# Patient Record
Sex: FEMALE | Race: WHITE | NOT HISPANIC OR LATINO | Employment: OTHER | URBAN - METROPOLITAN AREA
[De-identification: names, ages, dates, MRNs, and addresses within clinical notes are randomized per-mention and may not be internally consistent; named-entity substitution may affect disease eponyms.]

---

## 2017-01-10 ENCOUNTER — COMMUNICATION - HEALTHEAST (OUTPATIENT)
Dept: FAMILY MEDICINE | Facility: CLINIC | Age: 67
End: 2017-01-10

## 2017-01-10 DIAGNOSIS — F41.1 GENERALIZED ANXIETY DISORDER: ICD-10-CM

## 2017-02-28 ENCOUNTER — COMMUNICATION - HEALTHEAST (OUTPATIENT)
Dept: FAMILY MEDICINE | Facility: CLINIC | Age: 67
End: 2017-02-28

## 2017-02-28 DIAGNOSIS — R60.0 BILATERAL EDEMA OF LOWER EXTREMITY: ICD-10-CM

## 2017-02-28 DIAGNOSIS — I10 ESSENTIAL HYPERTENSION: ICD-10-CM

## 2017-03-13 ENCOUNTER — OFFICE VISIT - HEALTHEAST (OUTPATIENT)
Dept: FAMILY MEDICINE | Facility: CLINIC | Age: 67
End: 2017-03-13

## 2017-03-13 DIAGNOSIS — G44.009 CLUSTER HEADACHE: ICD-10-CM

## 2017-03-24 ENCOUNTER — COMMUNICATION - HEALTHEAST (OUTPATIENT)
Dept: FAMILY MEDICINE | Facility: CLINIC | Age: 67
End: 2017-03-24

## 2017-03-29 ENCOUNTER — OFFICE VISIT - HEALTHEAST (OUTPATIENT)
Dept: FAMILY MEDICINE | Facility: CLINIC | Age: 67
End: 2017-03-29

## 2017-03-29 DIAGNOSIS — G43.909 MIGRAINE: ICD-10-CM

## 2017-03-29 ASSESSMENT — MIFFLIN-ST. JEOR: SCORE: 1226.42

## 2017-04-01 ENCOUNTER — COMMUNICATION - HEALTHEAST (OUTPATIENT)
Dept: FAMILY MEDICINE | Facility: CLINIC | Age: 67
End: 2017-04-01

## 2017-04-06 ENCOUNTER — RECORDS - HEALTHEAST (OUTPATIENT)
Dept: ADMINISTRATIVE | Facility: OTHER | Age: 67
End: 2017-04-06

## 2017-04-20 ENCOUNTER — COMMUNICATION - HEALTHEAST (OUTPATIENT)
Dept: FAMILY MEDICINE | Facility: CLINIC | Age: 67
End: 2017-04-20

## 2017-04-20 DIAGNOSIS — F41.1 GENERALIZED ANXIETY DISORDER: ICD-10-CM

## 2017-04-27 ENCOUNTER — OFFICE VISIT - HEALTHEAST (OUTPATIENT)
Dept: FAMILY MEDICINE | Facility: CLINIC | Age: 67
End: 2017-04-27

## 2017-04-27 ENCOUNTER — COMMUNICATION - HEALTHEAST (OUTPATIENT)
Dept: FAMILY MEDICINE | Facility: CLINIC | Age: 67
End: 2017-04-27

## 2017-04-27 DIAGNOSIS — I10 HYPERTENSION: ICD-10-CM

## 2017-04-27 DIAGNOSIS — R60.0 PEDAL EDEMA: ICD-10-CM

## 2017-04-27 DIAGNOSIS — E55.9 VITAMIN D DEFICIENCY: ICD-10-CM

## 2017-04-27 DIAGNOSIS — G43.909 MIGRAINE: ICD-10-CM

## 2017-04-27 DIAGNOSIS — K43.9 HERNIA, VENTRAL: ICD-10-CM

## 2017-04-27 DIAGNOSIS — M54.9 BACK PAIN: ICD-10-CM

## 2017-04-27 ASSESSMENT — MIFFLIN-ST. JEOR: SCORE: 1230.96

## 2017-04-28 ENCOUNTER — OFFICE VISIT - HEALTHEAST (OUTPATIENT)
Dept: SURGERY | Facility: CLINIC | Age: 67
End: 2017-04-28

## 2017-04-28 ENCOUNTER — COMMUNICATION - HEALTHEAST (OUTPATIENT)
Dept: FAMILY MEDICINE | Facility: CLINIC | Age: 67
End: 2017-04-28

## 2017-04-28 DIAGNOSIS — G43.909 MIGRAINE: ICD-10-CM

## 2017-04-28 DIAGNOSIS — K43.2 INCISIONAL HERNIA, WITHOUT OBSTRUCTION OR GANGRENE: ICD-10-CM

## 2017-04-28 ASSESSMENT — MIFFLIN-ST. JEOR: SCORE: 1235.5

## 2017-05-01 ENCOUNTER — RECORDS - HEALTHEAST (OUTPATIENT)
Dept: ADMINISTRATIVE | Facility: OTHER | Age: 67
End: 2017-05-01

## 2017-05-11 ENCOUNTER — COMMUNICATION - HEALTHEAST (OUTPATIENT)
Dept: FAMILY MEDICINE | Facility: CLINIC | Age: 67
End: 2017-05-11

## 2017-05-11 ENCOUNTER — AMBULATORY - HEALTHEAST (OUTPATIENT)
Dept: FAMILY MEDICINE | Facility: CLINIC | Age: 67
End: 2017-05-11

## 2017-05-11 DIAGNOSIS — M25.569 KNEE PAIN: ICD-10-CM

## 2017-05-18 ENCOUNTER — OFFICE VISIT - HEALTHEAST (OUTPATIENT)
Dept: FAMILY MEDICINE | Facility: CLINIC | Age: 67
End: 2017-05-18

## 2017-05-18 DIAGNOSIS — G43.909 MIGRAINE: ICD-10-CM

## 2017-05-18 DIAGNOSIS — Z02.9 ADMINISTRATIVE ENCOUNTER: ICD-10-CM

## 2017-05-18 DIAGNOSIS — I10 HYPERTENSION: ICD-10-CM

## 2017-05-18 ASSESSMENT — MIFFLIN-ST. JEOR: SCORE: 1221.89

## 2017-05-20 ENCOUNTER — COMMUNICATION - HEALTHEAST (OUTPATIENT)
Dept: FAMILY MEDICINE | Facility: CLINIC | Age: 67
End: 2017-05-20

## 2017-05-22 ENCOUNTER — COMMUNICATION - HEALTHEAST (OUTPATIENT)
Dept: FAMILY MEDICINE | Facility: CLINIC | Age: 67
End: 2017-05-22

## 2017-05-22 DIAGNOSIS — G43.909 MIGRAINE: ICD-10-CM

## 2017-05-24 ENCOUNTER — COMMUNICATION - HEALTHEAST (OUTPATIENT)
Dept: FAMILY MEDICINE | Facility: CLINIC | Age: 67
End: 2017-05-24

## 2017-05-24 DIAGNOSIS — G43.909 MIGRAINE: ICD-10-CM

## 2017-05-26 ENCOUNTER — COMMUNICATION - HEALTHEAST (OUTPATIENT)
Dept: FAMILY MEDICINE | Facility: CLINIC | Age: 67
End: 2017-05-26

## 2017-05-29 ENCOUNTER — COMMUNICATION - HEALTHEAST (OUTPATIENT)
Dept: FAMILY MEDICINE | Facility: CLINIC | Age: 67
End: 2017-05-29

## 2017-05-29 DIAGNOSIS — G43.909 MIGRAINE: ICD-10-CM

## 2017-05-31 ENCOUNTER — COMMUNICATION - HEALTHEAST (OUTPATIENT)
Dept: FAMILY MEDICINE | Facility: CLINIC | Age: 67
End: 2017-05-31

## 2017-05-31 ENCOUNTER — OFFICE VISIT - HEALTHEAST (OUTPATIENT)
Dept: FAMILY MEDICINE | Facility: CLINIC | Age: 67
End: 2017-05-31

## 2017-05-31 DIAGNOSIS — K43.9 HERNIA, VENTRAL: ICD-10-CM

## 2017-05-31 DIAGNOSIS — Z01.818 PREOP EXAMINATION: ICD-10-CM

## 2017-05-31 DIAGNOSIS — F41.1 GENERALIZED ANXIETY DISORDER: ICD-10-CM

## 2017-05-31 DIAGNOSIS — E03.9 HYPOTHYROIDISM: ICD-10-CM

## 2017-05-31 ASSESSMENT — MIFFLIN-ST. JEOR: SCORE: 1204.89

## 2017-06-02 ENCOUNTER — SURGERY - HEALTHEAST (OUTPATIENT)
Dept: SURGERY | Facility: CLINIC | Age: 67
End: 2017-06-02

## 2017-06-02 ENCOUNTER — ANESTHESIA - HEALTHEAST (OUTPATIENT)
Dept: SURGERY | Facility: CLINIC | Age: 67
End: 2017-06-02

## 2017-06-02 ASSESSMENT — MIFFLIN-ST. JEOR: SCORE: 1196.95

## 2017-06-04 ASSESSMENT — MIFFLIN-ST. JEOR
SCORE: 1258.41
SCORE: 1244.8

## 2017-06-07 ENCOUNTER — COMMUNICATION - HEALTHEAST (OUTPATIENT)
Dept: FAMILY MEDICINE | Facility: CLINIC | Age: 67
End: 2017-06-07

## 2017-06-07 DIAGNOSIS — F41.1 GENERALIZED ANXIETY DISORDER: ICD-10-CM

## 2017-06-08 ENCOUNTER — COMMUNICATION - HEALTHEAST (OUTPATIENT)
Dept: FAMILY MEDICINE | Facility: CLINIC | Age: 67
End: 2017-06-08

## 2017-06-15 ENCOUNTER — COMMUNICATION - HEALTHEAST (OUTPATIENT)
Dept: FAMILY MEDICINE | Facility: CLINIC | Age: 67
End: 2017-06-15

## 2017-06-15 ENCOUNTER — OFFICE VISIT - HEALTHEAST (OUTPATIENT)
Dept: FAMILY MEDICINE | Facility: CLINIC | Age: 67
End: 2017-06-15

## 2017-06-15 DIAGNOSIS — M54.9 BACK PAIN: ICD-10-CM

## 2017-06-15 DIAGNOSIS — G43.909 MIGRAINE: ICD-10-CM

## 2017-06-15 DIAGNOSIS — Z13.9 SCREENING: ICD-10-CM

## 2017-06-15 DIAGNOSIS — F41.1 GENERALIZED ANXIETY DISORDER: ICD-10-CM

## 2017-06-15 DIAGNOSIS — K43.9 HERNIA, VENTRAL: ICD-10-CM

## 2017-06-15 ASSESSMENT — MIFFLIN-ST. JEOR: SCORE: 1182.21

## 2017-06-16 ENCOUNTER — OFFICE VISIT - HEALTHEAST (OUTPATIENT)
Dept: SURGERY | Facility: CLINIC | Age: 67
End: 2017-06-16

## 2017-06-16 DIAGNOSIS — K43.2 INCISIONAL HERNIA, WITHOUT OBSTRUCTION OR GANGRENE: ICD-10-CM

## 2017-06-16 ASSESSMENT — MIFFLIN-ST. JEOR: SCORE: 1175.4

## 2017-07-05 ENCOUNTER — COMMUNICATION - HEALTHEAST (OUTPATIENT)
Dept: SURGERY | Facility: CLINIC | Age: 67
End: 2017-07-05

## 2017-07-06 ENCOUNTER — OFFICE VISIT - HEALTHEAST (OUTPATIENT)
Dept: SURGERY | Facility: CLINIC | Age: 67
End: 2017-07-06

## 2017-07-06 DIAGNOSIS — R11.2 NON-INTRACTABLE VOMITING WITH NAUSEA, UNSPECIFIED VOMITING TYPE: ICD-10-CM

## 2017-07-06 DIAGNOSIS — K43.2 INCISIONAL HERNIA, WITHOUT OBSTRUCTION OR GANGRENE: ICD-10-CM

## 2017-07-11 ENCOUNTER — HOSPITAL ENCOUNTER (OUTPATIENT)
Dept: MAMMOGRAPHY | Facility: CLINIC | Age: 67
Discharge: HOME | End: 2017-07-11
Attending: FAMILY MEDICINE

## 2017-07-11 DIAGNOSIS — Z13.9 SCREENING: ICD-10-CM

## 2017-07-12 ENCOUNTER — HOSPITAL ENCOUNTER (OUTPATIENT)
Dept: CT IMAGING | Facility: CLINIC | Age: 67
Discharge: HOME | End: 2017-07-12
Attending: INTERNAL MEDICINE

## 2017-07-12 ENCOUNTER — RECORDS - HEALTHEAST (OUTPATIENT)
Dept: ADMINISTRATIVE | Facility: OTHER | Age: 67
End: 2017-07-12

## 2017-07-12 DIAGNOSIS — R10.30 LOWER ABDOMINAL PAIN: ICD-10-CM

## 2017-07-13 ENCOUNTER — RECORDS - HEALTHEAST (OUTPATIENT)
Dept: ADMINISTRATIVE | Facility: OTHER | Age: 67
End: 2017-07-13

## 2017-07-24 ENCOUNTER — RECORDS - HEALTHEAST (OUTPATIENT)
Dept: ADMINISTRATIVE | Facility: OTHER | Age: 67
End: 2017-07-24

## 2017-08-01 ENCOUNTER — AMBULATORY - HEALTHEAST (OUTPATIENT)
Dept: FAMILY MEDICINE | Facility: CLINIC | Age: 67
End: 2017-08-01

## 2017-08-01 DIAGNOSIS — K59.00 CONSTIPATION: ICD-10-CM

## 2017-08-15 ENCOUNTER — RECORDS - HEALTHEAST (OUTPATIENT)
Dept: ADMINISTRATIVE | Facility: OTHER | Age: 67
End: 2017-08-15

## 2017-08-22 ENCOUNTER — COMMUNICATION - HEALTHEAST (OUTPATIENT)
Dept: FAMILY MEDICINE | Facility: CLINIC | Age: 67
End: 2017-08-22

## 2017-08-22 DIAGNOSIS — G43.909 MIGRAINE: ICD-10-CM

## 2017-08-23 ENCOUNTER — COMMUNICATION - HEALTHEAST (OUTPATIENT)
Dept: FAMILY MEDICINE | Facility: CLINIC | Age: 67
End: 2017-08-23

## 2017-08-23 DIAGNOSIS — G43.909 MIGRAINES: ICD-10-CM

## 2017-08-23 DIAGNOSIS — R60.0 BILATERAL EDEMA OF LOWER EXTREMITY: ICD-10-CM

## 2017-08-23 DIAGNOSIS — I10 ESSENTIAL HYPERTENSION: ICD-10-CM

## 2017-08-24 ENCOUNTER — OFFICE VISIT - HEALTHEAST (OUTPATIENT)
Dept: NURSING | Facility: CLINIC | Age: 67
End: 2017-08-24

## 2017-08-24 DIAGNOSIS — Z78.0 MENOPAUSE: ICD-10-CM

## 2017-08-24 DIAGNOSIS — F41.1 GENERALIZED ANXIETY DISORDER: ICD-10-CM

## 2017-08-24 DIAGNOSIS — G25.81 RESTLESS LEG SYNDROME: ICD-10-CM

## 2017-08-24 DIAGNOSIS — G43.801 OTHER FORMS OF MIGRAINE WITH STATUS MIGRAINOSUS: ICD-10-CM

## 2017-08-24 DIAGNOSIS — E55.9 VITAMIN D DEFICIENCY: ICD-10-CM

## 2017-08-24 DIAGNOSIS — E03.9 HYPOTHYROIDISM, UNSPECIFIED TYPE: ICD-10-CM

## 2017-08-24 DIAGNOSIS — I10 ESSENTIAL HYPERTENSION WITH GOAL BLOOD PRESSURE LESS THAN 140/90: ICD-10-CM

## 2017-08-24 DIAGNOSIS — R25.2 MUSCLE CRAMPS: ICD-10-CM

## 2017-08-30 ENCOUNTER — COMMUNICATION - HEALTHEAST (OUTPATIENT)
Dept: FAMILY MEDICINE | Facility: CLINIC | Age: 67
End: 2017-08-30

## 2017-09-05 ENCOUNTER — RECORDS - HEALTHEAST (OUTPATIENT)
Dept: ADMINISTRATIVE | Facility: OTHER | Age: 67
End: 2017-09-05

## 2017-09-10 ENCOUNTER — OFFICE VISIT - HEALTHEAST (OUTPATIENT)
Dept: TELEHEALTH | Facility: CLINIC | Age: 67
End: 2017-09-10

## 2017-09-10 DIAGNOSIS — N39.0 ACUTE UTI (URINARY TRACT INFECTION): ICD-10-CM

## 2017-09-11 ENCOUNTER — RECORDS - HEALTHEAST (OUTPATIENT)
Dept: ADMINISTRATIVE | Facility: OTHER | Age: 67
End: 2017-09-11

## 2017-09-12 ENCOUNTER — RECORDS - HEALTHEAST (OUTPATIENT)
Dept: ADMINISTRATIVE | Facility: OTHER | Age: 67
End: 2017-09-12

## 2017-09-12 ENCOUNTER — RECORDS - HEALTHEAST (OUTPATIENT)
Dept: BONE DENSITY | Facility: CLINIC | Age: 67
End: 2017-09-12

## 2017-09-12 DIAGNOSIS — Z78.0 ASYMPTOMATIC MENOPAUSAL STATE: ICD-10-CM

## 2017-09-14 ENCOUNTER — AMBULATORY - HEALTHEAST (OUTPATIENT)
Dept: FAMILY MEDICINE | Facility: CLINIC | Age: 67
End: 2017-09-14

## 2017-09-14 DIAGNOSIS — M81.0 OSTEOPOROSIS: ICD-10-CM

## 2017-09-15 ENCOUNTER — COMMUNICATION - HEALTHEAST (OUTPATIENT)
Dept: ENDOCRINOLOGY | Facility: CLINIC | Age: 67
End: 2017-09-15

## 2017-09-15 ENCOUNTER — COMMUNICATION - HEALTHEAST (OUTPATIENT)
Dept: FAMILY MEDICINE | Facility: CLINIC | Age: 67
End: 2017-09-15

## 2017-09-18 ENCOUNTER — COMMUNICATION - HEALTHEAST (OUTPATIENT)
Dept: FAMILY MEDICINE | Facility: CLINIC | Age: 67
End: 2017-09-18

## 2017-09-18 ENCOUNTER — OFFICE VISIT - HEALTHEAST (OUTPATIENT)
Dept: FAMILY MEDICINE | Facility: CLINIC | Age: 67
End: 2017-09-18

## 2017-09-18 DIAGNOSIS — M54.50 CHRONIC LOW BACK PAIN: ICD-10-CM

## 2017-09-18 DIAGNOSIS — F41.1 GENERALIZED ANXIETY DISORDER: ICD-10-CM

## 2017-09-18 DIAGNOSIS — G89.29 CHRONIC LOW BACK PAIN: ICD-10-CM

## 2017-09-25 ENCOUNTER — RECORDS - HEALTHEAST (OUTPATIENT)
Dept: ADMINISTRATIVE | Facility: OTHER | Age: 67
End: 2017-09-25

## 2017-09-27 ENCOUNTER — RECORDS - HEALTHEAST (OUTPATIENT)
Dept: ADMINISTRATIVE | Facility: OTHER | Age: 67
End: 2017-09-27

## 2017-09-28 ENCOUNTER — OFFICE VISIT - HEALTHEAST (OUTPATIENT)
Dept: NURSING | Facility: CLINIC | Age: 67
End: 2017-09-28

## 2017-09-28 DIAGNOSIS — M81.0 OSTEOPOROSIS, UNSPECIFIED OSTEOPOROSIS TYPE, UNSPECIFIED PATHOLOGICAL FRACTURE PRESENCE: ICD-10-CM

## 2017-09-28 DIAGNOSIS — F41.1 GENERALIZED ANXIETY DISORDER: ICD-10-CM

## 2017-09-28 DIAGNOSIS — I10 ESSENTIAL HYPERTENSION WITH GOAL BLOOD PRESSURE LESS THAN 140/90: ICD-10-CM

## 2017-10-03 ENCOUNTER — RECORDS - HEALTHEAST (OUTPATIENT)
Dept: ADMINISTRATIVE | Facility: OTHER | Age: 67
End: 2017-10-03

## 2017-10-04 ENCOUNTER — COMMUNICATION - HEALTHEAST (OUTPATIENT)
Dept: NURSING | Facility: CLINIC | Age: 67
End: 2017-10-04

## 2017-10-04 ENCOUNTER — AMBULATORY - HEALTHEAST (OUTPATIENT)
Dept: FAMILY MEDICINE | Facility: CLINIC | Age: 67
End: 2017-10-04

## 2017-10-04 DIAGNOSIS — K57.90 DIVERTICULOSIS: ICD-10-CM

## 2017-10-05 ENCOUNTER — COMMUNICATION - HEALTHEAST (OUTPATIENT)
Dept: FAMILY MEDICINE | Facility: CLINIC | Age: 67
End: 2017-10-05

## 2017-10-05 DIAGNOSIS — I10 ESSENTIAL HYPERTENSION: ICD-10-CM

## 2017-10-05 DIAGNOSIS — F41.1 GENERALIZED ANXIETY DISORDER: ICD-10-CM

## 2017-10-05 DIAGNOSIS — E03.9 HYPOTHYROID: ICD-10-CM

## 2017-10-05 DIAGNOSIS — I10 ESSENTIAL HYPERTENSION WITH GOAL BLOOD PRESSURE LESS THAN 140/90: ICD-10-CM

## 2017-10-06 ENCOUNTER — COMMUNICATION - HEALTHEAST (OUTPATIENT)
Dept: FAMILY MEDICINE | Facility: CLINIC | Age: 67
End: 2017-10-06

## 2017-10-07 ENCOUNTER — COMMUNICATION - HEALTHEAST (OUTPATIENT)
Dept: FAMILY MEDICINE | Facility: CLINIC | Age: 67
End: 2017-10-07

## 2017-10-07 DIAGNOSIS — G25.81 RLS (RESTLESS LEGS SYNDROME): ICD-10-CM

## 2017-10-10 ENCOUNTER — COMMUNICATION - HEALTHEAST (OUTPATIENT)
Dept: NURSING | Facility: CLINIC | Age: 67
End: 2017-10-10

## 2017-10-12 ENCOUNTER — COMMUNICATION - HEALTHEAST (OUTPATIENT)
Dept: FAMILY MEDICINE | Facility: CLINIC | Age: 67
End: 2017-10-12

## 2017-10-12 ENCOUNTER — AMBULATORY - HEALTHEAST (OUTPATIENT)
Dept: FAMILY MEDICINE | Facility: CLINIC | Age: 67
End: 2017-10-12

## 2017-10-16 ENCOUNTER — HOSPITAL ENCOUNTER (OUTPATIENT)
Dept: MRI IMAGING | Facility: CLINIC | Age: 67
Discharge: HOME | End: 2017-10-16
Attending: INTERNAL MEDICINE

## 2017-10-16 DIAGNOSIS — R10.32 LEFT LOWER QUADRANT PAIN: ICD-10-CM

## 2017-10-16 ASSESSMENT — MIFFLIN-ST. JEOR: SCORE: 1177.67

## 2017-10-26 ENCOUNTER — COMMUNICATION - HEALTHEAST (OUTPATIENT)
Dept: FAMILY MEDICINE | Facility: CLINIC | Age: 67
End: 2017-10-26

## 2017-10-26 ENCOUNTER — COMMUNICATION - HEALTHEAST (OUTPATIENT)
Dept: NURSING | Facility: CLINIC | Age: 67
End: 2017-10-26

## 2017-10-26 ENCOUNTER — OFFICE VISIT - HEALTHEAST (OUTPATIENT)
Dept: FAMILY MEDICINE | Facility: CLINIC | Age: 67
End: 2017-10-26

## 2017-10-26 ENCOUNTER — RECORDS - HEALTHEAST (OUTPATIENT)
Dept: ADMINISTRATIVE | Facility: OTHER | Age: 67
End: 2017-10-26

## 2017-10-26 DIAGNOSIS — E55.9 VITAMIN D DEFICIENCY: ICD-10-CM

## 2017-10-26 DIAGNOSIS — M81.0 OSTEOPOROSIS WITHOUT CURRENT PATHOLOGICAL FRACTURE, UNSPECIFIED OSTEOPOROSIS TYPE: ICD-10-CM

## 2017-10-26 DIAGNOSIS — I10 ESSENTIAL HYPERTENSION: ICD-10-CM

## 2017-10-26 DIAGNOSIS — F41.1 GENERALIZED ANXIETY DISORDER: ICD-10-CM

## 2017-10-26 DIAGNOSIS — E03.9 HYPOTHYROIDISM, UNSPECIFIED TYPE: ICD-10-CM

## 2017-10-26 DIAGNOSIS — M54.9 BACK PAIN: ICD-10-CM

## 2017-10-30 ENCOUNTER — RECORDS - HEALTHEAST (OUTPATIENT)
Dept: ADMINISTRATIVE | Facility: OTHER | Age: 67
End: 2017-10-30

## 2017-11-07 ENCOUNTER — OFFICE VISIT - HEALTHEAST (OUTPATIENT)
Dept: FAMILY MEDICINE | Facility: CLINIC | Age: 67
End: 2017-11-07

## 2017-11-07 DIAGNOSIS — L20.82 FLEXURAL ECZEMA: ICD-10-CM

## 2017-11-07 DIAGNOSIS — E03.9 HYPOTHYROID: ICD-10-CM

## 2017-11-07 DIAGNOSIS — E03.9 HYPOTHYROIDISM, UNSPECIFIED TYPE: ICD-10-CM

## 2017-11-10 ENCOUNTER — COMMUNICATION - HEALTHEAST (OUTPATIENT)
Dept: FAMILY MEDICINE | Facility: CLINIC | Age: 67
End: 2017-11-10

## 2017-11-14 ENCOUNTER — OFFICE VISIT - HEALTHEAST (OUTPATIENT)
Dept: FAMILY MEDICINE | Facility: CLINIC | Age: 67
End: 2017-11-14

## 2017-11-14 ENCOUNTER — HOSPITAL ENCOUNTER (OUTPATIENT)
Dept: ULTRASOUND IMAGING | Facility: CLINIC | Age: 67
Discharge: HOME | End: 2017-11-14
Attending: INTERNAL MEDICINE

## 2017-11-14 DIAGNOSIS — R74.8 ELEVATED ALKALINE PHOSPHATASE LEVEL: ICD-10-CM

## 2017-11-14 DIAGNOSIS — R10.32 LLQ PAIN: ICD-10-CM

## 2017-11-14 DIAGNOSIS — E87.8 HYPOCHLOREMIA: ICD-10-CM

## 2017-11-14 DIAGNOSIS — E03.9 HYPOTHYROIDISM, UNSPECIFIED TYPE: ICD-10-CM

## 2017-11-14 DIAGNOSIS — G89.29 CHRONIC PAIN OF RIGHT KNEE: ICD-10-CM

## 2017-11-14 DIAGNOSIS — E87.1 HYPONATREMIA: ICD-10-CM

## 2017-11-14 DIAGNOSIS — M25.561 CHRONIC PAIN OF RIGHT KNEE: ICD-10-CM

## 2017-11-14 DIAGNOSIS — Z01.818 PREOP EXAMINATION: ICD-10-CM

## 2017-11-14 LAB
ATRIAL RATE - MUSE: 66 BPM
DIASTOLIC BLOOD PRESSURE - MUSE: NORMAL MMHG
INTERPRETATION ECG - MUSE: NORMAL
P AXIS - MUSE: 10 DEGREES
PR INTERVAL - MUSE: 128 MS
QRS DURATION - MUSE: 90 MS
QT - MUSE: 378 MS
QTC - MUSE: 396 MS
R AXIS - MUSE: 10 DEGREES
SYSTOLIC BLOOD PRESSURE - MUSE: NORMAL MMHG
T AXIS - MUSE: 19 DEGREES
VENTRICULAR RATE- MUSE: 66 BPM

## 2017-11-14 ASSESSMENT — MIFFLIN-ST. JEOR: SCORE: 1185.6

## 2017-11-16 ENCOUNTER — RECORDS - HEALTHEAST (OUTPATIENT)
Dept: ADMINISTRATIVE | Facility: OTHER | Age: 67
End: 2017-11-16

## 2017-11-20 ASSESSMENT — MIFFLIN-ST. JEOR: SCORE: 1185.6

## 2017-11-27 ENCOUNTER — RECORDS - HEALTHEAST (OUTPATIENT)
Dept: ADMINISTRATIVE | Facility: OTHER | Age: 67
End: 2017-11-27

## 2017-11-27 ENCOUNTER — ANESTHESIA - HEALTHEAST (OUTPATIENT)
Dept: SURGERY | Facility: CLINIC | Age: 67
End: 2017-11-27

## 2017-11-27 ENCOUNTER — SURGERY - HEALTHEAST (OUTPATIENT)
Dept: SURGERY | Facility: CLINIC | Age: 67
End: 2017-11-27

## 2017-12-07 ENCOUNTER — OFFICE VISIT - HEALTHEAST (OUTPATIENT)
Dept: NURSING | Facility: CLINIC | Age: 67
End: 2017-12-07

## 2017-12-07 ENCOUNTER — RECORDS - HEALTHEAST (OUTPATIENT)
Dept: ADMINISTRATIVE | Facility: OTHER | Age: 67
End: 2017-12-07

## 2017-12-07 DIAGNOSIS — G47.00 INSOMNIA, UNSPECIFIED TYPE: ICD-10-CM

## 2017-12-07 DIAGNOSIS — F32.A DEPRESSION, UNSPECIFIED DEPRESSION TYPE: ICD-10-CM

## 2017-12-07 DIAGNOSIS — F41.9 ANXIETY: ICD-10-CM

## 2017-12-07 DIAGNOSIS — Z96.651 STATUS POST TOTAL RIGHT KNEE REPLACEMENT: ICD-10-CM

## 2017-12-07 DIAGNOSIS — M81.0 OSTEOPOROSIS WITHOUT CURRENT PATHOLOGICAL FRACTURE, UNSPECIFIED OSTEOPOROSIS TYPE: ICD-10-CM

## 2017-12-13 ENCOUNTER — AMBULATORY - HEALTHEAST (OUTPATIENT)
Dept: FAMILY MEDICINE | Facility: CLINIC | Age: 67
End: 2017-12-13

## 2017-12-13 DIAGNOSIS — M81.0 OSTEOPOROSIS WITHOUT CURRENT PATHOLOGICAL FRACTURE, UNSPECIFIED OSTEOPOROSIS TYPE: ICD-10-CM

## 2017-12-14 ENCOUNTER — RECORDS - HEALTHEAST (OUTPATIENT)
Dept: ADMINISTRATIVE | Facility: OTHER | Age: 67
End: 2017-12-14

## 2018-01-05 ENCOUNTER — COMMUNICATION - HEALTHEAST (OUTPATIENT)
Dept: FAMILY MEDICINE | Facility: CLINIC | Age: 68
End: 2018-01-05

## 2018-01-05 DIAGNOSIS — F41.1 GENERALIZED ANXIETY DISORDER: ICD-10-CM

## 2018-01-05 DIAGNOSIS — I10 HTN (HYPERTENSION): ICD-10-CM

## 2018-01-15 ENCOUNTER — AMBULATORY - HEALTHEAST (OUTPATIENT)
Dept: LAB | Facility: CLINIC | Age: 68
End: 2018-01-15

## 2018-01-15 DIAGNOSIS — R74.8 ELEVATED ALKALINE PHOSPHATASE LEVEL: ICD-10-CM

## 2018-01-15 DIAGNOSIS — E87.1 HYPONATREMIA: ICD-10-CM

## 2018-01-15 DIAGNOSIS — E87.8 HYPOCHLOREMIA: ICD-10-CM

## 2018-01-15 LAB
ALBUMIN SERPL-MCNC: 3.4 G/DL (ref 3.5–5)
ALP SERPL-CCNC: 122 U/L (ref 45–120)
ALT SERPL W P-5'-P-CCNC: 22 U/L (ref 0–45)
ANION GAP SERPL CALCULATED.3IONS-SCNC: 11 MMOL/L (ref 5–18)
AST SERPL W P-5'-P-CCNC: 19 U/L (ref 0–40)
BILIRUB SERPL-MCNC: 0.4 MG/DL (ref 0–1)
BUN SERPL-MCNC: 15 MG/DL (ref 8–22)
CALCIUM SERPL-MCNC: 9 MG/DL (ref 8.5–10.5)
CHLORIDE BLD-SCNC: 100 MMOL/L (ref 98–107)
CO2 SERPL-SCNC: 24 MMOL/L (ref 22–31)
CREAT SERPL-MCNC: 0.69 MG/DL (ref 0.6–1.1)
GFR SERPL CREATININE-BSD FRML MDRD: >60 ML/MIN/1.73M2
GLUCOSE BLD-MCNC: 96 MG/DL (ref 70–125)
POTASSIUM BLD-SCNC: 4 MMOL/L (ref 3.5–5)
PROT SERPL-MCNC: 6.6 G/DL (ref 6–8)
SODIUM SERPL-SCNC: 135 MMOL/L (ref 136–145)

## 2018-01-24 ENCOUNTER — OFFICE VISIT - HEALTHEAST (OUTPATIENT)
Dept: FAMILY MEDICINE | Facility: CLINIC | Age: 68
End: 2018-01-24

## 2018-01-24 DIAGNOSIS — G47.00 INSOMNIA, UNSPECIFIED TYPE: ICD-10-CM

## 2018-01-24 DIAGNOSIS — S03.00XA DISLOCATION OF TEMPOROMANDIBULAR JOINT, INITIAL ENCOUNTER: ICD-10-CM

## 2018-01-24 ASSESSMENT — MIFFLIN-ST. JEOR: SCORE: 1190.14

## 2018-03-09 ENCOUNTER — COMMUNICATION - HEALTHEAST (OUTPATIENT)
Dept: FAMILY MEDICINE | Facility: CLINIC | Age: 68
End: 2018-03-09

## 2018-03-09 DIAGNOSIS — R60.0 BILATERAL EDEMA OF LOWER EXTREMITY: ICD-10-CM

## 2018-03-09 DIAGNOSIS — I10 ESSENTIAL HYPERTENSION: ICD-10-CM

## 2018-03-14 ENCOUNTER — RECORDS - HEALTHEAST (OUTPATIENT)
Dept: ADMINISTRATIVE | Facility: OTHER | Age: 68
End: 2018-03-14

## 2018-03-15 ENCOUNTER — AMBULATORY - HEALTHEAST (OUTPATIENT)
Dept: LAB | Facility: CLINIC | Age: 68
End: 2018-03-15

## 2018-03-15 DIAGNOSIS — E03.9 HYPOTHYROIDISM, UNSPECIFIED TYPE: ICD-10-CM

## 2018-03-15 LAB — TSH SERPL DL<=0.005 MIU/L-ACNC: 1.5 UIU/ML (ref 0.3–5)

## 2018-04-09 ENCOUNTER — RECORDS - HEALTHEAST (OUTPATIENT)
Dept: ADMINISTRATIVE | Facility: OTHER | Age: 68
End: 2018-04-09

## 2018-04-12 ENCOUNTER — AMBULATORY - HEALTHEAST (OUTPATIENT)
Dept: LAB | Facility: CLINIC | Age: 68
End: 2018-04-12

## 2018-04-12 ENCOUNTER — RECORDS - HEALTHEAST (OUTPATIENT)
Dept: ADMINISTRATIVE | Facility: OTHER | Age: 68
End: 2018-04-12

## 2018-04-12 DIAGNOSIS — M25.569 KNEE PAIN: ICD-10-CM

## 2018-04-12 LAB
C REACTIVE PROTEIN LHE: 0.4 MG/DL (ref 0–0.8)
ERYTHROCYTE [SEDIMENTATION RATE] IN BLOOD BY WESTERGREN METHOD: 16 MM/HR (ref 0–20)

## 2018-04-18 ENCOUNTER — HOSPITAL ENCOUNTER (OUTPATIENT)
Dept: ULTRASOUND IMAGING | Facility: CLINIC | Age: 68
Discharge: HOME | End: 2018-04-18
Attending: ORTHOPAEDIC SURGERY

## 2018-04-18 DIAGNOSIS — R52 PAIN: ICD-10-CM

## 2018-04-18 DIAGNOSIS — R60.9 SWELLING: ICD-10-CM

## 2018-04-19 ENCOUNTER — COMMUNICATION - HEALTHEAST (OUTPATIENT)
Dept: FAMILY MEDICINE | Facility: CLINIC | Age: 68
End: 2018-04-19

## 2018-04-24 ENCOUNTER — OFFICE VISIT - HEALTHEAST (OUTPATIENT)
Dept: FAMILY MEDICINE | Facility: CLINIC | Age: 68
End: 2018-04-24

## 2018-04-24 DIAGNOSIS — K57.90 DIVERTICULOSIS: ICD-10-CM

## 2018-04-24 DIAGNOSIS — E03.9 ACQUIRED HYPOTHYROIDISM: ICD-10-CM

## 2018-04-24 DIAGNOSIS — Z98.890 POSTOPERATIVE NAUSEA AND VOMITING: ICD-10-CM

## 2018-04-24 DIAGNOSIS — E87.1 HYPONATREMIA: ICD-10-CM

## 2018-04-24 DIAGNOSIS — R74.8 ELEVATED ALKALINE PHOSPHATASE LEVEL: ICD-10-CM

## 2018-04-24 DIAGNOSIS — M79.7 FIBROMYALGIA: ICD-10-CM

## 2018-04-24 DIAGNOSIS — R11.2 POSTOPERATIVE NAUSEA AND VOMITING: ICD-10-CM

## 2018-04-24 DIAGNOSIS — E87.8 HYPOCHLOREMIA: ICD-10-CM

## 2018-04-24 DIAGNOSIS — D62 ACUTE BLOOD LOSS AS CAUSE OF POSTOPERATIVE ANEMIA: ICD-10-CM

## 2018-04-24 LAB
ALBUMIN SERPL-MCNC: 3.6 G/DL (ref 3.5–5)
ALP SERPL-CCNC: 137 U/L (ref 45–120)
ALT SERPL W P-5'-P-CCNC: 22 U/L (ref 0–45)
ANION GAP SERPL CALCULATED.3IONS-SCNC: 13 MMOL/L (ref 5–18)
AST SERPL W P-5'-P-CCNC: 20 U/L (ref 0–40)
BASOPHILS # BLD AUTO: 0 THOU/UL (ref 0–0.2)
BASOPHILS NFR BLD AUTO: 1 % (ref 0–2)
BILIRUB SERPL-MCNC: 0.4 MG/DL (ref 0–1)
BUN SERPL-MCNC: 13 MG/DL (ref 8–22)
CALCIUM SERPL-MCNC: 9.5 MG/DL (ref 8.5–10.5)
CHLORIDE BLD-SCNC: 104 MMOL/L (ref 98–107)
CO2 SERPL-SCNC: 21 MMOL/L (ref 22–31)
CREAT SERPL-MCNC: 0.69 MG/DL (ref 0.6–1.1)
EOSINOPHIL # BLD AUTO: 0.2 THOU/UL (ref 0–0.4)
EOSINOPHIL NFR BLD AUTO: 3 % (ref 0–6)
ERYTHROCYTE [DISTWIDTH] IN BLOOD BY AUTOMATED COUNT: 11.8 % (ref 11–14.5)
GFR SERPL CREATININE-BSD FRML MDRD: >60 ML/MIN/1.73M2
GLUCOSE BLD-MCNC: 93 MG/DL (ref 70–125)
HCT VFR BLD AUTO: 37.2 % (ref 35–47)
HGB BLD-MCNC: 12.4 G/DL (ref 12–16)
LYMPHOCYTES # BLD AUTO: 1.4 THOU/UL (ref 0.8–4.4)
LYMPHOCYTES NFR BLD AUTO: 25 % (ref 20–40)
MCH RBC QN AUTO: 29.1 PG (ref 27–34)
MCHC RBC AUTO-ENTMCNC: 33.4 G/DL (ref 32–36)
MCV RBC AUTO: 87 FL (ref 80–100)
MONOCYTES # BLD AUTO: 0.6 THOU/UL (ref 0–0.9)
MONOCYTES NFR BLD AUTO: 11 % (ref 2–10)
NEUTROPHILS # BLD AUTO: 3.4 THOU/UL (ref 2–7.7)
NEUTROPHILS NFR BLD AUTO: 61 % (ref 50–70)
PLATELET # BLD AUTO: 326 THOU/UL (ref 140–440)
PMV BLD AUTO: 6.4 FL (ref 7–10)
POTASSIUM BLD-SCNC: 4.1 MMOL/L (ref 3.5–5)
PROT SERPL-MCNC: 7.1 G/DL (ref 6–8)
RBC # BLD AUTO: 4.27 MILL/UL (ref 3.8–5.4)
SODIUM SERPL-SCNC: 138 MMOL/L (ref 136–145)
WBC: 5.5 THOU/UL (ref 4–11)

## 2018-04-25 ENCOUNTER — RECORDS - HEALTHEAST (OUTPATIENT)
Dept: ADMINISTRATIVE | Facility: OTHER | Age: 68
End: 2018-04-25

## 2018-05-06 ENCOUNTER — COMMUNICATION - HEALTHEAST (OUTPATIENT)
Dept: FAMILY MEDICINE | Facility: CLINIC | Age: 68
End: 2018-05-06

## 2018-05-06 DIAGNOSIS — F41.1 GENERALIZED ANXIETY DISORDER: ICD-10-CM

## 2018-05-11 ENCOUNTER — TRANSFERRED RECORDS (OUTPATIENT)
Dept: HEALTH INFORMATION MANAGEMENT | Facility: CLINIC | Age: 68
End: 2018-05-11

## 2018-05-11 ENCOUNTER — RECORDS - HEALTHEAST (OUTPATIENT)
Dept: ADMINISTRATIVE | Facility: OTHER | Age: 68
End: 2018-05-11

## 2018-06-11 ENCOUNTER — COMMUNICATION - HEALTHEAST (OUTPATIENT)
Dept: FAMILY MEDICINE | Facility: CLINIC | Age: 68
End: 2018-06-11

## 2018-06-11 DIAGNOSIS — I10 ESSENTIAL HYPERTENSION: ICD-10-CM

## 2018-06-11 DIAGNOSIS — R60.0 BILATERAL EDEMA OF LOWER EXTREMITY: ICD-10-CM

## 2018-06-12 ENCOUNTER — HOSPITAL ENCOUNTER (OUTPATIENT)
Dept: PALLIATIVE MEDICINE | Facility: OTHER | Age: 68
Discharge: HOME | End: 2018-06-12
Attending: FAMILY MEDICINE

## 2018-06-12 DIAGNOSIS — G89.4 CHRONIC PAIN SYNDROME: ICD-10-CM

## 2018-06-12 DIAGNOSIS — M79.7 FIBROMYALGIA: ICD-10-CM

## 2018-06-12 ASSESSMENT — MIFFLIN-ST. JEOR: SCORE: 1217.35

## 2018-06-30 ENCOUNTER — COMMUNICATION - HEALTHEAST (OUTPATIENT)
Dept: FAMILY MEDICINE | Facility: CLINIC | Age: 68
End: 2018-06-30

## 2018-06-30 DIAGNOSIS — G25.81 RLS (RESTLESS LEGS SYNDROME): ICD-10-CM

## 2018-06-30 DIAGNOSIS — M26.609 TEMPOROMANDIBULAR JOINT SYNDROME: ICD-10-CM

## 2018-07-04 ENCOUNTER — COMMUNICATION - HEALTHEAST (OUTPATIENT)
Dept: FAMILY MEDICINE | Facility: CLINIC | Age: 68
End: 2018-07-04

## 2018-07-04 DIAGNOSIS — G25.81 RLS (RESTLESS LEGS SYNDROME): ICD-10-CM

## 2018-07-12 ENCOUNTER — RECORDS - HEALTHEAST (OUTPATIENT)
Dept: ADMINISTRATIVE | Facility: OTHER | Age: 68
End: 2018-07-12

## 2018-07-13 ENCOUNTER — COMMUNICATION - HEALTHEAST (OUTPATIENT)
Dept: INTERNAL MEDICINE | Facility: CLINIC | Age: 68
End: 2018-07-13

## 2018-07-18 ENCOUNTER — COMMUNICATION - HEALTHEAST (OUTPATIENT)
Dept: PALLIATIVE MEDICINE | Facility: OTHER | Age: 68
End: 2018-07-18

## 2018-07-19 ENCOUNTER — HOSPITAL ENCOUNTER (OUTPATIENT)
Dept: PALLIATIVE MEDICINE | Facility: OTHER | Age: 68
Discharge: HOME | End: 2018-07-19
Attending: PAIN MEDICINE | Admitting: PAIN MEDICINE

## 2018-07-19 DIAGNOSIS — M79.18 MYOFASCIAL PAIN: ICD-10-CM

## 2018-07-19 ASSESSMENT — MIFFLIN-ST. JEOR: SCORE: 1217.35

## 2018-07-20 ENCOUNTER — COMMUNICATION - HEALTHEAST (OUTPATIENT)
Dept: PALLIATIVE MEDICINE | Facility: OTHER | Age: 68
End: 2018-07-20

## 2018-07-20 ENCOUNTER — RECORDS - HEALTHEAST (OUTPATIENT)
Dept: ADMINISTRATIVE | Facility: OTHER | Age: 68
End: 2018-07-20

## 2018-07-25 ENCOUNTER — RECORDS - HEALTHEAST (OUTPATIENT)
Dept: ADMINISTRATIVE | Facility: OTHER | Age: 68
End: 2018-07-25

## 2018-07-31 ENCOUNTER — COMMUNICATION - HEALTHEAST (OUTPATIENT)
Dept: FAMILY MEDICINE | Facility: CLINIC | Age: 68
End: 2018-07-31

## 2018-07-31 DIAGNOSIS — I10 ESSENTIAL HYPERTENSION: ICD-10-CM

## 2018-08-02 ENCOUNTER — COMMUNICATION - HEALTHEAST (OUTPATIENT)
Dept: FAMILY MEDICINE | Facility: CLINIC | Age: 68
End: 2018-08-02

## 2018-08-02 DIAGNOSIS — I10 HTN (HYPERTENSION): ICD-10-CM

## 2018-08-03 ENCOUNTER — OFFICE VISIT - HEALTHEAST (OUTPATIENT)
Dept: FAMILY MEDICINE | Facility: CLINIC | Age: 68
End: 2018-08-03

## 2018-08-03 DIAGNOSIS — S81.812D LACERATION OF LEFT LOWER EXTREMITY, SUBSEQUENT ENCOUNTER: ICD-10-CM

## 2018-08-03 DIAGNOSIS — L03.116 CELLULITIS OF LEFT LOWER EXTREMITY: ICD-10-CM

## 2018-08-03 ASSESSMENT — MIFFLIN-ST. JEOR: SCORE: 1235.5

## 2018-08-05 LAB — BACTERIA SPEC CULT: NO GROWTH

## 2018-08-07 ENCOUNTER — RECORDS - HEALTHEAST (OUTPATIENT)
Dept: ADMINISTRATIVE | Facility: OTHER | Age: 68
End: 2018-08-07

## 2018-08-16 ENCOUNTER — OFFICE VISIT - HEALTHEAST (OUTPATIENT)
Dept: INTERNAL MEDICINE | Facility: CLINIC | Age: 68
End: 2018-08-16

## 2018-08-16 DIAGNOSIS — M79.7 FIBROMYALGIA: ICD-10-CM

## 2018-08-16 DIAGNOSIS — G43.909 MIGRAINE: ICD-10-CM

## 2018-08-16 DIAGNOSIS — E03.9 ACQUIRED HYPOTHYROIDISM: ICD-10-CM

## 2018-08-16 DIAGNOSIS — M81.0 OSTEOPOROSIS WITHOUT CURRENT PATHOLOGICAL FRACTURE, UNSPECIFIED OSTEOPOROSIS TYPE: ICD-10-CM

## 2018-08-16 DIAGNOSIS — F32.A DEPRESSION, UNSPECIFIED DEPRESSION TYPE: ICD-10-CM

## 2018-08-16 DIAGNOSIS — K21.9 GASTROESOPHAGEAL REFLUX DISEASE WITHOUT ESOPHAGITIS: ICD-10-CM

## 2018-08-16 DIAGNOSIS — I10 ESSENTIAL HYPERTENSION: ICD-10-CM

## 2018-08-16 ASSESSMENT — MIFFLIN-ST. JEOR: SCORE: 1242.08

## 2018-08-22 ENCOUNTER — COMMUNICATION - HEALTHEAST (OUTPATIENT)
Dept: INTERNAL MEDICINE | Facility: CLINIC | Age: 68
End: 2018-08-22

## 2018-08-22 ENCOUNTER — COMMUNICATION - HEALTHEAST (OUTPATIENT)
Dept: FAMILY MEDICINE | Facility: CLINIC | Age: 68
End: 2018-08-22

## 2018-08-22 DIAGNOSIS — G25.81 RLS (RESTLESS LEGS SYNDROME): ICD-10-CM

## 2018-08-23 ENCOUNTER — HOSPITAL ENCOUNTER (OUTPATIENT)
Dept: MAMMOGRAPHY | Facility: CLINIC | Age: 68
Discharge: HOME | End: 2018-08-23
Attending: INTERNAL MEDICINE

## 2018-08-23 DIAGNOSIS — Z12.31 VISIT FOR SCREENING MAMMOGRAM: ICD-10-CM

## 2018-08-24 ENCOUNTER — AMBULATORY - HEALTHEAST (OUTPATIENT)
Dept: INTERNAL MEDICINE | Facility: CLINIC | Age: 68
End: 2018-08-24

## 2018-08-24 ENCOUNTER — AMBULATORY - HEALTHEAST (OUTPATIENT)
Dept: NURSING | Facility: CLINIC | Age: 68
End: 2018-08-24

## 2018-08-24 DIAGNOSIS — M81.0 OSTEOPOROSIS WITHOUT CURRENT PATHOLOGICAL FRACTURE, UNSPECIFIED OSTEOPOROSIS TYPE: ICD-10-CM

## 2018-09-20 ENCOUNTER — COMMUNICATION - HEALTHEAST (OUTPATIENT)
Dept: FAMILY MEDICINE | Facility: CLINIC | Age: 68
End: 2018-09-20

## 2018-09-20 ENCOUNTER — COMMUNICATION - HEALTHEAST (OUTPATIENT)
Dept: INTERNAL MEDICINE | Facility: CLINIC | Age: 68
End: 2018-09-20

## 2018-09-20 ENCOUNTER — OFFICE VISIT - HEALTHEAST (OUTPATIENT)
Dept: INTERNAL MEDICINE | Facility: CLINIC | Age: 68
End: 2018-09-20

## 2018-09-20 DIAGNOSIS — R60.0 PERIPHERAL EDEMA: ICD-10-CM

## 2018-09-20 DIAGNOSIS — F41.1 GENERALIZED ANXIETY DISORDER: ICD-10-CM

## 2018-09-20 DIAGNOSIS — R60.0 BILATERAL EDEMA OF LOWER EXTREMITY: ICD-10-CM

## 2018-09-20 DIAGNOSIS — I10 ESSENTIAL HYPERTENSION: ICD-10-CM

## 2018-10-01 ENCOUNTER — COMMUNICATION - HEALTHEAST (OUTPATIENT)
Dept: FAMILY MEDICINE | Facility: CLINIC | Age: 68
End: 2018-10-01

## 2018-10-01 DIAGNOSIS — I10 ESSENTIAL HYPERTENSION WITH GOAL BLOOD PRESSURE LESS THAN 140/90: ICD-10-CM

## 2018-10-08 ENCOUNTER — COMMUNICATION - HEALTHEAST (OUTPATIENT)
Dept: FAMILY MEDICINE | Facility: CLINIC | Age: 68
End: 2018-10-08

## 2018-10-08 DIAGNOSIS — I10 ESSENTIAL HYPERTENSION WITH GOAL BLOOD PRESSURE LESS THAN 140/90: ICD-10-CM

## 2018-10-18 ENCOUNTER — OFFICE VISIT - HEALTHEAST (OUTPATIENT)
Dept: INTERNAL MEDICINE | Facility: CLINIC | Age: 68
End: 2018-10-18

## 2018-10-18 DIAGNOSIS — F32.A DEPRESSION, UNSPECIFIED DEPRESSION TYPE: ICD-10-CM

## 2018-10-18 DIAGNOSIS — M81.0 OSTEOPOROSIS: ICD-10-CM

## 2018-10-18 DIAGNOSIS — G25.81 RESTLESS LEG SYNDROME: ICD-10-CM

## 2018-10-18 DIAGNOSIS — Z00.00 ROUTINE GENERAL MEDICAL EXAMINATION AT A HEALTH CARE FACILITY: ICD-10-CM

## 2018-10-18 DIAGNOSIS — R60.0 BILATERAL LEG EDEMA: ICD-10-CM

## 2018-10-18 DIAGNOSIS — K21.9 GASTROESOPHAGEAL REFLUX DISEASE WITHOUT ESOPHAGITIS: ICD-10-CM

## 2018-10-18 DIAGNOSIS — I10 ESSENTIAL HYPERTENSION: ICD-10-CM

## 2018-10-18 DIAGNOSIS — G47.00 INSOMNIA, UNSPECIFIED TYPE: ICD-10-CM

## 2018-10-18 DIAGNOSIS — G25.81 RLS (RESTLESS LEGS SYNDROME): ICD-10-CM

## 2018-10-18 DIAGNOSIS — K57.90 DIVERTICULOSIS: ICD-10-CM

## 2018-10-18 DIAGNOSIS — E03.9 ACQUIRED HYPOTHYROIDISM: ICD-10-CM

## 2018-10-18 DIAGNOSIS — G43.909 MIGRAINE: ICD-10-CM

## 2018-10-18 DIAGNOSIS — M79.7 FIBROMYALGIA: ICD-10-CM

## 2018-10-18 DIAGNOSIS — R20.2 NUMBNESS AND TINGLING OF BOTH LEGS: ICD-10-CM

## 2018-10-18 DIAGNOSIS — R20.0 NUMBNESS AND TINGLING OF BOTH LEGS: ICD-10-CM

## 2018-10-18 DIAGNOSIS — Z00.00 ENCOUNTER FOR HEALTH MAINTENANCE EXAMINATION IN ADULT: ICD-10-CM

## 2018-10-18 ASSESSMENT — MIFFLIN-ST. JEOR: SCORE: 1256.59

## 2018-10-19 ENCOUNTER — COMMUNICATION - HEALTHEAST (OUTPATIENT)
Dept: INTERNAL MEDICINE | Facility: CLINIC | Age: 68
End: 2018-10-19

## 2018-10-19 ENCOUNTER — AMBULATORY - HEALTHEAST (OUTPATIENT)
Dept: LAB | Facility: CLINIC | Age: 68
End: 2018-10-19

## 2018-10-19 DIAGNOSIS — F32.A DEPRESSION, UNSPECIFIED DEPRESSION TYPE: ICD-10-CM

## 2018-10-19 DIAGNOSIS — K57.90 DIVERTICULOSIS: ICD-10-CM

## 2018-10-19 DIAGNOSIS — K21.9 GASTROESOPHAGEAL REFLUX DISEASE WITHOUT ESOPHAGITIS: ICD-10-CM

## 2018-10-19 DIAGNOSIS — M81.0 OSTEOPOROSIS: ICD-10-CM

## 2018-10-19 DIAGNOSIS — E03.9 ACQUIRED HYPOTHYROIDISM: ICD-10-CM

## 2018-10-19 DIAGNOSIS — R20.0 NUMBNESS AND TINGLING OF BOTH LEGS: ICD-10-CM

## 2018-10-19 DIAGNOSIS — G25.81 RESTLESS LEG SYNDROME: ICD-10-CM

## 2018-10-19 DIAGNOSIS — Z00.00 ENCOUNTER FOR HEALTH MAINTENANCE EXAMINATION IN ADULT: ICD-10-CM

## 2018-10-19 DIAGNOSIS — M79.7 FIBROMYALGIA: ICD-10-CM

## 2018-10-19 DIAGNOSIS — R20.2 NUMBNESS AND TINGLING OF BOTH LEGS: ICD-10-CM

## 2018-10-19 DIAGNOSIS — I10 ESSENTIAL HYPERTENSION: ICD-10-CM

## 2018-10-19 DIAGNOSIS — G43.909 MIGRAINE: ICD-10-CM

## 2018-10-19 DIAGNOSIS — G47.00 INSOMNIA, UNSPECIFIED TYPE: ICD-10-CM

## 2018-10-19 LAB
ALBUMIN SERPL-MCNC: 3.8 G/DL (ref 3.5–5)
ALBUMIN UR-MCNC: NEGATIVE MG/DL
ALP SERPL-CCNC: 99 U/L (ref 45–120)
ALT SERPL W P-5'-P-CCNC: 29 U/L (ref 0–45)
ANION GAP SERPL CALCULATED.3IONS-SCNC: 10 MMOL/L (ref 5–18)
APPEARANCE UR: CLEAR
AST SERPL W P-5'-P-CCNC: 27 U/L (ref 0–40)
BILIRUB SERPL-MCNC: 0.6 MG/DL (ref 0–1)
BILIRUB UR QL STRIP: NEGATIVE
BUN SERPL-MCNC: 17 MG/DL (ref 8–28)
CALCIUM SERPL-MCNC: 9.5 MG/DL (ref 8.5–10.5)
CALCIUM SERPL-MCNC: 9.5 MG/DL (ref 8.5–10.5)
CHLORIDE BLD-SCNC: 101 MMOL/L (ref 98–107)
CHOLEST SERPL-MCNC: 225 MG/DL
CO2 SERPL-SCNC: 26 MMOL/L (ref 22–31)
COLOR UR AUTO: YELLOW
CREAT SERPL-MCNC: 0.7 MG/DL (ref 0.6–1.1)
CREAT SERPL-MCNC: 0.72 MG/DL (ref 0.6–1.1)
ERYTHROCYTE [DISTWIDTH] IN BLOOD BY AUTOMATED COUNT: 12.8 % (ref 11–14.5)
FASTING STATUS PATIENT QL REPORTED: YES
FERRITIN SERPL-MCNC: 24 NG/ML (ref 10–130)
GFR SERPL CREATININE-BSD FRML MDRD: >60 ML/MIN/1.73M2
GFR SERPL CREATININE-BSD FRML MDRD: >60 ML/MIN/1.73M2
GLUCOSE BLD-MCNC: 87 MG/DL (ref 70–125)
GLUCOSE UR STRIP-MCNC: NEGATIVE MG/DL
HCT VFR BLD AUTO: 37.6 % (ref 35–47)
HDLC SERPL-MCNC: 54 MG/DL
HGB BLD-MCNC: 12.7 G/DL (ref 12–16)
HGB UR QL STRIP: NEGATIVE
IRON SATN MFR SERPL: 23 % (ref 20–50)
IRON SERPL-MCNC: 97 UG/DL (ref 42–175)
KETONES UR STRIP-MCNC: NEGATIVE MG/DL
LDLC SERPL CALC-MCNC: 143 MG/DL
LEUKOCYTE ESTERASE UR QL STRIP: ABNORMAL
MAGNESIUM SERPL-MCNC: 2.2 MG/DL (ref 1.8–2.6)
MCH RBC QN AUTO: 29.2 PG (ref 27–34)
MCHC RBC AUTO-ENTMCNC: 33.7 G/DL (ref 32–36)
MCV RBC AUTO: 87 FL (ref 80–100)
NITRATE UR QL: NEGATIVE
PH UR STRIP: 5.5 [PH] (ref 5–8)
PHOSPHATE SERPL-MCNC: 4 MG/DL (ref 2.5–4.5)
PLATELET # BLD AUTO: 318 THOU/UL (ref 140–440)
PMV BLD AUTO: 7.3 FL (ref 7–10)
POTASSIUM BLD-SCNC: 4.1 MMOL/L (ref 3.5–5)
PROT SERPL-MCNC: 6.4 G/DL (ref 6–8)
PTH-INTACT SERPL-MCNC: 82 PG/ML (ref 10–86)
RBC # BLD AUTO: 4.35 MILL/UL (ref 3.8–5.4)
SODIUM SERPL-SCNC: 137 MMOL/L (ref 136–145)
SP GR UR STRIP: 1.02 (ref 1–1.03)
TIBC SERPL-MCNC: 421 UG/DL (ref 313–563)
TRANSFERRIN SERPL-MCNC: 337 MG/DL (ref 212–360)
TRIGL SERPL-MCNC: 138 MG/DL
TSH SERPL DL<=0.005 MIU/L-ACNC: 2.15 UIU/ML (ref 0.3–5)
UROBILINOGEN UR STRIP-ACNC: ABNORMAL
VIT B12 SERPL-MCNC: 373 PG/ML (ref 213–816)
WBC: 5.9 THOU/UL (ref 4–11)

## 2018-10-20 LAB — BACTERIA SPEC CULT: NO GROWTH

## 2018-10-22 ENCOUNTER — AMBULATORY - HEALTHEAST (OUTPATIENT)
Dept: NURSING | Facility: CLINIC | Age: 68
End: 2018-10-22

## 2018-10-22 LAB
25(OH)D3 SERPL-MCNC: 25.3 NG/ML (ref 30–80)
25(OH)D3 SERPL-MCNC: 25.3 NG/ML (ref 30–80)

## 2018-10-23 LAB
ALBUMIN PERCENT: 60.3 % (ref 51–67)
ALBUMIN SERPL ELPH-MCNC: 4 G/DL (ref 3.2–4.7)
ALPHA 1 PERCENT: 2.8 % (ref 2–4)
ALPHA 2 PERCENT: 11.3 % (ref 5–13)
ALPHA1 GLOB SERPL ELPH-MCNC: 0.2 G/DL (ref 0.1–0.3)
ALPHA2 GLOB SERPL ELPH-MCNC: 0.8 G/DL (ref 0.4–0.9)
B-GLOBULIN SERPL ELPH-MCNC: 0.9 G/DL (ref 0.7–1.2)
BETA PERCENT: 14 % (ref 10–17)
GAMMA GLOB SERPL ELPH-MCNC: 0.8 G/DL (ref 0.6–1.4)
GAMMA GLOBULIN PERCENT: 11.6 % (ref 9–20)
PATH ICD:: NORMAL
PATH ICD:: NORMAL
PROT PATTERN SERPL ELPH-IMP: NORMAL
PROT PATTERN SERPL ELPH-IMP: NORMAL
PROT SERPL-MCNC: 6.7 G/DL (ref 6–8)
REVIEWING PATHOLOGIST: NORMAL
REVIEWING PATHOLOGIST: NORMAL
TOTAL PROTEIN RANDOM URINE MG/DL: 10 MG/DL

## 2018-11-02 ENCOUNTER — OFFICE VISIT - HEALTHEAST (OUTPATIENT)
Dept: VASCULAR SURGERY | Facility: CLINIC | Age: 68
End: 2018-11-02

## 2018-11-02 ENCOUNTER — COMMUNICATION - HEALTHEAST (OUTPATIENT)
Dept: VASCULAR SURGERY | Facility: CLINIC | Age: 68
End: 2018-11-02

## 2018-11-02 ENCOUNTER — RECORDS - HEALTHEAST (OUTPATIENT)
Dept: VASCULAR ULTRASOUND | Facility: CLINIC | Age: 68
End: 2018-11-02

## 2018-11-02 DIAGNOSIS — I87.2 VENOUS INSUFFICIENCY (CHRONIC) (PERIPHERAL): ICD-10-CM

## 2018-11-02 DIAGNOSIS — E66.9 OBESITY (BMI 30-39.9): ICD-10-CM

## 2018-11-02 DIAGNOSIS — R60.9 DEPENDENT EDEMA: ICD-10-CM

## 2018-11-02 DIAGNOSIS — Z98.890 H/O VEIN STRIPPING: ICD-10-CM

## 2018-11-02 DIAGNOSIS — R60.9 EDEMA, UNSPECIFIED: ICD-10-CM

## 2018-11-02 DIAGNOSIS — I87.2 VENOUS INSUFFICIENCY OF BOTH LOWER EXTREMITIES: ICD-10-CM

## 2018-11-02 DIAGNOSIS — Z96.653 PRESENCE OF ARTIFICIAL KNEE JOINT, BILATERAL: ICD-10-CM

## 2018-11-02 DIAGNOSIS — I87.303 VENOUS HYPERTENSION, CHRONIC, BILATERAL: ICD-10-CM

## 2018-11-02 DIAGNOSIS — I87.303 CHRONIC VENOUS HYPERTENSION (IDIOPATHIC) WITHOUT COMPLICATIONS OF BILATERAL LOWER EXTREMITY: ICD-10-CM

## 2018-11-02 DIAGNOSIS — E66.9 OBESITY, UNSPECIFIED: ICD-10-CM

## 2018-11-02 DIAGNOSIS — Z96.653 HISTORY OF BILATERAL KNEE REPLACEMENT: ICD-10-CM

## 2018-11-02 ASSESSMENT — MIFFLIN-ST. JEOR: SCORE: 1255.23

## 2018-11-08 ENCOUNTER — COMMUNICATION - HEALTHEAST (OUTPATIENT)
Dept: VASCULAR SURGERY | Facility: CLINIC | Age: 68
End: 2018-11-08

## 2018-11-09 ENCOUNTER — RECORDS - HEALTHEAST (OUTPATIENT)
Dept: ADMINISTRATIVE | Facility: OTHER | Age: 68
End: 2018-11-09

## 2018-11-14 ENCOUNTER — OFFICE VISIT - HEALTHEAST (OUTPATIENT)
Dept: VASCULAR SURGERY | Facility: CLINIC | Age: 68
End: 2018-11-14

## 2018-11-14 DIAGNOSIS — I83.891 SYMPTOMATIC VARICOSE VEINS OF RIGHT LOWER EXTREMITY: ICD-10-CM

## 2018-11-14 DIAGNOSIS — I87.2 VENOUS INSUFFICIENCY OF RIGHT LEG: ICD-10-CM

## 2018-11-16 ENCOUNTER — COMMUNICATION - HEALTHEAST (OUTPATIENT)
Dept: FAMILY MEDICINE | Facility: CLINIC | Age: 68
End: 2018-11-16

## 2018-11-16 DIAGNOSIS — I10 ESSENTIAL HYPERTENSION: ICD-10-CM

## 2018-11-16 DIAGNOSIS — E03.9 HYPOTHYROID: ICD-10-CM

## 2018-11-21 ENCOUNTER — COMMUNICATION - HEALTHEAST (OUTPATIENT)
Dept: VASCULAR SURGERY | Facility: CLINIC | Age: 68
End: 2018-11-21

## 2018-11-27 ENCOUNTER — COMMUNICATION - HEALTHEAST (OUTPATIENT)
Dept: INTERNAL MEDICINE | Facility: CLINIC | Age: 68
End: 2018-11-27

## 2018-11-28 ENCOUNTER — COMMUNICATION - HEALTHEAST (OUTPATIENT)
Dept: VASCULAR SURGERY | Facility: CLINIC | Age: 68
End: 2018-11-28

## 2018-12-04 ENCOUNTER — OFFICE VISIT - HEALTHEAST (OUTPATIENT)
Dept: INTERNAL MEDICINE | Facility: CLINIC | Age: 68
End: 2018-12-04

## 2018-12-04 ENCOUNTER — COMMUNICATION - HEALTHEAST (OUTPATIENT)
Dept: VASCULAR SURGERY | Facility: CLINIC | Age: 68
End: 2018-12-04

## 2018-12-04 DIAGNOSIS — I89.0 LYMPHEDEMA: ICD-10-CM

## 2018-12-04 DIAGNOSIS — R10.32 LEFT LOWER QUADRANT PAIN: ICD-10-CM

## 2018-12-07 ENCOUNTER — OFFICE VISIT - HEALTHEAST (OUTPATIENT)
Dept: PHYSICAL THERAPY | Facility: REHABILITATION | Age: 68
End: 2018-12-07

## 2018-12-07 DIAGNOSIS — I89.0 LYMPHEDEMA: ICD-10-CM

## 2018-12-07 DIAGNOSIS — M62.81 MUSCLE WEAKNESS (GENERALIZED): ICD-10-CM

## 2018-12-07 DIAGNOSIS — G25.81 RESTLESS LEG SYNDROME: ICD-10-CM

## 2018-12-07 DIAGNOSIS — M79.7 FIBROMYALGIA: ICD-10-CM

## 2018-12-07 DIAGNOSIS — R60.0 LOCALIZED EDEMA: ICD-10-CM

## 2018-12-07 DIAGNOSIS — F32.A DEPRESSION, UNSPECIFIED DEPRESSION TYPE: ICD-10-CM

## 2018-12-11 ENCOUNTER — RECORDS - HEALTHEAST (OUTPATIENT)
Dept: VASCULAR ULTRASOUND | Facility: CLINIC | Age: 68
End: 2018-12-11

## 2018-12-11 ENCOUNTER — HOSPITAL ENCOUNTER (OUTPATIENT)
Dept: CT IMAGING | Facility: CLINIC | Age: 68
Discharge: HOME | End: 2018-12-11
Attending: INTERNAL MEDICINE

## 2018-12-11 DIAGNOSIS — I89.0 LYMPHEDEMA: ICD-10-CM

## 2018-12-11 DIAGNOSIS — I83.891 VARICOSE VEINS OF RIGHT LOWER EXTREMITY WITH OTHER COMPLICATIONS: ICD-10-CM

## 2018-12-11 DIAGNOSIS — I87.2 VENOUS INSUFFICIENCY (CHRONIC) (PERIPHERAL): ICD-10-CM

## 2018-12-11 LAB
CREAT BLD-MCNC: 0.7 MG/DL
POC GFR AMER AF HE - HISTORICAL: >60 ML/MIN/1.73M2
POC GFR NON AMER AF HE - HISTORICAL: >60 ML/MIN/1.73M2

## 2018-12-13 ENCOUNTER — RECORDS - HEALTHEAST (OUTPATIENT)
Dept: VASCULAR ULTRASOUND | Facility: CLINIC | Age: 68
End: 2018-12-13

## 2018-12-13 DIAGNOSIS — I87.2 VENOUS INSUFFICIENCY (CHRONIC) (PERIPHERAL): ICD-10-CM

## 2018-12-13 DIAGNOSIS — I83.891 VARICOSE VEINS OF RIGHT LOWER EXTREMITY WITH OTHER COMPLICATIONS: ICD-10-CM

## 2018-12-17 ENCOUNTER — OFFICE VISIT - HEALTHEAST (OUTPATIENT)
Dept: SURGERY | Facility: CLINIC | Age: 68
End: 2018-12-17

## 2018-12-17 DIAGNOSIS — R10.32 ABDOMINAL PAIN, LEFT LOWER QUADRANT: ICD-10-CM

## 2018-12-17 ASSESSMENT — MIFFLIN-ST. JEOR: SCORE: 1246.61

## 2018-12-18 ENCOUNTER — OFFICE VISIT - HEALTHEAST (OUTPATIENT)
Dept: PHYSICAL THERAPY | Facility: REHABILITATION | Age: 68
End: 2018-12-18

## 2018-12-18 DIAGNOSIS — F32.A DEPRESSION, UNSPECIFIED DEPRESSION TYPE: ICD-10-CM

## 2018-12-18 DIAGNOSIS — I89.0 LYMPHEDEMA: ICD-10-CM

## 2018-12-18 DIAGNOSIS — M79.7 FIBROMYALGIA: ICD-10-CM

## 2018-12-18 DIAGNOSIS — G25.81 RESTLESS LEG SYNDROME: ICD-10-CM

## 2018-12-18 DIAGNOSIS — M62.81 MUSCLE WEAKNESS (GENERALIZED): ICD-10-CM

## 2018-12-18 DIAGNOSIS — R60.0 LOCALIZED EDEMA: ICD-10-CM

## 2018-12-21 ENCOUNTER — OFFICE VISIT - HEALTHEAST (OUTPATIENT)
Dept: PHYSICAL THERAPY | Facility: REHABILITATION | Age: 68
End: 2018-12-21

## 2018-12-21 DIAGNOSIS — M79.7 FIBROMYALGIA: ICD-10-CM

## 2018-12-21 DIAGNOSIS — M62.81 MUSCLE WEAKNESS (GENERALIZED): ICD-10-CM

## 2018-12-21 DIAGNOSIS — R60.0 LOCALIZED EDEMA: ICD-10-CM

## 2018-12-21 DIAGNOSIS — I89.0 LYMPHEDEMA: ICD-10-CM

## 2018-12-21 DIAGNOSIS — G25.81 RESTLESS LEG SYNDROME: ICD-10-CM

## 2018-12-21 DIAGNOSIS — F32.A DEPRESSION, UNSPECIFIED DEPRESSION TYPE: ICD-10-CM

## 2018-12-27 ENCOUNTER — OFFICE VISIT - HEALTHEAST (OUTPATIENT)
Dept: PHYSICAL THERAPY | Facility: REHABILITATION | Age: 68
End: 2018-12-27

## 2018-12-27 DIAGNOSIS — G25.81 RESTLESS LEG SYNDROME: ICD-10-CM

## 2018-12-27 DIAGNOSIS — R60.0 LOCALIZED EDEMA: ICD-10-CM

## 2018-12-27 DIAGNOSIS — I89.0 LYMPHEDEMA: ICD-10-CM

## 2018-12-27 DIAGNOSIS — M62.81 MUSCLE WEAKNESS (GENERALIZED): ICD-10-CM

## 2018-12-27 DIAGNOSIS — F32.A DEPRESSION, UNSPECIFIED DEPRESSION TYPE: ICD-10-CM

## 2018-12-27 DIAGNOSIS — M79.7 FIBROMYALGIA: ICD-10-CM

## 2018-12-31 ENCOUNTER — COMMUNICATION - HEALTHEAST (OUTPATIENT)
Dept: FAMILY MEDICINE | Facility: CLINIC | Age: 68
End: 2018-12-31

## 2018-12-31 ENCOUNTER — OFFICE VISIT - HEALTHEAST (OUTPATIENT)
Dept: PHYSICAL THERAPY | Facility: REHABILITATION | Age: 68
End: 2018-12-31

## 2018-12-31 ENCOUNTER — OFFICE VISIT - HEALTHEAST (OUTPATIENT)
Dept: SURGERY | Facility: CLINIC | Age: 68
End: 2018-12-31

## 2018-12-31 DIAGNOSIS — F41.1 GENERALIZED ANXIETY DISORDER: ICD-10-CM

## 2018-12-31 DIAGNOSIS — I89.0 LYMPHEDEMA: ICD-10-CM

## 2018-12-31 DIAGNOSIS — R10.32 LLQ ABDOMINAL PAIN: ICD-10-CM

## 2018-12-31 DIAGNOSIS — R10.32 ABDOMINAL PAIN, LEFT LOWER QUADRANT: ICD-10-CM

## 2018-12-31 ASSESSMENT — MIFFLIN-ST. JEOR: SCORE: 1243.44

## 2019-01-02 ENCOUNTER — OFFICE VISIT - HEALTHEAST (OUTPATIENT)
Dept: VASCULAR SURGERY | Facility: CLINIC | Age: 69
End: 2019-01-02

## 2019-01-02 DIAGNOSIS — I83.891 SYMPTOMATIC VARICOSE VEINS OF RIGHT LOWER EXTREMITY: ICD-10-CM

## 2019-01-02 DIAGNOSIS — I87.2 VENOUS INSUFFICIENCY OF RIGHT LEG: ICD-10-CM

## 2019-01-03 ENCOUNTER — OFFICE VISIT - HEALTHEAST (OUTPATIENT)
Dept: PHYSICAL THERAPY | Facility: REHABILITATION | Age: 69
End: 2019-01-03

## 2019-01-03 ENCOUNTER — COMMUNICATION - HEALTHEAST (OUTPATIENT)
Dept: INTERNAL MEDICINE | Facility: CLINIC | Age: 69
End: 2019-01-03

## 2019-01-03 DIAGNOSIS — I89.0 LYMPHEDEMA: ICD-10-CM

## 2019-01-03 DIAGNOSIS — G25.81 RESTLESS LEG SYNDROME: ICD-10-CM

## 2019-01-03 DIAGNOSIS — M79.7 FIBROMYALGIA: ICD-10-CM

## 2019-01-09 ENCOUNTER — AMBULATORY - HEALTHEAST (OUTPATIENT)
Dept: PALLIATIVE MEDICINE | Facility: OTHER | Age: 69
End: 2019-01-09

## 2019-01-15 ENCOUNTER — AMBULATORY - HEALTHEAST (OUTPATIENT)
Dept: SURGERY | Facility: CLINIC | Age: 69
End: 2019-01-15

## 2019-01-15 DIAGNOSIS — R10.32 ABDOMINAL PAIN, LEFT LOWER QUADRANT: ICD-10-CM

## 2019-01-25 ENCOUNTER — COMMUNICATION - HEALTHEAST (OUTPATIENT)
Dept: INTERNAL MEDICINE | Facility: CLINIC | Age: 69
End: 2019-01-25

## 2019-02-12 ENCOUNTER — COMMUNICATION - HEALTHEAST (OUTPATIENT)
Dept: INTERNAL MEDICINE | Facility: CLINIC | Age: 69
End: 2019-02-12

## 2019-02-12 DIAGNOSIS — R60.0 BILATERAL EDEMA OF LOWER EXTREMITY: ICD-10-CM

## 2019-02-12 DIAGNOSIS — E03.9 HYPOTHYROID: ICD-10-CM

## 2019-02-12 DIAGNOSIS — I10 ESSENTIAL HYPERTENSION: ICD-10-CM

## 2019-02-12 DIAGNOSIS — F41.1 GENERALIZED ANXIETY DISORDER: ICD-10-CM

## 2019-03-11 ENCOUNTER — COMMUNICATION - HEALTHEAST (OUTPATIENT)
Dept: INTERNAL MEDICINE | Facility: CLINIC | Age: 69
End: 2019-03-11

## 2019-03-15 ENCOUNTER — RECORDS - HEALTHEAST (OUTPATIENT)
Dept: ADMINISTRATIVE | Facility: OTHER | Age: 69
End: 2019-03-15

## 2019-03-16 ENCOUNTER — RECORDS - HEALTHEAST (OUTPATIENT)
Dept: ADMINISTRATIVE | Facility: OTHER | Age: 69
End: 2019-03-16

## 2019-03-17 ENCOUNTER — RECORDS - HEALTHEAST (OUTPATIENT)
Dept: ADMINISTRATIVE | Facility: OTHER | Age: 69
End: 2019-03-17

## 2019-03-18 ENCOUNTER — RECORDS - HEALTHEAST (OUTPATIENT)
Dept: ADMINISTRATIVE | Facility: OTHER | Age: 69
End: 2019-03-18

## 2019-03-19 ENCOUNTER — RECORDS - HEALTHEAST (OUTPATIENT)
Dept: ADMINISTRATIVE | Facility: OTHER | Age: 69
End: 2019-03-19

## 2019-03-22 ENCOUNTER — RECORDS - HEALTHEAST (OUTPATIENT)
Dept: ADMINISTRATIVE | Facility: OTHER | Age: 69
End: 2019-03-22

## 2019-03-22 ENCOUNTER — COMMUNICATION - HEALTHEAST (OUTPATIENT)
Dept: INTERNAL MEDICINE | Facility: CLINIC | Age: 69
End: 2019-03-22

## 2019-03-27 ENCOUNTER — RECORDS - HEALTHEAST (OUTPATIENT)
Dept: ADMINISTRATIVE | Facility: OTHER | Age: 69
End: 2019-03-27

## 2019-03-28 ENCOUNTER — COMMUNICATION - HEALTHEAST (OUTPATIENT)
Dept: INTERNAL MEDICINE | Facility: CLINIC | Age: 69
End: 2019-03-28

## 2019-03-28 DIAGNOSIS — G25.81 RLS (RESTLESS LEGS SYNDROME): ICD-10-CM

## 2019-04-07 ENCOUNTER — COMMUNICATION - HEALTHEAST (OUTPATIENT)
Dept: INTERNAL MEDICINE | Facility: CLINIC | Age: 69
End: 2019-04-07

## 2019-04-07 DIAGNOSIS — I10 ESSENTIAL HYPERTENSION: ICD-10-CM

## 2019-04-10 ENCOUNTER — COMMUNICATION - HEALTHEAST (OUTPATIENT)
Dept: PALLIATIVE MEDICINE | Facility: OTHER | Age: 69
End: 2019-04-10

## 2019-04-11 ENCOUNTER — OFFICE VISIT - HEALTHEAST (OUTPATIENT)
Dept: INTERNAL MEDICINE | Facility: CLINIC | Age: 69
End: 2019-04-11

## 2019-04-11 ENCOUNTER — HOSPITAL ENCOUNTER (OUTPATIENT)
Dept: PALLIATIVE MEDICINE | Facility: OTHER | Age: 69
Discharge: HOME | End: 2019-04-11
Attending: PAIN MEDICINE | Admitting: PAIN MEDICINE

## 2019-04-11 DIAGNOSIS — J18.9 PNEUMONIA DUE TO INFECTIOUS ORGANISM, UNSPECIFIED LATERALITY, UNSPECIFIED PART OF LUNG: ICD-10-CM

## 2019-04-11 DIAGNOSIS — E04.1 THYROID NODULE: ICD-10-CM

## 2019-04-11 DIAGNOSIS — Z09 HOSPITAL DISCHARGE FOLLOW-UP: ICD-10-CM

## 2019-04-11 DIAGNOSIS — M81.0 OSTEOPOROSIS, UNSPECIFIED OSTEOPOROSIS TYPE, UNSPECIFIED PATHOLOGICAL FRACTURE PRESENCE: ICD-10-CM

## 2019-04-11 DIAGNOSIS — I10 ESSENTIAL HYPERTENSION: ICD-10-CM

## 2019-04-11 DIAGNOSIS — I25.10 CORONARY ARTERY CALCIFICATION: ICD-10-CM

## 2019-04-11 DIAGNOSIS — G57.92 ILIOINGUINAL NEURALGIA, LEFT: ICD-10-CM

## 2019-04-11 LAB
ALBUMIN SERPL-MCNC: 4 G/DL (ref 3.5–5)
ALBUMIN UR-MCNC: NEGATIVE MG/DL
ALP SERPL-CCNC: 98 U/L (ref 45–120)
ALT SERPL W P-5'-P-CCNC: 31 U/L (ref 0–45)
ANION GAP SERPL CALCULATED.3IONS-SCNC: 14 MMOL/L (ref 5–18)
APPEARANCE UR: CLEAR
AST SERPL W P-5'-P-CCNC: 20 U/L (ref 0–40)
BACTERIA #/AREA URNS HPF: ABNORMAL HPF
BILIRUB SERPL-MCNC: 0.3 MG/DL (ref 0–1)
BILIRUB UR QL STRIP: NEGATIVE
BUN SERPL-MCNC: 21 MG/DL (ref 8–28)
CALCIUM SERPL-MCNC: 9.9 MG/DL (ref 8.5–10.5)
CHLORIDE BLD-SCNC: 101 MMOL/L (ref 98–107)
CO2 SERPL-SCNC: 24 MMOL/L (ref 22–31)
COLOR UR AUTO: YELLOW
CREAT SERPL-MCNC: 0.74 MG/DL (ref 0.6–1.1)
ERYTHROCYTE [DISTWIDTH] IN BLOOD BY AUTOMATED COUNT: 12.3 % (ref 11–14.5)
GFR SERPL CREATININE-BSD FRML MDRD: >60 ML/MIN/1.73M2
GLUCOSE BLD-MCNC: 99 MG/DL (ref 70–125)
GLUCOSE UR STRIP-MCNC: NEGATIVE MG/DL
HCT VFR BLD AUTO: 37.8 % (ref 35–47)
HGB BLD-MCNC: 12.8 G/DL (ref 12–16)
HGB UR QL STRIP: NEGATIVE
KETONES UR STRIP-MCNC: NEGATIVE MG/DL
LEUKOCYTE ESTERASE UR QL STRIP: ABNORMAL
MCH RBC QN AUTO: 30.7 PG (ref 27–34)
MCHC RBC AUTO-ENTMCNC: 34 G/DL (ref 32–36)
MCV RBC AUTO: 90 FL (ref 80–100)
MUCOUS THREADS #/AREA URNS LPF: ABNORMAL LPF
NITRATE UR QL: NEGATIVE
PH UR STRIP: 5.5 [PH] (ref 5–8)
PLATELET # BLD AUTO: 275 THOU/UL (ref 140–440)
PMV BLD AUTO: 6.3 FL (ref 7–10)
POTASSIUM BLD-SCNC: 3.8 MMOL/L (ref 3.5–5)
PROT SERPL-MCNC: 7.1 G/DL (ref 6–8)
RBC # BLD AUTO: 4.18 MILL/UL (ref 3.8–5.4)
RBC #/AREA URNS AUTO: ABNORMAL HPF
SODIUM SERPL-SCNC: 139 MMOL/L (ref 136–145)
SP GR UR STRIP: 1.01 (ref 1–1.03)
SQUAMOUS #/AREA URNS AUTO: ABNORMAL LPF
TRANS CELLS #/AREA URNS HPF: ABNORMAL LPF
TSH SERPL DL<=0.005 MIU/L-ACNC: 1.61 UIU/ML (ref 0.3–5)
UROBILINOGEN UR STRIP-ACNC: ABNORMAL
WBC #/AREA URNS AUTO: ABNORMAL HPF
WBC CLUMPS #/AREA URNS HPF: ABNORMAL /[HPF]
WBC: 8.5 THOU/UL (ref 4–11)

## 2019-04-13 LAB — BACTERIA SPEC CULT: NORMAL

## 2019-04-15 ENCOUNTER — OFFICE VISIT - HEALTHEAST (OUTPATIENT)
Dept: SURGERY | Facility: CLINIC | Age: 69
End: 2019-04-15

## 2019-04-15 DIAGNOSIS — M25.552 HIP PAIN, LEFT: ICD-10-CM

## 2019-04-16 ENCOUNTER — HOSPITAL ENCOUNTER (OUTPATIENT)
Dept: NUCLEAR MEDICINE | Facility: CLINIC | Age: 69
Discharge: HOME | End: 2019-04-16
Attending: INTERNAL MEDICINE

## 2019-04-16 ENCOUNTER — HOSPITAL ENCOUNTER (OUTPATIENT)
Dept: CARDIOLOGY | Facility: CLINIC | Age: 69
Discharge: HOME | End: 2019-04-16
Attending: INTERNAL MEDICINE

## 2019-04-16 DIAGNOSIS — I25.10 CORONARY ARTERY CALCIFICATION: ICD-10-CM

## 2019-04-16 LAB
CV STRESS CURRENT BP HE: NORMAL
CV STRESS CURRENT HR HE: 105
CV STRESS CURRENT HR HE: 106
CV STRESS CURRENT HR HE: 71
CV STRESS CURRENT HR HE: 72
CV STRESS CURRENT HR HE: 74
CV STRESS CURRENT HR HE: 77
CV STRESS CURRENT HR HE: 78
CV STRESS CURRENT HR HE: 80
CV STRESS CURRENT HR HE: 80
CV STRESS CURRENT HR HE: 87
CV STRESS CURRENT HR HE: 88
CV STRESS CURRENT HR HE: 89
CV STRESS CURRENT HR HE: 90
CV STRESS CURRENT HR HE: 94
CV STRESS CURRENT HR HE: 96
CV STRESS CURRENT HR HE: 99
CV STRESS CURRENT HR HE: 99
CV STRESS DEVIATION TIME HE: NORMAL
CV STRESS ECHO PERCENT HR HE: NORMAL
CV STRESS EXERCISE STAGE HE: NORMAL
CV STRESS FINAL RESTING BP HE: NORMAL
CV STRESS FINAL RESTING HR HE: 80
CV STRESS MAX HR HE: 109
CV STRESS MAX TREADMILL GRADE HE: 0
CV STRESS MAX TREADMILL SPEED HE: 0
CV STRESS PEAK DIA BP HE: NORMAL
CV STRESS PEAK SYS BP HE: NORMAL
CV STRESS PHASE HE: NORMAL
CV STRESS PROTOCOL HE: NORMAL
CV STRESS RESTING PT POSITION HE: NORMAL
CV STRESS ST DEVIATION AMOUNT HE: NORMAL
CV STRESS ST DEVIATION ELEVATION HE: NORMAL
CV STRESS ST EVELATION AMOUNT HE: NORMAL
CV STRESS TEST TYPE HE: NORMAL
CV STRESS TOTAL STAGE TIME MIN 1 HE: NORMAL
NUC STRESS EJECTION FRACTION: >75 %
STRESS ECHO BASELINE BP: NORMAL
STRESS ECHO BASELINE HR: 70
STRESS ECHO CALCULATED PERCENT HR: 73 %
STRESS ECHO LAST STRESS BP: NORMAL
STRESS ECHO LAST STRESS HR: 99

## 2019-04-19 ENCOUNTER — HOSPITAL ENCOUNTER (OUTPATIENT)
Dept: ULTRASOUND IMAGING | Facility: CLINIC | Age: 69
Discharge: HOME | End: 2019-04-19
Attending: INTERNAL MEDICINE | Admitting: RADIOLOGY

## 2019-04-19 DIAGNOSIS — E04.1 THYROID NODULE: ICD-10-CM

## 2019-04-26 ENCOUNTER — COMMUNICATION - HEALTHEAST (OUTPATIENT)
Dept: INTERNAL MEDICINE | Facility: CLINIC | Age: 69
End: 2019-04-26

## 2019-04-26 LAB
CAP COMMENT: NORMAL
LAB AP CHARGES (HE HISTORICAL CONVERSION): NORMAL
LAB AP INITIAL CYTO EVAL (HE HISTORICAL CONVERSION): NORMAL
LAB MED GENERAL PATH INTERP (HE HISTORICAL CONVERSION): NORMAL
PATH REPORT.COMMENTS IMP SPEC: NORMAL
PATH REPORT.COMMENTS IMP SPEC: NORMAL
PATH REPORT.FINAL DX SPEC: NORMAL
PATH REPORT.MICROSCOPIC SPEC OTHER STN: NORMAL
PATH REPORT.RELEVANT HX SPEC: NORMAL
RESULT FLAG (HE HISTORICAL CONVERSION): NORMAL
SPECIMEN DESCRIPTION: NORMAL

## 2019-05-01 ENCOUNTER — OFFICE VISIT - HEALTHEAST (OUTPATIENT)
Dept: VASCULAR SURGERY | Facility: CLINIC | Age: 69
End: 2019-05-01

## 2019-05-01 DIAGNOSIS — I87.2 VENOUS INSUFFICIENCY OF RIGHT LEG: ICD-10-CM

## 2019-05-01 DIAGNOSIS — I83.891 SYMPTOMATIC VARICOSE VEINS OF RIGHT LOWER EXTREMITY: ICD-10-CM

## 2019-05-02 ENCOUNTER — COMMUNICATION - HEALTHEAST (OUTPATIENT)
Dept: INTERNAL MEDICINE | Facility: CLINIC | Age: 69
End: 2019-05-02

## 2019-05-02 DIAGNOSIS — G25.81 RLS (RESTLESS LEGS SYNDROME): ICD-10-CM

## 2019-05-03 ENCOUNTER — RECORDS - HEALTHEAST (OUTPATIENT)
Dept: ADMINISTRATIVE | Facility: OTHER | Age: 69
End: 2019-05-03

## 2019-05-07 ENCOUNTER — AMBULATORY - HEALTHEAST (OUTPATIENT)
Dept: VASCULAR SURGERY | Facility: CLINIC | Age: 69
End: 2019-05-07

## 2019-05-08 ENCOUNTER — RECORDS - HEALTHEAST (OUTPATIENT)
Dept: BONE DENSITY | Facility: CLINIC | Age: 69
End: 2019-05-08

## 2019-05-08 ENCOUNTER — RECORDS - HEALTHEAST (OUTPATIENT)
Dept: ADMINISTRATIVE | Facility: OTHER | Age: 69
End: 2019-05-08

## 2019-05-08 DIAGNOSIS — M81.0 AGE-RELATED OSTEOPOROSIS WITHOUT CURRENT PATHOLOGICAL FRACTURE: ICD-10-CM

## 2019-05-15 ENCOUNTER — COMMUNICATION - HEALTHEAST (OUTPATIENT)
Dept: VASCULAR SURGERY | Facility: CLINIC | Age: 69
End: 2019-05-15

## 2019-05-16 ENCOUNTER — AMBULATORY - HEALTHEAST (OUTPATIENT)
Dept: VASCULAR SURGERY | Facility: CLINIC | Age: 69
End: 2019-05-16

## 2019-05-17 ENCOUNTER — COMMUNICATION - HEALTHEAST (OUTPATIENT)
Dept: INTERNAL MEDICINE | Facility: CLINIC | Age: 69
End: 2019-05-17

## 2019-05-21 ENCOUNTER — AMBULATORY - HEALTHEAST (OUTPATIENT)
Dept: SURGERY | Facility: CLINIC | Age: 69
End: 2019-05-21

## 2019-05-22 ENCOUNTER — COMMUNICATION - HEALTHEAST (OUTPATIENT)
Dept: VASCULAR SURGERY | Facility: CLINIC | Age: 69
End: 2019-05-22

## 2019-05-23 ENCOUNTER — AMBULATORY - HEALTHEAST (OUTPATIENT)
Dept: VASCULAR SURGERY | Facility: CLINIC | Age: 69
End: 2019-05-23

## 2019-07-08 ENCOUNTER — OFFICE VISIT - HEALTHEAST (OUTPATIENT)
Dept: INTERNAL MEDICINE | Facility: CLINIC | Age: 69
End: 2019-07-08

## 2019-07-08 DIAGNOSIS — T14.8XXA BRUISING: ICD-10-CM

## 2019-07-09 ENCOUNTER — COMMUNICATION - HEALTHEAST (OUTPATIENT)
Dept: VASCULAR SURGERY | Facility: CLINIC | Age: 69
End: 2019-07-09

## 2019-07-11 ENCOUNTER — COMMUNICATION - HEALTHEAST (OUTPATIENT)
Dept: INTERNAL MEDICINE | Facility: CLINIC | Age: 69
End: 2019-07-11

## 2019-07-11 DIAGNOSIS — F41.1 GENERALIZED ANXIETY DISORDER: ICD-10-CM

## 2019-07-29 ENCOUNTER — AMBULATORY - HEALTHEAST (OUTPATIENT)
Dept: LAB | Facility: CLINIC | Age: 69
End: 2019-07-29

## 2019-07-29 DIAGNOSIS — I10 ESSENTIAL HYPERTENSION: ICD-10-CM

## 2019-07-29 LAB
ANION GAP SERPL CALCULATED.3IONS-SCNC: 8 MMOL/L (ref 5–18)
BUN SERPL-MCNC: 13 MG/DL (ref 8–28)
CALCIUM SERPL-MCNC: 9.1 MG/DL (ref 8.5–10.5)
CHLORIDE BLD-SCNC: 106 MMOL/L (ref 98–107)
CO2 SERPL-SCNC: 25 MMOL/L (ref 22–31)
CREAT SERPL-MCNC: 0.71 MG/DL (ref 0.6–1.1)
GFR SERPL CREATININE-BSD FRML MDRD: >60 ML/MIN/1.73M2
GLUCOSE BLD-MCNC: 93 MG/DL (ref 70–125)
POTASSIUM BLD-SCNC: 4.2 MMOL/L (ref 3.5–5)
SODIUM SERPL-SCNC: 139 MMOL/L (ref 136–145)

## 2019-07-30 ENCOUNTER — OFFICE VISIT - HEALTHEAST (OUTPATIENT)
Dept: INTERNAL MEDICINE | Facility: CLINIC | Age: 69
End: 2019-07-30

## 2019-07-30 DIAGNOSIS — K66.0 ADHESION OF INTESTINE: ICD-10-CM

## 2019-07-30 DIAGNOSIS — R10.32 LLQ ABDOMINAL PAIN: ICD-10-CM

## 2019-07-30 DIAGNOSIS — Z01.818 PREOP GENERAL PHYSICAL EXAM: ICD-10-CM

## 2019-07-30 LAB
ERYTHROCYTE [DISTWIDTH] IN BLOOD BY AUTOMATED COUNT: 11.6 % (ref 11–14.5)
HCT VFR BLD AUTO: 36 % (ref 35–47)
HGB BLD-MCNC: 11.8 G/DL (ref 12–16)
MCH RBC QN AUTO: 29.2 PG (ref 27–34)
MCHC RBC AUTO-ENTMCNC: 32.9 G/DL (ref 32–36)
MCV RBC AUTO: 89 FL (ref 80–100)
PLATELET # BLD AUTO: 321 THOU/UL (ref 140–440)
PMV BLD AUTO: 6.9 FL (ref 7–10)
RBC # BLD AUTO: 4.05 MILL/UL (ref 3.8–5.4)
WBC: 6.4 THOU/UL (ref 4–11)

## 2019-07-30 ASSESSMENT — MIFFLIN-ST. JEOR: SCORE: 1258.41

## 2019-08-01 ENCOUNTER — COMMUNICATION - HEALTHEAST (OUTPATIENT)
Dept: INTERNAL MEDICINE | Facility: CLINIC | Age: 69
End: 2019-08-01

## 2019-08-01 ENCOUNTER — AMBULATORY - HEALTHEAST (OUTPATIENT)
Dept: VASCULAR SURGERY | Facility: CLINIC | Age: 69
End: 2019-08-01

## 2019-08-01 DIAGNOSIS — G47.00 INSOMNIA, UNSPECIFIED TYPE: ICD-10-CM

## 2019-08-14 ENCOUNTER — COMMUNICATION - HEALTHEAST (OUTPATIENT)
Dept: INTERNAL MEDICINE | Facility: CLINIC | Age: 69
End: 2019-08-14

## 2019-08-21 ASSESSMENT — MIFFLIN-ST. JEOR: SCORE: 1257.04

## 2019-08-22 ENCOUNTER — ANESTHESIA - HEALTHEAST (OUTPATIENT)
Dept: SURGERY | Facility: HOSPITAL | Age: 69
End: 2019-08-22

## 2019-08-22 ENCOUNTER — SURGERY - HEALTHEAST (OUTPATIENT)
Dept: SURGERY | Facility: HOSPITAL | Age: 69
End: 2019-08-22

## 2019-09-09 ENCOUNTER — OFFICE VISIT - HEALTHEAST (OUTPATIENT)
Dept: INTERNAL MEDICINE | Facility: CLINIC | Age: 69
End: 2019-09-09

## 2019-09-09 DIAGNOSIS — Z12.31 VISIT FOR SCREENING MAMMOGRAM: ICD-10-CM

## 2019-09-09 DIAGNOSIS — M54.50 LUMBAR BACK PAIN: ICD-10-CM

## 2019-09-09 DIAGNOSIS — R30.0 DYSURIA: ICD-10-CM

## 2019-09-09 DIAGNOSIS — I10 ESSENTIAL HYPERTENSION: ICD-10-CM

## 2019-09-09 DIAGNOSIS — Z01.818 PREOPERATIVE EXAMINATION: ICD-10-CM

## 2019-09-09 LAB
ALBUMIN UR-MCNC: NEGATIVE MG/DL
APPEARANCE UR: CLEAR
BACTERIA #/AREA URNS HPF: ABNORMAL HPF
BILIRUB UR QL STRIP: NEGATIVE
COLOR UR AUTO: YELLOW
GLUCOSE UR STRIP-MCNC: NEGATIVE MG/DL
HGB BLD-MCNC: 12.5 G/DL (ref 12–16)
HGB UR QL STRIP: NEGATIVE
KETONES UR STRIP-MCNC: NEGATIVE MG/DL
LEUKOCYTE ESTERASE UR QL STRIP: ABNORMAL
NITRATE UR QL: NEGATIVE
PH UR STRIP: 6 [PH] (ref 5–8)
POTASSIUM BLD-SCNC: 4 MMOL/L (ref 3.5–5)
RBC #/AREA URNS AUTO: ABNORMAL HPF
SP GR UR STRIP: 1.01 (ref 1–1.03)
SQUAMOUS #/AREA URNS AUTO: ABNORMAL LPF
UROBILINOGEN UR STRIP-ACNC: ABNORMAL
WBC #/AREA URNS AUTO: ABNORMAL HPF

## 2019-09-09 ASSESSMENT — MIFFLIN-ST. JEOR: SCORE: 1275.64

## 2019-09-10 LAB — BACTERIA SPEC CULT: NORMAL

## 2019-09-12 ENCOUNTER — RECORDS - HEALTHEAST (OUTPATIENT)
Dept: ADMINISTRATIVE | Facility: OTHER | Age: 69
End: 2019-09-12

## 2019-09-16 ENCOUNTER — AMBULATORY - HEALTHEAST (OUTPATIENT)
Dept: SURGERY | Facility: CLINIC | Age: 69
End: 2019-09-16

## 2019-09-16 DIAGNOSIS — R10.32 LLQ PAIN: ICD-10-CM

## 2019-09-17 ENCOUNTER — AMBULATORY - HEALTHEAST (OUTPATIENT)
Dept: SURGERY | Facility: CLINIC | Age: 69
End: 2019-09-17

## 2019-09-19 ENCOUNTER — COMMUNICATION - HEALTHEAST (OUTPATIENT)
Dept: FAMILY MEDICINE | Facility: CLINIC | Age: 69
End: 2019-09-19

## 2019-09-19 DIAGNOSIS — I10 HTN (HYPERTENSION): ICD-10-CM

## 2019-09-27 ENCOUNTER — OFFICE VISIT - HEALTHEAST (OUTPATIENT)
Dept: INTERNAL MEDICINE | Facility: CLINIC | Age: 69
End: 2019-09-27

## 2019-09-27 DIAGNOSIS — Z01.818 PREOPERATIVE EXAMINATION: ICD-10-CM

## 2019-09-27 DIAGNOSIS — H02.403 PTOSIS OF BOTH EYELIDS: ICD-10-CM

## 2019-09-27 DIAGNOSIS — E03.9 ACQUIRED HYPOTHYROIDISM: ICD-10-CM

## 2019-09-27 DIAGNOSIS — I10 ESSENTIAL HYPERTENSION: ICD-10-CM

## 2019-09-27 LAB
HGB BLD-MCNC: 11 G/DL (ref 12–16)
POTASSIUM BLD-SCNC: 4.1 MMOL/L (ref 3.5–5)

## 2019-10-03 ENCOUNTER — TRANSFERRED RECORDS (OUTPATIENT)
Dept: HEALTH INFORMATION MANAGEMENT | Facility: CLINIC | Age: 69
End: 2019-10-03

## 2019-10-06 ENCOUNTER — COMMUNICATION - HEALTHEAST (OUTPATIENT)
Dept: INTERNAL MEDICINE | Facility: CLINIC | Age: 69
End: 2019-10-06

## 2019-10-06 DIAGNOSIS — L29.9 ITCHING: ICD-10-CM

## 2019-10-07 ENCOUNTER — COMMUNICATION - HEALTHEAST (OUTPATIENT)
Dept: SCHEDULING | Facility: CLINIC | Age: 69
End: 2019-10-07

## 2019-10-07 ENCOUNTER — OFFICE VISIT - HEALTHEAST (OUTPATIENT)
Dept: FAMILY MEDICINE | Facility: CLINIC | Age: 69
End: 2019-10-07

## 2019-10-07 DIAGNOSIS — R21 RASH AND NONSPECIFIC SKIN ERUPTION: ICD-10-CM

## 2019-10-07 ASSESSMENT — PATIENT HEALTH QUESTIONNAIRE - PHQ9: SUM OF ALL RESPONSES TO PHQ QUESTIONS 1-9: 0

## 2019-10-12 ENCOUNTER — COMMUNICATION - HEALTHEAST (OUTPATIENT)
Dept: INTERNAL MEDICINE | Facility: CLINIC | Age: 69
End: 2019-10-12

## 2019-10-12 DIAGNOSIS — F41.1 GENERALIZED ANXIETY DISORDER: ICD-10-CM

## 2019-10-18 ENCOUNTER — OFFICE VISIT - HEALTHEAST (OUTPATIENT)
Dept: INTERNAL MEDICINE | Facility: CLINIC | Age: 69
End: 2019-10-18

## 2019-10-18 DIAGNOSIS — R53.83 FATIGUE, UNSPECIFIED TYPE: ICD-10-CM

## 2019-10-18 DIAGNOSIS — L29.9 ITCHING: ICD-10-CM

## 2019-10-18 DIAGNOSIS — L29.9 PRURITUS: ICD-10-CM

## 2019-10-18 DIAGNOSIS — R50.9 LOW GRADE FEVER: ICD-10-CM

## 2019-10-18 DIAGNOSIS — Z11.4 ENCOUNTER FOR SCREENING FOR HUMAN IMMUNODEFICIENCY VIRUS (HIV): ICD-10-CM

## 2019-10-18 LAB
ALBUMIN SERPL-MCNC: 3.6 G/DL (ref 3.5–5)
ALBUMIN UR-MCNC: NEGATIVE MG/DL
ALP SERPL-CCNC: 133 U/L (ref 45–120)
ALT SERPL W P-5'-P-CCNC: 65 U/L (ref 0–45)
AMYLASE SERPL-CCNC: 30 U/L (ref 5–120)
ANION GAP SERPL CALCULATED.3IONS-SCNC: 9 MMOL/L (ref 5–18)
APPEARANCE UR: CLEAR
AST SERPL W P-5'-P-CCNC: 48 U/L (ref 0–40)
BACTERIA #/AREA URNS HPF: ABNORMAL HPF
BILIRUB SERPL-MCNC: 0.4 MG/DL (ref 0–1)
BILIRUB UR QL STRIP: NEGATIVE
BUN SERPL-MCNC: 12 MG/DL (ref 8–28)
C REACTIVE PROTEIN LHE: 8.4 MG/DL (ref 0–0.8)
CALCIUM SERPL-MCNC: 9.6 MG/DL (ref 8.5–10.5)
CHLORIDE BLD-SCNC: 103 MMOL/L (ref 98–107)
CO2 SERPL-SCNC: 26 MMOL/L (ref 22–31)
COLOR UR AUTO: YELLOW
CREAT SERPL-MCNC: 0.72 MG/DL (ref 0.6–1.1)
ERYTHROCYTE [SEDIMENTATION RATE] IN BLOOD BY WESTERGREN METHOD: 53 MM/HR (ref 0–20)
GFR SERPL CREATININE-BSD FRML MDRD: >60 ML/MIN/1.73M2
GLUCOSE BLD-MCNC: 94 MG/DL (ref 70–125)
GLUCOSE UR STRIP-MCNC: NEGATIVE MG/DL
HGB UR QL STRIP: NEGATIVE
HIV 1+2 AB+HIV1 P24 AG SERPL QL IA: NEGATIVE
HYALINE CASTS #/AREA URNS LPF: ABNORMAL LPF
KETONES UR STRIP-MCNC: NEGATIVE MG/DL
LEUKOCYTE ESTERASE UR QL STRIP: ABNORMAL
LIPASE SERPL-CCNC: 19 U/L (ref 0–52)
NITRATE UR QL: NEGATIVE
PH UR STRIP: 6 [PH] (ref 5–8)
POTASSIUM BLD-SCNC: 4 MMOL/L (ref 3.5–5)
PROT SERPL-MCNC: 6.7 G/DL (ref 6–8)
RBC #/AREA URNS AUTO: ABNORMAL HPF
RHEUMATOID FACT SERPL-ACNC: <15 IU/ML (ref 0–30)
SODIUM SERPL-SCNC: 138 MMOL/L (ref 136–145)
SP GR UR STRIP: 1.01 (ref 1–1.03)
SQUAMOUS #/AREA URNS AUTO: ABNORMAL LPF
TSH SERPL DL<=0.005 MIU/L-ACNC: 1.94 UIU/ML (ref 0.3–5)
UROBILINOGEN UR STRIP-ACNC: ABNORMAL
WBC #/AREA URNS AUTO: ABNORMAL HPF
WBC CLUMPS #/AREA URNS HPF: ABNORMAL /[HPF]

## 2019-10-19 LAB
BACTERIA SPEC CULT: NO GROWTH
BASOPHILS # BLD AUTO: 0.1 THOU/UL (ref 0–0.2)
BASOPHILS NFR BLD AUTO: 1 % (ref 0–2)
EOSINOPHIL # BLD AUTO: 0.4 THOU/UL (ref 0–0.4)
EOSINOPHIL NFR BLD AUTO: 5 % (ref 0–6)
ERYTHROCYTE [DISTWIDTH] IN BLOOD BY AUTOMATED COUNT: 13.9 % (ref 11–14.5)
HCT VFR BLD AUTO: 33.6 % (ref 35–47)
HGB BLD-MCNC: 10.7 G/DL (ref 12–16)
LYMPHOCYTES # BLD AUTO: 1.5 THOU/UL (ref 0.8–4.4)
LYMPHOCYTES NFR BLD AUTO: 20 % (ref 20–40)
MCH RBC QN AUTO: 28.2 PG (ref 27–34)
MCHC RBC AUTO-ENTMCNC: 31.8 G/DL (ref 32–36)
MCV RBC AUTO: 88 FL (ref 80–100)
MONOCYTES # BLD AUTO: 0.7 THOU/UL (ref 0–0.9)
MONOCYTES NFR BLD AUTO: 10 % (ref 2–10)
NEUTROPHILS # BLD AUTO: 4.9 THOU/UL (ref 2–7.7)
NEUTROPHILS NFR BLD AUTO: 65 % (ref 50–70)
OVALOCYTES: ABNORMAL
PATH REPORT.MICROSCOPIC SPEC OTHER STN: ABNORMAL
PLAT MORPH BLD: NORMAL
PLATELET # BLD AUTO: 331 THOU/UL (ref 140–440)
PMV BLD AUTO: 9.7 FL (ref 8.5–12.5)
POLYCHROMASIA BLD QL SMEAR: ABNORMAL
RBC # BLD AUTO: 3.8 MILL/UL (ref 3.8–5.4)
REACTIVE LYMPHS: ABNORMAL
WBC: 7.6 THOU/UL (ref 4–11)

## 2019-10-20 LAB — MONOCYTES NFR BLD AUTO: POSITIVE %

## 2019-10-21 ENCOUNTER — COMMUNICATION - HEALTHEAST (OUTPATIENT)
Dept: INTERNAL MEDICINE | Facility: CLINIC | Age: 69
End: 2019-10-21

## 2019-10-21 ENCOUNTER — TRANSFERRED RECORDS (OUTPATIENT)
Dept: HEALTH INFORMATION MANAGEMENT | Facility: CLINIC | Age: 69
End: 2019-10-21

## 2019-10-21 LAB
ANA SER QL: 0.2 U
B BURGDOR IGG+IGM SER QL: 0.03 INDEX VALUE
HAV IGM SERPL QL IA: NEGATIVE
HBV CORE IGM SERPL QL IA: NEGATIVE
HBV SURFACE AG SERPL QL IA: NEGATIVE
HCV AB SERPL QL IA: NEGATIVE
LAB AP CHARGES (HE HISTORICAL CONVERSION): NORMAL
PATH REPORT.COMMENTS IMP SPEC: NORMAL
PATH REPORT.COMMENTS IMP SPEC: NORMAL
PATH REPORT.FINAL DX SPEC: NORMAL
PATH REPORT.MICROSCOPIC SPEC OTHER STN: NORMAL
PATH REPORT.RELEVANT HX SPEC: NORMAL

## 2019-10-22 ENCOUNTER — AMBULATORY - HEALTHEAST (OUTPATIENT)
Dept: VASCULAR SURGERY | Facility: CLINIC | Age: 69
End: 2019-10-22

## 2019-10-23 ENCOUNTER — OFFICE VISIT - HEALTHEAST (OUTPATIENT)
Dept: INTERNAL MEDICINE | Facility: CLINIC | Age: 69
End: 2019-10-23

## 2019-10-23 DIAGNOSIS — D64.9 LOW HEMOGLOBIN: ICD-10-CM

## 2019-10-23 DIAGNOSIS — B27.90 EBV INFECTION: ICD-10-CM

## 2019-10-23 DIAGNOSIS — L29.9 PRURITUS: ICD-10-CM

## 2019-10-23 LAB
ALBUMIN SERPL-MCNC: 3.7 G/DL (ref 3.5–5)
ALP SERPL-CCNC: 151 U/L (ref 45–120)
ALT SERPL W P-5'-P-CCNC: 44 U/L (ref 0–45)
ANION GAP SERPL CALCULATED.3IONS-SCNC: 12 MMOL/L (ref 5–18)
AST SERPL W P-5'-P-CCNC: 21 U/L (ref 0–40)
BILIRUB SERPL-MCNC: 0.2 MG/DL (ref 0–1)
BUN SERPL-MCNC: 11 MG/DL (ref 8–28)
CALCIUM SERPL-MCNC: 9.4 MG/DL (ref 8.5–10.5)
CHLORIDE BLD-SCNC: 99 MMOL/L (ref 98–107)
CO2 SERPL-SCNC: 26 MMOL/L (ref 22–31)
CREAT SERPL-MCNC: 0.69 MG/DL (ref 0.6–1.1)
ERYTHROCYTE [DISTWIDTH] IN BLOOD BY AUTOMATED COUNT: 12.5 % (ref 11–14.5)
FERRITIN SERPL-MCNC: 51 NG/ML (ref 10–130)
GFR SERPL CREATININE-BSD FRML MDRD: >60 ML/MIN/1.73M2
GLUCOSE BLD-MCNC: 93 MG/DL (ref 70–125)
HCT VFR BLD AUTO: 36.9 % (ref 35–47)
HGB BLD-MCNC: 12.2 G/DL (ref 12–16)
IRON SATN MFR SERPL: 7 % (ref 20–50)
IRON SERPL-MCNC: 25 UG/DL (ref 42–175)
MCH RBC QN AUTO: 28.9 PG (ref 27–34)
MCHC RBC AUTO-ENTMCNC: 33.2 G/DL (ref 32–36)
MCV RBC AUTO: 87 FL (ref 80–100)
PLATELET # BLD AUTO: 465 THOU/UL (ref 140–440)
PMV BLD AUTO: 6.7 FL (ref 7–10)
POTASSIUM BLD-SCNC: 3.9 MMOL/L (ref 3.5–5)
PROT SERPL-MCNC: 7.1 G/DL (ref 6–8)
RBC # BLD AUTO: 4.23 MILL/UL (ref 3.8–5.4)
SODIUM SERPL-SCNC: 137 MMOL/L (ref 136–145)
TIBC SERPL-MCNC: 356 UG/DL (ref 313–563)
TRANSFERRIN SERPL-MCNC: 285 MG/DL (ref 212–360)
VIT B12 SERPL-MCNC: 1428 PG/ML (ref 213–816)
WBC: 5.8 THOU/UL (ref 4–11)

## 2019-10-23 ASSESSMENT — PATIENT HEALTH QUESTIONNAIRE - PHQ9: SUM OF ALL RESPONSES TO PHQ QUESTIONS 1-9: 3

## 2019-10-26 ENCOUNTER — COMMUNICATION - HEALTHEAST (OUTPATIENT)
Dept: INTERNAL MEDICINE | Facility: CLINIC | Age: 69
End: 2019-10-26

## 2019-10-26 DIAGNOSIS — F41.1 GENERALIZED ANXIETY DISORDER: ICD-10-CM

## 2019-10-28 ENCOUNTER — RECORDS - HEALTHEAST (OUTPATIENT)
Dept: ADMINISTRATIVE | Facility: OTHER | Age: 69
End: 2019-10-28

## 2019-10-29 ENCOUNTER — COMMUNICATION - HEALTHEAST (OUTPATIENT)
Dept: INTERNAL MEDICINE | Facility: CLINIC | Age: 69
End: 2019-10-29

## 2019-10-31 ENCOUNTER — RECORDS - HEALTHEAST (OUTPATIENT)
Dept: ADMINISTRATIVE | Facility: OTHER | Age: 69
End: 2019-10-31

## 2019-11-02 ENCOUNTER — COMMUNICATION - HEALTHEAST (OUTPATIENT)
Dept: VASCULAR SURGERY | Facility: CLINIC | Age: 69
End: 2019-11-02

## 2019-11-03 ENCOUNTER — COMMUNICATION - HEALTHEAST (OUTPATIENT)
Dept: INTERNAL MEDICINE | Facility: CLINIC | Age: 69
End: 2019-11-03

## 2019-11-03 DIAGNOSIS — L29.9 ITCHING: ICD-10-CM

## 2019-11-04 ENCOUNTER — AMBULATORY - HEALTHEAST (OUTPATIENT)
Dept: VASCULAR SURGERY | Facility: CLINIC | Age: 69
End: 2019-11-04

## 2019-11-04 ENCOUNTER — SURGERY - HEALTHEAST (OUTPATIENT)
Dept: VASCULAR SURGERY | Facility: CLINIC | Age: 69
End: 2019-11-04

## 2019-11-04 DIAGNOSIS — I87.2 VENOUS INSUFFICIENCY OF BOTH LOWER EXTREMITIES: ICD-10-CM

## 2019-11-05 ENCOUNTER — HOSPITAL ENCOUNTER (OUTPATIENT)
Dept: MAMMOGRAPHY | Facility: CLINIC | Age: 69
Discharge: HOME | End: 2019-11-05

## 2019-11-05 DIAGNOSIS — Z12.31 VISIT FOR SCREENING MAMMOGRAM: ICD-10-CM

## 2019-11-06 ENCOUNTER — TRANSFERRED RECORDS (OUTPATIENT)
Dept: HEALTH INFORMATION MANAGEMENT | Facility: CLINIC | Age: 69
End: 2019-11-06

## 2019-11-07 ENCOUNTER — COMMUNICATION - HEALTHEAST (OUTPATIENT)
Dept: INTERNAL MEDICINE | Facility: CLINIC | Age: 69
End: 2019-11-07

## 2019-11-07 ENCOUNTER — AMBULATORY - HEALTHEAST (OUTPATIENT)
Dept: INTERNAL MEDICINE | Facility: CLINIC | Age: 69
End: 2019-11-07

## 2019-11-07 ENCOUNTER — OFFICE VISIT - HEALTHEAST (OUTPATIENT)
Dept: INTERNAL MEDICINE | Facility: CLINIC | Age: 69
End: 2019-11-07

## 2019-11-07 DIAGNOSIS — L29.9 ITCHING: ICD-10-CM

## 2019-11-07 DIAGNOSIS — M81.0 OSTEOPOROSIS WITHOUT CURRENT PATHOLOGICAL FRACTURE, UNSPECIFIED OSTEOPOROSIS TYPE: ICD-10-CM

## 2019-11-07 DIAGNOSIS — D64.9 LOW HEMOGLOBIN: ICD-10-CM

## 2019-11-07 DIAGNOSIS — Z92.29 PERSONAL HISTORY OF OTHER DRUG THERAPY: ICD-10-CM

## 2019-11-07 LAB
ALBUMIN SERPL-MCNC: 3.9 G/DL (ref 3.5–5)
ALP SERPL-CCNC: 116 U/L (ref 45–120)
ALT SERPL W P-5'-P-CCNC: 27 U/L (ref 0–45)
ANION GAP SERPL CALCULATED.3IONS-SCNC: 9 MMOL/L (ref 5–18)
AST SERPL W P-5'-P-CCNC: 22 U/L (ref 0–40)
BILIRUB SERPL-MCNC: 0.3 MG/DL (ref 0–1)
BUN SERPL-MCNC: 15 MG/DL (ref 8–28)
C REACTIVE PROTEIN LHE: 0.4 MG/DL (ref 0–0.8)
CALCIUM SERPL-MCNC: 9.6 MG/DL (ref 8.5–10.5)
CHLORIDE BLD-SCNC: 102 MMOL/L (ref 98–107)
CO2 SERPL-SCNC: 28 MMOL/L (ref 22–31)
CREAT SERPL-MCNC: 0.73 MG/DL (ref 0.6–1.1)
ERYTHROCYTE [DISTWIDTH] IN BLOOD BY AUTOMATED COUNT: 13.6 % (ref 11–14.5)
ERYTHROCYTE [SEDIMENTATION RATE] IN BLOOD BY WESTERGREN METHOD: 19 MM/HR (ref 0–20)
FERRITIN SERPL-MCNC: 31 NG/ML (ref 10–130)
GFR SERPL CREATININE-BSD FRML MDRD: >60 ML/MIN/1.73M2
GLUCOSE BLD-MCNC: 98 MG/DL (ref 70–125)
HCT VFR BLD AUTO: 37.6 % (ref 35–47)
HGB BLD-MCNC: 12.5 G/DL (ref 12–16)
IRON SATN MFR SERPL: 43 % (ref 20–50)
IRON SERPL-MCNC: 168 UG/DL (ref 42–175)
MCH RBC QN AUTO: 28.6 PG (ref 27–34)
MCHC RBC AUTO-ENTMCNC: 33.2 G/DL (ref 32–36)
MCV RBC AUTO: 86 FL (ref 80–100)
PLATELET # BLD AUTO: 324 THOU/UL (ref 140–440)
PMV BLD AUTO: 6.7 FL (ref 7–10)
POTASSIUM BLD-SCNC: 3.9 MMOL/L (ref 3.5–5)
PROT SERPL-MCNC: 7.1 G/DL (ref 6–8)
RBC # BLD AUTO: 4.36 MILL/UL (ref 3.8–5.4)
SODIUM SERPL-SCNC: 139 MMOL/L (ref 136–145)
TIBC SERPL-MCNC: 389 UG/DL (ref 313–563)
TRANSFERRIN SERPL-MCNC: 311 MG/DL (ref 212–360)
WBC: 5.8 THOU/UL (ref 4–11)

## 2019-11-08 ENCOUNTER — COMMUNICATION - HEALTHEAST (OUTPATIENT)
Dept: INTERNAL MEDICINE | Facility: CLINIC | Age: 69
End: 2019-11-08

## 2019-11-22 ENCOUNTER — COMMUNICATION - HEALTHEAST (OUTPATIENT)
Dept: INTERNAL MEDICINE | Facility: CLINIC | Age: 69
End: 2019-11-22

## 2019-12-02 ENCOUNTER — OFFICE VISIT - HEALTHEAST (OUTPATIENT)
Dept: INTERNAL MEDICINE | Facility: CLINIC | Age: 69
End: 2019-12-02

## 2019-12-02 DIAGNOSIS — I10 ESSENTIAL HYPERTENSION: ICD-10-CM

## 2019-12-02 DIAGNOSIS — Z01.818 PREOPERATIVE EXAMINATION: ICD-10-CM

## 2019-12-02 DIAGNOSIS — I83.893 SYMPTOMATIC VARICOSE VEINS OF BOTH LOWER EXTREMITIES: ICD-10-CM

## 2019-12-02 DIAGNOSIS — E03.9 ACQUIRED HYPOTHYROIDISM: ICD-10-CM

## 2019-12-02 ASSESSMENT — MIFFLIN-ST. JEOR: SCORE: 1267.48

## 2019-12-10 ENCOUNTER — OFFICE VISIT - HEALTHEAST (OUTPATIENT)
Dept: INTERNAL MEDICINE | Facility: CLINIC | Age: 69
End: 2019-12-10

## 2019-12-10 DIAGNOSIS — G89.29 CHRONIC LOW BACK PAIN, UNSPECIFIED BACK PAIN LATERALITY, UNSPECIFIED WHETHER SCIATICA PRESENT: ICD-10-CM

## 2019-12-10 DIAGNOSIS — Z01.818 PREOPERATIVE EXAMINATION: ICD-10-CM

## 2019-12-10 DIAGNOSIS — M54.50 CHRONIC LOW BACK PAIN, UNSPECIFIED BACK PAIN LATERALITY, UNSPECIFIED WHETHER SCIATICA PRESENT: ICD-10-CM

## 2019-12-10 DIAGNOSIS — E03.9 ACQUIRED HYPOTHYROIDISM: ICD-10-CM

## 2019-12-10 DIAGNOSIS — I10 ESSENTIAL HYPERTENSION: ICD-10-CM

## 2019-12-10 LAB
ANION GAP SERPL CALCULATED.3IONS-SCNC: 9 MMOL/L (ref 5–18)
BUN SERPL-MCNC: 15 MG/DL (ref 8–28)
CALCIUM SERPL-MCNC: 9 MG/DL (ref 8.5–10.5)
CHLORIDE BLD-SCNC: 102 MMOL/L (ref 98–107)
CO2 SERPL-SCNC: 26 MMOL/L (ref 22–31)
CREAT SERPL-MCNC: 0.75 MG/DL (ref 0.6–1.1)
GFR SERPL CREATININE-BSD FRML MDRD: >60 ML/MIN/1.73M2
GLUCOSE BLD-MCNC: 68 MG/DL (ref 70–125)
HGB BLD-MCNC: 12.3 G/DL (ref 12–16)
POTASSIUM BLD-SCNC: 4.1 MMOL/L (ref 3.5–5)
SODIUM SERPL-SCNC: 137 MMOL/L (ref 136–145)

## 2019-12-11 ENCOUNTER — COMMUNICATION - HEALTHEAST (OUTPATIENT)
Dept: FAMILY MEDICINE | Facility: CLINIC | Age: 69
End: 2019-12-11

## 2019-12-11 DIAGNOSIS — I10 HTN (HYPERTENSION): ICD-10-CM

## 2019-12-18 ASSESSMENT — MIFFLIN-ST. JEOR: SCORE: 1275.19

## 2019-12-19 ENCOUNTER — ANESTHESIA - HEALTHEAST (OUTPATIENT)
Dept: SURGERY | Facility: AMBULATORY SURGERY CENTER | Age: 69
End: 2019-12-19

## 2019-12-20 ENCOUNTER — SURGERY - HEALTHEAST (OUTPATIENT)
Dept: SURGERY | Facility: AMBULATORY SURGERY CENTER | Age: 69
End: 2019-12-20

## 2019-12-23 ENCOUNTER — COMMUNICATION - HEALTHEAST (OUTPATIENT)
Dept: FAMILY MEDICINE | Facility: CLINIC | Age: 69
End: 2019-12-23

## 2019-12-23 DIAGNOSIS — I10 ESSENTIAL HYPERTENSION WITH GOAL BLOOD PRESSURE LESS THAN 140/90: ICD-10-CM

## 2019-12-26 ENCOUNTER — COMMUNICATION - HEALTHEAST (OUTPATIENT)
Dept: INTERNAL MEDICINE | Facility: CLINIC | Age: 69
End: 2019-12-26

## 2019-12-26 DIAGNOSIS — L29.9 ITCHING: ICD-10-CM

## 2020-01-04 ENCOUNTER — COMMUNICATION - HEALTHEAST (OUTPATIENT)
Dept: INTERNAL MEDICINE | Facility: CLINIC | Age: 70
End: 2020-01-04

## 2020-01-04 DIAGNOSIS — G25.81 RLS (RESTLESS LEGS SYNDROME): ICD-10-CM

## 2020-01-07 ENCOUNTER — OFFICE VISIT - HEALTHEAST (OUTPATIENT)
Dept: VASCULAR SURGERY | Facility: CLINIC | Age: 70
End: 2020-01-07

## 2020-01-07 DIAGNOSIS — I87.2 VENOUS INSUFFICIENCY OF BOTH LOWER EXTREMITIES: ICD-10-CM

## 2020-01-07 ASSESSMENT — MIFFLIN-ST. JEOR: SCORE: 1275.19

## 2020-01-18 ENCOUNTER — COMMUNICATION - HEALTHEAST (OUTPATIENT)
Dept: INTERNAL MEDICINE | Facility: CLINIC | Age: 70
End: 2020-01-18

## 2020-01-18 DIAGNOSIS — I10 ESSENTIAL HYPERTENSION: ICD-10-CM

## 2020-01-18 DIAGNOSIS — R60.0 BILATERAL EDEMA OF LOWER EXTREMITY: ICD-10-CM

## 2020-01-18 DIAGNOSIS — E03.9 HYPOTHYROID: ICD-10-CM

## 2020-01-22 ENCOUNTER — COMMUNICATION - HEALTHEAST (OUTPATIENT)
Dept: INTERNAL MEDICINE | Facility: CLINIC | Age: 70
End: 2020-01-22

## 2020-01-22 DIAGNOSIS — L29.9 ITCHING: ICD-10-CM

## 2020-01-26 ENCOUNTER — COMMUNICATION - HEALTHEAST (OUTPATIENT)
Dept: INTERNAL MEDICINE | Facility: CLINIC | Age: 70
End: 2020-01-26

## 2020-01-26 DIAGNOSIS — F41.1 GENERALIZED ANXIETY DISORDER: ICD-10-CM

## 2020-01-28 ENCOUNTER — COMMUNICATION - HEALTHEAST (OUTPATIENT)
Dept: INTERNAL MEDICINE | Facility: CLINIC | Age: 70
End: 2020-01-28

## 2020-01-28 DIAGNOSIS — F41.1 GENERALIZED ANXIETY DISORDER: ICD-10-CM

## 2020-01-30 ENCOUNTER — COMMUNICATION - HEALTHEAST (OUTPATIENT)
Dept: INTERNAL MEDICINE | Facility: CLINIC | Age: 70
End: 2020-01-30

## 2020-01-30 DIAGNOSIS — L29.9 ITCHING: ICD-10-CM

## 2020-03-25 ENCOUNTER — COMMUNICATION - HEALTHEAST (OUTPATIENT)
Dept: INTERNAL MEDICINE | Facility: CLINIC | Age: 70
End: 2020-03-25

## 2020-03-25 DIAGNOSIS — I10 ESSENTIAL HYPERTENSION: ICD-10-CM

## 2020-04-17 ENCOUNTER — OFFICE VISIT - HEALTHEAST (OUTPATIENT)
Dept: INTERNAL MEDICINE | Facility: CLINIC | Age: 70
End: 2020-04-17

## 2020-04-17 ENCOUNTER — COMMUNICATION - HEALTHEAST (OUTPATIENT)
Dept: INTERNAL MEDICINE | Facility: CLINIC | Age: 70
End: 2020-04-17

## 2020-04-17 DIAGNOSIS — N30.01 ACUTE CYSTITIS WITH HEMATURIA: ICD-10-CM

## 2020-04-17 DIAGNOSIS — R30.0 DYSURIA: ICD-10-CM

## 2020-04-17 LAB
ALBUMIN UR-MCNC: NEGATIVE MG/DL
APPEARANCE UR: CLEAR
BACTERIA #/AREA URNS HPF: ABNORMAL HPF
BILIRUB UR QL STRIP: NEGATIVE
COLOR UR AUTO: YELLOW
GLUCOSE UR STRIP-MCNC: NEGATIVE MG/DL
HGB UR QL STRIP: ABNORMAL
KETONES UR STRIP-MCNC: NEGATIVE MG/DL
LEUKOCYTE ESTERASE UR QL STRIP: ABNORMAL
NITRATE UR QL: NEGATIVE
PH UR STRIP: 6 [PH] (ref 5–8)
RBC #/AREA URNS AUTO: ABNORMAL HPF
SP GR UR STRIP: 1.02 (ref 1–1.03)
SQUAMOUS #/AREA URNS AUTO: ABNORMAL LPF
UROBILINOGEN UR STRIP-ACNC: ABNORMAL
WBC #/AREA URNS AUTO: ABNORMAL HPF
WBC CLUMPS #/AREA URNS HPF: PRESENT /[HPF]

## 2020-04-17 ASSESSMENT — PATIENT HEALTH QUESTIONNAIRE - PHQ9: SUM OF ALL RESPONSES TO PHQ QUESTIONS 1-9: 2

## 2020-04-20 LAB — BACTERIA SPEC CULT: ABNORMAL

## 2020-04-23 ENCOUNTER — COMMUNICATION - HEALTHEAST (OUTPATIENT)
Dept: INTERNAL MEDICINE | Facility: CLINIC | Age: 70
End: 2020-04-23

## 2020-04-23 ENCOUNTER — AMBULATORY - HEALTHEAST (OUTPATIENT)
Dept: LAB | Facility: CLINIC | Age: 70
End: 2020-04-23

## 2020-04-23 ENCOUNTER — AMBULATORY - HEALTHEAST (OUTPATIENT)
Dept: INTERNAL MEDICINE | Facility: CLINIC | Age: 70
End: 2020-04-23

## 2020-04-23 DIAGNOSIS — R30.0 DYSURIA: ICD-10-CM

## 2020-04-23 LAB
ALBUMIN UR-MCNC: NEGATIVE MG/DL
APPEARANCE UR: ABNORMAL
BACTERIA #/AREA URNS HPF: ABNORMAL HPF
BILIRUB UR QL STRIP: NEGATIVE
COLOR UR AUTO: YELLOW
GLUCOSE UR STRIP-MCNC: NEGATIVE MG/DL
HGB UR QL STRIP: NEGATIVE
KETONES UR STRIP-MCNC: NEGATIVE MG/DL
LEUKOCYTE ESTERASE UR QL STRIP: NEGATIVE
NITRATE UR QL: NEGATIVE
PH UR STRIP: 6 [PH] (ref 5–8)
RBC #/AREA URNS AUTO: ABNORMAL HPF
SP GR UR STRIP: 1.02 (ref 1–1.03)
SQUAMOUS #/AREA URNS AUTO: ABNORMAL LPF
UROBILINOGEN UR STRIP-ACNC: ABNORMAL
WBC #/AREA URNS AUTO: ABNORMAL HPF

## 2020-05-21 ENCOUNTER — COMMUNICATION - HEALTHEAST (OUTPATIENT)
Dept: INTERNAL MEDICINE | Facility: CLINIC | Age: 70
End: 2020-05-21

## 2020-05-21 DIAGNOSIS — G47.00 INSOMNIA, UNSPECIFIED TYPE: ICD-10-CM

## 2020-06-11 ENCOUNTER — AMBULATORY - HEALTHEAST (OUTPATIENT)
Dept: INTERNAL MEDICINE | Facility: CLINIC | Age: 70
End: 2020-06-11

## 2020-06-11 DIAGNOSIS — Z92.29 PERSONAL HISTORY OF OTHER DRUG THERAPY: ICD-10-CM

## 2020-06-11 DIAGNOSIS — M81.0 OSTEOPOROSIS WITHOUT CURRENT PATHOLOGICAL FRACTURE, UNSPECIFIED OSTEOPOROSIS TYPE: ICD-10-CM

## 2020-06-16 ENCOUNTER — OFFICE VISIT - HEALTHEAST (OUTPATIENT)
Dept: INTERNAL MEDICINE | Facility: CLINIC | Age: 70
End: 2020-06-16

## 2020-06-16 DIAGNOSIS — I10 ESSENTIAL HYPERTENSION: ICD-10-CM

## 2020-06-16 DIAGNOSIS — G25.81 RESTLESS LEG SYNDROME: ICD-10-CM

## 2020-06-16 DIAGNOSIS — E03.9 ACQUIRED HYPOTHYROIDISM: ICD-10-CM

## 2020-06-16 DIAGNOSIS — M81.0 OSTEOPOROSIS WITHOUT CURRENT PATHOLOGICAL FRACTURE, UNSPECIFIED OSTEOPOROSIS TYPE: ICD-10-CM

## 2020-06-25 ENCOUNTER — COMMUNICATION - HEALTHEAST (OUTPATIENT)
Dept: INTERNAL MEDICINE | Facility: CLINIC | Age: 70
End: 2020-06-25

## 2020-06-25 DIAGNOSIS — G25.81 RLS (RESTLESS LEGS SYNDROME): ICD-10-CM

## 2020-07-01 ENCOUNTER — OFFICE VISIT - HEALTHEAST (OUTPATIENT)
Dept: FAMILY MEDICINE | Facility: CLINIC | Age: 70
End: 2020-07-01

## 2020-07-01 DIAGNOSIS — Z01.818 PRE-OP EXAM: ICD-10-CM

## 2020-07-01 DIAGNOSIS — I10 ESSENTIAL HYPERTENSION: ICD-10-CM

## 2020-07-01 DIAGNOSIS — M54.9 CHRONIC BACK PAIN, UNSPECIFIED BACK LOCATION, UNSPECIFIED BACK PAIN LATERALITY: ICD-10-CM

## 2020-07-01 DIAGNOSIS — R30.0 DYSURIA: ICD-10-CM

## 2020-07-01 DIAGNOSIS — G89.29 CHRONIC BACK PAIN, UNSPECIFIED BACK LOCATION, UNSPECIFIED BACK PAIN LATERALITY: ICD-10-CM

## 2020-07-01 LAB
ALBUMIN UR-MCNC: NEGATIVE MG/DL
APPEARANCE UR: CLEAR
BACTERIA #/AREA URNS HPF: ABNORMAL HPF
BILIRUB UR QL STRIP: NEGATIVE
COLOR UR AUTO: YELLOW
GLUCOSE UR STRIP-MCNC: NEGATIVE MG/DL
HGB BLD-MCNC: 12.6 G/DL (ref 12–16)
HGB UR QL STRIP: NEGATIVE
KETONES UR STRIP-MCNC: NEGATIVE MG/DL
LEUKOCYTE ESTERASE UR QL STRIP: ABNORMAL
NITRATE UR QL: NEGATIVE
PH UR STRIP: 5 [PH] (ref 5–8)
POTASSIUM BLD-SCNC: 4.7 MMOL/L (ref 3.5–5)
RBC #/AREA URNS AUTO: ABNORMAL HPF
SP GR UR STRIP: 1.01 (ref 1–1.03)
SQUAMOUS #/AREA URNS AUTO: ABNORMAL LPF
UROBILINOGEN UR STRIP-ACNC: ABNORMAL
WBC #/AREA URNS AUTO: ABNORMAL HPF

## 2020-07-02 LAB — BACTERIA SPEC CULT: NO GROWTH

## 2020-07-21 ENCOUNTER — TRANSFERRED RECORDS (OUTPATIENT)
Dept: HEALTH INFORMATION MANAGEMENT | Facility: CLINIC | Age: 70
End: 2020-07-21

## 2020-07-24 ENCOUNTER — RECORDS - HEALTHEAST (OUTPATIENT)
Dept: ADMINISTRATIVE | Facility: OTHER | Age: 70
End: 2020-07-24

## 2020-07-31 ENCOUNTER — AMBULATORY - HEALTHEAST (OUTPATIENT)
Dept: LAB | Facility: CLINIC | Age: 70
End: 2020-07-31

## 2020-07-31 DIAGNOSIS — I10 ESSENTIAL HYPERTENSION: ICD-10-CM

## 2020-07-31 DIAGNOSIS — G25.81 RESTLESS LEG SYNDROME: ICD-10-CM

## 2020-07-31 DIAGNOSIS — E03.9 ACQUIRED HYPOTHYROIDISM: ICD-10-CM

## 2020-07-31 DIAGNOSIS — M81.0 OSTEOPOROSIS WITHOUT CURRENT PATHOLOGICAL FRACTURE, UNSPECIFIED OSTEOPOROSIS TYPE: ICD-10-CM

## 2020-07-31 LAB
ALBUMIN SERPL-MCNC: 3.9 G/DL (ref 3.5–5)
ALBUMIN UR-MCNC: NEGATIVE MG/DL
ALP SERPL-CCNC: 89 U/L (ref 45–120)
ALT SERPL W P-5'-P-CCNC: 19 U/L (ref 0–45)
ANION GAP SERPL CALCULATED.3IONS-SCNC: 9 MMOL/L (ref 5–18)
APPEARANCE UR: CLEAR
AST SERPL W P-5'-P-CCNC: 17 U/L (ref 0–40)
BACTERIA #/AREA URNS HPF: ABNORMAL HPF
BILIRUB SERPL-MCNC: 0.3 MG/DL (ref 0–1)
BILIRUB UR QL STRIP: NEGATIVE
BUN SERPL-MCNC: 18 MG/DL (ref 8–28)
CALCIUM SERPL-MCNC: 9.3 MG/DL (ref 8.5–10.5)
CHLORIDE BLD-SCNC: 106 MMOL/L (ref 98–107)
CHOLEST SERPL-MCNC: 234 MG/DL
CO2 SERPL-SCNC: 22 MMOL/L (ref 22–31)
COLOR UR AUTO: YELLOW
CREAT SERPL-MCNC: 0.89 MG/DL (ref 0.6–1.1)
ERYTHROCYTE [DISTWIDTH] IN BLOOD BY AUTOMATED COUNT: 13.5 % (ref 11–14.5)
FASTING STATUS PATIENT QL REPORTED: YES
GFR SERPL CREATININE-BSD FRML MDRD: >60 ML/MIN/1.73M2
GLUCOSE BLD-MCNC: 96 MG/DL (ref 70–125)
GLUCOSE UR STRIP-MCNC: NEGATIVE MG/DL
HCT VFR BLD AUTO: 39 % (ref 35–47)
HDLC SERPL-MCNC: 53 MG/DL
HGB BLD-MCNC: 12.9 G/DL (ref 12–16)
HGB UR QL STRIP: NEGATIVE
KETONES UR STRIP-MCNC: NEGATIVE MG/DL
LDLC SERPL CALC-MCNC: 159 MG/DL
LEUKOCYTE ESTERASE UR QL STRIP: ABNORMAL
MAGNESIUM SERPL-MCNC: 2.2 MG/DL (ref 1.8–2.6)
MCH RBC QN AUTO: 28.6 PG (ref 27–34)
MCHC RBC AUTO-ENTMCNC: 33 G/DL (ref 32–36)
MCV RBC AUTO: 87 FL (ref 80–100)
MUCOUS THREADS #/AREA URNS LPF: ABNORMAL LPF
NITRATE UR QL: NEGATIVE
PH UR STRIP: 5 [PH] (ref 5–8)
PLATELET # BLD AUTO: 346 THOU/UL (ref 140–440)
PMV BLD AUTO: 7.4 FL (ref 7–10)
POTASSIUM BLD-SCNC: 4.4 MMOL/L (ref 3.5–5)
PROT SERPL-MCNC: 6.8 G/DL (ref 6–8)
RBC # BLD AUTO: 4.5 MILL/UL (ref 3.8–5.4)
RBC #/AREA URNS AUTO: ABNORMAL HPF
SODIUM SERPL-SCNC: 137 MMOL/L (ref 136–145)
SP GR UR STRIP: 1.02 (ref 1–1.03)
SQUAMOUS #/AREA URNS AUTO: ABNORMAL LPF
TRIGL SERPL-MCNC: 111 MG/DL
TSH SERPL DL<=0.005 MIU/L-ACNC: 3.38 UIU/ML (ref 0.3–5)
UROBILINOGEN UR STRIP-ACNC: ABNORMAL
WBC #/AREA URNS AUTO: ABNORMAL HPF
WBC: 5.5 THOU/UL (ref 4–11)

## 2020-08-01 LAB — BACTERIA SPEC CULT: NO GROWTH

## 2020-08-03 LAB — 25(OH)D3 SERPL-MCNC: 34.2 NG/ML (ref 30–80)

## 2020-08-04 ENCOUNTER — RECORDS - HEALTHEAST (OUTPATIENT)
Dept: ADMINISTRATIVE | Facility: OTHER | Age: 70
End: 2020-08-04

## 2020-08-05 ENCOUNTER — OFFICE VISIT - HEALTHEAST (OUTPATIENT)
Dept: INTERNAL MEDICINE | Facility: CLINIC | Age: 70
End: 2020-08-05

## 2020-08-05 ENCOUNTER — RECORDS - HEALTHEAST (OUTPATIENT)
Dept: ADMINISTRATIVE | Facility: OTHER | Age: 70
End: 2020-08-05

## 2020-08-05 DIAGNOSIS — E78.5 DYSLIPIDEMIA: ICD-10-CM

## 2020-08-05 DIAGNOSIS — I10 ESSENTIAL HYPERTENSION: ICD-10-CM

## 2020-08-05 DIAGNOSIS — E03.9 ACQUIRED HYPOTHYROIDISM: ICD-10-CM

## 2020-08-05 DIAGNOSIS — Z00.00 ROUTINE GENERAL MEDICAL EXAMINATION AT A HEALTH CARE FACILITY: ICD-10-CM

## 2020-08-05 DIAGNOSIS — G43.811 OTHER MIGRAINE WITH STATUS MIGRAINOSUS, INTRACTABLE: ICD-10-CM

## 2020-08-05 DIAGNOSIS — F32.A DEPRESSION, UNSPECIFIED DEPRESSION TYPE: ICD-10-CM

## 2020-08-05 DIAGNOSIS — Z01.818 PREOP GENERAL PHYSICAL EXAM: ICD-10-CM

## 2020-08-05 DIAGNOSIS — G25.81 RESTLESS LEG SYNDROME: ICD-10-CM

## 2020-08-05 DIAGNOSIS — H53.9 VISUAL DISTURBANCE: ICD-10-CM

## 2020-08-05 DIAGNOSIS — M79.7 FIBROMYALGIA: ICD-10-CM

## 2020-08-05 DIAGNOSIS — M81.0 OSTEOPOROSIS WITHOUT CURRENT PATHOLOGICAL FRACTURE, UNSPECIFIED OSTEOPOROSIS TYPE: ICD-10-CM

## 2020-08-05 ASSESSMENT — MIFFLIN-ST. JEOR: SCORE: 1241.51

## 2020-08-06 LAB
ATRIAL RATE - MUSE: 73 BPM
DIASTOLIC BLOOD PRESSURE - MUSE: NORMAL
INTERPRETATION ECG - MUSE: NORMAL
P AXIS - MUSE: 48 DEGREES
PR INTERVAL - MUSE: 126 MS
QRS DURATION - MUSE: 96 MS
QT - MUSE: 404 MS
QTC - MUSE: 445 MS
R AXIS - MUSE: 38 DEGREES
SYSTOLIC BLOOD PRESSURE - MUSE: NORMAL
T AXIS - MUSE: 47 DEGREES
VENTRICULAR RATE- MUSE: 73 BPM

## 2020-08-21 ENCOUNTER — AMBULATORY - HEALTHEAST (OUTPATIENT)
Dept: FAMILY MEDICINE | Facility: CLINIC | Age: 70
End: 2020-08-21

## 2020-08-21 ENCOUNTER — TRANSFERRED RECORDS (OUTPATIENT)
Dept: HEALTH INFORMATION MANAGEMENT | Facility: CLINIC | Age: 70
End: 2020-08-21

## 2020-08-21 ENCOUNTER — COMMUNICATION - HEALTHEAST (OUTPATIENT)
Dept: INTERNAL MEDICINE | Facility: CLINIC | Age: 70
End: 2020-08-21

## 2020-08-21 DIAGNOSIS — Z20.822 ENCOUNTER FOR LABORATORY TESTING FOR COVID-19 VIRUS: ICD-10-CM

## 2020-08-21 LAB — PHQ9 SCORE: 2

## 2020-08-22 ENCOUNTER — COMMUNICATION - HEALTHEAST (OUTPATIENT)
Dept: SCHEDULING | Facility: CLINIC | Age: 70
End: 2020-08-22

## 2020-08-27 ENCOUNTER — COMMUNICATION - HEALTHEAST (OUTPATIENT)
Dept: INTERNAL MEDICINE | Facility: CLINIC | Age: 70
End: 2020-08-27

## 2020-08-27 DIAGNOSIS — F41.1 GENERALIZED ANXIETY DISORDER: ICD-10-CM

## 2020-09-22 ENCOUNTER — COMMUNICATION - HEALTHEAST (OUTPATIENT)
Dept: INTERNAL MEDICINE | Facility: CLINIC | Age: 70
End: 2020-09-22

## 2020-09-22 DIAGNOSIS — F41.1 GENERALIZED ANXIETY DISORDER: ICD-10-CM

## 2020-11-11 ENCOUNTER — RECORDS - HEALTHEAST (OUTPATIENT)
Dept: ADMINISTRATIVE | Facility: OTHER | Age: 70
End: 2020-11-11

## 2020-11-12 ENCOUNTER — COMMUNICATION - HEALTHEAST (OUTPATIENT)
Dept: INTERNAL MEDICINE | Facility: CLINIC | Age: 70
End: 2020-11-12

## 2020-11-12 DIAGNOSIS — E03.9 HYPOTHYROID: ICD-10-CM

## 2020-11-23 ENCOUNTER — COMMUNICATION - HEALTHEAST (OUTPATIENT)
Dept: INTERNAL MEDICINE | Facility: CLINIC | Age: 70
End: 2020-11-23

## 2020-12-04 ENCOUNTER — RECORDS - HEALTHEAST (OUTPATIENT)
Dept: ADMINISTRATIVE | Facility: OTHER | Age: 70
End: 2020-12-04

## 2020-12-04 ENCOUNTER — OFFICE VISIT - HEALTHEAST (OUTPATIENT)
Dept: INTERNAL MEDICINE | Facility: CLINIC | Age: 70
End: 2020-12-04

## 2020-12-04 DIAGNOSIS — I10 ESSENTIAL HYPERTENSION: ICD-10-CM

## 2020-12-04 DIAGNOSIS — Z01.818 PREOP GENERAL PHYSICAL EXAM: ICD-10-CM

## 2020-12-04 DIAGNOSIS — H02.401 PTOSIS OF EYELID, RIGHT: ICD-10-CM

## 2020-12-04 DIAGNOSIS — G43.811 OTHER MIGRAINE WITH STATUS MIGRAINOSUS, INTRACTABLE: ICD-10-CM

## 2020-12-04 DIAGNOSIS — F32.A DEPRESSION, UNSPECIFIED DEPRESSION TYPE: ICD-10-CM

## 2020-12-04 DIAGNOSIS — Z20.822 ENCOUNTER FOR LABORATORY TESTING FOR COVID-19 VIRUS: ICD-10-CM

## 2020-12-04 LAB
ANION GAP SERPL CALCULATED.3IONS-SCNC: 12 MMOL/L (ref 5–18)
BUN SERPL-MCNC: 24 MG/DL (ref 8–28)
CALCIUM SERPL-MCNC: 9.4 MG/DL (ref 8.5–10.5)
CHLORIDE BLD-SCNC: 105 MMOL/L (ref 98–107)
CO2 SERPL-SCNC: 20 MMOL/L (ref 22–31)
CREAT SERPL-MCNC: 0.82 MG/DL (ref 0.6–1.1)
GFR SERPL CREATININE-BSD FRML MDRD: >60 ML/MIN/1.73M2
GLUCOSE BLD-MCNC: 89 MG/DL (ref 70–125)
POTASSIUM BLD-SCNC: 4.4 MMOL/L (ref 3.5–5)
SODIUM SERPL-SCNC: 137 MMOL/L (ref 136–145)

## 2020-12-06 ENCOUNTER — COMMUNICATION - HEALTHEAST (OUTPATIENT)
Dept: SCHEDULING | Facility: CLINIC | Age: 70
End: 2020-12-06

## 2020-12-09 ENCOUNTER — RECORDS - HEALTHEAST (OUTPATIENT)
Dept: ADMINISTRATIVE | Facility: OTHER | Age: 70
End: 2020-12-09

## 2020-12-17 ENCOUNTER — OFFICE VISIT - HEALTHEAST (OUTPATIENT)
Dept: INTERNAL MEDICINE | Facility: CLINIC | Age: 70
End: 2020-12-17

## 2020-12-17 DIAGNOSIS — L29.9 ITCHING: ICD-10-CM

## 2020-12-17 DIAGNOSIS — M81.0 OSTEOPOROSIS WITHOUT CURRENT PATHOLOGICAL FRACTURE, UNSPECIFIED OSTEOPOROSIS TYPE: ICD-10-CM

## 2020-12-18 ENCOUNTER — RECORDS - HEALTHEAST (OUTPATIENT)
Dept: ADMINISTRATIVE | Facility: OTHER | Age: 70
End: 2020-12-18

## 2020-12-21 ENCOUNTER — COMMUNICATION - HEALTHEAST (OUTPATIENT)
Dept: INTERNAL MEDICINE | Facility: CLINIC | Age: 70
End: 2020-12-21

## 2020-12-21 DIAGNOSIS — I10 HTN (HYPERTENSION): ICD-10-CM

## 2020-12-22 ENCOUNTER — COMMUNICATION - HEALTHEAST (OUTPATIENT)
Dept: SCHEDULING | Facility: CLINIC | Age: 70
End: 2020-12-22

## 2020-12-23 ENCOUNTER — RECORDS - HEALTHEAST (OUTPATIENT)
Dept: ADMINISTRATIVE | Facility: OTHER | Age: 70
End: 2020-12-23

## 2020-12-30 ENCOUNTER — RECORDS - HEALTHEAST (OUTPATIENT)
Dept: ADMINISTRATIVE | Facility: OTHER | Age: 70
End: 2020-12-30

## 2021-01-01 ENCOUNTER — COMMUNICATION - HEALTHEAST (OUTPATIENT)
Dept: FAMILY MEDICINE | Facility: CLINIC | Age: 71
End: 2021-01-01

## 2021-01-01 DIAGNOSIS — I10 ESSENTIAL HYPERTENSION WITH GOAL BLOOD PRESSURE LESS THAN 140/90: ICD-10-CM

## 2021-01-04 ENCOUNTER — HOSPITAL ENCOUNTER (OUTPATIENT)
Dept: MAMMOGRAPHY | Facility: CLINIC | Age: 71
Discharge: HOME | End: 2021-01-04
Attending: INTERNAL MEDICINE

## 2021-01-04 DIAGNOSIS — Z12.31 VISIT FOR SCREENING MAMMOGRAM: ICD-10-CM

## 2021-01-05 ENCOUNTER — OFFICE VISIT - HEALTHEAST (OUTPATIENT)
Dept: INTERNAL MEDICINE | Facility: CLINIC | Age: 71
End: 2021-01-05

## 2021-01-05 DIAGNOSIS — G47.00 INSOMNIA, UNSPECIFIED TYPE: ICD-10-CM

## 2021-01-05 DIAGNOSIS — F41.9 ANXIETY: ICD-10-CM

## 2021-01-08 ENCOUNTER — RECORDS - HEALTHEAST (OUTPATIENT)
Dept: ADMINISTRATIVE | Facility: OTHER | Age: 71
End: 2021-01-08

## 2021-01-15 ENCOUNTER — RECORDS - HEALTHEAST (OUTPATIENT)
Dept: ADMINISTRATIVE | Facility: OTHER | Age: 71
End: 2021-01-15

## 2021-01-21 ENCOUNTER — COMMUNICATION - HEALTHEAST (OUTPATIENT)
Dept: INTERNAL MEDICINE | Facility: CLINIC | Age: 71
End: 2021-01-21

## 2021-01-22 ENCOUNTER — RECORDS - HEALTHEAST (OUTPATIENT)
Dept: ADMINISTRATIVE | Facility: OTHER | Age: 71
End: 2021-01-22

## 2021-01-24 ENCOUNTER — COMMUNICATION - HEALTHEAST (OUTPATIENT)
Dept: INTERNAL MEDICINE | Facility: CLINIC | Age: 71
End: 2021-01-24

## 2021-01-24 DIAGNOSIS — G25.81 RLS (RESTLESS LEGS SYNDROME): ICD-10-CM

## 2021-01-29 ENCOUNTER — RECORDS - HEALTHEAST (OUTPATIENT)
Dept: ADMINISTRATIVE | Facility: OTHER | Age: 71
End: 2021-01-29

## 2021-02-05 ENCOUNTER — RECORDS - HEALTHEAST (OUTPATIENT)
Dept: ADMINISTRATIVE | Facility: OTHER | Age: 71
End: 2021-02-05

## 2021-02-09 ENCOUNTER — OFFICE VISIT - HEALTHEAST (OUTPATIENT)
Dept: INTERNAL MEDICINE | Facility: CLINIC | Age: 71
End: 2021-02-09

## 2021-02-09 DIAGNOSIS — G47.00 INSOMNIA, UNSPECIFIED TYPE: ICD-10-CM

## 2021-02-09 DIAGNOSIS — G25.81 RESTLESS LEG SYNDROME: ICD-10-CM

## 2021-02-09 DIAGNOSIS — F41.9 ANXIETY: ICD-10-CM

## 2021-02-10 ENCOUNTER — COMMUNICATION - HEALTHEAST (OUTPATIENT)
Dept: INTERNAL MEDICINE | Facility: CLINIC | Age: 71
End: 2021-02-10

## 2021-02-10 DIAGNOSIS — M79.7 FIBROMYALGIA: ICD-10-CM

## 2021-02-12 ENCOUNTER — RECORDS - HEALTHEAST (OUTPATIENT)
Dept: ADMINISTRATIVE | Facility: OTHER | Age: 71
End: 2021-02-12

## 2021-02-14 ENCOUNTER — COMMUNICATION - HEALTHEAST (OUTPATIENT)
Dept: INTERNAL MEDICINE | Facility: CLINIC | Age: 71
End: 2021-02-14

## 2021-02-14 DIAGNOSIS — R60.0 BILATERAL EDEMA OF LOWER EXTREMITY: ICD-10-CM

## 2021-02-14 DIAGNOSIS — I10 ESSENTIAL HYPERTENSION: ICD-10-CM

## 2021-02-19 ENCOUNTER — RECORDS - HEALTHEAST (OUTPATIENT)
Dept: ADMINISTRATIVE | Facility: OTHER | Age: 71
End: 2021-02-19

## 2021-02-26 ENCOUNTER — TRANSFERRED RECORDS (OUTPATIENT)
Dept: HEALTH INFORMATION MANAGEMENT | Facility: CLINIC | Age: 71
End: 2021-02-26

## 2021-03-05 ENCOUNTER — COMMUNICATION - HEALTHEAST (OUTPATIENT)
Dept: INTERNAL MEDICINE | Facility: CLINIC | Age: 71
End: 2021-03-05

## 2021-03-05 DIAGNOSIS — M79.7 FIBROMYALGIA: ICD-10-CM

## 2021-03-09 ENCOUNTER — OFFICE VISIT - HEALTHEAST (OUTPATIENT)
Dept: INTERNAL MEDICINE | Facility: CLINIC | Age: 71
End: 2021-03-09

## 2021-03-09 DIAGNOSIS — G47.00 INSOMNIA, UNSPECIFIED TYPE: ICD-10-CM

## 2021-03-09 DIAGNOSIS — F41.9 ANXIETY: ICD-10-CM

## 2021-03-09 DIAGNOSIS — G25.81 RESTLESS LEG SYNDROME: ICD-10-CM

## 2021-03-12 ENCOUNTER — RECORDS - HEALTHEAST (OUTPATIENT)
Dept: ADMINISTRATIVE | Facility: OTHER | Age: 71
End: 2021-03-12

## 2021-04-02 ENCOUNTER — COMMUNICATION - HEALTHEAST (OUTPATIENT)
Dept: INTERNAL MEDICINE | Facility: CLINIC | Age: 71
End: 2021-04-02

## 2021-04-02 ENCOUNTER — RECORDS - HEALTHEAST (OUTPATIENT)
Dept: ADMINISTRATIVE | Facility: OTHER | Age: 71
End: 2021-04-02

## 2021-04-02 DIAGNOSIS — G25.81 RESTLESS LEG SYNDROME: ICD-10-CM

## 2021-04-07 ENCOUNTER — COMMUNICATION - HEALTHEAST (OUTPATIENT)
Dept: INTERNAL MEDICINE | Facility: CLINIC | Age: 71
End: 2021-04-07

## 2021-04-09 ENCOUNTER — AMBULATORY - HEALTHEAST (OUTPATIENT)
Dept: INTERNAL MEDICINE | Facility: CLINIC | Age: 71
End: 2021-04-09

## 2021-04-09 DIAGNOSIS — M81.0 OSTEOPOROSIS WITHOUT CURRENT PATHOLOGICAL FRACTURE, UNSPECIFIED OSTEOPOROSIS TYPE: ICD-10-CM

## 2021-04-09 DIAGNOSIS — Z92.29 PERSONAL HISTORY OF OTHER DRUG THERAPY: ICD-10-CM

## 2021-04-14 ENCOUNTER — COMMUNICATION - HEALTHEAST (OUTPATIENT)
Dept: INTERNAL MEDICINE | Facility: CLINIC | Age: 71
End: 2021-04-14

## 2021-04-14 DIAGNOSIS — I10 ESSENTIAL HYPERTENSION: ICD-10-CM

## 2021-04-15 ENCOUNTER — OFFICE VISIT - HEALTHEAST (OUTPATIENT)
Dept: INTERNAL MEDICINE | Facility: CLINIC | Age: 71
End: 2021-04-15

## 2021-04-15 DIAGNOSIS — F32.A DEPRESSION, UNSPECIFIED DEPRESSION TYPE: ICD-10-CM

## 2021-04-15 DIAGNOSIS — F41.1 GENERALIZED ANXIETY DISORDER: ICD-10-CM

## 2021-04-15 DIAGNOSIS — R63.5 WEIGHT GAIN: ICD-10-CM

## 2021-04-15 DIAGNOSIS — I10 ESSENTIAL HYPERTENSION: ICD-10-CM

## 2021-04-15 DIAGNOSIS — F41.9 ANXIETY: ICD-10-CM

## 2021-04-15 DIAGNOSIS — G47.00 INSOMNIA, UNSPECIFIED TYPE: ICD-10-CM

## 2021-04-21 ENCOUNTER — COMMUNICATION - HEALTHEAST (OUTPATIENT)
Dept: INTERNAL MEDICINE | Facility: CLINIC | Age: 71
End: 2021-04-21

## 2021-04-21 DIAGNOSIS — F41.9 ANXIETY: ICD-10-CM

## 2021-05-13 ENCOUNTER — OFFICE VISIT - HEALTHEAST (OUTPATIENT)
Dept: INTERNAL MEDICINE | Facility: CLINIC | Age: 71
End: 2021-05-13

## 2021-05-13 DIAGNOSIS — G47.00 INSOMNIA, UNSPECIFIED TYPE: ICD-10-CM

## 2021-05-13 DIAGNOSIS — F41.9 ANXIETY: ICD-10-CM

## 2021-05-18 ENCOUNTER — RECORDS - HEALTHEAST (OUTPATIENT)
Dept: BONE DENSITY | Facility: CLINIC | Age: 71
End: 2021-05-18

## 2021-05-18 ENCOUNTER — RECORDS - HEALTHEAST (OUTPATIENT)
Dept: ADMINISTRATIVE | Facility: OTHER | Age: 71
End: 2021-05-18

## 2021-05-18 DIAGNOSIS — M81.0 AGE-RELATED OSTEOPOROSIS WITHOUT CURRENT PATHOLOGICAL FRACTURE: ICD-10-CM

## 2021-05-26 ENCOUNTER — COMMUNICATION - HEALTHEAST (OUTPATIENT)
Dept: ADMINISTRATIVE | Facility: CLINIC | Age: 71
End: 2021-05-26

## 2021-05-26 VITALS
SYSTOLIC BLOOD PRESSURE: 122 MMHG | DIASTOLIC BLOOD PRESSURE: 59 MMHG | HEART RATE: 78 BPM | OXYGEN SATURATION: 96 % | TEMPERATURE: 98.2 F

## 2021-05-26 ASSESSMENT — PATIENT HEALTH QUESTIONNAIRE - PHQ9
SUM OF ALL RESPONSES TO PHQ QUESTIONS 1-9: 3
SUM OF ALL RESPONSES TO PHQ QUESTIONS 1-9: 0

## 2021-05-27 ASSESSMENT — PATIENT HEALTH QUESTIONNAIRE - PHQ9: SUM OF ALL RESPONSES TO PHQ QUESTIONS 1-9: 2

## 2021-05-27 NOTE — PATIENT INSTRUCTIONS - HE
Prolia 2nd today.  Prolia 3rd in 6 months with me.    DXA in October 2019 .   Phone number to schedule [phone].    Daily calcium need is 8034-7203 mg a day from the diet and supplements.  Calcium citrate is easier to digest.  Vitamin D 2000 IU daily recommended.    Labs today.

## 2021-05-27 NOTE — TELEPHONE ENCOUNTER
Refill Approved    Rx renewed per Medication Renewal Policy. Medication was last renewed on 10/18/18.    Ov: 12/4/18    Carrol Snyder, [facility] Triage/Med Refill 3/29/2019     Requested Prescriptions   Pending Prescriptions Disp Refills     pramipexole (MIRAPEX) 0.25 MG tablet 270 tablet 0     Sig: Take 1 tablet (0.25 mg total) by mouth 3 (three) times a day.    Parkinson's Meds I Refill Protocol Passed - 3/28/2019  6:32 PM       Passed - PCP or prescribing provider visit in past 6 months     Last office visit with prescriber/PCP: 12/4/2018 OR same dept: 12/4/2018 Bernadette Taylor MD OR same specialty: 12/4/2018 Bernadette Taylor MD Last physical: 10/18/2018 Last MTM visit: Visit date not found     Next appt within 3 mo: Visit date not found  Next physical within 3 mo: Visit date not found  Prescriber OR PCP: Bernadette Taylor MD  Last diagnosis associated with med order: 1. RLS (restless legs syndrome)  - pramipexole (MIRAPEX) 0.25 MG tablet; Take 1 tablet (0.25 mg total) by mouth 3 (three) times a day.  Dispense: 270 tablet; Refill: 0    If protocol passes may refill for 6 months if within 3 months of last provider visit (or a total of 9 months).

## 2021-05-27 NOTE — PROGRESS NOTES
Injection - Other  Date/Time: 4/11/2019 3:45 PM  Performed by: Austin Chang MD  Authorized by: Austin Chang MD   Comments:     ILIOINGUINAL NERVE BLOCK, left  Performed on: 4/11/2019    Ms. Mark is a 68-year-old woman who has had some abdominal surgery.  She continues to have some pain in the left upper inguinal area that radiates somewhat more towards the midline.  It is felt there may be some scar tissue causing the pain.  Workup has found no other etiology.  She is referred for a trial of an ilioinguinal nerve block today.    Pre Procedure Diagnosis:  Ilioinguinal Neuralgia  Vital Signs:  As per intra-procedure documentation    The procedure was discussed with Suma Mark in detail along with the attendant risks, including but not limited to: nerve injury, infection, bleeding, allergic reaction, or worsening of pain.  Informed consent was obtained and patient elected to proceed.    Left ilioinguinal nerve block    After informed consent was obtained the patient was placed supine on the examination table.  The left upper inguinal area was prepped sterilely.  The anterior superior iliac spine was palpated.  A 1   inch #25 gauge needle was used.  An injection was made in a fan like distribution about 2 cm medial to the anterior superior iliac spine.  A total of 10 ml of 0.25% Marcaine with 10 mg of Decadron was injected.  The patient had no complications with the procedure.      The patient tolerated the procedure well. There were no complications.      Pre Procedure Pain Scale: 6  Pain Score: 0-No pain    If Suma Mark has any questions or concerns after discharge, she was instructed to call us.

## 2021-05-27 NOTE — PATIENT INSTRUCTIONS - HE
POST PROCEDURE INSTRUCTIONS  PROCEDURE DONE TODAY:Left Ilioinguinal Nerve Block    Rest today. Resume activity tomorrow as able.  Patient demonstrates the ability to ambulate independently with a steady gait at the time of discharge.      It is not unusual to have a temporary increase in your pain after a procedure. You can ice the area 24-48 hrs. 15 min at a time.      You received a steroid injection. This medication can take 2-7 days to take effect. Some temporary side effects include:    Heartburn    Flushed-red face    Insomnia-trouble sleeping-jitters    Diabetics may see a rise in blood sugar for a few days    Low back or SI joint (sacroiliac) injection: you may experience numbness in one or both legs. Please walk with help. This is temporary and will wear off in a few hours. Do not drive if you are tingling, numbness or weakness in your hands/arms or legs/feet.    Headache:  If you experience a headache after a lumbar epidural injection, lie down and drink fluids (especially caffeine). The headache will go away gradually. If it persists notify our clinic.    Fever: call if fever 100 or over, redness/warmth/swelling over the injection site.    No public hot tubs/pools for a couple days    Physical therapy: check with pain center provider regarding resuming therapy.    Monitor your response to the injection and report this at your next visit.    If you have been referred for a procedure only, please call your referring provider for a follow up.    IF YOU PLAN TO GET A FLU SHOT YOU MUST WAIT 2 WEEKS AFTER THIS INJECTION.      CALL IF ANY QUESTIONS [phone]

## 2021-05-27 NOTE — PROGRESS NOTES
HPI: Suma Mark is here for follow up for her left sided abdominal and hip pain.  She has undergone injection therapy which she states did not necessarily help.  However she does have a follow-up in 2 weeks for repeat injections.  She denies any new symptoms such as bulges or any other complications relating to her small fat-containing inguinal hernias.    Allergies, Medications, Social History, Past Medical History and Past Surgical History were reviewed and are noted in the chart.    /64 (Patient Site: Right Arm, Patient Position: Sitting, Cuff Size: Adult Regular)   Pulse 76   LMP  (LMP Unknown)   SpO2 96%   Breastfeeding? No   There is no height or weight on file to calculate BMI.      EXAM:   GENERAL: Appears well  Abdomen-soft, mild tenderness palpation in the left lateral hip region and ASIS region    Incision 06/02/17 Abdomen (Active)   Site Assessment Covered, not assessed 11/28/2017 10:56 AM   Surrounding Tissue Assessment Clean;Dry;Intact 11/28/2017 10:56 AM   Closure Steristrips 6/6/2017  8:00 AM   Drainage Amount Scant 6/6/2017  8:00 AM   Drainage Description Sanguineous (Bloody) 6/6/2017  8:00 AM   Site Care Cleansed 6/5/2017  7:45 AM   Dressing Dry gauze 6/6/2017  8:00 AM   Dressing Status  Clean;Dry;Intact 11/28/2017 10:56 AM       Incision 11/27/17 Leg Right (Active)   Site Assessment Covered, not assessed 11/30/2017  8:30 AM   Surrounding Tissue Assessment Covered, not assessed 11/30/2017  8:30 AM   Closure Covered, not assessed 11/30/2017  8:30 AM   Drainage Amount Small 11/29/2017  4:00 PM   Drainage Description Sanguineous (Bloody) 11/30/2017  8:30 AM   Dressing Silicone foam 11/30/2017  8:30 AM   Dressing Status  Old drainage;Intact 11/30/2017  8:30 AM       Assessment/Plan: Suma Mark continues to have what appears to be inguinal and hip pain.  I do not think that surgery is in her best interest as the small fat-containing hernias are likely not the cause of her  discomfort.  I have recommended that she continue to follow-up with pain service for injections.  I will also place referral to orthopedics given the finding that she has tenderness palpation of the hip as well as the ASIS.  Hopefully we can get her pain controlled and a diagnosis.  She may follow-up with me at any time if she has any further questions or concerns.    Tre Mast  DO [facility] Department of Surgery

## 2021-05-27 NOTE — PROGRESS NOTES
Assessment/Plan:        1. Thyroid nodule  US Thyroid Biopsy    Thyroid Stimulating Hormone (TSH)   2. Essential hypertension  irbesartan (AVAPRO) 150 MG tablet    HM2(CBC w/o Differential)    Comprehensive Metabolic Panel    Urinalysis-UC if Indicated    Thyroid Stimulating Hormone (TSH)   3. Pneumonia due to infectious organism, unspecified laterality, unspecified part of lung  HM2(CBC w/o Differential)    Comprehensive Metabolic Panel    Urinalysis-UC if Indicated    Thyroid Stimulating Hormone (TSH)   4. Hospital discharge follow-up     5. Osteoporosis, unspecified osteoporosis type, unspecified pathological fracture presence  DXA Bone Density Scan   6. Coronary artery calcification  NM Pharmacologic Stress Test     1.  Pneumonia, resolved, patient clinically stable with no symptoms of respiratory distress.  They already repeated CT of the chest week after discharge from the hospital which did not show any lung abnormalities.    2.  Recurrent UTI, treated in [place] with Macrobid, will repeat urine analysis today.    3.  Patient was scheduled for ultrasound-guided thyroid biopsy for large right thyroid lobe nodule that was seen on the CT scan and ultrasound in [place].    4.  Coronary artery calcification seen on the CT of the chest, patient was started on Lipitor 20 mg daily and she took only 2 pills up to this point.  She will schedule stress test.  We will recheck her lipid panel in 1-2 months.    5.  Osteoporosis treatment continued with a second Prolia injection today.  Patient was educated on safety of Prolia utilizing Patient Counseling Chart for Healthcare Providers, as outlined by the Prolia REMS progam.     This note has been dictated using voice recognition software. Any grammatical or context distortions are unintentional and inherent to the software.      Return in about 3 months (around 7/11/2019) for Recheck lipids, come fasting, Recheck.    Patient Instructions   Prolia 2nd today.  Prolia 3rd in  6 months with me.    DXA in October 2019 .   Phone number to schedule [phone].    Daily calcium need is 8976-6532 mg a day from the diet and supplements.  Calcium citrate is easier to digest.  Vitamin D 2000 IU daily recommended.    Labs today.            Subjective:    Suma Mark is a 68 y.o. female  here for    Chief Complaint   Patient presents with     Follow-up     osteo / prolia      Other     pt was in hospital in [place] and brought records with her - sepsis      Hospitalization in [place] 3/17-3/22/2019:  Discharge diagnosis:  Sepsis, hypotension secondary to community-acquired pneumonia.  Patient's blood pressure dropped in the ER progressively down to 76/39 and had lactic acidosis on presentation.  They consulted critical care medicine Dr.-pulmonologist.  She was treated with IV fluids and broad-spectrum antibiotics.  Prior to this hospitalization she was treated as an outpatient for UTI with Macrobid.  CT of the abdomen shows mild gallbladder wall thickening, abdominal ultrasound was negative for acute abnormalities, urine culture was negative, EKG was normal, bilateral lower extremity Doppler negative for DVT.  CT of the chest showed dominant right thyroid nodule and they did a thyroid ultrasound with recommendation for the FNA.  CT of the chest also showed initially some mediastinal and right hilar lymphadenopathy and then repeated CT of the chest on the follow-up visit after hospitalization on 3/27/2019 showed no acute findings, small hiatal hernia and multivessel coronary calcifications, no pulmonary nodules.  Echo shows ejection fraction 60-65%, grade 2 out of 4 diastolic dysfunction.   She was discharged home with Levaquin for 5 more days which she completed about 2 weeks ago.  She came back to [place] last night.    She is feeling still more tired than usual but otherwise denies any feeling of fever, chills, shortness of breath, cough, dysuria, hematuria, blood in the stool, diarrhea,  nausea, vomiting.  She denies any chest pain and we discussed finding of coronary calcifications.  She never had a stress test in the past.    We also discussed finding of thyroid nodule which needs to be biopsied.    She brought all the records from [facility] which we spent a lot of time reviewing today and is will be scanned in her chart.    She is also here today for the follow-up of osteoporosis and she is due for Prolia injection.    Social History     Socioeconomic History     Marital status:      Spouse name: Not on file     Number of children: Not on file     Years of education: Not on file     Highest education level: Not on file   Occupational History     Not on file   Social Needs     Financial resource strain: Not on file     Food insecurity:     Worry: Not on file     Inability: Not on file     Transportation needs:     Medical: Not on file     Non-medical: Not on file   Tobacco Use     Smoking status: Never Smoker     Smokeless tobacco: Never Used   Substance and Sexual Activity     Alcohol use: Yes     Alcohol/week: 8.4 oz     Types: 14 Shots of liquor per week     Drug use: No     Sexual activity: Not on file   Lifestyle     Physical activity:     Days per week: Not on file     Minutes per session: Not on file     Stress: Not on file   Relationships     Social connections:     Talks on phone: Not on file     Gets together: Not on file     Attends [employer] service: Not on file     Active member of club or organization: Not on file     Attends meetings of clubs or organizations: Not on file     Relationship status: Not on file     Intimate partner violence:     Fear of current or ex partner: Not on file     Emotionally abused: Not on file     Physically abused: Not on file     Forced sexual activity: Not on file   Other Topics Concern     Not on file   Social History Narrative     Not on file       Family History   Problem Relation Age of Onset     Dementia Mother      Arthritis  Mother      COPD Father      Cardiovascular Father      Hypertension Father      No Medical Problems Sister      No Medical Problems Daughter      No Medical Problems Son      Stroke Maternal Grandmother      Heart disease Paternal Grandmother      Stroke Paternal Grandmother      No Medical Problems Sister      No Medical Problems Sister      No Medical Problems Son      No Medical Problems Daughter      Breast cancer Paternal Aunt         age in 50's     Review of Systems:     A 12 point comprehensive review of systems was negative except as noted in HPI.            Objective:    Physical Exam   /60   Pulse 76   Wt 171 lb (77.6 kg)   LMP  (LMP Unknown)   BMI 31.28 kg/m      Constitutional: oriented to person, place, and time, appears well-nourished. No distress.   HENT:   Head: Normocephalic.   Mouth/Throat: Oropharynx is clear and moist.   Eyes: Conjunctivae are normal. Pupils are equal, round, and reactive to light.   Neck: Normal range of motion. Neck supple.   Cardiovascular: Normal rate, regular rhythm and normal heart sounds.    Pulmonary/Chest: Effort normal and breath sounds normal.  Abdominal: Soft. Bowel sounds are normal.   Musculoskeletal: Normal range of motion.   Neurological: alert and oriented to person, place, and time.  Psychiatric:  normal mood and affect.    Patient Active Problem List   Diagnosis     Acquired hypothyroidism     Essential hypertension     Restless leg syndrome     Gastroesophageal reflux disease without esophagitis     Migraine     Hernia, ventral     Osteoporosis     Diverticulosis     S/P total knee replacement     Insomnia, unspecified type     Depression, unspecified depression type     Fibromyalgia       Current Outpatient Medications on File Prior to Visit   Medication Sig Dispense Refill     amLODIPine (NORVASC) 10 MG tablet TAKE 1 TABLET(10 MG) BY MOUTH AT BEDTIME 90 tablet 3     atorvastatin (LIPITOR) 20 MG tablet Take 20 mg by mouth daily.       botulinum  toxin Type A, Cosm, (BOTOX) 100 unit SolR Every 3 months. [facility]       calcium carbonate-vitamin D3 (CALCIUM 600 + D,3,) 600 mg(1,500mg) -400 unit per tablet Take 3 tablets by mouth daily.       calcium, as carbonate, (CALCIUM 500) 500 mg calcium (1,250 mg) tablet Take 1 tablet (500 mg total) by mouth 2 (two) times a day. 60 tablet 11     cholecalciferol, vitamin D3, 2,000 unit capsule Take 2,000 Units by mouth daily.       citalopram (CELEXA) 20 MG tablet TAKE 1 AND 1/2 TABLETS(30 MG) BY MOUTH DAILY 135 tablet 2     cyanocobalamin (VITAMIN B-12) 50 mcg tablet Take 50 mcg by mouth daily.       eletriptan (RELPAX) 20 MG tablet Take 20 mg by mouth as needed for migraine. may repeat in 2 hours if necessary       furosemide (LASIX) 20 MG tablet Take 1 tablet (20 mg total) by mouth daily. 90 tablet 3     gabapentin (NEURONTIN) 100 MG capsule Take 100 mg by mouth 3 (three) times a day. 90 capsule 0     levothyroxine (SYNTHROID, LEVOTHROID) 88 MCG tablet Take 1 tablet (88 mcg total) by mouth Daily at 6:00 am. 90 tablet 3     LORazepam (ATIVAN) 1 MG tablet TAKE 1/2 TABLET(0.5 MG) BY MOUTH DAILY AS NEEDED FOR ANXIETY 30 tablet 0     pramipexole (MIRAPEX) 0.25 MG tablet Take 1 tablet (0.25 mg total) by mouth 3 (three) times a day. 270 tablet 0     propranolol (INDERAL) 10 MG tablet TAKE 1 TABLET(10 MG) BY MOUTH THREE TIMES DAILY 270 tablet 2     [DISCONTINUED] irbesartan (AVAPRO) 150 MG tablet TAKE 1 TABLET(150 MG) BY MOUTH DAILY 90 tablet 3     HYDROcodone-acetaminophen 5-325 mg per tablet Take 1-2 tablets by mouth every 4 (four) hours as needed. 55 tablet 0     Current Facility-Administered Medications on File Prior to Visit   Medication Dose Route Frequency Provider Last Rate Last Dose     denosumab 60 mg (PROLIA 60 mg/ml)  60 mg Subcutaneous Q6 Months Bernadette Taylor MD   60 mg at 04/11/19 1402               Bernadette Taylor  4/11/2019

## 2021-05-28 NOTE — TELEPHONE ENCOUNTER
Refill Approved    Rx renewed per Medication Renewal Policy. Medication was last renewed on 3/29/19.    Suma Jaramillo, [facility] Triage/Med Refill 5/2/2019     Requested Prescriptions   Pending Prescriptions Disp Refills     pramipexole (MIRAPEX) 0.25 MG tablet 270 tablet 0     Sig: Take 1 tablet (0.25 mg total) by mouth 3 (three) times a day.       Parkinson's Meds I Refill Protocol Passed - 5/2/2019 10:05 AM        Passed - PCP or prescribing provider visit in past 6 months      Last office visit with prescriber/PCP: 4/11/2019 OR same dept: 4/11/2019 Bernadette Taylor MD OR same specialty: 4/11/2019 Bernadette Taylor MD Last physical: Visit date not found Last MTM visit: Visit date not found     Next appt within 3 mo: Visit date not found  Next physical within 3 mo: Visit date not found  Prescriber OR PCP: Bernadette Taylor MD  Last diagnosis associated with med order: 1. RLS (restless legs syndrome)  - pramipexole (MIRAPEX) 0.25 MG tablet; Take 1 tablet (0.25 mg total) by mouth 3 (three) times a day.  Dispense: 270 tablet; Refill: 0    If protocol passes may refill for 6 months if within 3 months of last provider visit (or a total of 9 months).

## 2021-05-28 NOTE — TELEPHONE ENCOUNTER
Pt called 5/14/19 at 1641 inquiring about the status of her prior auth for her stab procedure with Dr. Pinto. Based on Terrence's notes, it was sent to pts insurance on 5/7/19.

## 2021-05-28 NOTE — PROGRESS NOTES
Received fax from secondary insurance [facility]. The health plan does not require medical necessity review for the service or item requested. Reference #: Case [id number], Code 83078.

## 2021-05-28 NOTE — PROGRESS NOTES
4 month f/u right RFA. Doing well.  Some tightness. Stretching help.Left leg not done and has VV. Continue compression. UTI 1/6/19 in [place] was in hospital and septic.restless leg continue. Bilateral stab offered will pre auth. Will preauth through [employer] [place]. Will need pre op

## 2021-05-28 NOTE — TELEPHONE ENCOUNTER
Writer spoke to pt today and informed her it can take up to one month to get a response whether the insurance will approve the recommended procedure. Pt thanked writer for the update.

## 2021-05-29 NOTE — PROGRESS NOTES
Surgery/Procedure: stab phlebectomy varicose vein bilateral     Special Equipment: to be determned    [place] [facility] [place]: [place]    Time: 7am     Surgeon: Dr. Pinto    Assist: no    Length of Surgery: 2 hrs     OR Confirmed/ :  Yes with zoa on 5/23/19    Orders In:  Yes    Provider Team Notified:  Yes    Entered on [employer] Calendar:  Yes    Post Op: 8/13/19 @

## 2021-05-30 VITALS — HEIGHT: 62 IN | BODY MASS INDEX: 31.47 KG/M2 | WEIGHT: 171 LBS

## 2021-05-30 VITALS — BODY MASS INDEX: 31.65 KG/M2 | WEIGHT: 172 LBS | HEIGHT: 62 IN

## 2021-05-30 VITALS — HEIGHT: 62 IN | BODY MASS INDEX: 31.28 KG/M2 | WEIGHT: 170 LBS

## 2021-05-30 NOTE — PATIENT INSTRUCTIONS - HE
Hold all supplements, aspirin and NSAIDs for 7 days prior to surgery.  Follow your surgeon's direction on when to stop eating and drinking prior to surgery.  Your surgeon will be managing your pain after your surgery.      Morning of the surgery, you will take only levothyroxine with small sip of water.    Prolia and physical in October.

## 2021-05-30 NOTE — TELEPHONE ENCOUNTER
Spoke with Patient. Gave code for bilateral stab phlebectomy [id number]. She has a new wound on her leg that is healing. Will call when healed or to make an apt if needed sooner.

## 2021-05-30 NOTE — PATIENT INSTRUCTIONS - HE
Stop taking your baby aspirin.    Your leg it does not look infected today.  However, if redness worsens or if you develop heat, streaking, fevers, swelling, or other signs of infection, schedule appointment to be seen immediately.    Minimize sodium intake.    Wear compression stockings more frequently.    Keep legs elevated when sitting.

## 2021-05-30 NOTE — PROGRESS NOTES
Clinic Note    Assessment:     Assessment and Plan:  1. Bruising  Of her left lower extremity does not look infected, no signs of cellulitis today.  She has no history of coronary artery disease and so I will have her discontinue her baby aspirin to see if this helps with her bruising.  See patient instructions below for plan of care.       Patient Instructions   Stop taking your baby aspirin.    Your leg it does not look infected today.  However, if redness worsens or if you develop heat, streaking, fevers, swelling, or other signs of infection, schedule appointment to be seen immediately.    Minimize sodium intake.    Wear compression stockings more frequently.    Keep legs elevated when sitting.    Return in about 8 months (around 3/8/2020) for If symptoms do not start to improve in two weeks..         Subjective:      Patient comes to the clinic today for some bruising on her extremities.    She noticed a red spot on her left shin a few days ago.  She also has some bruising on her bilateral forearms.  She admits that she does occasionally not can do things but denies any type of serious trauma.    She takes a baby aspirin.    No history of coronary artery disease.    No fevers or chills.  Her left lower extremity did feel a bit warm and so she became concerned about possible cellulitis.    The following portions of the patient's history were reviewed and updated as appropriate: Allergies, medications, problems, prior note.    Review of Systems:    Review is otherwise negative except for what is mentioned above.     Social Hx:    Social History     Tobacco Use   Smoking Status Never Smoker   Smokeless Tobacco Never Used         Objective:     Vitals:    07/08/19 1609   BP: 120/70   Pulse: 62   Weight: 171 lb (77.6 kg)       Exam:    General: No apparent distress. Calm. Alert and Oriented X3. Pt behavior is appropriate.  Skin:       Patient Active Problem List   Diagnosis     Acquired hypothyroidism     Essential  hypertension     Restless leg syndrome     Gastroesophageal reflux disease without esophagitis     Migraine     Hernia, ventral     Osteoporosis     Diverticulosis     S/P total knee replacement     Insomnia, unspecified type     Depression, unspecified depression type     Fibromyalgia     Symptomatic varicose veins of both lower extremities     Current Outpatient Medications   Medication Sig Dispense Refill     amLODIPine (NORVASC) 10 MG tablet TAKE 1 TABLET(10 MG) BY MOUTH AT BEDTIME 90 tablet 3     aspirin 81 MG EC tablet Take 81 mg by mouth daily.       atorvastatin (LIPITOR) 20 MG tablet Take 20 mg by mouth daily.       botulinum toxin Type A, Cosm, (BOTOX) 100 unit SolR Every 3 months. [facility]       calcium carbonate-vitamin D3 (CALCIUM 600 + D,3,) 600 mg(1,500mg) -400 unit per tablet Take 3 tablets by mouth daily.       calcium, as carbonate, (CALCIUM 500) 500 mg calcium (1,250 mg) tablet Take 1 tablet (500 mg total) by mouth 2 (two) times a day. 60 tablet 11     cholecalciferol, vitamin D3, 2,000 unit capsule Take 2,000 Units by mouth daily.       citalopram (CELEXA) 20 MG tablet TAKE 1 AND 1/2 TABLETS(30 MG) BY MOUTH DAILY 135 tablet 2     cyanocobalamin (VITAMIN B-12) 50 mcg tablet Take 50 mcg by mouth daily.       eletriptan (RELPAX) 20 MG tablet Take 20 mg by mouth as needed for migraine. may repeat in 2 hours if necessary       furosemide (LASIX) 20 MG tablet Take 1 tablet (20 mg total) by mouth daily. 90 tablet 3     HYDROcodone-acetaminophen 5-325 mg per tablet Take 1-2 tablets by mouth every 4 (four) hours as needed. 55 tablet 0     irbesartan (AVAPRO) 150 MG tablet TAKE 1 TABLET(150 MG) BY MOUTH DAILY 90 tablet 3     levothyroxine (SYNTHROID, LEVOTHROID) 88 MCG tablet Take 1 tablet (88 mcg total) by mouth Daily at 6:00 am. 90 tablet 3     LORazepam (ATIVAN) 1 MG tablet TAKE 1/2 TABLET(0.5 MG) BY MOUTH DAILY AS NEEDED FOR ANXIETY 30 tablet 0     pramipexole (MIRAPEX) 0.25 MG tablet Take 1 tablet  (0.25 mg total) by mouth 3 (three) times a day. 270 tablet 1     propranolol (INDERAL) 10 MG tablet TAKE 1 TABLET(10 MG) BY MOUTH THREE TIMES DAILY 270 tablet 2     Current Facility-Administered Medications   Medication Dose Route Frequency Provider Last Rate Last Dose     denosumab 60 mg (PROLIA 60 mg/ml)  60 mg Subcutaneous Q6 Months Bernadette Taylor MD   60 mg at 04/11/19 1402           Bret Caro CNP (Rob)    7/8/2019

## 2021-05-30 NOTE — TELEPHONE ENCOUNTER
Controlled Substance Refill Request  Medication Name:   Requested Prescriptions     Pending Prescriptions Disp Refills     LORazepam (ATIVAN) 1 MG tablet 30 tablet 0     Sig: TAKE 1/2 TABLET(0.5 MG) BY MOUTH DAILY AS NEEDED FOR ANXIETY     Date Last Fill: 2/12/19  Pharmacy: [facility]      Submit electronically to pharmacy  Controlled Substance Agreement Date Scanned:   Encounter-Level CSA Scan Date:    There are no encounter-level csa scan date.       Last office visit with prescriber/PCP: 4/11/2019 Bernadette Taylor MD OR same dept: 7/8/2019 Bret Caro CNP OR same specialty: 7/8/2019 Bret Caro CNP  Last physical: 10/18/2018 Last MTM visit: Visit date not found

## 2021-05-30 NOTE — PROGRESS NOTES
Preoperative Exam    Scheduled Procedure: Bowel Resection  Surgery Date:  8-  Surgery Location: [facility], fax [phone]    Surgeon:  Dr.Janet Ron    Assessment/Plan:     1. Preop general physical exam    - HM2(CBC w/o Differential)  BMP was done yesterday.  She had normal cardiac stress test in April 2019.    2. LLQ abdominal pain  Chronic, moderate to severe. Possible bowel adhesions. Laparoscopic procedure is scheduled for adhesion lysis and maybe bowel resection.    3. Adhesion of intestine    4. HTN, well managed - she will not take furosemide and irbesartan am of surgery.    5. Hypothyroidism, she will take levothyroxine am of surgery.       Surgical Procedure Risk: Low (reported cardiac risk generally < 1%)  Have you had prior anesthesia?: Yes  Have you or any family members had a previous anesthesia reaction:  No  Do you or any family members have a history of a clotting or bleeding disorder?: No  Cardiac Risk Assessment: no increased risk for major cardiac complications    APPROVAL GIVEN to proceed with proposed procedure, without further diagnostic evaluation    Please Note:  No    Functional Status: Independent  Patient plans to recover at home with family.     Subjective:      Suma Mark is a 68 y.o. female who presents for a preoperative consultation.  See above.    All other systems reviewed and are negative, other than those listed in the HPI.    Pertinent History  Do you have difficulty breathing or chest pain after walking up a flight of stairs: No  History of obstructive sleep apnea: No  Steroid use in the last 6 months: No  Frequent Aspirin/NSAID use: No  Prior Blood Transfusion: No  Prior Blood Transfusion Reaction: No  If for some reason prior to, during or after the procedure, if it is medically indicated, would you be willing to have a blood transfusion?:  There is no transfusion refusal.    Current Outpatient Medications   Medication Sig Dispense Refill      amLODIPine (NORVASC) 10 MG tablet TAKE 1 TABLET(10 MG) BY MOUTH AT BEDTIME 90 tablet 3     atorvastatin (LIPITOR) 20 MG tablet Take 20 mg by mouth daily.       botulinum toxin Type A, Cosm, (BOTOX) 100 unit SolR Every 3 months. [facility]       calcium carbonate-vitamin D3 (CALCIUM 600 + D,3,) 600 mg(1,500mg) -400 unit per tablet Take 3 tablets by mouth daily.       calcium, as carbonate, (CALCIUM 500) 500 mg calcium (1,250 mg) tablet Take 1 tablet (500 mg total) by mouth 2 (two) times a day. 60 tablet 11     cholecalciferol, vitamin D3, 2,000 unit capsule Take 2,000 Units by mouth daily.       citalopram (CELEXA) 20 MG tablet TAKE 1 AND 1/2 TABLETS(30 MG) BY MOUTH DAILY 135 tablet 2     cyanocobalamin (VITAMIN B-12) 50 mcg tablet Take 50 mcg by mouth daily.       eletriptan (RELPAX) 20 MG tablet Take 20 mg by mouth as needed for migraine. may repeat in 2 hours if necessary       furosemide (LASIX) 20 MG tablet Take 1 tablet (20 mg total) by mouth daily. 90 tablet 3     HYDROcodone-acetaminophen 5-325 mg per tablet Take 1-2 tablets by mouth every 4 (four) hours as needed. 55 tablet 0     irbesartan (AVAPRO) 150 MG tablet TAKE 1 TABLET(150 MG) BY MOUTH DAILY 90 tablet 3     levothyroxine (SYNTHROID, LEVOTHROID) 88 MCG tablet Take 1 tablet (88 mcg total) by mouth Daily at 6:00 am. 90 tablet 3     LORazepam (ATIVAN) 1 MG tablet TAKE 1/2 TABLET(0.5 MG) BY MOUTH DAILY AS NEEDED FOR ANXIETY 30 tablet 0     pramipexole (MIRAPEX) 0.25 MG tablet Take 1 tablet (0.25 mg total) by mouth 3 (three) times a day. 270 tablet 1     propranolol (INDERAL) 10 MG tablet TAKE 1 TABLET(10 MG) BY MOUTH THREE TIMES DAILY 270 tablet 2     traZODone (DESYREL) 50 MG tablet Take 50 mg by mouth at bedtime.       Current Facility-Administered Medications   Medication Dose Route Frequency Provider Last Rate Last Dose     denosumab 60 mg (PROLIA 60 mg/ml)  60 mg Subcutaneous Q6 Months Bernadette Taylor MD   60 mg at 04/11/19 1402        Allergies  "  Allergen Reactions     Cephalexin Nausea And Vomiting     Ace Inhibitors Other (See Comments)     Body aches, elevated BP     Amitriptyline Other (See Comments)     Elevated BP     Amlodipine      Atenolol Other (See Comments)     Aggrevates Raynaud's     Celecoxib Other (See Comments)           Clonidine Nausea And Vomiting     Codeine Nausea And Vomiting     Desogestrel-Ethinyl Estradiol Swelling and Unknown     Dexamethasone Swelling and Unknown     Erythromycin Nausea And Vomiting     Escitalopram Other (See Comments)     Headaches.     Gabapentin Unknown     \"Spotted\"     Hydrochlorothiazide Other (See Comments)     hyponatremia     Propoxyphene N-Acetaminophen Other (See Comments)     Tramadol-Acetaminophen Other (See Comments)     Triamterene      Venlafaxine Nausea And Vomiting     Zolpidem Other (See Comments)     Penicillins Rash and Swelling     Sulfa (Sulfonamide Antibiotics) Rash     Tramadol Itching, Rash and Swelling       Patient Active Problem List   Diagnosis     Acquired hypothyroidism     Essential hypertension     Restless leg syndrome     Gastroesophageal reflux disease without esophagitis     Migraine     Hernia, ventral     Osteoporosis     Diverticulosis     S/P total knee replacement     Insomnia, unspecified type     Depression, unspecified depression type     Fibromyalgia     Symptomatic varicose veins of both lower extremities       Past Medical History:   Diagnosis Date     Acquired hypothyroidism 3/12/2009     Anxiety      Back pain      Breast cyst      Degenerative disc disease, lumbar      Depression      Depression, unspecified depression type      Disease of thyroid gland     hypothyroid     Diverticulosis 10/4/2017    Incidental finding on colonoscopy 10/2017     Essential hypertension 4/21/2015     Fibromyalgia      Gastroesophageal reflux disease without esophagitis 4/21/2015     GERD (gastroesophageal reflux disease)      Hernia, ventral 4/27/2017     History of anesthesia " complications     hypotension after surgery     History of blood clots      History of transfusion      Hypertension      Insomnia, unspecified type      Migraine      Osteoarthritis      Osteoporosis      PONV (postoperative nausea and vomiting)      Raynaud's disease      Restless leg syndrome      S/P total knee replacement 11/27/2017     Ventral hernia      Vitamin D deficiency        Past Surgical History:   Procedure Laterality Date     BACK SURGERY       BREAST CYST EXCISION       CARPAL TUNNEL RELEASE       COLECTOMY N/A 6/2/2017    Procedure: RESECTION, SMALL INTESTINE, OPEN ADHESIOLYSIS;  Surgeon: Keyon Qureshi MD;  Location: [facility];  Service:      COLON SURGERY       HEMORRHOID SURGERY       HYSTERECTOMY  [id number]     INCISIONAL HERNIA REPAIR N/A 6/2/2017    Procedure: LAPARASCOPIC CONVERTED TO OPEN PRIMARY INCISIONAL HERNIA REPAIR;  Surgeon: Keyon Qureshi MD;  Location: [facility];  Service:      INCONTINENCE SURGERY       LUMBAR FUSION       MS TOTAL KNEE ARTHROPLASTY Right 11/27/2017    Procedure:  RIGHT TOTAL KNEE ARTHROPLASTY;  Surgeon: Kevin Ryder MD;  Location: [facility];  Service: Orthopedics      THYROID BIOPSY  4/19/2019     VARICOSE VEIN SURGERY       VEIN LIGATION AND STRIPPING Bilateral        Social History     Socioeconomic History     Marital status:      Spouse name: Not on file     Number of children: Not on file     Years of education: Not on file     Highest education level: Not on file   Occupational History     Not on file   Social Needs     Financial resource strain: Not on file     Food insecurity:     Worry: Not on file     Inability: Not on file     Transportation needs:     Medical: Not on file     Non-medical: Not on file   Tobacco Use     Smoking status: Never Smoker     Smokeless tobacco: Never Used   Substance and Sexual Activity     Alcohol use: Yes     Alcohol/week: 8.4 oz     Types: 14 Shots of liquor per week     Drug  "use: No     Sexual activity: Not on file   Lifestyle     Physical activity:     Days per week: Not on file     Minutes per session: Not on file     Stress: Not on file   Relationships     Social connections:     Talks on phone: Not on file     Gets together: Not on file     Attends Protestant service: Not on file     Active member of club or organization: Not on file     Attends meetings of clubs or organizations: Not on file     Relationship status: Not on file     Intimate partner violence:     Fear of current or ex partner: Not on file     Emotionally abused: Not on file     Physically abused: Not on file     Forced sexual activity: Not on file   Other Topics Concern     Not on file   Social History Narrative     Not on file             Objective:     Vitals:    07/30/19 1531   BP: 125/75   Pulse: 68   SpO2: 98%   Weight: 175 lb 4.8 oz (79.5 kg)   Height: 5' 2\" (1.575 m)         Physical Exam:  Constitutional:  oriented to person, place, and time, appears well-nourished. No distress.   HENT:   Head: Normocephalic.   Mouth/Throat: Oropharynx is clear and moist.   Eyes: Conjunctivae are normal. Pupils are equal, round, and reactive to light.   Neck: Normal range of motion. Neck supple.   Cardiovascular: Normal rate, regular rhythm and normal heart sounds.    Pulmonary/Chest: Effort normal and breath sounds normal.   Abdominal: Soft. Bowel sounds are normal.   Musculoskeletal: Normal range of motion.   Neurological: alert and oriented to person, place, and time. Skin: Skin is warm.   Psychiatric: normal mood and affect.      Patient Instructions     Hold all supplements, aspirin and NSAIDs for 7 days prior to surgery.  Follow your surgeon's direction on when to stop eating and drinking prior to surgery.  Your surgeon will be managing your pain after your surgery.      Morning of the surgery, you will take only levothyroxine with small sip of water.    Prolia and physical in October.        Labs:  Labs pending at this " time.  Results will be reviewed when available.    Immunization History   Administered Date(s) Administered     Hep A, historic 06/13/2006, 12/14/2006     Hep B, Adult 06/13/2006     Hep B, historic 06/13/2006     Influenza [id number], 12/29/2009     Influenza high dose, seasonal 10/06/2015, 09/26/2016, 10/26/2017     Influenza, Seasonal, Inj PF IIV3 09/03/2010, 10/09/2012     Influenza, inj, historic,unspecified 11/09/2006, 10/24/2007, 09/23/2009, 09/21/2011, 10/09/2012, 10/01/2014, 10/06/2015     Influenza,seasonal, Inj IIV3 11/09/2006, 10/24/2007, 11/03/2008, 09/21/2011     MMR 06/13/2006     Pneumo Conj 13-V (2010&after) 10/12/2015     Pneumo Polysac 23-V 10/26/2007, 10/26/2012     Td,adult,historic,unspecified 06/13/2006, 07/21/2018     Tdap 06/13/2006, 11/14/2016, 07/22/2018     Typhoid Live, Oral 06/13/2006     Typhoid, historic, Unspecified 06/13/2006     ZOSTER, LIVE 06/24/2014     ZOSTER, RECOMBINANT, IM 10/22/2018           Electronically signed by Bernadette Taylor MD 07/30/19 8:23 AM

## 2021-05-31 VITALS — HEIGHT: 61 IN | BODY MASS INDEX: 33.19 KG/M2 | WEIGHT: 175.8 LBS

## 2021-05-31 VITALS — HEIGHT: 61 IN | WEIGHT: 167 LBS | BODY MASS INDEX: 31.53 KG/M2

## 2021-05-31 VITALS — HEIGHT: 62 IN | BODY MASS INDEX: 29.63 KG/M2 | WEIGHT: 161 LBS

## 2021-05-31 VITALS — WEIGHT: 163 LBS | BODY MASS INDEX: 30.8 KG/M2

## 2021-05-31 VITALS — BODY MASS INDEX: 29.63 KG/M2 | WEIGHT: 161 LBS | HEIGHT: 62 IN

## 2021-05-31 VITALS — WEIGHT: 160.5 LBS | BODY MASS INDEX: 30.3 KG/M2 | HEIGHT: 61 IN

## 2021-05-31 VITALS — BODY MASS INDEX: 31.1 KG/M2 | HEIGHT: 62 IN | WEIGHT: 169 LBS

## 2021-05-31 VITALS — BODY MASS INDEX: 29.81 KG/M2 | HEIGHT: 62 IN | WEIGHT: 162 LBS

## 2021-05-31 VITALS — WEIGHT: 162 LBS | BODY MASS INDEX: 30.58 KG/M2 | HEIGHT: 61 IN

## 2021-05-31 VITALS — HEIGHT: 61 IN | BODY MASS INDEX: 30.4 KG/M2 | WEIGHT: 161 LBS

## 2021-05-31 VITALS — BODY MASS INDEX: 30.8 KG/M2 | WEIGHT: 163 LBS

## 2021-05-31 NOTE — ANESTHESIA PREPROCEDURE EVALUATION
Anesthesia Evaluation      Patient summary reviewed   History of anesthetic complications (PONV)     Airway   Mallampati: II  Neck ROM: full  Comment: Small mouth opening     Pulmonary - negative ROS    breath sounds clear to auscultation  (-) not a smoker                         Cardiovascular   Exercise tolerance: > or = 4 METS  (+) hypertension, ,     (-) murmur  Rhythm: regular  Rate: normal,    no murmur      Neuro/Psych    (+) neuromuscular disease (fibromyalgia),  depression, anxiety/panic attacks, chronic pain    Comments: Migraines  Restless Legs Syndrome    Endo/Other    (+) hypothyroidism, obesity (BMI 32),      GI/Hepatic/Renal    (+) GERD,        Other findings: Labs 8/22/19:  Hgb 11.9      Dental    (+) caps                       Anesthesia Plan  Planned anesthetic: general endotracheal and peripheral nerve block  GETA.  Magnesium gtt for chronic pain, 4 gm total, 1 gm/hr.  Hx of PONV and plan for scopolamine, dexamethasone (10 mg), zofran and low-dose propofol gtt (25-50 mcg/kg/min).  Potential b/l TAP blocks pending surgeon preference.  ASA 3   Induction: intravenous   Anesthetic plan and risks discussed with: patient, spouse and sibling  Anesthesia plan special considerations: antiemetics,   Post-op plan: routine recovery

## 2021-05-31 NOTE — ANESTHESIA CARE TRANSFER NOTE
Last vitals:   Vitals:    08/22/19 1414   BP: 128/60   Pulse: 78   Resp: 16   Temp: 36.7  C (98  F)   SpO2: 100%     Patient's level of consciousness is drowsy but awake. Denies pain. Denies nausea.   Spontaneous respirations: yes  Maintains airway independently: yes  Dentition unchanged: yes  Oropharynx: oropharynx clear of all foreign objects    QCDR Measures:  ASA# 20 - Surgical Safety Checklist: WHO surgical safety checklist completed prior to induction    PQRS# 430 - Adult PONV Prevention: 4558F - Pt received => 2 anti-emetic agents (different classes) preop & intraop  ASA# 8 - Peds PONV Prevention: NA - Not pediatric patient, not GA or 2 or more risk factors NOT present  PQRS# 424 - Korina-op Temp Management: 4559F - At least one body temp DOCUMENTED => 35.5C or 95.9F within required timeframe  PQRS# 426 - PACU Transfer Protocol: - Transfer of care checklist used  ASA# 14 - Acute Post-op Pain: ASA14B - Patient did NOT experience pain >= 7 out of 10

## 2021-05-31 NOTE — ANESTHESIA PROCEDURE NOTES
Peripheral Block    Patient location during procedure: OR  Start time: 8/22/2019 1:58 PM  End time: 8/22/2019 2:02 PM  post-op analgesia per surgeon order as noted in medical record  Staffing:  Performing  Anesthesiologist: Harshad Reyna MD  Preanesthetic Checklist  Completed: patient identified, site marked, risks, benefits, and alternatives discussed, timeout performed, consent obtained, airway assessed, oxygen available, suction available, emergency drugs available and hand hygiene performed  Peripheral Block  Block type: other, TAP  Prep: ChloraPrep  Patient position: supine  Patient monitoring: cardiac monitor, continuous pulse oximetry, blood pressure and heart rate  Laterality: bilateral, same technique used bilaterally  Injection technique: ultrasound guided    Ultrasound used to visualize needle placement in proximity to nerve being blocked: yes   Permanent ultrasound image captured for medical record  Sterile gel and probe cover used for ultrasound.    Needle  Needle type: echogenic   Needle gauge: 20G  Needle length: 4 in  no peripheral nerve catheter placed  Assessment  Injection assessment: no difficulty with injection, negative aspiration for heme, no paresthesia on injection and incremental injection

## 2021-05-31 NOTE — ANESTHESIA POSTPROCEDURE EVALUATION
Patient: Suma Mark  DIAGNOSTIC LAPAROSCOPY, LYSIS OF ADHESION  Anesthesia type: general    Patient location: PACU  Last vitals:   Vitals Value Taken Time   /63 8/22/2019  3:00 PM   Temp 36.7  C (98  F) 8/22/2019  2:14 PM   Pulse 71 8/22/2019  3:01 PM   Resp 16 8/22/2019  3:01 PM   SpO2 96 % 8/22/2019  3:01 PM   Vitals shown include unvalidated device data.  Post vital signs: stable  Level of consciousness: awake and responds to simple questions  Post-anesthesia pain: pain controlled  Post-anesthesia nausea and vomiting: no  Pulmonary: unassisted, return to baseline  Cardiovascular: stable and blood pressure at baseline  Hydration: adequate  Anesthetic events: no    QCDR Measures:  ASA# 11 - Korina-op Cardiac Arrest: ASA11B - Patient did NOT experience unanticipated cardiac arrest  ASA# 12 - Korina-op Mortality Rate: ASA12B - Patient did NOT die  ASA# 13 - PACU Re-Intubation Rate: ASA13B - Patient did NOT require a new airway mgmt  ASA# 10 - Composite Anes Safety: ASA10A - No serious adverse event    Additional Notes:

## 2021-05-31 NOTE — TELEPHONE ENCOUNTER
RN cannot approve Refill Request    RN can NOT refill this medication historical medication requested.       Suma Jaramillo, [facility] Triage/Med Refill 8/1/2019    Requested Prescriptions   Pending Prescriptions Disp Refills     traZODone (DESYREL) 50 MG tablet [Pharmacy Med Name: TRAZODONE 50 MG TABLET] 30 tablet 0     Sig: TAKE 1 TABLET BY MOUTH EVERYDAY AT BEDTIME       Tricyclics/Misc Antidepressant/Antianxiety Meds Refill Protocol Passed - 8/1/2019  9:36 AM        Passed - PCP or prescribing provider visit in last year     Last office visit with prescriber/PCP: 4/11/2019 Bernadette Taylor MD OR same dept: 7/8/2019 Bret Caro CNP OR same specialty: 7/8/2019 Bret Caro CNP  Last physical: 7/30/2019 Last MTM visit: Visit date not found   Next visit within 3 mo: Visit date not found  Next physical within 3 mo: Visit date not found  Prescriber OR PCP: Bernadette Taylor MD  Last diagnosis associated with med order: There are no diagnoses linked to this encounter.  If protocol passes may refill for 12 months if within 3 months of last provider visit (or a total of 15 months).
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
## 2021-06-01 VITALS — WEIGHT: 168.7 LBS | BODY MASS INDEX: 31.36 KG/M2

## 2021-06-01 VITALS — BODY MASS INDEX: 30.91 KG/M2 | WEIGHT: 168 LBS | HEIGHT: 62 IN

## 2021-06-01 VITALS — WEIGHT: 168 LBS | BODY MASS INDEX: 30.91 KG/M2 | HEIGHT: 62 IN

## 2021-06-01 VITALS — HEIGHT: 62 IN | WEIGHT: 171.7 LBS | BODY MASS INDEX: 31.6 KG/M2

## 2021-06-01 VITALS — BODY MASS INDEX: 31.65 KG/M2 | HEIGHT: 62 IN | WEIGHT: 172 LBS

## 2021-06-01 NOTE — PATIENT INSTRUCTIONS - HE
Hold your furosemide on the day of surgery.  Hold all supplements, aspirin and NSAIDs for 7 days prior to surgery.  Follow your surgeon's direction on when to stop eating and drinking prior to surgery.  Your surgeon will be managing your pain after your surgery.    Remove all jewelry and metal piercings before your surgery.   Remove nail polish from fingers before surgery.  If you use a CPAP machine, please bring this with you.

## 2021-06-01 NOTE — PATIENT INSTRUCTIONS - HE
No furosemide or Irbesartan on the day of your procedure.    Okay to take the rest of your medications with a small sip of water.    Urinalysis today to check for UTI.  I will call you later today and let you know if you need to be on an antibiotic.  This should not affect your eligibility for your surgery.      Hold all supplements, aspirin and NSAIDs for 7 days prior to surgery.  Follow your surgeon's direction on when to stop eating and drinking prior to surgery.  Your surgeon will be managing your pain after your surgery.    Remove all jewelry and metal piercings before your surgery.   Remove nail polish from fingers before surgery.  If you use a CPAP machine, please bring this with you.

## 2021-06-01 NOTE — TELEPHONE ENCOUNTER
RN cannot approve Refill Request    RN can NOT refill this medication pt stated is taking 1 tablet 2 times day.       Suma Jaramillo, [facility] Triage/Med Refill 9/19/2019    Requested Prescriptions   Pending Prescriptions Disp Refills     propranolol (INDERAL) 10 MG tablet [Pharmacy Med Name: PROPRANOLOL 10 MG TABLET] 270 tablet 0     Sig: TAKE 1 TABLET BY MOUTH 3 TIMES DAILY       Beta-Blockers Refill Protocol Passed - 9/19/2019 11:14 AM        Passed - PCP or prescribing provider visit in past 12 months or next 3 months     Last office visit with prescriber/PCP: 1/24/2018 Melva Meade MD OR same dept: Visit date not found OR same specialty: 8/3/2018 Yudi Heard MD  Last physical: 4/24/2018 Last MTM visit: Visit date not found   Next visit within 3 mo: Visit date not found  Next physical within 3 mo: Visit date not found  Prescriber OR PCP: Melva Meade MD  Last diagnosis associated with med order: 1. HTN (hypertension)  - propranolol (INDERAL) 10 MG tablet [Pharmacy Med Name: PROPRANOLOL 10 MG TABLET]; TAKE 1 TABLET BY MOUTH 3 TIMES DAILY  Dispense: 270 tablet; Refill: 0    If protocol passes may refill for 12 months if within 3 months of last provider visit (or a total of 15 months).             Passed - Blood pressure filed in past 12 months     BP Readings from Last 1 Encounters:   09/09/19 123/68

## 2021-06-01 NOTE — PROGRESS NOTES
Preoperative Exam    Scheduled Procedure: Spinal cord stimulator implant  Surgery Date:  09/24/19  Surgery Location: [facility]    Surgeon:  Dr. Fields; FAX: [phone]    Assessment/Plan:     1. Preoperative examination  No contraindications for planned procedure.    2. Lumbar back pain  She has had low back pain for a number of years and is tried ablations, injections, and spinal fusions.  She is elected to proceed with spinal cord stimulator implant.    3. Essential hypertension  Well-controlled.  She will hold her furosemide and irbesartan the day of her surgery.  Recheck potassium today she had a normal stress test done 5 months ago.    4. Visit for screening mammogram  - Mammo Screening Bilateral; Future    5. Dysuria  She has had some burning with urination.  We will check a UA today and prescribe her an antibiotic if needed.  This should not affect her eligibility for her surgery        Surgical Procedure Risk: Low (reported cardiac risk generally < 1%)  Have you had prior anesthesia?: Yes  Have you or any family members had a previous anesthesia reaction:  No  Do you or any family members have a history of a clotting or bleeding disorder?: No  Cardiac Risk Assessment: no increased risk for major cardiac complications    Pending review of diagnostic evaluation, APPROVAL GIVEN to proceed with proposed procedure, without further diagnostic evaluation.    Please Note:  Patient is taking medications for Chronic Pain. She uses vicodin for low back pain intermittently     Functional Status: Independent  Patient plans to recover at home with family.     Subjective:      Suma Mark is a 69 y.o. female who presents for a preoperative consultation.      Please see above.  The patient is otherwise feeling well and has no acute complaints today.    All other systems reviewed and are negative, other than those listed in the HPI.    Pertinent History  Do you have difficulty breathing or chest pain after  awalking up a flight of stairs: No  History of obstructive sleep apnea: No  Steroid use in the last 6 months: No  Frequent Aspirin/NSAID use: No  Prior Blood Transfusion: Yes: 2003  Prior Blood Transfusion Reaction: No  If for some reason prior to, during or after the procedure, if it is medically indicated, would you be willing to have a blood transfusion?:  There is no transfusion refusal.    Current Outpatient Medications   Medication Sig Dispense Refill     amLODIPine (NORVASC) 10 MG tablet TAKE 1 TABLET(10 MG) BY MOUTH AT BEDTIME 90 tablet 3     atorvastatin (LIPITOR) 20 MG tablet Take 20 mg by mouth at bedtime.              botulinum toxin Type A, Cosm, (BOTOX) 100 unit SolR Every 3 months. [facility]       calcium carbonate-vitamin D3 (CALCIUM 600 + D,3,) 600 mg(1,500mg) -400 unit per tablet Take 1 tablet by mouth daily.              cholecalciferol, vitamin D3, 2,000 unit capsule Take 2,000 Units by mouth daily.       citalopram (CELEXA) 20 MG tablet TAKE 1 AND 1/2 TABLETS(30 MG) BY MOUTH DAILY 135 tablet 2     cyanocobalamin (VITAMIN B-12) 50 mcg tablet Take 50 mcg by mouth daily.       eletriptan (RELPAX) 40 MG tablet Take 40 mg by mouth daily as needed for migraine. may repeat in 2 hours if necessary              furosemide (LASIX) 20 MG tablet Take 1 tablet (20 mg total) by mouth daily. 90 tablet 3     HYDROcodone-acetaminophen 5-325 mg per tablet Take 1-2 tablets by mouth every 4 (four) hours as needed. 55 tablet 0     irbesartan (AVAPRO) 150 MG tablet TAKE 1 TABLET(150 MG) BY MOUTH DAILY 90 tablet 3     levothyroxine (SYNTHROID, LEVOTHROID) 88 MCG tablet Take 1 tablet (88 mcg total) by mouth Daily at 6:00 am. 90 tablet 3     LORazepam (ATIVAN) 1 MG tablet TAKE 1/2 TABLET(0.5 MG) BY MOUTH DAILY AS NEEDED FOR ANXIETY 30 tablet 0     oxyCODONE-acetaminophen (PERCOCET/ENDOCET) 5-325 mg per tablet Take 1-2 tablets by mouth every 4 (four) hours as needed for pain (moderate pain). 25 tablet 0      "pramipexole (MIRAPEX) 0.25 MG tablet Take 1 tablet (0.25 mg total) by mouth 3 (three) times a day. 270 tablet 1     propranolol (INDERAL) 10 MG tablet TAKE 1 TABLET(10 MG) BY MOUTH THREE TIMES DAILY (Patient taking differently: TAKE 1 TABLET(10 MG) BY MOUTH TWICE DAILY) 270 tablet 2     traZODone (DESYREL) 50 MG tablet TAKE 1 TABLET BY MOUTH EVERYDAY AT BEDTIME 90 tablet 3     Current Facility-Administered Medications   Medication Dose Route Frequency Provider Last Rate Last Dose     denosumab 60 mg (PROLIA 60 mg/ml)  60 mg Subcutaneous Q6 Months Bernadette Taylor MD   60 mg at 04/11/19 1402        Allergies   Allergen Reactions     Cephalexin Nausea And Vomiting     Ace Inhibitors Other (See Comments)     Body aches, elevated BP     Amitriptyline Other (See Comments)     Elevated BP     Amlodipine      Atenolol Other (See Comments)     Aggrevates Raynaud's     Celecoxib Other (See Comments)           Clonidine Nausea And Vomiting     Codeine Nausea And Vomiting     Desogestrel-Ethinyl Estradiol Swelling and Unknown     Dexamethasone Swelling and Unknown     Erythromycin Nausea And Vomiting     Escitalopram Other (See Comments)     Headaches.     Gabapentin Unknown     \"Spotted\"     Hydrochlorothiazide Other (See Comments)     hyponatremia     Methylprednisolone Nausea And Vomiting     Agitation      Propoxyphene N-Acetaminophen      Tramadol-Acetaminophen Other (See Comments)     Triamterene      Venlafaxine Nausea And Vomiting     Zolpidem Other (See Comments)     Penicillins Rash and Swelling     Sulfa (Sulfonamide Antibiotics) Rash     Tramadol Itching, Rash and Swelling       Patient Active Problem List   Diagnosis     Acquired hypothyroidism     Essential hypertension     Restless leg syndrome     Gastroesophageal reflux disease without esophagitis     Migraine     Hernia, ventral     Osteoporosis     Diverticulosis     S/P total knee replacement     Insomnia, unspecified type     Depression, unspecified " depression type     Fibromyalgia     Symptomatic varicose veins of both lower extremities       Past Medical History:   Diagnosis Date     Acquired hypothyroidism 3/12/2009     Anxiety      Back pain      Breast cyst      Degenerative disc disease, lumbar      Depression      Depression, unspecified depression type      Disease of thyroid gland     hypothyroid     Diverticulosis 10/4/2017    Incidental finding on colonoscopy 10/2017     Essential hypertension 4/21/2015     Fibromyalgia      Gastroesophageal reflux disease without esophagitis 4/21/2015     GERD (gastroesophageal reflux disease)      Hernia, ventral 4/27/2017     History of anesthesia complications     hypotension after surgery     History of blood clots      History of transfusion      Hypertension      Insomnia, unspecified type      Left lower quadrant pain      Migraine      Osteoarthritis      Osteoporosis      PONV (postoperative nausea and vomiting)      Raynaud's disease      Restless leg syndrome      S/P total knee replacement 11/27/2017     Ventral hernia      Vitamin D deficiency        Past Surgical History:   Procedure Laterality Date     BACK SURGERY       BREAST CYST EXCISION       CARPAL TUNNEL RELEASE       COLECTOMY N/A 6/2/2017    Procedure: RESECTION, SMALL INTESTINE, OPEN ADHESIOLYSIS;  Surgeon: Keyon Qureshi MD;  Location: [facility];  Service:      COLON SURGERY       HEMORRHOID SURGERY       HYSTERECTOMY  [id number]     INCISIONAL HERNIA REPAIR N/A 6/2/2017    Procedure: LAPARASCOPIC CONVERTED TO OPEN PRIMARY INCISIONAL HERNIA REPAIR;  Surgeon: Keyon Qureshi MD;  Location: [facility];  Service:      INCONTINENCE SURGERY       LUMBAR FUSION       VA LAP,DIAGNOSTIC ABDOMEN N/A 8/22/2019    Procedure: DIAGNOSTIC LAPAROSCOPY, LYSIS OF ADHESION;  Surgeon: Svetlana Ron MD;  [place] [facility] [place]  Service: General     VA TOTAL KNEE ARTHROPLASTY Right 11/27/2017    Procedure:  RIGHT TOTAL KNEE  ARTHROPLASTY;  Surgeon: Kevin Ryder MD;  Location: [facility] Main OR;  Service: Orthopedics     US THYROID BIOPSY  4/19/2019     VARICOSE VEIN SURGERY       VEIN LIGATION AND STRIPPING Bilateral        Social History     Socioeconomic History     Marital status:      Spouse name: Not on file     Number of children: Not on file     Years of education: Not on file     Highest education level: Not on file   Occupational History     Not on file   Social Needs     Financial resource strain: Not on file     Food insecurity:     Worry: Not on file     Inability: Not on file     Transportation needs:     Medical: Not on file     Non-medical: Not on file   Tobacco Use     Smoking status: Never Smoker     Smokeless tobacco: Never Used   Substance and Sexual Activity     Alcohol use: Yes     Comment: 1 cocktail daily     Drug use: No     Sexual activity: Not on file   Lifestyle     Physical activity:     Days per week: Not on file     Minutes per session: Not on file     Stress: Not on file   Relationships     Social connections:     Talks on phone: Not on file     Gets together: Not on file     Attends Mandaeism service: Not on file     Active member of club or organization: Not on file     Attends meetings of clubs or organizations: Not on file     Relationship status: Not on file     Intimate partner violence:     Fear of current or ex partner: Not on file     Emotionally abused: Not on file     Physically abused: Not on file     Forced sexual activity: Not on file   Other Topics Concern     Not on file   Social History Narrative     Not on file       Patient Care Team:  Bernadette Taylor MD as PCP - General (Internal Medicine)  Izaiah Astorga MD as Physician (Orthopedic Surgery)  Jc Mcginnis MD as Physician (Gastroenterology)  Akilah Lezama MD as Consulting Physician (Dermatology)  Natalie Martel DO as Consulting Physician (Psychiatry)  Svetlana Ron MD as Physician (Colon and Rectal  Surgery)  Bernadette Taylor MD as Assigned PCP          Objective:     There were no vitals filed for this visit.      Physical Exam:  Physical Exam     General: No apparent distress. Calm. Alert and Oriented X3. Pt behavior is appropriate.  Head:Atraumatic. Normocephalic, non-tender to palpation  Neck: Supple. No JVD. Full ROM.   Eyes: PERRL, No discharge. No strabismus. No nystagmus.  Ears: TMs pearly gray with landmarks visible.   Nose/Mouth/Throat: Patent nares, no oral lesions, pharynx clear and without exudate. Uvula mid-line. Nasal septum mid-line. Clear turbinates.   Lymph: No axillar or cervical adenopathy.   Chest/Lungs: Normal chest wall, clear to auscultation, normal respiratory effort and rate.   Heart/Pulses: Regular rate and rhythm, strong and equal radial pulses, no murmurs. Capillary refill <2 seconds. No edema.   Abdomen: Soft, no palpable masses. No hepatosplenomegaly, no tenderness with palpation noted. Bowel sounds active in all quadrants. No increased tympany.   Genitalia: Not examined.   Musculoskeletal: No CVA tenderness with palpation. Good ROM with extremities.   Neurologic: Interactive, alert, no focal findings, CNs intact.   Skin: Warm, dry. Normal hair pattern. Free of lesions. Normal skin turgor.         Patient Instructions   No furosemide or Irbesartan on the day of your procedure.    Okay to take the rest of your medications with a small sip of water.    Urinalysis today to check for UTI.  I will call you later today and let you know if you need to be on an antibiotic.  This should not affect your eligibility for your surgery.      Hold all supplements, aspirin and NSAIDs for 7 days prior to surgery.  Follow your surgeon's direction on when to stop eating and drinking prior to surgery.  Your surgeon will be managing your pain after your surgery.    Remove all jewelry and metal piercings before your surgery.   Remove nail polish from fingers before surgery.  If you use a CPAP machine,  please bring this with you.      EKG:  Not done    Labs:  Labs pending at this time.  Results will be reviewed when available.    Immunization History   Administered Date(s) Administered     Hep A, historic 06/13/2006, 12/14/2006     Hep B, Adult 06/13/2006     Hep B, historic 06/13/2006     Influenza [id number], 12/29/2009     Influenza high dose,seasonal,PF, 65+ yrs 10/06/2015, 09/26/2016, 10/26/2017     Influenza, Seasonal, Inj PF IIV3 09/03/2010, 10/09/2012     Influenza, inj, historic,unspecified 11/09/2006, 10/24/2007, 09/23/2009, 09/21/2011, 10/09/2012, 10/01/2014, 10/06/2015     Influenza,seasonal, Inj IIV3 11/09/2006, 10/24/2007, 11/03/2008, 09/21/2011     MMR 06/13/2006     Pneumo Conj 13-V (2010&after) 10/12/2015     Pneumo Polysac 23-V 10/26/2007, 10/26/2012     Td,adult,historic,unspecified 06/13/2006, 07/21/2018     Tdap 06/13/2006, 11/14/2016, 07/22/2018     Typhoid Live, Oral 06/13/2006     Typhoid, historic, Unspecified 06/13/2006     ZOSTER, LIVE 06/24/2014     ZOSTER, RECOMBINANT, IM 10/22/2018           Electronically signed by Bret Caro CNP 09/09/19 10:13 AM

## 2021-06-02 VITALS — WEIGHT: 172 LBS | HEIGHT: 62 IN | BODY MASS INDEX: 31.65 KG/M2

## 2021-06-02 VITALS — WEIGHT: 174.6 LBS | HEIGHT: 62 IN | BODY MASS INDEX: 32.13 KG/M2

## 2021-06-02 VITALS — HEIGHT: 62 IN | WEIGHT: 174.9 LBS | BODY MASS INDEX: 32.18 KG/M2

## 2021-06-02 VITALS — HEIGHT: 62 IN | WEIGHT: 172.7 LBS | BODY MASS INDEX: 31.78 KG/M2

## 2021-06-02 VITALS — WEIGHT: 175.8 LBS | BODY MASS INDEX: 32.15 KG/M2

## 2021-06-02 VITALS — WEIGHT: 175.7 LBS | BODY MASS INDEX: 32.14 KG/M2

## 2021-06-02 NOTE — PATIENT INSTRUCTIONS - HE
1.  Discontinue clindamycin.  2.  Continue with Benadryl.  May add ranitidine to her regimen to help with itching.  3.  Follow-up if worsening wound pain, fever, growing redness, or pus from the surgical site.

## 2021-06-02 NOTE — TELEPHONE ENCOUNTER
RN Triage:    Spoke with 69 yr old Suma who c/o:    Wide spread itchy rash developed 4 days after starting percocet.    Pt was prescribed percocet after having a spinal cord stimulator  Implant placed 10 days ago.    Pt contacted surgeon's office who advised pt to stop percocet and take Vicodin instead.    Trying Benadryl every 4 hours for at least 3 full days without much relief.    Rash and itchiness wake patient at night.     Slightly puffy in the face since yesterday.    Raised pinkish red dots from scalp to legs.    Moments where she feels warm and then chilled but denies fever.    PLAN:  Advised OV now per protocol.  Pt intends to go to [facility] now.  No OV available at  clinic.  Alyson Alvarez RN   [facility] RN Triage      Reason for Disposition    Face or lip swelling    Protocols used: RASH - WIDESPREAD ON DRUGS - DRUG [id number]

## 2021-06-02 NOTE — TELEPHONE ENCOUNTER
Who is calling:  Lo  Reason for Call:  She received a call from Dr. Taylor and is waiting on a call back for an appointment to see her this week.  The provider called patient on 10/18 and want her to come back this week. (See lab result note of 10/18/19.)  Date of last appointment with primary care: 10/18/19  Okay to leave a detailed message: Yes

## 2021-06-02 NOTE — TELEPHONE ENCOUNTER
Refill Approved    Rx renewed per Medication Renewal Policy. Medication was last renewed on 12/31/2018 for 135/2.  Last OV 9/27/2019 pre-op  Kailey Wan, [facility] Triage/Med Refill 10/13/2019     Requested Prescriptions   Pending Prescriptions Disp Refills     citalopram (CELEXA) 20 MG tablet [Pharmacy Med Name: CITALOPRAM HBR 20 MG TABLET] 135 tablet 1     Sig: TAKE 1 & 1/2 TABLETS BY MOUTH ONCE DAILY       SSRI Refill Protocol  Passed - 10/12/2019  8:44 AM        Passed - PCP or prescribing provider visit in last year     Last office visit with prescriber/PCP: 4/11/2019 Bernadette Taylor MD OR same dept: 7/8/2019 Bret Caro CNP OR same specialty: 7/8/2019 rBet Caro CNP  Last physical: 7/30/2019 Last MTM visit: Visit date not found   Next visit within 3 mo: Visit date not found  Next physical within 3 mo: Visit date not found  Prescriber OR PCP: Bernadette Taylor MD  Last diagnosis associated with med order: 1. Generalized anxiety disorder  - citalopram (CELEXA) 20 MG tablet [Pharmacy Med Name: CITALOPRAM HBR 20 MG TABLET]; TAKE 1 & 1/2 TABLETS BY MOUTH ONCE DAILY  Dispense: 135 tablet; Refill: 1    If protocol passes may refill for 12 months if within 3 months of last provider visit (or a total of 15 months).

## 2021-06-02 NOTE — PATIENT INSTRUCTIONS - HE
Stop hydrocodone. Use only Tylenol for pain.  Use hydroxyzine for itching and hydrocortisone OTC.  To see Dermatologist.  Call Spine surgeon to check on incision site.    We will do Prolia next time.

## 2021-06-02 NOTE — TELEPHONE ENCOUNTER
Refill Approved    Rx renewed per Medication Renewal Policy. Medication was last renewed on 10/18/19.    Nancy Vanegas, [facility] Connection Triage/Med Refill 11/3/2019     Requested Prescriptions   Pending Prescriptions Disp Refills     hydrOXYzine HCl (ATARAX) 25 MG tablet [Pharmacy Med Name: HYDROXYZINE HCL 25 MG TABLET] 90 tablet 0     Sig: TAKE 1 TABLET (25 MG TOTAL) BY MOUTH 3 (THREE) TIMES A DAY AS NEEDED FOR ITCHING.       Antihistamine Refill Protocol Passed - 11/3/2019  9:32 AM        Passed - Patient has had office visit/physical in last year     Last office visit with prescriber/PCP: 10/23/2019 Bernadette Taylor MD OR same dept: 10/23/2019 Bernadette Taylor MD OR same specialty: 10/23/2019 Bernadette Taylor MD  Last physical: 7/30/2019 Last MTM visit: Visit date not found   Next visit within 3 mo: Visit date not found  Next physical within 3 mo: Visit date not found  Prescriber OR PCP: Bernadette Taylor MD  Last diagnosis associated with med order: 1. Itching  - hydrOXYzine HCl (ATARAX) 25 MG tablet [Pharmacy Med Name: HYDROXYZINE HCL 25 MG TABLET]; Take 1 tablet (25 mg total) by mouth 3 (three) times a day as needed for itching.  Dispense: 90 tablet; Refill: 0    If protocol passes may refill for 12 months if within 3 months of last provider visit (or a total of 15 months).

## 2021-06-02 NOTE — PROGRESS NOTES
Patient called and canceled surgery with DR Pinto on 11/7/2019 patient has nerve stimulator put in back patient will in [facility] starting in Dec 2019 patient instructed will need to come back in for re-check 6 months will have gone by from original consult with DR Pinto patient stated she cant handle anything other than her back patient will call to schedule ov

## 2021-06-02 NOTE — TELEPHONE ENCOUNTER
Controlled Substance Refill Request  Medication:   Requested Prescriptions     Pending Prescriptions Disp Refills     LORazepam (ATIVAN) 1 MG tablet [Pharmacy Med Name: LORAZEPAM 1 MG TABLET] 30 tablet 0     Sig: TAKE 1/2 TABLET(0.5 MG) BY MOUTH DAILY AS NEEDED FOR ANXIETY     Date Last Fill: 7/11/19  Pharmacy: [id number]   Submit electronically to pharmacy  Controlled Substance Agreement on File:   Encounter-Level CSA Scan Date:    There are no encounter-level csa scan date.       Last office visit: Last office visit pertaining to requested medication was 10/23/19.

## 2021-06-02 NOTE — PROGRESS NOTES
Chief Complaint   Patient presents with     Rash     x 4 days. rash on legs, arms, face back. had a spinal implant put in last week, itchy, was using percocet after procedure but stopped taking it. Has tried benedryl but not working       HPI:  Suma Mark is a 69 y.o. female who presents today complaining of rash on the legs, arms, face and back x 4- 5 days. She had a spinal implant surgery two weeks ago and she was given Percocet and Clindamycin one of which could be causing this rash. She has tried taking Benadryl without improvement. The rash is itchy. She has not taken any of the medication today. She called her spine surgery and they switched her from Percocet to Vicodin. She has previously tolerated Vicodin. She has not been taken off of Clindamycin; she is unsure if she has ever been on Clindamycin before. Her typical spin doctor is Yandel Upton out of [facility]. Her spine surgeon was  at [facility]. She denies any difficulty breathing, swallowing, or any lip/tongue swelling.  She was originally started on Clindamycin for 5 days as prophylaxis. At her follow up appointment her healing was not as far along as hoped, so she was restarted on the Clinda    History obtained from the patient.    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  2017-11: S/P total knee replacement  2017-11: Elevated alkaline phosphatase level  2017-11: Hyponatremia  2017-11: Hypochloremia  2017-11: Eczema  2017-10: Diverticulosis  2017-09: Osteoporosis  2017-09: Menopause  2017-08: Constipation - started postoperatively 2017 2017-06: Incisional hernia  2017-04: Hernia, ventral  2017-04: Migraine  2015-08: Knee injury, left, initial encounter  2015-08: Right knee pain  2015-04: Essential hypertension  2015-04: Generalized anxiety disorder  2015-04: Restless leg syndrome  2015-04: Pedal edema  2015-04: Gastroesophageal reflux disease  without esophagitis  2012-03: Back pain  2009-03: Acquired hypothyroidism  2009-03: Vitamin D deficiency  Anxiety  Acute blood loss as cause of postoperative anemia  Insomnia, unspecified type  Depression, unspecified depression type  Postoperative nausea and vomiting      Past Medical History:   Diagnosis Date     Acquired hypothyroidism 3/12/2009     Anxiety      Back pain      Breast cyst      Degenerative disc disease, lumbar      Depression      Depression, unspecified depression type      Disease of thyroid gland     hypothyroid     Diverticulosis 10/4/2017    Incidental finding on colonoscopy 10/2017     Essential hypertension 4/21/2015     Fibromyalgia      Gastroesophageal reflux disease without esophagitis 4/21/2015     GERD (gastroesophageal reflux disease)      Hernia, ventral 4/27/2017     History of anesthesia complications     hypotension after surgery     History of blood clots      History of transfusion      Hypertension      Insomnia, unspecified type      Left lower quadrant pain      Migraine      Osteoarthritis      Osteoporosis      PONV (postoperative nausea and vomiting)      Raynaud's disease      Restless leg syndrome      S/P total knee replacement 11/27/2017     Ventral hernia      Vitamin D deficiency        Social History     Tobacco Use     Smoking status: Never Smoker     Smokeless tobacco: Never Used   Substance Use Topics     Alcohol use: Yes     Comment: 1 cocktail daily       Review of Systems   Constitutional: Negative for chills and fever.   Respiratory: Negative for cough, shortness of breath and wheezing.    Skin: Positive for rash and wound.       Vitals:    10/07/19 1140   BP: 133/78   Patient Site: Right Arm   Patient Position: Sitting   Cuff Size: Adult Regular   Pulse: 66   Resp: 20   Temp: 97.9  F (36.6  C)   TempSrc: Oral   SpO2: 98%   Weight: 177 lb 12.8 oz (80.6 kg)       Physical Exam  Constitutional:       General: She is not in acute distress.     Appearance: She  is well-developed. She is not diaphoretic.   HENT:      Head: Normocephalic and atraumatic.      Right Ear: External ear normal.      Left Ear: External ear normal.   Eyes:      General:         Right eye: No discharge.         Left eye: No discharge.      Conjunctiva/sclera: Conjunctivae normal.   Cardiovascular:      Rate and Rhythm: Normal rate and regular rhythm.      Heart sounds: Normal heart sounds.   Pulmonary:      Effort: Pulmonary effort is normal. No respiratory distress.      Breath sounds: Normal breath sounds.   Skin:     Findings: Laceration (Surgical wound appears non-dehisced that any signs of cellulitis.) and rash (hive like) present.   Psychiatric:         Behavior: Behavior normal.         Thought Content: Thought content normal.         Judgement: Judgment normal.       Clinical Decision Making:  Patient was taken off of clindamycin in case her rash was being caused by allergic reaction to it.  Recommend the patient continue with Vicodin as she has previously tolerated this medication.  I did offer her prednisone for symptomatic relief, but the patient declined.  Recommend that she continue with Benadryl, and she can take ranitidine for additional help.  Ranitidine has recently been recalled, but the patient states that she has an old prescription of it.  I recommend that she check if that prescription has been recalled prior to taking it.  At the end of the encounter, I discussed results, diagnosis, medications. Discussed red flags for immediate return to clinic/ER, as well as indications for follow up if no improvement. Patient understood and agreed to plan. Patient was stable for discharge.    1. Rash and nonspecific skin eruption           Patient Instructions   1.  Discontinue clindamycin.  2.  Continue with Benadryl.  May add ranitidine to her regimen to help with itching.  3.  Follow-up if worsening wound pain, fever, growing redness, or pus from the surgical site.

## 2021-06-02 NOTE — PROGRESS NOTES
Assessment/Plan:        1. Pruritus  Thyroid Stimulating Hormone (TSH)    Comprehensive Metabolic Panel    Lipase    Amylase    Urinalysis-UC if Indicated    Erythrocyte Sedimentation Rate    C-Reactive Protein    Rheumatoid Factor Quant    Antinuclear Antibodies Screen (AMARI)    Hepatitis Acute Evaluation    Lyme Antibody [place]    HIV Antigen/Antibody Screening Cascade    HM1(CBC and Differential)    Morphology,Smear Review (MORP)    Ambulatory referral to Dermatology    1 (CBC with Diff)   2. Low grade fever  Thyroid Stimulating Hormone (TSH)    Comprehensive Metabolic Panel    Lipase    Amylase    Urinalysis-UC if Indicated    Erythrocyte Sedimentation Rate    C-Reactive Protein    Rheumatoid Factor Quant    Antinuclear Antibodies Screen (AMARI)    Hepatitis Acute Evaluation    Lyme Antibody [place]    HIV Antigen/Antibody Screening Cascade    1(CBC and Differential)    Morphology,Smear Review (MORP)    HM1 (CBC with Diff)   3. Fatigue, unspecified type  Thyroid Stimulating Hormone (TSH)    Comprehensive Metabolic Panel    Lipase    Amylase    Urinalysis-UC if Indicated    Erythrocyte Sedimentation Rate    C-Reactive Protein    Rheumatoid Factor Quant    Antinuclear Antibodies Screen (AMARI)    Hepatitis Acute Evaluation    Lyme Antibody [place]    HIV Antigen/Antibody Screening Cascade    1(CBC and Differential)    Morphology,Smear Review (MORP)    1 (CBC with Diff)   4. Encounter for screening for human immunodeficiency virus (HIV)   HIV Antigen/Antibody Screening Cascade   5. Itching  hydrOXYzine HCl (ATARAX) 25 MG tablet     1. Generalized pruritus workup will be done. She will see dermatology.  She will use hydroxyzine and steroids topical. She will see her spine surgeon. Not sure if she is reaction on something that is implanted during the surgery.  2. No fever today, but yesterday did not feel well. Will do infectious workup.    This note has been dictated using voice recognition software. Any  grammatical or context distortions are unintentional and inherent to the software.      Return in about 2 weeks (around 11/1/2019) for Recheck.    Patient Instructions   Stop hydrocodone. Use only Tylenol for pain.  Use hydroxyzine for itching and hydrocortisone OTC.  To see Dermatologist.  Call Spine surgeon to check on incision site.    We will do Prolia next time.          Subjective:    Suma Mark is a 69 y.o. female  here for    Chief Complaint   Patient presents with     Follow-up     Discuss Itching- Itching reaction to adhisive tape from 9-24-19 prosedure       69 y.o. female who presents today complaining of rash on the legs, arms, face and back that started on 10/2/19. She had a spinal implant surgery on 9/24/19 and she was given Percocet and Clindamycin one of which could be causing this rash. She has tried taking Benadryl without improvement. The rash is itchy.  She called her spine surgery and they switched her from Percocet to Vicodin. She has previously tolerated Vicodin. She was taken off of Clindamycin after she saw [facility] on 10/7/19.  She denies any difficulty breathing, swallowing, or any lip/tongue swelling.    She still has 2-3 episodes a day of severe itching, mostly both arms, low back and around the waistline. For more than a week, nothing on face, legs. She is still taking Vicodine. She tried hydroxyzine for few days. She is using topical steroids OTC.  She spoke with Spine clinic and they did not see her, but were considering this as a reaction to tape or drugs.    She was suppose to have eye surgery yesterday and it was cancelled because she had temp 99.2F. she felt some chills yesterday and some fatigue over the last few days, but no other symptoms in the complete ROS.    Social History     Socioeconomic History     Marital status:      Spouse name: Not on file     Number of children: Not on file     Years of education: Not on file     Highest education level: Not on file    Occupational History     Not on file   Social Needs     Financial resource strain: Not on file     Food insecurity:     Worry: Not on file     Inability: Not on file     Transportation needs:     Medical: Not on file     Non-medical: Not on file   Tobacco Use     Smoking status: Never Smoker     Smokeless tobacco: Never Used   Substance and Sexual Activity     Alcohol use: Yes     Comment: 1 cocktail daily     Drug use: No     Sexual activity: Not on file   Lifestyle     Physical activity:     Days per week: Not on file     Minutes per session: Not on file     Stress: Not on file   Relationships     Social connections:     Talks on phone: Not on file     Gets together: Not on file     Attends [employer] service: Not on file     Active member of club or organization: Not on file     Attends meetings of clubs or organizations: Not on file     Relationship status: Not on file     Intimate partner violence:     Fear of current or ex partner: Not on file     Emotionally abused: Not on file     Physically abused: Not on file     Forced sexual activity: Not on file   Other Topics Concern     Not on file   Social History Narrative     Not on file       Family History   Problem Relation Age of Onset     Dementia Mother      Arthritis Mother      COPD Father      Cardiovascular Father      Hypertension Father      No Medical Problems Sister      No Medical Problems Daughter      No Medical Problems Son      Stroke Maternal Grandmother      Heart disease Paternal Grandmother      Stroke Paternal Grandmother      No Medical Problems Sister      No Medical Problems Sister      No Medical Problems Son      No Medical Problems Daughter      Breast cancer Paternal Aunt         age in 50's     Review of Systems:     A 12 point comprehensive review of systems was negative except as noted in HPI.            Objective:    Physical Exam   /59   Pulse 78   Temp 98.2  F (36.8  C)   LMP  (LMP Unknown)   SpO2 96%      Constitutional: oriented to person, place, and time, appears well-nourished. No distress.   HENT:   Head: Normocephalic.   Mouth/Throat: Oropharynx is clear and moist.   Eyes: Conjunctivae are normal. Pupils are equal, round, and reactive to light.   Neck: Normal range of motion. Neck supple.   Cardiovascular: Normal rate, regular rhythm and normal heart sounds.    Pulmonary/Chest: Effort normal and breath sounds normal.  Abdominal: Soft. Bowel sounds are normal.   Musculoskeletal: Normal range of motion.   Skin: she has macular erythematous rash around the incision site and where the spine implant is. No rash on her arms or abdomen.  Neurological: alert and oriented to person, place, and time.  Psychiatric:  normal mood and affect.    Patient Active Problem List   Diagnosis     Acquired hypothyroidism     Essential hypertension     Restless leg syndrome     Gastroesophageal reflux disease without esophagitis     Migraine     Hernia, ventral     Osteoporosis     Diverticulosis     S/P total knee replacement     Insomnia, unspecified type     Depression, unspecified depression type     Fibromyalgia     Symptomatic varicose veins of both lower extremities       Current Outpatient Medications on File Prior to Visit   Medication Sig Dispense Refill     amLODIPine (NORVASC) 10 MG tablet TAKE 1 TABLET(10 MG) BY MOUTH AT BEDTIME 90 tablet 3     atorvastatin (LIPITOR) 20 MG tablet Take 20 mg by mouth at bedtime.              botulinum toxin Type A, Cosm, (BOTOX) 100 unit SolR Every 3 months. [facility]       calcium carbonate-vitamin D3 (CALCIUM 600 + D,3,) 600 mg(1,500mg) -400 unit per tablet Take 1 tablet by mouth daily.              cholecalciferol, vitamin D3, 2,000 unit capsule Take 2,000 Units by mouth daily.       citalopram (CELEXA) 20 MG tablet TAKE 1 & 1/2 TABLETS BY MOUTH ONCE DAILY 135 tablet 3     cyanocobalamin (VITAMIN B-12) 50 mcg tablet Take 50 mcg by mouth daily.       eletriptan (RELPAX) 40 MG  tablet Take 40 mg by mouth daily as needed for migraine. may repeat in 2 hours if necessary              furosemide (LASIX) 20 MG tablet Take 1 tablet (20 mg total) by mouth daily. 90 tablet 3     HYDROcodone-acetaminophen 5-325 mg per tablet Take 1-2 tablets by mouth every 4 (four) hours as needed. 55 tablet 0     irbesartan (AVAPRO) 150 MG tablet TAKE 1 TABLET(150 MG) BY MOUTH DAILY 90 tablet 3     levothyroxine (SYNTHROID, LEVOTHROID) 88 MCG tablet Take 1 tablet (88 mcg total) by mouth Daily at 6:00 am. 90 tablet 3     LORazepam (ATIVAN) 1 MG tablet TAKE 1/2 TABLET(0.5 MG) BY MOUTH DAILY AS NEEDED FOR ANXIETY 30 tablet 0     pramipexole (MIRAPEX) 0.25 MG tablet Take 1 tablet (0.25 mg total) by mouth 3 (three) times a day. 270 tablet 1     propranolol (INDERAL) 10 MG tablet TAKE 1 TABLET(10 MG) BY MOUTH TWICE DAILY 270 tablet 0     traZODone (DESYREL) 50 MG tablet TAKE 1 TABLET BY MOUTH EVERYDAY AT BEDTIME 90 tablet 3     [DISCONTINUED] hydrOXYzine HCl (ATARAX) 25 MG tablet Take 1 tablet (25 mg total) by mouth 3 (three) times a day as needed for itching. 90 tablet 0     [DISCONTINUED] oxyCODONE-acetaminophen (PERCOCET/ENDOCET) 5-325 mg per tablet Take 1-2 tablets by mouth every 4 (four) hours as needed for pain (moderate pain). 25 tablet 0     dicyclomine (BENTYL) 10 MG capsule TAKE 1 CAPSULE BY MOUTH FOUR TIMES A DAY  1     Current Facility-Administered Medications on File Prior to Visit   Medication Dose Route Frequency Provider Last Rate Last Dose     denosumab 60 mg (PROLIA 60 mg/ml)  60 mg Subcutaneous Q6 Months Bernadette Taylor MD   60 mg at 04/11/19 1402               Bernadette Taylor  10/18/2019

## 2021-06-03 VITALS
BODY MASS INDEX: 33.11 KG/M2 | HEART RATE: 66 BPM | WEIGHT: 181 LBS | DIASTOLIC BLOOD PRESSURE: 70 MMHG | SYSTOLIC BLOOD PRESSURE: 110 MMHG

## 2021-06-03 VITALS
WEIGHT: 179.1 LBS | BODY MASS INDEX: 32.96 KG/M2 | SYSTOLIC BLOOD PRESSURE: 123 MMHG | HEIGHT: 62 IN | TEMPERATURE: 98.2 F | HEART RATE: 71 BPM | OXYGEN SATURATION: 97 % | DIASTOLIC BLOOD PRESSURE: 68 MMHG

## 2021-06-03 VITALS — WEIGHT: 175 LBS | BODY MASS INDEX: 32.2 KG/M2 | HEIGHT: 62 IN

## 2021-06-03 VITALS
WEIGHT: 177.8 LBS | BODY MASS INDEX: 32.52 KG/M2 | DIASTOLIC BLOOD PRESSURE: 78 MMHG | TEMPERATURE: 97.9 F | RESPIRATION RATE: 20 BRPM | OXYGEN SATURATION: 98 % | HEART RATE: 66 BPM | SYSTOLIC BLOOD PRESSURE: 133 MMHG

## 2021-06-03 VITALS — BODY MASS INDEX: 31.28 KG/M2 | WEIGHT: 171 LBS

## 2021-06-03 VITALS — BODY MASS INDEX: 32.26 KG/M2 | WEIGHT: 175.3 LBS | HEIGHT: 62 IN

## 2021-06-03 VITALS
HEART RATE: 82 BPM | WEIGHT: 173.5 LBS | DIASTOLIC BLOOD PRESSURE: 62 MMHG | SYSTOLIC BLOOD PRESSURE: 124 MMHG | BODY MASS INDEX: 31.73 KG/M2 | OXYGEN SATURATION: 98 %

## 2021-06-03 VITALS — WEIGHT: 171 LBS | BODY MASS INDEX: 31.28 KG/M2

## 2021-06-03 VITALS
BODY MASS INDEX: 32.25 KG/M2 | OXYGEN SATURATION: 97 % | SYSTOLIC BLOOD PRESSURE: 126 MMHG | WEIGHT: 176.3 LBS | DIASTOLIC BLOOD PRESSURE: 62 MMHG | HEART RATE: 74 BPM

## 2021-06-03 NOTE — TELEPHONE ENCOUNTER
Left voicemail for patient to return call to clinic. When patient returns call, please give them below message.      We need to reschedule Prolia injection due to prior auth. Still pending
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
## 2021-06-03 NOTE — PATIENT INSTRUCTIONS - HE
Prolia 3rd today.  Prolia 4th in 6 months with me.    DXA in 5/2021 .   Phone number to schedule [phone].    Daily calcium need is 0889-5458 mg a day from the diet and supplements.  Calcium citrate is easier to digest.  Vitamin D 2000 IU daily recommended.

## 2021-06-03 NOTE — PROGRESS NOTES
Preoperative Exam    Scheduled Procedure: bilateral stab  phlebetomy  Surgery Date:  12/20/19  Surgery Location: [facility], fax [phone]    Surgeon:  Dr Aguirre    Assessment/Plan:     1. Symptomatic varicose veins of both lower extremities  She has been using compression stockings traditionally.  She was told that she be a candidate for stab phlebectomy.    2. Essential hypertension  Blood pressure is 110/64 today.  She uses propranolol 10 mg, Avapro 150 mg, furosemide 20 mg, and amlodipine 10 mg.  She was told to hold her furosemide on the day of her procedure.  CMP from November stable.    3. Acquired hypothyroidism  TSH last checked 2 months ago.  Stable on 88 mcg.    4. Preoperative examination  No contraindications for planned procedure.        Surgical Procedure Risk: Low (reported cardiac risk generally < 1%)  Have you had prior anesthesia?: Yes  Have you or any family members had a previous anesthesia reaction:  No  Do you or any family members have a history of a clotting or bleeding disorder?: No  Cardiac Risk Assessment: no increased risk for major cardiac complications    APPROVAL GIVEN to proceed with proposed procedure, without further diagnostic evaluation    Please Note:  Patient is taking medications for Chronic Pain.  Patient has an implanted device.  Device Type: Spinal cord stimulator      Device Vendor:  NA    Functional Status: Independent  Patient plans to recover at home with family.     Subjective:      Suma Mark is a 69 y.o. female who presents for a preoperative consultation.      Please see above.  The patient is otherwise feeling well and has no acute complaints today.    All other systems reviewed and are negative, other than those listed in the HPI.    Pertinent History  Do you have difficulty breathing or chest pain after walking up a flight of stairs: No  History of obstructive sleep apnea: No  Steroid use in the last 6 months: No  Frequent Aspirin/NSAID  use: No  Prior Blood Transfusion: Yes: 2003  Prior Blood Transfusion Reaction: No  If for some reason prior to, during or after the procedure, if it is medically indicated, would you be willing to have a blood transfusion?:  There is no transfusion refusal.    Current Outpatient Medications   Medication Sig Dispense Refill     amLODIPine (NORVASC) 10 MG tablet TAKE 1 TABLET(10 MG) BY MOUTH AT BEDTIME 90 tablet 3     atorvastatin (LIPITOR) 20 MG tablet Take 20 mg by mouth at bedtime.              botulinum toxin Type A, Cosm, (BOTOX) 100 unit SolR Every 3 months. [facility]       calcium carbonate-vitamin D3 (CALCIUM 600 + D,3,) 600 mg(1,500mg) -400 unit per tablet Take 1 tablet by mouth daily.              cholecalciferol, vitamin D3, 2,000 unit capsule Take 2,000 Units by mouth daily.       citalopram (CELEXA) 20 MG tablet TAKE 1 & 1/2 TABLETS BY MOUTH ONCE DAILY 135 tablet 3     cyanocobalamin (VITAMIN B-12) 50 mcg tablet Take 50 mcg by mouth daily.       dicyclomine (BENTYL) 10 MG capsule TAKE 1 CAPSULE BY MOUTH FOUR TIMES A DAY  1     eletriptan (RELPAX) 40 MG tablet Take 40 mg by mouth daily as needed for migraine. may repeat in 2 hours if necessary              furosemide (LASIX) 20 MG tablet Take 1 tablet (20 mg total) by mouth daily. 90 tablet 3     hydrOXYzine HCl (ATARAX) 25 MG tablet Take 1 tablet (25 mg total) by mouth 3 (three) times a day as needed for itching. 90 tablet 0     irbesartan (AVAPRO) 150 MG tablet TAKE 1 TABLET(150 MG) BY MOUTH DAILY 90 tablet 3     levothyroxine (SYNTHROID, LEVOTHROID) 88 MCG tablet Take 1 tablet (88 mcg total) by mouth Daily at 6:00 am. 90 tablet 3     LORazepam (ATIVAN) 1 MG tablet TAKE 1/2 TABLET(0.5 MG) BY MOUTH DAILY AS NEEDED FOR ANXIETY 30 tablet 0     pramipexole (MIRAPEX) 0.25 MG tablet Take 1 tablet (0.25 mg total) by mouth 3 (three) times a day. 270 tablet 1     propranolol (INDERAL) 10 MG tablet TAKE 1 TABLET(10 MG) BY MOUTH TWICE DAILY 270 tablet 0      "traZODone (DESYREL) 50 MG tablet TAKE 1 TABLET BY MOUTH EVERYDAY AT BEDTIME 90 tablet 3     Current Facility-Administered Medications   Medication Dose Route Frequency Provider Last Rate Last Dose     denosumab 60 mg (PROLIA 60 mg/ml)  60 mg Subcutaneous Q6 Months Bernadette Taylor MD   60 mg at 11/07/19 1452        Allergies   Allergen Reactions     Cephalexin Nausea And Vomiting     Ace Inhibitors Other (See Comments)     Body aches, elevated BP     Amitriptyline Other (See Comments)     Elevated BP     Amlodipine      Atenolol Other (See Comments)     Aggrevates Raynaud's     Celecoxib Other (See Comments)           Clonidine Nausea And Vomiting     Codeine Nausea And Vomiting     Desogestrel-Ethinyl Estradiol Swelling and Unknown     Dexamethasone Swelling and Unknown     Erythromycin Nausea And Vomiting     Escitalopram Other (See Comments)     Headaches.     Gabapentin Unknown     \"Spotted\"     Hydrochlorothiazide Other (See Comments)     hyponatremia     Methylprednisolone Nausea And Vomiting     Agitation      Propoxyphene N-Acetaminophen      Tramadol-Acetaminophen Other (See Comments)     Triamterene      Venlafaxine Nausea And Vomiting     Zolpidem Other (See Comments)     Penicillins Rash and Swelling     Sulfa (Sulfonamide Antibiotics) Rash     Tramadol Itching, Rash and Swelling       Patient Active Problem List   Diagnosis     Acquired hypothyroidism     Essential hypertension     Restless leg syndrome     Gastroesophageal reflux disease without esophagitis     Migraine     Hernia, ventral     Osteoporosis     Diverticulosis     S/P total knee replacement     Insomnia, unspecified type     Depression, unspecified depression type     Fibromyalgia     Symptomatic varicose veins of both lower extremities     Venous insufficiency of both lower extremities       Past Medical History:   Diagnosis Date     Acquired hypothyroidism 3/12/2009     Anxiety      Back pain      Breast cyst      Degenerative disc " disease, lumbar      Depression      Depression, unspecified depression type      Disease of thyroid gland     hypothyroid     Diverticulosis 10/4/2017    Incidental finding on colonoscopy 10/2017     Essential hypertension 4/21/2015     Fibromyalgia      Gastroesophageal reflux disease without esophagitis 4/21/2015     GERD (gastroesophageal reflux disease)      Hernia, ventral 4/27/2017     History of anesthesia complications     hypotension after surgery     History of blood clots      History of transfusion      Hypertension      Insomnia, unspecified type      Left lower quadrant pain      Migraine      Osteoarthritis      Osteoporosis      PONV (postoperative nausea and vomiting)      Raynaud's disease      Restless leg syndrome      S/P total knee replacement 11/27/2017     Ventral hernia      Vitamin D deficiency        Past Surgical History:   Procedure Laterality Date     BACK SURGERY       BREAST CYST EXCISION       CARPAL TUNNEL RELEASE       COLECTOMY N/A 6/2/2017    Procedure: RESECTION, SMALL INTESTINE, OPEN ADHESIOLYSIS;  Surgeon: Keyon Qureshi MD;  Location: [facility];  Service:      COLON SURGERY       HEMORRHOID SURGERY       HYSTERECTOMY  [id number]     INCISIONAL HERNIA REPAIR N/A 6/2/2017    Procedure: LAPARASCOPIC CONVERTED TO OPEN PRIMARY INCISIONAL HERNIA REPAIR;  Surgeon: Keyon Qureshi MD;  Location: [facility] OR;  Service:      INCONTINENCE SURGERY       LUMBAR FUSION       NC LAP,DIAGNOSTIC ABDOMEN N/A 8/22/2019    Procedure: DIAGNOSTIC LAPAROSCOPY, LYSIS OF ADHESION;  Surgeon: Svetlana Ron MD;  Location: [facility] OR;  Service: General     NC TOTAL KNEE ARTHROPLASTY Right 11/27/2017    Procedure:  RIGHT TOTAL KNEE ARTHROPLASTY;  Surgeon: Kevin Ryder MD;  Location: [facility];  Service: Orthopedics      THYROID BIOPSY  4/19/2019     VARICOSE VEIN SURGERY       VEIN LIGATION AND STRIPPING Bilateral        Social History     Socioeconomic History  "    Marital status:      Spouse name: Not on file     Number of children: Not on file     Years of education: Not on file     Highest education level: Not on file   Occupational History     Not on file   Social Needs     Financial resource strain: Not on file     Food insecurity:     Worry: Not on file     Inability: Not on file     Transportation needs:     Medical: Not on file     Non-medical: Not on file   Tobacco Use     Smoking status: Never Smoker     Smokeless tobacco: Never Used   Substance and Sexual Activity     Alcohol use: Yes     Comment: 1 cocktail daily     Drug use: No     Sexual activity: Not on file   Lifestyle     Physical activity:     Days per week: Not on file     Minutes per session: Not on file     Stress: Not on file   Relationships     Social connections:     Talks on phone: Not on file     Gets together: Not on file     Attends Hoahaoism service: Not on file     Active member of club or organization: Not on file     Attends meetings of clubs or organizations: Not on file     Relationship status: Not on file     Intimate partner violence:     Fear of current or ex partner: Not on file     Emotionally abused: Not on file     Physically abused: Not on file     Forced sexual activity: Not on file   Other Topics Concern     Not on file   Social History Narrative     Not on file       Patient Care Team:  Bernadette Taylor MD as PCP - General (Internal Medicine)  Izaiah Astorga MD as Physician (Orthopedic Surgery)  Jc Mcginnis MD as Physician (Gastroenterology)  Akilah Lezama MD as Consulting Physician (Dermatology)  Natalie Martel DO as Consulting Physician (Psychiatry)  Svetlana Ron MD as Physician (Colon and Rectal Surgery)  Bernadette Taylor MD as Assigned PCP          Objective:     Vitals:    12/02/19 0836   BP: 110/64   Pulse: 68   Weight: 177 lb 4.8 oz (80.4 kg)   Height: 5' 2\" (1.575 m)         Physical Exam:  General: No apparent distress. Calm. Alert and " Oriented X3. Pt behavior is appropriate.  Head:Atraumatic. Normocephalic, non-tender to palpation  Neck: Supple. No JVD. Full ROM.   Eyes: PERRL, No discharge. No strabismus. No nystagmus.  Ears: TMs pearly gray with landmarks visible.   Nose/Mouth/Throat: Patent nares, no oral lesions, pharynx clear and without exudate. Uvula mid-line. Nasal septum mid-line. Clear turbinates.   Lymph: No axillar or cervical adenopathy.   Chest/Lungs: Normal chest wall, clear to auscultation, normal respiratory effort and rate.   Heart/Pulses: Regular rate and rhythm, strong and equal radial pulses, no murmurs. Capillary refill <2 seconds. No edema.   Abdomen: Soft, no palpable masses. No hepatosplenomegaly, no tenderness with palpation noted. Bowel sounds active in all quadrants. No increased tympany.   Genitalia: Not examined.   Musculoskeletal: No CVA tenderness with palpation. Good ROM with extremities.   Neurologic: Interactive, alert, no focal findings, CNs intact.   Skin: Warm, dry. Normal hair pattern. Free of lesions. Normal skin turgor.         Patient Instructions   Do not take your furosemide (Lasix) the day of your procedure.      Hold all supplements, aspirin and NSAIDs for 7 days prior to surgery.    Follow your surgeon's direction on when to stop eating and drinking prior to surgery.    Your surgeon will be managing your pain after your surgery.      Remove all jewelry and metal piercings before your surgery.     Remove nail polish from fingers before surgery.      EKG:  She had a pharmacologic stress test 8 months ago which was negative for inducible myocardial ischemia.  Baseline EKG was NSR.    Labs:    Results for orders placed or performed in visit on 11/07/19   Comprehensive Metabolic Panel   Result Value Ref Range    Sodium 139 136 - 145 mmol/L    Potassium 3.9 3.5 - 5.0 mmol/L    Chloride 102 98 - 107 mmol/L    CO2 28 22 - 31 mmol/L    Anion Gap, Calculation 9 5 - 18 mmol/L    Glucose 98 70 - 125 mg/dL    BUN  15 8 - 28 mg/dL    Creatinine 0.73 0.60 - 1.10 mg/dL    GFR MDRD Af Amer >60 >60 mL/min/1.73m2    GFR MDRD Non Af Amer >60 >60 mL/min/1.73m2    Bilirubin, Total 0.3 0.0 - 1.0 mg/dL    Calcium 9.6 8.5 - 10.5 mg/dL    Protein, Total 7.1 6.0 - 8.0 g/dL    Albumin 3.9 3.5 - 5.0 g/dL    Alkaline Phosphatase 116 45 - 120 U/L    AST 22 0 - 40 U/L    ALT 27 0 - 45 U/L         Immunization History   Administered Date(s) Administered     Hep A, historic 06/13/2006, 12/14/2006     Hep B, Adult 06/13/2006     Hep B, historic 06/13/2006     Influenza [id number], 12/29/2009     Influenza high dose,seasonal,PF, 65+ yrs 10/06/2015, 09/26/2016, 10/26/2017     Influenza, Seasonal, Inj PF IIV3 09/03/2010, 10/09/2012     Influenza, inj, historic,unspecified 11/09/2006, 10/24/2007, 09/23/2009, 09/21/2011, 10/09/2012, 10/01/2014, 10/06/2015     Influenza,seasonal, Inj IIV3 11/09/2006, 10/24/2007, 11/03/2008, 09/21/2011     MMR 06/13/2006     Pneumo Conj 13-V (2010&after) 10/12/2015     Pneumo Polysac 23-V 10/26/2007, 10/26/2012     Td,adult,historic,unspecified 06/13/2006, 07/21/2018     Tdap 06/13/2006, 11/14/2016, 07/22/2018     Typhoid Live, Oral 06/13/2006     Typhoid, historic, Unspecified 06/13/2006     ZOSTER, LIVE 06/24/2014     ZOSTER, RECOMBINANT, IM 10/22/2018           Electronically signed by Bret Caro, CNP 12/02/19 8:38 AM

## 2021-06-03 NOTE — PATIENT INSTRUCTIONS - HE
Do not take your furosemide (Lasix) the day of your procedure.      Hold all supplements, aspirin and NSAIDs for 7 days prior to surgery.    Follow your surgeon's direction on when to stop eating and drinking prior to surgery.    Your surgeon will be managing your pain after your surgery.      Remove all jewelry and metal piercings before your surgery.     Remove nail polish from fingers before surgery.

## 2021-06-04 ENCOUNTER — COMMUNICATION - HEALTHEAST (OUTPATIENT)
Dept: INTERNAL MEDICINE | Facility: CLINIC | Age: 71
End: 2021-06-04

## 2021-06-04 VITALS
DIASTOLIC BLOOD PRESSURE: 64 MMHG | HEART RATE: 68 BPM | BODY MASS INDEX: 32.63 KG/M2 | SYSTOLIC BLOOD PRESSURE: 110 MMHG | WEIGHT: 177.3 LBS | HEIGHT: 62 IN

## 2021-06-04 VITALS
DIASTOLIC BLOOD PRESSURE: 70 MMHG | OXYGEN SATURATION: 98 % | WEIGHT: 173.6 LBS | HEART RATE: 66 BPM | BODY MASS INDEX: 31.75 KG/M2 | SYSTOLIC BLOOD PRESSURE: 122 MMHG

## 2021-06-04 VITALS
HEART RATE: 73 BPM | SYSTOLIC BLOOD PRESSURE: 115 MMHG | BODY MASS INDEX: 32.01 KG/M2 | DIASTOLIC BLOOD PRESSURE: 65 MMHG | OXYGEN SATURATION: 98 % | WEIGHT: 175 LBS

## 2021-06-04 VITALS
BODY MASS INDEX: 32.94 KG/M2 | DIASTOLIC BLOOD PRESSURE: 66 MMHG | WEIGHT: 179 LBS | TEMPERATURE: 97.9 F | RESPIRATION RATE: 16 BRPM | HEIGHT: 62 IN | HEART RATE: 76 BPM | SYSTOLIC BLOOD PRESSURE: 126 MMHG

## 2021-06-04 VITALS
DIASTOLIC BLOOD PRESSURE: 60 MMHG | WEIGHT: 179 LBS | BODY MASS INDEX: 32.74 KG/M2 | HEART RATE: 72 BPM | SYSTOLIC BLOOD PRESSURE: 110 MMHG

## 2021-06-04 VITALS
OXYGEN SATURATION: 98 % | WEIGHT: 170.7 LBS | DIASTOLIC BLOOD PRESSURE: 61 MMHG | SYSTOLIC BLOOD PRESSURE: 124 MMHG | HEIGHT: 62 IN | HEART RATE: 76 BPM | BODY MASS INDEX: 31.41 KG/M2

## 2021-06-04 VITALS — BODY MASS INDEX: 32.94 KG/M2 | HEIGHT: 62 IN | WEIGHT: 179 LBS

## 2021-06-04 VITALS
WEIGHT: 182.5 LBS | BODY MASS INDEX: 33.38 KG/M2 | HEART RATE: 69 BPM | DIASTOLIC BLOOD PRESSURE: 74 MMHG | SYSTOLIC BLOOD PRESSURE: 115 MMHG | OXYGEN SATURATION: 97 %

## 2021-06-04 DIAGNOSIS — G47.00 INSOMNIA, UNSPECIFIED TYPE: ICD-10-CM

## 2021-06-04 NOTE — ANESTHESIA POSTPROCEDURE EVALUATION
Patient: Suma Mark  STAB PHLEBECTOMY, VARICOSE VEIN bilateral  Anesthesia type: general    Patient location: Phase II Recovery  Last vitals:   Vitals Value Taken Time   /62 12/20/2019 10:01 AM   Temp  12/20/2019 10:06 AM   Pulse 69 12/20/2019 10:04 AM   Resp 20 12/20/2019  9:15 AM   SpO2 95 % 12/20/2019 10:04 AM   Vitals shown include unvalidated device data.  Post vital signs: stable  Level of consciousness: awake and responds to simple questions  Post-anesthesia pain: pain controlled  Post-anesthesia nausea and vomiting: no  Pulmonary: unassisted, return to baseline  Cardiovascular: stable and blood pressure at baseline  Hydration: adequate  Anesthetic events: no    QCDR Measures:  ASA# 11 - Korina-op Cardiac Arrest: ASA11B - Patient did NOT experience unanticipated cardiac arrest  ASA# 12 - Korina-op Mortality Rate: ASA12B - Patient did NOT die  ASA# 13 - PACU Re-Intubation Rate: ASA13B - Patient did NOT require a new airway mgmt  ASA# 10 - Composite Anes Safety: ASA10A - No serious adverse event    Additional Notes:

## 2021-06-04 NOTE — ANESTHESIA PREPROCEDURE EVALUATION
Anesthesia Evaluation      Patient summary reviewed   History of anesthetic complications (PONV)     Airway   Mallampati: II  Neck ROM: full  Comment: Small mouth opening     Pulmonary - negative ROS    breath sounds clear to auscultation  (-) not a smoker                         Cardiovascular   Exercise tolerance: > or = 4 METS  (+) hypertension, ,     (-) murmur  Rhythm: regular  Rate: normal,    no murmur      Neuro/Psych    (+) neuromuscular disease (fibromyalgia),  depression, anxiety/panic attacks, chronic pain    Comments: Migraines  Restless Legs Syndrome    Endo/Other    (+) hypothyroidism, obesity (BMI 32),      GI/Hepatic/Renal    (+) GERD,             Dental    (+) caps                         Anesthesia Plan  Planned anesthetic: general LMA    Versed/fentanyl/propofol  Ketamine PRN  Hx of PONV and plan for scopolamine, dexamethasone (10 mg), zofran and low-dose propofol gtt (25-50 mcg/kg/min).    ASA 3   Induction: intravenous   Anesthetic plan and risks discussed with: patient and spouse  Anesthesia plan special considerations: antiemetics,   Post-op plan: routine recovery      Results for orders placed or performed in visit on 12/10/19   Hemoglobin   Result Value Ref Range    Hemoglobin 12.3 12.0 - 16.0 g/dL   Basic Metabolic Panel   Result Value Ref Range    Sodium 137 136 - 145 mmol/L    Potassium 4.1 3.5 - 5.0 mmol/L    Chloride 102 98 - 107 mmol/L    CO2 26 22 - 31 mmol/L    Anion Gap, Calculation 9 5 - 18 mmol/L    Glucose 68 (L) 70 - 125 mg/dL    Calcium 9.0 8.5 - 10.5 mg/dL    BUN 15 8 - 28 mg/dL    Creatinine 0.75 0.60 - 1.10 mg/dL    GFR MDRD Af Amer >60 >60 mL/min/1.73m2    GFR MDRD Non Af Amer >60 >60 mL/min/1.73m2

## 2021-06-04 NOTE — PATIENT INSTRUCTIONS - HE
Do not take your Irbesartan or furosemide the day surgery.       Hold all supplements, aspirin and NSAIDs for 7 days prior to surgery.    Follow your surgeon's direction on when to stop eating and drinking prior to surgery.    Your surgeon will be managing your pain after your surgery.      Remove all jewelry and metal piercings before your surgery.     Remove nail polish from fingers before surgery.    If you use a CPAP machine, please bring this with you.

## 2021-06-04 NOTE — TELEPHONE ENCOUNTER
Refill Approved    Rx renewed per Medication Renewal Policy. Medication was last renewed on 5/2/19.    Suma Jaramillo, [facility] Triage/Med Refill 1/4/2020     Requested Prescriptions   Pending Prescriptions Disp Refills     pramipexole (MIRAPEX) 0.25 MG tablet 270 tablet 1     Sig: Take 1 tablet (0.25 mg total) by mouth 3 (three) times a day.       Parkinson's Meds I Refill Protocol Passed - 1/4/2020  1:20 PM        Passed - PCP or prescribing provider visit in past 6 months      Last office visit with prescriber/PCP: 11/7/2019 OR same dept: 11/7/2019 Bernadette Taylor MD OR same specialty: 11/7/2019 Bernadette Taylor MD Last physical: 7/30/2019 Last MTM visit: Visit date not found     Next appt within 3 mo: Visit date not found  Next physical within 3 mo: Visit date not found  Prescriber OR PCP: Bernadette Taylor MD  Last diagnosis associated with med order: 1. RLS (restless legs syndrome)  - pramipexole (MIRAPEX) 0.25 MG tablet; Take 1 tablet (0.25 mg total) by mouth 3 (three) times a day.  Dispense: 270 tablet; Refill: 1    If protocol passes may refill for 6 months if within 3 months of last provider visit (or a total of 9 months).

## 2021-06-04 NOTE — PROGRESS NOTES
Preoperative Exam    Scheduled Procedure: spinal cord stimulator repair  Surgery Date:  12/17/2019  Surgery Location: [facility]-[phone]    Surgeon:  Dr. Denis    Assessment/Plan:     1. Preoperative examination  No contraindications for planned procedure    2. Chronic low back pain  She has a history of chronic low back pain, status post spinal fusion with instrumentation.  Plan is to have a spinal cord stimulator.    3. Essential hypertension  Well-controlled on furosemide, Avapro, and propranolol.  She was told to hold her furosemide and Avapro on the day of her procedure.  Recheck BMP today.  She had a normal stress test in April of this past year.    4. Acquired hypothyroidism  Stable TSH on levothyroxine 88 mcg.        Surgical Procedure Risk: Low (reported cardiac risk generally < 1%)  Have you had prior anesthesia?: Yes  Have you or any family members had a previous anesthesia reaction:  No  Do you or any family members have a history of a clotting or bleeding disorder?: No  Cardiac Risk Assessment: no increased risk for major cardiac complications    Pending review of diagnostic evaluation, APPROVAL GIVEN to proceed with proposed procedure, without further diagnostic evaluation.    Please Note:  Patient has an implanted device.  Device Type: bilateral knee replacements      Device Vendor:  NA    Functional Status: Independent  Patient plans to recover at home with family.     Subjective:      Suma Mark is a 69 y.o. female who presents for a preoperative consultation.      Please see above.  The patient otherwise feeling well and has no acute complaints.    All other systems reviewed and are negative, other than those listed in the HPI.    Pertinent History  Do you have difficulty breathing or chest pain after walking up a flight of stairs: No  History of obstructive sleep apnea: No  Steroid use in the last 6 months: No  Frequent Aspirin/NSAID use: No  Prior Blood Transfusion: Yes:  2003  Prior Blood Transfusion Reaction: No  If for some reason prior to, during or after the procedure, if it is medically indicated, would you be willing to have a blood transfusion?:  There is no transfusion refusal.    Current Outpatient Medications   Medication Sig Dispense Refill     amLODIPine (NORVASC) 10 MG tablet TAKE 1 TABLET(10 MG) BY MOUTH AT BEDTIME 90 tablet 3     atorvastatin (LIPITOR) 20 MG tablet Take 20 mg by mouth at bedtime.              botulinum toxin Type A, Cosm, (BOTOX) 100 unit SolR Every 3 months. [facility]       calcium carbonate-vitamin D3 (CALCIUM 600 + D,3,) 600 mg(1,500mg) -400 unit per tablet Take 1 tablet by mouth daily.              cholecalciferol, vitamin D3, 2,000 unit capsule Take 2,000 Units by mouth daily.       citalopram (CELEXA) 20 MG tablet TAKE 1 & 1/2 TABLETS BY MOUTH ONCE DAILY 135 tablet 3     cyanocobalamin (VITAMIN B-12) 50 mcg tablet Take 50 mcg by mouth daily.       dicyclomine (BENTYL) 10 MG capsule TAKE 1 CAPSULE BY MOUTH FOUR TIMES A DAY  1     eletriptan (RELPAX) 40 MG tablet Take 40 mg by mouth daily as needed for migraine. may repeat in 2 hours if necessary              furosemide (LASIX) 20 MG tablet Take 1 tablet (20 mg total) by mouth daily. 90 tablet 3     hydrOXYzine HCl (ATARAX) 25 MG tablet Take 1 tablet (25 mg total) by mouth 3 (three) times a day as needed for itching. 90 tablet 0     irbesartan (AVAPRO) 150 MG tablet TAKE 1 TABLET(150 MG) BY MOUTH DAILY 90 tablet 3     levothyroxine (SYNTHROID, LEVOTHROID) 88 MCG tablet Take 1 tablet (88 mcg total) by mouth Daily at 6:00 am. 90 tablet 3     LORazepam (ATIVAN) 1 MG tablet TAKE 1/2 TABLET(0.5 MG) BY MOUTH DAILY AS NEEDED FOR ANXIETY 30 tablet 0     pramipexole (MIRAPEX) 0.25 MG tablet Take 1 tablet (0.25 mg total) by mouth 3 (three) times a day. 270 tablet 1     propranolol (INDERAL) 10 MG tablet TAKE 1 TABLET(10 MG) BY MOUTH TWICE DAILY 270 tablet 0     traZODone (DESYREL) 50 MG tablet TAKE 1  "TABLET BY MOUTH EVERYDAY AT BEDTIME 90 tablet 3     Current Facility-Administered Medications   Medication Dose Route Frequency Provider Last Rate Last Dose     denosumab 60 mg (PROLIA 60 mg/ml)  60 mg Subcutaneous Q6 Months Bernadette Taylor MD   60 mg at 11/07/19 1452        Allergies   Allergen Reactions     Cephalexin Nausea And Vomiting     Ace Inhibitors Other (See Comments)     Body aches, elevated BP     Amitriptyline Other (See Comments)     Elevated BP     Amlodipine      Atenolol Other (See Comments)     Aggrevates Raynaud's     Celecoxib Other (See Comments)           Clonidine Nausea And Vomiting     Codeine Nausea And Vomiting     Desogestrel-Ethinyl Estradiol Swelling and Unknown     Dexamethasone Swelling and Unknown     Erythromycin Nausea And Vomiting     Escitalopram Other (See Comments)     Headaches.     Gabapentin Unknown     \"Spotted\"     Hydrochlorothiazide Other (See Comments)     hyponatremia     Methylprednisolone Nausea And Vomiting     Agitation      Propoxyphene N-Acetaminophen      Tramadol-Acetaminophen Other (See Comments)     Triamterene      Venlafaxine Nausea And Vomiting     Zolpidem Other (See Comments)     Penicillins Rash and Swelling     Sulfa (Sulfonamide Antibiotics) Rash     Tramadol Itching, Rash and Swelling       Patient Active Problem List   Diagnosis     Acquired hypothyroidism     Essential hypertension     Restless leg syndrome     Gastroesophageal reflux disease without esophagitis     Migraine     Hernia, ventral     Osteoporosis     Diverticulosis     S/P total knee replacement     Insomnia, unspecified type     Depression, unspecified depression type     Fibromyalgia     Symptomatic varicose veins of both lower extremities     Venous insufficiency of both lower extremities       Past Medical History:   Diagnosis Date     Acquired hypothyroidism 3/12/2009     Anxiety      Back pain      Breast cyst      Degenerative disc disease, lumbar      Depression      " Depression, unspecified depression type      Disease of thyroid gland     hypothyroid     Diverticulosis 10/4/2017    Incidental finding on colonoscopy 10/2017     Essential hypertension 4/21/2015     Fibromyalgia      Gastroesophageal reflux disease without esophagitis 4/21/2015     GERD (gastroesophageal reflux disease)      Hernia, ventral 4/27/2017     History of anesthesia complications     hypotension after surgery     History of blood clots      History of transfusion      Hypertension      Insomnia, unspecified type      Left lower quadrant pain      Migraine      Osteoarthritis      Osteoporosis      PONV (postoperative nausea and vomiting)      Raynaud's disease      Restless leg syndrome      S/P total knee replacement 11/27/2017     Ventral hernia      Vitamin D deficiency        Past Surgical History:   Procedure Laterality Date     BACK SURGERY       BREAST CYST EXCISION       CARPAL TUNNEL RELEASE       COLECTOMY N/A 6/2/2017    Procedure: RESECTION, SMALL INTESTINE, OPEN ADHESIOLYSIS;  Surgeon: Keyon Qureshi MD;  Location: [facility];  Service:      COLON SURGERY       HEMORRHOID SURGERY       HYSTERECTOMY  [phone]     INCISIONAL HERNIA REPAIR N/A 6/2/2017    Procedure: LAPARASCOPIC CONVERTED TO OPEN PRIMARY INCISIONAL HERNIA REPAIR;  Surgeon: Keyon Qureshi MD;  Location: [facility] OR;  Service:      INCONTINENCE SURGERY       LUMBAR FUSION       MI LAP,DIAGNOSTIC ABDOMEN N/A 8/22/2019    Procedure: DIAGNOSTIC LAPAROSCOPY, LYSIS OF ADHESION;  Surgeon: Svetlana oRn MD;  Location: [facility] OR;  Service: General     MI TOTAL KNEE ARTHROPLASTY Right 11/27/2017    Procedure:  RIGHT TOTAL KNEE ARTHROPLASTY;  Surgeon: Kevin Ryder MD;  Location: [facility] OR;  Service: Orthopedics      THYROID BIOPSY  4/19/2019     VARICOSE VEIN SURGERY       VEIN LIGATION AND STRIPPING Bilateral        Social History     Socioeconomic History     Marital status:       Spouse name: Not on file     Number of children: Not on file     Years of education: Not on file     Highest education level: Not on file   Occupational History     Not on file   Social Needs     Financial resource strain: Not on file     Food insecurity:     Worry: Not on file     Inability: Not on file     Transportation needs:     Medical: Not on file     Non-medical: Not on file   Tobacco Use     Smoking status: Never Smoker     Smokeless tobacco: Never Used   Substance and Sexual Activity     Alcohol use: Yes     Comment: 1 cocktail daily     Drug use: No     Sexual activity: Not on file   Lifestyle     Physical activity:     Days per week: Not on file     Minutes per session: Not on file     Stress: Not on file   Relationships     Social connections:     Talks on phone: Not on file     Gets together: Not on file     Attends Gnosticism service: Not on file     Active member of club or organization: Not on file     Attends meetings of clubs or organizations: Not on file     Relationship status: Not on file     Intimate partner violence:     Fear of current or ex partner: Not on file     Emotionally abused: Not on file     Physically abused: Not on file     Forced sexual activity: Not on file   Other Topics Concern     Not on file   Social History Narrative     Not on file       Patient Care Team:  Bernadette Taylor MD as PCP - General (Internal Medicine)  Izaiah Astorga MD as Physician (Orthopedic Surgery)  Jc Mcginnis MD as Physician (Gastroenterology)  Akilah Lezama MD as Consulting Physician (Dermatology)  Nataile Martel DO as Consulting Physician (Psychiatry)  Svetlana Ron MD as Physician (Colon and Rectal Surgery)  Bernadette aTylor MD as Assigned PCP          Objective:     Vitals:    12/10/19 0806   BP: 110/60   Pulse: 72   Weight: 179 lb (81.2 kg)         Physical Exam:  General: No apparent distress. Calm. Alert and Oriented X3. Pt behavior is appropriate.  Head:Atraumatic.  Normocephalic, non-tender to palpation  Neck: Supple. No JVD. Full ROM.   Eyes: PERRL, No discharge. No strabismus. No nystagmus.  Ears: TMs pearly gray with landmarks visible.   Nose/Mouth/Throat: Patent nares, no oral lesions, pharynx clear and without exudate. Uvula mid-line. Nasal septum mid-line. Clear turbinates.   Lymph: No axillar or cervical adenopathy.   Chest/Lungs: Normal chest wall, clear to auscultation, normal respiratory effort and rate.   Heart/Pulses: Regular rate and rhythm, strong and equal radial pulses, no murmurs. Capillary refill <2 seconds. No edema.   Abdomen: Soft, no palpable masses. No hepatosplenomegaly, no tenderness with palpation noted. Bowel sounds active in all quadrants. No increased tympany.   Genitalia: Not examined.   Musculoskeletal: No CVA tenderness with palpation. Good ROM with extremities.   Neurologic: Interactive, alert, no focal findings, CNs intact.   Skin: Warm, dry. Normal hair pattern. Free of lesions. Normal skin turgor.         Patient Instructions   Do not take your Irbesartan or furosemide the day surgery.       Hold all supplements, aspirin and NSAIDs for 7 days prior to surgery.    Follow your surgeon's direction on when to stop eating and drinking prior to surgery.    Your surgeon will be managing your pain after your surgery.      Remove all jewelry and metal piercings before your surgery.     Remove nail polish from fingers before surgery.    If you use a CPAP machine, please bring this with you.      EKG:  She had a pharmacologic stress test on 4/16/2019; negative for inducible myocardial ischemia or infarction.  Left ventricular ejection fraction greater than 75%.  Baseline electrocardiogram demonstrated sinus rhythm.    Labs:  Labs pending at this time.  Results will be reviewed when available.    Immunization History   Administered Date(s) Administered     Hep A, historic 06/13/2006, 12/14/2006     Hep B, Adult 06/13/2006     Hep B, historic 06/13/2006      Influenza S1u6-63, 12/29/2009     Influenza high dose,seasonal,PF, 65+ yrs 10/06/2015, 09/26/2016, 10/26/2017     Influenza, Seasonal, Inj PF IIV3 09/03/2010, 10/09/2012     Influenza, inj, historic,unspecified 11/09/2006, 10/24/2007, 09/23/2009, 09/21/2011, 10/09/2012, 10/01/2014, 10/06/2015     Influenza,seasonal, Inj IIV3 11/09/2006, 10/24/2007, 11/03/2008, 09/21/2011     MMR 06/13/2006     Pneumo Conj 13-V (2010&after) 10/12/2015     Pneumo Polysac 23-V 10/26/2007, 10/26/2012     Td,adult,historic,unspecified 06/13/2006, 07/21/2018     Tdap 06/13/2006, 11/14/2016, 07/22/2018     Typhoid Live, Oral 06/13/2006     Typhoid, historic, Unspecified 06/13/2006     ZOSTER, LIVE 06/24/2014     ZOSTER, RECOMBINANT, IM 10/22/2018           Electronically signed by Bret Caro, CNP 12/10/19 8:05 AM op

## 2021-06-04 NOTE — TELEPHONE ENCOUNTER
Refill Approved    Rx renewed per Medication Renewal Policy. Medication was last renewed on 10/1/2018.    Star Delaney, [facility] Triage/Med Refill 12/25/2019     Requested Prescriptions   Pending Prescriptions Disp Refills     amLODIPine (NORVASC) 10 MG tablet [Pharmacy Med Name: AMLODIPINE BESYLATE 10 MG TAB] 90 tablet 1     Sig: TAKE 1 TABLET BY MOUTH ONCE DAILY AT BEDTIME       Calcium-Channel Blockers Protocol Passed - 12/23/2019  3:25 AM        Passed - PCP or prescribing provider visit in past 12 months or next 3 months     Last office visit with prescriber/PCP: 1/24/2018 Melva Meade MD OR same dept: Visit date not found OR same specialty: 8/3/2018 Yudi Heard MD  Last physical: 4/24/2018 Last MTM visit: Visit date not found   Next visit within 3 mo: Visit date not found  Next physical within 3 mo: Visit date not found  Prescriber OR PCP: Melva Meade MD  Last diagnosis associated with med order: 1. Essential hypertension with goal blood pressure less than 140/90  - amLODIPine (NORVASC) 10 MG tablet [Pharmacy Med Name: AMLODIPINE BESYLATE 10 MG TAB]; TAKE 1 TABLET BY MOUTH ONCE DAILY AT BEDTIME  Dispense: 90 tablet; Refill: 1    If protocol passes may refill for 12 months if within 3 months of last provider visit (or a total of 15 months).             Passed - Blood pressure filed in past 12 months     BP Readings from Last 1 Encounters:   12/20/19 135/62

## 2021-06-04 NOTE — ANESTHESIA CARE TRANSFER NOTE
Last vitals:   Vitals:    12/20/19 0645   BP: 137/61   Pulse: 70   Resp: 16   Temp: 36.6  C (97.8  F)   SpO2: 98%     Patient's level of consciousness is drowsy  Spontaneous respirations: yes  Maintains airway independently: yes  Dentition unchanged: yes  Oropharynx: oropharynx clear of all foreign objects    QCDR Measures:  ASA# 20 - Surgical Safety Checklist: WHO surgical safety checklist completed prior to induction    PQRS# 430 - Adult PONV Prevention: 4558F - Pt received => 2 anti-emetic agents (different classes) preop & intraop  ASA# 8 - Peds PONV Prevention: NA - Not pediatric patient, not GA or 2 or more risk factors NOT present  PQRS# 424 - Korina-op Temp Management: 4559F - At least one body temp DOCUMENTED => 35.5C or 95.9F within required timeframe  PQRS# 426 - PACU Transfer Protocol: - Transfer of care checklist used  ASA# 14 - Acute Post-op Pain: ASA14B - Patient did NOT experience pain >= 7 out of 10

## 2021-06-04 NOTE — TELEPHONE ENCOUNTER
Refill Approved    Rx renewed per Medication Renewal Policy. Medication was last renewed on 9/19/2019       Ozzie Iraheta, [facility] Connection Triage/Med Refill 12/12/2019     Requested Prescriptions   Pending Prescriptions Disp Refills     propranolol (INDERAL) 10 MG tablet [Pharmacy Med Name: PROPRANOLOL 10 MG TABLET] 180 tablet 1     Sig: TAKE 1 TABLET(10 MG) BY MOUTH TWICE DAILY       Beta-Blockers Refill Protocol Passed - 12/11/2019  4:19 AM        Passed - PCP or prescribing provider visit in past 12 months or next 3 months     Last office visit with prescriber/PCP: 11/7/2019 Bernadette Taylor MD OR same dept: Visit date not found OR same specialty: 8/3/2018 Yudi Heard MD  Last physical: 7/30/2019 Last MTM visit: Visit date not found   Next visit within 3 mo: Visit date not found  Next physical within 3 mo: Visit date not found  Prescriber OR PCP: Bernadette Taylor MD  Last diagnosis associated with med order: 1. HTN (hypertension)  - propranolol (INDERAL) 10 MG tablet [Pharmacy Med Name: PROPRANOLOL 10 MG TABLET]; TAKE 1 TABLET(10 MG) BY MOUTH TWICE DAILY  Dispense: 180 tablet; Refill: 1    If protocol passes may refill for 12 months if within 3 months of last provider visit (or a total of 15 months).             Passed - Blood pressure filed in past 12 months     BP Readings from Last 1 Encounters:   12/10/19 110/60

## 2021-06-05 ENCOUNTER — HEALTH MAINTENANCE LETTER (OUTPATIENT)
Age: 71
End: 2021-06-05

## 2021-06-05 VITALS
DIASTOLIC BLOOD PRESSURE: 78 MMHG | OXYGEN SATURATION: 98 % | WEIGHT: 170.2 LBS | SYSTOLIC BLOOD PRESSURE: 119 MMHG | BODY MASS INDEX: 30.88 KG/M2 | HEART RATE: 69 BPM

## 2021-06-05 VITALS
BODY MASS INDEX: 31.21 KG/M2 | OXYGEN SATURATION: 98 % | HEART RATE: 66 BPM | DIASTOLIC BLOOD PRESSURE: 60 MMHG | WEIGHT: 172 LBS | SYSTOLIC BLOOD PRESSURE: 118 MMHG

## 2021-06-05 NOTE — PROGRESS NOTES
"HPI: Pt is here for follow up of a bilateral stab phlebectomy.  She is doing well. Her appetite is good, and bowel function regular.  No fevers or chills. Ambulating without problems. Some numbness around lower incisional areas      /66 (Patient Site: Left Arm, Patient Position: Sitting, Cuff Size: Adult Regular)   Pulse 76   Temp 97.9  F (36.6  C) (Oral)   Resp 16   Ht 5' 2\" (1.575 m)   Wt 179 lb (81.2 kg)   LMP  (LMP Unknown)   BMI 32.74 kg/m        EXAM: This is a  69 y.o. WOMAN in no distress  GENERAL: Appears well  CHEST clear  CVS S1S2 NSR  ABDOMEN: Soft, non-tender.   EXT: warm, moves without difficulty, one incision looks a little inflamed.       Assessment/Plan:   1. Venous insufficiency of both lower extremities  S/p phlebectomy  abx cream and band aid until erythema resolved  Compression socks as much as she can.  Return to all activities no restrictions.  Follow up as needed.    Return if symptoms worsen or fail to improve.      Jonn Pinto MD  [facility] Department of Surgery  "

## 2021-06-05 NOTE — TELEPHONE ENCOUNTER
Refill Approved    Rx renewed per Medication Renewal Policy. Medication was last renewed on 2/12/19.    Suma Jaramillo, [facility] Connection Triage/Med Refill 1/19/2020     Requested Prescriptions   Pending Prescriptions Disp Refills     furosemide (LASIX) 20 MG tablet [Pharmacy Med Name: FUROSEMIDE 20 MG TABLET] 90 tablet 2     Sig: TAKE 1 TABLET BY MOUTH EVERY DAY       Diuretics/Combination Diuretics Refill Protocol  Passed - 1/18/2020 11:03 AM        Passed - Visit with PCP or prescribing provider visit in past 12 months     Last office visit with prescriber/PCP: Visit date not found OR same dept: 11/7/2019 Bernadette Taylor MD OR same specialty: 11/7/2019 Bernadette Taylor MD  Last physical: Visit date not found Last MTM visit: Visit date not found   Next visit within 3 mo: Visit date not found  Next physical within 3 mo: Visit date not found  Prescriber OR PCP: Tapan Daniel MD  Last diagnosis associated with med order: 1. Essential hypertension  - furosemide (LASIX) 20 MG tablet [Pharmacy Med Name: FUROSEMIDE 20 MG TABLET]; TAKE 1 TABLET BY MOUTH EVERY DAY  Dispense: 90 tablet; Refill: 2    2. Bilateral edema of lower extremity  - furosemide (LASIX) 20 MG tablet [Pharmacy Med Name: FUROSEMIDE 20 MG TABLET]; TAKE 1 TABLET BY MOUTH EVERY DAY  Dispense: 90 tablet; Refill: 2    3. Hypothyroid  - levothyroxine (SYNTHROID, LEVOTHROID) 88 MCG tablet [Pharmacy Med Name: LEVOTHYROXINE 88 MCG TABLET]; TAKE 1 TABLET BY MOUTH DAILY AT 6:00 AM.  Dispense: 90 tablet; Refill: 2    If protocol passes may refill for 12 months if within 3 months of last provider visit (or a total of 15 months).             Passed - Serum Potassium in past 12 months      Lab Results   Component Value Date    Potassium 4.1 12/10/2019             Passed - Serum Sodium in past 12 months      Lab Results   Component Value Date    Sodium 137 12/10/2019             Passed - Blood pressure on file in past 12 months     BP Readings from Last 1  Encounters:   01/07/20 126/66             Passed - Serum Creatinine in past 12 months      Creatinine   Date Value Ref Range Status   12/10/2019 0.75 0.60 - 1.10 mg/dL Final             levothyroxine (SYNTHROID, LEVOTHROID) 88 MCG tablet [Pharmacy Med Name: LEVOTHYROXINE 88 MCG TABLET] 90 tablet 2     Sig: TAKE 1 TABLET BY MOUTH DAILY AT 6:00 AM.       Thyroid Hormones Protocol Passed - 1/18/2020 11:03 AM        Passed - Provider visit in past 12 months or next 3 months     Last office visit with prescriber/PCP: Visit date not found OR same dept: 11/7/2019 Bernadette Taylor MD OR same specialty: 11/7/2019 Bernadette Taylor MD  Last physical: Visit date not found Last MTM visit: Visit date not found   Next visit within 3 mo: Visit date not found  Next physical within 3 mo: Visit date not found  Prescriber OR PCP: Tapan Daniel MD  Last diagnosis associated with med order: 1. Essential hypertension  - furosemide (LASIX) 20 MG tablet [Pharmacy Med Name: FUROSEMIDE 20 MG TABLET]; TAKE 1 TABLET BY MOUTH EVERY DAY  Dispense: 90 tablet; Refill: 2    2. Bilateral edema of lower extremity  - furosemide (LASIX) 20 MG tablet [Pharmacy Med Name: FUROSEMIDE 20 MG TABLET]; TAKE 1 TABLET BY MOUTH EVERY DAY  Dispense: 90 tablet; Refill: 2    3. Hypothyroid  - levothyroxine (SYNTHROID, LEVOTHROID) 88 MCG tablet [Pharmacy Med Name: LEVOTHYROXINE 88 MCG TABLET]; TAKE 1 TABLET BY MOUTH DAILY AT 6:00 AM.  Dispense: 90 tablet; Refill: 2    If protocol passes may refill for 12 months if within 3 months of last provider visit (or a total of 15 months).             Passed - TSH on file in past 12 months for patient age 12 & older     TSH   Date Value Ref Range Status   10/18/2019 1.94 0.30 - 5.00 uIU/mL Final

## 2021-06-05 NOTE — TELEPHONE ENCOUNTER
Controlled Substance Refill Request  Medication Name:   Requested Prescriptions     Pending Prescriptions Disp Refills     LORazepam (ATIVAN) 1 MG tablet 30 tablet 0     Date Last Fill: 10/29/2019  Requested Pharmacy: [id number]  Submit electronically to pharmacy  Controlled Substance Agreement on file:   Encounter-Level CSA Scan Date:    There are no encounter-level csa scan date.        Last office visit:  12/10/2019

## 2021-06-05 NOTE — PROGRESS NOTES
Patient returns for 2 week f/u post stab phlebectomy. C/o some numbness and tender spots along her legs.

## 2021-06-07 ENCOUNTER — COMMUNICATION - HEALTHEAST (OUTPATIENT)
Dept: INTERNAL MEDICINE | Facility: CLINIC | Age: 71
End: 2021-06-07

## 2021-06-08 ENCOUNTER — OFFICE VISIT - HEALTHEAST (OUTPATIENT)
Dept: INTERNAL MEDICINE | Facility: CLINIC | Age: 71
End: 2021-06-08

## 2021-06-08 DIAGNOSIS — I10 ESSENTIAL HYPERTENSION: ICD-10-CM

## 2021-06-08 DIAGNOSIS — E03.9 ACQUIRED HYPOTHYROIDISM: ICD-10-CM

## 2021-06-08 DIAGNOSIS — I87.2 VENOUS INSUFFICIENCY OF BOTH LOWER EXTREMITIES: ICD-10-CM

## 2021-06-08 NOTE — PROGRESS NOTES
1. Osteoporosis without current pathological fracture, unspecified osteoporosis type  Vitamin D, Total (25-Hydroxy)   2. Essential hypertension  HM2(CBC w/o Differential)    Lipid Profile    Comprehensive Metabolic Panel    Urinalysis-UC if Indicated   3. Acquired hypothyroidism  Thyroid Stimulating Hormone (TSH)   4. Restless leg syndrome  Magnesium     Patient was educated on safety of Prolia utilizing Patient Counseling Chart for Healthcare Providers, as outlined by the Prolia REMS progam.     Return in about 6 months (around 12/16/2020) for Recheck, Prolia injection.    Patient Instructions   Prolia 4th today.  Prolia 5th in 6 months with my nurse.    DXA in 5/2021 .   Phone number to schedule [phone].    Daily calcium need is 8417-8234 mg a day from the diet and supplements.  Calcium citrate is easier to digest.  Vitamin D 2000 IU daily recommended.      Chief Complaint   Patient presents with     Osteoporosis Follow Up     Osteo F/U       /65   Pulse 73   Wt 175 lb (79.4 kg)   LMP  (LMP Unknown)   SpO2 98%   BMI 32.01 kg/m        Did you experience any problems with previous Prolia injection? no  Any medication change in the last 6 months? no  Did you take prednisone or other immunosupressant drugs in the last 6 months   (chemo, transplant, rheum, dermatology conditions)? no  Did you have any serious infection in the last 6 months?no  Any recent hospitalizations?no  Do you plan any dental work in the next 2-3 months?no  How much calcium do you take daily from the diet and supplements?1200 mg  How much vit D do you take daily? 2000 IU  Last DXA? 5/2019, Reviewed and discussed      Patient is here today for the 5th Prolia injection. Patient tolerated previous injections well.   We discussed calcium and vit D daily needs today.   Next Prolia injection will be in 6 months.     We also discussed some symptoms of irritable bowel, recent phlebectomy and episodes of restlessness and anxiety that  sometimes happened during the night and she has to take lorazepam. She will need to come for fasting labs prior physical in August and I placed the orders.    Patient was educated on safety of Prolia utilizing Patient Counseling Chart for Healthcare Providers, as outlined by the Prolia REMS progam.     25 minutes spent with the patient and more then 50 % of the time in counseling about osteoporosis, available osteoporosis treatment options, medication side effects and contraindications, supplement use and weightbearing exercise.    This note has been dictated using voice recognition software. Any grammatical or context distortions are unintentional and inherent to the software      Patient Active Problem List   Diagnosis     Acquired hypothyroidism     Essential hypertension     Restless leg syndrome     Gastroesophageal reflux disease without esophagitis     Migraine     Hernia, ventral     Osteoporosis     Diverticulosis     S/P total knee replacement     Insomnia, unspecified type     Depression, unspecified depression type     Fibromyalgia     Symptomatic varicose veins of both lower extremities     Venous insufficiency of both lower extremities       Current Outpatient Medications on File Prior to Visit   Medication Sig Dispense Refill     amLODIPine (NORVASC) 10 MG tablet TAKE 1 TABLET BY MOUTH ONCE DAILY AT BEDTIME 90 tablet 3     atorvastatin (LIPITOR) 20 MG tablet Take 20 mg by mouth at bedtime.              botulinum toxin Type A, Cosm, (BOTOX) 100 unit SolR Every 3 months. [facility]       calcium carbonate-vitamin D3 (CALCIUM 600 + D,3,) 600 mg(1,500mg) -400 unit per tablet Take 1 tablet by mouth daily.              cholecalciferol, vitamin D3, 2,000 unit capsule Take 2,000 Units by mouth daily.       citalopram (CELEXA) 20 MG tablet TAKE 1 & 1/2 TABLETS BY MOUTH ONCE DAILY 135 tablet 3     eletriptan (RELPAX) 40 MG tablet Take 40 mg by mouth daily as needed for migraine. may repeat in 2 hours if  necessary              furosemide (LASIX) 20 MG tablet TAKE 1 TABLET BY MOUTH EVERY DAY 90 tablet 3     HYDROcodone-acetaminophen 5-325 mg per tablet Take 1 tablet by mouth.       irbesartan (AVAPRO) 150 MG tablet TAKE 1 TABLET(150 MG) BY MOUTH DAILY 90 tablet 3     levothyroxine (SYNTHROID, LEVOTHROID) 88 MCG tablet TAKE 1 TABLET BY MOUTH DAILY AT 6:00 AM. 90 tablet 2     LORazepam (ATIVAN) 1 MG tablet TAKE 1/2 TABLET(0.5 MG) BY MOUTH DAILY AS NEEDED FOR ANXIETY 30 tablet 0     pramipexole (MIRAPEX) 0.25 MG tablet Take 1 tablet (0.25 mg total) by mouth 3 (three) times a day. 270 tablet 1     propranolol (INDERAL) 10 MG tablet TAKE 1 TABLET(10 MG) BY MOUTH TWICE DAILY 180 tablet 3     topiramate (TOPAMAX) 25 MG tablet PLEASE SEE ATTACHED FOR DETAILED DIRECTIONS       traZODone (DESYREL) 50 MG tablet TAKE 1 TABLET BY MOUTH EVERYDAY AT BEDTIME 90 tablet 3     cyanocobalamin (VITAMIN B-12) 50 mcg tablet Take 50 mcg by mouth daily.       dicyclomine (BENTYL) 10 MG capsule TAKE 1 CAPSULE BY MOUTH FOUR TIMES A DAY  1     hydrOXYzine HCl (ATARAX) 25 MG tablet Take 1 tablet (25 mg total) by mouth 3 (three) times a day as needed for itching. 270 tablet 0     [DISCONTINUED] HYDROmorphone (DILAUDID) 2 MG tablet Take 1-2 tablets (2-4 mg total) by mouth every 6 (six) hours as needed for pain. 20 tablet 0     Current Facility-Administered Medications on File Prior to Visit   Medication Dose Route Frequency Provider Last Rate Last Dose     [START ON 6/25/2020] denosumab 60 mg (PROLIA 60 mg/ml)  60 mg Subcutaneous Q6 Months Bernadette Taylor MD         [DISCONTINUED] denosumab 60 mg (PROLIA 60 mg/ml)  60 mg Subcutaneous Q6 Months Bernadette Taylor MD   60 mg at 11/07/19 1451

## 2021-06-08 NOTE — TELEPHONE ENCOUNTER
Refill request received from [facility] Dr. Villalobos via fax for a refill of Trazadone. Order pended.    Last OV 4/23/2020  Next OV with Dr. Taylor 6/16/2020    Vandana Jackson CMA ............... 4:41 PM, 05/21/20

## 2021-06-08 NOTE — PATIENT INSTRUCTIONS - HE
Prolia 4th today.  Prolia 5th in 6 months with my nurse.    DXA in 5/2021 .   Phone number to schedule [phone].    Daily calcium need is 4141-9925 mg a day from the diet and supplements.  Calcium citrate is easier to digest.  Vitamin D 2000 IU daily recommended.

## 2021-06-09 NOTE — PATIENT INSTRUCTIONS - HE
No EKG today.    Recheck potassium and hemoglobin today.    I recommend that you schedule an appointment with urology.    Hold all supplements, aspirin and NSAIDs for 7 days prior to surgery.    Follow your surgeon's direction on when to stop eating and drinking prior to surgery.    Your surgeon will be managing your pain after your surgery.      Remove all jewelry and metal piercings before your surgery.     Remove nail polish from fingers before surgery.

## 2021-06-09 NOTE — PROGRESS NOTES
ASSESSMENT AND PLAN:  Problem List Items Addressed This Visit     Migraine     Discussed options.    1. Could continue imitrex or switch to another triptan med  2. Could take imitrex at higher dose    And could do one of the following to decrease the frequency of headaches.   1. Add propranolol   2 .add nortriptyline.       She opted to increase dose of imitrex to 100 mg tabs and add propranolol 10 mg po bid.         Relevant Medications    SUMAtriptan (IMITREX) 100 MG tablet    propranolol (INDERAL) 10 MG tablet           Chief Complaint   Patient presents with     Headache     Patient states she's had a constant headache for the last 2 weeks. She did an e-visit, and was given a small supply of Imitrex.       HPI  Suma Mark is a 66 y.o. female comes in for migraine headache. Describes sensitivity to light and sound and nausea. Headache is like a wave of an ocean. The intensity of the headache waxes and wanes.    History   Smoking Status     Never Smoker   Smokeless Tobacco     Never Used      Current Outpatient Prescriptions   Medication Sig Dispense Refill     amLODIPine (NORVASC) 10 MG tablet Take 1 tablet (10 mg total) by mouth daily. 90 tablet 3     aspirin 81 MG EC tablet Take by mouth.       calcium carbonate-vitamin D3 (CALCIUM 600 WITH VITAMIN D3) 600 mg(1,500mg) -400 unit cap Take by mouth.       citalopram (CELEXA) 20 MG tablet TAKE ONE AND ONE-HALF TABLETS BY MOUTH EVERY  tablet 1     furosemide (LASIX) 20 MG tablet TAKE 1 TABLET BY MOUTH DAILY 90 tablet 1     HYDROcodone-acetaminophen 5-325 mg per tablet Take 1-2 tablets by mouth every 6 (six) hours as needed for pain. 45 tablet 0     levothyroxine (SYNTHROID, LEVOTHROID) 88 MCG tablet TAKE 1 TABLET BY MOUTH DAILY AT 6AM 90 tablet 3     LORazepam (ATIVAN) 0.5 MG tablet Take 1 tablet (0.5 mg total) by mouth every 8 (eight) hours as needed for anxiety. 30 tablet 0     multivitamin therapeutic (THERAGRAN) tablet Take by mouth.        pramipexole (MIRAPEX) 0.25 MG tablet Take 1 tablet (0.25 mg total) by mouth 2 (two) times a day. 180 tablet 3     ranitidine (ZANTAC) 150 MG tablet Take 150 mg by mouth 2 (two) times a day.        SUMAtriptan (IMITREX) 50 MG tablet May repeat in 2 hours if not resolved 10 tablet 3     valsartan (DIOVAN) 160 MG tablet Take 1 tablet (160 mg total) by mouth daily. 90 tablet 3     No current facility-administered medications for this visit.      Allergies   Allergen Reactions     Ace Inhibitors Other (See Comments)     Body aches, elevated BP     Amitriptyline Other (See Comments)     Elevated BP     Atenolol Other (See Comments)     Aggrevates Raynaud's     Celecoxib Other (See Comments)     Clonidine Nausea And Vomiting     Codeine Nausea And Vomiting     Desogestrel-Ethinyl Estradiol Swelling     Dexamethasone Swelling     Erythromycin Nausea And Vomiting     Escitalopram Other (See Comments)     Headaches.     Hydrochlorothiazide Other (See Comments)     hyponatremia     Propoxyphene N-Acetaminophen Other (See Comments)     Tramadol-Acetaminophen Other (See Comments)     Venlafaxine Nausea And Vomiting     Zolpidem Other (See Comments)     Penicillins Rash and Swelling     Sulfa (Sulfonamide Antibiotics) Rash     Tramadol Itching, Rash and Swelling     Review of Systems   Constitution: Negative for chills, fever, night sweats, weight gain and weight loss.   HENT: Negative for hearing loss, hoarse voice and nosebleeds.    Eyes: Negative for visual disturbance.   Cardiovascular: Negative for chest pain, claudication, dyspnea on exertion, irregular heartbeat, leg swelling, palpitations and paroxysmal nocturnal dyspnea.   Respiratory: Negative for cough, shortness of breath, snoring and wheezing.    Hematologic/Lymphatic: Negative for adenopathy and bleeding problem.   Skin: Negative for itching, poor wound healing and rash.   Musculoskeletal: Negative for arthritis, falls, joint pain, muscle weakness, myalgias and  "stiffness.   Gastrointestinal: Negative for abdominal pain, constipation, diarrhea, heartburn and nausea.   Genitourinary: Negative for bladder incontinence, decreased libido, frequency and nocturia.   Neurological: Negative for excessive daytime sleepiness, dizziness, loss of balance and seizures.   Psychiatric/Behavioral: Negative for depression. The patient is not nervous/anxious.        OBJECTIVE: /80  Pulse 76  Resp 14  Ht 5' 1.5\" (1.562 m)  Wt 170 lb (77.1 kg)  Breastfeeding? No  BMI 31.6 kg/m2  Physical Exam   Constitutional: She is oriented to person, place, and time. She appears well-developed and well-nourished.   HENT:   Head: Normocephalic and atraumatic.   Eyes: Conjunctivae are normal.   Neck: Normal range of motion. Neck supple.   Cardiovascular: Normal rate, regular rhythm and normal heart sounds.    Pulmonary/Chest: Effort normal and breath sounds normal.   Abdominal: Soft.   Neurological: She is alert and oriented to person, place, and time.   Neuro exam is normal including gait, rapidly alternating movements, extraocular movements are intact, pupils are equally round and reactive to light and accommodation, there is no arm drift, she is able to stand on 1 foot at a time without losing balance, finger to nose is normal.  Coordination is normal.  Sensation is symmetrical bilaterally. facial symmetry is normal.  Normal Romberg exam.  Normal heel-to-shin.    Skin: Skin is warm and dry.   Psychiatric: She has a normal mood and affect.      "

## 2021-06-09 NOTE — TELEPHONE ENCOUNTER
Refill Approved    Rx renewed per Medication Renewal Policy. Medication was last renewed on 1/4/20.    Suma Jaramillo, [facility] Triage/Med Refill 6/25/2020     Requested Prescriptions   Pending Prescriptions Disp Refills     pramipexole (MIRAPEX) 0.25 MG tablet [Pharmacy Med Name: PRAMIPEXOLE 0.25 MG TABLET] 270 tablet 1     Sig: TAKE 1 TABLET (0.25 MG TOTAL) BY MOUTH 3 (THREE) TIMES A DAY.       Parkinson's Meds I Refill Protocol Passed - 6/25/2020  8:35 AM        Passed - PCP or prescribing provider visit in past 6 months      Last office visit with prescriber/PCP: 6/16/2020 OR same dept: 6/16/2020 Bernadette Taylor MD OR same specialty: 6/16/2020 Bernadette Taylor MD Last physical: Visit date not found Last MTM visit: Visit date not found     Next appt within 3 mo: Visit date not found  Next physical within 3 mo: Visit date not found  Prescriber OR PCP: Bernadette Taylor MD  Last diagnosis associated with med order: 1. RLS (restless legs syndrome)  - pramipexole (MIRAPEX) 0.25 MG tablet [Pharmacy Med Name: PRAMIPEXOLE 0.25 MG TABLET]; Take 1 tablet (0.25 mg total) by mouth 3 (three) times a day.  Dispense: 270 tablet; Refill: 1    If protocol passes may refill for 6 months if within 3 months of last provider visit (or a total of 9 months).             Pramipexole/Ropinirole Refill Protocol Passed - 6/25/2020  8:35 AM        Passed - PCP or prescribing provider visit in past 6 months      Last office visit with prescriber/PCP: 6/16/2020 OR same dept: 6/16/2020 Bernadette Taylor MD OR same specialty: 6/16/2020 Bernadette Taylor MD Last physical: Visit date not found Last MTM visit: Visit date not found         Next appt within 3 mo: Visit date not found  Next physical within 3 mo: Visit date not found  Prescriber OR PCP: Bernadette Taylor MD  Last diagnosis associated with med order: 1. RLS (restless legs syndrome)  - pramipexole (MIRAPEX) 0.25 MG tablet [Pharmacy Med Name: PRAMIPEXOLE 0.25 MG TABLET]; Take 1  tablet (0.25 mg total) by mouth 3 (three) times a day.  Dispense: 270 tablet; Refill: 1     If protocol passes may refill for 6 months if within 3 months of last provider visit (or a total of 9 months).

## 2021-06-09 NOTE — PROGRESS NOTES
Preoperative Exam    Scheduled Procedure: spinal cord stimulator removal  Surgery Date:  7/14/2020  Surgery Location: [facility]-[phone]  Surgeon:  Dr. Fields    Assessment/Plan:     1. Preoperative Exam  No contraindications for planned procedure.   - Electrocardiogram Perform and Read  - Potassium  - Hemoglobin    2. Essential hypertension  Well controlled on amlodipine, furosemide, avapro, and propranolol. Recheck BMP today.     3. Chronic back pain  She had a spinal cord stimulator implanted in December 2019 for her chronic back pain. Apparently, the battery pack is causing her some discomfort, so she plans on having it removed.     4. Dysuria  She is having some urinary hesitancy but her urine looks clean today.   - Urinalysis-UC if Indicated  - Culture, Urine        Surgical Procedure Risk: Low (reported cardiac risk generally < 1%)  Have you had prior anesthesia?: Yes  Have you or any family members had a previous anesthesia reaction:  No  Do you or any family members have a history of a clotting or bleeding disorder?: No  Cardiac Risk Assessment: no increased risk for major cardiac complications    APPROVAL GIVEN to proceed with proposed procedure, without further diagnostic evaluation    Please Note:  Patient is taking medications for Chronic Pain.  Patient has an implanted device.  Device Type: Bilateral knee replacement       Device Vendor:  NA    Functional Status: Independent  Patient plans to recover at home with family.     Subjective:      Suma Mark is a 69 y.o. female who presents for a preoperative consultation.      Please see above. Patient is otherwise feeling well and has no acute complaints.     All other systems reviewed and are negative, other than those listed in the HPI.    Pertinent History  Do you have difficulty breathing or chest pain after walking up a flight of stairs: No  History of obstructive sleep apnea: No  Steroid use in the last 6 months:  Yes-steroid injection  Frequent Aspirin/NSAID use: No  Prior Blood Transfusion: Yes-2003  Prior Blood Transfusion Reaction: No  If for some reason prior to, during or after the procedure, if it is medically indicated, would you be willing to have a blood transfusion?:  There is no transfusion refusal.    Current Outpatient Medications   Medication Sig Dispense Refill     amLODIPine (NORVASC) 10 MG tablet TAKE 1 TABLET BY MOUTH ONCE DAILY AT BEDTIME 90 tablet 3     atorvastatin (LIPITOR) 20 MG tablet Take 20 mg by mouth at bedtime.              botulinum toxin Type A, Cosm, (BOTOX) 100 unit SolR Every 3 months. [facility]       calcium carbonate-vitamin D3 (CALCIUM 600 + D,3,) 600 mg(1,500mg) -400 unit per tablet Take 1 tablet by mouth daily.              cholecalciferol, vitamin D3, 2,000 unit capsule Take 2,000 Units by mouth daily.       citalopram (CELEXA) 20 MG tablet TAKE 1 & 1/2 TABLETS BY MOUTH ONCE DAILY 135 tablet 3     cyanocobalamin (VITAMIN B-12) 50 mcg tablet Take 50 mcg by mouth daily.       dicyclomine (BENTYL) 10 MG capsule TAKE 1 CAPSULE BY MOUTH FOUR TIMES A DAY  1     eletriptan (RELPAX) 40 MG tablet Take 40 mg by mouth daily as needed for migraine. may repeat in 2 hours if necessary              furosemide (LASIX) 20 MG tablet TAKE 1 TABLET BY MOUTH EVERY DAY 90 tablet 3     HYDROcodone-acetaminophen 5-325 mg per tablet Take 1 tablet by mouth.       hydrOXYzine HCl (ATARAX) 25 MG tablet Take 1 tablet (25 mg total) by mouth 3 (three) times a day as needed for itching. 270 tablet 0     irbesartan (AVAPRO) 150 MG tablet TAKE 1 TABLET(150 MG) BY MOUTH DAILY 90 tablet 3     levothyroxine (SYNTHROID, LEVOTHROID) 88 MCG tablet TAKE 1 TABLET BY MOUTH DAILY AT 6:00 AM. 90 tablet 2     LORazepam (ATIVAN) 1 MG tablet TAKE 1/2 TABLET(0.5 MG) BY MOUTH DAILY AS NEEDED FOR ANXIETY 30 tablet 0     pramipexole (MIRAPEX) 0.25 MG tablet TAKE 1 TABLET (0.25 MG TOTAL) BY MOUTH 3 (THREE) TIMES A DAY. 270 tablet 1  "    propranolol (INDERAL) 10 MG tablet TAKE 1 TABLET(10 MG) BY MOUTH TWICE DAILY 180 tablet 3     topiramate (TOPAMAX) 25 MG tablet PLEASE SEE ATTACHED FOR DETAILED DIRECTIONS       traZODone (DESYREL) 50 MG tablet TAKE 1 TABLET BY MOUTH EVERYDAY AT BEDTIME 90 tablet 3     Current Facility-Administered Medications   Medication Dose Route Frequency Provider Last Rate Last Dose     denosumab 60 mg (PROLIA 60 mg/ml)  60 mg Subcutaneous Q6 Months Bernadette Taylor MD            Allergies   Allergen Reactions     Cephalexin Nausea And Vomiting     Ace Inhibitors Other (See Comments)     Body aches, elevated BP     Amitriptyline Other (See Comments)     Elevated BP     Amlodipine      Atenolol Other (See Comments)     Aggrevates Raynaud's     Celecoxib Other (See Comments)           Clonidine Nausea And Vomiting     Codeine Nausea And Vomiting     Desogestrel-Ethinyl Estradiol Swelling and Unknown     Dexamethasone Swelling and Unknown     Erythromycin Nausea And Vomiting     Escitalopram Other (See Comments)     Headaches.     Gabapentin Unknown     \"Spotted\"     Hydrochlorothiazide Other (See Comments)     hyponatremia     Methylprednisolone Nausea And Vomiting     Agitation      Propoxyphene N-Acetaminophen      Tramadol-Acetaminophen Other (See Comments)     Triamterene      Venlafaxine Nausea And Vomiting     Zolpidem Other (See Comments)     Bacitracin Itching and Rash     Penicillins Rash and Swelling     Sulfa (Sulfonamide Antibiotics) Rash     Tramadol Itching, Rash and Swelling       Patient Active Problem List   Diagnosis     Acquired hypothyroidism     Essential hypertension     Restless leg syndrome     Gastroesophageal reflux disease without esophagitis     Migraine     Hernia, ventral     Osteoporosis     Diverticulosis     S/P total knee replacement     Insomnia, unspecified type     Depression, unspecified depression type     Fibromyalgia     Symptomatic varicose veins of both lower extremities     Venous " insufficiency of both lower extremities       Past Medical History:   Diagnosis Date     Acquired hypothyroidism 3/12/2009     Anxiety      Back pain      Breast cyst      Degenerative disc disease, lumbar      Depression      Depression, unspecified depression type      Disease of thyroid gland     hypothyroid     Diverticulosis 10/4/2017    Incidental finding on colonoscopy 10/2017     Essential hypertension 4/21/2015     Fibromyalgia      Gastroesophageal reflux disease without esophagitis 4/21/2015     GERD (gastroesophageal reflux disease)      Hernia, ventral 4/27/2017     History of anesthesia complications     hypotension after surgery     History of blood clots      History of transfusion      Hypertension      Insomnia, unspecified type      Left lower quadrant pain      Migraine      Osteoarthritis      Osteoporosis      PONV (postoperative nausea and vomiting)      Raynaud's disease      Restless leg syndrome      S/P total knee replacement 11/27/2017     Ventral hernia      Vitamin D deficiency        Past Surgical History:   Procedure Laterality Date     BACK SURGERY       BREAST CYST EXCISION       CARPAL TUNNEL RELEASE       COLECTOMY N/A 6/2/2017    Procedure: RESECTION, SMALL INTESTINE, OPEN ADHESIOLYSIS;  Surgeon: Keyon Qureshi MD;  Location: [facility];  Service:      COLON SURGERY       HEMORRHOID SURGERY       HYSTERECTOMY  [phone]     INCISIONAL HERNIA REPAIR N/A 6/2/2017    Procedure: LAPARASCOPIC CONVERTED TO OPEN PRIMARY INCISIONAL HERNIA REPAIR;  Surgeon: Keyon Qureshi MD;  Location: [facility];  Service:      INCONTINENCE SURGERY       LUMBAR FUSION       N[place] LAP,DIAGNOSTIC ABDOMEN N/A 8/22/2019    Procedure: DIAGNOSTIC LAPAROSCOPY, LYSIS OF ADHESION;  Surgeon: Svetlana Ron MD;  Location: [facility];  Service: General     NJ TOTAL KNEE ARTHROPLASTY Right 11/27/2017    Procedure:  RIGHT TOTAL KNEE ARTHROPLASTY;  Surgeon: Kevin Ryder MD;  Location:  [facility];  Service: Orthopedics     US THYROID BIOPSY  4/19/2019     VARICOSE VEIN SURGERY       VEIN LIGATION AND STRIPPING Bilateral        Social History     Socioeconomic History     Marital status:      Spouse name: Not on file     Number of children: Not on file     Years of education: Not on file     Highest education level: Not on file   Occupational History     Not on file   Social Needs     Financial resource strain: Not on file     Food insecurity     Worry: Not on file     Inability: Not on file     Transportation needs     Medical: Not on file     Non-medical: Not on file   Tobacco Use     Smoking status: Never Smoker     Smokeless tobacco: Never Used   Substance and Sexual Activity     Alcohol use: Yes     Comment: 1 cocktail daily     Drug use: No     Sexual activity: Not on file   Lifestyle     Physical activity     Days per week: Not on file     Minutes per session: Not on file     Stress: Not on file   Relationships     Social connections     Talks on phone: Not on file     Gets together: Not on file     Attends Hindu service: Not on file     Active member of club or organization: Not on file     Attends meetings of clubs or organizations: Not on file     Relationship status: Not on file     Intimate partner violence     Fear of current or ex partner: Not on file     Emotionally abused: Not on file     Physically abused: Not on file     Forced sexual activity: Not on file   Other Topics Concern     Not on file   Social History Narrative     Not on file       Patient Care Team:  Bernadette Taylor MD as PCP - General (Internal Medicine)  Izaiah Astorga MD as Physician (Orthopedic Surgery)  Jc Mcginnis MD as Physician (Gastroenterology)  Akilah Lezama MD as Consulting Physician (Dermatology)  Natalie Martel DO as Consulting Physician (Psychiatry)  Svetlana Ron MD as Physician (Colon and Rectal Surgery)  Bernadette Taylor MD as Assigned PCP          Objective:      Vitals:    07/01/20 1110   BP: 122/70   Pulse: 66   SpO2: 98%   Weight: 173 lb 9.6 oz (78.7 kg)         Physical Exam:  General: No apparent distress. Calm. Alert and Oriented X3. Pt behavior is appropriate.  Head:Atraumatic. Normocephalic, non-tender to palpation  Neck: Supple. No JVD. Full ROM.   Eyes: PERRL, No discharge. No strabismus. No nystagmus.  Ears: TMs pearly gray with landmarks visible.   Nose/Mouth/Throat: Patent nares, no oral lesions, pharynx clear and without exudate. Uvula mid-line. Nasal septum mid-line. Clear turbinates.   Lymph: No axillar or cervical adenopathy.   Chest/Lungs: Normal chest wall, clear to auscultation, normal respiratory effort and rate.   Heart/Pulses: Regular rate and rhythm, strong and equal radial pulses, no murmurs. Capillary refill <2 seconds. No edema.   Abdomen: Soft, no palpable masses. No hepatosplenomegaly, no tenderness with palpation noted. Bowel sounds active in all quadrants. No increased tympany.   Genitalia: Not examined.   Musculoskeletal: No CVA tenderness with palpation. Good ROM with extremities.   Neurologic: Interactive, alert, no focal findings, CNs intact.   Skin: Warm, dry. Normal hair pattern. Free of lesions. Normal skin turgor.         Patient Instructions   No EKG today.    Recheck potassium and hemoglobin today.    I recommend that you schedule an appointment with urology.    Hold all supplements, aspirin and NSAIDs for 7 days prior to surgery.    Follow your surgeon's direction on when to stop eating and drinking prior to surgery.    Your surgeon will be managing your pain after your surgery.      Remove all jewelry and metal piercings before your surgery.     Remove nail polish from fingers before surgery.      EKG:  Not done    Labs:  Labs pending at this time.  Results will be reviewed when available.    Immunization History   Administered Date(s) Administered     Hep A, historic 06/13/2006, 12/14/2006     Hep B, Adult 06/13/2006     Hep B,  historic 06/13/2006     Influenza [id number], 12/29/2009     Influenza high dose,seasonal,PF, 65+ yrs 10/06/2015, 09/26/2016, 10/26/2017     Influenza, Seasonal, Inj PF IIV3 09/03/2010, 10/09/2012     Influenza, inj, historic,unspecified 11/09/2006, 10/24/2007, 09/23/2009, 09/21/2011, 10/09/2012, 10/01/2014, 10/06/2015     Influenza,seasonal, Inj IIV3 11/09/2006, 10/24/2007, 11/03/2008, 09/21/2011     MMR 06/13/2006     Pneumo Conj 13-V (2010&after) 10/12/2015     Pneumo Polysac 23-V 10/26/2007, 10/26/2012     Td,adult,historic,unspecified 06/13/2006, 07/21/2018     Tdap 06/13/2006, 11/14/2016, 07/22/2018     Typhoid Live, Oral 06/13/2006     Typhoid, historic, Unspecified 06/13/2006     ZOSTER, LIVE 06/24/2014     ZOSTER, RECOMBINANT, IM 10/22/2018           Electronically signed by Bret Caro, CNP 07/01/20 11:08 AM

## 2021-06-10 ENCOUNTER — OFFICE VISIT - HEALTHEAST (OUTPATIENT)
Dept: INTERNAL MEDICINE | Facility: CLINIC | Age: 71
End: 2021-06-10

## 2021-06-10 DIAGNOSIS — I10 ESSENTIAL HYPERTENSION: ICD-10-CM

## 2021-06-10 DIAGNOSIS — L30.9 CHRONIC ECZEMA OF FOOT: ICD-10-CM

## 2021-06-10 DIAGNOSIS — F32.A DEPRESSION, UNSPECIFIED DEPRESSION TYPE: ICD-10-CM

## 2021-06-10 DIAGNOSIS — M81.0 OSTEOPOROSIS WITHOUT CURRENT PATHOLOGICAL FRACTURE, UNSPECIFIED OSTEOPOROSIS TYPE: ICD-10-CM

## 2021-06-10 DIAGNOSIS — E78.5 DYSLIPIDEMIA: ICD-10-CM

## 2021-06-10 DIAGNOSIS — G47.00 INSOMNIA, UNSPECIFIED TYPE: ICD-10-CM

## 2021-06-10 DIAGNOSIS — F41.9 ANXIETY: ICD-10-CM

## 2021-06-10 DIAGNOSIS — E03.9 ACQUIRED HYPOTHYROIDISM: ICD-10-CM

## 2021-06-10 DIAGNOSIS — M79.7 FIBROMYALGIA: ICD-10-CM

## 2021-06-10 DIAGNOSIS — R60.9 EDEMA, UNSPECIFIED TYPE: ICD-10-CM

## 2021-06-10 DIAGNOSIS — I87.2 VENOUS INSUFFICIENCY OF BOTH LOWER EXTREMITIES: ICD-10-CM

## 2021-06-10 DIAGNOSIS — Z00.00 ROUTINE GENERAL MEDICAL EXAMINATION AT A HEALTH CARE FACILITY: ICD-10-CM

## 2021-06-10 LAB
ALBUMIN SERPL-MCNC: 4.1 G/DL (ref 3.5–5)
ALBUMIN UR-MCNC: NEGATIVE G/DL
ALP SERPL-CCNC: 80 U/L (ref 45–120)
ALT SERPL W P-5'-P-CCNC: 20 U/L (ref 0–45)
ANION GAP SERPL CALCULATED.3IONS-SCNC: 12 MMOL/L (ref 5–18)
APPEARANCE UR: CLEAR
AST SERPL W P-5'-P-CCNC: 19 U/L (ref 0–40)
BACTERIA #/AREA URNS HPF: ABNORMAL /[HPF]
BILIRUB SERPL-MCNC: 0.6 MG/DL (ref 0–1)
BILIRUB UR QL STRIP: NEGATIVE
BUN SERPL-MCNC: 16 MG/DL (ref 8–28)
CALCIUM SERPL-MCNC: 9.2 MG/DL (ref 8.5–10.5)
CALCIUM SERPL-MCNC: 9.4 MG/DL (ref 8.5–10.5)
CHLORIDE BLD-SCNC: 102 MMOL/L (ref 98–107)
CHOLEST SERPL-MCNC: 149 MG/DL
CO2 SERPL-SCNC: 25 MMOL/L (ref 22–31)
COLOR UR AUTO: YELLOW
CREAT SERPL-MCNC: 0.75 MG/DL (ref 0.6–1.1)
CREAT SERPL-MCNC: 0.75 MG/DL (ref 0.6–1.1)
ERYTHROCYTE [DISTWIDTH] IN BLOOD BY AUTOMATED COUNT: 13.4 % (ref 11–14.5)
FASTING STATUS PATIENT QL REPORTED: NORMAL
GFR SERPL CREATININE-BSD FRML MDRD: >60 ML/MIN/1.73M2
GFR SERPL CREATININE-BSD FRML MDRD: >60 ML/MIN/1.73M2
GLUCOSE BLD-MCNC: 87 MG/DL (ref 70–125)
GLUCOSE UR STRIP-MCNC: NEGATIVE MG/DL
HCT VFR BLD AUTO: 36.7 % (ref 35–47)
HDLC SERPL-MCNC: 56 MG/DL
HGB BLD-MCNC: 12.1 G/DL (ref 12–16)
HGB UR QL STRIP: NEGATIVE
KETONES UR STRIP-MCNC: NEGATIVE MG/DL
LDLC SERPL CALC-MCNC: 70 MG/DL
LEUKOCYTE ESTERASE UR QL STRIP: ABNORMAL
MCH RBC QN AUTO: 30.1 PG (ref 27–34)
MCHC RBC AUTO-ENTMCNC: 33 G/DL (ref 32–36)
MCV RBC AUTO: 91 FL (ref 80–100)
NITRATE UR QL: NEGATIVE
PH UR STRIP: 5.5 [PH] (ref 5–8)
PHOSPHATE SERPL-MCNC: 3.4 MG/DL (ref 2.5–4.5)
PLATELET # BLD AUTO: 290 THOU/UL (ref 140–440)
PMV BLD AUTO: 8.6 FL (ref 7–10)
POTASSIUM BLD-SCNC: 4 MMOL/L (ref 3.5–5)
PROT SERPL-MCNC: 6.8 G/DL (ref 6–8)
PTH-INTACT SERPL-MCNC: 52 PG/ML (ref 10–86)
RBC # BLD AUTO: 4.02 MILL/UL (ref 3.8–5.4)
RBC #/AREA URNS AUTO: ABNORMAL HPF
SODIUM SERPL-SCNC: 139 MMOL/L (ref 136–145)
SP GR UR STRIP: 1.02 (ref 1–1.03)
SQUAMOUS #/AREA URNS AUTO: ABNORMAL LPF
TRIGL SERPL-MCNC: 113 MG/DL
TSH SERPL DL<=0.005 MIU/L-ACNC: 1.67 UIU/ML (ref 0.3–5)
UROBILINOGEN UR STRIP-ACNC: ABNORMAL
WBC #/AREA URNS AUTO: ABNORMAL HPF
WBC CLUMPS #/AREA URNS HPF: PRESENT /[HPF]
WBC: 6.7 THOU/UL (ref 4–11)

## 2021-06-10 ASSESSMENT — MIFFLIN-ST. JEOR: SCORE: 1278.37

## 2021-06-10 NOTE — PROGRESS NOTES
ASSESSMENT AND PLAN:  Problem List Items Addressed This Visit     Hypertension     BP Readings from Last 3 Encounters:   05/18/17 122/64   04/28/17 132/68   04/27/17 142/74     continue   Diovan 160 mg orally per day  Lasix 20 mg orally per day  Norvasc 10 mg orally per day  Propranolol 10 mg orally twice daily         Migraine     Mri of brain normal 2015 and 2016  Because her headaches are persistent and not improving despite propranolol and Imitrex I recommended that we move towards a neurology consult.  I would like to place the consult and get a neurology visit on the schedule.  In the meantime I can offer her nortriptyline to take at night, although she has had some elevated blood pressure with amitriptyline in the past so we have to watch her blood pressures closely.  I also offered her a trial of another triptan.  She would like to try another triptan so I am going to discontinue Imitrex and start Maxalt.    Summary  Neurology consult,  D/c imitrex  Trial maaxalt  Continue propranolol (especially since it helps her bp)  Start nortriptyline and monitor bp because in past amitriptyline caused elevated bp.              Other Visit Diagnoses     Administrative encounter    -  Primary    Relevant Orders    Mumps Immune Status Antibody, IgG    Rubella Immune Status (IgG)    Rubeola Immune Status, IgG           Chief Complaint   Patient presents with     Follow-up     Headache medication. She feels like it's working, but only to some extent.      HPI  Suma Mark is a 66 y.o. female who comes in today to follow-up on her headaches and her blood pressure.  The last time she was here, 4/27/17, she was started on propranolol twice daily to see if we could help decrease the frequency of her headaches she feels nauseous and vomits about once a week or so.  Unfortunately, her headache frequency did not decrease.  However, her blood pressure improved.       She wonders what else can be done for her headaches that are  really bothersome to her.  In the past she feels like Imitrex worked well to treat headaches that occurred, but now even the Imitrex does not seem to be working anymore.    When asked if her headaches are worsening she says they are really not getting worse or more frequent since I have seen her (3/29/2017), but they just are not tolerable.    She wanted to volunteer at the [facility] and for that she needed her immune status to measles mumps and rubella.    History   Smoking Status     Never Smoker   Smokeless Tobacco     Never Used      Current Outpatient Prescriptions   Medication Sig Dispense Refill     amLODIPine (NORVASC) 10 MG tablet Take 1 tablet (10 mg total) by mouth daily. 90 tablet 3     aspirin 81 MG EC tablet Take by mouth.       CALCIUM CARBONATE/VITAMIN D3 (CALCIUM+D ORAL) Take by mouth.       citalopram (CELEXA) 20 MG tablet TAKE ONE AND ONE-HALF TABLETS BY MOUTH EVERY  tablet 1     furosemide (LASIX) 20 MG tablet TAKE 1 TABLET BY MOUTH DAILY 90 tablet 1     HYDROcodone-acetaminophen 5-325 mg per tablet Take 1-2 tablets by mouth every 6 (six) hours as needed for pain. 45 tablet 0     levothyroxine (SYNTHROID, LEVOTHROID) 88 MCG tablet TAKE 1 TABLET BY MOUTH DAILY AT 6AM 90 tablet 3     LORazepam (ATIVAN) 0.5 MG tablet TAKE 1 TABLET(0.5 MG) BY MOUTH EVERY 8 HOURS AS NEEDED FOR ANXIETY 30 tablet 0     multivitamin therapeutic (THERAGRAN) tablet Take by mouth.       pramipexole (MIRAPEX) 0.25 MG tablet Take 1 tablet (0.25 mg total) by mouth 2 (two) times a day. 180 tablet 3     propranolol (INDERAL) 10 MG tablet TAKE 1 TABLET(10 MG) BY MOUTH TWICE DAILY 180 tablet 1     ranitidine (ZANTAC) 150 MG tablet Take 150 mg by mouth 2 (two) times a day.        SUMAtriptan (IMITREX) 100 MG tablet TAKE 1 TABLET(100 MG) BY MOUTH EVERY 2 HOURS AS NEEDED FOR MIGRAINE.  MG PER DAY 27 tablet 2     valsartan (DIOVAN) 160 MG tablet Take 1 tablet (160 mg total) by mouth daily. 90 tablet 3     No  "current facility-administered medications for this visit.      Allergies   Allergen Reactions     Ace Inhibitors Other (See Comments)     Body aches, elevated BP     Amitriptyline Other (See Comments)     Elevated BP     Atenolol Other (See Comments)     Aggrevates Raynaud's     Celecoxib Other (See Comments)     Clonidine Nausea And Vomiting     Codeine Nausea And Vomiting     Desogestrel-Ethinyl Estradiol Swelling     Dexamethasone Swelling     Erythromycin Nausea And Vomiting     Escitalopram Other (See Comments)     Headaches.     Hydrochlorothiazide Other (See Comments)     hyponatremia     Propoxyphene N-Acetaminophen Other (See Comments)     Tramadol-Acetaminophen Other (See Comments)     Venlafaxine Nausea And Vomiting     Zolpidem Other (See Comments)     Penicillins Rash and Swelling     Sulfa (Sulfonamide Antibiotics) Rash     Tramadol Itching, Rash and Swelling     Review of Systems   Constitutional: Negative.    HENT: Negative.    Eyes: Negative.    Respiratory: Negative.    Cardiovascular: Negative.    Gastrointestinal: Negative.    Endocrine: Negative.    Genitourinary: Negative.    Musculoskeletal: Negative.    Skin: Negative.    Neurological: Negative.    Hematological: Negative.    Psychiatric/Behavioral: Negative.      OBJECTIVE: /64  Pulse 72  Resp 14  Ht 5' 1.5\" (1.562 m)  Wt 169 lb (76.7 kg)  LMP  (LMP Unknown)  BMI 31.42 kg/m2  Physical Exam   Constitutional: She is oriented to person, place, and time. She appears well-developed and well-nourished.   HENT:   Head: Normocephalic and atraumatic.   Eyes: Conjunctivae are normal.   Cardiovascular: Normal rate and regular rhythm.    Pulmonary/Chest: Effort normal.   Neurological: She is alert and oriented to person, place, and time.   Skin: Skin is warm and dry.   Psychiatric: She has a normal mood and affect.     MRI of the brain is normal 2105 & 2016  "

## 2021-06-10 NOTE — TELEPHONE ENCOUNTER
FYI - Status Update  Who is Calling: Bre with [facility]  Update: Bre states patient's allergies were not listed on the Pre-Op. Requesting a list of patient's allergies be faxed to: [phone]. Please advise.  Okay to leave a detailed message?:  No return call needed

## 2021-06-10 NOTE — PROGRESS NOTES
Assessment and Plan:       1. Routine general medical examination at a health care facility      2. Preop general physical exam  Because of the significant eye lid drop and vision disturbance, patient is scheduled for blepharoplasty.  EKG done today, per my review showed NSR, no acute ST/T changes.  Labs were done on 7/31/20:  Component      Latest Ref Rng & Units 7/31/2020   Sodium      136 - 145 mmol/L 137   Potassium      3.5 - 5.0 mmol/L 4.4   Chloride      98 - 107 mmol/L 106   CO2      22 - 31 mmol/L 22   Anion Gap, Calculation      5 - 18 mmol/L 9   Glucose      70 - 125 mg/dL 96   BUN      8 - 28 mg/dL 18   Creatinine      0.60 - 1.10 mg/dL 0.89   GFR MDRD Af Amer      >60 mL/min/1.73m2 >60   GFR MDRD Non Af Amer      >60 mL/min/1.73m2 >60   Bilirubin, Total      0.0 - 1.0 mg/dL 0.3   Calcium      8.5 - 10.5 mg/dL 9.3   Protein, Total      6.0 - 8.0 g/dL 6.8   ALBUMIN      3.5 - 5.0 g/dL 3.9   Alkaline Phosphatase      45 - 120 U/L 89   AST      0 - 40 U/L 17   ALT      0 - 45 U/L 19   WBC      4.0 - 11.0 thou/uL 5.5   RBC      3.80 - 5.40 mill/uL 4.50   Hemoglobin      12.0 - 16.0 g/dL 12.9   Hematocrit      35.0 - 47.0 % 39.0   MCV      80 - 100 fL 87   MCH      27.0 - 34.0 pg 28.6   MCHC      32.0 - 36.0 g/dL 33.0   RDW      11.0 - 14.5 % 13.5   Platelets      140 - 440 thou/uL 346   MPV      7.0 - 10.0 fL 7.4     Surgical Procedure Risk: Low (reported cardiac risk generally < 1%)  Have you had prior anesthesia?: Yes  Have you or any family members had a previous anesthesia reaction:  No  Do you or any family members have a history of a clotting or bleeding disorder?: No  Cardiac Risk Assessment: no increased risk for major cardiac complications     APPROVAL GIVEN to proceed with proposed procedure, without further diagnostic evaluation    3. Visual disturbance  See above.    4. Essential hypertension  Stable. She will not take furosemide on the morning of the procedure.  - Electrocardiogram Perform and  Read    5. Restless leg syndrome  Stable on Mirapex.    6. Other migraine with status migrainosus, intractable  On Topamax for prophylaxis, using eletriptan as needed.    7. Depression, unspecified depression type  On citalopram.    8. Acquired hypothyroidism  On levothyroxine.   Component      Latest Ref Rng & Units 7/31/2020   TSH      0.30 - 5.00 uIU/mL 3.38     9. Osteoporosis without current pathological fracture, unspecified osteoporosis type  Prolia was given in June.    10. Fibromyalgia  Stable.    11. Dyslipidemia  She did not take Lipitor for many months since she was worried about side effects. We discussed everything.  I refilled it today, since her bad cholesterol was elevated.  Component      Latest Ref Rng & Units 7/31/2020   Triglycerides      <=149 mg/dL 111   Cholesterol      <=199 mg/dL 234 (H)   LDL Calculated      <=129 mg/dL 159 (H)   HDL Cholesterol      >=50 mg/dL 53   Patient Fasting > 8hrs?       Yes     - atorvastatin (LIPITOR) 20 MG tablet; Take 1 tablet (20 mg total) by mouth at bedtime.  Dispense: 90 tablet; Refill: 3     The patient's current medical problems were reviewed.    I have had an Advance Directives discussion with the patient.  The following health maintenance schedule was reviewed with the patient and provided in printed form in the after visit summary:   Health Maintenance   Topic Date Due     HYPERTENSION FOLLOW-UP  04/26/2018     ZOSTER VACCINES (3 of 3) 12/17/2018     MEDICARE ANNUAL WELLNESS VISIT  10/18/2019     INFLUENZA VACCINE RULE BASED (1) 08/01/2020     MAMMOGRAM  11/05/2020     FALL RISK ASSESSMENT  09/09/2020     DXA SCAN  05/08/2021     ADVANCE CARE PLANNING  10/18/2023     LIPID  07/31/2025     COLORECTAL CANCER SCREENING  09/27/2027     TD 18+ HE  07/22/2028     HEPATITIS C SCREENING  Completed     DEPRESSION ACTION PLAN  Addressed     HEPATITIS B VACCINES  Aged Out     PNEUMOCOCCAL IMMUNIZATION 65+ LOW/MEDIUM RISK  Discontinued        Subjective:   Chief  Complaint: Suma Mark is an 69 y.o. female here for an Annual Wellness visit.   HPI:  ACute and chronic medical problems discussed as above.    Review of Systems:    Please see above.  The rest of the review of systems are negative for all systems.    Patient Care Team:  Bernadette Taylor MD as PCP - General (Internal Medicine)  Izaiah Astorga MD as Physician (Orthopedic Surgery)  Jc Mcginnis MD as Physician (Gastroenterology)  Akilah Lezama MD as Consulting Physician (Dermatology)  Natalie Martel DO as Consulting Physician (Psychiatry)  Svetlana Ron MD as Physician (Colon and Rectal Surgery)  Bernadette Taylor MD as Assigned PCP     Patient Active Problem List   Diagnosis     Acquired hypothyroidism     Essential hypertension     Restless leg syndrome     Gastroesophageal reflux disease without esophagitis     Migraine     Hernia, ventral     Osteoporosis     Diverticulosis     S/P total knee replacement     Insomnia, unspecified type     Depression, unspecified depression type     Fibromyalgia     Symptomatic varicose veins of both lower extremities     Venous insufficiency of both lower extremities     Past Medical History:   Diagnosis Date     Acquired hypothyroidism 3/12/2009     Anxiety      Back pain      Breast cyst      Degenerative disc disease, lumbar      Depression      Depression, unspecified depression type      Disease of thyroid gland     hypothyroid     Diverticulosis 10/4/2017    Incidental finding on colonoscopy 10/2017     Essential hypertension 4/21/2015     Fibromyalgia      Gastroesophageal reflux disease without esophagitis 4/21/2015     GERD (gastroesophageal reflux disease)      Hernia, ventral 4/27/2017     History of anesthesia complications     hypotension after surgery     History of blood clots      History of transfusion      Hypertension      Insomnia, unspecified type      Left lower quadrant pain      Migraine      Osteoarthritis      Osteoporosis       PONV (postoperative nausea and vomiting)      Raynaud's disease      Restless leg syndrome      S/P total knee replacement 11/27/2017     Ventral hernia      Vitamin D deficiency       Past Surgical History:   Procedure Laterality Date     BACK SURGERY       BREAST CYST EXCISION       CARPAL TUNNEL RELEASE       COLECTOMY N/A 6/2/2017    Procedure: RESECTION, SMALL INTESTINE, OPEN ADHESIOLYSIS;  Surgeon: Keyon Qureshi MD;  Location: [facility];  Service:      COLON SURGERY       HEMORRHOID SURGERY       HYSTERECTOMY  [id number]     INCISIONAL HERNIA REPAIR N/A 6/2/2017    Procedure: LAPARASCOPIC CONVERTED TO OPEN PRIMARY INCISIONAL HERNIA REPAIR;  Surgeon: Keyon Qureshi MD;  Location: [facility];  Service:      INCONTINENCE SURGERY       LUMBAR FUSION       TX LAP,DIAGNOSTIC ABDOMEN N/A 8/22/2019    Procedure: DIAGNOSTIC LAPAROSCOPY, LYSIS OF ADHESION;  Surgeon: Svetlana Ron MD;  Location: [facility];  Service: General     TX TOTAL KNEE ARTHROPLASTY Right 11/27/2017    Procedure:  RIGHT TOTAL KNEE ARTHROPLASTY;  Surgeon: Kevin Ryder MD;  Location: [facility];  Service: Orthopedics     US THYROID BIOPSY  4/19/2019     VARICOSE VEIN SURGERY       VEIN LIGATION AND STRIPPING Bilateral       Family History   Problem Relation Age of Onset     Dementia Mother      Arthritis Mother      COPD Father      Cardiovascular Father      Hypertension Father      No Medical Problems Sister      No Medical Problems Daughter      No Medical Problems Son      Stroke Maternal Grandmother      Heart disease Paternal Grandmother      Stroke Paternal Grandmother      No Medical Problems Sister      No Medical Problems Sister      No Medical Problems Son      No Medical Problems Daughter      Breast cancer Paternal Aunt         age in 50's      Social History     Socioeconomic History     Marital status:      Spouse name: Not on file     Number of children: Not on file     Years of  education: Not on file     Highest education level: Not on file   Occupational History     Not on file   Social Needs     Financial resource strain: Not on file     Food insecurity     Worry: Not on file     Inability: Not on file     Transportation needs     Medical: Not on file     Non-medical: Not on file   Tobacco Use     Smoking status: Never Smoker     Smokeless tobacco: Never Used   Substance and Sexual Activity     Alcohol use: Yes     Comment: 1 cocktail daily     Drug use: No     Sexual activity: Not on file   Lifestyle     Physical activity     Days per week: Not on file     Minutes per session: Not on file     Stress: Not on file   Relationships     Social connections     Talks on phone: Not on file     Gets together: Not on file     Attends Sabianism service: Not on file     Active member of club or organization: Not on file     Attends meetings of clubs or organizations: Not on file     Relationship status: Not on file     Intimate partner violence     Fear of current or ex partner: Not on file     Emotionally abused: Not on file     Physically abused: Not on file     Forced sexual activity: Not on file   Other Topics Concern     Not on file   Social History Narrative     Not on file      Current Outpatient Medications   Medication Sig Dispense Refill     amLODIPine (NORVASC) 10 MG tablet TAKE 1 TABLET BY MOUTH ONCE DAILY AT BEDTIME 90 tablet 3     atorvastatin (LIPITOR) 20 MG tablet Take 20 mg by mouth at bedtime.              botulinum toxin Type A, Cosm, (BOTOX) 100 unit SolR Every 3 months. [facility]       calcium carbonate-vitamin D3 (CALCIUM 600 + D,3,) 600 mg(1,500mg) -400 unit per tablet Take 1 tablet by mouth daily.              cholecalciferol, vitamin D3, 2,000 unit capsule Take 2,000 Units by mouth daily.       citalopram (CELEXA) 20 MG tablet TAKE 1 & 1/2 TABLETS BY MOUTH ONCE DAILY 135 tablet 3     cyanocobalamin (VITAMIN B-12) 50 mcg tablet Take 50 mcg by mouth daily.        "dicyclomine (BENTYL) 10 MG capsule TAKE 1 CAPSULE BY MOUTH FOUR TIMES A DAY  1     eletriptan (RELPAX) 40 MG tablet Take 40 mg by mouth daily as needed for migraine. may repeat in 2 hours if necessary              furosemide (LASIX) 20 MG tablet TAKE 1 TABLET BY MOUTH EVERY DAY 90 tablet 3     HYDROcodone-acetaminophen 5-325 mg per tablet Take 1 tablet by mouth.       hydrOXYzine HCl (ATARAX) 25 MG tablet Take 1 tablet (25 mg total) by mouth 3 (three) times a day as needed for itching. 270 tablet 0     irbesartan (AVAPRO) 150 MG tablet TAKE 1 TABLET(150 MG) BY MOUTH DAILY 90 tablet 3     levothyroxine (SYNTHROID, LEVOTHROID) 88 MCG tablet TAKE 1 TABLET BY MOUTH DAILY AT 6:00 AM. 90 tablet 2     LORazepam (ATIVAN) 1 MG tablet TAKE 1/2 TABLET(0.5 MG) BY MOUTH DAILY AS NEEDED FOR ANXIETY 30 tablet 0     pramipexole (MIRAPEX) 0.25 MG tablet TAKE 1 TABLET (0.25 MG TOTAL) BY MOUTH 3 (THREE) TIMES A DAY. 270 tablet 1     propranolol (INDERAL) 10 MG tablet TAKE 1 TABLET(10 MG) BY MOUTH TWICE DAILY 180 tablet 3     topiramate (TOPAMAX) 25 MG tablet PLEASE SEE ATTACHED FOR DETAILED DIRECTIONS       traZODone (DESYREL) 50 MG tablet TAKE 1 TABLET BY MOUTH EVERYDAY AT BEDTIME 90 tablet 3     Current Facility-Administered Medications   Medication Dose Route Frequency Provider Last Rate Last Dose     denosumab 60 mg (PROLIA 60 mg/ml)  60 mg Subcutaneous Q6 Months Bernadette Taylor MD          Objective:   Vital Signs:   Visit Vitals  /61   Pulse 76   Ht 5' 2.25\" (1.581 m)   Wt 170 lb 11.2 oz (77.4 kg)   LMP  (LMP Unknown)   SpO2 98%   BMI 30.97 kg/m           VisionScreening:  No exam data present     PHYSICAL EXAM  General Appearance: Alert, cooperative, no distress, appears stated age.  Head: Normocephalic, without obvious abnormality, atraumatic  Eyes: PERRL, conjunctiva/corneas clear, EOM's intact  Ears: Normal TM's and external ear canals, both ears  Nose: Nares normal, septum midline,mucosa normal, no drainage  Throat: " Lips, mucosa, and tongue normal; teeth and gums normal  Neck: Supple, symmetrical, trachea midline, no adenopathy;  thyroid: not enlarged, symmetric, no tenderness/mass/nodules; no carotid bruit or JVD  Back: Symmetric, no curvature, ROM normal, no CVA tenderness.  Lungs: Clear to auscultation bilaterally, respirations unlabored.  Breasts: No breast masses, tenderness, asymmetry, or nipple discharge.  Heart: Regular rate and rhythm, S1 and S2 normal, no murmur, rub, or gallop.  Abdomen: Soft, non-tender, bowel sounds active all four quadrants,  no masses, no organomegaly.  Pelvic:declined  Extremities: Extremities normal, atraumatic, no cyanosis or edema.  Skin: Skin color, texture, turgor normal, no rashes or lesions.  Lymph nodes: Cervical, supraclavicular, and axillary nodes normal.  Neurologic: No focal neurological findings.      Assessment Results 8/5/2020   Activities of Daily Living No help needed   Instrumental Activities of Daily Living No help needed   Mini Cog Total Score 5   Some recent data might be hidden     A Mini-Cog score of 0-2 suggests the possibility of dementia, score of 3-5 suggests no dementia      Identified Health Risks:     The patient was counseled and encouraged to consider modifying their diet and eating habits. She was provided with information on recommended healthy diet options.  Patient's advanced directive was discussed and I am comfortable with the patient's wishes.

## 2021-06-10 NOTE — PROGRESS NOTES
HPI:  Suma Mark is a 66 y.o. female who was referred to me by Carrol Romano MD for evaluation of an abdominal wall hernia.  She presents with complaints of a bulge just above her pubic bone that has been present for about a week.  Prior to this she had some mild discomfort in the area.  Her surgical history is notable for having undergone an abdominal rectopexy and colpopexy in 2015, wherein they used a lower midline incision with partial detachment of the rectus musculature off the pubic bone.    She denies any nausea, vomiting, fevers, chills, bloody bowel movements or any other complaints at this time.  She specifically denies any obstipation, bloating, or development of a hard mass at this site that fails to reduce.       Allergies:Ace inhibitors; Amitriptyline; Atenolol; Celecoxib; Clonidine; Codeine; Desogestrel-ethinyl estradiol; Dexamethasone; Erythromycin; Escitalopram; Hydrochlorothiazide; Propoxyphene n-acetaminophen; Tramadol-acetaminophen; Venlafaxine; Zolpidem; Penicillins; Sulfa (sulfonamide antibiotics); and Tramadol    Past Medical History:   Diagnosis Date     Degenerative disc disease, lumbar      Hypertension      Osteoarthritis        Past Surgical History:   Procedure Laterality Date     CARPAL TUNNEL RELEASE       COLON SURGERY       HEMORRHOID SURGERY       HYSTERECTOMY       INCONTINENCE SURGERY       LUMBAR FUSION       VARICOSE VEIN SURGERY         CURRENT MEDS:  Current Outpatient Prescriptions   Medication Sig Dispense Refill     amLODIPine (NORVASC) 10 MG tablet Take 1 tablet (10 mg total) by mouth daily. 90 tablet 3     aspirin 81 MG EC tablet Take by mouth.       citalopram (CELEXA) 20 MG tablet TAKE ONE AND ONE-HALF TABLETS BY MOUTH EVERY  tablet 1     furosemide (LASIX) 20 MG tablet TAKE 1 TABLET BY MOUTH DAILY 90 tablet 1     HYDROcodone-acetaminophen 5-325 mg per tablet Take 1-2 tablets by mouth every 6 (six) hours as needed for pain. 45 tablet 0      levothyroxine (SYNTHROID, LEVOTHROID) 88 MCG tablet TAKE 1 TABLET BY MOUTH DAILY AT 6AM 90 tablet 3     LORazepam (ATIVAN) 0.5 MG tablet TAKE 1 TABLET(0.5 MG) BY MOUTH EVERY 8 HOURS AS NEEDED FOR ANXIETY 30 tablet 0     multivitamin therapeutic (THERAGRAN) tablet Take by mouth.       pramipexole (MIRAPEX) 0.25 MG tablet Take 1 tablet (0.25 mg total) by mouth 2 (two) times a day. 180 tablet 3     propranolol (INDERAL) 10 MG tablet Take 1 tablet (10 mg total) by mouth 2 (two) times a day. 180 tablet 3     ranitidine (ZANTAC) 150 MG tablet Take 150 mg by mouth 2 (two) times a day.        SUMAtriptan (IMITREX) 100 MG tablet Take 1 tablet (100 mg total) by mouth every 2 (two) hours as needed for migraine. Max 200 mg per day. 27 tablet 10     valsartan (DIOVAN) 160 MG tablet Take 1 tablet (160 mg total) by mouth daily. 90 tablet 3     No current facility-administered medications for this visit.        Family History   Problem Relation Age of Onset     Dementia Mother      Arthritis Mother      COPD Father      Cardiovascular Father      Hypertension Father      No Medical Problems Sister      No Medical Problems Daughter      No Medical Problems Son      Stroke Maternal Grandmother      Heart disease Paternal Grandmother      Stroke Paternal Grandmother      No Medical Problems Sister      No Medical Problems Sister      No Medical Problems Son      No Medical Problems Daughter         reports that she has never smoked. She has never used smokeless tobacco.    Review of Systems -   The 10 point review of systems  is within normal limits except for as mentioned above in the HPI.  General ROS: No complaints or constitutional symptoms  Skin: No complaints or symptoms   Hematologic/Lymphatic: No symptoms or complaints  Psychiatric: No symptoms or complaints  Endocrine: No excessive fatigue, no hypermetabolic symptoms reported  Respiratory ROS: no cough, shortness of breath, or wheezing  Cardiovascular ROS: no chest pain or  "dyspnea on exertion  Gastrointestinal ROS: As per HPI  Musculoskeletal ROS: no recent injuries reported  Neurological ROS: no focal neurologic defects reported.        /68  Pulse 82  Resp 18  Ht 5' 1.5\" (1.562 m)  Wt 172 lb (78 kg)  LMP  (LMP Unknown)  SpO2 100%  BMI 31.97 kg/m2  Body mass index is 31.97 kg/(m^2).    EXAM:  General : Alert, cooperative, appears stated age   Skin: Skin color, texture, turgor normal, no rashes or lesions   Lymphatic: No obvious adenopathy, no swelling   Eyes: No scleral icterus, pupils equal  HENT: no traumatic injury to the head or face, no gross abnormalities  Lungs: Normal respiratory effort, breath sounds equal bilaterally  Heart: Regular rate and rhythm  Abdomen: Soft, mild tenderness with palpation directly superior to the pubic bone, corresponding with a weak area of the abdominal wall.  With valsalva, there is a slight protrusion in this location.    Musculoskeletal: No obvious swelling,  Neurologic: Grossly intact    Assessment/Plan:    Suma Mark is a 66 y.o. female with what is likely a small incisional hernia at the junction of her rectus musculature and the pubic bone.  The pathophysiology of incisional hernias was discussed as were the surgical and non-operative management strategies.      We discussed both open and laparoscopic approaches, and the respective risks and benefits of each approach.  We similarly discussed the role and specific risks associated with mesh placement and primary repair.  In particular, I think we can repair this similar to a TEP repair, with placement of the mesh in the retropubic, preperitoneal space.  The risks of surgery in general were discussed which include, but are not limited to, bleeding, infection, recurrent hernia, chronic pain, poor cosmesis, blood clots, stroke, heart attack and death.  Additionally, the risks of observation were discussed which include, but are not limited to, enlargement of the hernia, " incarceration, strangulation, pain and death.      She understands everything which was discussed and has consented to proceed with a laparoscopic incisional hernia repair with mesh.  She would like to undergo surgery at [facility].  We will therefore schedule surgery accordingly.      Keyon Qureshi MD  [phone]  [facility] Department of Surgery

## 2021-06-11 ENCOUNTER — COMMUNICATION - HEALTHEAST (OUTPATIENT)
Dept: INTERNAL MEDICINE | Facility: CLINIC | Age: 71
End: 2021-06-11

## 2021-06-11 DIAGNOSIS — I10 ESSENTIAL HYPERTENSION WITH GOAL BLOOD PRESSURE LESS THAN 140/90: ICD-10-CM

## 2021-06-11 DIAGNOSIS — I10 HTN (HYPERTENSION): ICD-10-CM

## 2021-06-11 DIAGNOSIS — R60.0 BILATERAL EDEMA OF LOWER EXTREMITY: ICD-10-CM

## 2021-06-11 DIAGNOSIS — I10 ESSENTIAL HYPERTENSION: ICD-10-CM

## 2021-06-11 LAB — 25(OH)D3 SERPL-MCNC: 30 NG/ML (ref 30–80)

## 2021-06-11 NOTE — PROGRESS NOTES
ASSESSMENT AND PLAN:  Problem List Items Addressed This Visit     Back pain     He intermittently gives her some Vicodin for flares of her back pain.  She gets nerve ablations approximately every 6 months for her pain which works pretty well for her.      See anxiety in problem list: We discussed extensively that she cannot take Vicodin and Ativan in close proximity.  She promised that she would not take Vicodin during the weeks that she is taking Ativan.         Generalized anxiety disorder     She tells me she is a little agitation and Ativan helps.  She takes 0.5 mg orally up to twice a month for this.  She said that 30 tablets of Ativan should last her at least a year.   If she needs further refills she understands she needs to come into the clinic.    We did also extensively go over the risk of benzodiazepine use in the setting of narcotic use.  She uses Vicodin intermittently through another doctor for flares of back pain.  She understood the danger and promised that she would not use Ativan during the same weeks that she is using Vicodin.    #30 ativan sent by Dr. Romano 4/24/2017 next refill expected 4/2018         Migraine     She has not had a migraine in quite some time.      She thinks the propranolol is finally kicking in.  No change in plan.    Headaches are improving with treatment.  Continue current treatment regimen.             Relevant Medications    verapamil (VERELAN PM) 120 MG 24 hr capsule    Hernia, ventral     Recovering from surgery, surgery was complicated by adhesions.  A laparoscopic surgery was converted into an open surgery because of this.  She continues to recover and is seeing the surgeon tomorrow.  She says she has low energy and low appetite.  I discussed the importance of protein intake and supporting her muscle mass.  We talked about possibly drinking to ensure drinks per day until her appetite returns.  She will do this.           Other Visit Diagnoses     Screening    -   Primary    Relevant Orders    Mammo Screening Bilateral           Chief Complaint   Patient presents with     Med check     HPI  Suma Mark is a 66 y.o. female tells me that she is doing really well with her headaches.  She thinks the propranolol is taking in.  She has not needed to take any triptan medications.    She came in today specifically because she wanted some Ativan.  She is also concurrently got Vicodin on her medication list so I wanted to have a discussion with her about use of these 2 medications together.    She tells me that she gets her Vicodin from Dr. Cardoza for flareups of her back pain.  She had spine fusion in the past and now every 6 months Dr. Cardoza does a nerve ablation which lasts for another 6 months but in between nerve ablations she does use some Vicodin intermittently.    Also Dr. Romano has been giving her some Ativan 0.5 mg to be used up to a few times a month.  She tells me that 30 tablets of Ativan should lasts her a year.    History   Smoking Status     Never Smoker   Smokeless Tobacco     Never Used      Current Outpatient Prescriptions   Medication Sig Dispense Refill     amLODIPine (NORVASC) 10 MG tablet Take 1 tablet (10 mg total) by mouth daily. (Patient taking differently: Take 10 mg by mouth at bedtime. ) 90 tablet 3     aspirin 81 MG EC tablet Take by mouth.       Bacillus coagulans (PROBIOTIC, B. COAGULANS,) 10 billion cell CpDR        CALCIUM CARBONATE/VITAMIN D3 (CALCIUM+D ORAL) Take by mouth.       furosemide (LASIX) 20 MG tablet TAKE 1 TABLET BY MOUTH DAILY 90 tablet 1     HYDROcodone-acetaminophen 5-325 mg per tablet Take 1-2 tablets by mouth every 6 (six) hours as needed for pain. 45 tablet 0     HYDROmorphone (DILAUDID) 2 MG tablet Take 1 tablet (2 mg total) by mouth every 4 (four) hours as needed. 20 tablet 0     levothyroxine (SYNTHROID, LEVOTHROID) 88 MCG tablet TAKE 1 TABLET BY MOUTH DAILY AT 6AM 90 tablet 3     LORazepam (ATIVAN) 0.5 MG tablet TAKE  1 TABLET(0.5 MG) BY MOUTH EVERY 8 HOURS AS NEEDED FOR ANXIETY 30 tablet 0     multivitamin therapeutic (THERAGRAN) tablet Take by mouth.       naratriptan (AMERGE) 1 MG Tab Take one (1) tablet at onset of headache; if returns or does not resolve, may repeat after 4 hours; do not exceed five (5) mg in 24 hours. 9 tablet 0     pramipexole (MIRAPEX) 0.25 MG tablet Take 1 tablet (0.25 mg total) by mouth 2 (two) times a day. 180 tablet 3     propranolol (INDERAL) 10 MG tablet TAKE 1 TABLET(10 MG) BY MOUTH TWICE DAILY 180 tablet 1     ranitidine (ZANTAC) 150 MG tablet Take 150 mg by mouth 2 (two) times a day.        valsartan (DIOVAN) 160 MG tablet Take 1 tablet (160 mg total) by mouth daily. 90 tablet 3     verapamil (VERELAN PM) 120 MG 24 hr capsule        No current facility-administered medications for this visit.      Allergies   Allergen Reactions     Ace Inhibitors Other (See Comments)     Body aches, elevated BP     Amitriptyline Other (See Comments)     Elevated BP     Atenolol Other (See Comments)     Aggrevates Raynaud's     Celecoxib Other (See Comments)           Clonidine Nausea And Vomiting     Codeine Nausea And Vomiting     Desogestrel-Ethinyl Estradiol Swelling     Dexamethasone Swelling     Erythromycin Nausea And Vomiting     Escitalopram Other (See Comments)     Headaches.     Hydrochlorothiazide Other (See Comments)     hyponatremia     Propoxyphene N-Acetaminophen Other (See Comments)     Tramadol-Acetaminophen Other (See Comments)     Venlafaxine Nausea And Vomiting     Zolpidem Other (See Comments)     Penicillins Rash and Swelling     Sulfa (Sulfonamide Antibiotics) Rash     Tramadol Itching, Rash and Swelling     Review of Systems   Constitutional: Negative.    HENT: Negative.    Eyes: Negative.    Respiratory: Negative.    Cardiovascular: Negative.    Gastrointestinal: Negative.    Endocrine: Negative.    Genitourinary: Negative.    Musculoskeletal: Negative.    Skin: Negative.    Neurological:  "Negative.    Hematological: Negative.    Psychiatric/Behavioral: Negative.          OBJECTIVE: /70 (Patient Site: Left Arm, Patient Position: Sitting, Cuff Size: Adult Regular)  Pulse 64  Ht 5' 1\" (1.549 m)  Wt 162 lb (73.5 kg)  LMP  (LMP Unknown)  Breastfeeding? No  BMI 30.61 kg/m2  Physical Exam   Constitutional: She is oriented to person, place, and time. She appears well-developed and well-nourished.   HENT:   Head: Normocephalic and atraumatic.   Eyes: Conjunctivae are normal.   Cardiovascular: Normal rate and regular rhythm.    Pulmonary/Chest: Effort normal.   Neurological: She is alert and oriented to person, place, and time.   Skin: Skin is warm and dry.   Psychiatric: She has a normal mood and affect.      "

## 2021-06-11 NOTE — ANESTHESIA PROCEDURE NOTES
Peripheral Block    Patient location during procedure: OR  Start time: 6/2/2017 3:18 PM  End time: 6/2/2017 3:20 PM    Staffing:  Performing  Anesthesiologist: GABRIELA HO  Preanesthetic Checklist  Completed: patient identified, site marked, risks, benefits, and alternatives discussed, timeout performed, consent obtained, airway assessed, oxygen available, suction available, emergency drugs available and hand hygiene performed  Peripheral Block  Block type: other, TAP  Prep: ChloraPrep  Patient position: supine  Patient monitoring: cardiac monitor, continuous pulse oximetry, heart rate and blood pressure  Laterality: right  Injection technique: ultrasound guided    Ultrasound used to visualize needle placement in proximity to nerve being blocked: yes   Permanent ultrasound image captured for medical record    Needle  Needle type: Stimuplex   Needle gauge: 20G  Needle length: 6 in  no peripheral nerve catheter placed  Assessment  Injection assessment: no difficulty with injection, negative aspiration for heme, no paresthesia on injection and incremental injection  Additional Notes      Right transversus abdominis plane (TAP) block with bupivacaine 0.25% 20 cc. Patient tolerated procedure well.    Gabriela Ho MD  Staff Anesthesiologist  [facility]

## 2021-06-11 NOTE — PROGRESS NOTES
Assessment/Plan:      Visit for Preoperative Exam.     Labs will be done as indicated. Below recommendations were reviewed with the patient. No Cardiology consultation or non-invasive testing.      I have advised her to hold her aspirin 1 week week before surgery which she is already doing.  I have asked her to hold her Mirapex and Zantac 48 hours before surgery.  I have asked her not to take her Ativan on the day of surgery due to potential increased sedative effect.  She can continue the following medications up to and on the day of surgery: Celexa, propranolol, Diovan, Norvasc, Lasix, levothyroxine and if needed hydrocodone.    She tells me that she has had issues with hypotension after surgery in the past.  I would recommend close monitoring of her blood pressure postoperatively.    Mallampati score: class I     Subjective:     Suma Mark is a 66 y.o. female complains of pain in the right lower abdomen for about a month.  The pain is described as: stings.  The pain is mild to moderate in severity.  The pain does radiate to the left side.  Resting  makes it better.     Too much activity makes it worse. it is intermittent.  It does not wake her up from sleep.     Scheduled Procedure: Ventral hernia repair  Surgery Date:  Friday 6/2/17  Surgery Location:  [facility]  Surgeon:  Dr. Qureshi    Current Outpatient Prescriptions   Medication Sig Dispense Refill     amLODIPine (NORVASC) 10 MG tablet Take 1 tablet (10 mg total) by mouth daily. 90 tablet 3     aspirin 81 MG EC tablet Take by mouth.       CALCIUM CARBONATE/VITAMIN D3 (CALCIUM+D ORAL) Take by mouth.       citalopram (CELEXA) 20 MG tablet TAKE ONE AND ONE-HALF TABLETS BY MOUTH EVERY  tablet 1     furosemide (LASIX) 20 MG tablet TAKE 1 TABLET BY MOUTH DAILY 90 tablet 1     HYDROcodone-acetaminophen 5-325 mg per tablet Take 1-2 tablets by mouth every 6 (six) hours as needed for pain. 45 tablet 0     levothyroxine (SYNTHROID, LEVOTHROID) 88 MCG  tablet TAKE 1 TABLET BY MOUTH DAILY AT 6AM 90 tablet 3     LORazepam (ATIVAN) 0.5 MG tablet TAKE 1 TABLET(0.5 MG) BY MOUTH EVERY 8 HOURS AS NEEDED FOR ANXIETY 30 tablet 0     multivitamin therapeutic (THERAGRAN) tablet Take by mouth.       naratriptan (AMERGE) 1 MG Tab Take one (1) tablet at onset of headache; if returns or does not resolve, may repeat after 4 hours; do not exceed five (5) mg in 24 hours. 9 tablet 0     pramipexole (MIRAPEX) 0.25 MG tablet Take 1 tablet (0.25 mg total) by mouth 2 (two) times a day. 180 tablet 3     propranolol (INDERAL) 10 MG tablet TAKE 1 TABLET(10 MG) BY MOUTH TWICE DAILY 180 tablet 1     ranitidine (ZANTAC) 150 MG tablet Take 150 mg by mouth 2 (two) times a day.        valsartan (DIOVAN) 160 MG tablet Take 1 tablet (160 mg total) by mouth daily. 90 tablet 3     No current facility-administered medications for this visit.        Allergies   Allergen Reactions     Ace Inhibitors Other (See Comments)     Body aches, elevated BP     Amitriptyline Other (See Comments)     Elevated BP     Atenolol Other (See Comments)     Aggrevates Raynaud's     Celecoxib Other (See Comments)     Clonidine Nausea And Vomiting     Codeine Nausea And Vomiting     Desogestrel-Ethinyl Estradiol Swelling     Dexamethasone Swelling     Erythromycin Nausea And Vomiting     Escitalopram Other (See Comments)     Headaches.     Hydrochlorothiazide Other (See Comments)     hyponatremia     Propoxyphene N-Acetaminophen Other (See Comments)     Tramadol-Acetaminophen Other (See Comments)     Venlafaxine Nausea And Vomiting     Zolpidem Other (See Comments)     Penicillins Rash and Swelling     Sulfa (Sulfonamide Antibiotics) Rash     Tramadol Itching, Rash and Swelling       Immunization History   Administered Date(s) Administered     Hep A, historic 06/13/2006, 12/15/2006     Hep B, historic 06/13/2006     Influenza high dose, seasonal 09/26/2016     Influenza, inj, historic 10/06/2015     MMR 06/13/2006      Pneumo Conj 13-V (2010&after) 10/12/2015     Pneumo Polysac 23-V 10/26/2007, 10/26/2012     Td, historic 06/13/2006     Tdap 11/14/2016     Typhoid, historic 06/13/2006     ZOSTER 06/24/2014       Patient Active Problem List   Diagnosis     Hypothyroidism     Back pain     Vitamin D deficiency     Hypertension     Generalized anxiety disorder     Restless leg syndrome     Pedal edema     GERD (gastroesophageal reflux disease)     Migraine     Hernia, ventral       Past Medical History:   Diagnosis Date     Degenerative disc disease, lumbar      Hypertension      Osteoarthritis        Social History     Social History     Marital status:      Spouse name: N/A     Number of children: N/A     Years of education: N/A     Occupational History     Not on file.     Social History Main Topics     Smoking status: Never Smoker     Smokeless tobacco: Never Used     Alcohol use Not on file     Drug use: Not on file     Sexual activity: Not on file     Other Topics Concern     Not on file     Social History Narrative       Past Surgical History:   Procedure Laterality Date     CARPAL TUNNEL RELEASE       COLON SURGERY       HEMORRHOID SURGERY       HYSTERECTOMY       INCONTINENCE SURGERY       LUMBAR FUSION       VARICOSE VEIN SURGERY         History of Present Illness  Recent Health  Fever: no  Chills: no  Fatigue: no  Chest Pain: no  Cough: no  Dyspnea: no  Urinary Frequency: no  Nausea: no  Vomiting: no  Diarrhea: no  Abdominal Pain: no  Easy Bruising: no  Lower Extremity Swelling: no  Poor Exercise Tolerance: no    Most recent Health Maintenance Visit:none noted on file.   Pertinent History  Prior Anesthesia: yes  Previous Anesthesia Reaction:  no  Diabetes: no  Cardiovascular Disease: no  Pulmonary Disease: no  Renal Disease: no  GI Disease: yes, heartburn  Sleep Apnea: no  Thromboembolic Problems: yes after child birth 1985 - no problem since   Clotting Disorder: no  Bleeding Disorder: bleeds easily, thinks it is  "due to asprin.   Transfusion Reaction: none - she thinks she had an uncomplicated transfusion when she had back surgery.   Impaired Immunity: no  Steroid use in the last 6 months: yes, cortisone in the knee this month  Frequent Aspirin use: yes, but discontinued now.     Family history of no surgical complications or early demise.     Social history of patient does not wear denture or partial plates, there is no transfusion refusal and there are no concerns regarding care after surgery    After surgery, the patient plans to recover at home with family.    Review of Systems   Review of Systems   Constitutional: Negative.    HENT: Negative.    Eyes: Negative.    Respiratory: Negative.    Cardiovascular: Negative.    Gastrointestinal: Negative.    Endocrine: Negative.    Genitourinary: Negative.    Musculoskeletal: Negative.    Skin: Negative.    Neurological: Negative.    Hematological: Negative.    Psychiatric/Behavioral: Negative.              Objective:         Vitals:    05/31/17 1303   BP: 122/64   Pulse: 60   Resp: 12   Weight: 167 lb (75.8 kg)   Height: 5' 1\" (1.549 m)       Physical Exam:  Physical Exam   Constitutional: She appears well-developed and well-nourished.   HENT:   Right Ear: External ear normal.   Left Ear: External ear normal.   Nose: Nose normal.   Mouth/Throat: Oropharynx is clear and moist.   No increased concern regarding intubation when looking at oropharynx.   Eyes: Conjunctivae and EOM are normal. Pupils are equal, round, and reactive to light. Right eye exhibits no discharge. Left eye exhibits no discharge.   Neck: No thyromegaly present.   Cardiovascular: Normal rate, regular rhythm and normal heart sounds.    No murmur heard.  Pulmonary/Chest: Effort normal and breath sounds normal.   Abdominal: Soft. Bowel sounds are normal. She exhibits no distension and no mass. There is no tenderness. There is no rebound and no guarding.   Musculoskeletal: Normal range of motion.   No joint swelling " or deformity.   Lymphadenopathy:     She has no cervical adenopathy.   Neurological: She is alert. She has normal reflexes.   Skin: Skin is warm and dry. No rash noted.   Psychiatric: She has a normal mood and affect.        ekg reviewed and is on the chart for baseline from 10/25/016

## 2021-06-11 NOTE — PROGRESS NOTES
Here for 2 week post op visit.      Mayra Elmore RN, CBN  [facility]  P [phone]  F [phone]

## 2021-06-11 NOTE — ANESTHESIA PROCEDURE NOTES
Peripheral Block    Patient location during procedure: OR  Start time: 6/2/2017 3:20 PM  End time: 6/2/2017 3:22 PM  post-op analgesia per surgeon order as noted in medical record  Staffing:  Performing  Anesthesiologist: GABRIELA HO  Preanesthetic Checklist  Completed: patient identified, site marked, risks, benefits, and alternatives discussed, timeout performed, consent obtained, airway assessed, oxygen available, suction available, emergency drugs available and hand hygiene performed  Peripheral Block  Block type: other, TAP  Prep: ChloraPrep  Patient position: supine  Patient monitoring: cardiac monitor, continuous pulse oximetry, heart rate and blood pressure  Laterality: left  Injection technique: ultrasound guided    Ultrasound used to visualize needle placement in proximity to nerve being blocked: yes   Permanent ultrasound image captured for medical record    Needle  Needle type: Stimuplex   Needle gauge: 20G  Needle length: 6 in  no peripheral nerve catheter placed  Assessment  Injection assessment: no difficulty with injection, negative aspiration for heme, no paresthesia on injection and incremental injection  Additional Notes      Left transversus abdominis plane (TAP) block with bupivacaine 0.25% 20 cc. Patient tolerated procedure well.    Gabriela Ho MD  Staff Anesthesiologist  [facility]

## 2021-06-11 NOTE — PROGRESS NOTES
Suma Mark is status post open incisional hernia repair with small bowel resection and adhesiolysis. she underwent this procedure on June 2.  She was seen initially for follow-up on June 16, at which point She was noted to be doing well with mild post operative incisional pain.  She had not quite regained her normal appetite, and was having only occasional diarrhea at that time.  She notes that over the past 2 weeks, she has had an increase in alternating constipation and diarrhea.  Reports that prior to surgery she was having 1 regular bowel movement per day, and this is now not as regular, and she can have a sensation of needing to go the bathroom without any actual evacuation.  More bothersome to her is the intermittent nausea, which she states occurs multiple times per day.  This is not associated with eating and drinking.  Exacerbating this is intermittent vomiting that is fairly sporadic in nature, occurring every few days.  She notes this is not proceeded by prolonged nausea, or eating foods.  Neither the nausea nor vomiting are associated with abdominal distention or abdominal pain.     She is having some intermittent, 10-15 second duration left lower quadrant pain that is sharp and electric in nature.  It travels just past her anterior superior iliac spine around to her back.  Happens several times per day, but is quite short-lived.      EXAM:  /73  Pulse 74  LMP  (LMP Unknown)  SpO2 97%    GENERAL: Well developed female, No acute distress, pleasant and conversant   EYES: Pupils equal, round and reactive, no scleral icterus  ABDOMEN: Surgical incisions clean, dry, intact without evidence of infection.  Tender to palpation over the left lower quadrant.  No evidence of hernia recurrence.  Abdomen soft, nondistended  SKIN: Pink, warm and dry, no obvious rashes or lesions   NEURO:No focal deficits, ambulatory  MUSCULOSKELETAL:No obvious deformities, no swelling, normal appearing        ASSESSMENT  AND PLAN:  Suma Mark is not doing as well postoperatively she would like.  I think she has 2 separate issues, the first being some neuropathic left lower quadrant pain that I suspect may continue to improve with time as she heals further from surgery.  The constellation of GI complaints is less exact.  Given that she had a bowel resection ×2 during her lysis of adhesions, I suspect there may be some dysmotility of her distal small bowel that could be contributing to her nausea and potentially to her intermittent vomiting.  I explained the coordinated propagation of enteric contents through the small bowel and how resection can lead to discoordination, distention of segments of small bowel and nausea.  If this would be the case, I would expect this to slowly improve over time.  With regard to the intermittent constipation and diarrhea, I do not have a great explanation for this as we do not do anything with her colon.  From my limited knowledge of irritable bowel syndrome, it sounds that this is potentially consistent with IBS, but I would not make that diagnosis so recently after surgery.    I think at this point she would benefit from a discussion with the gastroenterologist prior to obtaining functional imaging such as MR or CT enterography.  I do not suspect at this point a mechanical bowel obstruction, partial or otherwise, as a source for her discomfort.  Will refer her to our GI colleagues for their input before proceeding further.  She may now follow up on a prn basis if she has any other questions or concerns.     Keyon Qureshi MD  [phone]  [facility] Department of Surgery

## 2021-06-11 NOTE — ANESTHESIA PREPROCEDURE EVALUATION
"Anesthesia Evaluation      Patient summary reviewed   History of anesthetic complications     Airway   Mallampati: II  Neck ROM: full   Pulmonary - negative ROS and normal exam                          Cardiovascular - normal exam  Exercise tolerance: > or = 4 METS  (+) hypertension, ,      Neuro/Psych    (+) anxiety/panic attacks, chronic pain    Endo/Other    (+) hypothyroidism, arthritis,      GI/Hepatic/Renal    (+) GERD well controlled,        Other findings: States she runs a low BP after surgery, no details available, states \"they usually just watch it\". DDD and chronic LBP.  DJD.  RLS.  Pedal edema.  K 4.5, Hg 12.9, Cr 0.81.      Dental - normal exam                        Anesthesia Plan  Planned anesthetic: general endotracheal    ASA 2   Induction: intravenous   Anesthetic plan and risks discussed with: patient    Post-op plan: routine recovery          "

## 2021-06-11 NOTE — PROGRESS NOTES
"Suma Mark is status post open incisional hernia repair with small bowel resection and adhesiolysis. she underwent this procedure on June 2.  She is doing well with mild post operative incisional pain.  She is tolerating a regular diet and has no other complaints at present.  Did have one episode of melanotic stools, and occasional diarrhea these of been intermittent in nature.    EXAM:  /68  Pulse 79  Temp 98.1  F (36.7  C)  Resp 16  Ht 5' 1\" (1.549 m)  Wt 160 lb 8 oz (72.8 kg)  LMP  (LMP Unknown)  SpO2 98%  BMI 30.33 kg/m2    GENERAL: Well developed female, No acute distress, pleasant and conversant   EYES: Pupils equal, round and reactive, no scleral icterus  ABDOMEN: Surgical incisions clean, dry, intact without evidence of infection.  Some superficial abrasion of the skin inferior to the lower midline incision.  SKIN: Pink, warm and dry, no obvious rashes or lesions   NEURO:No focal deficits, ambulatory  MUSCULOSKELETAL:No obvious deformities, no swelling, normal appearing        ASSESSMENT AND PLAN:  Suma Mark is doing well postoperatively.  She may continue with the recommended diet and light activities as tolerated.  She may now follow up on a prn basis if she has any other questions or concerns.     Keyon Qureshi MD  [phone]  [facility] Department of Surgery  "

## 2021-06-11 NOTE — ANESTHESIA CARE TRANSFER NOTE
Last vitals:   Vitals:    06/02/17 1546   BP: 126/59   Pulse: 76   Resp: 18   Temp: 37  C (98.6  F)   SpO2: 100%     Patient's level of consciousness is awake and drowsy  Spontaneous respirations: yes  Maintains airway independently: yes  Dentition unchanged: yes  Oropharynx: oropharynx clear of all foreign objects    QCDR Measures:  ASA# 20 - Surgical Safety Checklist: ASA20A - Safety Checks Done  PQRS# 430 - Adult PONV Prevention: 4558F - Pt received => 2 anti-emetic agents (different classes) preop & intraop  ASA# 8 - Peds PONV Prevention: NA - Not pediatric patient, not GA or 2 or more risk factors NOT present  PQRS# 424 - Korina-op Temp Management: 4559F - At least one body temp DOCUMENTED => 35.5C or 95.9F within required timeframe  PQRS# 426 - PACU Transfer Protocol: - Transfer of care checklist used  ASA# 14 - Acute Post-op Pain: ASA14B - Patient did NOT experience pain >= 7 out of 10

## 2021-06-11 NOTE — TELEPHONE ENCOUNTER
Refill Approved    Rx renewed per Medication Renewal Policy. Medication was last renewed on 10/13/19.    Suma Jaramillo, [facility] Triage/Med Refill 9/24/2020     Requested Prescriptions   Pending Prescriptions Disp Refills     citalopram (CELEXA) 20 MG tablet [Pharmacy Med Name: CITALOPRAM HBR 20 MG TABLET] 135 tablet 3     Sig: TAKE 1 & 1/2 TABLETS BY MOUTH ONCE DAILY       SSRI Refill Protocol  Passed - 9/22/2020  7:08 AM        Passed - PCP or prescribing provider visit in last year     Last office visit with prescriber/PCP: 6/16/2020 Bernadette Taylor MD OR same dept: 6/16/2020 Bernadette Taylor MD OR same specialty: 6/16/2020 Bernadette Taylor MD  Last physical: 8/5/2020 Last MTM visit: Visit date not found   Next visit within 3 mo: Visit date not found  Next physical within 3 mo: Visit date not found  Prescriber OR PCP: Bernadette Taylor MD  Last diagnosis associated with med order: 1. Generalized anxiety disorder  - citalopram (CELEXA) 20 MG tablet [Pharmacy Med Name: CITALOPRAM HBR 20 MG TABLET]; TAKE 1 & 1/2 TABLETS BY MOUTH ONCE DAILY  Dispense: 135 tablet; Refill: 3    If protocol passes may refill for 12 months if within 3 months of last provider visit (or a total of 15 months).

## 2021-06-11 NOTE — TELEPHONE ENCOUNTER
RN cannot approve Refill Request    RN can NOT refill this medication med is not covered by policy/route to provider. Last office visit: 6/16/2020 Bernadette Taylor MD Last Physical: 8/5/2020 Last MTM visit: Visit date not found Last visit same specialty: 6/16/2020 Bernadette Taylor MD.  Next visit within 3 mo: Visit date not found  Next physical within 3 mo: Visit date not found      Danitza Prakash, [facility] Triage/Med Refill 8/31/2020    Requested Prescriptions   Pending Prescriptions Disp Refills     LORazepam (ATIVAN) 1 MG tablet [Pharmacy Med Name: LORAZEPAM 1 MG TABLET] 30 tablet 0     Sig: TAKE 1/2 TABLET BY MOUTH DAILY AS NEEDED FOR ANXIETY       Controlled Substances Refill Protocol Failed - 8/27/2020  9:09 AM        Failed - Route all Controlled Substance Requests to Provider        Failed - Patient has controlled substance agreement in past 12 months     Encounter-Level CSA Scan Date:    There are no encounter-level csa scan date.               Passed - Visit with PCP or prescribing provider visit in past 12 months      Last office visit with prescriber/PCP: 6/16/2020 Bernadette Taylor MD OR same dept: 6/16/2020 Bernadette Taylor MD OR same specialty: 6/16/2020 Bernadette Taylor MD Last physical: 8/5/2020 Last MTM visit: Visit date not found    Next visit within 3 mo: Visit date not found  Next physical within 3 mo: Visit date not found  Prescriber OR PCP: Bernadette Taylor MD  Last diagnosis associated with med order: 1. Generalized anxiety disorder  - LORazepam (ATIVAN) 1 MG tablet [Pharmacy Med Name: LORAZEPAM 1 MG TABLET]; TAKE 1/2 TABLET BY MOUTH DAILY AS NEEDED FOR ANXIETY  Dispense: 30 tablet; Refill: 0

## 2021-06-12 NOTE — PROGRESS NOTES
Assessment:   The encounter diagnosis was Acute UTI (urinary tract infection).     Symptoms are highly suggestive of a UTI with dysuria and urinary frequency. NO red flags on history to suggest pyelonephritis, kidney stone, acute abdomen, or any other serious etiology of symptoms.  We will treat with 3 days of Cipro given her many other antibiotic allergies. Cautioned risk of tendon rupture.  Provided clear instruction for follow up if no improvement and clear red flags for immediate return to ER.       Plan:     Medications Ordered   Medications     ciprofloxacin HCl (CIPRO) 500 MG tablet     Sig: Take 1 tablet (500 mg total) by mouth 2 times a day at 6:00 am and 4:00 pm for 3 days.     Dispense:  6 tablet     Refill:  0     Patient Instructions     Based on the information that you have provided, I have placed an order for you to start treatment for a urinary tract infection. Please take Ciprofloxacin twice daily x 3 days.  View your full visit summary for details. Click on the link below to access your visit summary.    Your pharmacist will address any questions you may have about taking the medication.  Your symptoms are suggestive of a UTI. We will treat with ciprofloxacin twice daily x 3 days. Take this with food. Take a probiotic while on the antibiotic. Be cautioned that this medication has a risk of possible tendon rupture. If you developing bone or muscle pain while taking the medication, stop it immediately and be seen.  Push clear fluid intake.    If developing fever, vomiting, abdominal pain, or any other new, concerning symptoms, come back immediately. If no improvement in symptoms by the end of your antibiotic treatment, follow up with your primary care doctor.    Otherwise, simply follow up as needed.        Urinary Tract Infections in Women  Urinary tract infections (UTIs) are most often caused by bacteria (germs). These bacteria enter the urinary tract. The bacteria may come from outside the  body. Or they may travel from the skin outside the rectum or vagina into the urethra. Female anatomy makes it easier for bacteria from the bowel to enter a woman s urinary tract, which is the most common source of UTI. This means women develop UTIs more often than men. Pain in or around the urinary tract is a common UTI symptom. But the only way to know for sure if you have a UTI for the health care provider to test your urine. The two tests that may be done are the urinalysis and urine culture.  Types of UTIs    Cystitis: A bladder infection (cystitis) is the most common UTI in women. You may have urgent or frequent urination. You may also have pain, burning when you urinate, and bloody urine.    Urethritis: This is an inflamed urethra, which is the tube that carries urine from the bladder to outside the body. You may have lower stomach or back pain. You may also have urgent or frequent urination.    Pyelonephritis: This is a kidney infection. If not treated, it can be serious and damage your kidneys. In severe cases, you may be hospitalized. You may have a fever and lower back pain.  Medications to treat a UTI  Most UTIs are treated with antibiotics. These kill the bacteria. The length of time you need to take them depends on the type of infection. It may be as short as 3 days. If you have repeated UTIs, a low-dose antibiotic may be needed for several months. Take antibiotics exactly as directed. Don t stop taking them until all of the medication is gone. If you stop taking the antibiotic too soon, the infection may not go away, and you may develop a resistance to the antibiotic. This can make it much harder to treat.  Lifestyle changes to treat and prevent UTIs  The lifestyle changes below will help get rid of your UTI. They may also help prevent future UTIs.    Drink plenty of fluids. This includes water, juice, or other caffeine-free drinks. Fluids help flush bacteria out of your body.    Empty your bladder.  Always empty your bladder when you feel the urge to urinate. And always urinate before going to sleep. Urine that stays in your bladder can lead to infection. Try to urinate before and after sex as well.    Practice good personal hygiene. Wipe yourself from front to back after using the toilet. This helps keep bacteria from getting into the urethra.    Use condoms during sex. These help prevent UTIs caused by sexually transmitted bacteria. Also, avoid using spermicides during sex. These can increase the risk of UTIs. Choose other forms of birth control instead. For women who tend to get UTIs after sex, a low-dose of a preventive antibiotic may be used. Be sure to discuss this option with your health care provider.    Follow up with your health care provider as directed. He or she may test to make sure the infection has cleared. If necessary, additional treatment may be started.    2590-3811 [employer]. [address] [place] [address]. All rights reserved. This information is not intended as a substitute for professional medical care. Always follow your healthcare professional's instructions.    If you don't see improvement after 3 days of treatment, follow up with your primary care provider.    If developing worsening symptoms, bloody urine, fever, nausea or vomiting, abdominal pain, or any other new concerning symptoms, go to the ER right away.        Return for further follow up if needed. Call [phone]) or schedule an appointment via [employer]    Subjective:   Suma Mark is a 67 y.o. female who submitted an eVisit request for evaluation of her Dysuria.  See the questionnaire and message section of encounter report for information related to history of present illness and review of systems.    The following portions of the patient's history were reviewed and updated as appropriate:  She  does not have any pertinent problems on file.  She is allergic to ace inhibitors;  amitriptyline; atenolol; celecoxib; clonidine; codeine; desogestrel-ethinyl estradiol; dexamethasone; erythromycin; escitalopram; hydrochlorothiazide; propoxyphene n-acetaminophen; tramadol-acetaminophen; venlafaxine; zolpidem; penicillins; sulfa (sulfonamide antibiotics); and tramadol..     Objective:   No exam performed today, patient submitted as eVisit.

## 2021-06-12 NOTE — PROGRESS NOTES
Medication Therapy Management Initial Visit       ASSESSMENT AND PLAN  Migraines: Not well controlled. Recently started Topamax and has not titrated up to the recommended dose of 50 mg twice daily. Discussed increasing dose of Topamax by 25 mg at bedtime until she reaches 50 mg twice daily. Given that Lo has had reactions to many medications, recommended [employer] genetic testing. There is some evidence that migraineurs may have lower levels of brain magnesium either from decreased absorption of it in food, a genetic tendency to low brain magnesium, or from excreting it from the body to a greater degree than non-migraineurs. Studies of migraineurs have found low levels of brain and spinal fluid magnesium in between migraine attacks. Recommended checking levels today. No changes made to her medications. Reviewed non-pharmacological recommendation to help with migraines, including possible food triggers, like dairy and caffeine.   Plan  1. [employer] genetic testing  2. Continue to taper up Topamax     Pain: Vicodin helps control pain. Reviewed limiting use of medication, as this could trigger or worsen migraines. No changes made today.    Hypothyroidism: Controlled. Last TSH normal. No changes made today.     GERD: Discussed mechanism of action of H2 blockers, including importance of taking medication prior to eating foods that trigger heartburn. Reviewed non-pharmacological ways to improve heartburn. Educated Lo to monitor symptoms. No changes made today.      Blood pressure: Controlled within goal of <150/90, even off beta blocker. Reviewed increase in heart rate is due to stopping propranolol. No changes made to medications today.     CLEMENT: Mild anxiety, given GAD7 score of 5. Instead of stopping SSRI, as Lo wanted, recommended lowering dose from 20 mg to 10 mg. Educated her to monitor her mood.   Plan  1. Decrease citalopram to 10 mg once daily    Vitamin D Deficiency: Checking vitamin D  level today, as last vitamin D level in April was low. Lab results show vitamin D level is normal. No changes made today.   Plan  1. Vitamin D level     RLS: Controlled. Discussed that it is typically recommended to take the medication once daily 2-3 hours before bedtime. Since her symptoms are controlled, no changes made today. If side effects, like constipation (incidence of 4% - 14%), continue to become an issue, could consider lowering dose to 0.25 mg two-three hours before bedtime (eliminate morning dose)    General Health: Last DXA from 2014 shows T-score of -1.5, indicating low bone mass. Discussed foods that contain calcium, including green vegetables (i.e. Kale, broccoli). Also discussed importance of weight-baring exercises to help with bone health. Will discuss timeline for repeat DXA with PCP. Discussed high blood pressure is a risk factor for stroke in women. Discussed that given her age, net benefit of low-dose aspirin is likely higher than risk of GI bleed. No changes made today.     RECOMMENDED FOLLOW-UP    Suma Mark was recommended to follow up in: 1 month    1. Decrease citalopram 20 mg to 10 mg once daily (1/2 tablet). Monitor how you're feeling   2. Continue aspirin 81 mg   3. We will call you about your lab results (vitamin D and magnesium)  4. I will talk to Dr. Meade about calcium and repeat DXA scan for your bones  5. We will schedule you to have [employer] genetic testing done     SUBJECTIVE AND OBJECTIVE    Suma Mark is a 67 y.o. female here for a medication therapy management (MTM)  initial appointment.     Medication related questions/concerns: Wants to discuss migraine flares       Migraines: Has had migraines for years. Started in her 30s and became worse again in her 60s. Saw [facility] 8/15, was started on Topamax and stopped propranolol 10 mg. [facility] dose of propranolol was lower than recommended dose for migraine and it was decided to stop during that  visit. Currently taking Topamax 25 mg twice daily. Reported that she hasn't noticed any difference in her migraines. Does feel shaky and had one episode last night where she had trouble with word-finding. Takes naratriptan 1 mg for migraines. Reported she has trouble with onset of migraines, wakes up with migraines. No aura. Stated Tylenol doesn't really help with migraines -- has tried a couple times in the last few days, but doesn't help. Drinks 2-3 cups of coffee per day. Has a follow up with [facility] on 9/26/17.     Has tried many medications in the past for migraines, but has been unsuccessful: amitriptyline (also caused high blood pressure), nortriptyline, verapamil, Maxalt, Imitrex    Pain: Had lumbar spine surgery in 2003. Sees Dr. Loya. She gets nerve ablations approximately every 6 months for her pain which works pretty well for her. He intermittently gives her Vicodin for flares of her back pain or flares between nerve ablations. Takes Vicodin one table 1-2 times per week, which she stated is sort of helpful for pain. Reported that she has a pretty high tolerance for pain.     Hypothyroidism: Suma stated that she is currently taking levothyroxine 88 mcg once daily in the morning. Last TSH in May was normal.     GERD: Suma stated that she is currently taking ranitidine 150 mg twice daily - AM and PM. Reported that she gets heartburn symptoms a couple times per week. Stated it is food related, especially tomato-based. Stated if she can remember to take the medication before eating foods that trigger heartburn, she can tolerate symptoms. Does use two pillows when she's sleeping to help with symptoms at night.      Blood pressure: Shared struggle finding right blood pressure medications in the past. Stated amlodipine 10 mg once daily at bedtime and valsartan 160 mg in the morning are the two medications that she has historically taken for blood pressure. Started propranolol in May 2017, but stopped  "this a two weeks ago. Noticed Also taking furosemide 20 mg once daily for water retention, especially in lower legs and ankles.     CLEMENT: Stated that she was taking lorazepam for anxiety, but stopped when she ran out of the medication about a week ago. Stated that she can feel \"super excitable on the inside.\" Uses breathing techniques to help calm her down. Finds herself anxious more when she's around certain people or if she knows that she has a medical test coming up. Stated that she feels okay off lorazepam. Stated that she is also taking citalopram 20 mg - 1 tablet daily at bedtime. Stated she was started on this years ago, but wants to experiment going off this medication because she doesn't think she needs this anymore.     PHQ9 = 4  GAD7 = 5    Vitamin D Deficiency: Currently taking vitamin D 2000 IU once daily. Last vitamin D level in April was low. Gets muscle cramps a couple times per week.     RLS: Taking pramipexole 0.25 mg twice daily. Reported that the medication helps her. Last ferritin level was normal.      General Health: Suma stated that she is currently taking a multi-vitamin once daily and aspirin 81 mg once daily. Stated she has been taking aspirin 81 mg for years. Per chart review, had DXA in 2014 showing osteopenia of lumbar spine. Hasn't had DXA scan since 2014. Doesn't recall taking calcium supplement.     The following are part of a depression follow up plan for the patient:  mental health screening management, mental health treatment assessment and mental health treatment education    Manju Cagle, [place], [facility]                              We reviewed Suma's medication list with them, discussing reason for use, directions for use, and potential side effects of each medication.  Indication, safety, efficacy, and convenience was assessed for all reviewed medications.  No environmental factors were noted currently affecting patient.  This care plan was " communicated via EMR with her primary care provider, Melva Meade MD, and is the authorizing prescriber for this visit.  Direct supervision was available by either the patient's PCP or another available physician when needed. Time and complexity billing metrics are included in the docflowsheet linked to this visit. Time spent: 65 minutes      Other pertinent information at the time of this visit:  Current Outpatient Prescriptions   Medication Sig Dispense Refill     amLODIPine (NORVASC) 10 MG tablet Take 1 tablet (10 mg total) by mouth daily. (Patient taking differently: Take 10 mg by mouth at bedtime. ) 90 tablet 3     aspirin 81 MG EC tablet Take by mouth.       CALCIUM CARBONATE/VITAMIN D3 (CALCIUM+D ORAL) Take by mouth.       furosemide (LASIX) 20 MG tablet TAKE 1 TABLET BY MOUTH DAILY 90 tablet 1     furosemide (LASIX) 20 MG tablet TAKE 1 TABLET BY MOUTH DAILY 90 tablet 1     HYDROcodone-acetaminophen 5-325 mg per tablet Take 1-2 tablets by mouth every 6 (six) hours as needed for pain. 45 tablet 0     levothyroxine (SYNTHROID, LEVOTHROID) 88 MCG tablet TAKE 1 TABLET BY MOUTH DAILY AT 6AM 90 tablet 3     LORazepam (ATIVAN) 0.5 MG tablet TAKE 1 TABLET(0.5 MG) BY MOUTH EVERY 8 HOURS AS NEEDED FOR ANXIETY 30 tablet 0     multivitamin therapeutic (THERAGRAN) tablet Take by mouth.       naratriptan (AMERGE) 1 MG Tab TAKE ONE TABLET BY MOUTH AT ONSET OF HEADACHE, IF IT RETURNS OR DOES NOT RESOLVE, MAY REPEAT AFTER 4 HOURS, DO NOT EXCEED 5MG IN 24 HOURS. 9 tablet 0     pramipexole (MIRAPEX) 0.25 MG tablet Take 1 tablet (0.25 mg total) by mouth 2 (two) times a day. (Patient taking differently: Take 0.25 mg by mouth as needed. ) 180 tablet 3     propranolol (INDERAL) 10 MG tablet TAKE 1 TABLET(10 MG) BY MOUTH TWICE DAILY 180 tablet 1     ranitidine (ZANTAC) 150 MG tablet Take 150 mg by mouth 2 (two) times a day.        valsartan (DIOVAN) 160 MG tablet Take 1 tablet (160 mg total) by mouth daily. 90 tablet 3     No  current facility-administered medications for this visit.        This transcription uses voice recognition software, which may contain typographical errors.

## 2021-06-12 NOTE — PROGRESS NOTES
Problem List Items Addressed This Visit     Constipation - started postoperatively 2017     MANAGED BY GI.

## 2021-06-13 ENCOUNTER — COMMUNICATION - HEALTHEAST (OUTPATIENT)
Dept: INTERNAL MEDICINE | Facility: CLINIC | Age: 71
End: 2021-06-13

## 2021-06-13 DIAGNOSIS — I10 ESSENTIAL HYPERTENSION: ICD-10-CM

## 2021-06-13 NOTE — PROGRESS NOTES
Problem List Items Addressed This Visit     Osteoporosis     Osteoporosis noted on dexa scan. Will recommend consult with endocrine to discuss treatment options. Let me know if you have questions.

## 2021-06-13 NOTE — PATIENT INSTRUCTIONS - HE
The proposed surgical procedure is considered LOW risk.    Satisfactory preoperative medical exam for planned revision right blepharoplasty surgery scheduled for December 10, 2020.    Her ophthalmologist, Dr. Mega Toney at [facility] instructs that she have a Covid test completed within 7 days of the surgery date, so we will plan to do that here today.    I also like to get a basic metabolic panel since she does take the diuretic furosemide.    Blood pressures well controlled.     I am instructing her that on the morning of her surgery, do NOT take her furosemide water pill.    DO TAKE the irbesartan blood pressure pill, and morning dose of propranolol for blood pressure, and her thyroid pill.    Evening before surgery, okay to take her amlodipine for blood pressure, atorvastatin for cholesterol, pramipexole for restless legs, Topamax for migraines and nerve pain

## 2021-06-13 NOTE — PATIENT INSTRUCTIONS - HE
Prolia 5th today.  Prolia 6th in 6 months with your physical.    DXA in 5/2021 .   Phone number to schedule [phone].    Daily calcium need is 5929-0480 mg a day from the diet and supplements.  Calcium citrate is easier to digest.  Vitamin D 2000 IU daily recommended.      For sleep - increase trazodone to 2 pills at bedtime. Add hydroxyzine at bedtime to help you sleep and relax.  Lorazepam is just a rescue medication when feeling very anxious.  For anxiety - we will add buspirone twice daily.    In 2 weeks, video visit.

## 2021-06-13 NOTE — PROGRESS NOTES
[facility]  [address] [address]  Dept: [phone]  Dept Fax: [phone]  Primary Provider: Bernadette Taylor MD  Pre-op Performing Provider: KADE BLUM    PREOPERATIVE EVALUATION:  Today's date: 12/4/2020    Suma Mark is a 70 y.o. female who presents for a preoperative evaluation.    Surgical Information:  Surgery/Procedure: Blepharoplasty and Levator Aponeurosis Advancement, Right Eye   Surgery Location: [facility]   Surgeon: Dr. Toney   Surgery Date: 12/10/2020  Time of Surgery: Unknown  Where patient plans to recover: At home with family  Fax number for surgical facility: [phone]    Type of Anesthesia Anticipated: Local with MAC    Subjective     HPI related to upcoming procedure:     She had blepharoplasty procedure done on August 27, 2020, but unfortunately the stitches on the right eyelid did not hold, and so therefore she is going back for revision procedure scheduled for December 10, 2020.    Her ophthalmologist, Dr. Mega Toney at [facility] instructs that she have a Covid test completed within 7 days of the surgery date, so we will plan to do that here today.    I also like to get a basic metabolic panel since she does take the diuretic furosemide.    Blood pressures well controlled.     I am instructing her that on the morning of her surgery, do NOT take her furosemide water pill.    DO TAKE the irbesartan blood pressure pill, and morning dose of propranolol for blood pressure, and her thyroid pill.    Evening before surgery, okay to take her amlodipine for blood pressure, atorvastatin for cholesterol, pramipexole for restless legs, Topamax for migraines and nerve pain    Most Recent EKG     Units 08/05/20  1308   VENTRATE BPM 73   ATRIALRATE BPM 73   QRSDURATION ms 96   QTINTERVAL ms 404   QTCCALC ms 445   P Axis degrees 48   RAXIS degrees 38   TAXIS degrees 47   MUSEDX  Normal sinus rhythm  Normal ECG  When compared with ECG  of 14-NOV-2017 11:09,  No significant change was found  Confirmed by LANA JUDD MD LOC: ([id number]) on 8/6/2020 2:13:53 PM         Essential hypertension  Stable. She will not take furosemide on the morning of the procedure.    Restless leg syndrome  Stable on Mirapex.     Other migraine with status migrainosus, intractable  On Topamax for prophylaxis, using eletriptan as needed.     Depression, unspecified depression type  On citalopram.     Acquired hypothyroidism  On levothyroxine.   Lab Results   Component Value Date    TSH 3.38 07/31/2020      Osteoporosis without current pathological fracture, unspecified osteoporosis type  Prolia was given in June 2020     Fibromyalgia       Dyslipidemia  atorvastatin (LIPITOR) 20 MG tablet  Lab Results   Component Value Date    CHOL 234 (H) 07/31/2020    CHOL 225 (H) 10/19/2018     Lab Results   Component Value Date    HDL 53 07/31/2020    HDL 54 10/19/2018     Lab Results   Component Value Date    LDLCALC 159 (H) 07/31/2020    LDLCALC 143 (H) 10/19/2018     Lab Results   Component Value Date    TRIG 111 07/31/2020    TRIG 138 10/19/2018     No components found for: CHOLHDL      Acquired hypothyroidism    Essential hypertension    Restless leg syndrome    Gastroesophageal reflux disease without esophagitis    Migraine    Hernia, ventral    Osteoporosis    Diverticulosis    S/P total knee replacement    Insomnia, unspecified type    Depression, unspecified depression type    Fibromyalgia    Symptomatic varicose veins of both lower extremities    Venous insufficiency of both lower extremities    Preop Questions 12/4/2020   Have you ever had a heart attack or stroke? No   Have you ever had surgery on your heart or blood vessels, such as a stent placement, a coronary artery bypass, or surgery on an artery in your head, neck, heart, or legs? YES -varicose veins surgery   Do you have chest pain with activity? No   Do you have a history of  heart failure? No   Do you currently  have a cold, bronchitis or symptoms of other infection? No   Do you have a cough, shortness of breath, or wheezing? No   Do you or anyone in your family have previous history of blood clots? No   Do you or does anyone in your family have a serious bleeding problem such as prolonged bleeding following surgeries or cuts? No   Have you ever had problems with anemia or been told to take iron pills? No   Have you had any abnormal blood loss such as black, tarry or bloody stools, or abnormal vaginal bleeding? No   Have you ever had a blood transfusion? YES    Have you ever had a transfusion reaction? No   Are you willing to have a blood transfusion if it is medically needed before, during, or after your surgery? Yes   Have you or any of your relatives ever had problems with anesthesia? No   Do you have sleep apnea, excessive snoring or daytime drowsiness? No   Do you have any artifical heart valves or other implanted medical devices like a pacemaker, defibrillator, or continuous glucose monitor? No   Do you have artificial joints? YES -bilateral knees, partial left, full right   Are you allergic to latex? No     Health Care Directive:  Patient has a Health Care Directive on file.     Review of Systems  CONSTITUTIONAL: NEGATIVE for fever, chills, change in weight  ENT/MOUTH: Negative for ear, mouth, and throat problems  RESP: NEGATIVE for significant cough or SOB  CV: NEGATIVE for chest pain, palpitations or peripheral edema    Patient Active Problem List    Diagnosis Date Noted     Venous insufficiency of both lower extremities 11/04/2019     Symptomatic varicose veins of both lower extremities 05/23/2019     Fibromyalgia 04/24/2018     Insomnia, unspecified type      Depression, unspecified depression type      S/P total knee replacement 11/27/2017     Diverticulosis 10/04/2017     Osteoporosis 09/14/2017     Hernia, ventral 04/27/2017     Migraine 04/04/2017     Essential hypertension 04/21/2015     Restless leg  syndrome 04/21/2015     Gastroesophageal reflux disease without esophagitis 04/21/2015     Acquired hypothyroidism 03/12/2009     Past Medical History:   Diagnosis Date     Acquired hypothyroidism 3/12/2009     Anxiety      Back pain      Breast cyst      Degenerative disc disease, lumbar      Depression      Depression, unspecified depression type      Disease of thyroid gland     hypothyroid     Diverticulosis 10/4/2017    Incidental finding on colonoscopy 10/2017     Essential hypertension 4/21/2015     Fibromyalgia      Gastroesophageal reflux disease without esophagitis 4/21/2015     GERD (gastroesophageal reflux disease)      Hernia, ventral 4/27/2017     History of anesthesia complications     hypotension after surgery     History of blood clots      History of transfusion      Hypertension      Insomnia, unspecified type      Left lower quadrant pain      Migraine      Osteoarthritis      Osteoporosis      PONV (postoperative nausea and vomiting)      Raynaud's disease      Restless leg syndrome      S/P total knee replacement 11/27/2017     Ventral hernia      Vitamin D deficiency      Past Surgical History:   Procedure Laterality Date     BACK SURGERY       BREAST CYST EXCISION       CARPAL TUNNEL RELEASE       COLECTOMY N/A 6/2/2017    Procedure: RESECTION, SMALL INTESTINE, OPEN ADHESIOLYSIS;  Surgeon: Keyon Qureshi MD;  Location: [facility];  Service:      COLON SURGERY       HEMORRHOID SURGERY       HYSTERECTOMY  [id number]     INCISIONAL HERNIA REPAIR N/A 6/2/2017    Procedure: LAPARASCOPIC CONVERTED TO OPEN PRIMARY INCISIONAL HERNIA REPAIR;  Surgeon: Keyon Qureshi MD;  Location: [facility] OR;  Service:      INCONTINENCE SURGERY       LUMBAR FUSION       SC LAP,DIAGNOSTIC ABDOMEN N/A 8/22/2019    Procedure: DIAGNOSTIC LAPAROSCOPY, LYSIS OF ADHESION;  Surgeon: Svetlana Ron MD;  Location: [facility] OR;  Service: General     SC TOTAL KNEE ARTHROPLASTY Right 11/27/2017     Procedure:  RIGHT TOTAL KNEE ARTHROPLASTY;  Surgeon: Kevin Ryder MD;  Location: [facility];  Service: Orthopedics     US THYROID BIOPSY  4/19/2019     VARICOSE VEIN SURGERY       VEIN LIGATION AND STRIPPING Bilateral      Current Outpatient Medications   Medication Sig Dispense Refill     amLODIPine (NORVASC) 10 MG tablet TAKE 1 TABLET BY MOUTH ONCE DAILY AT BEDTIME 90 tablet 3     atorvastatin (LIPITOR) 20 MG tablet Take 1 tablet (20 mg total) by mouth at bedtime. 90 tablet 3     botulinum toxin Type A, Cosm, (BOTOX) 100 unit SolR Every 3 months. [facility]       calcium carbonate-vitamin D3 (CALCIUM 600 + D,3,) 600 mg(1,500mg) -400 unit per tablet Take 1 tablet by mouth daily.              cholecalciferol, vitamin D3, 2,000 unit capsule Take 2,000 Units by mouth daily.       citalopram (CELEXA) 20 MG tablet TAKE 1 & 1/2 TABLETS BY MOUTH ONCE DAILY 135 tablet 3     eletriptan (RELPAX) 40 MG tablet Take 40 mg by mouth daily as needed for migraine. may repeat in 2 hours if necessary              furosemide (LASIX) 20 MG tablet TAKE 1 TABLET BY MOUTH EVERY DAY 90 tablet 3     HYDROcodone-acetaminophen 5-325 mg per tablet Take 1 tablet by mouth.       irbesartan (AVAPRO) 150 MG tablet TAKE 1 TABLET(150 MG) BY MOUTH DAILY 90 tablet 3     levothyroxine (SYNTHROID, LEVOTHROID) 88 MCG tablet TAKE 1 TABLET BY MOUTH DAILY AT 6:00 AM. 90 tablet 2     pramipexole (MIRAPEX) 0.25 MG tablet TAKE 1 TABLET (0.25 MG TOTAL) BY MOUTH 3 (THREE) TIMES A DAY. 270 tablet 1     propranolol (INDERAL) 10 MG tablet TAKE 1 TABLET(10 MG) BY MOUTH TWICE DAILY 180 tablet 3     topiramate (TOPAMAX) 25 MG tablet PLEASE SEE ATTACHED FOR DETAILED DIRECTIONS       traZODone (DESYREL) 50 MG tablet TAKE 1 TABLET BY MOUTH EVERYDAY AT BEDTIME 90 tablet 3     cyanocobalamin (VITAMIN B-12) 50 mcg tablet Take 50 mcg by mouth daily.       dicyclomine (BENTYL) 10 MG capsule TAKE 1 CAPSULE BY MOUTH FOUR TIMES A DAY  1     hydrOXYzine HCl (ATARAX)  "25 MG tablet Take 1 tablet (25 mg total) by mouth 3 (three) times a day as needed for itching. 270 tablet 0     LORazepam (ATIVAN) 1 MG tablet TAKE 1/2 TABLET BY MOUTH DAILY AS NEEDED FOR ANXIETY 30 tablet 0     Current Facility-Administered Medications   Medication Dose Route Frequency Provider Last Rate Last Admin     denosumab 60 mg (PROLIA 60 mg/ml)  60 mg Subcutaneous Q6 Months Bernadette Taylor MD           Allergies   Allergen Reactions     Cephalexin Nausea And Vomiting     Ace Inhibitors Other (See Comments)     Body aches, elevated BP     Amitriptyline Other (See Comments)     Elevated BP     Amlodipine      Atenolol Other (See Comments)     Aggrevates Raynaud's     Celecoxib Other (See Comments)           Clonidine Nausea And Vomiting     Codeine Nausea And Vomiting     Desogestrel-Ethinyl Estradiol Swelling and Unknown     Dexamethasone Swelling and Unknown     Erythromycin Nausea And Vomiting     Escitalopram Other (See Comments)     Headaches.     Gabapentin Unknown     \"Spotted\"     Hydrochlorothiazide Other (See Comments)     hyponatremia     Methylprednisolone Nausea And Vomiting     Agitation      Propoxyphene N-Acetaminophen      Tramadol-Acetaminophen Other (See Comments)     Triamterene      Venlafaxine Nausea And Vomiting     Zolpidem Other (See Comments)     Bacitracin Itching and Rash     Penicillins Rash and Swelling     Sulfa (Sulfonamide Antibiotics) Rash     Tramadol Itching, Rash and Swelling       Social History     Tobacco Use     Smoking status: Never Smoker     Smokeless tobacco: Never Used   Substance Use Topics     Alcohol use: Yes     Comment: 1 cocktail daily      Social History     Substance and Sexual Activity   Drug Use No        Objective     /78   Pulse 69   Wt 170 lb 3.2 oz (77.2 kg)   LMP  (LMP Unknown)   SpO2 98%   BMI 30.88 kg/m    Physical Exam     General: Alert, in no distress. Overweight  Skin: No significant lesion seen.  Eyes/nose/throat: Eyes without " scleral icterus, eye movements normal, pupils equal and reactive, oropharynx clear  + Droopy right eyelid  MSK: Neck with good ROM  Lymphatic: Neck without adenopathy or masses  Endocrine: Thyroid with no nodules to palpation  Pulm: Lungs clear to auscultation bilaterally  Cardiac: Heart with regular rate and rhythm, no murmur or gallop  GI: Abdomen obese, soft, nontender. No palpable enlargement of liver or spleen  MSK: Extremities no tenderness or edema  Neuro: Moves all extremities, without focal weakness  Psych: Alert, normal mental status. Normal affect and speech      Recent Labs   Lab Test 12/04/20  1357 07/31/20  0755 07/01/20  1149 11/07/19  1502 11/07/19  1502   HGB  --  12.9 12.6   < > 12.5   PLT  --  346  --   --  324    137  --    < > 139   K 4.4 4.4 4.7   < > 3.9   CREATININE 0.82 0.89  --    < > 0.73    < > = values in this interval not displayed.        PRE-OP Diagnostics:   Recent Results (from the past 48 hour(s))   Basic Metabolic Panel    Collection Time: 12/04/20  1:57 PM   Result Value Ref Range    Sodium 137 136 - 145 mmol/L    Potassium 4.4 3.5 - 5.0 mmol/L    Chloride 105 98 - 107 mmol/L    CO2 20 (L) 22 - 31 mmol/L    Anion Gap, Calculation 12 5 - 18 mmol/L    Glucose 89 70 - 125 mg/dL    Calcium 9.4 8.5 - 10.5 mg/dL    BUN 24 8 - 28 mg/dL    Creatinine 0.82 0.60 - 1.10 mg/dL    GFR MDRD Af Amer >60 >60 mL/min/1.73m2    GFR MDRD Non Af Amer >60 >60 mL/min/1.73m2     Component 12/4/20 1357   COVID-19 VIRUS SPECIMEN SOURCE Nasopharyngeal    2019-nCOV Not Detected          Most Recent EKG     Units 08/05/20  1308   VENTRATE BPM 73   ATRIALRATE BPM 73   QRSDURATION ms 96   QTINTERVAL ms 404   QTCCALC ms 445   P Axis degrees 48   RAXIS degrees 38   TAXIS degrees 47   MUSEDX  Normal sinus rhythm  Normal ECG  When compared with ECG of 14-NOV-2017 11:09,  No significant change was found  Confirmed by DUTCH FENG, LES LOC:[facility]CE ([id number]) on 8/6/2020 2:13:53 PM         REVISED CARDIAC RISK INDEX  (RCRI)   The patient has the following serious cardiovascular risks for perioperative complications:   - No serious cardiac risks = 0 points     Assessment & Plan      The proposed surgical procedure is considered LOW risk.    Satisfactory preoperative medical exam for planned revision right blepharoplasty surgery scheduled for December 10, 2020.    Her ophthalmologist, Dr. Mega Toney at [facility] instructs that she have a Covid test completed within 7 days of the surgery date, so we will plan to do that here today.    I also like to get a basic metabolic panel since she does take the diuretic furosemide.  LATER: metabolic panel satisfactory. Reduced bicarb not significant.  COVID test negative.    Blood pressures well controlled.     I am instructing her that on the morning of her surgery, do NOT take her furosemide water pill.    DO TAKE the irbesartan blood pressure pill, and morning dose of propranolol for blood pressure, and her thyroid pill.    Evening before surgery, okay to take her amlodipine for blood pressure, atorvastatin for cholesterol, pramipexole for restless legs, Topamax for migraines and nerve pain    RECOMMENDATION:  APPROVAL GIVEN to proceed with proposed procedure, without further diagnostic evaluation.    Signed Electronically by: Vishal Ron MD    Copy of this evaluation report is provided to requesting physician.    Preop [facility] Preop Guidelines    Revised Cardiac Risk Index

## 2021-06-13 NOTE — PROGRESS NOTES
Problem List Items Addressed This Visit     Diverticulosis     Incidental finding on colonoscopy 10/2017  Repeat colonoscopy in 10 years.

## 2021-06-13 NOTE — PROGRESS NOTES
SUBJECTIVE: Suma Mark is a 67 y.o. female with:  Chief Complaint   Patient presents with     Consult     gene testing  - Diane Pharm D referred pt    She is referred by Clovis NunesD, for Gene Sight testing. She has extensive list of allergies to medications.  She has anxiety that is not well treated with citalopram.  She also has chronic low back pain and fibromyalgia and is taking hydrocodone which is not working as well as it used to.  She also has history of HTN/ GERD/ migraines.    OBJECTIVE: /60 (Patient Site: Left Arm, Patient Position: Sitting, Cuff Size: Adult Regular)  Pulse 60  Resp 12  Wt 163 lb (73.9 kg)  LMP  (LMP Unknown)  BMI 30.8 kg/m2 no distress    Suma was seen today for consult.    Diagnoses and all orders for this visit:    Generalized anxiety disorder    Chronic low back pain       I discussed GeneSight testing and she is interested in pursuing this to help better manage her pharmaceuticals and avoid side effects.  I feel she is high risk given the number of medicine allergies she has listed.  Will order the psychotropic and analgesic panels.  She will meet with Manju to review the results in a couple of weeks.    15 minutes spent with the patient.  Over 50% were spent in counseling and coordination of care.      Sugey Carcamo
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
## 2021-06-13 NOTE — TELEPHONE ENCOUNTER
New Appointment Needed  What is the reason for the visit:   ALSO NEEDS A COVID ORDER TO BE TESTED  Pre-Op Appt Request  When is the surgery? :  12.10.20  Where is the surgery?:   [facility] in [place]  Who is the surgeon? :  Dr Mega Toney   What type of surgery is being done?: eye lid surgery   Provider Preference: PCP or anyone   How soon do you need to be seen?: before the procedure  Waitlist offered?: No  Okay to leave a detailed message:  Yes

## 2021-06-13 NOTE — PROGRESS NOTES
Medication Therapy Management Follow Up Visit       ASSESSMENT AND PLAN  Summary of Gene[employer] Testing: Majority of today's visit was spent reviewing results from [employer] genetic testing, specifically results from her pharmacokinetic gene testing. Reviewed which medications may have moderate or severe gene-drug interactions.     CY Ultrarapid Metabolizer (increased enzyme activity)  CYP2B6 Intermediate Metabolizer (reduced enzyme activity)  CYP2C9 Intermediate Metabolizer (reduced enzyme activity)  CY Intermediate Metabolizer (reduced enzyme activity)  [id number] (Normal) Metabolizer  [id number] (Normal) Metabolizer    Migraines: Educated Lo that even though both naratriptan and eletriptan are both metabolized through CY, naratriptan only has 50% liver metabolism, whereas eletriptan has more than 90% clearance through the liver. This may indicate that the total serum concentration of eletriptan is higher in Lo's body, compared to naratriptan, which may explain why it is more effective at relieving migraines. Reviewed that Topamax doesn't clinically have proven genetic markers that allow it to be categorized. Reviewed that her genetic testing results should not impact the use of Botox. No changes made today. Gave Lo a copy of her test results to share with her neurologist.   Plan   1. Continue eletriptan as needed    Blood pressure: Controlled with restarting propranolol. Reviewed how the medication works for heart rate control and blood pressure, which is why Lo likely felt her heart racing when she tapered off the medication. Reviewed that propranolol is metabolized by CY, which for Lo means increased enzyme activity. Educated her that the dose she is currently taking likely is metabolized faster in her body, so the dose/serum concentration she is getting is lower than what someone with normal metabolism would get. No changes made today to her medications,  but educated Lo to continue to monitor her blood pressure.   Plan   1. Continue current medication     Pain/Mood: Mood has improved since restarting SSRI, but isn't well controlled. Pain is also not well controlled. Educated Lo that because she has reduced enzyme activity with CY, citalopram's serum level may be too high and possibly a lower dose may be required. Given her age, she's already taking the max dose of citalopram. Reviewed how TCA, like nortriptyline help with both mood and nerve pain. Will discuss lowering dose of citalopram from 20 mg to 10 mg with PCP and recommend adding nortriptyline 10 mg to help with mood and nerve pain. Nortriptyline does not have any gene-drug interactions for Lo, and has been used for migraine prophylaxis as well. Will monitor mood and nerve pain.  Plan   1. Discuss with PCP - lower citalopram to 10 mg and add nortriptyline 10 mg    General Health: Not well controlled. DXA scan showed Lo has osteoporosis. Discussed the various pharmacological options with Lo today - she is scheduled to see endocrinology later this year. The [employer] recommends 1200 mg of calcium and 800 IU of vitamin D, whereas the American Geriatrics Society and the National Osteoporosis Foundation recommend a slightly higher dose of vitamin D supplementation (at least 1000 and 800 - 1000 IU daily respectively). Will discuss adding calcium to Lo's vitamin D regimen with PCP. Discussed non-pharmacological ways to help with bone health, including weight-baring exericses.   Plan   1. Discuss with PCP - start calcium in addition to vitamin     RECOMMENDED FOLLOW-UP    Suma Mark was recommended to follow up in: 1 month    SUBJECTIVE AND OBJECTIVE    Suma Mark is a 67 y.o. female here for a medication therapy management (MTM)  follow up appointment. Had GeneSight testing with Dr. Carcamo on Tuesday, here to review results.     Migraines: Has had one migraine since our last  "visit. Per chart review, [facility] 9[facility]. Was taken off naratriptan and switched to eletriptan. Stated that Topamax didn't help with migraines, so she stopped this two weeks ago. Took eletriptan once since her appointment with [facility] and stated migraine was subdue enough that she could tolerate it. Starting Botox on 10/17/17 - still pending insurance coverage.      Pain: Back pain varies depending on activity level and how long it's been since nerve ablation. Saw Dr. Loya last Friday for right side ablation, getting left side done on the 6th. Stated that she was diagnosed with fibromyalgia in the past, and discussed this with Dr. Carcamo at her last appointment. Stated that there isn't a day where her body doesn't hurt.      Blood pressure: Reported blood pressure and heart rate were increasing after stopping propranolol, so she restarted. Taking propranolol 10 mg twice daily. Was monitoring blood pressure twice daily -- Reported blood pressure was around 165/85 off propranolol. Continues amlodipine 10 mg at bedtime and valsartan 160 mg in the morning. Also taking furosemide 20 mg once daily for water retention, especially in lower legs and ankles.      CLEMENT: Reported that she tried to taper off citalopram from 20 mg to 10 mg and then off. Stated when she stopped completely, she didn't feel like herself. Felt \"teary-eyed\" and \"everything was bothering me.\" Stated that she didn't like herself. Went back to taking citalopram 20 mg about a week ago. Reported that she hasn't really been anxious. Stated she thinks her mood could improve.      Vitamin D Deficiency: Continues vitamin D 2000 IU once daily. Last vitamin D level was normal.        General Health: DXA scan showed osteoporosis. Not taking calcium supplementation. Limited calcium in diet. Plans to see endocrinology.    Manju Cagle, PharmD, BCACP  [facility]                              We reviewed Suma's medication list with them, " discussing reason for use, directions for use, and potential side effects of each medication.  Indication, safety, efficacy, and convenience was assessed for all reviewed medications.  No environmental factors were noted currently affecting patient.  This care plan was communicated via EMR with her primary care provider, Melva Meade MD, and is the authorizing prescriber for this visit.  Direct supervision was available by either the patient's PCP or another available physician when needed. Time and complexity billing metrics are included in the docflowsheet linked to this visit. Time spent: 55 minutes    Other pertinent information at the time of this visit:  Current Outpatient Prescriptions   Medication Sig Dispense Refill     amLODIPine (NORVASC) 10 MG tablet Take 1 tablet (10 mg total) by mouth at bedtime. 90 tablet 3     cholecalciferol, vitamin D3, 2,000 unit capsule Take 2,000 Units by mouth daily.       citalopram (CELEXA) 20 MG tablet Take 20 mg by mouth daily.       eletriptan (RELPAX) 20 MG tablet TK ONE  T PO AT ONSET OF MIGRAINE AND MAY REPEAT AFTER 2 H  3     furosemide (LASIX) 20 MG tablet TAKE 1 TABLET BY MOUTH DAILY 90 tablet 1     HYDROcodone-acetaminophen 5-325 mg per tablet Take 1-2 tablets by mouth every 6 (six) hours as needed for pain. 45 tablet 0     levothyroxine (SYNTHROID, LEVOTHROID) 88 MCG tablet TAKE 1 TABLET BY MOUTH DAILY AT 6AM 90 tablet 3     propranolol (INDERAL) 10 MG tablet Take 1 tablet (10 mg) by mouth twice daily 90 tablet 2     ranitidine (ZANTAC) 150 MG tablet Take 150 mg by mouth 2 (two) times a day.        valsartan (DIOVAN) 160 MG tablet Take 1 tablet (160 mg total) by mouth every morning. 90 tablet 3     No current facility-administered medications for this visit.        This transcription uses voice recognition software, which may contain typographical errors.

## 2021-06-13 NOTE — TELEPHONE ENCOUNTER
Refill Approved    Rx renewed per Medication Renewal Policy. Medication was last renewed on 1/19/2020 with 2 refills.  Last office visit: 8/5/2020 [facility]     Requested Prescriptions   Pending Prescriptions Disp Refills     levothyroxine (SYNTHROID, LEVOTHROID) 88 MCG tablet [Pharmacy Med Name: LEVOTHYROXINE 88 MCG TABLET] 90 tablet 1     Sig: TAKE 1 TABLET BY MOUTH DAILY AT 6:00 AM.       Thyroid Hormones Protocol Passed - 11/12/2020 12:19 AM        Passed - Provider visit in past 12 months or next 3 months     Last office visit with prescriber/PCP: Visit date not found OR same dept: 6/16/2020 Bernadette Taylor MD OR same specialty: 6/16/2020 Bernadette Taylor MD  Last physical: Visit date not found Last MTM visit: Visit date not found   Next visit within 3 mo: Visit date not found  Next physical within 3 mo: Visit date not found  Prescriber OR PCP: Tapan Daniel MD  Last diagnosis associated with med order: 1. Hypothyroid  - levothyroxine (SYNTHROID, LEVOTHROID) 88 MCG tablet [Pharmacy Med Name: LEVOTHYROXINE 88 MCG TABLET]; TAKE 1 TABLET BY MOUTH DAILY AT 6:00 AM.  Dispense: 90 tablet; Refill: 1    If protocol passes may refill for 12 months if within 3 months of last provider visit (or a total of 15 months).             Passed - TSH on file in past 12 months for patient age 12 & older     TSH   Date Value Ref Range Status   07/31/2020 3.38 0.30 - 5.00 uIU/mL Final

## 2021-06-13 NOTE — TELEPHONE ENCOUNTER
Patient contacted and scheduled for preop with Dr. Ron on 12/04  Jeimy PEMBERTON [facility]

## 2021-06-13 NOTE — TELEPHONE ENCOUNTER
Patient calling for refill of propranolol.   RN advised patient that refill request was received on 12/21/20 and that it can take up to 3 business days for refill requests. Pt has 5 more days of medication.   Patient stated understanding and had no further questions.    Brigid Armendariz RN 12/22/20 8:29 AM  [facility] Nurse Advisor    Reason for Disposition    Information only question and nurse able to answer    Additional Information    Negative: Nursing judgment    Negative: Nursing judgment    Negative: Nursing judgment    Negative: Nursing judgment    Protocols used: INFORMATION ONLY CALL - NO TRIAGE-A-OH

## 2021-06-14 NOTE — TELEPHONE ENCOUNTER
Please begin PA process for Hydroxyzine 25 mg tablets.  Vandana Jacskon CMA ............... 4:15 PM, 01/21/21

## 2021-06-14 NOTE — ANESTHESIA PROCEDURE NOTES
Spinal Block    Patient location during procedure: OR  Start time: 11/27/2017 2:30 PM  End time: 11/27/2017 2:32 PM  Reason for block: primary anesthetic    Staffing:  Performing  Anesthesiologist: POOJA MARTINEZ IV    Preanesthetic Checklist  Completed: patient identified, risks, benefits, and alternatives discussed, timeout performed, consent obtained, at patient's request, airway assessed, oxygen available, suction available, emergency drugs available and hand hygiene performed  Spinal Block  Patient position: sitting  Prep: ChloraPrep  Patient monitoring: heart rate, cardiac monitor, continuous pulse ox and blood pressure  Approach: midline  Location: L3-4  Injection technique: single-shot  Needle type: Quincke   Needle gauge: 22 G

## 2021-06-14 NOTE — PROGRESS NOTES
"Suma Mark is a 70 y.o. female who is being evaluated via a billable video visit.      How would you like to obtain your AVS? MyChart.  If dropped from the video visit, the video invitation should be resent by: [phone]  Will anyone else be joining your video visit? No    Video Start Time: 5:15 PM  Assessment & Plan     Insomnia, unspecified type  Sleeping much better since trazodone dose increased to 2 tab at bedtime and we added hydroxyzine. It helped anxiety too. She did not take any lorazepam since our last visit.    Anxiety  We started Buspar 5 mg two times a day on 12/17/20 and she did not have anxiety episodes. She is taking citalopram 30 mg daily and would like to get off citalopram. She will decrease dose to 20 mg daily and we will do another video visit in 1 month. We can possibly do the switch to different NSSRI if needed.     She noticed dry mouth since recent med change and her dentist recommended using mouth wash twice daily.    [id number]}     BMI:   Estimated body mass index is 31.21 kg/m  as calculated from the following:    Height as of 8/5/20: 5' 2.25\" (1.581 m).    Weight as of 12/17/20: 172 lb (78 kg).           Bernadette Taylor MD  [facility]    Subjective     Suma Mark is 70 y.o. and presents to clinic today for the following health issues         Objective       Vitals:  No vitals were obtained today due to virtual visit.    Physical Exam  Constitutional:  oriented to person, place, and time, appears well-nourished. No distress.   HENT:   Head: Normocephalic.   Eyes: Conjunctivae are normal.   Neck: Normal range of motion.      Pulmonary/Chest: Effort normal   Musculoskeletal: Normal range of motion.   Neurological: alert and oriented to person, place, and time.   Psychiatric: normal mood and affect.          Video-Visit Details    Type of service:  Video Visit    Video End Time (time video stopped): 5:21 PM  Originating Location (pt. Location): " Home    Distant Location (provider location):  [facility]     Platform used for Video Visit: Anni

## 2021-06-14 NOTE — TELEPHONE ENCOUNTER
Refill Approved    Rx renewed per Medication Renewal Policy. Medication was last renewed on 12/12/19.    Suma Jaramillo, [facility] Triage/Med Refill 12/24/2020     Requested Prescriptions   Pending Prescriptions Disp Refills     propranoloL (INDERAL) 10 MG tablet [Pharmacy Med Name: PROPRANOLOL 10 MG TABLET] 180 tablet 1     Sig: TAKE 1 TABLET(10 MG) BY MOUTH TWICE DAILY       Beta-Blockers Refill Protocol Passed - 12/22/2020  8:25 AM        Passed - PCP or prescribing provider visit in past 12 months or next 3 months     Last office visit with prescriber/PCP: 12/17/2020 Bernadette Taylor MD OR same dept: 12/17/2020 Bernadette Taylor MD OR same specialty: 12/17/2020 Bernadette Taylor MD  Last physical: 8/5/2020 Last MTM visit: Visit date not found   Next visit within 3 mo: Visit date not found  Next physical within 3 mo: Visit date not found  Prescriber OR PCP: Bernadette Taylor MD  Last diagnosis associated with med order: 1. HTN (hypertension)  - propranoloL (INDERAL) 10 MG tablet [Pharmacy Med Name: PROPRANOLOL 10 MG TABLET]; TAKE 1 TABLET(10 MG) BY MOUTH TWICE DAILY  Dispense: 180 tablet; Refill: 1    If protocol passes may refill for 12 months if within 3 months of last provider visit (or a total of 15 months).             Passed - Blood pressure filed in past 12 months     BP Readings from Last 1 Encounters:   12/17/20 118/60

## 2021-06-14 NOTE — ANESTHESIA PREPROCEDURE EVALUATION
Anesthesia Evaluation      Patient summary reviewed   History of anesthetic complications     Airway   Mallampati: II  Neck ROM: full   Pulmonary - negative ROS and normal exam                          Cardiovascular - negative ROS and normal exam   Neuro/Psych - negative ROS     Endo/Other - negative ROS      GI/Hepatic/Renal - negative ROS           Dental    (+) poor dentition                       Anesthesia Plan  Planned anesthetic: spinal and peripheral nerve block    ASA 2     Anesthetic plan and risks discussed with: patient

## 2021-06-14 NOTE — PROGRESS NOTES
Medication Therapy Management Follow Up Visit       ASSESSMENT AND PLAN  Migraines: Improving with Botox. Educated Lo to continue keeping track of symptoms in her daily journal. No changes made today.   Plan   1. Continue current medication    Pain/Post-Surgery: Reviewed to continue aspirin 325 mg twice daily per protocol post knee arthroplasty for 30 days for DVT prophylaxis. Educated Lo to lower dose of aspirin back to 81 mg after she is 30 days post surgery.   Plan   1. Decrease aspirin to 81 mg 30 days post-surgery    Blood pressure: Controlled. No changes made today.  Plan   1. Continue current medication    CLEMENT/Sleep: Trazodone is metabolized by CY and CYP2D6. Reviewed that Lo is a CY Intermediate Metabolizer (reduced enzyme activity), which may contribute to her tiredness upon awakening. Recommended Lo monitor her sleep and anxiety after she completes her course of trazodone and melatonin. If sleep worsens, recommend restarting melatonin first. Educated Lo on how trazodone and melatonin work. Reviewed to take trazodone about 2 hours prior to sleep.     General Health/Osteoporosis: Not taking optimal dose of calcium. The [employer] recommends 1200 mg of calcium and 800 IU of vitamin D, whereas the American Geriatrics Society and the National Osteoporosis Foundation recommend a slightly higher dose of vitamin D supplementation (at least 1000 and 800 - 1000 IU daily respectively). Recommended Lo increase dose of calcium to 500 mg twice daily and continue incorporating calcium into diet. Note from [facility] endocrinology appointment pending.   Plan   1. Increase calcium 500 mg - 1 tablet twice daily    RECOMMENDED FOLLOW-UP    1. Once you have reached 30 days post-surgery, go back to taking aspirin 81 mg once daily  2. Increase calcium 500 mg - 1 tablet twice daily (breakfast and dinner)  3. Monitor your sleep/anxiety once you've completed the trazodone and melatonin.  "If needed, we can restart melatonin.     SUBJECTIVE AND OBJECTIVE    Suma Mark is a 67 y.o. female here for a medication therapy management (MTM)  follow up appointment.     Migraines: Started Botox on 10/17/17 and reported she's feeling better. Getting second dose of Botox in a few days and following up with [facility]. Has used six eletriptan since October 25th. Keeping a daily log of migraines, and has noticed symptoms are a lot less.     CLEMENT/Sleep: At last PCP visit, stopped nortriptyline as anxiety was worsening and went back to citalopram 20 mg. Reported that there are days where she feels super \"racey\" or anxious on the inside. Reported sometimes she wakes up at night and feels anxious. Taking lorazepam 0.5 mg maybe once per week. Stated it helps with anxiety. Was started on trazodone 25 mg to help her sleep in the hospital. Reported that she has about a week worth of medication. Stated she does sometimes feel tired during the day. Hasn't noticed a change in anxiety. Stated that she's able to wake up at night and use the bathroom. Was also given melatonin 3 mg in the hospital. Reported that she has been taking melatonin at night.     Pain/Post Surgery: Reported she had total knee arthroplasty about two weeks ago. Taking Vicodin 5/325 mg - 2 tablets every 4 hours. Pain is tolerable during night, but when she's moving around, pain is worse. Going to PT twice weekly. Has been using ice when at home. Has been taking aspirin 325 mg - 1 tablet twice daily for 30 days. Hasn't noticed worsening in acid reflux. Stopped aspirin 81 mg. Has been struggling with constipation - had first bowel movement today in eight days. Taking Senna S - 2 tablets at bedtime and Miralax in the morning.      Blood pressure: Continuing propranolol 10 mg twice daily, valsartan 160 mg once daily, amlodipine 10 mg at bedtime and furosemide 20 mg once daily in the morning. Hasn't been monitoring blood pressure at home. Stated that she hasn't " felt like anything feels off.       Vitamin D Deficiency: Continues vitamin D 2000 IU once daily. Last vitamin D level was normal.      Hypothyroidism: Continues levothyroxine 88 mcg once daily in the morning.       General Health/osteoporosis: Saw endocrinologist today (Shadi) for osteoporosis. Reported that she has recommended to have DXA in 2 years and continue vitamin D and calcium for now. Has currently been taking calcium 500 mg once daily, not twice daily. Eating cottage cheese for lunch and has a glass of milk before bedtime.     Manju Cagle, PharmD, [facility]                              We reviewed Suma's medication list with them, discussing reason for use, directions for use, and potential side effects of each medication.  Indication, safety, efficacy, and convenience was assessed for all reviewed medications.  No environmental factors were noted currently affecting patient.  This care plan was communicated via EMR with her primary care provider, Melva Meade MD, and is the authorizing prescriber for this visit.  Direct supervision was available by either the patient's PCP or another available physician when needed. Time and complexity billing metrics are included in the docflowsheet linked to this visit. Time spent: 30 minutes    Other pertinent information at the time of this visit:  Current Outpatient Prescriptions   Medication Sig Dispense Refill     amLODIPine (NORVASC) 10 MG tablet Take 1 tablet (10 mg total) by mouth at bedtime. 90 tablet 3     aspirin 325 MG EC tablet Take 1 tablet (325 mg total) by mouth 2 (two) times a day with meals. 84 tablet 0     bisacodyl (DULCOLAX) 10 mg suppository Take suppository tonight 11/30 to have bowel movement. Call if you are not having good results. [phone]  0     calcium, as carbonate, (CALCIUM 500) 500 mg calcium (1,250 mg) tablet Take 1 tablet (500 mg total) by mouth 2 (two) times a day. 60 tablet 11      cholecalciferol, vitamin D3, 2,000 unit capsule Take 2,000 Units by mouth daily.       citalopram (CELEXA) 20 MG tablet Take 1 tablet (20 mg total) by mouth daily. (Patient taking differently: Take 20 mg by mouth at bedtime. ) 90 tablet 3     eletriptan (RELPAX) 20 MG tablet Take 20 mg by mouth as needed for migraine. may repeat in 2 hours if necessary       furosemide (LASIX) 20 MG tablet Take 20 mg by mouth daily.       HYDROcodone-acetaminophen 5-325 mg per tablet Take 1-2 tablets by mouth every 4 (four) hours as needed. 55 tablet 0     levothyroxine (SYNTHROID, LEVOTHROID) 88 MCG tablet TAKE 1 TABLET BY MOUTH DAILY AT 6AM (Patient taking differently: Take 88 mcg by mouth Daily at 6:00 am. TAKE 1 TABLET BY MOUTH DAILY AT 6AM) 90 tablet 3     LORazepam (ATIVAN) 1 MG tablet Take 0.5 tablets (0.5 mg total) by mouth daily as needed for anxiety. 30 tablet 0     melatonin 3 mg Tab tablet Take 1 tablet (3 mg total) by mouth at bedtime as needed. 15 tablet 0     pramipexole (MIRAPEX) 0.25 MG tablet Take 0.25 mg by mouth 2 (two) times a day.       promethazine (PHENERGAN) 12.5 MG tablet Take 1 tablet (12.5 mg total) by mouth every 6 (six) hours as needed for nausea. 10 tablet 0     propranolol (INDERAL) 10 MG tablet Take 1 tablet (10 mg) by mouth twice daily (Patient taking differently: Take 10 mg by mouth 2 (two) times a day. Take 1 tablet (10 mg) by mouth twice daily) 90 tablet 3     ranitidine (ZANTAC) 150 MG tablet Take 150 mg by mouth 2 (two) times a day.        senna-docusate (PERICOLACE) 8.6-50 mg tablet Take 1 tablet by mouth 2 (two) times a day.  0     traZODone (DESYREL) 50 MG tablet Take 0.5 tablets (25 mg total) by mouth at bedtime as needed for sleep. 6 tablet 0     valsartan (DIOVAN) 160 MG tablet Take 1 tablet (160 mg total) by mouth every morning. 90 tablet 3     No current facility-administered medications for this visit.        This transcription uses voice recognition software, which may contain  typographical errors.

## 2021-06-14 NOTE — ANESTHESIA POSTPROCEDURE EVALUATION
Patient: Suma Mark   RIGHT TOTAL KNEE ARTHROPLASTY  Anesthesia type: spinal    Patient location: PACU  Last vitals:   Vitals:    11/27/17 1705   BP: 129/64   Pulse: 67   Resp: 16   Temp: 36.8  C (98.2  F)   SpO2: 90%     Post vital signs: stable  Level of consciousness: awake and responds to simple questions  Post-anesthesia pain: pain controlled  Post-anesthesia nausea and vomiting: no  Pulmonary: unassisted, return to baseline  Cardiovascular: stable and blood pressure at baseline  Hydration: adequate  Anesthetic events: no    QCDR Measures:  ASA# 11 - Korina-op Cardiac Arrest: ASA11B - Patient did NOT experience unanticipated cardiac arrest  ASA# 12 - Korina-op Mortality Rate: ASA12B - Patient did NOT die  ASA# 13 - PACU Re-Intubation Rate: NA - No ETT / LMA used for case  ASA# 10 - Composite Anes Safety: ASA10A - No serious adverse event    Additional Notes:  Recovery as anticipated from spinal anesthetic.  No complications.

## 2021-06-14 NOTE — TELEPHONE ENCOUNTER
Refill Approved    Rx renewed per Medication Renewal Policy. Medication was last renewed on 12/25/19.    Suma Jaramillo, [facility] Triage/Med Refill 1/4/2021     Requested Prescriptions   Pending Prescriptions Disp Refills     amLODIPine (NORVASC) 10 MG tablet [Pharmacy Med Name: AMLODIPINE BESYLATE 10 MG TAB] 90 tablet 3     Sig: TAKE 1 TABLET BY MOUTH EVERYDAY AT BEDTIME       Calcium-Channel Blockers Protocol Passed - 1/1/2021 12:13 AM        Passed - PCP or prescribing provider visit in past 12 months or next 3 months     Last office visit with prescriber/PCP: 1/24/2018 Melva Meade MD OR same dept: Visit date not found OR same specialty: 8/3/2018 Yudi Heard MD  Last physical: 4/24/2018 Last MTM visit: Visit date not found   Next visit within 3 mo: Visit date not found  Next physical within 3 mo: Visit date not found  Prescriber OR PCP: Melva Meade MD  Last diagnosis associated with med order: 1. Essential hypertension with goal blood pressure less than 140/90  - amLODIPine (NORVASC) 10 MG tablet [Pharmacy Med Name: AMLODIPINE BESYLATE 10 MG TAB]; TAKE 1 TABLET BY MOUTH EVERYDAY AT BEDTIME  Dispense: 90 tablet; Refill: 3    If protocol passes may refill for 12 months if within 3 months of last provider visit (or a total of 15 months).             Passed - Blood pressure filed in past 12 months     BP Readings from Last 1 Encounters:   12/17/20 118/60

## 2021-06-14 NOTE — PROGRESS NOTES
Problem List Items Addressed This Visit     Osteoporosis     She did see Wesley Bowles MD-endocrinologist regarding this and he told her it would certainly be reasonable to treat her with a bisphosphonate but since she is not interested in that follow-up with DEXA scan every 2 years is recommended.  Treatment if her bone density is declining.  See scanned consult note.

## 2021-06-14 NOTE — TELEPHONE ENCOUNTER
Central PA team  [phone]  Pool: HE PA MED ([id number])          PA has been initiated.       PA form completed and faxed insurance via Cover My Meds     Key:  [id number]     Medication:  HYDROXYZINE 25MG     Insurance:  [employer]        Response will be received via fax and may take up to 5-10 business days depending on plan

## 2021-06-14 NOTE — ANESTHESIA CARE TRANSFER NOTE
Last vitals:   Vitals:    11/27/17 1557   BP: 96/52   Pulse: 73   Resp: 16   Temp: 36.9  C (98.4  F)   SpO2: 98%     Patient's level of consciousness is awake  Spontaneous respirations: yes  Maintains airway independently: yes  Dentition unchanged: yes  Oropharynx: oropharynx clear of all foreign objects    QCDR Measures:  ASA# 20 - Surgical Safety Checklist: WHO surgical safety checklist completed prior to induction  PQRS# 430 - Adult PONV Prevention: 4558F-1P - Medical reason for NOT administering combination therapy  ASA# 8 - Peds PONV Prevention: NA - Not pediatric patient, not GA or 2 or more risk factors NOT present  PQRS# 424 - Korina-op Temp Management: 4559F - At least one body temp DOCUMENTED => 35.5C or 95.9F within required timeframe  PQRS# 426 - PACU Transfer Protocol: - Transfer of care checklist used  ASA# 14 - Acute Post-op Pain: ASA14B - Patient did NOT experience pain >= 7 out of 10

## 2021-06-14 NOTE — ANESTHESIA PROCEDURE NOTES
Peripheral Block    Patient location during procedure: pre-op  Start time: 11/27/2017 1:42 PM  End time: 11/27/2017 1:44 PM  post-op analgesia per surgeon order as noted in medical record  Staffing:  Performing  Anesthesiologist: POOJA MARTINEZ IV  Preanesthetic Checklist  Completed: patient identified, site marked, risks, benefits, and alternatives discussed, timeout performed, consent obtained, at patient's request, airway assessed, oxygen available, suction available, emergency drugs available and hand hygiene performed  Peripheral Block  Block type: saphenous  Prep: ChloraPrep  Patient position: supine  Patient monitoring: cardiac monitor, continuous pulse oximetry, heart rate and blood pressure  Laterality: right  Injection technique: ultrasound guided        lidocaine 1% local anesthesia used for local skin prep  Needle  Needle gauge: 22 G  Needle length: 4 in  no peripheral nerve catheter placed  Assessment  Injection assessment: no difficulty with injection, negative aspiration for heme, no paresthesia on injection and incremental injection

## 2021-06-14 NOTE — TELEPHONE ENCOUNTER
Refill Approved    Rx renewed per Medication Renewal Policy. Medication was last renewed on 06/25/2020.  Last office visit was 01/05/2021 with PCP.    Alyson Foote, [facility] Triage/Med Refill 1/25/2021     Requested Prescriptions   Pending Prescriptions Disp Refills     pramipexole (MIRAPEX) 0.25 MG tablet [Pharmacy Med Name: PRAMIPEXOLE 0.25 MG TABLET] 270 tablet 1     Sig: TAKE 1 TABLET (0.25 MG TOTAL) BY MOUTH 3 (THREE) TIMES A DAY.       Parkinson's Meds I Refill Protocol Passed - 1/24/2021  9:12 AM        Passed - PCP or prescribing provider visit in past 6 months      Last office visit with prescriber/PCP: 12/17/2020 OR same dept: 12/17/2020 Bernadette Taylor MD OR same specialty: 12/17/2020 Bernadette Taylor MD Last physical: 8/5/2020 Last MTM visit: Visit date not found     Next appt within 3 mo: Visit date not found  Next physical within 3 mo: Visit date not found  Prescriber OR PCP: Bernadette Taylor MD  Last diagnosis associated with med order: 1. RLS (restless legs syndrome)  - pramipexole (MIRAPEX) 0.25 MG tablet [Pharmacy Med Name: PRAMIPEXOLE 0.25 MG TABLET]; TAKE 1 TABLET (0.25 MG TOTAL) BY MOUTH 3 (THREE) TIMES A DAY.  Dispense: 270 tablet; Refill: 1    If protocol passes may refill for 6 months if within 3 months of last provider visit (or a total of 9 months).             Pramipexole/Ropinirole Refill Protocol Passed - 1/24/2021  9:12 AM        Passed - PCP or prescribing provider visit in past 6 months      Last office visit with prescriber/PCP: 12/17/2020 OR same dept: 12/17/2020 Bernadette Taylor MD OR same specialty: 12/17/2020 Bernadette Taylor MD Last physical: 8/5/2020 Last MTM visit: Visit date not found         Next appt within 3 mo: Visit date not found  Next physical within 3 mo: Visit date not found  Prescriber OR PCP: Bernadette Taylor MD  Last diagnosis associated with med order: 1. RLS (restless legs syndrome)  - pramipexole (MIRAPEX) 0.25 MG tablet [Pharmacy Med Name:  PRAMIPEXOLE 0.25 MG TABLET]; TAKE 1 TABLET (0.25 MG TOTAL) BY MOUTH 3 (THREE) TIMES A DAY.  Dispense: 270 tablet; Refill: 1     If protocol passes may refill for 6 months if within 3 months of last provider visit (or a total of 9 months).

## 2021-06-14 NOTE — PROGRESS NOTES
Assessment/Plan:      Visit for Preoperative Exam.     Low Risk Surgery.   No surgical complications expected.     She was noted to be anemic in June 2017 perioperatively. Will recheck cbc today.     She is on antihtn meds so will check cmp today as well.     She did get nauseous and vomit after general anesthesia from her spine surgery so please be aware.     Stop all medications once she is npo and resume all medications after she is taking orals again.    Subjective:     Suma Mark is a 67 y.o. female complains of pain in the right knee 'bone on bone' for about a year.  The pain is described as: grinding .  The pain is 4-5/10 in severity.  The pain does not radiate.  nothing makes it better.     Going up or down stairs/ hills  makes it worse. It is constant. It does keep her awake her up from sleep.     Scheduled Procedure: Right knee replacement  Surgery Date:  11/27/17  Surgery Location:  [facility]  Surgeon:  Dr.Kevin Ryder    Current Outpatient Prescriptions   Medication Sig Dispense Refill     amLODIPine (NORVASC) 10 MG tablet Take 1 tablet (10 mg total) by mouth at bedtime. 90 tablet 3     calcium, as carbonate, (CALCIUM 500) 500 mg calcium (1,250 mg) tablet Take 1 tablet (500 mg total) by mouth 2 (two) times a day. 60 tablet 11     cholecalciferol, vitamin D3, 2,000 unit capsule Take 2,000 Units by mouth daily.       citalopram (CELEXA) 20 MG tablet Take 1 tablet (20 mg total) by mouth daily. 90 tablet 3     eletriptan (RELPAX) 20 MG tablet TK ONE  T PO AT ONSET OF MIGRAINE AND MAY REPEAT AFTER 2 H  3     furosemide (LASIX) 20 MG tablet TAKE 1 TABLET BY MOUTH DAILY 90 tablet 1     HYDROcodone-acetaminophen 5-325 mg per tablet Take 1-2 tablets by mouth every 6 (six) hours as needed for pain. 45 tablet 0     levothyroxine (SYNTHROID, LEVOTHROID) 88 MCG tablet TAKE 1 TABLET BY MOUTH DAILY AT 6AM 90 tablet 3     LORazepam (ATIVAN) 1 MG tablet Take 0.5 tablets (0.5 mg total) by mouth daily as needed for  anxiety. 30 tablet 0     pramipexole (MIRAPEX) 0.25 MG tablet TAKE 1 TABLET(0.25 MG) BY MOUTH TWICE DAILY 180 tablet 2     propranolol (INDERAL) 10 MG tablet Take 1 tablet (10 mg) by mouth twice daily 90 tablet 3     ranitidine (ZANTAC) 150 MG tablet Take 150 mg by mouth 2 (two) times a day.        valsartan (DIOVAN) 160 MG tablet Take 1 tablet (160 mg total) by mouth every morning. 90 tablet 3     No current facility-administered medications for this visit.        Allergies   Allergen Reactions     Ace Inhibitors Other (See Comments)     Body aches, elevated BP     Amitriptyline Other (See Comments)     Elevated BP     Atenolol Other (See Comments)     Aggrevates Raynaud's     Celecoxib Other (See Comments)           Clonidine Nausea And Vomiting     Codeine Nausea And Vomiting     Desogestrel-Ethinyl Estradiol Swelling     Dexamethasone Swelling     Erythromycin Nausea And Vomiting     Escitalopram Other (See Comments)     Headaches.     Hydrochlorothiazide Other (See Comments)     hyponatremia     Propoxyphene N-Acetaminophen Other (See Comments)     Tramadol-Acetaminophen Other (See Comments)     Venlafaxine Nausea And Vomiting     Zolpidem Other (See Comments)     Penicillins Rash and Swelling     Sulfa (Sulfonamide Antibiotics) Rash     Tramadol Itching, Rash and Swelling       Immunization History   Administered Date(s) Administered     Hep A, historic 06/13/2006, 12/14/2006     Hep B, Adult 06/13/2006     Hep B, historic 06/13/2006     Influenza Q9m9-52, 12/29/2009     Influenza high dose, seasonal 10/06/2015, 09/26/2016, 10/26/2017     Influenza, inj, historic,unspecified 11/09/2006, 10/24/2007, 09/21/2011, 10/09/2012, 10/01/2014, 10/06/2015     MMR 06/13/2006     Pneumo Conj 13-V (2010&after) 10/12/2015     Pneumo Polysac 23-V 10/26/2007, 10/26/2012     Td,adult,historic,unspecified 06/13/2006     Tdap 06/13/2006, 11/14/2016     Typhoid Live, Oral 06/13/2006     Typhoid, historic, Unspecified 06/13/2006      ZOSTER 06/24/2014       Patient Active Problem List   Diagnosis     Hypothyroidism     Back pain     Vitamin D deficiency     Hypertension     Generalized anxiety disorder     Restless leg syndrome     Pedal edema     GERD (gastroesophageal reflux disease)     Migraine     Hernia, ventral     Constipation - started postoperatively 2017     Menopause     Osteoporosis     Diverticulosis     Eczema       Past Medical History:   Diagnosis Date     Anxiety      Back pain      Breast cyst      Degenerative disc disease, lumbar      Disease of thyroid gland     hypothyroid     GERD (gastroesophageal reflux disease)      History of anesthesia complications     hypotension after surgery     Hypertension      Migraine      Osteoarthritis      Raynaud's disease      Restless leg syndrome      Ventral hernia      Vitamin D deficiency        Social History     Social History     Marital status:      Spouse name: N/A     Number of children: N/A     Years of education: N/A     Occupational History     Not on file.     Social History Main Topics     Smoking status: Never Smoker     Smokeless tobacco: Never Used     Alcohol use Yes      Comment: occ     Drug use: No     Sexual activity: Not on file     Other Topics Concern     Not on file     Social History Narrative       Past Surgical History:   Procedure Laterality Date     BACK SURGERY       BREAST CYST EXCISION       CARPAL TUNNEL RELEASE       COLECTOMY N/A 6/2/2017    Procedure: RESECTION, SMALL INTESTINE, OPEN ADHESIOLYSIS;  Surgeon: Keyon Qureshi MD;  Location: [facility] OR;  Service:      COLON SURGERY       HEMORRHOID SURGERY       HYSTERECTOMY  [id number]     INCISIONAL HERNIA REPAIR N/A 6/2/2017    Procedure: LAPARASCOPIC CONVERTED TO OPEN PRIMARY INCISIONAL HERNIA REPAIR;  Surgeon: Keyon Qureshi MD;  Location: [facility] OR;  Service:      INCONTINENCE SURGERY       LUMBAR FUSION       VARICOSE VEIN SURGERY         History of Present  "Illness  Recent Health  Fever: no  Chills: no  Fatigue: no  Chest Pain: no  Cough: no  Dyspnea: no  Urinary Frequency: no  Nausea: no  Vomiting: no  Diarrhea: no  Abdominal Pain: no  Easy Bruising: no  Lower Extremity Swelling: no  Poor Exercise Tolerance: no      Pertinent History  Prior Anesthesia: yes  Previous Anesthesia Reaction:  yes, once  Diabetes: no  Cardiovascular Disease: no  Pulmonary Disease: no  Renal Disease: no  GI Disease: no  Sleep Apnea: no  Thromboembolic Problems: no  Clotting Disorder: no  Bleeding Disorder: no  Transfusion Reaction: no  Impaired Immunity: no  Steroid use in the last 6 months: yes, cortisone in left knee for pain/ swelling, otherwise none.   Frequent Aspirin use: no    Family history of no surgical or anesthetic complications.    Social history of patient does not wear denture or partial plates, there is no transfusion refusal and there are no concerns regarding care after surgery    After surgery, the patient plans to recover at home with family.    Review of Systems   Review of Systems   Constitutional: Negative.    HENT: Negative.    Eyes: Negative.    Respiratory: Negative.    Cardiovascular: Negative.    Gastrointestinal: Negative.    Endocrine: Negative.    Genitourinary: Negative.    Musculoskeletal: Negative.    Skin: Negative.    Neurological: Negative.    Hematological: Negative.    Psychiatric/Behavioral: Negative.              Objective:         Vitals:    11/14/17 1039   BP: 124/80   Pulse: 68   Resp: 12   Weight: 161 lb (73 kg)   Height: 5' 1.5\" (1.562 m)       Physical Exam:   Physical Exam   Constitutional: She is oriented to person, place, and time. She appears well-developed and well-nourished.   HENT:   Head: Normocephalic and atraumatic.   Mouth/Throat: Oropharynx is clear and moist.   Eyes: EOM are normal.   Neck: Neck supple.   Cardiovascular: Normal rate, regular rhythm, normal heart sounds and intact distal pulses.    No murmur heard.  Pulmonary/Chest: " Effort normal and breath sounds normal. No respiratory distress. She has no wheezes. She has no rales. She exhibits no tenderness.   Abdominal: Soft. Bowel sounds are normal.   Musculoskeletal: Normal range of motion.   Neurological: She is alert and oriented to person, place, and time.   Skin: Skin is warm and dry.   Psychiatric: She has a normal mood and affect. Her behavior is normal. Judgment and thought content normal.       ekg done today and was normal - await cardiology reading  cmp and cbc also ordered.

## 2021-06-15 NOTE — TELEPHONE ENCOUNTER
Refill Approved    Rx renewed per Medication Renewal Policy. Medication was last renewed on 2/10/21, last OV 2/9/21.    Lynnette Canales, [facility] Triage/Med Refill 3/7/2021     Requested Prescriptions   Pending Prescriptions Disp Refills     topiramate (TOPAMAX) 25 MG tablet [Pharmacy Med Name: TOPIRAMATE 25 MG TABLET] 30 tablet 0     Sig: TAKE 1 TABLET BY MOUTH EVERYDAY AT BEDTIME       Topiramate Refill Protocol  Passed - 3/5/2021 11:36 AM        Passed - Bicarbonate/Electrolyte panel in last 12 months     Sodium   Date Value Ref Range Status   12/04/2020 137 136 - 145 mmol/L Final     Potassium   Date Value Ref Range Status   12/04/2020 4.4 3.5 - 5.0 mmol/L Final     Chloride   Date Value Ref Range Status   12/04/2020 105 98 - 107 mmol/L Final     CO2   Date Value Ref Range Status   12/04/2020 20 (L) 22 - 31 mmol/L Final                Passed - PCP or prescribing provider visit in last 12 months or next 3 months     Last office visit with prescriber/PCP: 12/17/2020 Bernadette Taylor MD OR same dept: 12/17/2020 Bernadette Taylor MD OR same specialty: 12/17/2020 Bernadette Taylor MD  Last physical: 8/5/2020 Last MTM visit: Visit date not found   Next visit within 3 mo: Visit date not found  Next physical within 3 mo: Visit date not found  Prescriber OR PCP: Bernadette Taylor MD  Last diagnosis associated with med order: 1. Fibromyalgia  - topiramate (TOPAMAX) 25 MG tablet [Pharmacy Med Name: TOPIRAMATE 25 MG TABLET]; TAKE 1 TABLET BY MOUTH EVERYDAY AT BEDTIME  Dispense: 30 tablet; Refill: 0    If protocol passes may refill for 12 months if within 3 months of last provider visit (or a total of 15 months).             Passed - Serum creatinine in last 12 months     Creatinine   Date Value Ref Range Status   12/04/2020 0.82 0.60 - 1.10 mg/dL Final

## 2021-06-15 NOTE — PROGRESS NOTES
Suma Mark is a 70 y.o. female who is being evaluated via a billable telephone visit.      What phone number would you like to be contacted at? [phone]  How would you like to obtain your AVS? AVS Preference: Karla.  Assessment & Plan     Anxiety  Taking Buspar two times a day and Celexa 20 mg daily. Occasionally still have some anxiety feeling. Not feeling depressed. Has also fibromyalgia. We will switch from Celexa to Duloxetine 60 mg daily. She will  Call me if any side effects.  - DULoxetine (CYMBALTA) 60 MG capsule  Dispense: 90 capsule; Refill: 3  - hydrOXYzine HCL (ATARAX) 25 MG tablet  Dispense: 270 tablet; Refill: 3    Insomnia, unspecified type  Taking 100 mg trazodone and hydroxyzine 25 mg. Some nights cannot fall asleep until 4am, some night falls asleep easy but wakes up few times during the night.  She will increase trazodone to 150 mg at bedtime.  She will add one more hydroxyzine if needed.  - traZODone (DESYREL) 50 MG tablet  Dispense: 90 tablet; Refill: 3  - hydrOXYzine HCL (ATARAX) 25 MG tablet  Dispense: 270 tablet; Refill: 3    Restless leg syndrome  Taking Mirapex three times a day. Restless legs are waking her up from sleep. She will add iron pill daily.  She will wean off topiramate from 100 mg to 50 mg for 2 weeks and then to 25 mg for 2 weeks and then stop. It was prescribed 1-2 months ago by [facility] for pain management.  - ferrous sulfate 325 (65 FE) MG tablet  Dispense: 120 tablet; Refill: 3  - topiramate (TOPAMAX) 50 MG tablet  Dispense: 60 tablet; Refill: 1    F/u in 4 weeks or earlier if any side effects.      Bernadette Taylor MD  [facility]          Phone call duration: 25 minutes

## 2021-06-15 NOTE — TELEPHONE ENCOUNTER
Refill Approved    Rx renewed per Medication Renewal Policy. Medication was last renewed on 1/19/20, last OV 2/9/21.    Lynnette Canales, [facility] Triage/Med Refill 2/14/2021     Requested Prescriptions   Pending Prescriptions Disp Refills     furosemide (LASIX) 20 MG tablet [Pharmacy Med Name: FUROSEMIDE 20 MG TABLET] 90 tablet 2     Sig: TAKE 1 TABLET BY MOUTH EVERY DAY       Diuretics/Combination Diuretics Refill Protocol  Passed - 2/14/2021  9:15 AM        Passed - Visit with PCP or prescribing provider visit in past 12 months     Last office visit with prescriber/PCP: Visit date not found OR same dept: 12/17/2020 Bernadette Taylor MD OR same specialty: 12/17/2020 Bernadette Taylor MD  Last physical: Visit date not found Last MTM visit: Visit date not found   Next visit within 3 mo: Visit date not found  Next physical within 3 mo: Visit date not found  Prescriber OR PCP: Tapan Daniel MD  Last diagnosis associated with med order: 1. Essential hypertension  - furosemide (LASIX) 20 MG tablet [Pharmacy Med Name: FUROSEMIDE 20 MG TABLET]; TAKE 1 TABLET BY MOUTH EVERY DAY  Dispense: 90 tablet; Refill: 2    2. Bilateral edema of lower extremity  - furosemide (LASIX) 20 MG tablet [Pharmacy Med Name: FUROSEMIDE 20 MG TABLET]; TAKE 1 TABLET BY MOUTH EVERY DAY  Dispense: 90 tablet; Refill: 2    If protocol passes may refill for 12 months if within 3 months of last provider visit (or a total of 15 months).             Passed - Serum Potassium in past 12 months      Lab Results   Component Value Date    Potassium 4.4 12/04/2020             Passed - Serum Sodium in past 12 months      Lab Results   Component Value Date    Sodium 137 12/04/2020             Passed - Blood pressure on file in past 12 months     BP Readings from Last 1 Encounters:   12/17/20 118/60             Passed - Serum Creatinine in past 12 months      Creatinine   Date Value Ref Range Status   12/04/2020 0.82 0.60 - 1.10 mg/dL Final

## 2021-06-15 NOTE — PROGRESS NOTES
Suma Mark is a 70 y.o. female who is being evaluated via a billable telephone visit.          Assessment & Plan     Anxiety  Taking Buspar two times a day and Duloxetine 60mg daily. Anxiety improved.Not feeling depressed. Has also fibromyalgia. We did switch from Celexa to Duloxetine 60 mg daily a month ago. She noticed some weight gain but no other side effects.     Restless leg syndrome  Taking Mirapex three times a day.sleeping much better since trazodone dose increased.She will add iron pill daily.  She did wean off topiramate. It was prescribed 1-2 months ago by [facility] for pain management.    Insomnia, unspecified type  Sleeping much better since increase trazodone to 150 mg a day and added hydroxyzine at bedtime.    F/u is scheduled in June.      Bernadette Taylor MD  [facility]          Phone call duration: 13 minutes

## 2021-06-15 NOTE — PROGRESS NOTES
ASSESSMENT AND PLAN:  Problem List Items Addressed This Visit     Insomnia, unspecified type     She does complain of middle of the night insomnia.  She gets up and place her iPad-I did talk to her about this and told her the iPad is not going to be helpful.  She is going to try to do some meditation in the middle of the night instead.    Possibly some trazodone would be a good idea in the future if this is not improving.         TMJ (dislocation of temporomandibular joint)     Left side tmj noted today - direct pain to tmj with palpation.    I discussed the pathophysiology of TMJ today.    We discussed resting the jaw-soft diet  We discussed using ice intermittently to help decrease inflammation  Ibuprofen was suggested over-the-counter to use if needed.  I also prescribed some tizanidine today to use as a muscle relaxant to help at night.  She is advised that this can be addictive and to use sparingly and also that this can cause sedation and she should only use it when she does not need to be alert.  I also suggested the potential benefit of an antidepressant medication to help with managing stress levels which could result in clenching the jaw.  Lastly we discussed physical therapy to help with posture which could also affect TMJ.  We discussed consulting the dentist if problems are persistent.                Chief Complaint   Patient presents with     Ear Problem     Patient thinks she has an ear infection (left ear).      HPI  The patient has a few weeks of left upper tooth/ ear pain history. There is no history of jaw pain and clicking while chewing or opening mouth. She denies a history of injury to this area.     History   Smoking Status     Never Smoker   Smokeless Tobacco     Never Used      Current Outpatient Prescriptions on File Prior to Visit   Medication Sig Dispense Refill     amLODIPine (NORVASC) 10 MG tablet Take 1 tablet (10 mg total) by mouth at bedtime. 90 tablet 3     aspirin 325 MG EC tablet  Take 1 tablet (325 mg total) by mouth 2 (two) times a day with meals. 84 tablet 0     botulinum toxin Type A, Cosm, (BOTOX) 100 unit SolR Every 3 months. [facility]       calcium, as carbonate, (CALCIUM 500) 500 mg calcium (1,250 mg) tablet Take 1 tablet (500 mg total) by mouth 2 (two) times a day. 60 tablet 11     cholecalciferol, vitamin D3, 2,000 unit capsule Take 2,000 Units by mouth daily.       citalopram (CELEXA) 20 MG tablet Take 1 tablet (20 mg total) by mouth daily. (Patient taking differently: Take 20 mg by mouth at bedtime. ) 90 tablet 3     citalopram (CELEXA) 20 MG tablet TAKE 1 AND 1/2 TABLETS(30 MG) BY MOUTH DAILY 135 tablet 3     eletriptan (RELPAX) 20 MG tablet Take 20 mg by mouth as needed for migraine. may repeat in 2 hours if necessary       furosemide (LASIX) 20 MG tablet Take 20 mg by mouth daily.       HYDROcodone-acetaminophen 5-325 mg per tablet Take 1-2 tablets by mouth every 4 (four) hours as needed. 55 tablet 0     levothyroxine (SYNTHROID, LEVOTHROID) 88 MCG tablet TAKE 1 TABLET BY MOUTH DAILY AT 6AM (Patient taking differently: Take 88 mcg by mouth Daily at 6:00 am. TAKE 1 TABLET BY MOUTH DAILY AT 6AM) 90 tablet 3     LORazepam (ATIVAN) 1 MG tablet Take 0.5 tablets (0.5 mg total) by mouth daily as needed for anxiety. 30 tablet 0     pramipexole (MIRAPEX) 0.25 MG tablet Take 0.25 mg by mouth 2 (two) times a day.       promethazine (PHENERGAN) 12.5 MG tablet Take 1 tablet (12.5 mg total) by mouth every 6 (six) hours as needed for nausea. 10 tablet 0     propranolol (INDERAL) 10 MG tablet Take 1 tablet (10 mg) by mouth twice daily (Patient taking differently: Take 10 mg by mouth 2 (two) times a day. Take 1 tablet (10 mg) by mouth twice daily) 90 tablet 3     propranolol (INDERAL) 10 MG tablet TAKE 1 TABLET(10 MG) BY MOUTH THREE TIMES DAILY 90 tablet 3     ranitidine (ZANTAC) 150 MG tablet Take 150 mg by mouth 2 (two) times a day.        senna-docusate (PERICOLACE) 8.6-50 mg tablet Take  1 tablet by mouth 2 (two) times a day.  0     valsartan (DIOVAN) 160 MG tablet Take 1 tablet (160 mg total) by mouth every morning. 90 tablet 3     bisacodyl (DULCOLAX) 10 mg suppository Take suppository tonight 11/30 to have bowel movement. Call if you are not having good results. [phone]  0     melatonin 3 mg Tab tablet Take 1 tablet (3 mg total) by mouth at bedtime as needed. 15 tablet 0     traZODone (DESYREL) 50 MG tablet Take 0.5 tablets (25 mg total) by mouth at bedtime as needed for sleep. 6 tablet 0     No current facility-administered medications on file prior to visit.       Allergies   Allergen Reactions     Ace Inhibitors Other (See Comments)     Body aches, elevated BP     Amitriptyline Other (See Comments)     Elevated BP     Atenolol Other (See Comments)     Aggrevates Raynaud's     Celecoxib Other (See Comments)           Clonidine Nausea And Vomiting     Codeine Nausea And Vomiting     Desogestrel-Ethinyl Estradiol Swelling     Dexamethasone Swelling     Erythromycin Nausea And Vomiting     Escitalopram Other (See Comments)     Headaches.     Hydrochlorothiazide Other (See Comments)     hyponatremia     Propoxyphene N-Acetaminophen Other (See Comments)     Tramadol-Acetaminophen Other (See Comments)     Venlafaxine Nausea And Vomiting     Zolpidem Other (See Comments)     Penicillins Rash and Swelling     Sulfa (Sulfonamide Antibiotics) Rash     Tramadol Itching, Rash and Swelling     Review of Systems   Constitutional: Negative.         She does complain of middle of the night insomnia.  She gets up and place her iPad-I did talk to her about this and told her the iPad is not going to be helpful.  She is going to try to do some meditation in the middle of the night instead.   HENT: Negative.    Eyes: Negative.    Respiratory: Negative.    Cardiovascular: Negative.    Gastrointestinal: Negative.    Endocrine: Negative.    Genitourinary: Negative.    Musculoskeletal: Negative.    Skin: Negative.  "   Neurological: Negative.    Hematological: Negative.    Psychiatric/Behavioral: Negative.         OBJECTIVE: /70  Pulse 76  Temp 98  F (36.7  C) (Oral)   Resp 14  Ht 5' 1.5\" (1.562 m)  Wt 162 lb (73.5 kg)  LMP  (LMP Unknown)  Breastfeeding? No  BMI 30.11 kg/m2   Appears well, in no apparent distress.   Ears normal. Throat and pharynx normal. Neck supple. No adenopathy or masses in the neck or supraclavicular regions. Sinuses non tender. The left temporomandibular joint reveals tenderness, pain and clicking with opening. The right temporomandibular joint reveals no tenderness, pain and clicking with opening. Teeth are non tender.     "

## 2021-06-16 NOTE — PROGRESS NOTES
"Suma Mark is a 70 y.o. female who is being evaluated via a billable telephone visit.          Assessment & Plan     Anxiety  Buspar started in 1/2021, taking two times a day. She does not think it is helpful. She is still getting episodes of severe anxiety and inner body shakiness when she needs to take lorazepam.   We switched from citalopram to duloxetine in February. She reports 15lbs weight gain over the last 4-6 weeks. She would like to go back to Citalopram.  - citalopram (CELEXA) 20 MG tablet  Dispense: 90 tablet; Refill: 3    Weight gain  We reviewed all her meds. Possible weight gain is reported with duloxetine, Buspar and trazodone.    Insomnia, unspecified type  She takes 2 trazodone pills and one hydroxyzine pills at bedtime. If she takes less meds, stays awake whole night.     Generalized anxiety disorder  Lorazepam works the best when feeling very anxious.  - LORazepam (ATIVAN) 1 MG tablet  Dispense: 30 tablet; Refill: 0    Essential hypertension  She needs refill of Avapro.  - irbesartan (AVAPRO) 150 MG tablet  Dispense: 90 tablet; Refill: 3    Depression, unspecified depression type  We will add Bupropion. I strongly recommended seeing psychotherapist for depression and anxiety and  psychiatrist for the medication management. She will look up list of the providers in her network.  - buPROPion (WELLBUTRIN XL) 150 MG 24 hr tablet  Dispense: 90 tablet; Refill: 3  F/u in 4 weeks.     BMI:   Estimated body mass index is 31.21 kg/m  as calculated from the following:    Height as of 8/5/20: 5' 2.25\" (1.581 m).    Weight as of 12/17/20: 172 lb (78 kg).         Bernadette Taylor MD  [facility]    22 min spent during this telephone visit.  "

## 2021-06-16 NOTE — TELEPHONE ENCOUNTER
RN cannot approve Refill Request    RN can NOT refill this medication medication not on med list. Last office visit: 12/17/2020 Bernadette Taylor MD Last Physical: 8/5/2020 Last MTM visit: Visit date not found Last visit same specialty: 12/17/2020 Bernadette Taylor MD.  Next visit within 3 mo: Visit date not found  Next physical within 3 mo: Visit date not found      Lynnette Canales, [facility] Triage/Med Refill 4/3/2021    Requested Prescriptions   Pending Prescriptions Disp Refills     topiramate (TOPAMAX) 50 MG tablet [Pharmacy Med Name: TOPIRAMATE 50 MG TABLET] 60 tablet 1     Sig: TAKE 1 TABLET BY MOUTH EVERY DAY       Topiramate Refill Protocol  Passed - 4/2/2021  1:32 PM        Passed - Bicarbonate/Electrolyte panel in last 12 months     Sodium   Date Value Ref Range Status   12/04/2020 137 136 - 145 mmol/L Final     Potassium   Date Value Ref Range Status   12/04/2020 4.4 3.5 - 5.0 mmol/L Final     Chloride   Date Value Ref Range Status   12/04/2020 105 98 - 107 mmol/L Final     CO2   Date Value Ref Range Status   12/04/2020 20 (L) 22 - 31 mmol/L Final                Passed - PCP or prescribing provider visit in last 12 months or next 3 months     Last office visit with prescriber/PCP: 12/17/2020 Bernadette Taylor MD OR same dept: 12/17/2020 Bernadette Taylor MD OR same specialty: 12/17/2020 Bernadette Taylor MD  Last physical: 8/5/2020 Last MTM visit: Visit date not found   Next visit within 3 mo: Visit date not found  Next physical within 3 mo: Visit date not found  Prescriber OR PCP: Bernadette Taylor MD  Last diagnosis associated with med order: 1. Restless leg syndrome  - topiramate (TOPAMAX) 50 MG tablet [Pharmacy Med Name: TOPIRAMATE 50 MG TABLET]; TAKE 1 TABLET BY MOUTH EVERY DAY  Dispense: 60 tablet; Refill: 1  - ferrous sulfate 325 (65 FE) MG tablet [Pharmacy Med Name: FERROUS SULFATE 325 MG TABLET]; TAKE 1 TABLET BY MOUTH TWICE A DAY  Dispense: 120 tablet; Refill: 3    If protocol passes may  refill for 12 months if within 3 months of last provider visit (or a total of 15 months).             Passed - Serum creatinine in last 12 months     Creatinine   Date Value Ref Range Status   12/04/2020 0.82 0.60 - 1.10 mg/dL Final                ferrous sulfate 325 (65 FE) MG tablet [Pharmacy Med Name: FERROUS SULFATE 325 MG TABLET] 120 tablet 3     Sig: TAKE 1 TABLET BY MOUTH TWICE A DAY       Iron Supplements Refill Protocol Passed - 4/2/2021  1:32 PM        Passed - PCP or prescribing provider visit in past 12 months       Last office visit with prescriber/PCP: 12/17/2020 Bernadette Taylor MD OR same dept: 12/17/2020 Bernadette Taylor MD OR same specialty: 12/17/2020 Bernadette Taylor MD  Last physical: 8/5/2020 Last MTM visit: Visit date not found   Next visit within 3 mo: Visit date not found  Next physical within 3 mo: Visit date not found  Prescriber OR PCP: Bernadette Taylor MD  Last diagnosis associated with med order: 1. Restless leg syndrome  - topiramate (TOPAMAX) 50 MG tablet [Pharmacy Med Name: TOPIRAMATE 50 MG TABLET]; TAKE 1 TABLET BY MOUTH EVERY DAY  Dispense: 60 tablet; Refill: 1  - ferrous sulfate 325 (65 FE) MG tablet [Pharmacy Med Name: FERROUS SULFATE 325 MG TABLET]; TAKE 1 TABLET BY MOUTH TWICE A DAY  Dispense: 120 tablet; Refill: 3    If protocol passes may refill for 12 months if within 3 months of last provider visit (or a total of 15 months).             Passed - Hemoglobin or Hematocrit in last 12 months     Hemoglobin   Date Value Ref Range Status   07/31/2020 12.9 12.0 - 16.0 g/dL Final     Hematocrit   Date Value Ref Range Status   07/31/2020 39.0 35.0 - 47.0 % Final

## 2021-06-17 NOTE — PROGRESS NOTES
Preoperative Exam    Scheduled Procedure: right knee  Surgery Date:  4/27/18  Surgery Location: [facility]    Surgeon:  Dr. Izaiah Astorga    Assessment/Plan:     Problem List Items Addressed This Visit        High    Elevated alkaline phosphatase level     CMP to check for resolution before surgery         Relevant Orders    Comprehensive Metabolic Panel    Hyponatremia     CMP to check for resolution before surgery         Relevant Orders    Comprehensive Metabolic Panel    Hypochloremia     CMP to check for resolution before surgery         Relevant Orders    Comprehensive Metabolic Panel    Postoperative nausea and vomiting     Does not believe this will happen again.  She says that she thought she was under anesthetic effects for too long and that is why she had the nausea and vomiting.  She says she does not think that when she has her knee surgery this will even be an issue.            Unprioritized    Acquired hypothyroidism       3/15/18 tsh nl 1.50 no chg in dosage         Diverticulosis     She has been complaining of left lower quadrant abdominal pain and sought gastroenterology for this.  This is continued despite gi working wiht her on abd pain - egd ordered. She is planning egd tomorrow. She is also planning agile capsule. Gi doc suspects crohns.         Acute blood loss as cause of postoperative anemia - Primary     We will check CBC today to ensure no exacerbation of anemia before surgery.         Relevant Orders    HM1(CBC and Differential)    HM1 (CBC with Diff)    Fibromyalgia     She tells me today that she has chronic fibromyalgia diagnosed by another doctor.  She says that she cannot take ibuprofen for pain.  Unfortunately the pain keeps her awake.  She has been told to try Tylenol but she did try that and does not feel that it works.  She wonders if she can do something else for her pain.  I have referred her to the pain clinic.  She has a packet and will complete that, mail it in and  schedule with the pain clinic when able.         Relevant Orders    Ambulatory referral to Pain Clinic             Surgical Procedure Risk: Low (reported cardiac risk generally < 1%) can easily walk a mile without resting.   Have you had prior anesthesia?: yes  Have you or any family members had a previous anesthesia reaction:  No  Do you or any family members have a history of a clotting or bleeding disorder?: No  Cardiac Risk Assessment: no increased risk for major cardiac complications    Patient approved for surgery with general or local anesthesia.  Please note: Labs are still pending at the time of this note.    Functional Status: Independent  Patient plans to recover at home with family.     Subjective:      Suma Mark is a 67 y.o. female who presents for a preoperative consultation.  She has been having right knee pain.  It is not clear what is causing this and it suspected that there is some soft tissue scar tissue.  Plan is to mobilize the scar tissue during an operative to procedure.    In addition to her knee pain she wanted to talk about generalized chronic pain.  She has abdominal pain for which the GI doctor is seeing her and working her up.  He thinks she might have Crohn's disease.    Also she wants to know what she can do about her pain because she is not able to sleep and she has been told she cannot take ibuprofen due to her abdominal pain.  She has chronic pain from fibromyalgia was diagnosed by another doctor.    All other systems reviewed and are negative, other than those listed in the HPI.    Pertinent History  Do you have difficulty breathing or chest pain after walking up a flight of stairs: No  History of obstructive sleep apnea: No  Steroid use in the last 6 months: No  Frequent Aspirin/NSAID use: No  Prior Blood Transfusion: 2003  Prior Blood Transfusion Reaction: no  If for some reason prior to, during or after the procedure, if it is medically indicated, would you be willing to  have a blood transfusion?:  There is no transfusion refusal.    Current Outpatient Prescriptions   Medication Sig Dispense Refill     amLODIPine (NORVASC) 10 MG tablet Take 1 tablet (10 mg total) by mouth at bedtime. 90 tablet 3     aspirin 325 MG EC tablet Take 1 tablet (325 mg total) by mouth 2 (two) times a day with meals. 84 tablet 0     botulinum toxin Type A, Cosm, (BOTOX) 100 unit SolR Every 3 months. [facility]       calcium, as carbonate, (CALCIUM 500) 500 mg calcium (1,250 mg) tablet Take 1 tablet (500 mg total) by mouth 2 (two) times a day. 60 tablet 11     cholecalciferol, vitamin D3, 2,000 unit capsule Take 2,000 Units by mouth daily.       citalopram (CELEXA) 20 MG tablet TAKE 1 AND 1/2 TABLETS(30 MG) BY MOUTH DAILY 135 tablet 3     eletriptan (RELPAX) 20 MG tablet Take 20 mg by mouth as needed for migraine. may repeat in 2 hours if necessary       furosemide (LASIX) 20 MG tablet Take 20 mg by mouth daily.       HYDROcodone-acetaminophen 5-325 mg per tablet Take 1-2 tablets by mouth every 4 (four) hours as needed. 55 tablet 0     levothyroxine (SYNTHROID, LEVOTHROID) 88 MCG tablet TAKE 1 TABLET BY MOUTH DAILY AT 6AM (Patient taking differently: Take 88 mcg by mouth Daily at 6:00 am. TAKE 1 TABLET BY MOUTH DAILY AT 6AM) 90 tablet 3     LORazepam (ATIVAN) 1 MG tablet Take 0.5 tablets (0.5 mg total) by mouth daily as needed for anxiety. 30 tablet 0     pramipexole (MIRAPEX) 0.25 MG tablet Take 0.25 mg by mouth 2 (two) times a day.       propranolol (INDERAL) 10 MG tablet Take 1 tablet (10 mg) by mouth twice daily (Patient taking differently: Take 10 mg by mouth 2 (two) times a day. Take 1 tablet (10 mg) by mouth twice daily) 90 tablet 3     ranitidine (ZANTAC) 150 MG tablet Take 1 tablet (150 mg total) by mouth 2 (two) times a day.  0     ranitidine (ZANTAC) 150 MG tablet Take 1 tablet (150 mg total) by mouth 2 (two) times a day.  0     valsartan (DIOVAN) 160 MG tablet Take 1 tablet (160 mg total) by  mouth every morning. 90 tablet 3     propranolol (INDERAL) 10 MG tablet TAKE 1 TABLET(10 MG) BY MOUTH THREE TIMES DAILY 90 tablet 3     No current facility-administered medications for this visit.         Allergies   Allergen Reactions     Ace Inhibitors Other (See Comments)     Body aches, elevated BP     Amitriptyline Other (See Comments)     Elevated BP     Atenolol Other (See Comments)     Aggrevates Raynaud's     Celecoxib Other (See Comments)           Clonidine Nausea And Vomiting     Codeine Nausea And Vomiting     Desogestrel-Ethinyl Estradiol Swelling     Dexamethasone Swelling     Erythromycin Nausea And Vomiting     Escitalopram Other (See Comments)     Headaches.     Hydrochlorothiazide Other (See Comments)     hyponatremia     Propoxyphene N-Acetaminophen Other (See Comments)     Tramadol-Acetaminophen Other (See Comments)     Venlafaxine Nausea And Vomiting     Zolpidem Other (See Comments)     Penicillins Rash and Swelling     Sulfa (Sulfonamide Antibiotics) Rash     Tramadol Itching, Rash and Swelling       Patient Active Problem List   Diagnosis     Acquired hypothyroidism     Back pain     Vitamin D deficiency     Essential hypertension     Generalized anxiety disorder     Restless leg syndrome     Pedal edema     Gastroesophageal reflux disease without esophagitis     Right knee pain     Migraine     Hernia, ventral     Constipation - started postoperatively 2017     Menopause     Osteoporosis     Diverticulosis     Eczema     Elevated alkaline phosphatase level     Hyponatremia     Hypochloremia     S/P total knee replacement     Anxiety     Acute blood loss as cause of postoperative anemia     Insomnia, unspecified type     Depression, unspecified depression type     Postoperative nausea and vomiting     TMJ (dislocation of temporomandibular joint)     Fibromyalgia       Past Medical History:   Diagnosis Date     Anxiety      Back pain      Breast cyst      Degenerative disc disease, lumbar       Disease of thyroid gland     hypothyroid     GERD (gastroesophageal reflux disease)      History of anesthesia complications     hypotension after surgery     History of blood clots      History of transfusion      Hypertension      Migraine      Osteoarthritis      PONV (postoperative nausea and vomiting)      Raynaud's disease      Restless leg syndrome      Ventral hernia      Vitamin D deficiency        Past Surgical History:   Procedure Laterality Date     BACK SURGERY       BREAST CYST EXCISION       CARPAL TUNNEL RELEASE       COLECTOMY N/A 6/2/2017    Procedure: RESECTION, SMALL INTESTINE, OPEN ADHESIOLYSIS;  Surgeon: Keyon Qureshi MD;  Location: [facility];  Service:      COLON SURGERY       HEMORRHOID SURGERY       HYSTERECTOMY  [id number]     INCISIONAL HERNIA REPAIR N/A 6/2/2017    Procedure: LAPARASCOPIC CONVERTED TO OPEN PRIMARY INCISIONAL HERNIA REPAIR;  Surgeon: Keyon Qureshi MD;  Location: [facility] OR;  Service:      INCONTINENCE SURGERY       LUMBAR FUSION       PA TOTAL KNEE ARTHROPLASTY Right 11/27/2017    Procedure:  RIGHT TOTAL KNEE ARTHROPLASTY;  Surgeon: Kevin Ryder MD;  Location: [facility];  Service: Orthopedics     VARICOSE VEIN SURGERY         Social History     Social History     Marital status:      Spouse name: N/A     Number of children: N/A     Years of education: N/A     Occupational History     Not on file.     Social History Main Topics     Smoking status: Never Smoker     Smokeless tobacco: Never Used     Alcohol use Yes      Comment: occ     Drug use: No     Sexual activity: Not on file     Other Topics Concern     Not on file     Social History Narrative       Patient Care Team:  Melva Meade MD as PCP - General (Family Medicine)  Izaiah Astorga MD as Physician (Orthopedic Surgery)  Jc Mcginnis MD as Physician (Gastroenterology)  Akilah Lezama MD as Consulting Physician (Dermatology)  Natalie Martel DO as  Consulting Physician (Psychiatry)          Objective:     Vitals:    04/24/18 1108   BP: 124/64   Pulse: 72   SpO2: 96%   Weight: 168 lb 11.2 oz (76.5 kg)         Physical Exam:  Physical Exam   Constitutional: She is oriented to person, place, and time. She appears well-developed and well-nourished.   HENT:   Head: Normocephalic and atraumatic.   Right Ear: External ear normal.   Left Ear: External ear normal.   Nose: Nose normal.   Mouth/Throat: Oropharynx is clear and moist.   Eyes: Conjunctivae are normal. Pupils are equal, round, and reactive to light.   Neck: Neck supple.   Cardiovascular: Normal rate, regular rhythm and normal heart sounds.    Pulmonary/Chest: Effort normal and breath sounds normal.   Abdominal: Soft. Bowel sounds are normal.   Musculoskeletal: Normal range of motion.   Neurological: She is alert and oriented to person, place, and time.   Skin: Skin is warm and dry.   Psychiatric: She has a normal mood and affect.        There are no Patient Instructions on file for this visit.    ekg on file from 11/2017 - normal.     Labs:  Labs pending at this time.  Results will be reviewed when available.    Immunization History   Administered Date(s) Administered     Hep A, historic 06/13/2006, 12/14/2006     Hep B, Adult 06/13/2006     Hep B, historic 06/13/2006     Influenza [id number], 12/29/2009     Influenza high dose, seasonal 10/06/2015, 09/26/2016, 10/26/2017     Influenza, inj, historic,unspecified 11/09/2006, 10/24/2007, 09/21/2011, 10/09/2012, 10/01/2014, 10/06/2015     MMR 06/13/2006     Pneumo Conj 13-V (2010&after) 10/12/2015     Pneumo Polysac 23-V 10/26/2007, 10/26/2012     Td,adult,historic,unspecified 06/13/2006     Tdap 06/13/2006, 11/14/2016     Typhoid Live, Oral 06/13/2006     Typhoid, historic, Unspecified 06/13/2006     ZOSTER, LIVE 06/24/2014           Electronically signed by Melva Meade MD 04/24/18 11:10 AM

## 2021-06-17 NOTE — PATIENT INSTRUCTIONS - HE
Patient Instructions by Shaista Cano LPN at 1/7/2020  9:00 AM     Author: Shaista Cano LPN Service: -- Author Type: Licensed Nurse    Filed: 1/7/2020  9:45 AM Encounter Date: 1/7/2020 Status: Signed    : Shaista Cano LPN (Licensed Nurse)       Patient Education     Varicose Veins     Varicose veins are swollen, enlarged veins most often found in the legs. They are usually blue or purple in color and may bulge, twist, and stand out under the skin.  Normally, veins return blood from the body to the heart. The leg veins have one-way valves that prevent blood from flowing backward in the vein. When the valves are weak or damaged, blood backs up in the veins. This may cause some of the veins to swell and bulge and become varicose veins.  Symptoms  Varicose veins may or may not cause symptoms. If symptoms do occur, they can include:    Legs that feel tired, achy, heavy, or itchy    Leg muscle cramps    Skin changes, such as discoloration, dryness, redness, or rash (in more severe cases, you may also have sores on the skin called venous leg ulcers)  Risk Factors  There are a number of factors that increase the risk for varicose veins. These can include:    Being a woman    Being older    Sitting or standing for long periods    Being overweight    Being pregnant    Having a family history of varicose veins  Treatment begins with simple self-help measures (see below). If these dont help, there are many procedures that can be done to shrink or remove varicose veins. Your healthcare provider can tell you more about these options, if needed.  Home care    Support or compression stockings will likely be prescribed. If so, be sure to wear them as directed. They may help improve blood flow.    Exercising helps strengthen your leg muscles and improve blood flow. To get the most benefit, choose exercises such as walking, swimming, or cycling. Also try to exercise for at least 30 minutes on most  days.    Raising (elevating) your legs lets gravity help blood flow back to the heart. Sit or lie with your feet above heart level a few times throughout the day, or as directed.    Avoid long periods of sitting or standing. Change positions often. Also, move your ankles, toes and knees often. This may also help improve blood flow.    If you are overweight, talk with your healthcare provider about setting up a weight-loss plan. Maintaining a healthy weight can help reduce the strain on your veins. It may also improve symptoms, such as swelling and aching.    If you have dryness and itching, ask your provider about special lotions that can be applied to the skin to help improve symptoms.  Follow-up care  Follow up with your healthcare provider, or as directed. If imaging tests were done, youll be told the results and if there are any new findings that affect your care.  When to seek medical advice  Call your healthcare provider right away if any of these occur:    Sudden, severe leg swelling, pain, or redness    Symptoms worsen, or they dont improve with self-care    Bleeding from any affected veins    Ulcers form on the legs, ankles, or feet    Fever of 100.4 F (38 C) or higher, or as advised by your provider  Date Last Reviewed: 9/21/2015 2000-2017 [employer]. [address] [place] [address], [place] [address]. All rights reserved. This information is not intended as a substitute for professional medical care. Always follow your healthcare professional's instructions.

## 2021-06-17 NOTE — TELEPHONE ENCOUNTER
Telephone Encounter by Susy Heart at 1/26/2021 12:51 PM     Author: Susy Heart Service: -- Author Type: --    Filed: 1/26/2021 12:52 PM Encounter Date: 1/21/2021 Status: Signed    : Susy Heart APPROVED:    Approval start date: 10/27/2020  Approval end date:  1/25/2022    Pharmacy has been notified of approval and will contact patient when medication is ready for pickup.

## 2021-06-17 NOTE — PROGRESS NOTES
Suma Mark is a 70 y.o. female who is being evaluated via a billable telephone visit.          Assessment & Plan     Anxiety  On Celexa 20 mg daily and Buspar started in 1/2021, taking two times a day. She saw Psychiatrist recently who increased Celexa to 30 mg daily and Buspar to 30 mg two times a day.      Weight gain  We reviewed all her meds. Possible weight gain is reported with duloxetine, Buspar and trazodone.     Insomnia, unspecified type  She takes 2 trazodone pills and one hydroxyzine pills at bedtime. If she takes less meds, stays awake whole night. Psychiatrist recommended only 2 trazodone pills daily.     Generalized anxiety disorder  Lorazepam works the best when feeling very anxious.    I will see her in June for Prolia, AWV and will review these issues again.  [id number]}         Bernadette Taylor MD  [facility]            Phone call duration: 17 minutes

## 2021-06-17 NOTE — PATIENT INSTRUCTIONS - HE
"Patient Instructions by Terrence Hankins RN at 5/1/2019  9:00 AM     Author: Terrence Hankins RN Service: -- Author Type: Registered Nurse    Filed: 5/1/2019  9:26 AM Encounter Date: 5/1/2019 Status: Signed    : Terrence Hankins RN (Registered Nurse)    Related Notes: Original Note by Terrence Hankins RN (Registered Nurse) filed at 5/1/2019  9:09 AM       We are prescribing some compression stockings for you. I have included different suppliers that should help you get measured and fitting to ensure proper fitting socks. You should wear this socks as much as you can. It is especially important to wear them with long periods of sitting/standing, long car rides or if you will be flying. Compression socks should get refilled every 4-6 months. They do not need to be worn at night while in bed.    If you do a lot of standing it is good to do calf raises to help keep the blood pumping. If you sit a lot at work it is good to get up periodically to walk around. Elevation of the foot of your bed 4-6\" helps the blood return back to where it is needed.    .    Varicose Veins      Varicose veins are swollen, enlarged veins most often found in the legs. They are usually blue or purple in color and may bulge, twist, and stand out under the skin.  Normally, veins return blood from the body to the heart. The leg veins have one-way valves that prevent blood from flowing backward in the vein. When the valves are weak or damaged, blood backs up in the veins. This may cause some of the veins to swell and bulge and become varicose veins.  Symptoms  Varicose veins may or may not cause symptoms. If symptoms do occur, they can include:    Legs that feel tired, achy, heavy, or itchy    Leg muscle cramps    Skin changes, such as discoloration, dryness, redness, or rash (in more severe cases, you may also have sores on the skin called venous leg ulcers)  Risk Factors  There are a number of factors that increase the risk for varicose veins. " These can include:    Being a woman    Being older    Sitting or standing for long periods    Being overweight    Being pregnant    Having a family history of varicose veins  Treatment begins with simple self-help measures (see below). If these dont help, there are many procedures that can be done to shrink or remove varicose veins. Your healthcare provider can tell you more about these options, if needed.  Home care    Support or compression stockings will likely be prescribed. If so, be sure to wear them as directed. They may help improve blood flow.    Exercising helps strengthen your leg muscles and improve blood flow. To get the most benefit, choose exercises such as walking, swimming, or cycling. Also try to exercise for at least 30 minutes on most days.    Raising (elevating) your legs lets gravity help blood flow back to the heart. Sit or lie with your feet above heart level a few times throughout the day, or as directed.    Avoid long periods of sitting or standing. Change positions often. Also, move your ankles, toes and knees often. This may also help improve blood flow.    If you are overweight, talk with your healthcare provider about setting up a weight-loss plan. Maintaining a healthy weight can help reduce the strain on your veins. It may also improve symptoms, such as swelling and aching.    If you have dryness and itching, ask your provider about special lotions that can be applied to the skin to help improve symptoms.  Follow-up care  Follow up with your healthcare provider, or as directed. If imaging tests were done, youll be told the results and if there are any new findings that affect your care.  When to seek medical advice  Call your healthcare provider right away if any of these occur:    Sudden, severe leg swelling, pain, or redness    Symptoms worsen, or they dont improve with self-care    Bleeding from any affected veins    Ulcers form on the legs, ankles, or feet    Fever of 100.4 F  (38 C) or higher, or as advised by your provider      Understanding Spider and Varicose Veins  Do you often hide your legs because of the way they look? You may have noticed tiny red or blue bursts (spider veins). Or maybe you have veins that bulge or look twisted (varicose veins). If so, there are treatments that can help  What are the symptoms?  Spider veins or varicose veins may never be a problem. But sometimes they can cause legs to ache or swell. Your legs may also feel heavy and tired, or like theyre burning. These symptoms may be more severe at the end of the day. Prolonged sitting or standing can also make your symptoms worse.  Who gets spider and varicose veins?  Anyone can get spider or varicose veins. But vein problems tend to be hereditary (run in families). Other factors that can affect veins include:    Pregnancy, hormones, and birth control pills    A job where you stand or sit a lot    Extra weight or lack of exercise    Age         Spider veins look like tiny webs on the ankles, legs, and upper thighs.       Ropy, dark blue, red, or flesh-colored varicose veins are most common on the thighs, calves, and feet.    What can be done?  Spider and varicose veins can affect the way you feel about yourself. Talk to your healthcare provider about your concerns. There are treatments that can ease symptoms and make your legs look better.  Your treatment choices  Treatment may include self-care, sclerotherapy (injecting veins with a chemical), surgery, or newer nonsurgical minimally invasive therapies. Spider veins and some varicose veins can be treated with sclerotherapy. Large varicose veins can often be treated with newer minimally invasive procedures and, in rare cases, surgery may be needed.     Please call [facility] to schedule an appointment. If you received a prescription please bring it with you to your appointment. You may call one of the locations below, although some  locations are limited to what they carry.    Office Locations  New Locations  [facility]  Home Medical Equipment  [address], [place] [address]  Orthotics and Prosthetics ([facility])  [address], [place] [address]  Phone [phone] /Fax [phone]        [place]   [address]   Medical Equipment [address]/Orthotics and Prosthetics [address]  [place] [address]   Phone: [phone]  Fax [phone]    [facility]  [address]  [place] [address]  Phone: [phone]  Fax: [phone]    [facility] Medical Bldg[facility]   [address]  [place] [address]  Phone: [phone]  Fax: [phone]    [facility] Professional Bldg[facility]  [address]  [place] [address]  Phone: [phone]  Fax: [phone]    [facility]  [address]  [place] [address]  Phone: [phone]  Fax: [phone]    [facility] [place]  [address]   [place] [address]   Phone: [phone]  Fax: [phone]    [place]   [address] [place] [address]   Phone: [phone]  Fax: [phone]    [facility]  [address]  [place] [address]    [place] [phone]  [phone]  [place] [phone]  [email or web address]      [facility]   [address]             [address]                 [address]     [place] [address]      [place] [address]         [place], [place] [id number] [phone]  [phone]     [phone]               Fax: [phone]  [email or web address]                                                      [facility]  [address] [place] [address]  [phone]  [phone]  [email or web address]    Nell Abdullahi  [phone]  [email or web address]   [phone]    [facility]  [address] [place] [address]    [phone]  After your phlebectomy we would like to see you two weeks after follow up. If you don't already have a follow up appointment we should be seen at the [facility] Center in 2 weeks. Please ensure you are wrapping your leg with an ace bandage or short stretch wrap for the next 5-7 days until it is comfortable enough to transition into your compression stocking. Please call the Vascular Center with any questions at [phone]    Surgery for Varicose Veins  If you have large varicose veins, surgery may be the best choice. However, it will not prevent new varicose veins from forming. Surgery is most often performed in a hospital or surgery center on an outpatient basis.  Varicose vein surgery    Your surgery will be tailored to your needs. Varicose veins may be tied off (ligation), destroyed, or removed. Blood will then flow through the healthy veins. One or more of the following techniques may be used:  Vein stripping and ligation  In more severe cases, the surgeon may tie off and remove veins by making smaller cuts in the skin. Smaller branching veins may also be tied off or removed.  Microphlebectomy or ambulatory phlebectomy  A special hook is used to gently take out a varicose vein through tiny incisions. Microphlebectomy may be done in your healthcare providers office.  Sclerotherapy  Your healthcare provider will inject the varicose vein with a special chemical that will quickly close the vein from the inside. This is particularly useful for smaller veins.  PIN stripping  All or part of the vein may be removed with a stripping instrument.  Ablation (laser or radiofrequency)  A tiny cut in the skin is  made near the varicose vein. A small tube called a catheter is inserted into the vein. Energy or heat released from the catheter tip will make the vein walls collapse and stick together, stopping all blood flow through the vein.   Know about the risks  Your healthcare provider will talk with you about the risks of surgery. These include:    Bleeding or swelling    A sense of numbness, burning, or tingling in areas near the procedure    Edema or swelling in the legs    Clots in the deep veins that may travel to the lungs    Infection    Scarring   Date Last Reviewed: 5/1/2016 2000-2016 [employer]. [address] [place] [address]. All rights reserved. This information is not intended as a substitute for professional medical care. Always follow your healthcare professional's instructions.      Procedure: bilateral stab phlebectomy    Anticoagulation Schedule      1.Aspirin: ok to continue 81mg      The surgery scheduler Maria C Olivas at [phone] will contact you to schedule if you were not scheduled today.     You will need a preop physical within 30 days before surgery with your primary care provider. Please note if you do not get a complete History and Physical your surgery will be cancelled.   Please contact your primary to schedule.     A nurse should contact you from the hospital 1-2 days before surgery to review with you.    If you would like a Good Gricel Estimate for your upcoming service/procedure contact Cost of Care Estimates at [phone], advocates are available Monday through Friday 8am - 5pm. You may also submit a request online at [email or web address]    [address]  [address]     [phone] for facility charge    Anesthesiology charge  [phone]          Please don't hesitate to call us with any additional question or problems  Our number is [phone]

## 2021-06-18 NOTE — PATIENT INSTRUCTIONS - HE
Patient Instructions by Bernadette Taylor MD at 8/5/2020  1:40 PM     Author: Bernadette Taylor MD Service: -- Author Type: Physician    Filed: 8/5/2020  1:37 PM Encounter Date: 8/5/2020 Status: Addendum    : Bernadette Taylor MD (Physician)    Related Notes: Original Note by Bernadette Taylor MD (Physician) filed at 8/5/2020  1:02 PM       See you in December for Prolia.    Patient Education   Understanding USDA MyPlate  The USDA (US Department of Agriculture) has guidelines to help you make healthy food choices. These are called MyPlate. MyPlate shows the food groups that make up healthy meals using the image of a place setting. Before you eat, think about the healthiest choices for what to put onto your plate or into your cup or bowl. To learn more about building a healthy plate, visit www.choosemyplate.gov.       The Food Groups    Fruits: Any fruit or 100% fruit juice counts as part of the Fruit Group. Fruits may be fresh, canned, frozen, or dried, and may be whole, cut-up, or pureed. Make half your plate fruits and vegetables.    Vegetables: Any vegetable or 100% vegetable juice counts as a member of the Vegetable Group. Vegetables may be fresh, frozen, canned, or dried. They can be served raw or cooked and may be whole, cut-up, or mashed. Make half your plate fruits and vegetables.     Grains: All foods made from grains are part of the Grains Group. These include wheat, rice, oats, cornmeal, and barley such as bread, pasta, oatmeal, cereal, tortillas, and grits. Grains should be no more than a quarter of your plate. At least half of your grains should be whole grains.    Protein: This group includes meat, poultry, seafood, beans and peas, eggs, processed soy products (like tofu), nuts (including nut butters), and seeds. Make protein choices no more than a quarter of your plate. Meat and poultry choices should be lean or low fat.    Dairy: All fluid milk products and foods made from milk that contain  calcium, like yogurt and cheese are part of the Dairy Group. (Foods that have little calcium, such as cream, butter, and cream cheese, are not part of the group.) Most dairy choices should be low-fat or fat-free.    Oils: These are fats that are liquid at room temperature. They include canola, corn, olive, soybean, and sunflower oil. Foods that are mainly oil include mayonnaise, certain salad dressings, and soft margarines. You should have only 5 to 7 teaspoons of oils a day. You probably already get this much from the food you eat.  Use [employer] to Help Build Your Meals  The [employer] can help you plan and track your meals and activity. You can look up individual foods to see or compare their nutritional value. You can get guidelines for what and how much you should eat. You can compare your food choices. And you can assess personal physical activities and see ways you can improve. Go to www.Gen9.gov/[employer] Networkcker/.    9054-0463 [employer]. [address] [place] [address]. All rights reserved. This information is not intended as a substitute for professional medical care. Always follow your healthcare professional's instructions.             Advance Directive  Patients advance directive was discussed and I am comfortable with the patients wishes.  Patient Education   Personalized Prevention Plan  You are due for the preventive services outlined below.  Your care team is available to assist you in scheduling these services.  If you have already completed any of these items, please share that information with your care team to update in your medical record.  Health Maintenance   Topic Date Due   ? HYPERTENSION FOLLOW-UP  04/26/2018   ? ZOSTER VACCINES (3 of 3) 12/17/2018   ? MEDICARE ANNUAL WELLNESS VISIT  10/18/2019   ? INFLUENZA VACCINE RULE BASED (1) 08/01/2020   ? MAMMOGRAM  11/05/2020   ? FALL RISK ASSESSMENT  09/09/2020   ? DXA SCAN  05/08/2021   ? ADVANCE CARE PLANNING   10/18/2023   ? LIPID  07/31/2025   ? COLORECTAL CANCER SCREENING  09/27/2027   ? TD 18+ HE  07/22/2028   ? HEPATITIS C SCREENING  Completed   ? DEPRESSION ACTION PLAN  Addressed   ? HEPATITIS B VACCINES  Aged Out   ? PNEUMOCOCCAL IMMUNIZATION 65+ LOW/MEDIUM RISK  Discontinued         Before Your Surgery       Call your surgeon if there is any change in your health. This includes signs of a cold or flu (such as a sore throat, runny nose, cough, rash or fever).       Do not smoke, drink alcohol or take over the counter medicine (unless your surgeon or primary care doctor tells you to) for the 24 hours before and after surgery.       If you take prescribed drugs: Follow your doctors orders about which medicines to take and which to stop until after surgery.      Eating and drinking prior to surgery: follow the instructions from your surgeon.      Take a shower or bath the night before surgery. Use the soap your surgeon gave you to gently clean your skin. If you do not have soap from your surgeon, use your regular soap. Do not shave or scrub the surgery site. Wear clean pajamas and have clean sheets on your bed.             Hold all supplements, aspirin and NSAIDs for 7 days prior to surgery.    Follow your surgeon's direction on when to stop eating and drinking prior to surgery.    Your surgeon will be managing your pain after your surgery.

## 2021-06-18 NOTE — PROGRESS NOTES
[facility]  New patient consultation        CC-  Chief Complaint   Patient presents with     Consult     Fibromyalgia         Suma MICKY Mark 67 y.o. is here today, sent to me by  to discuss the patients pain.  Associated symptoms with pain include multi site joint pain after presenting for fibromyalgia type pain and has [facility] seen [facility] that was previous fusion with hardware that was also removed this was done at [facility] in [place]. Currently she sees Dr. Reid for RFA of her low back pain. Tylenol and IBU do not work    Alleviating factors: Include- currently she takes Norco maybe 1-2 per week for her low back pain   Aggravating factors:  Over active, bending too much, lifting heavy items, excessive walking, stairs, stress, temperture changes.   The patient denies using any assistive devices to help with mobilization.  Pain level today: On a scale of 1-10, the patient rates their pain at a 6  Function Rating: F8 affects activity levels, sleep ability to feel good everyday, feeling refreshed.       HPI  Past Medical History:   Diagnosis Date     Anxiety      Back pain      Breast cyst      Degenerative disc disease, lumbar      Depression      Disease of thyroid gland     hypothyroid     GERD (gastroesophageal reflux disease)      History of anesthesia complications     hypotension after surgery     History of blood clots      History of transfusion      Hypertension      Migraine      Osteoarthritis      Osteoporosis      PONV (postoperative nausea and vomiting)      Raynaud's disease      Restless leg syndrome      Ventral hernia      Vitamin D deficiency      Past Surgical History:   Procedure Laterality Date     BACK SURGERY       BREAST CYST EXCISION       CARPAL TUNNEL RELEASE       COLECTOMY N/A 6/2/2017    Procedure: RESECTION, SMALL INTESTINE, OPEN ADHESIOLYSIS;  Surgeon: Keyon Qureshi MD;  Location: [facility];   Service:      COLON SURGERY       HEMORRHOID SURGERY       HYSTERECTOMY  [id number]     INCISIONAL HERNIA REPAIR N/A 6/2/2017    Procedure: LAPARASCOPIC CONVERTED TO OPEN PRIMARY INCISIONAL HERNIA REPAIR;  Surgeon: Keyon Qureshi MD;  Location: [facility] Main OR;  Service:      INCONTINENCE SURGERY       LUMBAR FUSION       CT TOTAL KNEE ARTHROPLASTY Right 11/27/2017    Procedure:  RIGHT TOTAL KNEE ARTHROPLASTY;  Surgeon: Kvein Ryder MD;  Location: [facility] Main OR;  Service: Orthopedics     VARICOSE VEIN SURGERY       Family History   Problem Relation Age of Onset     Dementia Mother      Arthritis Mother      COPD Father      Cardiovascular Father      Hypertension Father      No Medical Problems Sister      No Medical Problems Daughter      No Medical Problems Son      Stroke Maternal Grandmother      Heart disease Paternal Grandmother      Stroke Paternal Grandmother      No Medical Problems Sister      No Medical Problems Sister      No Medical Problems Son      No Medical Problems Daughter      Breast cancer Paternal Aunt      age in 50's     History   Smoking Status     Never Smoker   Smokeless Tobacco     Never Used     History   Alcohol Use     8.4 oz/week     14 Shots of liquor per week     History   Drug Use No     History   Sexual Activity     Sexual activity: Not on file       Chemical Dependency History: Patient Denies tobacco use, alcohol use, illicit substance use including THC.  Denies any chemical dependency evaluation or treatment in the past.  Denies any legal issues related to substance use.    Psychiatric History:  Patient reports no current psychiatrist or psychologist.  Denies any suicidal ideation.  Denies any hospitalizations for mental illness. Sometimes the patient reports feeling anxiety that is driven by her pain.     ORT   Total: 1    Pertinent Pain Medications:    She currently takes  norco up too 2 per week for her low back pain, IBU and tylenol as needed but this does not  help.       Current Outpatient Prescriptions:      amLODIPine (NORVASC) 10 MG tablet, Take 1 tablet (10 mg total) by mouth at bedtime., Disp: 90 tablet, Rfl: 3     aspirin 325 MG EC tablet, Take 1 tablet (325 mg total) by mouth 2 (two) times a day with meals., Disp: 84 tablet, Rfl: 0     botulinum toxin Type A, Cosm, (BOTOX) 100 unit SolR, Every 3 months. [facility], Disp: , Rfl:      calcium, as carbonate, (CALCIUM 500) 500 mg calcium (1,250 mg) tablet, Take 1 tablet (500 mg total) by mouth 2 (two) times a day., Disp: 60 tablet, Rfl: 11     cholecalciferol, vitamin D3, 2,000 unit capsule, Take 2,000 Units by mouth daily., Disp: , Rfl:      citalopram (CELEXA) 20 MG tablet, TAKE 1 AND 1/2 TABLETS(30 MG) BY MOUTH DAILY, Disp: 135 tablet, Rfl: 3     eletriptan (RELPAX) 20 MG tablet, Take 20 mg by mouth as needed for migraine. may repeat in 2 hours if necessary, Disp: , Rfl:      furosemide (LASIX) 20 MG tablet, Take 20 mg by mouth daily., Disp: , Rfl:      gabapentin (NEURONTIN) 100 MG capsule, Take 100 mg by mouth at bedtime. For 1 week, Ok to increase to am use if no side effects and noon if no S/E, Disp: 90 capsule, Rfl: 0     HYDROcodone-acetaminophen 5-325 mg per tablet, Take 1-2 tablets by mouth every 4 (four) hours as needed., Disp: 55 tablet, Rfl: 0     levothyroxine (SYNTHROID, LEVOTHROID) 88 MCG tablet, TAKE 1 TABLET BY MOUTH DAILY AT 6AM (Patient taking differently: Take 88 mcg by mouth Daily at 6:00 am. TAKE 1 TABLET BY MOUTH DAILY AT 6AM), Disp: 90 tablet, Rfl: 3     LORazepam (ATIVAN) 1 MG tablet, Take 0.5 tablets (0.5 mg total) by mouth daily as needed for anxiety., Disp: 30 tablet, Rfl: 0     pramipexole (MIRAPEX) 0.25 MG tablet, Take 0.25 mg by mouth 2 (two) times a day., Disp: , Rfl:      propranolol (INDERAL) 10 MG tablet, Take 1 tablet (10 mg) by mouth twice daily (Patient taking differently: Take 10 mg by mouth 2 (two) times a day. Take 1 tablet (10 mg) by mouth twice daily), Disp: 90 tablet,  "Rfl: 3     ranitidine (ZANTAC) 150 MG tablet, Take 1 tablet (150 mg total) by mouth 2 (two) times a day., Disp: , Rfl: 0     ranitidine (ZANTAC) 150 MG tablet, Take 1 tablet (150 mg total) by mouth 2 (two) times a day., Disp: , Rfl: 0     valsartan (DIOVAN) 160 MG tablet, Take 1 tablet (160 mg total) by mouth every morning., Disp: 90 tablet, Rfl: 3    Therapeutic interventions previously tried-   PT for her knees and back   MBB and RFA for her low back     Review of Systems:  12 point systems were reviewed with pt as documented on pt health form of 6/12/2018. ROS was positive for low back pain and well as multi-site pain throughout her extremities and back the rest of the systems were pertinent negative.    Exam  Vitals:    06/12/18 1308   BP: 124/65   Patient Site: Right Arm   Patient Position: Sitting   Cuff Size: Adult Regular   Pulse: (!) 59   Resp: 18   Weight: 168 lb (76.2 kg)   Height: 5' 1.5\" (1.562 m)     Constitutional-General:  Pleasant, straightforward, healthy appearing individual.   Psych-Mental Status: A & O in no acute distress. Speech is fluent.  Recent and remote memory are intact.  Attention span and concentration are normal. Displays appropriate mood and affect.   H,E,N,T- Symmetrical, Eyes- Perrla, Nares- patents bilaterally, throat- trachea is midline, airway is patent, unlabored respiratory effort.  Lymph-cervical lymph system (anterior and posterior) without palpable nodules or tenderness throughout. Supraclavicular chain without palpable nodules or tenderness throughout.   Cardiovascular- Regular S1, S2 rhythm without murmurs, gallops, clicks or rubs. legs and feet are warm.  Pulses are palpable and grade 2 at the posterior tibial arteries bilaterally, no edema noted.  Pulmonary- lungs are clear to auscultation throughout   Musckuloskeletal  Gait:  Gait is normal.   Gross Motor: Toe walking, heel walking, and Romberg tests are normal.   Strength:  Bilateral upper and lower extremity strength " is normal and symmetric 5/5. No atrophy or tone abnormalities noted.   Sensation:  No loss of sensation noted to light touch in both upper and lower extremities. No dermatomal abnormal distributions noted.  Spine ROM:  Normal range of motion with no pain reproduction in the cervical, thoracic and lumbar spine.   Provocative Maneuvers:  Upper extremity, Hip, sacroiliac, and knee provocative maneuvers are negative,Tinel's test negative bilaterally, Facet loading test negative bilaterally. Impingement tests of rotator cuff pathology, negative bilaterally. Knee exam: Ligaments test was negative, Dalila test was negative. SLR test was negative bilaterally..   Palpation:  Inspection and palpatory examination of the spine and upper/lower extremities is unremarkable. Tenderness is noted in the low back pain and bilateral SI joint pain, myalgia type pain throughout back and arms.   Neuro:  Bilateral upper and lower extremity coordination and muscle stretch reflexes are physiologic and symmetric 2/4.  Babinski responses are downgoing.  No clonus bilaterally. Negative hoffmans sign bilaterally.   Integumentary: no rashes or breaks in the skin, no open wounds.     Lab:  Last UDS on 2018 was taken today and is pending.      Imaging:  No new imaging available to review    :  Dated 2018 was reviewed with the patient  [employer] [place] Date: 18  Query Report Page#: 1  Patient Rx History Report  AMNAV ISAACS  Search Criteria: Last Name 'manav' and First Name 'holly' and  = '[birth year]' and Request Period = '17' to  ' - 4 out of 4 Recipients Selected.  Fill Date Product, Str, Form Qty Days Pt ID Prescriber Written RX# N/R* Pharm **MED+  ---------- -------------------------------- ------ ---- --------- ---------- ---------- ------------ ----- --------- ------  05/15/2018 HYDROCODONE-ACETAMIN 5-325 MG 60.00 30 [id number] 2018 [id number] N [id number] 10.0  2018 LORAZEPAM 1 MG  TABLET 30.00 60 [id number] 05/07/2018 [id number] N [id number] 00.0  04/27/2018 OXYCODONE HCL 5 MG TABLET 20.00 3 [id number] 04/27/2018 [id number] N [id number] 50.0  01/13/2018 HYDROCODONE-ACETAMIN 5-325 MG 40.00 10 [id number] 01/09/2018 [id number] N [id number] 20.0  12/21/2017 HYDROCODONE-ACETAMIN 5-325 MG 60.00 10 [id number] 12/21/2017 [id number] N [id number] 30.0  12/08/2017 HYDROCODONE-ACETAMIN 5-325 MG 60.00 10 [id number] 12/08/2017 [id number] N [id number] 30.0  11/30/2017 HYDROCODONE-ACETAMIN 5-325 MG 55.00 5 [id number] 11/30/2017 [id number] N [id number] 55.0  10/31/2017 LORAZEPAM 1 MG TABLET 30.00 60 [id number] 10/31/2017 [id number] N [id number] UNK  10/05/2017 HYDROCODONE-ACETAMIN 5-325 MG 60.00 20 [id number] 10/05/2017 [id number] N [id number] 15.0  10/03/2017 LORAZEPAM 1 MG TABLET 2.00 1 [id number] 10/03/2017 [id number] N [id number] UNK  06/12/2017 HYDROCODONE-ACETAMIN 5-325 MG 60.00 20 [id number] 06/07/2017 [id number] N [id number] 15.0  *N/R N=New R=Refill  +MED Daily  Prescribers for prescriptions listed  ----------------------------------------------------------------------------------------------------------------------------------  [id number] NITESH LANG; [facility], [address] [place] [address], [facility] [address]  [id number] MERY IAVN; [facility], [address], [facility] [address]  [id number] DHRUV MEZA; [facility], [address] [place] [address], [facility] [address]  [id number] LANDY LANDON; [facility], [address], [place] [address]  [id number] GERMÁN BARRON (MSN); [address], [address] [address]  [id number] ARRINGTON, LIVIER O (PAC); [facility], [address], [facility] [address]  [id number] DAMON DAVIS (MD); [facility], [address], [facility] [address]  [id number] RYAN  DAMON COREY); [facility], [address], [place] [address]  [id number] VASQUEZ CURRIE); [facility], [address], [facility] [address]  [id number] MAGDALENE SOL D.O.; [address] [address]  [id number] MELANIE DANIELLE (PA-C); [facility], [address] [place] [address]  Pharmacies that dispensed prescriptions listed  ----------------------------------------------------------------------------------------------------------------------------------  [id number] [employer].; : JOZEF # [id number], [address], [facility] [address],  Patients that match search criteria  ----------------------------------------------------------------------------------------------------------------------------------  [id number] SHAHRAM TRINIDAD,  46; [address] [facility] [address]  [id number] SHAHRAM SHEIKH,  46; [address] [facility] [address]  [id number] SHAHRAM TRINIDAD,  46; [address], [place] [address]  **Per CDC guidance, the conversion factors and associated daily morphine milligram equivalents for drugs prescribed as part of  medication-assisted treatment for opioid use disorder should not be used to benchmark against dosage thresholds meant for opioids  prescribed for pain.  Report Disclaimers:  The report provided above is based upon the search criteria and the data provided by the dispensing entities. For more information  about any prescription, please contact the dispenser or the prescriber.  This report contains confidential information, including patient identifiers, and is not a public record. The information on this  report must be treated as protected health information and is to be disclosed to others only as authorized by applicable state  and Federal regulations.    Assessment -  Todays diagnosis includes   1. Chronic pain syndrome    2. Fibromyalgia       Patient presents for chronic 5 myalgia type pain.  Currently patient is taking Norco 5/325 1-2 tablets per week [facility] to [facility] from [facility] due to her chronic low back pain that she is previously had a fusion on from a surgeon in [place] and since her care as well as RFA procedures for which she gets through Dr. Cardoza every 6 months.  We did discuss today that they are pain physicians and they may treat her fibromyalgia type pain patient seems unaware of this.  Patient does state she would like to continue some of her treatments here in a non-opioid manner for her fibromyalgia type pain this was discussed as it is appropriate to use non-opioid plan of care for fibromyalgia by utilizing the standard treatment which includes gabapentin, anti-inflammatory diet such as mitochondrial diet, myofascial release, acupuncture, trigger point injections as needed as well as routine exercise.    A U DT was not taken today as the patient is a non-opioid plan of care at this time    Patient set goals today in the office to achieve with the pain centers help. They are:  1. To control pain and maintain independence    Plan Interventions recommended today:  Assessment tool-        Follow up in 2-3 weeks, we will discuss a pain treatment plan at that time, which may include narcotics and alternative therapies.     Sign a LONDON at the  so we can obtain your records for our next visit    Injections- trigger points can be available from this clinic or others if needed for the tender spots    Non-opoid medical management includes- for Fibromyalgia type pain    Anti-inflammatory diet- provided for you today     Exercise 3 times a week for 30 minutes helps with Fibro type pain. Pool therapy helps as well if you want to pursue this let me know.      Myofascial release helps fibromyalgia type pain-this can be ordered through PT     Some patients like acupuncture sometimes helps if you want to pursue this you  would have to  a packet at the  prior to making an appointment    Gabapentin recommend starting with 100 mg at bedtime, if no side effects can increase to three times a day, depending on the response this can be increased if needed up to 300 mg three times a day or higher    Tumeric 1000 mg clear caps preferred    Non opioid plan of care as you [facility] of the [facility] back.     Education was provided to the patient today in regards to their specific diagnosis.      The patient agrees to the plan and has no further questions, if questions arise the patient knows to call [phone].       Thank you for this consult and opportunity to assist with this patients care.    Candice Dueñas, [facility]  [address]  [place] [address]  Ph: [phone]  Fax: [phone]

## 2021-06-18 NOTE — PATIENT INSTRUCTIONS - HE
"Patient Instructions by Radha Garcia CNP at 4/17/2020  3:20 PM     Author: Radha Garcia CNP Service: -- Author Type: Nurse Practitioner    Filed: 4/17/2020  3:53 PM Encounter Date: 4/17/2020 Status: Addendum    : Radha Garcia CNP (Nurse Practitioner)    Related Notes: Original Note by Radha Garcia CNP (Nurse Practitioner) filed at 4/17/2020  3:53 PM       Rest, push your water intake.    You could consider trying an over the counter Cranberry supplement.    Ciprofloxacin as prescribed.    Monitor yourself closely for worsening symptoms (fever greater than 100 degrees, nausea, vomiting, abdominal pain, or worsening flank pain). ER over the weekend if this occurs.  Patient Education     Bladder Infection, Female (Adult)    Urine is normally doesn't have any bacteria in it. But bacteria can get into the urinary tract from the skin around the rectum. Or they can travel in the blood from elsewhere in the body. Once they are in your urinary tract, they can cause infection in the urethra (urethritis), the bladder (cystitis), or the kidneys (pyelonephritis).  The most common place for an infection is in the bladder. This is called a bladder infection. This is one of the most common infections in women. Most bladder infections are easily treated. They are not serious unless the infection spreads to the kidney.  The phrases \"bladder infection,\" \"UTI,\" and \"cystitis\" are often used to describe the same thing. But they are not always the same. Cystitis is an inflammation of the bladder. The most common cause of cystitis is an infection.  Symptoms  The infection causes inflammation in the urethra and bladder. This causes many of the symptoms. The most common symptoms of a bladder infection are:    Pain or burning when urinating    Having to urinate more often than usual    Urgent need to urinate    Only a small amount of urine comes out    Blood in urine    Abdominal " discomfort. This is usually in the lower abdomen above the pubic bone.    Cloudy urine    Strong- or bad-smelling urine    Unable to urinate (urinary retention)    Unable to hold urine in (urinary incontinence)    Fever    Loss of appetite    Confusion (in older adults)  Causes  Bladder infections are not contagious. You can't get one from someone else, from a toilet seat, or from sharing a bath.  The most common cause of bladder infections is bacteria from the bowels. The bacteria get onto the skin around the opening of the urethra. From there, they can get into the urine and travel up to the bladder, causing inflammation and infection. This usually happens because of:    Wiping improperly after urinating. Always wipe from front to back.    Bowel incontinence    Pregnancy    Procedures such as having a catheter inserted    Older age    Not emptying your bladder. This can allow bacteria a chance to grow in your urine.    Dehydration    Constipation    Sex    Use of a diaphragm for birth control   Treatment  Bladder infections are diagnosed by a urine test. They are treated with antibiotics and usually clear up quickly without complications. Treatment helps prevent a more serious kidney infection.  Medicines  Medicines can help in the treatment of a bladder infection:    Take antibiotics until they are used up, even if you feel better. It is important to finish them to make sure the infection has cleared.    You can use acetaminophen or ibuprofen for pain, fever, or discomfort, unless another medicine was prescribed. If you have chronic liver or kidney disease, talk with your healthcare provider before using these medicines. Also talk with your provider if you've ever had a stomach ulcer or gastrointestinal bleeding, or are taking blood-thinner medicines.    If you are given phenazopydridine to reduce burning with urination, it will cause your urine to become a bright orange color. This can stain clothing.  Care and  prevention  These self-care steps can help prevent future infections:    Drink plenty of fluids to prevent dehydration and flush out your bladder. Do this unless you must restrict fluids for other health reasons, or your doctor told you not to.    Proper cleaning after going to the bathroom is important. Wipe from front to back after using the toilet to prevent the spread of bacteria.    Urinate more often. Don't try to hold urine in for a long time.    Wear loose-fitting clothes and cotton underwear. Avoid tight-fitting pants.    Improve your diet and prevent constipation. Eat more fresh fruit and vegetables, and fiber, and less junk and fatty foods.    Avoid sex until your symptoms are gone.    Avoid caffeine, alcohol, and spicy foods. These can irritate your bladder.    Urinate right after intercourse to flush out your bladder.    If you use birth control pills and have frequent bladder infections, discuss it with your doctor.  Follow-up care  Call your healthcare provider if all symptoms are not gone after 3 days of treatment. This is especially important if you have repeat infections.  If a culture was done, you will be told if your treatment needs to be changed. If directed, you can call to find out the results.  If X-rays were done, you will be told if the results will affect your treatment.  Call 911  Call 911 if any of the following occur:    Trouble breathing    Hard to wake up or confusion    Fainting or loss of consciousness    Rapid heart rate  When to seek medical advice  Call your healthcare provider right away if any of these occur:    Fever of 100.4 F (38.0 C) or higher, or as directed by your healthcare provider    Symptoms are not better by the third day of treatment    Back or belly (abdominal) pain that gets worse    Repeated vomiting, or unable to keep medicine down    Weakness or dizziness    Vaginal discharge    Pain, redness, or swelling in the outer vaginal area (labia)  Date Last Reviewed:  10/1/2016    2025-2447 [employer]. [address] [place] [address], [place] [address]. All rights reserved. This information is not intended as a substitute for professional medical care. Always follow your healthcare professional's instructions.

## 2021-06-19 NOTE — PROGRESS NOTES
Assessment/Plan:         Visit Diagnoses     Cellulitis of left lower extremity    -  Primary    Relevant Medications    cephalexin (KEFLEX) 500 MG capsule three times a day x 10 days    Other Relevant Orders    Culture, Wound (Completed)    Laceration of left lower extremity, subsequent encounter - the laceration is healing well and skin flap is intact. Continue healing by secondary intention.           Patient Instructions   1) Keflex 1 tab three times daily for 10 days.  2) Continue to apply antibiotic ointment or vaseline to keep moist 2-3 times daily.  3) We will notify you of culture results.      Subjective:   Suma Mark is a 67 y.o. female who presents today for recheck of a laceration on her lower left leg. She reports it happened 13 days ago. She noticed yesterday that it seems to be getting red and a bit swollen. No drainage that she has noticed. She was seen at the time of the laceration. It was irrigated but couldn't be stitched due to thin skin. She has been applying antibiotic ointment.     Patient Active Problem List   Diagnosis     Acquired hypothyroidism     Back pain     Vitamin D deficiency     Essential hypertension     Generalized anxiety disorder     Restless leg syndrome     Pedal edema     Gastroesophageal reflux disease without esophagitis     Right knee pain     Migraine     Hernia, ventral     Constipation - started postoperatively 2017     Menopause     Osteoporosis     Diverticulosis     Eczema     Elevated alkaline phosphatase level     Hyponatremia     Hypochloremia     S/P total knee replacement     Anxiety     Acute blood loss as cause of postoperative anemia     Insomnia, unspecified type     Depression, unspecified depression type     Postoperative nausea and vomiting     Temporomandibular joint syndrome     Fibromyalgia      Past Medical History:   Diagnosis Date     Anxiety      Back pain      Breast cyst      Degenerative disc disease, lumbar      Depression      Disease  of thyroid gland     hypothyroid     GERD (gastroesophageal reflux disease)      History of anesthesia complications     hypotension after surgery     History of blood clots      History of transfusion      Hypertension      Migraine      Osteoarthritis      Osteoporosis      PONV (postoperative nausea and vomiting)      Raynaud's disease      Restless leg syndrome      Ventral hernia      Vitamin D deficiency         Past Surgical History:   Procedure Laterality Date     BACK SURGERY       BREAST CYST EXCISION       CARPAL TUNNEL RELEASE       COLECTOMY N/A 6/2/2017    Procedure: RESECTION, SMALL INTESTINE, OPEN ADHESIOLYSIS;  Surgeon: Keyon Qureshi MD;  Location: [facility];  Service:      COLON SURGERY       HEMORRHOID SURGERY       HYSTERECTOMY  [id number]     INCISIONAL HERNIA REPAIR N/A 6/2/2017    Procedure: LAPARASCOPIC CONVERTED TO OPEN PRIMARY INCISIONAL HERNIA REPAIR;  Surgeon: Keyon Qureshi MD;  Location: [facility];  Service:      INCONTINENCE SURGERY       LUMBAR FUSION       OK TOTAL KNEE ARTHROPLASTY Right 11/27/2017    Procedure:  RIGHT TOTAL KNEE ARTHROPLASTY;  Surgeon: Kevin Ryder MD;  Location: [facility];  Service: Orthopedics     VARICOSE VEIN SURGERY          Current Outpatient Prescriptions:      amLODIPine (NORVASC) 10 MG tablet, Take 1 tablet (10 mg total) by mouth at bedtime., Disp: 90 tablet, Rfl: 3     aspirin 325 MG EC tablet, Take 1 tablet (325 mg total) by mouth 2 (two) times a day with meals., Disp: 84 tablet, Rfl: 0     botulinum toxin Type A, Cosm, (BOTOX) 100 unit SolR, Every 3 months. [facility], Disp: , Rfl:      calcium, as carbonate, (CALCIUM 500) 500 mg calcium (1,250 mg) tablet, Take 1 tablet (500 mg total) by mouth 2 (two) times a day., Disp: 60 tablet, Rfl: 11     cholecalciferol, vitamin D3, 2,000 unit capsule, Take 2,000 Units by mouth daily., Disp: , Rfl:      citalopram (CELEXA) 20 MG tablet, TAKE 1 AND 1/2 TABLETS(30 MG) BY MOUTH  "DAILY, Disp: 135 tablet, Rfl: 3     eletriptan (RELPAX) 20 MG tablet, Take 20 mg by mouth as needed for migraine. may repeat in 2 hours if necessary, Disp: , Rfl:      furosemide (LASIX) 20 MG tablet, TAKE 1 TABLET(20 MG) BY MOUTH DAILY, Disp: 90 tablet, Rfl: 0     HYDROcodone-acetaminophen 5-325 mg per tablet, Take 1-2 tablets by mouth every 4 (four) hours as needed., Disp: 55 tablet, Rfl: 0     irbesartan (AVAPRO) 150 MG tablet, Take 1 tablet (150 mg total) by mouth daily., Disp: 90 tablet, Rfl: 0     levothyroxine (SYNTHROID, LEVOTHROID) 88 MCG tablet, TAKE 1 TABLET BY MOUTH DAILY AT 6AM (Patient taking differently: Take 88 mcg by mouth Daily at 6:00 am. TAKE 1 TABLET BY MOUTH DAILY AT 6AM), Disp: 90 tablet, Rfl: 3     LORazepam (ATIVAN) 0.5 MG tablet, Take 0.5 mg by mouth., Disp: , Rfl:      LORazepam (ATIVAN) 1 MG tablet, Take 0.5 tablets (0.5 mg total) by mouth daily as needed for anxiety., Disp: 30 tablet, Rfl: 0     propranolol (INDERAL) 10 MG tablet, Take 1 tablet (10 mg) by mouth twice daily (Patient taking differently: Take 10 mg by mouth 2 (two) times a day. Take 1 tablet (10 mg) by mouth twice daily), Disp: 90 tablet, Rfl: 3     propranolol (INDERAL) 10 MG tablet, TAKE 1 TABLET(10 MG) BY MOUTH THREE TIMES DAILY, Disp: 270 tablet, Rfl: 2     ranitidine (ZANTAC) 150 MG tablet, Take 1 tablet (150 mg total) by mouth 2 (two) times a day., Disp: , Rfl: 0     budesonide (ENTOCORT EC) 3 mg 24 hr capsule, Take 6 mg by mouth every morning., Disp: , Rfl:       Review of Systems  Pertinent items are noted in HPI.  ROS otherwise negative.    Objective:     Vitals:    08/03/18 1303   BP: 116/68   Pulse: 73   Resp: 14   Temp: 98.6  F (37  C)   TempSrc: Oral   SpO2: 97%   Weight: 172 lb (78 kg)   Height: 5' 1.5\" (1.562 m)       Physical Exam   Constitutional: She appears well-nourished. No distress.   Skin:   Half [place] shaped laceration left lower leg on the medial surface. Measures about 5 cm in total length. Skin " flap is well adhered and healing well in place. There is moderate surrounding erythema and mild swelling. Small amount of serosanguinous discharge.

## 2021-06-19 NOTE — PROGRESS NOTES
Patient here for trigger point injections for shoulder pain. Also has pain in arms.low back,hips and knees.

## 2021-06-19 NOTE — PROGRESS NOTES
Injection - Other  Date/Time: 7/19/2018 2:20 PM  Performed by: DHRUV PLUNKETT  Authorized by: DHRUV PLUNKETT   Comments:     TRIGGER POINT INJECTION  Performed on: 7/19/2018    Ms. Mark is a 67-year-old woman followed in the pain center who has multilevel pain symptoms.  Several of her pains are felt to be myofascial in nature and she is referred here today to give a trial to trigger point injection.    Pre Procedure Diagnosis:  Myofascial pain  Vital Signs:  As per intra-procedure documentation    The procedure was discussed with Suma Mark in detail along with the attendant risks, including but not limited to: nerve injury, infection, bleeding, allergic reaction, or worsening of pain.  Informed consent was obtained and patient elected to proceed.    After informed consent was obtained the patient was seated in the examination chair.  The neck and shoulder areas were prepped sterilely with alcohol.  A 1 1/4 inch #27-gauge needle was used.  There were injections made in the medial and lateral aspects of the upper trapezius muscle on both sides.  There was also an injection made in the lower cervical paraspinal muscle on the left and in the levator scapula muscles near the attachment to the scapula bilaterally.  A total of 10 mL of 0.25% Marcaine with 10 mg of Decadron was used in divided doses.    The patient tolerated the procedure well.  The patient was taken to the recovery area after the injection.  There were no complications.      Pre Procedure Pain Scale: 6  Pain Score:   2 (Post procedure)    If Suma Mark has any questions or concerns after discharge, she was instructed to call us.

## 2021-06-19 NOTE — PROGRESS NOTES
Assessment/Plan:        1. Depression, unspecified depression type     2. Acquired hypothyroidism     3. Essential hypertension     4. Fibromyalgia     5. Gastroesophageal reflux disease without esophagitis     6. Migraine     7. Osteoporosis without current pathological fracture, unspecified osteoporosis type      All chronic medical problems discussed as per HPI.  No change in her medications today.   We will start PA for Prolia treatment for osteoporosis.  I reviewed all labs from [facility] 7/2018 and all notes from [facility] Colorectal surgery.    This note has been dictated using voice recognition software. Any grammatical or context distortions are unintentional and inherent to the software.      Return in about 2 months (around 10/16/2018) for Annual physical.    Patient Instructions   Mammogram due.    Physical  in 2 months.          Subjective:    Suma Mark is a 67 y.o. female  here for    Chief Complaint   Patient presents with     Establish Care     Wt. gain, pain in -right- leg, energy, med. refills?     67-year-old female is here today to establish care in internal medicine.  She was seen family medicine provider in the past.    Surgical history significant for hysterectomy at age of 35 and early menopause which was treated with hormonal therapy for 5-6 years only.  She also has a history of hemorrhoidectomy, sling placement for bladder prolapse, rectal prolapse and intussusception in 2015, inguinal hernia surgery with bowel resection in 2017, right leg vein stripping, bilateral carpal tunnel surgery, spine fusion in 2005 on S1-L3 level, partial left knee replacement and total right knee replacement.    Her chronic medical problems are:  #1 depression for which she is taking Celexa 20 mg daily for many years and it is well managed    2.  Hypertension managed with propranolol, Avapro, propranolol, furosemide.    3.  Hypothyroidism, on 88 mcg levothyroxine daily    4.  History of migraine headaches which  significantly improved after using the Botox injections and eletriptan only as needed    5.  GERD, on Zantac daily    6.  Osteoporosis diagnosed in a bone density scan done last year, not on any active treatment.    Her acute issues at this point are:  #1 frequent bowel movements 4-5 a day and to persistent left lower quadrant abdominal pain.  Patient is currently seeing [facility].  She is going to biofeedback therapy and pelvic floor therapy and they also recommended acupuncture.  They did talk about the exploratory laparotomy and possible lysis of adhesions as a cause of the pain in her left lower quadrant.  She was in the trial of budesonide therapy with assumption that she can probably have some inflammatory bowel disease but there was no improvement, she had upper GI endoscopy, colonoscopy, CT of the abdomen and pelvis, fructose test and she was positive for bacterial overgrowth test and treated with antibiotics for some period of time.  Nothing showed the real cause of her problems and no improvement.      Social History     Social History     Marital status:      Spouse name: N/A     Number of children: N/A     Years of education: N/A     Occupational History     Not on file.     Social History Main Topics     Smoking status: Never Smoker     Smokeless tobacco: Never Used     Alcohol use 8.4 oz/week     14 Shots of liquor per week     Drug use: No     Sexual activity: Not on file     Other Topics Concern     Not on file     Social History Narrative       Family History   Problem Relation Age of Onset     Dementia Mother      Arthritis Mother      COPD Father      Cardiovascular Father      Hypertension Father      No Medical Problems Sister      No Medical Problems Daughter      No Medical Problems Son      Stroke Maternal Grandmother      Heart disease Paternal Grandmother      Stroke Paternal Grandmother      No Medical Problems Sister      No Medical Problems Sister      No  "Medical Problems Son      No Medical Problems Daughter      Breast cancer Paternal Aunt      age in 50's     Review of Systems:     A 12 point comprehensive review of systems was negative except as noted in HPI.            Objective:    Physical Exam   /74  Pulse 72  Ht 5' 2\" (1.575 m)  Wt 171 lb 11.2 oz (77.9 kg)  LMP  (LMP Unknown)  BMI 31.4 kg/m2    Constitutional: oriented to person, place, and time, appears well-nourished. No distress.   HENT:   Head: Normocephalic.   Mouth/Throat: Oropharynx is clear and moist.   Eyes: Conjunctivae are normal. Pupils are equal, round, and reactive to light.   Neck: Normal range of motion. Neck supple.   Cardiovascular: Normal rate, regular rhythm and normal heart sounds.    Pulmonary/Chest: Effort normal and breath sounds normal.  Abdominal: Soft. Bowel sounds are normal. Tender in the LLQ, no palpable masses, no guarding.  Musculoskeletal: Normal range of motion.   Neurological: alert and oriented to person, place, and time.  Psychiatric:  normal mood and affect.    Patient Active Problem List   Diagnosis     Acquired hypothyroidism     Essential hypertension     Restless leg syndrome     Gastroesophageal reflux disease without esophagitis     Migraine     Hernia, ventral     Osteoporosis     Diverticulosis     Elevated alkaline phosphatase level     S/P total knee replacement     Insomnia, unspecified type     Depression, unspecified depression type     Fibromyalgia       Current Outpatient Prescriptions on File Prior to Visit   Medication Sig Dispense Refill     amLODIPine (NORVASC) 10 MG tablet Take 1 tablet (10 mg total) by mouth at bedtime. 90 tablet 3     botulinum toxin Type A, Cosm, (BOTOX) 100 unit SolR Every 3 months. [facility]       calcium, as carbonate, (CALCIUM 500) 500 mg calcium (1,250 mg) tablet Take 1 tablet (500 mg total) by mouth 2 (two) times a day. 60 tablet 11     cholecalciferol, vitamin D3, 2,000 unit capsule Take 2,000 Units by mouth " daily.       citalopram (CELEXA) 20 MG tablet TAKE 1 AND 1/2 TABLETS(30 MG) BY MOUTH DAILY 135 tablet 3     furosemide (LASIX) 20 MG tablet TAKE 1 TABLET(20 MG) BY MOUTH DAILY 90 tablet 0     levothyroxine (SYNTHROID, LEVOTHROID) 88 MCG tablet TAKE 1 TABLET BY MOUTH DAILY AT 6AM (Patient taking differently: Take 88 mcg by mouth Daily at 6:00 am. TAKE 1 TABLET BY MOUTH DAILY AT 6AM) 90 tablet 3     propranolol (INDERAL) 10 MG tablet TAKE 1 TABLET(10 MG) BY MOUTH THREE TIMES DAILY 270 tablet 2     ranitidine (ZANTAC) 150 MG tablet Take 1 tablet (150 mg total) by mouth 2 (two) times a day.  0     eletriptan (RELPAX) 20 MG tablet Take 20 mg by mouth as needed for migraine. may repeat in 2 hours if necessary       HYDROcodone-acetaminophen 5-325 mg per tablet Take 1-2 tablets by mouth every 4 (four) hours as needed. 55 tablet 0     irbesartan (AVAPRO) 150 MG tablet Take 1 tablet (150 mg total) by mouth daily. 90 tablet 0     LORazepam (ATIVAN) 1 MG tablet Take 0.5 tablets (0.5 mg total) by mouth daily as needed for anxiety. 30 tablet 0     [DISCONTINUED] aspirin 325 MG EC tablet Take 1 tablet (325 mg total) by mouth 2 (two) times a day with meals. 84 tablet 0     [DISCONTINUED] budesonide (ENTOCORT EC) 3 mg 24 hr capsule Take 6 mg by mouth every morning.       [DISCONTINUED] LORazepam (ATIVAN) 0.5 MG tablet Take 0.5 mg by mouth.       No current facility-administered medications on file prior to visit.                Bernadette Taylor  8/16/2018

## 2021-06-20 NOTE — LETTER
Letter by Bernadette Taylor MD at      Author: Bernadette Taylor MD Service: -- Author Type: --    Filed:  Encounter Date: 4/23/2020 Status: (Other)         Suma Mark  [address] [facility] [id number]             April 23, 2020         Dear Ms. Mark,    Below are the results from your recent visit:    Resulted Orders   Urinalysis-UC if Indicated   Result Value Ref Range    Color, UA Yellow Colorless, Yellow, Straw, Light Yellow    Clarity, UA Slightly Cloudy (!) Clear    Glucose, UA Negative Negative    Bilirubin, UA Negative Negative    Ketones, UA Negative Negative    Specific Gravity, UA 1.025 1.005 - 1.030    Blood, UA Negative Negative    pH, UA 6.0 5.0 - 8.0    Protein, UA Negative Negative mg/dL    Urobilinogen, UA 0.2 E.U./dL 0.2 E.U./dL, 1.0 E.U./dL    Nitrite, UA Negative Negative    Leukocytes, UA Negative Negative    Bacteria, UA Few (!) None Seen hpf    RBC, UA None Seen None Seen, 0-2 hpf    WBC, UA 0-5 None Seen, 0-5 hpf    Squam Epithel, UA 0-5 None Seen, 0-5 lpf    Narrative    Microscopic not indicated  UC not indicated           Please call with questions or contact us using [employer].    Sincerely,        Electronically signed by Berndaette Taylor MD

## 2021-06-20 NOTE — PROGRESS NOTES
1. Peripheral edema          Plan: We will increase her Lasix up to 20 mg twice a day, I suggested that she take it in the morning and then at noon.  Can do this for a week or 2, until she gets back in to follow-up how things are coming along.  We discussed that this could be related to medication side effect or it could be something completely unrelated as well.  She will follow-up nonetheless in a couple of weeks and we will see how she is doing.  If she has any problems in the meantime she will let us know.    Subjective: 76-year-old female who is here today with swelling in her lower extremities.  She has noticed this mostly for about a week but thinks back on it now and does go back even possibly a month.  She feels that her legs are heavy and shiny and that there is a lot of fluid in her legs.  They do not week or drained.  She states that there is somewhat better in the morning after she has had them up all night.  She notes of no significant redness although she feels the color is a little darker than they used to be.  She did take her first dose of Prolia about a month ago.  She is concerned that that might be the cause of the swelling.  There have been no other medication changes, and she is not taking any supplements over-the-counter that are new.    She denies any chest pain or shortness of breath, no cough, no fevers or chills.    Objective: Well-appearing female in no acute distress.  Vital signs as noted.  Chest clear to auscultation bilaterally.  Heart regular rate and rhythm.  Extremities do show about 2+ edema to the level of the knee.  There is symmetric on both legs.  Good distal pulses appreciated.

## 2021-06-20 NOTE — PROGRESS NOTES
1. Did you experience new onset of achiness or rashes that lasted for over a month? N/a  2. Do you feel sick today - fever > 101F, or new deep cough? no  3. Did you have gastric bypass or parathyroid surgery in the last 6 months? no  4. Do you plan any dental implants or extractions in the next 2-3 months? no  5. Have you started any new medications in the last 6 months that you were told could affect your immune system? These may have been prescribed by Oncologist, Transplant Center, Rheumatology or Dermatology. no      Pt presents for her first Prolia injection today  MAK Montanez  9:39 AM  8/24/2018

## 2021-06-21 NOTE — PROGRESS NOTES
[facility] Vein Consult      Assessment:     1. varicose veins, bilateral   2. spider veins, bilateral   3. Insuffiencey of right greater saphenous vein   4. Leg swelling bilateral multifactorial    Plan:     1. Treatment options of conservative therapy of stockings use, exercise, weight loss, elevating legs when possible.    2. Script for compression stockings 20-30 mm hg  3. Ultrasound to evaluate legs for incompetency of both deep and superficial system . Done already  4. Surgical treatment   Endovenous closure ofright, greater saphenous vein   Risks and benefits of surgical intervention including infection, burns, dvt,  thrombophlebitis, not closing, recurrence, numbness and nerve injury and need  for further intervention were all discused    5. Follow up: for procedure if aproved .   6. Call for any questions concerns or issues    Subjective:      Suma Mark is a 68 y.o. female  who was referred by Bernadette Taylor MD  for evaluation of varicose veins. Symptoms include pain, aching, fatigue, burning, edema and dermatitis. Patient has history of leg selling, pain and vein issues that have progressed. Pain and symptoms have affected daily living and work activities needing medications. Here for evaluation today. Stocking use with compression stockings of 20-30 mm hg or greater for greater then 3 months    Allergies:Ace inhibitors; Amitriptyline; Atenolol; Celecoxib; Clonidine; Codeine; Desogestrel-ethinyl estradiol; Dexamethasone; Erythromycin; Escitalopram; Hydrochlorothiazide; Propoxyphene n-acetaminophen; Tramadol-acetaminophen; Venlafaxine; Zolpidem; Penicillins; Sulfa (sulfonamide antibiotics); and Tramadol    Past Medical History:   Diagnosis Date     Acquired hypothyroidism 3/12/2009     Anxiety      Back pain      Breast cyst      Degenerative disc disease, lumbar      Depression      Depression, unspecified depression type      Disease of thyroid gland     hypothyroid     Diverticulosis 10/4/2017     Incidental finding on colonoscopy 10/2017     Essential hypertension 4/21/2015     Fibromyalgia      Gastroesophageal reflux disease without esophagitis 4/21/2015     GERD (gastroesophageal reflux disease)      Hernia, ventral 4/27/2017     History of anesthesia complications     hypotension after surgery     History of blood clots      History of transfusion      Hypertension      Insomnia, unspecified type      Migraine      Osteoarthritis      Osteoporosis      PONV (postoperative nausea and vomiting)      Raynaud's disease      Restless leg syndrome      S/P total knee replacement 11/27/2017     Ventral hernia      Vitamin D deficiency        Past Surgical History:   Procedure Laterality Date     BACK SURGERY       BREAST CYST EXCISION       CARPAL TUNNEL RELEASE       COLECTOMY N/A 6/2/2017    Procedure: RESECTION, SMALL INTESTINE, OPEN ADHESIOLYSIS;  Surgeon: Keyon Qureshi MD;  Location: [facility] OR;  Service:      COLON SURGERY       HEMORRHOID SURGERY       HYSTERECTOMY  [id number]     INCISIONAL HERNIA REPAIR N/A 6/2/2017    Procedure: LAPARASCOPIC CONVERTED TO OPEN PRIMARY INCISIONAL HERNIA REPAIR;  Surgeon: Keyon Qureshi MD;  Location: [facility] OR;  Service:      INCONTINENCE SURGERY       LUMBAR FUSION       CO TOTAL KNEE ARTHROPLASTY Right 11/27/2017    Procedure:  RIGHT TOTAL KNEE ARTHROPLASTY;  Surgeon: Kevin Ryder MD;  Location: [facility] OR;  Service: Orthopedics     VARICOSE VEIN SURGERY       VEIN LIGATION AND STRIPPING Bilateral        Current Outpatient Medications   Medication Sig     amLODIPine (NORVASC) 10 MG tablet TAKE 1 TABLET(10 MG) BY MOUTH AT BEDTIME     botulinum toxin Type A, Cosm, (BOTOX) 100 unit SolR Every 3 months. [facility]     calcium, as carbonate, (CALCIUM 500) 500 mg calcium (1,250 mg) tablet Take 1 tablet (500 mg total) by mouth 2 (two) times a day.     cholecalciferol, vitamin D3, 2,000 unit capsule Take 2,000 Units by mouth daily.      citalopram (CELEXA) 20 MG tablet TAKE 1 AND 1/2 TABLETS(30 MG) BY MOUTH DAILY     cyanocobalamin (VITAMIN B-12) 50 mcg tablet Take 50 mcg by mouth daily.     eletriptan (RELPAX) 20 MG tablet Take 20 mg by mouth as needed for migraine. may repeat in 2 hours if necessary     furosemide (LASIX) 20 MG tablet Take 1 tablet (20 mg total) by mouth daily.     HYDROcodone-acetaminophen 5-325 mg per tablet Take 1-2 tablets by mouth every 4 (four) hours as needed.     irbesartan (AVAPRO) 150 MG tablet Take 1 tablet (150 mg total) by mouth daily.     levothyroxine (SYNTHROID, LEVOTHROID) 88 MCG tablet TAKE 1 TABLET BY MOUTH DAILY AT 6AM (Patient taking differently: Take 88 mcg by mouth Daily at 6:00 am. TAKE 1 TABLET BY MOUTH DAILY AT 6AM)     LORazepam (ATIVAN) 1 MG tablet TAKE 1/2 TABLET(0.5 MG) BY MOUTH DAILY AS NEEDED FOR ANXIETY     pramipexole (MIRAPEX) 0.25 MG tablet Take 1 tablet (0.25 mg total) by mouth 3 (three) times a day.     propranolol (INDERAL) 10 MG tablet TAKE 1 TABLET(10 MG) BY MOUTH THREE TIMES DAILY       Family History   Problem Relation Age of Onset     Dementia Mother      Arthritis Mother      COPD Father      Cardiovascular Father      Hypertension Father      No Medical Problems Sister      No Medical Problems Daughter      No Medical Problems Son      Stroke Maternal Grandmother      Heart disease Paternal Grandmother      Stroke Paternal Grandmother      No Medical Problems Sister      No Medical Problems Sister      No Medical Problems Son      No Medical Problems Daughter      Breast cancer Paternal Aunt         age in 50's        reports that  has never smoked. she has never used smokeless tobacco. She reports that she drinks about 8.4 oz of alcohol per week. She reports that she does not use drugs.      Review of Systems  Pertinent items are noted in HPI.  A 12 point comprehensive review of systems was negative except as noted.      Objective:     Vitals:    11/14/18 0833   BP: 116/68   Pulse:  72   Temp: 98.2  F (36.8  C)     There is no height or weight on file to calculate BMI.    EXAM:  GENERAL: This is a well-developed 68 y.o. female who appears her stated age  HEAD: normocephalic  HEENT: Pupils equal and reactive bilaterally  MOUTH: mucus membranes intact. Normal dentation  CARDIAC: RRR without murmur  CHEST/LUNG:  Clear to auscultation bilaterally  ABDOMEN: Soft, nontender, nondistended, no masses noted   NEUROLOGIC: Focally intact, nonfocal, alert and oriented x 3  INTEGUMENT: No open lesions or ulcers  VASCULAR: Pulses intact, symmetrical upper and lower extremities. There areskin changes consistent with chronic venous insufficiency. Varicose veins present in bilateral greater saphenous distribution. Spider veins present bilateral.        Imaging:    US Venous Insufficiency Legs Bilateral (Order [id number])   Imaging   Date: 11/2/2018 Department: [facility] Ultrasound [place] Released By: Jame De Jesus Authorizing: Oralia Daniel NP   Study Result        [facility] OUTPATIENT     EXAM: BILATERAL LOWER EXTREMITY DEEP AND SUPERFICIAL VENOUS DUPLEX ULTRASOUND WITH PHYSIOLOGIC TESTING      INDICATION: Symptomatic varicose veins. Assess for incompetent veins.      TECHNIQUE: Supine and upright ultrasound of the deep and superficial veins with Valsalva and compression augmentation maneuvers. Duplex imaging is performed utilizing gray-scale, two-dimensional images, color-flow imaging, Doppler waveform analysis, and   spectral Doppler imaging.      INCOMPETENCY CRITERIA: Deep vein reflux reported when greater than 1,000 ms flow reversal. Superficial vein reflux reported when greater than 600 ms flow reversal.  vein reflux reported as greater than 350 ms flow reversal.     RIGHT DEEP VEIN FINDINGS:     The common femoral vein is incompetent. The profunda femoris, femoral, popliteal and posterior tibial veins are competent. All vessels are patent and compressible  without deep venous thrombus.     LEFT DEEP VEIN FINDINGS:     The common femoral vein is incompetent. The profunda femoris, femoral, popliteal and posterior tibial veins are competent. All vessels are patent and compressible without deep venous thrombus.     RIGHT SUPERFICIAL VEIN FINDINGS:  Great saphenous vein: Incompetent from the saphenofemoral junction to the mid calf. The vessel measures 5-6 mm above the knee and 3-5 mm below the knee where incompetent.     Small saphenous vein: Competent from the saphenopopliteal junction to the mid calf.     No incompetent perforating veins or abnormal accessory veins identified.     LEFT SUPERFICIAL VEIN FINDINGS:  Great saphenous vein: Competent from the saphenofemoral junction to the mid calf.     Small saphenous vein: Competent from the saphenopopliteal junction to the mid calf.     No incompetent perforating veins or abnormal accessory veins identified.     IMPRESSION:   CONCLUSION:   1.  No deep venous thrombosis of either lower extremity.  2.  The right great saphenous vein is diffusely incompetent. The right small saphenous vein is competent.  3.  The left superficial venous system is patent, without incompetence.         Jonn Pinto MD  [facility] Surgery Dept.

## 2021-06-21 NOTE — PROGRESS NOTES
Assessment and Plan:       1. Encounter for health maintenance examination in adult    - Comprehensive Metabolic Panel; Future  - HM2(CBC w/o Differential); Future  - Lipid Profile; Future  - Urinalysis-UC if Indicated; Future  - Vitamin D, Total (25-Hydroxy); Future  - Compression stockings 20/30 mmHg; Knee  - Ambulatory referral to Vascular Center    2. Osteoporosis  Prolia given on 8/24/18. One month later, developed bilat LE swelling, which did not improve with increased furosemide dose. I will need to see her before next Prolia injection.  - Comprehensive Metabolic Panel; Future  - HM2(CBC w/o Differential); Future  - Lipid Profile; Future  - Urinalysis-UC if Indicated; Future  - Vitamin D, Total (25-Hydroxy); Future  - Compression stockings 20/30 mmHg; Knee  - Ambulatory referral to Vascular Center    3. Restless leg syndrome  Worse now since developed leg swelling. Feeling tingling in both legs, heaviness.  - Comprehensive Metabolic Panel; Future  - HM2(CBC w/o Differential); Future  - Lipid Profile; Future  - Urinalysis-UC if Indicated; Future  - Vitamin D, Total (25-Hydroxy); Future  - Compression stockings 20/30 mmHg; Knee  - Ambulatory referral to Vascular Center  - Magnesium; Future    4. Migraine  Stable.  - Comprehensive Metabolic Panel; Future  - HM2(CBC w/o Differential); Future  - Lipid Profile; Future  - Urinalysis-UC if Indicated; Future  - Vitamin D, Total (25-Hydroxy); Future  - Compression stockings 20/30 mmHg; Knee  - Ambulatory referral to Vascular Center    5. Acquired hypothyroidism  On levothyroxine  - Comprehensive Metabolic Panel; Future  - HM2(CBC w/o Differential); Future  - Lipid Profile; Future  - Urinalysis-UC if Indicated; Future  - Vitamin D, Total (25-Hydroxy); Future  - Compression stockings 20/30 mmHg; Knee  - Ambulatory referral to Vascular Center    6. Depression, unspecified depression type  Stable  - Comprehensive Metabolic Panel; Future  - HM2(CBC w/o Differential);  Future  - Lipid Profile; Future  - Urinalysis-UC if Indicated; Future  - Vitamin D, Total (25-Hydroxy); Future  - Compression stockings 20/30 mmHg; Knee  - Ambulatory referral to Vascular Center    7. Diverticulosis  Stable.  - Comprehensive Metabolic Panel; Future  - HM2(CBC w/o Differential); Future  - Lipid Profile; Future  - Urinalysis-UC if Indicated; Future  - Vitamin D, Total (25-Hydroxy); Future  - Compression stockings 20/30 mmHg; Knee  - Ambulatory referral to Vascular Center    8. Essential hypertension  STable.  - Comprehensive Metabolic Panel; Future  - HM2(CBC w/o Differential); Future  - Lipid Profile; Future  - Urinalysis-UC if Indicated; Future  - Vitamin D, Total (25-Hydroxy); Future  - Compression stockings 20/30 mmHg; Knee  - Ambulatory referral to Vascular Center    9. Fibromyalgia    - Comprehensive Metabolic Panel; Future  - HM2(CBC w/o Differential); Future  - Lipid Profile; Future  - Urinalysis-UC if Indicated; Future  - Vitamin D, Total (25-Hydroxy); Future  - Compression stockings 20/30 mmHg; Knee  - Ambulatory referral to Vascular Center    10. Gastroesophageal reflux disease without esophagitis    - Comprehensive Metabolic Panel; Future  - HM2(CBC w/o Differential); Future  - Lipid Profile; Future  - Urinalysis-UC if Indicated; Future  - Vitamin D, Total (25-Hydroxy); Future  - Compression stockings 20/30 mmHg; Knee  - Ambulatory referral to Vascular Center    11. Insomnia, unspecified type    - Comprehensive Metabolic Panel; Future  - HM2(CBC w/o Differential); Future  - Lipid Profile; Future  - Urinalysis-UC if Indicated; Future  - Vitamin D, Total (25-Hydroxy); Future  - Compression stockings 20/30 mmHg; Knee  - Ambulatory referral to Vascular Center    12. Numbness and tingling of both legs  I will check labs for neuropathy.  - Thyroid Stimulating Hormone (TSH); Future  - Ferritin; Future  - Vitamin B12; Future  - Iron and Transferrin Iron Binding Capacity; Future  - Parathyroid  Hormone Intact with Minerals; Future  - Electrophoresis, Protein, Random Urine Ccade; Future  - Electrophoresis, Protein, Serum, Cascade; Future    13. Routine general medical examination at a health care facility      14. RLS (restless legs syndrome), will increase Mirapex dose at bedtime.    - pramipexole (MIRAPEX) 0.25 MG tablet; Take 1 tablet (0.25 mg total) by mouth 3 (three) times a day.  Dispense: 270 tablet; Refill: 0     15: LE swelling, possible side effect of Prolia. Amlodipine 10 mg can do the same thing, but taking this medication for many years. It looks like lymphedema on the exam. Compression stockings Rx sent. She will see Vascular clinic.    The patient's current medical problems were reviewed.    I have had an Advance Directives discussion with the patient.  The following health maintenance schedule was reviewed with the patient and provided in printed form in the after visit summary:   Health Maintenance   Topic Date Due     DEPRESSION FOLLOW UP  1950     HYPERTENSION FOLLOW-UP  04/26/2018     INFLUENZA VACCINE RULE BASED (1) 08/01/2018     FALL RISK ASSESSMENT  10/26/2018     DXA SCAN  09/12/2019     MAMMOGRAM  08/23/2020     ADVANCE DIRECTIVES DISCUSSED WITH PATIENT  09/30/2021     COLONOSCOPY  09/27/2027     TD 18+ HE  07/21/2028     PNEUMOCOCCAL POLYSACCHARIDE VACCINE AGE 65 AND OVER  Completed     PNEUMOCOCCAL CONJUGATE VACCINE FOR ADULTS (PCV13 OR PREVNAR)  Completed     ZOSTER VACCINE  Completed        Subjective:   Chief Complaint: Suma Mark is an 68 y.o. female here for an Annual Wellness visit.   HPI:  Chronic and acute issues discussed as above.    Review of Systems:    Please see above.  The rest of the review of systems are negative for all systems.    Patient Care Team:  Bernadette Taylor MD as PCP - General (Internal Medicine)  Izaiah HOLCOMB MD as Physician (Orthopedic Surgery)  Jc Mcginnis MD as Physician (Gastroenterology)  Akilah Lezama MD as Consulting  Physician (Dermatology)  Natalie Martel DO as Consulting Physician (Psychiatry)  Svetlana Ron MD as Physician (Colon and Rectal Surgery)     Patient Active Problem List   Diagnosis     Acquired hypothyroidism     Essential hypertension     Restless leg syndrome     Gastroesophageal reflux disease without esophagitis     Migraine     Hernia, ventral     Osteoporosis     Diverticulosis     S/P total knee replacement     Insomnia, unspecified type     Depression, unspecified depression type     Fibromyalgia     Past Medical History:   Diagnosis Date     Anxiety      Back pain      Breast cyst      Degenerative disc disease, lumbar      Depression      Disease of thyroid gland     hypothyroid     GERD (gastroesophageal reflux disease)      History of anesthesia complications     hypotension after surgery     History of blood clots      History of transfusion      Hypertension      Migraine      Osteoarthritis      Osteoporosis      PONV (postoperative nausea and vomiting)      Raynaud's disease      Restless leg syndrome      Ventral hernia      Vitamin D deficiency       Past Surgical History:   Procedure Laterality Date     BACK SURGERY       BREAST CYST EXCISION       CARPAL TUNNEL RELEASE       COLECTOMY N/A 6/2/2017    Procedure: RESECTION, SMALL INTESTINE, OPEN ADHESIOLYSIS;  Surgeon: Keyon Qureshi MD;  Location: [facility];  Service:      COLON SURGERY       HEMORRHOID SURGERY       HYSTERECTOMY  [id number]     INCISIONAL HERNIA REPAIR N/A 6/2/2017    Procedure: LAPARASCOPIC CONVERTED TO OPEN PRIMARY INCISIONAL HERNIA REPAIR;  Surgeon: Keyon Qureshi MD;  Location: [facility] OR;  Service:      INCONTINENCE SURGERY       LUMBAR FUSION       DE TOTAL KNEE ARTHROPLASTY Right 11/27/2017    Procedure:  RIGHT TOTAL KNEE ARTHROPLASTY;  Surgeon: Kevin Ryder MD;  Location: [facility];  Service: Orthopedics     VARICOSE VEIN SURGERY        Family History   Problem Relation Age of Onset      Dementia Mother      Arthritis Mother      COPD Father      Cardiovascular Father      Hypertension Father      No Medical Problems Sister      No Medical Problems Daughter      No Medical Problems Son      Stroke Maternal Grandmother      Heart disease Paternal Grandmother      Stroke Paternal Grandmother      No Medical Problems Sister      No Medical Problems Sister      No Medical Problems Son      No Medical Problems Daughter      Breast cancer Paternal Aunt      age in 50's      Social History     Social History     Marital status:      Spouse name: N/A     Number of children: N/A     Years of education: N/A     Occupational History     Not on file.     Social History Main Topics     Smoking status: Never Smoker     Smokeless tobacco: Never Used     Alcohol use 8.4 oz/week     14 Shots of liquor per week     Drug use: No     Sexual activity: Not on file     Other Topics Concern     Not on file     Social History Narrative      Current Outpatient Prescriptions   Medication Sig Dispense Refill     amLODIPine (NORVASC) 10 MG tablet TAKE 1 TABLET(10 MG) BY MOUTH AT BEDTIME 90 tablet 3     botulinum toxin Type A, Cosm, (BOTOX) 100 unit SolR Every 3 months. [facility]       calcium, as carbonate, (CALCIUM 500) 500 mg calcium (1,250 mg) tablet Take 1 tablet (500 mg total) by mouth 2 (two) times a day. 60 tablet 11     cholecalciferol, vitamin D3, 2,000 unit capsule Take 2,000 Units by mouth daily.       citalopram (CELEXA) 20 MG tablet TAKE 1 AND 1/2 TABLETS(30 MG) BY MOUTH DAILY 135 tablet 3     eletriptan (RELPAX) 20 MG tablet Take 20 mg by mouth as needed for migraine. may repeat in 2 hours if necessary       furosemide (LASIX) 20 MG tablet Take 1 tablet (20 mg total) by mouth daily. 90 tablet 2     HYDROcodone-acetaminophen 5-325 mg per tablet Take 1-2 tablets by mouth every 4 (four) hours as needed. 55 tablet 0     irbesartan (AVAPRO) 150 MG tablet Take 1 tablet (150 mg total) by mouth daily. 90  "tablet 0     levothyroxine (SYNTHROID, LEVOTHROID) 88 MCG tablet TAKE 1 TABLET BY MOUTH DAILY AT 6AM (Patient taking differently: Take 88 mcg by mouth Daily at 6:00 am. TAKE 1 TABLET BY MOUTH DAILY AT 6AM) 90 tablet 3     LORazepam (ATIVAN) 1 MG tablet TAKE 1/2 TABLET(0.5 MG) BY MOUTH DAILY AS NEEDED FOR ANXIETY 30 tablet 0     pramipexole (MIRAPEX) 0.25 MG tablet TAKE 1 TABLET(0.25 MG) BY MOUTH TWICE DAILY 180 tablet 0     propranolol (INDERAL) 10 MG tablet TAKE 1 TABLET(10 MG) BY MOUTH THREE TIMES DAILY 270 tablet 2     Current Facility-Administered Medications   Medication Dose Route Frequency Provider Last Rate Last Dose     denosumab 60 mg (PROLIA 60 mg/ml)  60 mg Subcutaneous Q6 Months Bernadette Taylor MD   60 mg at 08/24/18 1018      Objective:   Vital Signs:   Visit Vitals     /68 (Patient Site: Right Arm, Patient Position: Sitting, Cuff Size: Adult Large)     Pulse 80     Ht 5' 2\" (1.575 m)     Wt 174 lb 14.4 oz (79.3 kg)     LMP  (LMP Unknown)     BMI 31.99 kg/m2        VisionScreening:  No exam data present     PHYSICAL EXAM  General Appearance: Alert, cooperative, no distress, appears stated age.  Head: Normocephalic, without obvious abnormality, atraumatic  Eyes: PERRL, conjunctiva/corneas clear, EOM's intact  Ears: Normal TM's and external ear canals, both ears  Nose: Nares normal, septum midline,mucosa normal, no drainage  Throat: Lips, mucosa, and tongue normal; teeth and gums normal  Neck: Supple, symmetrical, trachea midline, no adenopathy;  thyroid: not enlarged, symmetric, no tenderness/mass/nodules; no carotid bruit or JVD  Back: Symmetric, no curvature, ROM normal, no CVA tenderness.  Lungs: Clear to auscultation bilaterally, respirations unlabored.  Breasts: No breast masses, tenderness, asymmetry, or nipple discharge.  Heart: Regular rate and rhythm, S1 and S2 normal, no murmur, rub, or gallop.  Abdomen: Soft, non-tender, bowel sounds active all four quadrants,  no masses, no " organomegaly.  Pelvic:declined  Extremities: Extremities normal, atraumatic, no cyanosis. 1+ edema ain the ankle area mostly, sensitive on touch bilat, not warm, skin tense.  Skin: Skin color, texture, turgor normal, no rashes or lesions.  Lymph nodes: Cervical, supraclavicular, and axillary nodes normal.  Neurologic: No focal neurological findings.       Assessment Results 10/18/2018   Activities of Daily Living No help needed   Instrumental Activities of Daily Living No help needed   Mini Cog Total Score 4   Some recent data might be hidden     A Mini-Cog score of 0-2 suggests the possibility of dementia, score of 3-5 suggests no dementia    Identified Health Risks:     The patient's PHQ-9 score is consistent with mild depression. She was provided with information regarding depression and was advised to schedule a follow up appointment in 12 weeks to further address this issue.  Patient's advanced directive was discussed and I am comfortable with the patient's wishes.

## 2021-06-21 NOTE — PROGRESS NOTES
patient consult from Oralia Daniel CNP for RFA right GSV option/some deep insuf/ bilateral leg swelling,has used compression for years. Reviewed RFA option and will preauth [employer]

## 2021-06-21 NOTE — PROGRESS NOTES
[facility] Consult Note    Date of Service:  11/2/2018    Requesting Provider: Dr. Bernadette Taylor    Chief Complaint: BLE swelling    History of Present Illness: Suma Mark [facility] today regarding BLE swelling. They arrive to the clinic today alone. The patient reports that she has been dealing with swelling the past 2-3 months. Her lasix was increased to 40mg and this did not improve things reduced back to 20mg she is good about taking this as prescribed. She has history of vein stripping bilaterally. Also with history of partial knee replacement on the left and total knee replacement on the right. Has also has right great toe and 2nd toe surgery due to toe deformities. Denies history of weeping, blistering; wounds on the legs. She does report redness in the legs with associated itching. Reports pain of 5/10 heaviness in the legs; takes effort to move the legs; sharp pains at times; tingling at times; feels bruised; noticed more when she is sitting, laying down; when she is up on her feet too much doing too much activity; moving the legs; walking helps; she does have restless leg syndrom; currently using nothing for pain. Has used compression stockings as compression in the past; these are 1-2 years in age. Denies any fevers, chills, or generalized ill feeling. Denies history of cancer. Sleeps in a bed with legs elevated. Pt no longer uterus, still has ovaries, had partial hysterectomy due to uterine prolapse. Has 4 children, lives with ; retired; previously worked as ; never smoker. Does a lot of traveling to see kids and vacation.  Denies history of cellulitis. Positive history of DVT, joint replacement and vein procedures. She is not sure if she had a DVT after giving birth. Last u/s was done 4/2018 on the RLE this was negative for u/s. CT scan of abd and pelvis done 7/2018 and this was  essentially negative.    CT Abdomen Pelvis With Oral With IV Contrast (Order [id number])   Imaging   Date: 7/12/2017 Department: [facility] CT Released By: Savita Wan Authorizing: Jc Mcginnis MD   Study Result   CT ABDOMEN PELVIS W ORAL W IV CONTRAST  7/12/2017 1:15 PM      INDICATION: Bilateral lower abdominal pain. 10 days postop from abdominal surgery for small bowel adhesion lysis and incisional hernia repair.  TECHNIQUE: CT abdomen and pelvis. Multiplanar reformation images (MPR). Dose reduction techniques were used.   IV CONTRAST: Iohexol (Omni) 100 mL  COMPARISON: None.     FINDINGS:  LUNG BASES: Negative.     ABDOMEN: Small hiatal hernia. Multiple small hepatic cysts. Contracted gallbladder. Bile ducts, pancreas, spleen and adrenals are negative.  Tiny left renal cyst with left kidney otherwise normal. There is borderline right hydronephrosis but no stone or mass. Minimal right hydroureter.     PELVIS: No definite bladder or distal ureteral stones bilateral pelvic phleboliths. Previous hysterectomy, no adnexal mass or free fluid.     There is focal area of mild soft tissue stranding present in the distal small bowel mesentery, near ileocecal valve. There are a few small lymph nodes in this region and likely this relates to the very recent surgical procedure. No significant bowel wall   thickening and no bowel obstruction. Moderate amount stool present throughout the colon.  Postoperative changes of ventral abdominal wall below the umbilicus. Tiny fat-containing umbilical hernia. Atherosclerotic calcifications aorta and iliacs.     MUSCULOSKELETAL: Degenerative changes and mild scoliosis.     IMPRESSION:   CONCLUSION:  1.  No definite etiology for symptoms. Minimal mesenteric edema right lower quadrant but no significant bowel pathology, no obstruction.  2.  Right kidney and right ureter are minimally prominent in size but no stones or obstructing ureteral lesion.         Review of  Systems:   Constitutional:  negative  for fever, chills or night sweats; feels chilly at times  EENTM: positive for glasses;  negative [place]  GI:  negative for nausea/vomiting;  negative for constipation  positive diarrhea; working with biofeedback and PT; seeing GI  negative for fecal incontinence negative weight loss; weight is actually increasing  :  negative dysuria, negative incontinence  MS: patient is ambulatory;  does not use assistive devices  Cardiac:  negative   Respiratory:  negative SOB  Endocrine:  negative diabetes  Psych:  negative depression/anxiety    Past Medical History:    Past Medical History:   Diagnosis Date     Acquired hypothyroidism 3/12/2009     Anxiety      Back pain      Breast cyst      Degenerative disc disease, lumbar      Depression      Depression, unspecified depression type      Disease of thyroid gland     hypothyroid     Diverticulosis 10/4/2017    Incidental finding on colonoscopy 10/2017     Essential hypertension 4/21/2015     Fibromyalgia      Gastroesophageal reflux disease without esophagitis 4/21/2015     GERD (gastroesophageal reflux disease)      Hernia, ventral 4/27/2017     History of anesthesia complications     hypotension after surgery     History of blood clots      History of transfusion      Hypertension      Insomnia, unspecified type      Migraine      Osteoarthritis      Osteoporosis      PONV (postoperative nausea and vomiting)      Raynaud's disease      Restless leg syndrome      S/P total knee replacement 11/27/2017     Ventral hernia      Vitamin D deficiency         Surgical History:   Past Surgical History:   Procedure Laterality Date     BACK SURGERY       BREAST CYST EXCISION       CARPAL TUNNEL RELEASE       COLECTOMY N/A 6/2/2017    Procedure: RESECTION, SMALL INTESTINE, OPEN ADHESIOLYSIS;  Surgeon: Keyon Qureshi MD;  Location: [facility];  Service:      COLON SURGERY       HEMORRHOID SURGERY       HYSTERECTOMY  [id number]      INCISIONAL HERNIA REPAIR N/A 6/2/2017    Procedure: LAPARASCOPIC CONVERTED TO OPEN PRIMARY INCISIONAL HERNIA REPAIR;  Surgeon: Keyon Qureshi MD;  Location: [facility] Main OR;  Service:      INCONTINENCE SURGERY       LUMBAR FUSION       NH TOTAL KNEE ARTHROPLASTY Right 11/27/2017    Procedure:  RIGHT TOTAL KNEE ARTHROPLASTY;  Surgeon: Kevin Ryder MD;  Location: [facility] Main OR;  Service: Orthopedics     VARICOSE VEIN SURGERY          Medications:    Current Outpatient Prescriptions:      amLODIPine (NORVASC) 10 MG tablet, TAKE 1 TABLET(10 MG) BY MOUTH AT BEDTIME, Disp: 90 tablet, Rfl: 3     botulinum toxin Type A, Cosm, (BOTOX) 100 unit SolR, Every 3 months. [facility], Disp: , Rfl:      calcium, as carbonate, (CALCIUM 500) 500 mg calcium (1,250 mg) tablet, Take 1 tablet (500 mg total) by mouth 2 (two) times a day., Disp: 60 tablet, Rfl: 11     cholecalciferol, vitamin D3, 2,000 unit capsule, Take 2,000 Units by mouth daily., Disp: , Rfl:      citalopram (CELEXA) 20 MG tablet, TAKE 1 AND 1/2 TABLETS(30 MG) BY MOUTH DAILY, Disp: 135 tablet, Rfl: 3     cyanocobalamin (VITAMIN B-12) 50 mcg tablet, Take 50 mcg by mouth daily., Disp: , Rfl:      eletriptan (RELPAX) 20 MG tablet, Take 20 mg by mouth as needed for migraine. may repeat in 2 hours if necessary, Disp: , Rfl:      furosemide (LASIX) 20 MG tablet, Take 1 tablet (20 mg total) by mouth daily., Disp: 90 tablet, Rfl: 2     HYDROcodone-acetaminophen 5-325 mg per tablet, Take 1-2 tablets by mouth every 4 (four) hours as needed., Disp: 55 tablet, Rfl: 0     irbesartan (AVAPRO) 150 MG tablet, Take 1 tablet (150 mg total) by mouth daily., Disp: 90 tablet, Rfl: 0     levothyroxine (SYNTHROID, LEVOTHROID) 88 MCG tablet, TAKE 1 TABLET BY MOUTH DAILY AT 6AM (Patient taking differently: Take 88 mcg by mouth Daily at 6:00 am. TAKE 1 TABLET BY MOUTH DAILY AT 6AM), Disp: 90 tablet, Rfl: 3     LORazepam (ATIVAN) 1 MG tablet, TAKE 1/2 TABLET(0.5 MG) BY MOUTH DAILY  AS NEEDED FOR ANXIETY, Disp: 30 tablet, Rfl: 0     pramipexole (MIRAPEX) 0.25 MG tablet, Take 1 tablet (0.25 mg total) by mouth 3 (three) times a day., Disp: 270 tablet, Rfl: 0     propranolol (INDERAL) 10 MG tablet, TAKE 1 TABLET(10 MG) BY MOUTH THREE TIMES DAILY, Disp: 270 tablet, Rfl: 2    Current Facility-Administered Medications:      denosumab 60 mg (PROLIA 60 mg/ml), 60 mg, Subcutaneous, Q6 Months, Bernadette Taylor MD, 60 mg at 08/24/18 1018    Allergies:   Allergies   Allergen Reactions     Ace Inhibitors Other (See Comments)     Body aches, elevated BP     Amitriptyline Other (See Comments)     Elevated BP     Atenolol Other (See Comments)     Aggrevates Raynaud's     Celecoxib Other (See Comments)           Clonidine Nausea And Vomiting     Codeine Nausea And Vomiting     Desogestrel-Ethinyl Estradiol Swelling and Unknown     Dexamethasone Swelling and Unknown     Erythromycin Nausea And Vomiting     Escitalopram Other (See Comments)     Headaches.     Hydrochlorothiazide Other (See Comments)     hyponatremia     Propoxyphene N-Acetaminophen Other (See Comments)     Tramadol-Acetaminophen Other (See Comments)     Venlafaxine Nausea And Vomiting     Zolpidem Other (See Comments)     Penicillins Rash and Swelling     Sulfa (Sulfonamide Antibiotics) Rash     Tramadol Itching, Rash and Swelling       Family history:   Family History   Problem Relation Age of Onset     Dementia Mother      Arthritis Mother      COPD Father      Cardiovascular Father      Hypertension Father      No Medical Problems Sister      No Medical Problems Daughter      No Medical Problems Son      Stroke Maternal Grandmother      Heart disease Paternal Grandmother      Stroke Paternal Grandmother      No Medical Problems Sister      No Medical Problems Sister      No Medical Problems Son      No Medical Problems Daughter      Breast cancer Paternal Aunt      age in 50's        Social History:   Social History     Social History      "Marital status:      Spouse name: N/A     Number of children: N/A     Years of education: N/A     Occupational History     Not on file.     Social History Main Topics     Smoking status: Never Smoker     Smokeless tobacco: Never Used     Alcohol use 8.4 oz/week     14 Shots of liquor per week     Drug use: No     Sexual activity: Not on file     Other Topics Concern     Not on file     Social History Narrative        Physical Exam  Vitals: Blood pressure 110/62, pulse 66, temperature 98.2  F (36.8  C), height 5' 2\" (1.575 m), weight 174 lb 9.6 oz (79.2 kg), not currently breastfeeding.  General: This is a 68 y.o. female who appears their reported age, not in acute distress  Head: normocephalic, Atraumatic; wearing glasses; non-icteric sclera; no exudate; mild hearing loss   Respiratory: Clear throughout all lung fields; unlabored breathing; no cough   Cardiac: Regular, Rate and Rhythm, no murmurs appreciated   Skin: Uniformly warm and dry  Psych: Alert and oriented x4; calm and cooperative throughout exam  Abdomen: Normal bowel sounds. Soft, symmetric, no guarding or rigidity, nontender with palpation.  No organomegaly or masses palpated.   Lymphatics: No palpable nodes to bilateral groin   Extremities: BLE with +1 edema; left leg slightly larger than right; see circumferential measures below; skin intact; no erythema; mild tenderness with palpation of the left ankle region; hair growth to the toes; nails painted well trimmed webbing clear;  strength testing revealed 4/4 to BLEs.    Sensation: Intact to pinprick and light touch throughout lower extremities bilaterally     Peripheral Vascular: normal dorsalis pedis, posterior tibial pulses to bilateral feet , using a handheld doppler these were strong; easily found and biphasic in nature.  Good capillary refill. No unusual venous distention. Negative for spider veins, telangiectasias, hemosiderin deposition or hyperpigmentation and fibrosis or scarring "       Circumferential volume measures:    Vasc Edema 11/2/2018   Right just above MTP 20.5   Right Ankle 20.6   Right Widest Calf 35.   Right Thigh Up 10cm 46   Left - just above MTP 20.6   Left Ankle 21.7   Left Widest Calf 37   Left Thigh Up 10cm 48.5       Ulceration(s)/Wound(s):     Incision 06/02/17 Abdomen (Active)       Incision 11/27/17 Leg Right (Active)       Laboratory studies:   Lab Results   Component Value Date    SEDRATE 16 04/12/2018     Lab Results   Component Value Date    CREATININE 0.72 10/19/2018    CREATININE 0.70 10/19/2018     No results found for: HGBA1C  Lab Results   Component Value Date    BUN 17 10/19/2018     Lab Results   Component Value Date    ALBUMIN 3.8 10/19/2018     Vitamin D, Total (25-Hydroxy)   Date Value Ref Range Status   10/19/2018 25.3 (L) 30.0 - 80.0 ng/mL Final             Impression:   Venous insufficiency bilaterally  Venous hypertension bilaterally  Obesity bmi 31  History of bilateral knee replacement  Possible history of right DVT  Dependent edema  History of vein stripping    Assessment/Plan:  1. Excisional debridement na skin intact    2. Edema. We spoke at length regarding their swelling, potential causes, and treatment options. Answered all questions. Their swelling is likely multifactorial including but not limited to the following: their weight, dependency of the limbs for most hours of the day, venous hypertension, valvular incompetence, history of DVT, side effect of certain medications and history of joint replacements.  Patient would benefit from lifelong compression; we spoke about vein stripping procedures and how this does not preclude one from needing compression. Will provide her with [employer] walker catalogue with knee cody 20-30mmHG compression stockings options. Also will obtain venous insufficiency to see if there is anything further to be treated in the left leg as this is larger than the right; will also evaluate her deep system. Recommend  continued walking, elevation; weight management. Continue to take their diuretics per their PMD recommendations; I agree that the 20mg of lasix is appropriate for her bp and swelling management.       3. Treatment: lotion daily; we spoke about the dangers of nail salons and to ensure that they are using best practices with clean instruments and soaking tubs; she is at increased risk of cellulitis due to the swelling    4. Offloading: na    5. Nutrition: we spoke about her weight and how this can contribute to there swelling issues; she will continue to work at this; I encouraged her to continue walking and consider swimming which is really good for swelling    Patient to return to clinic PRN for re-evaluation. They were instructed to call the clinic sooner with any further questions or concerns. Answered all questions.    Oralia Daniel DNP, RN, CNP, [facility]  [phone]      This note was electronically signed by Oralia Daniel

## 2021-06-21 NOTE — PROGRESS NOTES
Patient answered yes to 1 or more questions on the vaccine screening questionnaire. Patient has a number of allergies and states she has been on medications that have affected her immune system in the last 3 months.. Per Dr. Tapan Daniel, patient is ok to receive the vaccine today.

## 2021-06-22 NOTE — PROGRESS NOTES
[facility] Daily Progress     Patient Name: Suma Mark  Date: 2018  Visit #: 2  PTA visit #:  1  Referral Diagnosis: [unfilled]  Referring provider: Bernadette Taylor MD  Visit Diagnosis:     ICD-10-CM    1. Fibromyalgia M79.7    2. Restless leg syndrome G25.81    3. Depression, unspecified depression type F32.9    4. Muscle weakness (generalized) M62.81    5. Lymphedema I89.0    6. Localized edema R60.0          Assessment:   Pt returns today for her first follow up appointment .  Pt has been compliant with her lymph exercises.  Patient is benefitting from skilled physical therapy and is making steady progress toward functional goals.  Patient is appropriate to continue with skilled physical therapy intervention, as indicated by initial plan of care.    Goal Status:  Pt. will demonstrate/verbalize independence in self-management of condition in : 12 weeks    Patient will have a decreased volume in : LE;by 5%;to decrease risk of infection  Patient will have decreased fibrosis, scar tissue for improved lymphatic mobilitiy : in 4 weeks  Pt will: decrease LLIS by 8 points to decrease impact of lymphedema on function      Plan / Patient Education:     Continue with initial plan of care.  Progress with home program as tolerated.   Modify bandages as needed. Pt may have her  help with the wraps.  Pt was instructed to keep the bandages on 23/24 hours, remove for showering and re bandage.    Subjective:   Pt reports she has been doing the lymph exercises.  Pt has a thigh high stocking on her R LE. This was put on pt after the ablation on .  Pt reports decreased swelling in the morning.  Pain Ratin  L thigh        Objective:     Caregiver present: No    Observation of swelling:  The same    Volume measurements taken:  No      Skin condition is:  Normal     Compression: thigh high on R LE, knee high on the L LE      Medication for infection:  NO      Treatment Today     TREATMENT  MINUTES COMMENTS   Evaluation     Self-care/ Home management 20 Medical compression bandages to L LE  -applied Eucerin lotion  -ortho stockinette  -1 roll 8 x 5 cm comprilan  -1 roll 10 x 5 cm comprilan  -Comperm G on top   Manual therapy 40 MLD to neck, trunk/abdomen, and B LEs   Neuromuscular Re-education     Therapeutic Activity     Therapeutic Exercises     Gait training     Modality__________________                Total 60    Blank areas are intentional and mean the treatment did not include these items.       Maribell Linares CLT  12/18/2018

## 2021-06-22 NOTE — PROGRESS NOTES
Assessment/Plan:        1. Lymphedema  CT Pelvis WIthout Oral Without IV Contrast    Ambulatory referral to PT/OT    Compression stockings 20/30 mmHg; Panty   2. Left lower quadrant pain  Ambulatory referral to General Surgery       Return in about 2 months (around 2/4/2019) for Recheck.    Patient Instructions   Tylenol 500 mg or 650 mg - 2 pills at the bedtime      Chief Complaint   Patient presents with     Swelling     in legs, went to vascular center will be having an ablation next week - they think it might be lymph related     We discussed few different issues during the visit today:  1. Bilat LE swelling - she saw Vascular Clinic and has scheduled right leg vein ablation for next week. Question about lymphoedema was raised. On the exam today, no significant swelling, but she brought pictures of significant swelling in her both legs. Compression stockings helps but when have so much swelling, actually make big cut below her knees. We discussed longer stockings, weight loss, exercise. She agreed to start PT and new stockings. I would like to check CT pelvis t rule out obstruction.  2. She also has chronic LLQ abdominal pain, for many years.She also has a history of hemorrhoidectomy, sling placement for bladder prolapse, rectal prolapse and intussusception in 2015, inguinal hernia surgery with bowel resection in 2017. She is seeing Dr Worley in Colorectal Surgery and they are discussing doing exploratory surgery.Pain is actually worse over the last few months. She would like to get second opinion. Referral provided.  3. Disturbed sleep with abdominal pain and leg pains - discussed in details. She will try 2 Tylenol pills at bedtime. I am resistant to start Vicodin as a daily therapy.      /76 (Patient Site: Right Arm, Patient Position: Sitting, Cuff Size: Adult Regular)   Pulse 68   Wt 175 lb 12.8 oz (79.7 kg)   LMP  (LMP Unknown)   BMI 32.15 kg/m    25 minutes spent with the patient and more then 50 % of  the time in counseling.  This note has been dictated using voice recognition software. Any grammatical or context distortions are unintentional and inherent to the software      Patient Active Problem List   Diagnosis     Acquired hypothyroidism     Essential hypertension     Restless leg syndrome     Gastroesophageal reflux disease without esophagitis     Migraine     Hernia, ventral     Osteoporosis     Diverticulosis     S/P total knee replacement     Insomnia, unspecified type     Depression, unspecified depression type     Fibromyalgia       Current Outpatient Medications on File Prior to Visit   Medication Sig Dispense Refill     amLODIPine (NORVASC) 10 MG tablet TAKE 1 TABLET(10 MG) BY MOUTH AT BEDTIME 90 tablet 3     botulinum toxin Type A, Cosm, (BOTOX) 100 unit SolR Every 3 months. [facility]       calcium carbonate-vitamin D3 (CALCIUM 600 + D,3,) 600 mg(1,500mg) -400 unit per tablet Take 3 tablets by mouth daily.       citalopram (CELEXA) 20 MG tablet TAKE 1 AND 1/2 TABLETS(30 MG) BY MOUTH DAILY 135 tablet 3     cyanocobalamin (VITAMIN B-12) 50 mcg tablet Take 50 mcg by mouth daily.       eletriptan (RELPAX) 20 MG tablet Take 20 mg by mouth as needed for migraine. may repeat in 2 hours if necessary       furosemide (LASIX) 20 MG tablet Take 1 tablet (20 mg total) by mouth daily. 90 tablet 2     HYDROcodone-acetaminophen 5-325 mg per tablet Take 1-2 tablets by mouth every 4 (four) hours as needed. 55 tablet 0     irbesartan (AVAPRO) 150 MG tablet TAKE 1 TABLET(150 MG) BY MOUTH DAILY 90 tablet 3     levothyroxine (SYNTHROID, LEVOTHROID) 88 MCG tablet TAKE 1 TABLET BY MOUTH DAILY AT 6AM (Patient taking differently: Take 88 mcg by mouth Daily at 6:00 am. TAKE 1 TABLET BY MOUTH DAILY AT 6AM) 90 tablet 3     LORazepam (ATIVAN) 1 MG tablet TAKE 1/2 TABLET(0.5 MG) BY MOUTH DAILY AS NEEDED FOR ANXIETY 30 tablet 0     pramipexole (MIRAPEX) 0.25 MG tablet Take 1 tablet (0.25 mg total) by mouth 3 (three) times a  day. 270 tablet 0     propranolol (INDERAL) 10 MG tablet TAKE 1 TABLET(10 MG) BY MOUTH THREE TIMES DAILY 270 tablet 2     calcium, as carbonate, (CALCIUM 500) 500 mg calcium (1,250 mg) tablet Take 1 tablet (500 mg total) by mouth 2 (two) times a day. 60 tablet 11     cholecalciferol, vitamin D3, 2,000 unit capsule Take 2,000 Units by mouth daily.       [DISCONTINUED] levothyroxine (SYNTHROID, LEVOTHROID) 88 MCG tablet TAKE 1 TABLET BY MOUTH DAILY AT 6 AM. 90 tablet 3     Current Facility-Administered Medications on File Prior to Visit   Medication Dose Route Frequency Provider Last Rate Last Dose     denosumab 60 mg (PROLIA 60 mg/ml)  60 mg Subcutaneous Q6 Months Bernadette Taylor MD   60 mg at 08/24/18 1018     lidocaine 1%-EPINEPHrine 1:100,000 112 mL in sodium chloride 0.9% 1,000 mL (TUMESCENT)  1,000 mL Irrigation Q1H PRN Jonn Pinto MD         [DISCONTINUED] lidocaine 1%-EPINEPHrine 1:100,000 112 mL in sodium chloride 0.9% 1,000 mL (TUMESCENT)  1,000 mL Irrigation Q1H PRN Jonn Pinto MD

## 2021-06-22 NOTE — PROGRESS NOTES
GONIO WAS ORDERED AND PERFORMED TODAY TO EVALUATE ANGLES FOR GLAUCOMA.  ANGLES ARE OPEN TODAY [facility] Daily Progress     Patient Name: Suma Mark  Date: 12/27/2018  Visit #: 4/8  PTA visit #:  2  Referral Diagnosis: lymphedema  Referring provider: Bernadette Taylor MD  Visit Diagnosis:     ICD-10-CM    1. Fibromyalgia M79.7    2. Restless leg syndrome G25.81    3. Depression, unspecified depression type F32.9    4. Muscle weakness (generalized) M62.81    5. Lymphedema I89.0    6. Localized edema R60.0          Assessment:   Pt concerned about recommended size of compression stocking. Pt advised to call [employer] customer service.  Patient is benefitting from skilled physical therapy and is making steady progress toward functional goals.  Patient is appropriate to continue with skilled physical therapy intervention, as indicated by initial plan of care.    Goal Status:  Pt. will demonstrate/verbalize independence in self-management of condition in : 12 weeks    Patient will have a decreased volume in : LE;by 5%;to decrease risk of infection  Patient will have decreased fibrosis, scar tissue for improved lymphatic mobilitiy : in 4 weeks  Pt will: decrease LLIS by 8 points to decrease impact of lymphedema on function      Plan / Patient Education:     Continue with initial plan of care. Modify bandages as needed. Instructed patient to bring in [employer] catalog with compression stocking recommendations next visit to assist in purchasing  Pt was instructed to keep the bandages on 23/24 hours, remove for showering and re bandage. MLD and self lymphatic drainage, exs  Pt to call [employer] customer service regarding size of compression stockings. ( I re measured circumference today)    Subjective:     Pt has been complaint with the bandages and HEP.  Pt brought in [employer] catalog.       Objective:   To dept without compression on L LE and thigh high 20-30mmhg stocking on R LE  1-2+ L pretiial edema  R LE with thigh high stocking    Treatment Today   12/21/2018  TREATMENT MINUTES  COMMENTS   Evaluation     Self-care/ Home management 25 Pt brought in [employer] catalog. Re measured ankle and calf circumference for proper fit.  Medical compression bandages:  Stockinette,  -foam roll spiraled ankle to knee   -8cm short stretch figure 8 foot to mid lower leg  -10cm x 5m short stretch spiraled widest ankle to below knee  -size G elasticized stockinette   Manual therapy 30 Performed Manual lymph drainage trunk and L LE stimulating all regional LNs and clearing through L inguinal axillary pathways, gentle fibrotic softening of L LE especially calf.     Neuromuscular Re-education     Therapeutic Activity     Therapeutic Exercises     Gait training     Modality__________________                Total 55    Blank areas are intentional and mean the treatment did not include these items.       Maribell Linares,MORALES  12/27/2018

## 2021-06-22 NOTE — PATIENT INSTRUCTIONS - HE
Resume normal activity.  Use compression socks as much as possible when on feet, long car rides and on air planes.  Return to clinic in 4 months.  Call if any questions [phone]

## 2021-06-22 NOTE — PROGRESS NOTES
Hernia education & surgery packet provided to pt.    Klever Le CMA ([facility])     Ph: [phone]    Fx: [phone]

## 2021-06-22 NOTE — PROGRESS NOTES
[facility] Daily Progress     Patient Name: Suma Mark  Date: 1/3/2019  Visit #: 6/8  PTA visit #:  2  Referral Diagnosis: lymphedema  Referring provider: Bernadette Taylor MD  Visit Diagnosis:     ICD-10-CM    1. Lymphedema I89.0    2. Fibromyalgia M79.7    3. Restless leg syndrome G25.81          Assessment:     Patient is doing well at this time. She has decreased her volumes overall. She is wearing her thigh high compression garments 20-30mmHg.   No further PT indicated at this time    Goal Status: all goals met.  Pt. will demonstrate/verbalize independence in self-management of condition in : 12 weeks    Patient will have a decreased volume in : LE;by 5%;to decrease risk of infection  Patient will have decreased fibrosis, scar tissue for improved lymphatic mobilitiy : in 4 weeks  Pt will: decrease LLIS by 8 points to decrease impact of lymphedema on function      Plan / Patient Education:     DC PT.    Subjective:     Pt has been complaint with the bandages and HEP. No further questions.    Objective:     To dept with bilat thigh high 20-30mmhg stocking on Bilat LE    LE volume:   Right Lower Extremity Total Estimated Volume (cm3): 2671.58  Left Lower Extremity Total Estimated Volume (cm3): 2448.81  Left LE change of volume from initial (%): -6.99  Lower Extremity Swelling Comparison (%): 1.47 %      Treatment Today   12/21/2018  TREATMENT MINUTES COMMENTS   Evaluation     Self-care/ Home management     Manual therapy 45 Performed Manual lymph drainage trunk and Bilat LE stimulating all regional LNs and clearing through Bilat inguinal axillary pathways, gentle fibrotic softening of L LE especially calf.     Neuromuscular Re-education     Therapeutic Activity     Therapeutic Exercises     Gait training     Modality__________________                Total 45    Blank areas are intentional and mean the treatment did not include these items.       Grecia Link, DPT,CLT  1/3/2019

## 2021-06-22 NOTE — PROGRESS NOTES
" HPI: Suma Mark is here for follow up for left-sided inguinal pain.  She tried taking the Neurontin for approximately 1 week but states that she broke out in a rash and had diarrhea.  She states that her pain still persisted despite taking the Neurontin.    Allergies, Medications, Social History, Past Medical History and Past Surgical History were reviewed and are noted in the chart.    /72   Pulse 79   Resp 18   Ht 5' 2\" (1.575 m)   Wt 172 lb (78 kg)   LMP  (LMP Unknown)   SpO2 100%   BMI 31.46 kg/m    Body mass index is 31.46 kg/m .      EXAM:   GENERAL: Appears well      Incision 06/02/17 Abdomen (Active)   Site Assessment Covered, not assessed 11/28/2017 10:56 AM   Surrounding Tissue Assessment Clean;Dry;Intact 11/28/2017 10:56 AM   Closure Steristrips 6/6/2017  8:00 AM   Drainage Amount Scant 6/6/2017  8:00 AM   Drainage Description Sanguineous (Bloody) 6/6/2017  8:00 AM   Site Care Cleansed 6/5/2017  7:45 AM   Dressing Dry gauze 6/6/2017  8:00 AM   Dressing Status  Clean;Dry;Intact 11/28/2017 10:56 AM       Incision 11/27/17 Leg Right (Active)   Site Assessment Covered, not assessed 11/30/2017  8:30 AM   Surrounding Tissue Assessment Covered, not assessed 11/30/2017  8:30 AM   Closure Covered, not assessed 11/30/2017  8:30 AM   Drainage Amount Small 11/29/2017  4:00 PM   Drainage Description Sanguineous (Bloody) 11/30/2017  8:30 AM   Dressing Silicone foam 11/30/2017  8:30 AM   Dressing Status  Old drainage;Intact 11/30/2017  8:30 AM       Assessment/Plan: Suma Mark continues to have postoperative discomfort.  At this point I do not think that the small fat-containing left inguinal hernias are contributing to her discomfort.  I recommended that she follow-up with pain clinic for possible injections.  I will place the referral today.  If this does not work then we can always entertain diagnostic laparoscopy or robotic inguinal hernia repair with lysis of adhesions.    Tre Mast  " DO [facility] Department of Surgery

## 2021-06-22 NOTE — PROGRESS NOTES
HPI:  Suma Mark is a 68 y.o. female who was referred to me by Bernadette Taylor MD for evaluation of chronic left lower quadrant abdominal pain.  She has had an extensive surgical history which includes a recent laparoscopic lysis of adhesions converted to an open exploratory laparotomy with resection of small intestine due to difficult adhesions.  She states she did relatively well in the immediate postoperative timeframe but continued to have postoperative discomfort in the left lower quadrant.  She states this was not evident prior to her expiratory laparotomy  She has had complaints of intermittent diarrhea and constipation and has undergone a fairly significant gastroenterologic workup with GI for evaluation of this.  Despite the findings she continues to have left lower quadrant pain.  She describes the pain as sharp burning pain directly over the left lower quadrant with intermittent radiation to the back as well as the groin.  She denies any obvious bulges in the left inguinal region or right inguinal region for that matter.  She denies any other abdominal complaints in the right lower quadrant or periumbilical regions.  She denies any fevers or chills but does have intermittent nausea with spontaneous emesis.  There is no recent history of bowel obstructions.  She does have approximately 4 bowel movements a day with no evidence of blood or any other abnormalities.    Allergies:Ace inhibitors; Amitriptyline; Atenolol; Celecoxib; Clonidine; Codeine; Desogestrel-ethinyl estradiol; Dexamethasone; Erythromycin; Escitalopram; Hydrochlorothiazide; Propoxyphene n-acetaminophen; Tramadol-acetaminophen; Venlafaxine; Zolpidem; Penicillins; Sulfa (sulfonamide antibiotics); and Tramadol    Past Medical History:   Diagnosis Date     Acquired hypothyroidism 3/12/2009     Anxiety      Back pain      Breast cyst      Degenerative disc disease, lumbar      Depression      Depression, unspecified depression type       Disease of thyroid gland     hypothyroid     Diverticulosis 10/4/2017    Incidental finding on colonoscopy 10/2017     Essential hypertension 4/21/2015     Fibromyalgia      Gastroesophageal reflux disease without esophagitis 4/21/2015     GERD (gastroesophageal reflux disease)      Hernia, ventral 4/27/2017     History of anesthesia complications     hypotension after surgery     History of blood clots      History of transfusion      Hypertension      Insomnia, unspecified type      Migraine      Osteoarthritis      Osteoporosis      PONV (postoperative nausea and vomiting)      Raynaud's disease      Restless leg syndrome      S/P total knee replacement 11/27/2017     Ventral hernia      Vitamin D deficiency        Past Surgical History:   Procedure Laterality Date     BACK SURGERY       BREAST CYST EXCISION       CARPAL TUNNEL RELEASE       COLECTOMY N/A 6/2/2017    Procedure: RESECTION, SMALL INTESTINE, OPEN ADHESIOLYSIS;  Surgeon: Keyon Qureshi MD;  Location: [facility] Main OR;  Service:      COLON SURGERY       HEMORRHOID SURGERY       HYSTERECTOMY  [id number]     INCISIONAL HERNIA REPAIR N/A 6/2/2017    Procedure: LAPARASCOPIC CONVERTED TO OPEN PRIMARY INCISIONAL HERNIA REPAIR;  Surgeon: Keyon Qureshi MD;  Location: [facility] Main OR;  Service:      INCONTINENCE SURGERY       LUMBAR FUSION       MS TOTAL KNEE ARTHROPLASTY Right 11/27/2017    Procedure:  RIGHT TOTAL KNEE ARTHROPLASTY;  Surgeon: Kevin Ryder MD;  Location: [facility] Main OR;  Service: Orthopedics     VARICOSE VEIN SURGERY       VEIN LIGATION AND STRIPPING Bilateral        CURRENT MEDS:  Current Outpatient Medications   Medication Sig Dispense Refill     amLODIPine (NORVASC) 10 MG tablet TAKE 1 TABLET(10 MG) BY MOUTH AT BEDTIME 90 tablet 3     botulinum toxin Type A, Cosm, (BOTOX) 100 unit SolR Every 3 months. [facility]       calcium carbonate-vitamin D3 (CALCIUM 600 + D,3,) 600 mg(1,500mg) -400 unit per tablet Take 3 tablets  by mouth daily.       calcium, as carbonate, (CALCIUM 500) 500 mg calcium (1,250 mg) tablet Take 1 tablet (500 mg total) by mouth 2 (two) times a day. 60 tablet 11     cholecalciferol, vitamin D3, 2,000 unit capsule Take 2,000 Units by mouth daily.       citalopram (CELEXA) 20 MG tablet TAKE 1 AND 1/2 TABLETS(30 MG) BY MOUTH DAILY 135 tablet 3     cyanocobalamin (VITAMIN B-12) 50 mcg tablet Take 50 mcg by mouth daily.       eletriptan (RELPAX) 20 MG tablet Take 20 mg by mouth as needed for migraine. may repeat in 2 hours if necessary       furosemide (LASIX) 20 MG tablet Take 1 tablet (20 mg total) by mouth daily. 90 tablet 2     HYDROcodone-acetaminophen 5-325 mg per tablet Take 1-2 tablets by mouth every 4 (four) hours as needed. 55 tablet 0     irbesartan (AVAPRO) 150 MG tablet TAKE 1 TABLET(150 MG) BY MOUTH DAILY 90 tablet 3     levothyroxine (SYNTHROID, LEVOTHROID) 88 MCG tablet TAKE 1 TABLET BY MOUTH DAILY AT 6AM (Patient taking differently: Take 88 mcg by mouth Daily at 6:00 am. TAKE 1 TABLET BY MOUTH DAILY AT 6AM) 90 tablet 3     LORazepam (ATIVAN) 1 MG tablet TAKE 1/2 TABLET(0.5 MG) BY MOUTH DAILY AS NEEDED FOR ANXIETY 30 tablet 0     pramipexole (MIRAPEX) 0.25 MG tablet Take 1 tablet (0.25 mg total) by mouth 3 (three) times a day. 270 tablet 0     propranolol (INDERAL) 10 MG tablet TAKE 1 TABLET(10 MG) BY MOUTH THREE TIMES DAILY 270 tablet 2     gabapentin (NEURONTIN) 100 MG capsule Take 100 mg by mouth 3 (three) times a day. 90 capsule 0     Current Facility-Administered Medications   Medication Dose Route Frequency Provider Last Rate Last Dose     denosumab 60 mg (PROLIA 60 mg/ml)  60 mg Subcutaneous Q6 Months Bernadette Taylor MD   60 mg at 08/24/18 1018       Family History   Problem Relation Age of Onset     Dementia Mother      Arthritis Mother      COPD Father      Cardiovascular Father      Hypertension Father      No Medical Problems Sister      No Medical Problems Daughter      No Medical Problems  "Son      Stroke Maternal Grandmother      Heart disease Paternal Grandmother      Stroke Paternal Grandmother      No Medical Problems Sister      No Medical Problems Sister      No Medical Problems Son      No Medical Problems Daughter      Breast cancer Paternal Aunt         age in 50's        reports that  has never smoked. she has never used smokeless tobacco. She reports that she drinks about 8.4 oz of alcohol per week. She reports that she does not use drugs.    Review of Systems -   The 12 system review is within normal limits except for as mentioned above.  General ROS: No complaints or constitutional symptoms  Ophthalmic ROS: No complaints of visual changes  Skin: No complaints or symptoms   Endocrine: No complaints or symptoms  Hematologic/Lymphatic: No symptoms or complaints  Psychiatric: No symptoms or complaints  Respiratory ROS: no cough, shortness of breath, or wheezing  Cardiovascular ROS: no chest pain or dyspnea on exertion  Gastrointestinal ROS: As per HPI  Genito-Urinary ROS: no dysuria, trouble voiding, or hematuria  Musculoskeletal ROS: no joint or muscle pain  Neurological ROS: no TIA or stroke symptoms    /72 (Patient Site: Right Arm, Patient Position: Sitting, Cuff Size: Adult Regular)   Pulse 67   Ht 5' 2\" (1.575 m)   Wt 172 lb 11.2 oz (78.3 kg)   LMP  (LMP Unknown)   SpO2 96%   Breastfeeding? No   BMI 31.59 kg/m    Body mass index is 31.59 kg/m .    EXAM:  GENERAL: Well developed female, No acute distress, pleasant and conversant   EYES: Pupils equal, round and reactive, no scleral icterus  CARDIAC: Regular rate and rhythm, no obvious murmurs noted  CHEST/LUNG: Clear to ascultation bilaterally, No ronchi, No wheezes  ABDOMEN: Soft, well-healed lower midline incisions, severe point tenderness in the left inguinal region just proximal to the left inguinal canal with no obvious hernias noted, no anal palpation noted in the right lower abdominal region or periumbilical " region  SKIN: Pink, warm and dry, no obvious rashes or lesions   NEURO:No focal deficits, ambulatory  MUSCULOSKELETAL:No obvious deformities, no swelling, normal appearing      IMAGES:   Relevant images were reviewed and discussed with the patient.  Notable findings were   CT ABDOMEN PELVIS W ORAL W IV CONTRAST  12/11/2018 8:13 AM        INDICATION: Adnexal mass, suspected or known lymphedema.  TECHNIQUE: CT abdomen and pelvis. Multiplanar reformation images (MPR). Dose reduction techniques were used.   IV CONTRAST: Iohexol (Omni) 100 mL.  COMPARISON: 07/12/2017.     FINDINGS:  LUNG BASES: Mild scarring in both lung bases. The heart is enlarged. Coronary arterial calcifications are noted. There is a small hiatal hernia.     ABDOMEN: Stable benign cysts and/or hemangiomas in the liver. No new or enlarging suspicious liver lesion. The adrenal glands appear normal. Gallbladder is present. The spleen is normal in size. Stable bilateral small renal cysts. Stable mild right   hydroureteronephrosis without identifiable obstructing stone or lesion. The abdominal aorta is normal in caliber but is heavily calcified. No retroperitoneal lymphadenopathy. No free air or free fluid. No signs of small bowel obstruction or small bowel   inflammation. End to side small bowel anastomosis is patent in the left lower quadrant on image 69 and similar in appearance. A few prominent mesenteric lymph nodes are unchanged. Moderate amount of stool throughout the colon.     PELVIS: Urinary bladder is moderately distended. There are signs of pelvic floor relaxation. There has been a hysterectomy. Left ovary appears normal on image 110. Right ovary is not well visualized. Small amount of stool in the rectosigmoid. Appendix is   not well visualized. No secondary signs of appendicitis.     MUSCULOSKELETAL: Small fat-containing umbilical versus periumbilical hernia. Previous posterior lateral osseous fusion with laminectomies. Scoliosis with  degenerative change in the spine.     IMPRESSION:   CONCLUSION:  1.  Hysterectomy. Normal appearance of the left ovary. The right ovary could not be definitively identified.  2.  Nonvisualized appendix. No secondary signs of appendicitis.  3.  Similar right hydroureteronephrosis without identifiable stone or obstructing ureteral lesion, although this was not a CT IVP.      Assessment/Plan:   Suma Mark is a 68 y.o. female with chronic left lower quadrant abdominal discomfort with point tenderness.  After reviewing her CT scan and performing a history and physical it does appear as though this is neuropathic in nature.  In the setting of a temporal relationship from her last surgery and the pain there is likely adhesive disease or scar tissue that is formed within the musculature which is causing nerve irritation.  She does have a small fat-containing inguinal hernias noted on the CT scan.  These were present in her previous CT scan in 2017.  Also present were the small right abdominal wall hernia and supraumbilical abdominal wall breech.  These are incidental findings and likely not contributing to any of her symptomatology.  We had an extensive discussion regarding the likely causes of her pain with neuropathic pathophysiology at the top of the list.  I do not think her intestinal issues have any relation to the point tenderness I elicited on physical exam.  This is most likely a secondary issue.  Regardless, I do not think surgery given her history.  After our discussion I have recommended low-dose gabapentin to see if we can alleviate some of her symptomatology.  I will have her follow-up with me in 2 weeks for ongoing evaluation.  If this does not work then we can entertain possible injections as a next step.  She understands everything that was discussed and will follow up accordingly in the next 2 weeks.      Travis Mast D.O. [facility]  [phone]  [facility] Department of Surgery

## 2021-06-22 NOTE — PROGRESS NOTES
[facility] Daily Progress     Patient Name: Suma Mark  Date: 12/31/2018  Visit #: 5/8  PTA visit #:  2  Referral Diagnosis: lymphedema  Referring provider: Bernadette Taylor MD  Visit Diagnosis:   No diagnosis found.      Assessment:     Patient has one more visit scheduled in PT. We will wrap one more time, then have her follow up after 2-4 weeks of wearing her compression stocking to make sure she is maintaining appropriate volume on L LE.  She is seeing vascular surgeon this week to discuss possible surgery for L LE.    Goal Status:  Pt. will demonstrate/verbalize independence in self-management of condition in : 12 weeks    Patient will have a decreased volume in : LE;by 5%;to decrease risk of infection  Patient will have decreased fibrosis, scar tissue for improved lymphatic mobilitiy : in 4 weeks  Pt will: decrease LLIS by 8 points to decrease impact of lymphedema on function      Plan / Patient Education:     Continue with initial plan of care.   Patient will bring in stocking for L LE next session.     Subjective:     Pt has been complaint with the bandages and HEP. Has [employer] stocking at home.    Objective:     To dept with tube compression on L LE and thigh high 20-30mmhg stocking on R LE  1+ left pretibial edema  R LE with thigh high stocking    Right Lower Extremity Total Estimated Volume (cm3): 2671.58  Left Lower Extremity Total Estimated Volume (cm3): 2448.81  Left LE change of volume from initial (%): -6.99  Lower Extremity Swelling Comparison (%): 1.47 %        Treatment Today   12/21/2018  TREATMENT MINUTES COMMENTS   Evaluation     Self-care/ Home management 15 Measured L LE volume today    Medical compression bandages:  Stockinette,  -foam roll spiraled ankle to knee   -8cm short stretch figure 8 foot to mid lower leg  -10cm x 5m short stretch spiraled widest ankle to below knee  -size G elasticized stockinette   Manual therapy 30 Performed Manual lymph drainage trunk and L  LE stimulating all regional LNs and clearing through L inguinal axillary pathways, gentle fibrotic softening of L LE especially calf.     Neuromuscular Re-education     Therapeutic Activity     Therapeutic Exercises     Gait training     Modality__________________                Total 45    Blank areas are intentional and mean the treatment did not include these items.       Grecia Link, DPT,CLT  12/31/2018

## 2021-06-22 NOTE — PROGRESS NOTES
"[facility] Daily Progress     Patient Name: Suma Mark  Date: 12/21/2018  Visit #: 3/8  PTA visit #:  1  Referral Diagnosis: lymphedema  Referring provider: Bernadette Taylor MD  Visit Diagnosis:     ICD-10-CM    1. Fibromyalgia M79.7    2. Restless leg syndrome G25.81    3. Depression, unspecified depression type F32.9    4. Muscle weakness (generalized) M62.81    5. Lymphedema I89.0    6. Localized edema R60.0          Assessment:     Patient is benefitting from skilled physical therapy and is making steady progress toward functional goals.  Patient is appropriate to continue with skilled physical therapy intervention, as indicated by initial plan of care.    Goal Status:  Pt. will demonstrate/verbalize independence in self-management of condition in : 12 weeks    Patient will have a decreased volume in : LE;by 5%;to decrease risk of infection  Patient will have decreased fibrosis, scar tissue for improved lymphatic mobilitiy : in 4 weeks  Pt will: decrease LLIS by 8 points to decrease impact of lymphedema on function      Plan / Patient Education:     Continue with initial plan of care. Modify bandages as needed. Instructed patient to bring in [employer] catalog with compression stocking recommendations next visit to assist in purchasing  Pt was instructed to keep the bandages on 23/24 hours, remove for showering and re bandage. MLD and self lymphatic drainage, exs      Subjective:     Left GCB on x 2 days, well tolerated, removed to shower and noted visible decrease in edema. Reapplied then removed yesterday.   Did not reapply as \"was coming in today\"  Does report mild refill.      Objective:   To dept without compression on L LE and thigh high 20-30mmhg stocking on R LE  1-2+ L pretiial edema  R LE with thigh high stocking    Treatment Today   12/21/2018  TREATMENT MINUTES COMMENTS   Evaluation     Self-care/ Home management 25 Issued size F elasticized stockinette for use when out of bandages " to launder.  Instructed in donning/doffing and wear as well as laundering of all bandaging supplies.  Instructed pt to bring in [employer] catalog next session to assess recommended compression stockings including knee highs and thigh highs.    Would be beneficial to consider ordering and receiving prior to completion of treatment to assess fit and effectiveness.  Further instructed pt in and pt demonstrated self gradient compression bandaging of L LE to knee with mod vcs for optimal technique, spacing and tension:  Stockinette,  -foam roll spiraled ankle to knee   -8cm short stretch figure 8 foot to mid lower leg  -10cm x 5m short stretch spiraled widest ankle to below knee  -size G elasticized stockinette   Manual therapy 30 Performed Manual lymph drainage trunk and L LE stimulating all regional LNs and clearing through L inguinal axillary pathways, gentle fibrotic softening of L LE especially calf.  Further instructed in self lymphatic drainage techniques   Neuromuscular Re-education     Therapeutic Activity     Therapeutic Exercises     Gait training     Modality__________________                Total 55    Blank areas are intentional and mean the treatment did not include these items.       Elissa Lewis  12/21/2018

## 2021-06-22 NOTE — PROGRESS NOTES
[facility] Certification Request    December 7, 2018      Patient: Suma Mark  MR Number: [id number]  YOB: 1950  Date of Visit: 12/7/2018      Dear Dr. Bernadette Taylor:    Thank you for this referral.   We are seeing Suma Mark for Physical Therapy of B lower extremity lymphedema, fibrosis.    Medicare and/or Medicaid requires physician review and approval of the treatment plan. Please review the plan of care and verify that you agree with the therapy plan of care by co-signing this note.      Plan of Care  Authorization / Certification Start Date: 12/07/18  Authorization / Certification End Date: 03/07/19  Authorization / Certification Number of Visits: up to 10 visits  Communication with: Referral Source  Patient Related Instruction: Treatment plan and rationale;Self Care instruction;Basis of treatment;Posture;Precautions;Expected outcome  Times per Week: 2x/week  Number of Weeks: 3 weeks then decrease to 1x/month x 2 months  Number of Visits: 8 visits  Discharge Planning: independent in home program  Therapeutic Exercise: Stretching;Strengthening;Lymphedema  Manual Therapy: soft tissue mobilization;myofascial release;lymphatic drainage massage  Functional Training (ADL's): skin care;self care;lymphedema precautions;bandage/garment wear and care;ADL's      Goals:  Pt. will demonstrate/verbalize independence in self-management of condition in : 12 weeks    Patient will have a decreased volume in : LE;by 5%;to decrease risk of infection  Patient will have decreased fibrosis, scar tissue for improved lymphatic mobilitiy : in 4 weeks  Pt will: decrease LLIS by 8 points to decrease impact of lymphedema on function        If you have any questions or concerns, please don't hesitate to call.    Sincerely,      Elissa Lewis, PT        Physician recommendation:     _x__ Follow therapist's recommendation        ___ Modify therapy      *Physician co-signature indicates they certify the  need for these services furnished within this plan and while under their care.    [facility]   Lymphedema Initial Evaluation      Patient Name: Suma Mark  Date of evaluation: 12/7/2018  Referral Diagnosis: Lymphedema  Referring provider: Bernadette Taylor MD    Insurance: Medicare (KX modifier indicated); [employer] [place]  Visit Diagnosis:     ICD-10-CM    1. Lymphedema I89.0    2. Fibromyalgia M79.7    3. Restless leg syndrome G25.81    4. Depression, unspecified depression type F32.9    5. Muscle weakness (generalized) M62.81    6. Localized edema R60.0        Assessment:      Pt. would be a good candidate for edema management and to develop a home management program.    Goals:   Pt. will demonstrate/verbalize independence in self-management of condition in : 12 weeks    Patient will have a decreased volume in : LE;by 5%;to decrease risk of infection  Patient will have decreased fibrosis, scar tissue for improved lymphatic mobilitiy : in 4 weeks  Pt will: decrease LLIS by 8 points to decrease impact of lymphedema on function      Patient's expectations/goals are realistic.    Barriers to Learning or Achieving Goals:  Chronicity of the problem.  Co-morbidities or other medical factors.  scheduled for R LE ablation 12/11/2018    Lymphedema Category:  VI - Stage 2 with Mod-High Functional Demand       Plan / Patient Instructions:      Plan of Care:   Authorization / Certification Start Date: 12/07/18  Authorization / Certification End Date: 03/07/19  Authorization / Certification Number of Visits: up to 10 visits  Communication with: Referral Source  Patient Related Instruction: Treatment plan and rationale;Self Care instruction;Basis of treatment;Posture;Precautions;Expected outcome  Times per Week: 2x/week  Number of Weeks: 3 weeks then decrease to 1x/month x 2 months  Number of Visits: 8 visits  Discharge Planning: independent in home program  Therapeutic Exercise:  Stretching;Strengthening;Lymphedema  Manual Therapy: soft tissue mobilization;myofascial release;lymphatic drainage massage  Functional Training (ADL's): skin care;self care;lymphedema precautions;bandage/garment wear and care;ADL's      Plan for next visit: initiate instruction in self gradient compression bandaging of B lower legs but may need to hold R LE as scheduled for venous ablation 2018.  Patient is unsure if willing to trial continuous bandaging x 2 weeks at this time but is open to learning self bandaging techniques.  MLD to address B LE edema/fibrotic softening techniques, further instruct in edema exs and in garment options and upgraded home program     Subjective:         Social information:   Occupation:retired but continues as a    Equipment Available: has used compression stockings 20-30mmhg since pregnacies intermittently    History of Present Illness:    Suma is a 68 y.o. female who presents to therapy today with complaints of worsening leg swelling x 4 months. Date of onset/duration of symptoms x 4months and may be related to new injection medication for osteoporsis or possible antibiotic use for possible abdominal infection.  Continuing work up on L LQ pain x years.  CT scheduled. Onset was gradual. Symptoms are constant and getting worse. She reports history of vein stripping about 30 years ago with intermittent use of compression knee highs with some relief of symptoms.  Scheduled for vein ablation R LE 2018.   She describes their previous level of function as limited with walking about 1 mile due to fatigue, leg/back pain Has     Pain Ratin -8/10 legs    Functional limitations are described as occurring with:   sitting 30'  standing 5'  walking 1 mile    Patient reports benefit from:  movement or exercise , elevation, hot tub, compression stockings       Objective:      Patient Outcome Measures :    Lymphedema Life Impact Score (%): 56     Scores range from  %, where a score of 20% represents the least impact on the individual's life (minimal physical, psychosocial and functional concerns).     Right Lower Extremity Total Estimated Volume (cm3): 2671.58  Left Lower Extremity Total Estimated Volume (cm3): 2632.93  Lower Extremity Swelling Comparison (%): 1.47 %         Examination  1. Lymphedema     2. Fibromyalgia     3. Restless leg syndrome     4. Depression, unspecified depression type     5. Muscle weakness (generalized)     6. Localized edema       Involved side: Bilateral LEs    Presents with atypical toe webbing of B 2nd and 3rd toes    Volume to 50cm;  G=1370iw; L=2633ml; R>L=1.5%  Presents with B LE edema    Surgery: scheduled for R LE vein ablation 12/11/2/18/ s/p L partial KA 2014; R TKA 11/2017; s/p multiple abdominal surgeries  Treatments: compression stockings x 30years intermittently  Precautions/Restrictions: None  Involved area: Bilateral Leg  Edema: Pitting at grade, 2+, Moderate and Stemmers sign  positive  Fibrosis: moderate  Temperature: Normal  Sensation: Normal  Skin color: Reddened  Skin texture: rubbery fibrosis R>L LE extending to knees  Scars: B knees and abdomen  ROM; WFL except B DF = 5  Strength = 4+/5  Gait: decreased pace, decreased DF during heel strike      Treatment Today  : 12/7/2018   TREATMENT MINUTES COMMENTS   Evaluation 30    Self-care/ Home management 15 Assessed current compression knee highs and folded at top resulting in constriction.  Educated in proper wear and donning/doffing and recommendation of likely at least 20-30mmhg and possibly alternating with 30-40mmhg compression knee highs   Manual therapy  Instructed in self lymph drainage including Long sweeps of thighs B proximally thorughouday   Neuromuscular Re-education     Therapeutic Activity     Therapeutic Exercises 15 Instructed in and issued edema exs for LEs full sequence daily and deep breathing, APs  thorughout day   Gait training      Modality__________________                Total 60    Blank areas are intentional and mean the treatment did not include these items.     PT Evaluation Code: (Please list factors)  Patient History/Comorbidities: h/o depression, multiple comorbidities, chronic abdominal pain, decreased restorative sleep  Examination: edema, fibrosis, pain, decreased range of motion and strength  Clinical Presentation: evolving  Clinical Decision Making: moderate complexity    Patient History/  Comorbidities Examination  (body structures and functions, activity limitations, and/or participation restrictions) Clinical Presentation Clinical Decision Making (Complexity)   No documented Comorbidities or personal factors 1-2 Elements Stable and/or uncomplicated Low   1-2 documented comorbidities or personal factor 3 Elements Evolving clinical presentation with changing characteristics Moderate   3-4 documented comorbidities or personal factors 4 or more Unstable and unpredictable High            Elissa Lewis  12/7/2018  8:27 AM

## 2021-06-22 NOTE — PROGRESS NOTES
3 week f/u right RFA. Doing well.  Would like her left leg RFA done but US in 11/18 results not an option.  Offered to repeat left US in 18 months.  Resume normal activity.  Use compression socks as much as possible when on feet, long car rides and on air planes.  Return to clinic in 4 months.

## 2021-06-22 NOTE — PROGRESS NOTES
Post Procedure Note Endovenous Closure    S: Suma Mark is a 68 y.o. female S/P Right  leg endovenous closure ofRight lower ext. Two weeks out from procedure. Doing well, wore female  thigh high socks. Minimal discomfort. Some superficial phlibitis. Which is resolving.     O:   Vitals:    01/02/19 1024   BP: 116/64   Pulse: 72   Resp: 20   Temp: 98.4  F (36.9  C)       General: no apparent distress  Legs look good no signs of infection, incisions healing nicely.    US Venous Post Ablation Leg Right (Order [id number])   Imaging   Date: 12/13/2018 Department: [facility] [place] Released By: Diane Peoples Authorizing: Jonn Pinto MD   Study Result     formerly [facility] RADIOLOGY  LOCATION: [facility]  DATE: 12/13/2018     EXAM: US VENOUS POST ABLATION LEG RIGHT      INDICATION: Symptomatic varicose veins status post endovenous ablation.     COMPARISON: Venous insufficiency study dated 11/2/2018     TECHNIQUE: Duplex imaging is performed utilizing gray-scale, two-dimensional images, and color-flow imaging. Doppler waveform analysis and spectral Doppler imaging is also performed.     FINDINGS:   The right great saphenous vein is occluded from the proximal thigh to the distal thigh. The vein is patent at the knee. Patent right common femoral vein.         A/P: S/p endovenous closure. For insuffiencey of veins     May switch to knee high support socks   Resume all activities   RTC 4 months   Call for any questions or concerns     Jonn Pinto MD   [facility] Surgery

## 2021-06-23 NOTE — TELEPHONE ENCOUNTER
Who is calling:  The patient  Reason for Call:  The patient is in huddle [id number] returning a call  Date of last appointment with primary care: 01-10-19  Has the patient been recently seen:  Yes  Okay to leave a detailed message: No

## 2021-06-25 NOTE — PROGRESS NOTES
Progress Notes by Melva Meade MD at 4/27/2017  2:20 PM     Author: Melva Meade MD Service: -- Author Type: Physician    Filed: 4/27/2017  2:51 PM Encounter Date: 4/27/2017 Status: Signed    : Melva Meade MD (Physician)       ASSESSMENT AND PLAN:  We spent 40 minutes today in direct patient contact, 100% of the time in consultation concerning medical problems as listed below.   Problem List Items Addressed This Visit     Back pain     She is planning follow up with [facility].          Vitamin D deficiency - Primary     Wants level checked today.         Relevant Orders    Vitamin D, Total (25-Hydroxy)    Hypertension     Elevated. We are planning for her to continue:   diovan 160 mg po q day  Lasix 20 mg po q day   norvasc 10 mg po q day.   Will add Propranolol 10 mg po q day which may also help her with her migraine headaches that she gets daily.          Pedal edema     Takes lasix 20 mg po q day. I told her she can take 40 mg po on days when her swelling is worse than usual. If doing regularly she will let me know.          Migraine     Headache ongoing imitrex not helping. She has not tried the propranolol so we discussed trying that first (sent to pharm 3/29/2017). If that is not effective then she could also add nortriptyline. See previous notes.    Mri brain normal 2016         Hernia, ventral     Surgical consult. Lower abdominal ventral hernia.          Relevant Orders    Ambulatory referral to General Surgery        Chief Complaint   Patient presents with   ? Headache     Patient states she's having ongoing issues with headaches.     HPI  Suma Mark is a 66 y.o. female comes in today with multiple concerns.  She wants to tell me about her history of back pain.  She says she goes to the [facility] and has had back surgery in the past she has had degenerative disc disease and multiple disc herniations and annular tears.    Secondly she asked me  what dose of vitamin D she should be on.  Her she has ever had her level of vitamin D tested before.    She also notes her blood pressure is high and wonders what can be done about that.    She has had headaches for years and Imitrex is not really that helpful.  She wonders what else can be done.    She has pedal edema that is intermittently a problem.      She takes Lasix which helps somewhat and she wonders if she can double up on her dose.     History   Smoking Status   ? Never Smoker   Smokeless Tobacco   ? Never Used      Current Outpatient Prescriptions   Medication Sig Dispense Refill   ? amLODIPine (NORVASC) 10 MG tablet Take 1 tablet (10 mg total) by mouth daily. 90 tablet 3   ? aspirin 81 MG EC tablet Take by mouth.     ? citalopram (CELEXA) 20 MG tablet TAKE ONE AND ONE-HALF TABLETS BY MOUTH EVERY  tablet 1   ? furosemide (LASIX) 20 MG tablet TAKE 1 TABLET BY MOUTH DAILY 90 tablet 1   ? HYDROcodone-acetaminophen 5-325 mg per tablet Take 1-2 tablets by mouth every 6 (six) hours as needed for pain. 45 tablet 0   ? levothyroxine (SYNTHROID, LEVOTHROID) 88 MCG tablet TAKE 1 TABLET BY MOUTH DAILY AT 6AM 90 tablet 3   ? LORazepam (ATIVAN) 0.5 MG tablet TAKE 1 TABLET(0.5 MG) BY MOUTH EVERY 8 HOURS AS NEEDED FOR ANXIETY 30 tablet 0   ? multivitamin therapeutic (THERAGRAN) tablet Take by mouth.     ? pramipexole (MIRAPEX) 0.25 MG tablet Take 1 tablet (0.25 mg total) by mouth 2 (two) times a day. 180 tablet 3   ? ranitidine (ZANTAC) 150 MG tablet Take 150 mg by mouth 2 (two) times a day.      ? valsartan (DIOVAN) 160 MG tablet Take 1 tablet (160 mg total) by mouth daily. 90 tablet 3   ? propranolol (INDERAL) 10 MG tablet Take 1 tablet (10 mg total) by mouth 2 (two) times a day. 180 tablet 3   ? SUMAtriptan (IMITREX) 100 MG tablet Take 1 tablet (100 mg total) by mouth every 2 (two) hours as needed for migraine. Max 200 mg per day. 27 tablet 10     No current facility-administered medications for this visit.   "    Allergies   Allergen Reactions   ? Ace Inhibitors Other (See Comments)     Body aches, elevated BP   ? Amitriptyline Other (See Comments)     Elevated BP   ? Atenolol Other (See Comments)     Aggrevates Raynaud's   ? Celecoxib Other (See Comments)   ? Clonidine Nausea And Vomiting   ? Codeine Nausea And Vomiting   ? Desogestrel-Ethinyl Estradiol Swelling   ? Dexamethasone Swelling   ? Erythromycin Nausea And Vomiting   ? Escitalopram Other (See Comments)     Headaches.   ? Hydrochlorothiazide Other (See Comments)     hyponatremia   ? Propoxyphene N-Acetaminophen Other (See Comments)   ? Tramadol-Acetaminophen Other (See Comments)   ? Venlafaxine Nausea And Vomiting   ? Zolpidem Other (See Comments)   ? Penicillins Rash and Swelling   ? Sulfa (Sulfonamide Antibiotics) Rash   ? Tramadol Itching, Rash and Swelling     Review of Systems   Constitutional: Negative.    HENT: Negative.    Eyes: Negative.    Respiratory: Negative.    Cardiovascular: Negative.    Gastrointestinal:        She noticed a lump in her lower abdomen yesterday when she was washing herself.  It is not painful and she wants me to take a look at it today.   Endocrine: Negative.    Genitourinary: Negative.    Musculoskeletal: Negative.    Skin: Negative.    Neurological: Negative.    Hematological: Negative.    Psychiatric/Behavioral: Negative.        OBJECTIVE: /74  Pulse 84  Resp 16  Ht 5' 1.5\" (1.562 m)  Wt 171 lb (77.6 kg)  BMI 31.79 kg/m2  Physical Exam   Constitutional: She is oriented to person, place, and time. She appears well-developed and well-nourished.   HENT:   Head: Normocephalic and atraumatic.   Eyes: Conjunctivae are normal.   Cardiovascular: Normal rate, regular rhythm and normal heart sounds.    Pulmonary/Chest: Effort normal and breath sounds normal.   Abdominal: Soft. Bowel sounds are normal.       Suspicious for Reducible hernia palpated. Difficult exam due to overlying adipose.   Neurological: She is alert and " oriented to person, place, and time.   Skin: Skin is warm and dry.   Psychiatric: She has a normal mood and affect.

## 2021-06-25 NOTE — TELEPHONE ENCOUNTER
Called [employer] and requested for them to send fax again. Likely we did not receive this if they haven't received it back yet.    Vandana Jackson, CMA

## 2021-06-25 NOTE — TELEPHONE ENCOUNTER
Refill Request  Medication name:   Requested Prescriptions     Pending Prescriptions Disp Refills     propranoloL (INDERAL) 10 MG tablet 180 tablet 3     Who prescribed the medication: VENICE  Last refill on medication: 12/24/2020  Requested Pharmacy: [facility]  Last appointment with PCP: 06/11/2021  Next appointment: Appointment scheduled for 06/24/2021    RT Dionna (R)

## 2021-06-25 NOTE — TELEPHONE ENCOUNTER
Reason for Call:  Other       Detailed comments:  [employer] [place] calling to check status of Medication safety review form faxed beginning of May.  Please call with status [phone] or fax completed for to [phone]      Call taken on 5/26/2021 at 11:42 AM by Laura L Goldberg

## 2021-06-25 NOTE — PROGRESS NOTES
Nutrition Assessment   Assessment Type: Follow up  Chart Medications Lab Results Reviewed:  yes   Food Allergies: no    Current Diet Order: NPO; TPN D20%, P3.5, SMOF3 @ 7 mL/hr continuous x 24 hrs  Diet Tolerance: NPO      Anthropometrics  Gender: male      Patient Age: 12 day old Corrected Gestational Age: 34 + 5/7 weeks gestation  Growth Chart: [place]  Weight: 2.27 kg; Z-score: -0.37  Height/Length: 47 cm; Z-score: 0.54  Head Circumference: 29 cm; Z-score: -1.81  Weight for Length: Z-score: n/a  Ideal Body Weight: 2.425 kg; 94% IBW    Weight Classification: Normal    Growth Goal: 20-30 gm/day; 0.9-1.1 cm/week    Estimated Nutritional Needs  Assessment Weight: 2.0  kg  Energy Needs: 120-140 kcal/kg  Protein Needs: 2-3 g/kg  Fluid Needs: 100 ml/kg    Nutrition Diagnosis (PES)  No Nutritional Diagnosis at This Time related to Other: n/a as evidenced by Other: n/a    Nutrition Plan  Recommended Nutrition Intervention: Other: continue current TPN while full EN not indicated as medically able and tolerated  Monitor: Calorie and Protein Intake, Enteral/Parenteral Tolerance, Intake & Output, Lab Results, Oral Intake, Vitamin/Mineral Supplement and Weight  Discharge Needs: Other: pending  Care Plan Discussed With: Physician and Multidisciplinary Team    Goals: Tolerate enteral feeding goal, Tolerate oral diet by discharge and Other: meet growth goal  Goal Progress: Extended  Timeframe to Achieve Goal: By Discharge     Nutritional Risk Status: Moderate    HPI / NUTRITION ASSESSMENT: Pt remains intubated and requiring PGE and inotropic support at this time.  He is NPO and receiving TPN D20%, P3.5, SMOF3 @ 7 mL/hr continuous x 24 hrs to provide approximately 101 kcal/kg and 99 mL/kg fluid.  GIR = 11.7 mg/kg/min.  Current TPN meets ~84% estimated energy needs.  Wt remains stable and is uptrending since birth appropriately.  Will continue to monitor closely.    RD RECOMMENDATIONS:  1. Continue current TPN while full EN not  Progress Notes by Melva Meade MD at 11/7/2017  1:40 PM     Author: Melva Meade MD Service: -- Author Type: Physician    Filed: 11/7/2017  2:08 PM Encounter Date: 11/7/2017 Status: Signed    : Melva Meade MD (Physician)       ASSESSMENT AND PLAN:  Problem List Items Addressed This Visit     Eczema     Discussed the pathophysiology of eczema and importance of keeping the skin hydrated.    Step 1-change cleanser to a non-foaming cleanser such as Cetaphil or cerave    Step 2-apply Aquaphor ointment within 3 minutes of bathing and twice daily    Step 3- can prescribe steroid cream for intermittent use if needed.    Step 4 - Claritin if needed. she was educated on mechanism of action and that it may take two weeks to see full effect due to circulating histamines.       Step 5 - derm referral to consider alternative treatments (i.e., phototherapy)       She is so miserable and itchy today that she wanted to take something stronger.  We discussed a Medrol Dosepak but she has dexamethasone listed as an allergy that causes swelling.  She says she doubts that this is an actual allergy and would like to try the Medrol and will call if any problems arise.                Chief Complaint   Patient presents with   ? Rash   ? blood work     HPI  Suma Mark is a 67 y.o. female comes in worried that she may have bedbugs.  Comes in worried that she may have bedbugs.  She went on a vacation and she felt really itchy afterwards.  She kept itching her skin and then noticed that she started to get a rash.  She says she has been itching very vigorously and is actually keeping her up at night.    She does not have any recollection of a specific bug bite but worries that maybe she was exposed to bedbugs because she was in a hotel.    History   Smoking Status   ? Never Smoker   Smokeless Tobacco   ? Never Used      Current Outpatient Prescriptions   Medication Sig Dispense Refill   ? amLODIPine (NORVASC) 10  MG tablet Take 1 tablet (10 mg total) by mouth at bedtime. 90 tablet 3   ? calcium, as carbonate, (CALCIUM 500) 500 mg calcium (1,250 mg) tablet Take 1 tablet (500 mg total) by mouth 2 (two) times a day. 60 tablet 11   ? cholecalciferol, vitamin D3, 2,000 unit capsule Take 2,000 Units by mouth daily.     ? citalopram (CELEXA) 20 MG tablet Take 1 tablet (20 mg total) by mouth daily. 90 tablet 3   ? eletriptan (RELPAX) 20 MG tablet TK ONE  T PO AT ONSET OF MIGRAINE AND MAY REPEAT AFTER 2 H  3   ? furosemide (LASIX) 20 MG tablet TAKE 1 TABLET BY MOUTH DAILY 90 tablet 1   ? HYDROcodone-acetaminophen 5-325 mg per tablet Take 1-2 tablets by mouth every 6 (six) hours as needed for pain. 45 tablet 0   ? levothyroxine (SYNTHROID, LEVOTHROID) 88 MCG tablet TAKE 1 TABLET BY MOUTH DAILY AT 6AM 90 tablet 3   ? LORazepam (ATIVAN) 1 MG tablet Take 0.5 tablets (0.5 mg total) by mouth daily as needed for anxiety. 30 tablet 0   ? pramipexole (MIRAPEX) 0.25 MG tablet TAKE 1 TABLET(0.25 MG) BY MOUTH TWICE DAILY 180 tablet 2   ? propranolol (INDERAL) 10 MG tablet Take 1 tablet (10 mg) by mouth twice daily 90 tablet 3   ? ranitidine (ZANTAC) 150 MG tablet Take 150 mg by mouth 2 (two) times a day.      ? valsartan (DIOVAN) 160 MG tablet Take 1 tablet (160 mg total) by mouth every morning. 90 tablet 3   ? methylPREDNISolone (MEDROL DOSEPACK) 4 mg tablet follow package directions 21 tablet 0     No current facility-administered medications for this visit.      Allergies   Allergen Reactions   ? Ace Inhibitors Other (See Comments)     Body aches, elevated BP   ? Amitriptyline Other (See Comments)     Elevated BP   ? Atenolol Other (See Comments)     Aggrevates Raynaud's   ? Celecoxib Other (See Comments)         ? Clonidine Nausea And Vomiting   ? Codeine Nausea And Vomiting   ? Desogestrel-Ethinyl Estradiol Swelling   ? Dexamethasone Swelling   ? Erythromycin Nausea And Vomiting   ? Escitalopram Other (See Comments)     Headaches.   ?  indicated as medically able and tolerated - D20%, P3.5, SMOF3 @ 7 mL/hr continuous x 24 hrs to provide approximately 101 kcal/kg and 99 mL/kg fluid.  GIR = 11.7 mg/kg/min.  TPN will meet ~84% estimated energy needs.  2. Consider initiating EN trophic feeds of EBM vs donor milk if consent obtained as medically able and tolerated.  3. Monitor wt, length, NPO status, TPN, labs, SLP recommendations, BMs, POC.  Adjust nutrition PRN to meet growth goal.     RD to f/u per protocol  Yolanda Lama RD, LDN           Hydrochlorothiazide Other (See Comments)     hyponatremia   ? Propoxyphene N-Acetaminophen Other (See Comments)   ? Tramadol-Acetaminophen Other (See Comments)   ? Venlafaxine Nausea And Vomiting   ? Zolpidem Other (See Comments)   ? Penicillins Rash and Swelling   ? Sulfa (Sulfonamide Antibiotics) Rash   ? Tramadol Itching, Rash and Swelling     Review of Systems   Constitutional: Negative.    HENT: Negative.    Eyes: Negative.    Respiratory: Negative.    Cardiovascular: Negative.    Gastrointestinal: Negative.    Endocrine: Negative.    Genitourinary: Negative.    Musculoskeletal: Negative.    Skin: Negative.    Neurological: Negative.    Hematological: Negative.    Psychiatric/Behavioral: Negative.       OBJECTIVE: Wt 163 lb (73.9 kg)  LMP  (LMP Unknown)  BMI 30.8 kg/m2  Physical Exam   Constitutional: She is oriented to person, place, and time. She appears well-developed and well-nourished. No distress.   HENT:   Head: Normocephalic and atraumatic.   Eyes: Conjunctivae are normal.   Neck: Neck supple.   Cardiovascular: Normal rate and regular rhythm.    Pulmonary/Chest: Effort normal.   Musculoskeletal: Normal range of motion.   Neurological: She is alert and oriented to person, place, and time.   Skin: Skin is warm and dry.        Diffuse excoriations are noted in the following areas-see diagram.  Several broken capillaries are noted under the skin.  Skin appears dry and the rash is nonspecific.  There are no discrete bug bite marks.     where her clothing touches such as at the belt line and the bra line there are linear urticarial type rashes.   Psychiatric: She has a normal mood and affect.

## 2021-06-25 NOTE — TELEPHONE ENCOUNTER
Refill Request  Medication name:   Requested Prescriptions     Pending Prescriptions Disp Refills     irbesartan (AVAPRO) 150 MG tablet 90 tablet 3     Sig: TAKE 1 TABLET(300mg) BY MOUTH DAILY     Who prescribed the medication: VISEKRUNA  Last refill on medication: 04/15/2021  Requested Pharmacy: [facility]  Last appointment with PCP: 06/11/2021  Next appointment: Appointment scheduled for 06/11/2021    RT Dionna (R)

## 2021-06-25 NOTE — TELEPHONE ENCOUNTER
RN cannot approve Refill Request    RN can NOT refill this medication Please clarify sig with provider. It contains 2 different sets of instructions. . Last office visit: 12/17/2020 Bernadette Taylor MD Last Physical: 8/5/2020 Last MTM visit: Visit date not found Last visit same specialty: 12/17/2020 Bernadette Taylor MD.  Next visit within 3 mo: Visit date not found  Next physical within 3 mo: Visit date not found      Gisella Hart, [facility] Triage/Med Refill 6/5/2021    Requested Prescriptions   Pending Prescriptions Disp Refills     traZODone (DESYREL) 50 MG tablet [Pharmacy Med Name: TRAZODONE 50 MG TABLET] 270 tablet 1     Sig: TAKE 3 TABLETS (150 MG TOTAL) BY MOUTH AT BEDTIME. TAKE 1 TABLET BY MOUTH EVERYDAY AT BEDTIME       Tricyclics/Misc Antidepressant/Antianxiety Meds Refill Protocol Passed - 6/4/2021 12:25 AM        Passed - PCP or prescribing provider visit in last year     Last office visit with prescriber/PCP: 12/17/2020 Bernadette Taylor MD OR same dept: 12/17/2020 Bernadette Taylor MD OR same specialty: 12/17/2020 Bernadette Taylor MD  Last physical: 8/5/2020 Last MTM visit: Visit date not found   Next visit within 3 mo: Visit date not found  Next physical within 3 mo: Visit date not found  Prescriber OR PCP: Bernadette Taylor MD  Last diagnosis associated with med order: 1. Insomnia, unspecified type  - traZODone (DESYREL) 50 MG tablet [Pharmacy Med Name: TRAZODONE 50 MG TABLET]; Take 3 tablets (150 mg total) by mouth at bedtime. TAKE 1 TABLET BY MOUTH EVERYDAY AT BEDTIME  Dispense: 270 tablet; Refill: 1    If protocol passes may refill for 12 months if within 3 months of last provider visit (or a total of 15 months).

## 2021-06-25 NOTE — PROGRESS NOTES
Progress Notes by Melva Meade MD at 10/26/2017  2:20 PM     Author: Melva Meade MD Service: -- Author Type: Physician    Filed: 10/31/2017  7:57 AM Encounter Date: 10/26/2017 Status: Signed    : Melva Meade MD (Physician)       ASSESSMENT AND PLAN:  We spent 40 minutes today in direct patient contact, 100% of the time in consultation concerning medical problems as listed below.     Problem List Items Addressed This Visit     Hypothyroidism     Lab Results   Component Value Date    TSH 2.03 05/31/2017      Continue levothyroxine 88 mcg and recheck tsh.         Back pain     She complains today of pain in the left leg.  On exam the pain seems to be coming from the L4-L5area.  I have recommended she go back and see Dr. Loya who she liked at the [place] [facility] in the past.         Vitamin D deficiency     Vitamin D, Total (25-Hydroxy)   Date Value Ref Range Status   08/24/2017 37.9 30.0 - 80.0 ng/mL Final     She will continue her current vitamin D intake.         Hypertension     BP Readings from Last 3 Encounters:   10/26/17 128/64   09/18/17 100/60   08/24/17 122/80     Lasix 20 mg po q day.  diovan 160 mg po q day  norvasc 10 mg po q day  Propranolol 10 mg po q day (more for migraine prophylaxis)         Generalized anxiety disorder     After seeing the pharmacist in consultation she had tried to decrease her Celexa from 20 mg orally per day to 10 mg orally per day.  Simultaneously they started her on some nortriptyline to see if they can help with her nerve pain and migraines.  Unfortunately the nortriptyline did not seem to help and decreasing the Celexa increase her anxiety.  At this point I will discontinue the nortriptyline since it is ineffective and    Increase the Celexa from 10 mg orally per day to 20 mg orally per day as it was helping her anxiety more at the higher dose.    She also tells me she is out of her Ativan.  She used 30 tablets of Ativan between June  2016 and now.  She is well aware of the potential adverse consequences that he did much Ativan so she uses it sparingly.  I have no reason to believe that she is having any concerning issues with her Ativan.  I will refill 30 Ativan and expect the next refill will not be until February 2018         Relevant Medications    LORazepam (ATIVAN) 1 MG tablet    citalopram (CELEXA) 20 MG tablet    Osteoporosis     Consult that with the endocrinologist for 2017.  I have told her I want her to get adequate dietary calcium, continue her vitamin D intake and I think she should take a bisphosphonate but she is hesitant to take any additional medications.  For this reason I have consulted endocrinology.             Chief Complaint   Patient presents with   ? med check      HPI  Suma Mark is a 67 y.o. female complaining of left leg pain.  It started 2 weeks ago and it hurts when she walks on her leg.  This is been no trauma but the pain is constant.  She rates the pain as 3 out of 10 at rest and with weightbearing or walking it increases to 9 out of 10.  She says rest and heat help.  Tylenol has not helped.  She describes the pain as sharp or aching.    She also asked me about calcium, vitamin D and which is what her osteoporosis.    She tells that her anxiety has increased since she decreased her Celexa and she is out of Ativan.      History   Smoking Status   ? Never Smoker   Smokeless Tobacco   ? Never Used      Current Outpatient Prescriptions   Medication Sig Dispense Refill   ? amLODIPine (NORVASC) 10 MG tablet Take 1 tablet (10 mg total) by mouth at bedtime. 90 tablet 3   ? calcium, as carbonate, (CALCIUM 500) 500 mg calcium (1,250 mg) tablet Take 1 tablet (500 mg total) by mouth 2 (two) times a day. 60 tablet 11   ? cholecalciferol, vitamin D3, 2,000 unit capsule Take 2,000 Units by mouth daily.     ? citalopram (CELEXA) 20 MG tablet Take 1 tablet (20 mg total) by mouth daily. 90 tablet 3   ? eletriptan (RELPAX) 20  MG tablet TK ONE  T PO AT ONSET OF MIGRAINE AND MAY REPEAT AFTER 2 H  3   ? furosemide (LASIX) 20 MG tablet TAKE 1 TABLET BY MOUTH DAILY 90 tablet 1   ? HYDROcodone-acetaminophen 5-325 mg per tablet Take 1-2 tablets by mouth every 6 (six) hours as needed for pain. 45 tablet 0   ? levothyroxine (SYNTHROID, LEVOTHROID) 88 MCG tablet TAKE 1 TABLET BY MOUTH DAILY AT 6AM 90 tablet 3   ? pramipexole (MIRAPEX) 0.25 MG tablet TAKE 1 TABLET(0.25 MG) BY MOUTH TWICE DAILY 180 tablet 2   ? propranolol (INDERAL) 10 MG tablet Take 1 tablet (10 mg) by mouth twice daily 90 tablet 3   ? ranitidine (ZANTAC) 150 MG tablet Take 150 mg by mouth 2 (two) times a day.      ? valsartan (DIOVAN) 160 MG tablet Take 1 tablet (160 mg total) by mouth every morning. 90 tablet 3   ? LORazepam (ATIVAN) 1 MG tablet TK 1 - 2 TS PO 30 MINUTES PRIOR TO THE MRI AS NEEDED  0     No current facility-administered medications for this visit.      Allergies   Allergen Reactions   ? Ace Inhibitors Other (See Comments)     Body aches, elevated BP   ? Amitriptyline Other (See Comments)     Elevated BP   ? Atenolol Other (See Comments)     Aggrevates Raynaud's   ? Celecoxib Other (See Comments)         ? Clonidine Nausea And Vomiting   ? Codeine Nausea And Vomiting   ? Desogestrel-Ethinyl Estradiol Swelling   ? Dexamethasone Swelling   ? Erythromycin Nausea And Vomiting   ? Escitalopram Other (See Comments)     Headaches.   ? Hydrochlorothiazide Other (See Comments)     hyponatremia   ? Propoxyphene N-Acetaminophen Other (See Comments)   ? Tramadol-Acetaminophen Other (See Comments)   ? Venlafaxine Nausea And Vomiting   ? Zolpidem Other (See Comments)   ? Penicillins Rash and Swelling   ? Sulfa (Sulfonamide Antibiotics) Rash   ? Tramadol Itching, Rash and Swelling     Review of Systems   Constitutional: Negative.    HENT: Negative.    Eyes: Negative.    Respiratory: Negative.    Cardiovascular: Negative.    Gastrointestinal: Negative.    Endocrine: Negative.     Genitourinary: Negative.    Musculoskeletal: Negative.    Skin: Negative.    Neurological: Negative.    Hematological: Negative.    Psychiatric/Behavioral: Negative.      OBJECTIVE: Blood pressure 128/64, pulse 72, weight 163 lb (73.9 kg), not currently breastfeeding.    Physical Exam   Constitutional: She is oriented to person, place, and time. She appears well-developed and well-nourished. No distress.   HENT:   Head: Normocephalic and atraumatic.   Eyes: Conjunctivae are normal.   Neck: Neck supple.   Cardiovascular: Normal rate and regular rhythm.    Pulmonary/Chest: Effort normal.   Musculoskeletal: Normal range of motion.        Back:         Legs:  Gait is normal except slightly favors the left leg    See diagram.  The area indicated on the diagram is tender to touch.  This is soft tissue and seems to stem from her low spine.   Neurological: She is alert and oriented to person, place, and time.   Skin: Skin is warm and dry.   Psychiatric: Her speech is normal and behavior is normal. Judgment and thought content normal. Her mood appears anxious. Cognition and memory are normal.

## 2021-06-26 NOTE — PROGRESS NOTES
Assessment & Plan   Problem List Items Addressed This Visit     Venous insufficiency of both lower extremities - Primary    Essential hypertension (Chronic)    Acquired hypothyroidism (Chronic)         #1.  Exacerbation of lower extremity edema secondary to venous insufficiency.  I reassured Lo that I did not see anything else that could be causing this, specifically congestive heart failure, DVT, or renal dysfunction, amongst others.  We discussed treatment of venous insufficiency and discussed compression as the cornerstone of treatment for this.  She states that she does not use compression stockings much and I told her that she should start using these on a daily basis in order to control her edema better.  Follow-up with us as needed.    2.  Hypertension, well controlled today.  Conceivably her amlodipine could be contributing to her lower extremity edema, but given her multiple drug allergies the benefits of the amlodipine is likely outweighing the risks of the drug.  Continue.    3.  Hypothyroidism, stable.  Last TSH in July 2020 was 3.3.          No follow-ups on file.    Tapan Daniel MD  [facility]   Suma MICKY Mark is 70 y.o. and presents today for the following health issues     Lo comes in today for evaluation of lower extremity edema.  She has had chronic lower extremity edema secondary to venous stasis for a number of years.  She states that she was up at her cabin over the weekend and her bilateral legs both swelled up to the point where she was having fluid extravasation through the skin.  She returned to the [facility] and her legs started to improve.  She denies any pain in the extremities and denies any worsening redness.  No shortness of breath.  She generally will elevate the legs when she has worsening swelling, but does not wear compression garments much and did not wear them this last weekend..  She is on 20 mg of furosemide but  has been on this dose for a number of years.      Objective    /72   Pulse 68   Wt 183 lb (83 kg)   LMP  (LMP Unknown)   BMI 33.20 kg/m    Body mass index is 33.2 kg/m .  Physical Exam    Lower extremities: 2-3+ pitting edema bilaterally.  No erythema present.  DP pulses are 2+ and normal bilaterally.

## 2021-06-26 NOTE — PROGRESS NOTES
Assessment and Plan:     Patient has been advised of split billing requirements and indicates understanding: Yes  1. Routine general medical examination at a health care facility      2. Essential hypertension  Amlodipine can cause leg swelling - she will decrease amlodipine to 5 mg daily.  Increase irbesartan to 300 mg daily.  Increase propranolol to 20 mg two times a day.  Increase furosemide to 40mg daily.    - HM2(CBC w/o Differential)  - Urinalysis    3. Osteoporosis without current pathological fracture, unspecified osteoporosis type  Prolia given today.   The following steps were completed to comply with the REMS program for Prolia:    Reviewed information in the Medication Guide and Patient Counseling Chart, including the serious risks of Prolia  and the symptoms of each risk.  - Parathyroid Hormone Intact with Minerals  - Vitamin D, Total (25-Hydroxy)    4. Acquired hypothyroidism  On levothyroxine  - Thyroid Stimulating Hormone (TSH)    5. Anxiety  Stable now. Not taking Buspar or hydroxyzine anymore. On Celexa 30 mg daily.     6. Depression, unspecified depression type  Started Bupropion a week ago.    7. Insomnia, unspecified type  Trazodone 2 tab at bedtime works well.    8. Fibromyalgia      9. Dyslipidemia  On statin now.  - Lipid Profile  - Comprehensive Metabolic Panel    10. Edema, unspecified type  Over the last 7 days, significant edema bilat and some oozing of the clear liquid noticed. She wears compression stockings very randomly, just for few hours at home    - Compression stockings 20/30 mmHg; Knee    11. Venous insufficiency of both lower extremities      12. Chronic eczema of foot  Right foot peeling skin on the bottom of the foot, itchy.  - triamcinolone (KENALOG) 0.1 % cream; Twice daily  Dispense: 30 g; Refill: 0  - urea (CARMOL) 10 % cream; Two times a day  Dispense: 71 g; Refill: 0     The patient's current medical problems were reviewed.    I have had an Advance Directives  discussion with the patient.  The following health maintenance schedule was reviewed with the patient and provided in printed form in the after visit summary:   Health Maintenance   Topic Date Due     Pneumococcal Vaccine: 65+ Years (2 of 2 - PPSV23) 10/26/2017     HYPERTENSION FOLLOW-UP  04/26/2018     FALL RISK ASSESSMENT  12/04/2021     MAMMOGRAM  01/04/2022     MEDICARE ANNUAL WELLNESS VISIT  06/10/2022     LIPID  07/31/2025     ADVANCE CARE PLANNING  08/05/2025     COLORECTAL CANCER SCREENING  09/27/2027     TD 18+ HE  07/22/2028     DEXA SCAN  05/18/2036     HEPATITIS C SCREENING  Completed     INFLUENZA VACCINE RULE BASED  Completed     ZOSTER VACCINES  Completed     COVID-19 Vaccine  Completed     DEPRESSION ACTION PLAN  Addressed     Pneumococcal Vaccine: Pediatrics (0 to 5 Years) and At-Risk Patients (6 to 64 Years)  Aged Out     HEPATITIS B VACCINES  Aged Out       Subjective:   Chief Complaint: Suma Mark is an 70 y.o. female here for an Annual Wellness visit.   HPI:  Acute and chronic medical problems discussed as above.    Review of Systems:    Please see above.  The rest of the review of systems are negative for all systems.    Patient Care Team:  Bernadette Taylor MD as PCP - General (Internal Medicine)  Izaiah Astorga MD as Physician (Orthopedic Surgery)  Jc Mcginnis MD as Physician (Gastroenterology)  Akilah Lezama MD as Consulting Physician (Dermatology)  Natalie Martel DO as Consulting Physician (Psychiatry)  Svetlana Ron MD as Physician (Colon and Rectal Surgery)  Bernadette Taylor MD as Assigned PCP  Jonn Pinto MD as Assigned Surgical Provider     Patient Active Problem List   Diagnosis     Acquired hypothyroidism     Essential hypertension     Restless leg syndrome     Gastroesophageal reflux disease without esophagitis     Migraine     Hernia, ventral     Osteoporosis     Diverticulosis     S/P total knee replacement     Anxiety     Insomnia, unspecified type      Depression, unspecified depression type     Fibromyalgia     Venous insufficiency of both lower extremities     Past Medical History:   Diagnosis Date     Acquired hypothyroidism 3/12/2009     Anxiety      Back pain      Breast cyst      Degenerative disc disease, lumbar      Depression      Depression, unspecified depression type      Disease of thyroid gland     hypothyroid     Diverticulosis 10/4/2017    Incidental finding on colonoscopy 10/2017     Essential hypertension 4/21/2015     Fibromyalgia      Gastroesophageal reflux disease without esophagitis 4/21/2015     GERD (gastroesophageal reflux disease)      Hernia, ventral 4/27/2017     History of anesthesia complications     hypotension after surgery     History of blood clots      History of transfusion      Hypertension      Insomnia, unspecified type      Left lower quadrant pain      Migraine      Osteoarthritis      Osteoporosis      PONV (postoperative nausea and vomiting)      Raynaud's disease      Restless leg syndrome      S/P total knee replacement 11/27/2017     Ventral hernia      Vitamin D deficiency       Past Surgical History:   Procedure Laterality Date     BACK SURGERY       BREAST CYST EXCISION       CARPAL TUNNEL RELEASE       COLECTOMY N/A 6/2/2017    Procedure: RESECTION, SMALL INTESTINE, OPEN ADHESIOLYSIS;  Surgeon: Keyon Qureshi MD;  Location: [facility];  Service:      COLON SURGERY       HEMORRHOID SURGERY       HYSTERECTOMY  [id number]     INCISIONAL HERNIA REPAIR N/A 6/2/2017    Procedure: LAPARASCOPIC CONVERTED TO OPEN PRIMARY INCISIONAL HERNIA REPAIR;  Surgeon: Keyon Qureshi MD;  Location: [facility] OR;  Service:      INCONTINENCE SURGERY       LUMBAR FUSION       MI LAP,DIAGNOSTIC ABDOMEN N/A 8/22/2019    Procedure: DIAGNOSTIC LAPAROSCOPY, LYSIS OF ADHESION;  Surgeon: Svetlana Ron MD;  Location: [facility];  Service: General     MI TOTAL KNEE ARTHROPLASTY Right 11/27/2017    Procedure:  RIGHT  TOTAL KNEE ARTHROPLASTY;  Surgeon: Kevin Ryder MD;  Location: [facility] OR;  Service: Orthopedics     US THYROID BIOPSY  4/19/2019     VARICOSE VEIN SURGERY       VEIN LIGATION AND STRIPPING Bilateral       Family History   Problem Relation Age of Onset     Dementia Mother      Arthritis Mother      COPD Father      Cardiovascular Father      Hypertension Father      No Medical Problems Sister      No Medical Problems Daughter      No Medical Problems Son      Stroke Maternal Grandmother      Heart disease Paternal Grandmother      Stroke Paternal Grandmother      No Medical Problems Sister      No Medical Problems Sister      No Medical Problems Son      No Medical Problems Daughter      Breast cancer Paternal Aunt         age in 50's      Social History     Socioeconomic History     Marital status:      Spouse name: Not on file     Number of children: Not on file     Years of education: Not on file     Highest education level: Not on file   Occupational History     Not on file   Social Needs     Financial resource strain: Not on file     Food insecurity     Worry: Not on file     Inability: Not on file     Transportation needs     Medical: Not on file     Non-medical: Not on file   Tobacco Use     Smoking status: Never Smoker     Smokeless tobacco: Never Used   Substance and Sexual Activity     Alcohol use: Yes     Comment: 1 cocktail daily     Drug use: No     Sexual activity: Not on file   Lifestyle     Physical activity     Days per week: Not on file     Minutes per session: Not on file     Stress: Not on file   Relationships     Social connections     Talks on phone: Not on file     Gets together: Not on file     Attends L[employer] day service: Not on file     Active member of club or organization: Not on file     Attends meetings of clubs or organizations: Not on file     Relationship status: Not on file     Intimate partner violence     Fear of current or ex partner: Not on file     Emotionally  abused: Not on file     Physically abused: Not on file     Forced sexual activity: Not on file   Other Topics Concern     Not on file   Social History Narrative     Not on file      Current Outpatient Medications   Medication Sig Dispense Refill     amLODIPine (NORVASC) 10 MG tablet TAKE 1 TABLET BY MOUTH EVERYDAY AT BEDTIME 90 tablet 3     atorvastatin (LIPITOR) 20 MG tablet Take 1 tablet (20 mg total) by mouth at bedtime. 90 tablet 3     botulinum toxin Type A, Cosm, (BOTOX) 100 unit SolR Every 3 months. [facility]       buPROPion (WELLBUTRIN XL) 150 MG 24 hr tablet Take 1 tablet (150 mg total) by mouth daily. 90 tablet 3     calcium carbonate-vitamin D3 (CALCIUM 600 + D,3,) 600 mg(1,500mg) -400 unit per tablet Take 1 tablet by mouth daily.              cholecalciferol, vitamin D3, 2,000 unit capsule Take 2,000 Units by mouth daily.       citalopram (CELEXA) 20 MG tablet Take 1 tablet (20 mg total) by mouth daily. (Patient taking differently: Take 30 mg by mouth daily. ) 90 tablet 3     eletriptan (RELPAX) 40 MG tablet Take 40 mg by mouth daily as needed for migraine. may repeat in 2 hours if necessary              ferrous sulfate 325 (65 FE) MG tablet Take 1 tablet (325 mg total) by mouth 2 (two) times a day. 120 tablet 3     furosemide (LASIX) 20 MG tablet Take 1 tablet (20 mg total) by mouth daily. 90 tablet 3     HYDROcodone-acetaminophen 5-325 mg per tablet Take 1 tablet by mouth.       irbesartan (AVAPRO) 150 MG tablet TAKE 1 TABLET(150 MG) BY MOUTH DAILY 90 tablet 3     levothyroxine (SYNTHROID, LEVOTHROID) 88 MCG tablet TAKE 1 TABLET BY MOUTH DAILY AT 6:00 AM. 90 tablet 2     LORazepam (ATIVAN) 1 MG tablet TAKE 1/2 TABLET BY MOUTH DAILY AS NEEDED FOR ANXIETY 30 tablet 0     pramipexole (MIRAPEX) 0.25 MG tablet TAKE 1 TABLET (0.25 MG TOTAL) BY MOUTH 3 (THREE) TIMES A DAY. 270 tablet 1     propranoloL (INDERAL) 10 MG tablet TAKE 1 TABLET(10 MG) BY MOUTH TWICE DAILY 180 tablet 3     traZODone (DESYREL) 50  MG tablet TAKE 3 TABLETS (150 MG TOTAL) BY MOUTH AT BEDTIME. TAKE 1 TABLET BY MOUTH EVERYDAY AT BEDTIME 270 tablet 1     hydrOXYzine HCL (ATARAX) 25 MG tablet Take 1 tablet (25 mg total) by mouth 3 (three) times a day as needed for itching. 270 tablet 3     Current Facility-Administered Medications   Medication Dose Route Frequency Provider Last Rate Last Admin     denosumab 60 mg (PROLIA 60 mg/ml)  60 mg Subcutaneous Q6 Months Bernadette Taylor MD   60 mg at 12/17/20 1054     denosumab 60 mg (PROLIA 60 mg/ml)  60 mg Subcutaneous Q6 Months Bernadette Taylor MD          Objective:   Vital Signs:   Visit Vitals  LMP  (LMP Unknown)          VisionScreening:  No exam data present     PHYSICAL EXAM  General Appearance: Alert, cooperative, no distress, appears stated age.  Head: Normocephalic, without obvious abnormality, atraumatic  Eyes: PERRL, conjunctiva/corneas clear, EOM's intact  Ears: Normal TM's and external ear canals, both ears  Nose: Nares normal, septum midline,mucosa normal, no drainage  Throat: Lips, mucosa, and tongue normal; teeth and gums normal  Neck: Supple, symmetrical, trachea midline, no adenopathy;  thyroid: not enlarged, symmetric, no tenderness/mass/nodules; no carotid bruit or JVD  Back: Symmetric, no curvature, ROM normal, no CVA tenderness.  Lungs: Clear to auscultation bilaterally, respirations unlabored.  Breasts: No breast masses, tenderness, asymmetry, or nipple discharge.  Heart: Regular rate and rhythm, S1 and S2 normal, no murmur, rub, or gallop.  Abdomen: Soft, non-tender, bowel sounds active all four quadrants,  no masses, no organomegaly.  Extremities: Extremities normal, atraumatic, no cyanosis. 2+ edema bilat in LE, shiny stretched skin, with venous insufficiency dermatitis  Skin: Skin color, texture, turgor normal, peeling itchy skin on the bottom of the right foot.  Lymph nodes: Cervical, supraclavicular, and axillary nodes normal.  Neurologic: No focal neurological  findings.      Assessment Results 8/5/2020   Activities of Daily Living No help needed   Instrumental Activities of Daily Living No help needed   Mini Cog Total Score 5   Some recent data might be hidden     A Mini-Cog score of 0-2 suggests the possibility of dementia, score of 3-5 suggests no dementia    Identified Health Risks:     The patient was counseled and encouraged to consider modifying their diet and eating habits. She was provided with information on recommended healthy diet options.  Patient's advanced directive was discussed and I am comfortable with the patient's wishes.

## 2021-06-27 NOTE — PROGRESS NOTES
Progress Notes by Jonn Pinto MD at 5/1/2019  9:00 AM     Author: Jonn Pinto MD Service: -- Author Type: Physician    Filed: 5/3/2019  6:48 PM Encounter Date: 5/1/2019 Status: Signed    : Jonn Pinto MD (Physician)       [facility] Surgery Follow up    HPI:    68 y.o. year old female who returns for a follow up.  She had endovenous closure of her right leg back in November.  She returns now with ongoing since continued symptoms of some of her varicosities and would like to have these evaluated.    Allergies:Ace inhibitors; Amitriptyline; Amlodipine; Atenolol; Celecoxib; Clonidine; Codeine; Desogestrel-ethinyl estradiol; Dexamethasone; Erythromycin; Escitalopram; Gabapentin; Hydrochlorothiazide; Propoxyphene n-acetaminophen; Tramadol-acetaminophen; Triamterene; Venlafaxine; Zolpidem; Penicillins; Sulfa (sulfonamide antibiotics); and Tramadol    Past Medical History:   Diagnosis Date   ? Acquired hypothyroidism 3/12/2009   ? Anxiety    ? Back pain    ? Breast cyst    ? Degenerative disc disease, lumbar    ? Depression    ? Depression, unspecified depression type    ? Disease of thyroid gland     hypothyroid   ? Diverticulosis 10/4/2017    Incidental finding on colonoscopy 10/2017   ? Essential hypertension 4/21/2015   ? Fibromyalgia    ? Gastroesophageal reflux disease without esophagitis 4/21/2015   ? GERD (gastroesophageal reflux disease)    ? Hernia, ventral 4/27/2017   ? History of anesthesia complications     hypotension after surgery   ? History of blood clots    ? History of transfusion    ? Hypertension    ? Insomnia, unspecified type    ? Migraine    ? Osteoarthritis    ? Osteoporosis    ? PONV (postoperative nausea and vomiting)    ? Raynaud's disease    ? Restless leg syndrome    ? S/P total knee replacement 11/27/2017   ? Ventral hernia    ? Vitamin D deficiency        Past Surgical History:   Procedure Laterality Date   ? BACK SURGERY     ? BREAST CYST EXCISION     ? CARPAL  TUNNEL RELEASE     ? COLECTOMY N/A 6/2/2017    Procedure: RESECTION, SMALL INTESTINE, OPEN ADHESIOLYSIS;  Surgeon: Keyon Qureshi MD;  Location: [facility] Main OR;  Service:    ? COLON SURGERY     ? HEMORRHOID SURGERY     ? HYSTERECTOMY  [id number]   ? INCISIONAL HERNIA REPAIR N/A 6/2/2017    Procedure: LAPARASCOPIC CONVERTED TO OPEN PRIMARY INCISIONAL HERNIA REPAIR;  Surgeon: Keyon Qureshi MD;  Location: [facility] Main OR;  Service:    ? INCONTINENCE SURGERY     ? LUMBAR FUSION     ? [place] TOTAL KNEE ARTHROPLASTY Right 11/27/2017    Procedure:  RIGHT TOTAL KNEE ARTHROPLASTY;  Surgeon: Kevin Ryder MD;  Location: [facility] Main OR;  Service: Orthopedics   ? US THYROID BIOPSY  4/19/2019   ? VARICOSE VEIN SURGERY     ? VEIN LIGATION AND STRIPPING Bilateral        CURRENT MEDS:  Current Outpatient Medications on File Prior to Visit   Medication Sig Dispense Refill   ? amLODIPine (NORVASC) 10 MG tablet TAKE 1 TABLET(10 MG) BY MOUTH AT BEDTIME 90 tablet 3   ? aspirin 81 MG EC tablet Take 81 mg by mouth daily.     ? atorvastatin (LIPITOR) 20 MG tablet Take 20 mg by mouth daily.     ? botulinum toxin Type A, Cosm, (BOTOX) 100 unit SolR Every 3 months. [facility]     ? calcium carbonate-vitamin D3 (CALCIUM 600 + D,3,) 600 mg(1,500mg) -400 unit per tablet Take 3 tablets by mouth daily.     ? calcium, as carbonate, (CALCIUM 500) 500 mg calcium (1,250 mg) tablet Take 1 tablet (500 mg total) by mouth 2 (two) times a day. 60 tablet 11   ? cholecalciferol, vitamin D3, 2,000 unit capsule Take 2,000 Units by mouth daily.     ? citalopram (CELEXA) 20 MG tablet TAKE 1 AND 1/2 TABLETS(30 MG) BY MOUTH DAILY 135 tablet 2   ? cyanocobalamin (VITAMIN B-12) 50 mcg tablet Take 50 mcg by mouth daily.     ? eletriptan (RELPAX) 20 MG tablet Take 20 mg by mouth as needed for migraine. may repeat in 2 hours if necessary     ? furosemide (LASIX) 20 MG tablet Take 1 tablet (20 mg total) by mouth daily. 90 tablet 3   ?  HYDROcodone-acetaminophen 5-325 mg per tablet Take 1-2 tablets by mouth every 4 (four) hours as needed. 55 tablet 0   ? irbesartan (AVAPRO) 150 MG tablet TAKE 1 TABLET(150 MG) BY MOUTH DAILY 90 tablet 3   ? levothyroxine (SYNTHROID, LEVOTHROID) 88 MCG tablet Take 1 tablet (88 mcg total) by mouth Daily at 6:00 am. 90 tablet 3   ? LORazepam (ATIVAN) 1 MG tablet TAKE 1/2 TABLET(0.5 MG) BY MOUTH DAILY AS NEEDED FOR ANXIETY 30 tablet 0   ? propranolol (INDERAL) 10 MG tablet TAKE 1 TABLET(10 MG) BY MOUTH THREE TIMES DAILY 270 tablet 2     Current Facility-Administered Medications on File Prior to Visit   Medication Dose Route Frequency Provider Last Rate Last Dose   ? denosumab 60 mg (PROLIA 60 mg/ml)  60 mg Subcutaneous Q6 Months Bernadette Taylor MD   60 mg at 04/11/19 1402       Family History   Problem Relation Age of Onset   ? Dementia Mother    ? Arthritis Mother    ? COPD Father    ? Cardiovascular Father    ? Hypertension Father    ? No Medical Problems Sister    ? No Medical Problems Daughter    ? No Medical Problems Son    ? Stroke Maternal Grandmother    ? Heart disease Paternal Grandmother    ? Stroke Paternal Grandmother    ? No Medical Problems Sister    ? No Medical Problems Sister    ? No Medical Problems Son    ? No Medical Problems Daughter    ? Breast cancer Paternal Aunt         age in 50's        reports that she has never smoked. She has never used smokeless tobacco. She reports that she drinks about 8.4 oz of alcohol per week. She reports that she does not use drugs.    Review of Systems:  Negative except for bilateral leg varicose veins some branches still remain.  These are symptomatic and bothersome especially when she is at standing for long length of time doing chores where she stands her feet such as vacuuming and washing dishes.  She is worn compression underwent closure of all involved veins which we could take care of and still has ongoing issues. Otherwise twelve system of review is  negative.      OBJECTIVE:  Vitals:    05/01/19 0904   BP: 120/60   Pulse: 72   Resp: 12   Temp: 98  F (36.7  C)   TempSrc: Oral     There is no height or weight on file to calculate BMI.    EXAM:  GENERAL: This is a well-developed 68 y.o. female who appears her stated age  HEAD: normocephalic  HEENT: Pupils equal and reactive bilaterally  CARDIAC: RRR without murmur  CHEST/LUNG:  Clear to auscultation  ABDOMEN: Soft, nontender, nondistended, no masses    NEUROLOGIC: Focally intact, nonfocal  VASCULAR: Pulses intact, symmetrical upper and lower extremities. Remaining varicose veins bilateral legs.                 LABS:  Lab Results   Component Value Date    WBC 8.5 04/11/2019    WBC 5.2 04/21/2015    HGB 12.8 04/11/2019    HCT 37.8 04/11/2019    MCV 90 04/11/2019     04/11/2019     INR/Prothrombin Time      No results found for: HGBA1C  Lab Results   Component Value Date    ALT 31 04/11/2019    AST 20 04/11/2019    ALKPHOS 98 04/11/2019    BILITOT 0.3 04/11/2019        Images: reviewed old ultrasounds    US Venous Post Ablation Leg Right (Order [id number])   Imaging   Date: 12/13/2018 Department: [facility] Ultrasound [place] Released By: Diane Peoples Authorizing: Jonn Pinto MD   Study Result     [facility] RADIOLOGY  LOCATION: [facility]  DATE: 12/13/2018     EXAM: US VENOUS POST ABLATION LEG RIGHT      INDICATION: Symptomatic varicose veins status post endovenous ablation.     COMPARISON: Venous insufficiency study dated 11/2/2018     TECHNIQUE: Duplex imaging is performed utilizing gray-scale, two-dimensional images, and color-flow imaging. Doppler waveform analysis and spectral Doppler imaging is also performed.     FINDINGS:   The right great saphenous vein is occluded from the proximal thigh to the distal thigh. The vein is patent at the knee. Patent right common femoral vein.     US Venous Insufficiency Legs Bilateral (Order [id number])   Imaging    Date: 11/2/2018 Department: [facility] Ultrasound [place] Released By: Magdalene De Jesusjmay Authorizing: Oralia Daniel NP   Study Result        [facility] OUTPATIENT     EXAM: BILATERAL LOWER EXTREMITY DEEP AND SUPERFICIAL VENOUS DUPLEX ULTRASOUND WITH PHYSIOLOGIC TESTING      INDICATION: Symptomatic varicose veins. Assess for incompetent veins.      TECHNIQUE: Supine and upright ultrasound of the deep and superficial veins with Valsalva and compression augmentation maneuvers. Duplex imaging is performed utilizing gray-scale, two-dimensional images, color-flow imaging, Doppler waveform analysis, and   spectral Doppler imaging.      INCOMPETENCY CRITERIA: Deep vein reflux reported when greater than 1,000 ms flow reversal. Superficial vein reflux reported when greater than 600 ms flow reversal.  vein reflux reported as greater than 350 ms flow reversal.     RIGHT DEEP VEIN FINDINGS:     The common femoral vein is incompetent. The profunda femoris, femoral, popliteal and posterior tibial veins are competent. All vessels are patent and compressible without deep venous thrombus.     LEFT DEEP VEIN FINDINGS:     The common femoral vein is incompetent. The profunda femoris, femoral, popliteal and posterior tibial veins are competent. All vessels are patent and compressible without deep venous thrombus.     RIGHT SUPERFICIAL VEIN FINDINGS:  Great saphenous vein: Incompetent from the saphenofemoral junction to the mid calf. The vessel measures 5-6 mm above the knee and 3-5 mm below the knee where incompetent.     Small saphenous vein: Competent from the saphenopopliteal junction to the mid calf.     No incompetent perforating veins or abnormal accessory veins identified.     LEFT SUPERFICIAL VEIN FINDINGS:  Great saphenous vein: Competent from the saphenofemoral junction to the mid calf.     Small saphenous vein: Competent from the saphenopopliteal junction to the mid calf.     No  incompetent perforating veins or abnormal accessory veins identified.     IMPRESSION:   CONCLUSION:   1.  No deep venous thrombosis of either lower extremity.  2.  The right great saphenous vein is diffusely incompetent. The right small saphenous vein is competent.  3.  The left superficial venous system is patent, without incompetence.         Assessment/Plan:   1. Venous insufficiency of right leg  We discussed today about remaining varicose vein disease.  She is to continue her compression stockings and is continued to have some symptoms and issues and is dilated veins of both legs.  We discussed her options would be she has really I think 3  1. to be to proceed with continued compression exercise elevation and conservative treatment.  2. she could do a surgical intervention with stab phlebectomies we would remove the veins are small stabs.  This will be done under anesthetic in the same day surgery setting.  She related for 2 to 3 weeks time.  There are some risks risk of infection bleeding recurrence and clotting issues were all discussed along with anesthetic problems.  She would like to proceed with this option  3.  Sclerotherapy is an also an option this would be done with a chemical count of injection of the veins.  Most likely would not be covered by insurance some insurance to provide this service.  I do not do this and I would send her to someone she would like to do this option.    2. Symptomatic varicose veins of right lower extremity  After discussing issues she would like to proceed with stab phlebectomy.  We will get this set up her Neurontin to him to be approved through insurance.  She is continued ongoing symptoms and issues is worn socks for well over 3 months time.  She is on conservative therapy and exercise elevation and treatments is continued ongoing issues.  I think she is a great candidate for surgery after discussion and she would like to proceed we will need a preop history and physical  and clearance and also will submit to her insurance company.      No follow-ups on file.     Jonn Pinto MD  [facility] Department of Surgery

## 2021-06-29 NOTE — PROGRESS NOTES
Progress Notes by Radha Garcia CNP at 4/17/2020  3:20 PM     Author: Radha Garcia CNP Service: -- Author Type: Nurse Practitioner    Filed: 4/17/2020  4:00 PM Encounter Date: 4/17/2020 Status: Signed    : Radha Garcia CNP (Nurse Practitioner)       Acute Visit Note    Assessment:     Assessment and Plan:  1. Dysuria/Acute cystitis with hematuria: Symptoms started yesterday. Urinary frequency, urgency, and burning. Urinalysis showed small blood, moderate bacteria,  WBC, WBC clumps. Will treat more aggressively with Ciprofloxacin (hx of Urosepsis in past) as an outpatient. She is vitally stable.   - Urinalysis-UC if Indicated  - Culture, Urine  - ciprofloxacin HCl (CIPRO) 500 MG tablet; Take 1 tablet (500 mg total) by mouth 2 (two) times a day for 7 days.  Dispense: 14 tablet; Refill: 0  -Consider oral cranberry supplementation OTC or AZO bladder relief.  -Push fluid intake.  -Follow up over the weekend if symptoms worsen (nausea, vomiting, fever, chills, or flank pain).     The patient was in agreement with this plan.    Patient Instructions   Rest, push your water intake.    You could consider trying an over the counter Cranberry supplement.    Ciprofloxacin as prescribed.    Monitor yourself closely for worsening symptoms (fever greater than 100 degrees, nausea, vomiting, abdominal pain, or worsening flank pain). ER over the weekend if this occurs.  Patient Education     Bladder Infection, Female (Adult)    Urine is normally doesn't have any bacteria in it. But bacteria can get into the urinary tract from the skin around the rectum. Or they can travel in the blood from elsewhere in the body. Once they are in your urinary tract, they can cause infection in the urethra (urethritis), the bladder (cystitis), or the kidneys (pyelonephritis).  The most common place for an infection is in the bladder. This is called a bladder infection. This is one of the most common  "infections in women. Most bladder infections are easily treated. They are not serious unless the infection spreads to the kidney.  The phrases \"bladder infection,\" \"UTI,\" and \"cystitis\" are often used to describe the same thing. But they are not always the same. Cystitis is an inflammation of the bladder. The most common cause of cystitis is an infection.  Symptoms  The infection causes inflammation in the urethra and bladder. This causes many of the symptoms. The most common symptoms of a bladder infection are:    Pain or burning when urinating    Having to urinate more often than usual    Urgent need to urinate    Only a small amount of urine comes out    Blood in urine    Abdominal discomfort. This is usually in the lower abdomen above the pubic bone.    Cloudy urine    Strong- or bad-smelling urine    Unable to urinate (urinary retention)    Unable to hold urine in (urinary incontinence)    Fever    Loss of appetite    Confusion (in older adults)  Causes  Bladder infections are not contagious. You can't get one from someone else, from a toilet seat, or from sharing a bath.  The most common cause of bladder infections is bacteria from the bowels. The bacteria get onto the skin around the opening of the urethra. From there, they can get into the urine and travel up to the bladder, causing inflammation and infection. This usually happens because of:    Wiping improperly after urinating. Always wipe from front to back.    Bowel incontinence    Pregnancy    Procedures such as having a catheter inserted    Older age    Not emptying your bladder. This can allow bacteria a chance to grow in your urine.    Dehydration    Constipation    Sex    Use of a diaphragm for birth control   Treatment  Bladder infections are diagnosed by a urine test. They are treated with antibiotics and usually clear up quickly without complications. Treatment helps prevent a more serious kidney infection.  Medicines  Medicines can help in the " treatment of a bladder infection:    Take antibiotics until they are used up, even if you feel better. It is important to finish them to make sure the infection has cleared.    You can use acetaminophen or ibuprofen for pain, fever, or discomfort, unless another medicine was prescribed. If you have chronic liver or kidney disease, talk with your healthcare provider before using these medicines. Also talk with your provider if you've ever had a stomach ulcer or gastrointestinal bleeding, or are taking blood-thinner medicines.    If you are given phenazopydridine to reduce burning with urination, it will cause your urine to become a bright orange color. This can stain clothing.  Care and prevention  These self-care steps can help prevent future infections:    Drink plenty of fluids to prevent dehydration and flush out your bladder. Do this unless you must restrict fluids for other health reasons, or your doctor told you not to.    Proper cleaning after going to the bathroom is important. Wipe from front to back after using the toilet to prevent the spread of bacteria.    Urinate more often. Don't try to hold urine in for a long time.    Wear loose-fitting clothes and cotton underwear. Avoid tight-fitting pants.    Improve your diet and prevent constipation. Eat more fresh fruit and vegetables, and fiber, and less junk and fatty foods.    Avoid sex until your symptoms are gone.    Avoid caffeine, alcohol, and spicy foods. These can irritate your bladder.    Urinate right after intercourse to flush out your bladder.    If you use birth control pills and have frequent bladder infections, discuss it with your doctor.  Follow-up care  Call your healthcare provider if all symptoms are not gone after 3 days of treatment. This is especially important if you have repeat infections.  If a culture was done, you will be told if your treatment needs to be changed. If directed, you can call to find out the results.  If X-rays were  done, you will be told if the results will affect your treatment.  Call 911  Call 911 if any of the following occur:    Trouble breathing    Hard to wake up or confusion    Fainting or loss of consciousness    Rapid heart rate  When to seek medical advice  Call your healthcare provider right away if any of these occur:    Fever of 100.4 F (38.0 C) or higher, or as directed by your healthcare provider    Symptoms are not better by the third day of treatment    Back or belly (abdominal) pain that gets worse    Repeated vomiting, or unable to keep medicine down    Weakness or dizziness    Vaginal discharge    Pain, redness, or swelling in the outer vaginal area (labia)  Date Last Reviewed: 10/1/2016    [id number] [employer]. [address] [place] [address], [place] [address]. All rights reserved. This information is not intended as a substitute for professional medical care. Always follow your healthcare professional's instructions.             Return if symptoms worsen or fail to improve.         Subjective:      Suma Mark is a 69 y.o. female who presents today with concerns of a one day course of urinary urgency, frequency, and burning. She denies a current fever, chills, nausea, vomiting or flank pain. She reports taking some Ibuprofen yesterday to help with generalized discomfort and this helped some.    She reports pushing fluids at home, this has also helped some. She reports she wanted to come in today to get checked out, just in case it was an infection, as she didn't want to wait until Monday, when this could be worse.    She denies any other symptoms today.    The following portions of the patient's history were reviewed and updated as appropriate.    Review of Systems:    Review is otherwise negative except for what is mentioned above.     Social Hx:    Social History     Tobacco Use   Smoking Status Never Smoker   Smokeless Tobacco Never Used         Objective:     Vitals:    04/17/20 1540   BP:  115/74   Pulse: 69   SpO2: 97%   Weight: 182 lb 8 oz (82.8 kg)       Exam:  General: No apparent distress. Calm. Alert and Oriented X3. Pt behavior is appropriate.  Head:Atraumatic. Normocephalic.  Eyes: No discharge. No strabismus. No nystagmus.  Musculoskeletal: No CVA tenderness with palpation. Good ROM with extremities.   Neurologic: Interactive, alert, no focal findings.   Skin: Warm, dry. Normal hair pattern. Normal skin turgor.       Patient Active Problem List   Diagnosis   ? Acquired hypothyroidism   ? Essential hypertension   ? Restless leg syndrome   ? Gastroesophageal reflux disease without esophagitis   ? Migraine   ? Hernia, ventral   ? Osteoporosis   ? Diverticulosis   ? S/P total knee replacement   ? Insomnia, unspecified type   ? Depression, unspecified depression type   ? Fibromyalgia   ? Symptomatic varicose veins of both lower extremities   ? Venous insufficiency of both lower extremities     Current Outpatient Medications   Medication Sig Dispense Refill   ? amLODIPine (NORVASC) 10 MG tablet TAKE 1 TABLET BY MOUTH ONCE DAILY AT BEDTIME 90 tablet 3   ? atorvastatin (LIPITOR) 20 MG tablet Take 20 mg by mouth at bedtime.            ? botulinum toxin Type A, Cosm, (BOTOX) 100 unit SolR Every 3 months. [facility]     ? calcium carbonate-vitamin D3 (CALCIUM 600 + D,3,) 600 mg(1,500mg) -400 unit per tablet Take 1 tablet by mouth daily.            ? cholecalciferol, vitamin D3, 2,000 unit capsule Take 2,000 Units by mouth daily.     ? citalopram (CELEXA) 20 MG tablet TAKE 1 & 1/2 TABLETS BY MOUTH ONCE DAILY 135 tablet 3   ? eletriptan (RELPAX) 40 MG tablet Take 40 mg by mouth daily as needed for migraine. may repeat in 2 hours if necessary            ? furosemide (LASIX) 20 MG tablet TAKE 1 TABLET BY MOUTH EVERY DAY 90 tablet 3   ? HYDROcodone-acetaminophen 5-325 mg per tablet Take 1 tablet by mouth.     ? irbesartan (AVAPRO) 150 MG tablet TAKE 1 TABLET(150 MG) BY MOUTH DAILY 90 tablet 3   ?  levothyroxine (SYNTHROID, LEVOTHROID) 88 MCG tablet TAKE 1 TABLET BY MOUTH DAILY AT 6:00 AM. 90 tablet 2   ? LORazepam (ATIVAN) 1 MG tablet TAKE 1/2 TABLET(0.5 MG) BY MOUTH DAILY AS NEEDED FOR ANXIETY 30 tablet 0   ? pramipexole (MIRAPEX) 0.25 MG tablet Take 1 tablet (0.25 mg total) by mouth 3 (three) times a day. 270 tablet 1   ? propranolol (INDERAL) 10 MG tablet TAKE 1 TABLET(10 MG) BY MOUTH TWICE DAILY 180 tablet 3   ? traZODone (DESYREL) 50 MG tablet TAKE 1 TABLET BY MOUTH EVERYDAY AT BEDTIME 90 tablet 3   ? ciprofloxacin HCl (CIPRO) 500 MG tablet Take 1 tablet (500 mg total) by mouth 2 (two) times a day for 7 days. 14 tablet 0   ? cyanocobalamin (VITAMIN B-12) 50 mcg tablet Take 50 mcg by mouth daily.     ? dicyclomine (BENTYL) 10 MG capsule TAKE 1 CAPSULE BY MOUTH FOUR TIMES A DAY  1   ? HYDROmorphone (DILAUDID) 2 MG tablet Take 1-2 tablets (2-4 mg total) by mouth every 6 (six) hours as needed for pain. 20 tablet 0   ? hydrOXYzine HCl (ATARAX) 25 MG tablet Take 1 tablet (25 mg total) by mouth 3 (three) times a day as needed for itching. 270 tablet 0     Current Facility-Administered Medications   Medication Dose Route Frequency Provider Last Rate Last Dose   ? denosumab 60 mg (PROLIA 60 mg/ml)  60 mg Subcutaneous Q6 Months Bernadette Taylor MD   60 mg at 11/07/19 1452        Radha Garcia, Adult-Geriatric Nurse Practitioner  [facility] - Internal Medicine Team     4/17/2020

## 2021-07-01 ENCOUNTER — COMMUNICATION - HEALTHEAST (OUTPATIENT)
Dept: ADMINISTRATIVE | Facility: CLINIC | Age: 71
End: 2021-07-01

## 2021-07-01 DIAGNOSIS — I10 ESSENTIAL HYPERTENSION: ICD-10-CM

## 2021-07-03 NOTE — ADDENDUM NOTE
Addendum Note by Landon Meade MD at 11/17/2017  8:20 AM     Author: Landon Meade MD Service: -- Author Type: Physician    Filed: 11/17/2017  8:20 AM Encounter Date: 11/14/2017 Status: Signed    : Landon Meade MD (Physician)    Addended by: LANDON MEADE on: 11/17/2017 08:20 AM        Modules accepted: Orders

## 2021-07-03 NOTE — ADDENDUM NOTE
Addendum Note by Landon Meade MD at 11/8/2017 10:54 AM     Author: Landon Meade MD Service: -- Author Type: Physician    Filed: 11/8/2017 10:54 AM Encounter Date: 11/7/2017 Status: Signed    : Landon Meade MD (Physician)    Addended by: LANDON MEADE on: 11/8/2017 10:54 AM        Modules accepted: Orders

## 2021-07-03 NOTE — ADDENDUM NOTE
Addendum Note by Gloria Taylor MD at 10/18/2019  1:00 PM     Author: Gloria Taylor MD Service: -- Author Type: Physician    Filed: 10/19/2019 12:51 PM Encounter Date: 10/18/2019 Status: Signed    : Gloria Taylor MD (Physician)    Addended by: GLORIA TAYLOR on: 10/19/2019 12:51 PM        Modules accepted: Orders

## 2021-07-03 NOTE — ADDENDUM NOTE
Addendum Note by Landon Meade MD at 9/4/2017 12:56 PM     Author: Landon Meade MD Service: -- Author Type: Physician    Filed: 9/4/2017 12:56 PM Encounter Date: 8/24/2017 Status: Signed    : Landon Meade MD (Physician)    Addended by: LANDON MEADE on: 9/4/2017 12:56 PM        Modules accepted: Orders

## 2021-07-03 NOTE — ADDENDUM NOTE
Addendum Note by Terrence Hankins RN at 11/14/2018  8:40 AM     Author: Terrence Hankins RN Service: -- Author Type: Registered Nurse    Filed: 12/4/2018  4:36 PM Encounter Date: 11/14/2018 Status: Signed    : Terrence Hankins RN (Registered Nurse)    Addended by: TERRENCE HANKINS on: 12/4/2018 04:36 PM        Modules accepted: Orders, SmartSet

## 2021-07-03 NOTE — ADDENDUM NOTE
Addendum Note by Yecenia Maurice RN at 7/19/2018  3:58 PM     Author: Yecenia Maurice RN Service: -- Author Type: Registered Nurse    Filed: 7/19/2018  3:58 PM Date of Service: 7/19/2018  3:58 PM Status: Signed    : Yecenia Maurice RN (Registered Nurse)    Encounter addended by: Yecenia Maurice RN on: 7/19/2018  3:58 PM<BR>     Actions taken: Sign clinical note

## 2021-07-04 NOTE — TELEPHONE ENCOUNTER
Telephone Encounter by Fatoumata Vera RT (R) at 7/1/2021  9:09 AM     Author: Fatoumata Vera RT (R) Service: -- Author Type: Radiologic Technologist    Filed: 7/1/2021  9:11 AM Encounter Date: 7/1/2021 Status: Signed    : Fatoumata Vera RT (R) (Radiologic Technologist)       Reason for Call:  Direction on medication     Detailed comments:   irbesartan (AVAPRO) 150 MG tablet  Sig: TAKE 1 TABLET(300mg) BY MOUTH DAILY    - Please call pharmacy to give direction on medication as it doesn't match.     Phone Number Patient can be reached at: Home number on file [phone] (home)    Best Time: ANY    Can we leave a detailed message on this number?: Yes    Call taken on 7/1/2021 at 9:10 AM by Fatoumata Vera

## 2021-07-04 NOTE — TELEPHONE ENCOUNTER
Telephone Encounter by Grecia Avery CMA at 7/1/2021  9:55 AM     Author: Grecia Avery CMA Service: -- Author Type: Certified Medical Assistant    Filed: 7/1/2021  9:55 AM Encounter Date: 7/1/2021 Status: Signed    : Grecia Avery CMA (Certified Medical Assistant)       New rx teed up for irbesartan (this was increased as seen below)    2. Essential hypertension  Amlodipine can cause leg swelling - she will decrease amlodipine to 5 mg daily.  Increase irbesartan to 300 mg daily.  Increase propranolol to 20 mg two times a day.  Increase furosemide to 40mg daily.     - HM2(CBC w/o Differential)  - Urinalysis

## 2021-07-04 NOTE — PATIENT INSTRUCTIONS - HE
Patient Instructions by Bernadette Taylor MD at 6/10/2021  1:40 PM     Author: Bernadette Taylor MD Service: -- Author Type: Physician    Filed: 6/10/2021  2:36 PM Encounter Date: 6/10/2021 Status: Addendum    : Bernadette Taylor MD (Physician)    Related Notes: Original Note by Bernadette Taylor MD (Physician) filed at 6/10/2021  2:21 PM       Prolia 6th today.  Prolia 7th in 6 months with me.    DXA in 5/2023 .   Phone number to schedule [phone].    Daily calcium need is 0856-3565 mg a day from the diet and supplements.  Calcium citrate is easier to digest.  Vitamin D 2000 IU daily recommended.    Amlodipine can cause leg swelling - you can decrease amlodipine to 5 mg daily.  Increase irbesartan to 300 mg daily.  Increase propranolol to 20 mg two times a day.  Increase furosemide to 40mg daily.    Compression stockings - put them on in the morning, take of in the evening.    Try triamcinolone and urea cream for feet.    Patient Education   Understanding [employer] MyPlate  The USDA (US Department of Agriculture) has guidelines to help you make healthy food choices. These are called MyPlate. MyPlate shows the food groups that make up healthy meals using the image of a place setting. Before you eat, think about the healthiest choices for what to put onto your plate or into your cup or bowl. To learn more about building a healthy plate, visit www.choosemyplate.gov.       The Food Groups    Fruits: Any fruit or 100% fruit juice counts as part of the Fruit Group. Fruits may be fresh, canned, frozen, or dried, and may be whole, cut-up, or pureed. Make half your plate fruits and vegetables.    Vegetables: Any vegetable or 100% vegetable juice counts as a member of the Vegetable Group. Vegetables may be fresh, frozen, canned, or dried. They can be served raw or cooked and may be whole, cut-up, or mashed. Make half your plate fruits and vegetables.     Grains: All foods made from grains are part of the Grains Group. These  include wheat, rice, oats, cornmeal, and barley such as bread, pasta, oatmeal, cereal, tortillas, and grits. Grains should be no more than a quarter of your plate. At least half of your grains should be whole grains.    Protein: This group includes meat, poultry, seafood, beans and peas, eggs, processed soy products (like tofu), nuts (including nut butters), and seeds. Make protein choices no more than a quarter of your plate. Meat and poultry choices should be lean or low fat.    Dairy: All fluid milk products and foods made from milk that contain calcium, like yogurt and cheese are part of the Dairy Group. (Foods that have little calcium, such as cream, butter, and cream cheese, are not part of the group.) Most dairy choices should be low-fat or fat-free.    Oils: These are fats that are liquid at room temperature. They include canola, corn, olive, soybean, and sunflower oil. Foods that are mainly oil include mayonnaise, certain salad dressings, and soft margarines. You should have only 5 to 7 teaspoons of oils a day. You probably already get this much from the food you eat.  Use [employer] to Help Build Your Meals  The SuperTracker can help you plan and track your meals and activity. You can look up individual foods to see or compare their nutritional value. You can get guidelines for what and how much you should eat. You can compare your food choices. And you can assess personal physical activities and see ways you can improve. Go to www.[place].complate.gov/supertracker/.    0626-2311 [employer]. [address] [place] [address]. All rights reserved. This information is not intended as a substitute for professional medical care. Always follow your healthcare professional's instructions.             Advance Directive  Patients advance directive was discussed and I am comfortable with the patients wishes.  Patient Education   Personalized Prevention Plan  You are due for the preventive  services outlined below.  Your care team is available to assist you in scheduling these services.  If you have already completed any of these items, please share that information with your care team to update in your medical record.  Health Maintenance Due   Topic Date Due   ? Pneumococcal Vaccine: 65+ Years (2 of 2 - PPSV23) 10/26/2017   ? HYPERTENSION FOLLOW-UP  04/26/2018

## 2021-07-06 VITALS
HEART RATE: 68 BPM | BODY MASS INDEX: 33.2 KG/M2 | WEIGHT: 183 LBS | DIASTOLIC BLOOD PRESSURE: 72 MMHG | SYSTOLIC BLOOD PRESSURE: 136 MMHG

## 2021-07-06 VITALS
OXYGEN SATURATION: 97 % | SYSTOLIC BLOOD PRESSURE: 121 MMHG | BODY MASS INDEX: 34.59 KG/M2 | HEIGHT: 61 IN | HEART RATE: 74 BPM | DIASTOLIC BLOOD PRESSURE: 60 MMHG | WEIGHT: 183.2 LBS

## 2021-07-13 ENCOUNTER — TRANSCRIBE ORDERS (OUTPATIENT)
Dept: INTERNAL MEDICINE | Facility: CLINIC | Age: 71
End: 2021-07-13

## 2021-07-13 DIAGNOSIS — M81.0 OSTEOPOROSIS WITHOUT CURRENT PATHOLOGICAL FRACTURE, UNSPECIFIED OSTEOPOROSIS TYPE: Primary | ICD-10-CM

## 2021-07-13 DIAGNOSIS — Z92.29 PERSONAL HISTORY OF OTHER DRUG THERAPY: ICD-10-CM

## 2021-07-13 NOTE — TELEPHONE ENCOUNTER
Refill Request  Medication name:   Requested Prescriptions     Pending Prescriptions Disp Refills     furosemide (LASIX) 20 MG tablet 90 tablet 3     Sig: Take 1 tablet (20 mg total) by mouth daily.     Who prescribed the medication: VENICE   Last refill on medication: 02/14/2021  [facility]  Last appointment with PCP: 06/07/2021  Next appointment: Appointment scheduled for 02/14/2021    RT Dionna (R)   Otc Regimen: I recommended a broad spectrum sunscreen with a SPF of 30 or higher. I explained that SPF 30 sunscreens block approximately 97 percent of the sun's harmful rays. Sunscreens should be applied at least 15 minutes prior to expected sun exposure and then every 2 hours after that as long as sun exposure continues.\\n Detail Level: Zone

## 2021-07-13 NOTE — PROGRESS NOTES
As part of the required manual data conversion process for integration, this encounter was created to document a CAM (Clinic Administered Medication) order. This information was copied from the [facility] patient's chart to the [facility] patient chart.     Lida Ford, Courtney  July 13, 2021

## 2021-07-21 DIAGNOSIS — I10 ESSENTIAL (PRIMARY) HYPERTENSION: ICD-10-CM

## 2021-07-21 DIAGNOSIS — G25.81 RESTLESS LEGS SYNDROME: ICD-10-CM

## 2021-07-23 ENCOUNTER — TELEPHONE (OUTPATIENT)
Dept: INTERNAL MEDICINE | Facility: CLINIC | Age: 71
End: 2021-07-23

## 2021-07-23 DIAGNOSIS — I87.2 VENOUS INSUFFICIENCY OF BOTH LOWER EXTREMITIES: Primary | ICD-10-CM

## 2021-07-23 RX ORDER — FUROSEMIDE 20 MG
20 TABLET ORAL 2 TIMES DAILY
Qty: 60 TABLET | Refills: 3 | Status: SHIPPED | OUTPATIENT
Start: 2021-07-23 | End: 2021-08-19

## 2021-07-23 RX ORDER — FUROSEMIDE 20 MG
20 TABLET ORAL
COMMUNITY
Start: 2021-06-14 | End: 2021-07-23

## 2021-07-23 NOTE — TELEPHONE ENCOUNTER
7-23-21  Reason for Call:   medication refill:    Do you use a [facility] [address]    Name of the medication requested: furosemide (LASIX) 20 MG tablet    Other request: pt called stated her prescription was recently changed to 2 tablets a day versus 1 tablet , pharmacy needs new prescription.   Pt is leaving on vacation today and needs this refilled ASAP     Can we leave a detailed message on this number? YES    Phone number patient can be reached at: Cell number on file:    Telephone Information:   Mobile [phone]       Best Time: anytime    Call taken on 7/23/2021 at 9:35 AM by Michell Glynn

## 2021-07-25 ENCOUNTER — RECORDS - HEALTHEAST (OUTPATIENT)
Dept: ADMINISTRATIVE | Facility: OTHER | Age: 71
End: 2021-07-25

## 2021-07-25 NOTE — TELEPHONE ENCOUNTER
"Routing refill request to provider for review/approval because:  Drug not active on patient's medication list    ___________________________________________________________    Copy of Last Rx:          Last office visit with provider:  7/7/21  ___________________________________________________________    Requested Prescriptions   Pending Prescriptions Disp Refills     pramipexole (MIRAPEX) 0.25 MG tablet [Pharmacy Med Name: PRAMIPEXOLE 0.25 MG TABLET] 270 tablet 1     Sig: TAKE 1 TABLET (0.25 MG TOTAL) BY MOUTH 3 (THREE) TIMES A DAY.       Antiparkinson's Agents Protocol Failed - 7/21/2021  1:32 PM        Failed - CBC on record in past 12 months     Recent Labs   Lab Test 06/10/21  1454   WBC 6.7   RBC 4.02   HGB 12.1   HCT 36.7                    Failed - Medication is active on med list        Failed - Recent (6 mo) or future (30 days) visit within the authorizing provider's specialty     Patient had office visit in the last 6 months or has a visit in the next 30 days with authorizing provider or within the authorizing provider's specialty.  See \"Patient Info\" tab in inbasket, or \"Choose Columns\" in Meds & Orders section of the refill encounter.            Passed - Blood pressure under 140/90 in past 12 months     BP Readings from Last 3 Encounters:   07/07/21 123/62   06/10/21 121/60   06/08/21 136/72                 Passed - ALT on record in past 12 months         Recent Labs   Lab Test 06/10/21  1454   ALT 20             Passed - Serum Creatinine on file in past 12 months     Recent Labs   Lab Test 07/07/21  0919   CR 0.78       Ok to refill medication if creatinine is low          Passed - Patient is age 18 or older        Passed - No active pregnancy on record        Passed - No positive pregnancy test in the past 12 months           propranolol (INDERAL) 10 MG tablet [Pharmacy Med Name: PROPRANOLOL 10 MG TABLET] 180 tablet 3     Sig: TAKE 2 TABLETS (20 MG TOTAL) BY MOUTH 2 (TWO) TIMES A DAY.       " "Beta-Blockers Protocol Failed - 7/21/2021  1:32 PM        Failed - Medication is active on med list        Passed - Blood pressure under 140/90 in past 12 months     BP Readings from Last 3 Encounters:   07/07/21 123/62   06/10/21 121/60   06/08/21 136/72                 Passed - Patient is age 6 or older        Passed - Recent (12 mo) or future (30 days) visit within the authorizing provider's specialty     Patient has had an office visit with the authorizing provider or a provider within the authorizing providers department within the previous 12 mos or has a future within next 30 days. See \"Patient Info\" tab in inbasket, or \"Choose Columns\" in Meds & Orders section of the refill encounter.                   Malika Montano RN 07/25/21 1:37 PM  "

## 2021-07-26 RX ORDER — PRAMIPEXOLE DIHYDROCHLORIDE 0.25 MG/1
TABLET ORAL
Qty: 270 TABLET | Refills: 1 | Status: SHIPPED | OUTPATIENT
Start: 2021-07-26 | End: 2022-01-31

## 2021-07-26 RX ORDER — PROPRANOLOL HYDROCHLORIDE 10 MG/1
TABLET ORAL
Qty: 180 TABLET | Refills: 3 | Status: SHIPPED | OUTPATIENT
Start: 2021-07-26 | End: 2022-01-31

## 2021-07-31 ENCOUNTER — TRANSFERRED RECORDS (OUTPATIENT)
Dept: HEALTH INFORMATION MANAGEMENT | Facility: CLINIC | Age: 71
End: 2021-07-31

## 2021-08-06 RX ORDER — ELETRIPTAN HYDROBROMIDE 40 MG/1
40 TABLET, FILM COATED ORAL
COMMUNITY
End: 2021-08-10

## 2021-08-12 ENCOUNTER — TELEPHONE (OUTPATIENT)
Dept: INTERNAL MEDICINE | Facility: CLINIC | Age: 71
End: 2021-08-12

## 2021-08-12 ENCOUNTER — TRANSFERRED RECORDS (OUTPATIENT)
Dept: HEALTH INFORMATION MANAGEMENT | Facility: CLINIC | Age: 71
End: 2021-08-12
Payer: MEDICARE

## 2021-08-12 NOTE — TELEPHONE ENCOUNTER
Central Prior Authorization Team  Phone: [phone]    PA Initiation    Medication: eletriptan (RELPAX) 40 MG tablet  Insurance Company: [facility] [employer] - Phone [phone] Fax [phone]  [facility] [id number] [place] [address] [facility]  Filling Pharmacy Phone: [phone]  Filling Pharmacy Fax:    Start Date: 8/12/2021

## 2021-08-12 NOTE — TELEPHONE ENCOUNTER
Prior Authorization Approval    Authorization Effective Date: 5/14/2021  Authorization Expiration Date: 8/12/2022  Medication: eletriptan (RELPAX) 40 MG tablet- APPROVED   Approved Dose/Quantity:   Reference #:     Insurance Company: [facility] [employer] - Phone [phone] Fax [phone]  Expected CoPay:       CoPay Card Available:      Foundation Assistance Needed:    Which Pharmacy is filling the prescription (Not needed for infusion/clinic administered): [id number] [place] [address]  Pharmacy Notified: Yes  Patient Notified:  **Instructed pharmacy to notify patient when script is ready to /ship.**

## 2021-08-14 DIAGNOSIS — I87.2 VENOUS INSUFFICIENCY OF BOTH LOWER EXTREMITIES: ICD-10-CM

## 2021-08-17 RX ORDER — FUROSEMIDE 20 MG
TABLET ORAL
Qty: 60 TABLET | Refills: 3 | OUTPATIENT
Start: 2021-08-17

## 2021-08-19 ENCOUNTER — OFFICE VISIT (OUTPATIENT)
Dept: INTERNAL MEDICINE | Facility: CLINIC | Age: 71
End: 2021-08-19
Payer: MEDICARE

## 2021-08-19 VITALS
DIASTOLIC BLOOD PRESSURE: 70 MMHG | OXYGEN SATURATION: 97 % | HEART RATE: 62 BPM | SYSTOLIC BLOOD PRESSURE: 120 MMHG | WEIGHT: 174 LBS | BODY MASS INDEX: 32.88 KG/M2

## 2021-08-19 DIAGNOSIS — G89.29 CHRONIC MIDLINE LOW BACK PAIN WITHOUT SCIATICA: ICD-10-CM

## 2021-08-19 DIAGNOSIS — I10 ESSENTIAL HYPERTENSION: ICD-10-CM

## 2021-08-19 DIAGNOSIS — Z01.818 PREOPERATIVE EXAMINATION: Primary | ICD-10-CM

## 2021-08-19 DIAGNOSIS — I87.2 VENOUS INSUFFICIENCY OF BOTH LOWER EXTREMITIES: ICD-10-CM

## 2021-08-19 DIAGNOSIS — M54.50 CHRONIC MIDLINE LOW BACK PAIN WITHOUT SCIATICA: ICD-10-CM

## 2021-08-19 DIAGNOSIS — F41.9 ANXIETY: ICD-10-CM

## 2021-08-19 DIAGNOSIS — E04.1 THYROID NODULE: ICD-10-CM

## 2021-08-19 PROBLEM — M81.8 OTHER OSTEOPOROSIS WITHOUT CURRENT PATHOLOGICAL FRACTURE: Status: ACTIVE | Noted: 2017-12-07

## 2021-08-19 PROBLEM — I25.10 CORONARY ARTERY DISEASE INVOLVING NATIVE CORONARY ARTERY OF NATIVE HEART WITHOUT ANGINA PECTORIS: Status: RESOLVED | Noted: 2019-03-29 | Resolved: 2021-08-19

## 2021-08-19 PROBLEM — F32.A DEPRESSION, UNSPECIFIED DEPRESSION TYPE: Status: ACTIVE | Noted: 2021-08-19

## 2021-08-19 PROBLEM — E78.2 MIXED HYPERLIPIDEMIA: Status: ACTIVE | Noted: 2019-04-08

## 2021-08-19 PROBLEM — I25.10 CORONARY ARTERY DISEASE INVOLVING NATIVE CORONARY ARTERY OF NATIVE HEART WITHOUT ANGINA PECTORIS: Status: ACTIVE | Noted: 2019-03-29

## 2021-08-19 LAB
ANION GAP SERPL CALCULATED.3IONS-SCNC: 9 MMOL/L (ref 5–18)
BUN SERPL-MCNC: 14 MG/DL (ref 8–28)
CALCIUM SERPL-MCNC: 10.1 MG/DL (ref 8.5–10.5)
CHLORIDE BLD-SCNC: 102 MMOL/L (ref 98–107)
CO2 SERPL-SCNC: 28 MMOL/L (ref 22–31)
CREAT SERPL-MCNC: 0.84 MG/DL (ref 0.6–1.1)
ERYTHROCYTE [DISTWIDTH] IN BLOOD BY AUTOMATED COUNT: 12.2 % (ref 10–15)
GFR SERPL CREATININE-BSD FRML MDRD: 69 ML/MIN/1.73M2
GLUCOSE BLD-MCNC: 92 MG/DL (ref 70–125)
HCT VFR BLD AUTO: 40.1 % (ref 35–47)
HGB BLD-MCNC: 13.3 G/DL (ref 11.7–15.7)
MCH RBC QN AUTO: 29.8 PG (ref 26.5–33)
MCHC RBC AUTO-ENTMCNC: 33.2 G/DL (ref 31.5–36.5)
MCV RBC AUTO: 90 FL (ref 78–100)
PLATELET # BLD AUTO: 245 10E3/UL (ref 150–450)
POTASSIUM BLD-SCNC: 4.3 MMOL/L (ref 3.5–5)
RBC # BLD AUTO: 4.46 10E6/UL (ref 3.8–5.2)
SODIUM SERPL-SCNC: 139 MMOL/L (ref 136–145)
WBC # BLD AUTO: 6.3 10E3/UL (ref 4–11)

## 2021-08-19 PROCEDURE — 85027 COMPLETE CBC AUTOMATED: CPT | Performed by: INTERNAL MEDICINE

## 2021-08-19 PROCEDURE — 93010 ELECTROCARDIOGRAM REPORT: CPT | Performed by: INTERNAL MEDICINE

## 2021-08-19 PROCEDURE — 99214 OFFICE O/P EST MOD 30 MIN: CPT | Performed by: INTERNAL MEDICINE

## 2021-08-19 PROCEDURE — 80048 BASIC METABOLIC PNL TOTAL CA: CPT | Performed by: INTERNAL MEDICINE

## 2021-08-19 PROCEDURE — 93005 ELECTROCARDIOGRAM TRACING: CPT | Performed by: INTERNAL MEDICINE

## 2021-08-19 PROCEDURE — 36415 COLL VENOUS BLD VENIPUNCTURE: CPT | Performed by: INTERNAL MEDICINE

## 2021-08-19 RX ORDER — BUPROPION HYDROCHLORIDE 150 MG/1
150 TABLET ORAL
COMMUNITY
Start: 2021-06-01 | End: 2022-03-07

## 2021-08-19 RX ORDER — FUROSEMIDE 20 MG
40 TABLET ORAL DAILY
Qty: 180 TABLET | Refills: 1 | Status: SHIPPED | OUTPATIENT
Start: 2021-08-19 | End: 2022-02-16

## 2021-08-19 RX ORDER — ATORVASTATIN CALCIUM 20 MG/1
TABLET, FILM COATED ORAL
COMMUNITY
Start: 2020-08-05 | End: 2021-09-12

## 2021-08-19 RX ORDER — IRBESARTAN 300 MG/1
300 TABLET ORAL DAILY
COMMUNITY
Start: 2021-07-01 | End: 2022-04-12

## 2021-08-19 NOTE — PATIENT INSTRUCTIONS
Prolia due in December.      No contraindications for the planned procedure.    You should stop taking aspirin and NSAID's ( ibuprofene, naproxen) 7-10 days prior procedure.  You should stop all supplements 7- 10 days prior procedure ( fish oil, Q10, vitamins, etc).    Morning of procedure - take levothyroxine, propranolol.  The rest of the med - take when come back home.

## 2021-08-19 NOTE — PROGRESS NOTES
ekg  [facility]  [address] [address]  Phone: [phone]  Fax: [phone]  Primary Provider: Bernadette Taylor        PREOPERATIVE EVALUATION:  Today's date: 8/19/2021    Suma Mark is a 71 year old female who presents for a preoperative evaluation.    Surgical Information:  Surgery/Procedure: vertflex procedure  Surgery Location: [facility]  Surgeon: Dr Fields  Surgery Date: 9/2/21  Time of Surgery: 8:00am  Where patient plans to recover: At home with family  Fax number for surgical facility: [phone] Attn Linda    Type of Anesthesia Anticipated: to be determined    Assessment & Plan     The proposed surgical procedure is considered LOW risk.    Preoperative examination    - CBC with platelets; Future  - Basic metabolic panel; Future  - EKG 12-lead, tracing only    Chronic midline low back pain without sciatica  She has a history of chronic low back pain, status post spinal fusion with instrumentation. Had spinal cord stimulator in 2019. Had nerve ablation x 6-8, but now not covered by insurance anymore.Now, surgeon will put titanium plate in between L3-L4.    Essential hypertension  Stable on amlodipine, irbesartan and frusemide and propranolol.  The morning of the procedure will take propranolol only.  She takes amlodipine at bedtime.  - CBC with platelets; Future  - Basic metabolic panel; Future  - EKG 12-lead, tracing only    Venous insufficiency of both lower extremities  Leg swelling much better since increase furosemide dose. She will wear compression stockings daily.    - furosemide (LASIX) 20 MG tablet; Take 2 tablets (40 mg) by mouth daily    Anxiety/depression - she will take Celexa and bupropion when she comes home after the procedure.           RECOMMENDATION:  APPROVAL GIVEN to proceed with proposed procedure, without further diagnostic evaluation.    [id number]}    Subjective     HPI related to upcoming  procedure:   She has a history of chronic low back pain, status post spinal fusion with instrumentation. Had spinal cord stimulator in 2019. Had nerve ablation x 6-8, but now not covered by insurance anymore.Now, surgeon will put titanium plate in between L3-L4.      Preop Questions 8/19/2021   1. Have you ever had a heart attack or stroke? No   2. Have you ever had surgery on your heart or blood vessels, such as a stent placement, a coronary artery bypass, or surgery on an artery in your head, neck, heart, or legs? No   3. Do you have chest pain with activity? No   4. Do you have a history of  heart failure? No   5. Do you currently have a cold, bronchitis or symptoms of other infection? No   6. Do you have a cough, shortness of breath, or wheezing? No   7. Do you or anyone in your family have previous history of blood clots? YES -    8. Do you or does anyone in your family have a serious bleeding problem such as prolonged bleeding following surgeries or cuts? No   9. Have you ever had problems with anemia or been told to take iron pills? YES -    10. Have you had any abnormal blood loss such as black, tarry or bloody stools, or abnormal vaginal bleeding? No   11. Have you ever had a blood transfusion? YES -    11a. Have you ever had a transfusion reaction? No   12. Are you willing to have a blood transfusion if it is medically needed before, during, or after your surgery? Yes   13. Have you or any of your relatives ever had problems with anesthesia? YES -    14. Do you have sleep apnea, excessive snoring or daytime drowsiness? No   15. Do you have any artifical heart valves or other implanted medical devices like a pacemaker, defibrillator, or continuous glucose monitor? No   16. Do you have artificial joints? YES -    17. Are you allergic to latex? No       Review of Systems  CONSTITUTIONAL: NEGATIVE for fever, chills, change in weight  INTEGUMENTARY/SKIN: NEGATIVE for worrisome rashes, moles or lesions  EYES:  NEGATIVE for vision changes or irritation  ENT/MOUTH: NEGATIVE for ear, mouth and throat problems  RESP: NEGATIVE for significant cough or SOB  CV: NEGATIVE for chest pain, palpitations or peripheral edema  GI: NEGATIVE for nausea, abdominal pain, heartburn, or change in bowel habits  : NEGATIVE for frequency, dysuria, or hematuria  MUSCULOSKELETAL: NEGATIVE for significant arthralgias or myalgia  NEURO: NEGATIVE for weakness, dizziness or paresthesias  ENDOCRINE: NEGATIVE for temperature intolerance, skin/hair changes  HEME: NEGATIVE for bleeding problems  PSYCHIATRIC: NEGATIVE for changes in mood or affect    Patient Active Problem List    Diagnosis Date Noted     Depression, unspecified depression type 08/19/2021     Priority: Medium     Insomnia, unspecified type 08/19/2021     Priority: Medium     Last Assessment & Plan:   Formatting of this note might be different from the original.  She does complain of middle of the night insomnia.  She gets up and place her iPad-I did talk to her about this and told her the iPad is not going to be helpful.  She is going to try to do some meditation in the middle of the night instead.    Possibly some trazodone would be a good idea in the future if this is not improving.       Mixed hyperlipidemia 04/08/2019     Priority: Medium     Coronary artery disease involving native coronary artery of native heart without angina pectoris 03/29/2019     Priority: Medium     Fibromyalgia 04/24/2018     Priority: Medium     Last Assessment & Plan:   Formatting of this note might be different from the original.  She tells me today that she has chronic fibromyalgia diagnosed by another doctor.  She says that she cannot take ibuprofen for pain.  Unfortunately the pain keeps her awake.  She has been told to try Tylenol but she did try that and does not feel that it works.  She wonders if she can do something else for her pain.  I have referred her to the pain clinic.  She has a packet and  will complete that, mail it in and schedule with the pain clinic when able.       Chronic back pain 12/07/2017     Priority: Medium     Other osteoporosis without current pathological fracture 12/07/2017     Priority: Medium     Diverticulosis 10/04/2017     Priority: Medium     Formatting of this note might be different from the original.  Incidental finding on colonoscopy 10/2017    Last Assessment & Plan:   Formatting of this note might be different from the original.  She has been complaining of left lower quadrant abdominal pain and sought gastroenterology for this.  This is continued despite gi working wiht her on abd pain - egd ordered. She is planning egd tomorrow. She is also planning agile capsule. Gi doc suspects crohns.       Migraine 04/04/2017     Priority: Medium     Formatting of this note might be different from the original.  Managed by [facility].    Last Assessment & Plan:   Formatting of this note might be different from the original.  Saw [facility] neurolog[facility].   Will try naratriptan and topamax.   Incremental increases in topamax 25mg weekly until she gets to 100 mg is the plan.       Essential hypertension 04/21/2015     Priority: Medium     Last Assessment & Plan:   Formatting of this note might be different from the original.  Due to valsartan drug recall, valsartan was discontinued today.  I will start Avapro 150 mg orally per day and ask her to return to clinic in 1 month for a BMP to ensure that no side effects are occurring.       Gastroesophageal reflux disease without esophagitis 04/21/2015     Priority: Medium     Restless leg syndrome 04/21/2015     Priority: Medium     Anxiety 01/20/2015     Priority: Medium     Total knee replacement status 10/31/2013     Priority: Medium     Acquired hypothyroidism 03/12/2009     Priority: Medium     Last Assessment & Plan:   Formatting of this note might be different from the original.    3/15/18 tsh nl 1.50 no chg in dosage        Past Medical History:    Diagnosis Date     Acquired hypothyroidism 3/12/2009     Anxiety      Anxiety state, unspecified      Back pain      Breast cyst      Carpal tunnel syndrome      Chronic pain     LOW BACK     Degenerative disc disease, lumbar      Depression      Depression, unspecified depression type      Diarrhea      Disease of thyroid gland     hypothyroid     Diverticulosis 10/4/2017    Incidental finding on colonoscopy 10/2017     DJD (degenerative joint disease)      Essential hypertension 4/21/2015     Essential hypertension, benign      Fatty liver      Fibromyalgia      Gastro-oesophageal reflux disease      Gastroesophageal reflux disease without esophagitis 4/21/2015     GERD (gastroesophageal reflux disease)      Hernia, ventral 4/27/2017     History of anesthesia complications     hypotension after surgery     History of blood clots      History of blood transfusion      History of transfusion      Hyperlipidemia      Hypertension      Insomnia, unspecified type      Left lower quadrant pain      Lumbago      Lung nodules     INTERMITTENT     Major depression      Migraine      Osteoarthritis      Osteopenia      Osteoporosis      Other abnormal glucose      Other and unspecified special symptom or syndrome, not elsewhere classified      PONV (postoperative nausea and vomiting)      Posttraumatic stress disorder      Raynaud's disease      Restless leg syndrome      S/P total knee replacement 11/27/2017     Spontaneous ecchymoses      Thrombosis of leg     with pregnancy     Unspecified episodic mood disorder     AFFECTIVE PSYCHOSIS     Unspecified hypothyroidism      Unspecified vitamin D deficiency      Varicose veins      Ventral hernia      Vitamin D deficiency      Past Surgical History:   Procedure Laterality Date     ARTHROCENTESIS      LEFT HIP TROCHANTERIC BURSA     ARTHROPLASTY KNEE UNICOMPARTMENT  10/31/2013    Procedure: ARTHROPLASTY KNEE UNICOMPARTMENT;  LEFT KNEE UNICOMPARTMENTAL ARTHROPLASTY  (CAROLINE)^;  Surgeon: Chi Mittal MD;  Location: [facility] OR     ARTICULAR DEBRIEDEMENT[      LEFT KNEE     BACK SURGERY       BREAST CYST EXCISION       BREAST SURGERY      bx     C TOTAL KNEE ARTHROPLASTY Right 11/27/2017    Procedure:  RIGHT TOTAL KNEE ARTHROPLASTY;  Surgeon: Kevin Ryder MD;  Location: [facility];  Service: Orthopedics     COLECTOMY N/A 6/2/2017    Procedure: RESECTION, SMALL INTESTINE, OPEN ADHESIOLYSIS;  Surgeon: Keyon Qureshi MD;  Location: [facility] OR;  Service:      COLON SURGERY       ENDOSCOPIC RETROGRADE CHOLANGIOPANCREATOGRAPHY       ENDOSCOPIC STRIPPING VEIN(S)      BILATERLY     GENITOURINARY SURGERY      bladder sling     GI SURGERY      hemorrhoidectomy     HEMORRHOID SURGERY       HERNIORRHAPHY INCISIONAL (LOCATION) N/A 6/2/2017    Procedure: LAPARASCOPIC CONVERTED TO OPEN PRIMARY INCISIONAL HERNIA REPAIR;  Surgeon: Keyon Qureshi MD;  Location: [facility];  Service:      HYSTERECTOMY  [id number]     HYSTERECTOMY VAGINAL       INCONTINENCE SURGERY       LAMINECTOMY LUMBAR TWO LEVELS      2003     LUMBAR FUSION       LUMBAR HARDWARE REMOVAL[       ORTHOPEDIC SURGERY      carpal tunnel     [place] LAP,DIAGNOSTIC ABDOMEN N/A 8/22/2019    Procedure: DIAGNOSTIC LAPAROSCOPY, LYSIS OF ADHESION;  Surgeon: Svetlana Ron MD;  Location: [facility];  Service: General     RELEASE CARPAL TUNNEL       STRIP VEIN       TRANSARTHISCOPIC SURGERY[      LEFT KNEE     US THYROID BIOPSY  4/19/2019     VEIN LIGATION AND STRIPPING Bilateral      Current Outpatient Medications   Medication Sig Dispense Refill     AmLODIPine Besylate (NORVASC PO) Take 10 mg by mouth daily       atorvastatin (LIPITOR) 20 MG tablet atorvastatin 20 mg tablet   TAKE 1 TABLET BY MOUTH EVERY DAY       buPROPion (WELLBUTRIN XL) 150 MG 24 hr tablet Take 150 mg by mouth       calcium-vitamin D (CALTRATE) 600-400 MG-UNIT per tablet Take 1 tablet by mouth 2 times daily       CITALOPRAM HYDROBROMIDE  PO Take 30 mg by mouth daily       eletriptan (RELPAX) 40 MG tablet Take 1 tablet (40 mg) by mouth at onset of headache for migraine 10 tablet 0     furosemide (LASIX) 20 MG tablet Take 1 tablet (20 mg) by mouth 2 times daily (Patient taking differently: Take 40 mg by mouth daily ) 60 tablet 3     HYDROcodone-acetaminophen (NORCO)  MG per tablet Take 1-2 tablets by mouth every 4 hours as needed 60 tablet 1     levothyroxine (SYNTHROID/LEVOTHROID) 88 MCG tablet TAKE 1 TABLET BY MOUTH DAILY AT 6:00 AM. 90 tablet 2     LORAZEPAM PO Take 0.5 mg by mouth every 8 hours as needed Uses about 2 times a week       pramipexole (MIRAPEX) 0.25 MG tablet TAKE 1 TABLET (0.25 MG TOTAL) BY MOUTH 3 (THREE) TIMES A DAY. 270 tablet 1     propranolol (INDERAL) 10 MG tablet TAKE 2 TABLETS (20 MG TOTAL) BY MOUTH 2 (TWO) TIMES A DAY. 180 tablet 3     Ranitidine HCl (ZANTAC PO) Take 150 mg by mouth 2 times daily       Valsartan (DIOVAN PO) Take 160 mg by mouth daily       aspirin 325 MG tablet Take 1 tablet (325 mg) by mouth 2 times daily (Patient not taking: Reported on 8/19/2021) 20 tablet 0     diazepam (VALIUM) 2 MG tablet Take 1.5-2.5 tablets (3-5 mg) by mouth every 6 hours as needed (spasms) (Patient not taking: Reported on 8/19/2021) 30 tablet 0     HYDROcodone-acetaminophen (VICODIN) 5-500 MG per tablet Take 1-2 tablets by mouth every 6 hours as needed Normally takes 1 tab once a day       ORDER FOR DME Equipment being ordered: Walker Wheels () and Walker ()  Treatment Diagnosis: TKA 1 each 0     ORDER FOR DME Equipment being ordered: Walker Wheels () and Walker ()  Treatment Diagnosis: Gait instability 1 each 0     Prenatal Vit-Fe Fumarate-FA (PRENATAL MULTIVITAMIN  PLUS IRON) 27-0.8 MG TABS Take 1 tablet by mouth daily (Patient not taking: Reported on 8/19/2021)       senna-docusate (SENOKOT-S;PERICOLACE) 8.6-50 MG per tablet Take 1-2 tablets by mouth 2 times daily (Patient not taking: Reported on  "8/19/2021) 30 tablet 0     urea (CARMOL) 10 % external cream APPLY TOPICALLY TWO TIMES A  g 0       Allergies   Allergen Reactions     Ace Inhibitors Other (See Comments)     Elevates blood pressure     Amitriptyline Hcl Other (See Comments)     elevates blood pressure     Atenolol Other (See Comments)     Aggravates reynauds     Celebrex [Celecoxib] GI Disturbance     Clonidine Unknown     \"got very ill in ER\"     Codeine Sulfate Nausea and Vomiting     Darvocet [Propoxyphene N-Apap] Nausea and Vomiting     Dexamethasone Acetate Swelling     Erythromycin Nausea and Vomiting     Hctz Unknown     \"depletes my sodium\"     Penicillins Swelling     Tramadol Swelling     Swelling of tongue and face     Venlafaxine Nausea and Vomiting and Diarrhea     Zolpidem Tartrate Unknown     Sulfanilamide Rash        Social History     Tobacco Use     Smoking status: Never Smoker     Smokeless tobacco: Never Used   Substance Use Topics     Alcohol use: Yes     Comment: Alcoholic Drinks/day: 1 cocktail daily       History   Drug Use No         Objective     Wt 78.9 kg (174 lb)   BMI 32.88 kg/m      Physical Exam  Constitutional:  oriented to person, place, and time, appears well-nourished. No distress.   HENT:   Head: Normocephalic.   Mouth/Throat: Oropharynx is clear and moist.   Eyes: Conjunctivae are normal. Pupils are equal, round, and reactive to light.   Neck: Normal range of motion. Neck supple.   Cardiovascular: Normal rate, regular rhythm and normal heart sounds.    Pulmonary/Chest: Effort normal and breath sounds normal.   Abdominal: Soft. Bowel sounds are normal.   Musculoskeletal: Normal range of motion.   Neurological: alert and oriented to person, place, and time. Skin: Skin is warm.   Psychiatric: normal mood and affect.    Recent Labs   Lab Test 07/07/21  0919 06/10/21  1454 07/31/20  0755   HGB  --  12.1 12.9   PLT  --  290 346    139 137   POTASSIUM 4.1 4.0 4.4   CR 0.78 0.75  0.75 0.89    "     Diagnostics:  Labs pending at this time.  Results will be reviewed when available.   EKG: per my review appears normal, NSR, normal axis, normal intervals, no acute ST/T changes c/w ischemia, no LVH by voltage criteria, unchanged from previous tracings  No change compared to 8/5/2020.               Signed Electronically by: Bernadette Taylor MD  Copy of this evaluation report is provided to requesting physician.

## 2021-08-24 LAB
ATRIAL RATE - MUSE: 59 BPM
DIASTOLIC BLOOD PRESSURE - MUSE: NORMAL MMHG
INTERPRETATION ECG - MUSE: NORMAL
P AXIS - MUSE: 27 DEGREES
PR INTERVAL - MUSE: 136 MS
QRS DURATION - MUSE: 88 MS
QT - MUSE: 428 MS
QTC - MUSE: 423 MS
R AXIS - MUSE: 22 DEGREES
SYSTOLIC BLOOD PRESSURE - MUSE: NORMAL MMHG
T AXIS - MUSE: 35 DEGREES
VENTRICULAR RATE- MUSE: 59 BPM

## 2021-09-11 DIAGNOSIS — E78.5 HYPERLIPIDEMIA, UNSPECIFIED: ICD-10-CM

## 2021-09-12 RX ORDER — ATORVASTATIN CALCIUM 20 MG/1
TABLET, FILM COATED ORAL
Qty: 90 TABLET | Refills: 2 | Status: SHIPPED | OUTPATIENT
Start: 2021-09-12 | End: 2022-06-09

## 2021-09-12 NOTE — TELEPHONE ENCOUNTER
"   Disp Refills Start End RICARDO   atorvastatin (LIPITOR) 20 MG tablet 90 tablet 3 8/5/2020  No   Sig - Route: Take 1 tablet (20 mg total) by mouth at bedtime. - Oral   Sent to pharmacy as: atorvastatin 20 mg tablet (LIPITOR)   E-Prescribing Status: Receipt confirmed by pharmacy (8/5/2020  1:39 PM CDT)       Last Written Prescription Date:  8/5/20  Last Fill Quantity: 90,  # refills: 3   Last office visit provider:  8/19/21     Requested Prescriptions   Pending Prescriptions Disp Refills     atorvastatin (LIPITOR) 20 MG tablet [Pharmacy Med Name: ATORVASTATIN 20 MG TABLET] 90 tablet 3     Sig: TAKE 1 TABLET BY MOUTH EVERYDAY AT BEDTIME       Statins Protocol Passed - 9/11/2021 10:39 AM        Passed - LDL on file in past 12 months     Recent Labs   Lab Test 06/10/21  1454   LDL 70             Passed - No abnormal creatine kinase in past 12 months     No lab results found.             Passed - Recent (12 mo) or future (30 days) visit within the authorizing provider's specialty     Patient has had an office visit with the authorizing provider or a provider within the authorizing providers department within the previous 12 mos or has a future within next 30 days. See \"Patient Info\" tab in inbasket, or \"Choose Columns\" in Meds & Orders section of the refill encounter.              Passed - Medication is active on med list        Passed - Patient is age 18 or older        Passed - No active pregnancy on record        Passed - No positive pregnancy test in past 12 months             Bruce Nicholas RN 09/12/21 8:31 AM  "

## 2021-09-25 ENCOUNTER — HEALTH MAINTENANCE LETTER (OUTPATIENT)
Age: 71
End: 2021-09-25

## 2021-10-13 ENCOUNTER — MEDICAL CORRESPONDENCE (OUTPATIENT)
Dept: HEALTH INFORMATION MANAGEMENT | Facility: CLINIC | Age: 71
End: 2021-10-13
Payer: MEDICARE

## 2021-10-20 ENCOUNTER — HOSPITAL ENCOUNTER (EMERGENCY)
Facility: CLINIC | Age: 71
Discharge: HOME | End: 2021-10-20
Attending: STUDENT IN AN ORGANIZED HEALTH CARE EDUCATION/TRAINING PROGRAM | Admitting: STUDENT IN AN ORGANIZED HEALTH CARE EDUCATION/TRAINING PROGRAM
Payer: MEDICARE

## 2021-10-20 ENCOUNTER — APPOINTMENT (OUTPATIENT)
Dept: CT IMAGING | Facility: CLINIC | Age: 71
End: 2021-10-20
Attending: STUDENT IN AN ORGANIZED HEALTH CARE EDUCATION/TRAINING PROGRAM
Payer: MEDICARE

## 2021-10-20 ENCOUNTER — OFFICE VISIT (OUTPATIENT)
Dept: FAMILY MEDICINE | Facility: CLINIC | Age: 71
End: 2021-10-20
Payer: MEDICARE

## 2021-10-20 VITALS
SYSTOLIC BLOOD PRESSURE: 118 MMHG | WEIGHT: 171 LBS | OXYGEN SATURATION: 97 % | HEART RATE: 66 BPM | BODY MASS INDEX: 32.31 KG/M2 | TEMPERATURE: 98.5 F | RESPIRATION RATE: 16 BRPM | DIASTOLIC BLOOD PRESSURE: 77 MMHG

## 2021-10-20 VITALS
RESPIRATION RATE: 16 BRPM | BODY MASS INDEX: 31.47 KG/M2 | HEART RATE: 63 BPM | TEMPERATURE: 98.2 F | HEIGHT: 62 IN | SYSTOLIC BLOOD PRESSURE: 133 MMHG | OXYGEN SATURATION: 95 % | DIASTOLIC BLOOD PRESSURE: 63 MMHG | WEIGHT: 171 LBS

## 2021-10-20 DIAGNOSIS — G43.009 MIGRAINE WITHOUT AURA AND WITHOUT STATUS MIGRAINOSUS, NOT INTRACTABLE: ICD-10-CM

## 2021-10-20 DIAGNOSIS — G43.711 INTRACTABLE CHRONIC MIGRAINE WITHOUT AURA AND WITH STATUS MIGRAINOSUS: Primary | ICD-10-CM

## 2021-10-20 PROCEDURE — 99285 EMERGENCY DEPT VISIT HI MDM: CPT | Mod: 25

## 2021-10-20 PROCEDURE — 96361 HYDRATE IV INFUSION ADD-ON: CPT

## 2021-10-20 PROCEDURE — 96375 TX/PRO/DX INJ NEW DRUG ADDON: CPT

## 2021-10-20 PROCEDURE — 250N000011 HC RX IP 250 OP 636: Performed by: STUDENT IN AN ORGANIZED HEALTH CARE EDUCATION/TRAINING PROGRAM

## 2021-10-20 PROCEDURE — 258N000003 HC RX IP 258 OP 636: Performed by: STUDENT IN AN ORGANIZED HEALTH CARE EDUCATION/TRAINING PROGRAM

## 2021-10-20 PROCEDURE — 99214 OFFICE O/P EST MOD 30 MIN: CPT | Performed by: PHYSICIAN ASSISTANT

## 2021-10-20 PROCEDURE — 70450 CT HEAD/BRAIN W/O DYE: CPT

## 2021-10-20 PROCEDURE — 250N000013 HC RX MED GY IP 250 OP 250 PS 637: Performed by: STUDENT IN AN ORGANIZED HEALTH CARE EDUCATION/TRAINING PROGRAM

## 2021-10-20 PROCEDURE — 96365 THER/PROPH/DIAG IV INF INIT: CPT

## 2021-10-20 RX ORDER — AMLODIPINE BESYLATE 10 MG/1
10 TABLET ORAL
COMMUNITY
Start: 2021-07-04 | End: 2021-12-07

## 2021-10-20 RX ORDER — MAGNESIUM SULFATE HEPTAHYDRATE 40 MG/ML
2 INJECTION, SOLUTION INTRAVENOUS ONCE
Status: COMPLETED | OUTPATIENT
Start: 2021-10-20 | End: 2021-10-20

## 2021-10-20 RX ORDER — TRIAMCINOLONE ACETONIDE 1 MG/G
CREAM TOPICAL
COMMUNITY
Start: 2021-07-07 | End: 2021-12-07

## 2021-10-20 RX ORDER — LORAZEPAM 1 MG/1
1 TABLET ORAL ONCE
Status: COMPLETED | OUTPATIENT
Start: 2021-10-20 | End: 2021-10-20

## 2021-10-20 RX ORDER — KETOROLAC TROMETHAMINE 15 MG/ML
15 INJECTION, SOLUTION INTRAMUSCULAR; INTRAVENOUS ONCE
Status: COMPLETED | OUTPATIENT
Start: 2021-10-20 | End: 2021-10-20

## 2021-10-20 RX ORDER — DIPHENHYDRAMINE HYDROCHLORIDE 50 MG/ML
50 INJECTION INTRAMUSCULAR; INTRAVENOUS ONCE
Status: COMPLETED | OUTPATIENT
Start: 2021-10-20 | End: 2021-10-20

## 2021-10-20 RX ORDER — RIZATRIPTAN BENZOATE 10 MG/1
TABLET ORAL
COMMUNITY
Start: 2021-10-07 | End: 2021-12-07

## 2021-10-20 RX ORDER — HYDROXYZINE HYDROCHLORIDE 25 MG/1
25 TABLET, FILM COATED ORAL
COMMUNITY
Start: 2021-02-09 | End: 2021-12-07

## 2021-10-20 RX ORDER — METOCLOPRAMIDE HYDROCHLORIDE 5 MG/ML
10 INJECTION INTRAMUSCULAR; INTRAVENOUS ONCE
Status: COMPLETED | OUTPATIENT
Start: 2021-10-20 | End: 2021-10-20

## 2021-10-20 RX ORDER — CITALOPRAM HYDROBROMIDE 20 MG/1
TABLET ORAL
COMMUNITY
Start: 2021-10-08 | End: 2021-12-07

## 2021-10-20 RX ORDER — HYDROCODONE BITARTRATE AND ACETAMINOPHEN 5; 325 MG/1; MG/1
1 TABLET ORAL EVERY 6 HOURS PRN
COMMUNITY
Start: 2021-09-16 | End: 2022-04-19

## 2021-10-20 RX ORDER — TRAZODONE HYDROCHLORIDE 50 MG/1
150 TABLET, FILM COATED ORAL AT BEDTIME
COMMUNITY
Start: 2021-06-07 | End: 2022-04-13

## 2021-10-20 RX ORDER — BUSPIRONE HYDROCHLORIDE 15 MG/1
TABLET ORAL
COMMUNITY
Start: 2021-05-12 | End: 2021-12-09

## 2021-10-20 RX ORDER — LORAZEPAM 1 MG/1
TABLET ORAL
COMMUNITY
Start: 2021-04-15 | End: 2022-03-07

## 2021-10-20 RX ADMIN — DIPHENHYDRAMINE HYDROCHLORIDE 50 MG: 50 INJECTION INTRAMUSCULAR; INTRAVENOUS at 18:52

## 2021-10-20 RX ADMIN — LORAZEPAM 1 MG: 1 TABLET ORAL at 20:04

## 2021-10-20 RX ADMIN — SODIUM CHLORIDE, POTASSIUM CHLORIDE, SODIUM LACTATE AND CALCIUM CHLORIDE 1000 ML: 600; 310; 30; 20 INJECTION, SOLUTION INTRAVENOUS at 18:56

## 2021-10-20 RX ADMIN — METOCLOPRAMIDE HYDROCHLORIDE 10 MG: 5 INJECTION INTRAMUSCULAR; INTRAVENOUS at 18:54

## 2021-10-20 RX ADMIN — KETOROLAC TROMETHAMINE 15 MG: 15 INJECTION, SOLUTION INTRAMUSCULAR; INTRAVENOUS at 18:49

## 2021-10-20 RX ADMIN — MAGNESIUM SULFATE IN WATER 2 G: 40 INJECTION, SOLUTION INTRAVENOUS at 18:56

## 2021-10-20 ASSESSMENT — MIFFLIN-ST. JEOR: SCORE: 1233.9

## 2021-10-20 NOTE — ED PROVIDER NOTES
"  Emergency Department Encounter         FINAL IMPRESSION:  Migraine        ED COURSE AND MEDICAL DECISION MAKING     6:30 PM I introduced myself to the patient, performed my physical exam, and discussed plan for ED workup.  7:58 PM The patient is requesting some medication for her restless legs. We will give Ativan.  9:03 PM I rechecked the patient. She is feeling better. Her migraine has resolved. We discussed the plan for discharge and the patient is agreeable. Reviewed supportive cares, symptomatic treatment, outpatient follow up, and reasons to return to the Emergency Department. Patient to be discharged by ED RN.     ED Course as of Oct 20 2106   Wed Oct 20, 2021   1837 Patient is a 71-year-old female history of longstanding headaches and migraines for the past 40 years here with 12 days of a migraine.  Patient was seen by her neurologist clinic earlier today and was given \"medicines and infusions\" through the IV which did not abort the headache.  Sent sent to urgent care who then sent her here for evaluation.  Denies any fevers, chills, nausea vomiting.  States this headache feels exactly the same as her previous migraines and the only thing different is that it is lasting longer than normal.  Denies any neck stiffness.  No vertigo.  No difficulty speaking walking or any weakness in her upper or lower extremities.  No vision changes.  She does have a formal history of cluster headaches as well and states that her right eye has been watery and red.  The headache is on the right side of her body.  No falls or trauma.  No sudden onset thunderclap component.  No chest pain or trouble breathing.  Abdominal pain.  Mild nausea.  No vomiting.  Arrival she looks well peer vitals are stable.  Heart and lungs normal.  NIH of 0.  Patient has normal sensation.  The right eye does appear to have mild ptosis which she states is chronic from a remote eye surgery.  The right eye is red and watery.  The eye itself is not " "painful and I have low suspicion of this is glaucoma or something along that line.  Her otherwise examination is unremarkable.  Plan for fluids, migraine cocktail, magnesium, high flow oxygen, CT head and reevaluate                   -Patient feeling better after migraine cocktail and oxygen.  I suspect migraine versus cluster headache.                    At the conclusion of the encounter I discussed the results of all the tests and the disposition. The questions were answered. The patient or family acknowledged understanding and was agreeable with the care plan.                  MEDICATIONS GIVEN IN THE EMERGENCY DEPARTMENT:  Medications   metoclopramide (REGLAN) injection 10 mg (10 mg Intravenous Given 10/20/21 1854)   diphenhydrAMINE (BENADRYL) injection 50 mg (50 mg Intravenous Given 10/20/21 1852)   ketorolac (TORADOL) injection 15 mg (15 mg Intravenous Given 10/20/21 1849)   magnesium sulfate 2 g in water intermittent infusion (0 g Intravenous Stopped 10/20/21 2006)   lactated ringers BOLUS 1,000 mL (1,000 mLs Intravenous New Bag 10/20/21 1856)   LORazepam (ATIVAN) tablet 1 mg (1 mg Oral Given 10/20/21 2004)       NEW PRESCRIPTIONS STARTED AT TODAY'S ED VISIT:  New Prescriptions    No medications on file       HPI     Patient information obtained from: Patient    Use of Interpretor: N/A     Suma Mark is a 71 year old female with a pertinent history of migraines and cluster headaches who presents to this ED by walk in after referral from neurology for evaluation of migraine.    The patient reports she has had an ongoing headache for he past 12 days. The headache feels similar to her previous migraines but nothing seems to break the headache. Her headache feels like \"somebody cracked my head.\" The headache is mostly on the right side of her head. With the headache she also has sensitivity to light and nausea. She was given an infusion of IV fluids and a couple medications today but these did not break " her headache. She called her neurologist who recommended she come to the ED for evaluation. She reports no recent falls. She has not had any difficulty with speech, difficulty walking, or been dropping things. She does state her right eye is red, droopy, and watery which is abnormal for her. She does chronically have a slight squint to her right eye after a previous procedure, but her eye droop is new. She denies any fever, neck pain, vomiting, chest pain, shortness of breath, or any other complaints at this time.    REVIEW OF SYSTEMS:  Review of Systems   Constitutional: Negative for fever, malaise  HEENT: Negative runny nose, sore throat, ear pain, neck pain. Positive for photophobia, eye droop and redness (right).  Respiratory: Negative for shortness of breath, cough, congestion  Cardiovascular: Negative for chest pain, leg edema  Gastrointestinal: Negative for abdominal distention, abdominal pain, constipation, vomiting, diarrhea. Positive for nausea.  Genitourinary: Negative for dysuria and hematuria.   Integument: Negative for rash, skin breakdown  Neurological: Negative for paresthesias, weakness. Positive for headache.  Musculoskeletal: Negative for joint pain, joint swelling      All other systems reviewed and are negative.          MEDICAL HISTORY     Past Medical History:   Diagnosis Date     Acquired hypothyroidism 3/12/2009     Anxiety      Anxiety state, unspecified      Back pain      Breast cyst      Carpal tunnel syndrome      Chronic pain      Degenerative disc disease, lumbar      Depression      Depression, unspecified depression type      Diarrhea      Disease of thyroid gland      Diverticulosis 10/4/2017     DJD (degenerative joint disease)      Essential hypertension 4/21/2015     Essential hypertension, benign      Fatty liver      Fibromyalgia      Gastro-oesophageal reflux disease      Gastroesophageal reflux disease without esophagitis 4/21/2015     GERD (gastroesophageal reflux disease)       Hernia, ventral 4/27/2017     History of anesthesia complications      History of blood clots      History of blood transfusion      History of transfusion      Hyperlipidemia      Hypertension      Insomnia, unspecified type      Left lower quadrant pain      Lumbago      Lung nodules      Major depression      Migraine      Osteoarthritis      Osteopenia      Osteoporosis      Other abnormal glucose      Other and unspecified special symptom or syndrome, not elsewhere classified      PONV (postoperative nausea and vomiting)      Posttraumatic stress disorder      Raynaud's disease      Restless leg syndrome      S/P total knee replacement 11/27/2017     Spontaneous ecchymoses      Thrombosis of leg      Unspecified episodic mood disorder      Unspecified hypothyroidism      Unspecified vitamin D deficiency      Varicose veins      Ventral hernia      Vitamin D deficiency        Past Surgical History:   Procedure Laterality Date     ARTHROCENTESIS      LEFT HIP TROCHANTERIC BURSA     ARTHROPLASTY KNEE UNICOMPARTMENT  10/31/2013    Procedure: ARTHROPLASTY KNEE UNICOMPARTMENT;  LEFT KNEE UNICOMPARTMENTAL ARTHROPLASTY (CAROLINE)^;  Surgeon: Chi Mittal MD;  Location:  OR     ARTICULAR DEBRIEDEMENT[      LEFT KNEE     BACK SURGERY       BREAST CYST EXCISION       BREAST SURGERY      bx     C TOTAL KNEE ARTHROPLASTY Right 11/27/2017    Procedure:  RIGHT TOTAL KNEE ARTHROPLASTY;  Surgeon: Kevin Ryder MD;  Location: [facility] Main OR;  Service: Orthopedics     COLECTOMY N/A 6/2/2017    Procedure: RESECTION, SMALL INTESTINE, OPEN ADHESIOLYSIS;  Surgeon: Keyon Qureshi MD;  Location: [facility] Main OR;  Service:      COLON SURGERY       ENDOSCOPIC RETROGRADE CHOLANGIOPANCREATOGRAPHY       ENDOSCOPIC STRIPPING VEIN(S)      BILATERLY     GENITOURINARY SURGERY      bladder sling     GI SURGERY      hemorrhoidectomy     HEMORRHOID SURGERY       HERNIORRHAPHY INCISIONAL (LOCATION) N/A 6/2/2017    Procedure:  "LAPARASCOPIC CONVERTED TO OPEN PRIMARY INCISIONAL HERNIA REPAIR;  Surgeon: Keyon Qureshi MD;  Location: [facility] OR;  Service:      HYSTERECTOMY  [id number]     HYSTERECTOMY VAGINAL       INCONTINENCE SURGERY       LAMINECTOMY LUMBAR TWO LEVELS      2003     LUMBAR FUSION       LUMBAR HARDWARE REMOVAL[       ORTHOPEDIC SURGERY      carpal tunnel     MT LAP,DIAGNOSTIC ABDOMEN N/A 8/22/2019    Procedure: DIAGNOSTIC LAPAROSCOPY, LYSIS OF ADHESION;  Surgeon: Svetlana Ron MD;  Location: [facility] OR;  Service: General     RELEASE CARPAL TUNNEL       STRIP VEIN       TRANSARTHISCOPIC SURGERY[      LEFT KNEE     US THYROID BIOPSY  4/19/2019     VEIN LIGATION AND STRIPPING Bilateral        Social History     Tobacco Use     Smoking status: Never Smoker     Smokeless tobacco: Never Used   Substance Use Topics     Alcohol use: Yes     Comment: Alcoholic Drinks/day: 1 cocktail daily     Drug use: No       amLODIPine (NORVASC) 10 MG tablet  AmLODIPine Besylate (NORVASC PO)  atorvastatin (LIPITOR) 20 MG tablet  botulinum toxin type A (BOTOX) 100 units injection  buPROPion (WELLBUTRIN XL) 150 MG 24 hr tablet  busPIRone (BUSPAR) 15 MG tablet  calcium-vitamin D (CALTRATE) 600-400 MG-UNIT per tablet  cholecalciferol 50 MCG (2000 UT) CAPS  citalopram (CELEXA) 20 MG tablet  CITALOPRAM HYDROBROMIDE PO  eletriptan (RELPAX) 40 MG tablet  furosemide (LASIX) 20 MG tablet  HYDROcodone-acetaminophen (NORCO) 5-325 MG tablet  hydrOXYzine (ATARAX) 25 MG tablet  irbesartan (AVAPRO) 300 MG tablet  levothyroxine (SYNTHROID/LEVOTHROID) 88 MCG tablet  LORazepam (ATIVAN) 1 MG tablet  LORAZEPAM PO  pramipexole (MIRAPEX) 0.25 MG tablet  propranolol (INDERAL) 10 MG tablet  rizatriptan (MAXALT) 10 MG tablet  traZODone (DESYREL) 50 MG tablet  triamcinolone (KENALOG) 0.1 % external cream  urea (CARMOL) 10 % external cream            PHYSICAL EXAM     /63   Pulse 63   Temp 98.2  F (36.8  C) (Oral)   Resp 16   Ht 1.575 m (5' 2\")   " Wt 77.6 kg (171 lb)   SpO2 95%   BMI 31.28 kg/m        PHYSICAL EXAM:     General: Patient appears well, nontoxic, comfortable  HEENT: Moist mucous membranes, no tongue swelling.  No head trauma.  No midline neck pain.  Right eye is teary as well as watery.  Normal extraocular eye motion.  Chronic right eye ptosis.  Cardiovascular: Normal rate, normal rhythm, no extremity edema.  No appreciable murmur.  Respiratory: No signs of respiratory distress, lungs are clear to auscultation bilaterally with no wheezes rhonchi or rales.  Abdominal: Soft, nontender, nondistended, no palpable masses, no guarding, no rebound  Musculoskeletal: Full range of motion of joints, no deformities appreciated.  Neurological: Alert and oriented, +5 strength UE/LE, normal finger to nose, , gross sensation intact throughout, CN II-12 intact grossly, no difficulty with ambulation, no slurring of words, no word finding difficulty    Psychological: Normal affect and mood.  Integument: No rashes appreciated          RESULTS       Labs Ordered and Resulted from Time of ED Arrival Up to the Time of Departure from the ED - No data to display    Head CT w/o contrast   Final Result   IMPRESSION:   1.  No acute intracranial process.                        PROCEDURES:  Procedures:  Procedures       I, Den Schneider am serving as a scribe to document services personally performed by Paul Valadez DO, based on my observations and the provider's statements to me.  I, Paul Valadez DO, attest that Den Schneider is acting in a scribe capacity, has observed my performance of the services and has documented them in accordance with my direction.    Paul Valadez DO  Emergency Medicine  [facility] EMERGENCY ROOM     Paul Valadez DO  10/20/21 8329

## 2021-10-20 NOTE — ED PROVIDER NOTES
Expected Patient Referral to ED  3:10 PM    Referring Clinic/Provider:  [facility]    Reason for referral/Clinical facts:  Hx migraines and fibromyalgia  Having a migraine today  Sees neurology  Tried her triptan, no improvement    Recommendations provided:  Send to ED for eval     Caller was informed that this institution does  possess the capabilities and/or resources to provide for patient and should be transferred to our institution.    Based on the information provided, discussed that this patient likely is not a good candidate for direct admission to this institution and that provider could proceed as such.  If however direct admit is sought and any hurdles encountered, this ED would be happy to see the patient and evaluate.    Informed caller that recommendations provided are recommendations based only on the facts provided and that they responsible to accept or reject the advice, or to seek a formal in person consultation as needed and that this ED will see/treat patient should they arrive.      Sumanth Glynn MD  Emergency Medicine  [facility] EMERGENCY ROOM  [id number] [id number] [phone]     Sumanth Glynn MD  10/20/21 6129

## 2021-10-20 NOTE — ED TRIAGE NOTES
12 day history migraine. Has taken prescription oral medications, ibuprofen. Seen in [facility] neuro clinic and given IVF. Nothing seems to break migraine. Same symptoms as usual but just lasting longer. Called neurologist and was told to go to .  They briefly examined her and sent here for IV and possible scan

## 2021-10-20 NOTE — PROGRESS NOTES
Assessment & Plan:      Problem List Items Addressed This Visit        Nervous and Auditory    Migraine - Primary    Relevant Medications    amLODIPine (NORVASC) 10 MG tablet    HYDROcodone-acetaminophen (NORCO) 5-325 MG tablet    rizatriptan (MAXALT) 10 MG tablet    traZODone (DESYREL) 50 MG tablet    citalopram (CELEXA) 20 MG tablet        Medical Decision Making  Patient with history of migraines presents with intractable headache for 12 days and worsening right eye drooping.  Concern for possible central lesion versus intracranial hemorrhage given worsening headache and neurological changes.  Recommend patient be seen immediately in the emergency room for further evaluation likely requiring CT of the head versus treatment of intractable migraine.  Called [facility] ED and discussed patient case and reason for transfer.  Patient's vital signs are stable here at the clinic and she will leave by private vehicle.     Subjective:      Suma Mark is a 71 year old female here for evaluation of ongoing headache for 12 days.  Symptoms will come and go but never completely resolves.  Patient was initially seen by neurology on 10/8 and was given an infusion.  She noted some improvement at that time, but not completely resolved.  Headache has been gradually worsening.  She notes pain in the back of the neck that then extends along the right side of the forehead.  Patient has history of migraines.  She tried her rizatriptan medications with no relief.  She states that she normally gets migraines that last maybe 3 days at a time.  She has never had a migraine that has lasted this long.  She also notes worsening drooping of the right eye compared to left.  She did always have history of some drooping, but never this severe.  She denies weakness in the extremities and denies slurred speech.  Patient called back neurology today and they told her to be seen immediately in the urgent care for read in the emergency room if  "needed.     The following portions of the patient's history were reviewed and updated as appropriate: allergies, current medications, and problem list.     Review of Systems  Pertinent items are noted in HPI.    Allergies  Allergies   Allergen Reactions     Cephalexin Nausea and Vomiting     Ace Inhibitors Other (See Comments)     Elevates blood pressure     Amitriptyline Hcl Other (See Comments)     elevates blood pressure     Atenolol Other (See Comments)     Aggravates reynauds     Celebrex [Celecoxib] GI Disturbance     Cephalosporins Unknown     Other reaction(s): *Unknown  HUT Comment: Pt doesn't remember   Pt doesn't remember        Clonidine Unknown     \"got very ill in ER\"     Codeine Sulfate Nausea and Vomiting     Darvocet [Propoxyphene N-Apap] Nausea and Vomiting     Dexamethasone Acetate Swelling     Erythromycin Nausea and Vomiting     Escitalopram Other (See Comments)     Other reaction(s): Headache  Headaches.       Gabapentin Unknown     \"Spotted\"     Hctz Unknown     \"depletes my sodium\"     Penicillins Swelling     Propoxyphene      Other reaction(s): Gastrointestinal  HUT Reaction: Gastrointestinal; HUT Reaction: Nausea And Vomiting; HUT Noted: [id number]     Tramadol Swelling     Swelling of tongue and face     Triamterene      Ultracet Other (See Comments)     Venlafaxine Nausea and Vomiting and Diarrhea     Zolpidem Other (See Comments) and Unknown     Other reaction(s): *Unknown  HUT Comment: Pt doesn't rember  Pt doesn't rember       Zolpidem Tartrate Unknown     Bacitracin Itching and Rash     Sulfanilamide Rash       Family History   Problem Relation Age of Onset     Dementia Mother      Arthritis Mother      Chronic Obstructive Pulmonary Disease Father      Cardiovascular Father      Hypertension Father      No Known Problems Sister      No Known Problems Daughter      No Known Problems Son      Cerebrovascular Disease Maternal Grandmother      Heart Disease Paternal Grandmother      " Cerebrovascular Disease Paternal Grandmother      No Known Problems Sister      No Known Problems Sister      No Known Problems Son      No Known Problems Daughter      Breast Cancer Paternal Aunt         age in 50's       Social History     Tobacco Use     Smoking status: Never Smoker     Smokeless tobacco: Never Used   Substance Use Topics     Alcohol use: Yes     Comment: Alcoholic Drinks/day: 1 cocktail daily        Objective:      /77 (BP Location: Right arm, Patient Position: Sitting, Cuff Size: Adult Regular)   Pulse 66   Temp 98.5  F (36.9  C) (Oral)   Resp 16   Wt 77.6 kg (171 lb)   SpO2 97%   BMI 32.31 kg/m    General appearance - alert, well appearing, and in no distress and non-toxic  Eyes - Slightly delayed blinking of the right eyelid and slight drooping of the right upper eyelid compared to left  Ears - bilateral TM's and external ear canals normal  Nose - normal and patent, no erythema, discharge or polyps  Mouth - mucous membranes moist, pharynx normal without lesions  Neck - supple, no significant adenopathy  Chest - clear to auscultation, no wheezes, rales or rhonchi, symmetric air entry  Heart - normal rate, regular rhythm, normal S1, S2, no murmurs, rubs, clicks or gallops     Lab & Imaging Results    No results found for this or any previous visit (from the past 24 hour(s)).    I personally reviewed these results and discussed findings with the patient.    The use of [employer] services was used to construct the content of this note; any grammatical errors are non-intentional. Please contact the author directly if you are in need of any clarification.

## 2021-10-21 ENCOUNTER — TRANSFERRED RECORDS (OUTPATIENT)
Dept: HEALTH INFORMATION MANAGEMENT | Facility: CLINIC | Age: 71
End: 2021-10-21
Payer: MEDICARE

## 2021-10-21 NOTE — ED NOTES
Patient returned from CT. Pain is 5/10. Requesting medication for restless legs. Provider informed

## 2021-10-21 NOTE — DISCHARGE INSTRUCTIONS
Please push fluids tomorrow.  Take your normal home medications.    Return to the ER for any worsening or persisting symptoms.

## 2021-10-23 ENCOUNTER — HOSPITAL ENCOUNTER (EMERGENCY)
Facility: CLINIC | Age: 71
Discharge: HOME | End: 2021-10-23
Attending: EMERGENCY MEDICINE | Admitting: EMERGENCY MEDICINE
Payer: MEDICARE

## 2021-10-23 ENCOUNTER — NURSE TRIAGE (OUTPATIENT)
Dept: NURSING | Facility: CLINIC | Age: 71
End: 2021-10-23

## 2021-10-23 VITALS
OXYGEN SATURATION: 100 % | BODY MASS INDEX: 31.47 KG/M2 | TEMPERATURE: 98.1 F | RESPIRATION RATE: 16 BRPM | HEART RATE: 75 BPM | SYSTOLIC BLOOD PRESSURE: 137 MMHG | HEIGHT: 62 IN | WEIGHT: 171 LBS | DIASTOLIC BLOOD PRESSURE: 65 MMHG

## 2021-10-23 DIAGNOSIS — R51.9 ACUTE NONINTRACTABLE HEADACHE, UNSPECIFIED HEADACHE TYPE: ICD-10-CM

## 2021-10-23 PROCEDURE — 96374 THER/PROPH/DIAG INJ IV PUSH: CPT

## 2021-10-23 PROCEDURE — 250N000011 HC RX IP 250 OP 636: Performed by: EMERGENCY MEDICINE

## 2021-10-23 PROCEDURE — 258N000003 HC RX IP 258 OP 636: Performed by: EMERGENCY MEDICINE

## 2021-10-23 PROCEDURE — 96361 HYDRATE IV INFUSION ADD-ON: CPT

## 2021-10-23 PROCEDURE — 96375 TX/PRO/DX INJ NEW DRUG ADDON: CPT

## 2021-10-23 PROCEDURE — 99284 EMERGENCY DEPT VISIT MOD MDM: CPT | Mod: 25

## 2021-10-23 RX ORDER — DIAZEPAM 10 MG/2ML
2.5 INJECTION, SOLUTION INTRAMUSCULAR; INTRAVENOUS ONCE
Status: COMPLETED | OUTPATIENT
Start: 2021-10-23 | End: 2021-10-23

## 2021-10-23 RX ORDER — ONDANSETRON 4 MG/1
4 TABLET, ORALLY DISINTEGRATING ORAL EVERY 8 HOURS PRN
Qty: 12 TABLET | Refills: 0 | Status: SHIPPED | OUTPATIENT
Start: 2021-10-23 | End: 2021-10-26

## 2021-10-23 RX ORDER — METHYLPREDNISOLONE SODIUM SUCCINATE 125 MG/2ML
125 INJECTION, POWDER, LYOPHILIZED, FOR SOLUTION INTRAMUSCULAR; INTRAVENOUS ONCE
Status: COMPLETED | OUTPATIENT
Start: 2021-10-23 | End: 2021-10-23

## 2021-10-23 RX ORDER — DIPHENHYDRAMINE HYDROCHLORIDE 50 MG/ML
25 INJECTION INTRAMUSCULAR; INTRAVENOUS ONCE
Status: COMPLETED | OUTPATIENT
Start: 2021-10-23 | End: 2021-10-23

## 2021-10-23 RX ORDER — KETOROLAC TROMETHAMINE 15 MG/ML
15 INJECTION, SOLUTION INTRAMUSCULAR; INTRAVENOUS ONCE
Status: COMPLETED | OUTPATIENT
Start: 2021-10-23 | End: 2021-10-23

## 2021-10-23 RX ORDER — METOCLOPRAMIDE HYDROCHLORIDE 5 MG/ML
10 INJECTION INTRAMUSCULAR; INTRAVENOUS ONCE
Status: COMPLETED | OUTPATIENT
Start: 2021-10-23 | End: 2021-10-23

## 2021-10-23 RX ADMIN — METHYLPREDNISOLONE SODIUM SUCCINATE 125 MG: 125 INJECTION, POWDER, FOR SOLUTION INTRAMUSCULAR; INTRAVENOUS at 17:25

## 2021-10-23 RX ADMIN — SODIUM CHLORIDE 500 ML: 9 INJECTION, SOLUTION INTRAVENOUS at 17:25

## 2021-10-23 RX ADMIN — DIPHENHYDRAMINE HYDROCHLORIDE 25 MG: 50 INJECTION INTRAMUSCULAR; INTRAVENOUS at 17:22

## 2021-10-23 RX ADMIN — METOCLOPRAMIDE HYDROCHLORIDE 10 MG: 5 INJECTION INTRAMUSCULAR; INTRAVENOUS at 17:29

## 2021-10-23 RX ADMIN — KETOROLAC TROMETHAMINE 15 MG: 15 INJECTION, SOLUTION INTRAMUSCULAR; INTRAVENOUS at 17:23

## 2021-10-23 RX ADMIN — DIAZEPAM 2.5 MG: 5 INJECTION, SOLUTION INTRAMUSCULAR; INTRAVENOUS at 18:33

## 2021-10-23 ASSESSMENT — ENCOUNTER SYMPTOMS
HEADACHES: 1
SPEECH DIFFICULTY: 0
NECK PAIN: 0
TROUBLE SWALLOWING: 0
RHINORRHEA: 1
EYE DISCHARGE: 1
FEVER: 0

## 2021-10-23 ASSESSMENT — MIFFLIN-ST. JEOR: SCORE: 1233.9

## 2021-10-23 NOTE — ED PROVIDER NOTES
EMERGENCY DEPARTMENT ENCOUNTER      NAME: Suma Mark  AGE: 71 year old female  YOB: 1950  MRN: [id number]  EVALUATION DATE & TIME: 10/23/2021  4:20 PM    PCP: Bernadette Taylor    ED PROVIDER: Brent Baptiste D.O.      Chief Complaint   Patient presents with     Headache         FINAL IMPRESSION:  1. Acute nonintractable headache, unspecified headache type          ED COURSE & MEDICAL DECISION MAKING:    Pertinent Labs & Imaging studies reviewed. (See chart for details)  71 year old female presents to the Emergency Department for evaluation of headache.  Patient is a history of migraine headaches, was seen 2 days ago for similar presentation.  Patient at that time had CT head as well as migraine cocktail which improved the headache.  States that she is here today because she is having rebound of her headache.  No red flags for her headache.  Do not feel that repeat imaging is necessary at this time, no neurologic deficits on examination.  I do discuss potentially doing lumbar puncture however after migraine cocktail her headache resolved and the sepsis were gone.  Suspect potential component of a cluster headache as she was having pain behind her eyes as well as tearing and runny nose, she was placed on oxygen which should improve her headache.  Patient not having any pain over her temples.  Patient does have a neurologist at [facility] whom she can follow-up with    4:24 PM I met with the patient to gather history and to perform my initial exam. I discussed the plan for care while in the Emergency Department. PPE (protective eyewear, gloves, surgical cap, and N95 mask) was worn by me during patient encounters while patient wore mask.   5:42 PM I rechecked and updated the patient. Her headache is improving, but now she endorses restless legs.  7:15 PM I reassessed the patient. Headache is resolved. We discussed the plan for discharge and the patient is agreeable. Reviewed supportive cares,  symptomatic treatment, outpatient follow up, and reasons to return to the Emergency Department. Patient to be discharged by ED RN.      At the conclusion of the encounter I discussed the results of all of the tests and the disposition. The questions were answered. The patient or family acknowledged understanding and was agreeable with the care plan.       0 minutes of critical care time     MEDICATIONS GIVEN IN THE EMERGENCY:  Medications   metoclopramide (REGLAN) injection 10 mg (10 mg Intravenous Given 10/23/21 1729)   diphenhydrAMINE (BENADRYL) injection 25 mg (25 mg Intravenous Given 10/23/21 1722)   ketorolac (TORADOL) injection 15 mg (15 mg Intravenous Given 10/23/21 1723)   methylPREDNISolone sodium succinate (solu-MEDROL) injection 125 mg (125 mg Intravenous Given 10/23/21 1725)   0.9% sodium chloride BOLUS (0 mLs Intravenous Stopped 10/23/21 1830)   diazepam (VALIUM) injection 2.5 mg (2.5 mg Intravenous Given 10/23/21 1833)       NEW PRESCRIPTIONS STARTED AT TODAY'S ER VISIT  New Prescriptions    No medications on file          =================================================================    HPI    Patient information was obtained from: Patient    Use of : N/A       Suma Mark is a 71 year old female with a pertinent history of migraine, chronic back pain, HLD, anxiety, who presents to this ED by private car for evaluation of headache.     Per chart review, patient presented to this ER on 10/20 (3 days ago) for evaluation of a headache. She was seen by her neurologist clinic earlier in the day who gave IV medication without relief. They sent the patient to urgent care, who sent her to the ER. Her migraine felt similar to previous migraines. CT of the head was unremarkable. Patient was given IV fluids, magnesium and a migraine cocktail. She felt improved after these medications and was discharged.    Patient reports her migraine she presented to the ER for 3 days ago returned. She went  to sleep after returning home from the ER and woke up the next morning with her headache again. Patient notes it was previously on the right side of her head, but notes today it is on the left side. She notes when the pain becomes more significant, her eye shelton and she has rhinorrhea. She locates the pain to her forehead and notes it occasionally moves. Patient frequently gets migraines and note her current ones is similar to previous, though it is much longer in duration. She was recently switched to a new migraine medication. Denies jaw pain, speech difficulty, swallowing difficulty, head trauma, fever, neck pain, or any additional symptoms at this time.     REVIEW OF SYSTEMS   Review of Systems   Constitutional: Negative for fever.   HENT: Positive for rhinorrhea. Negative for trouble swallowing.         Negative for head trauma, jaw pain.   Eyes: Positive for discharge.   Musculoskeletal: Negative for neck pain.   Neurological: Positive for headaches. Negative for speech difficulty.   All other systems reviewed and are negative.       PAST MEDICAL HISTORY:  Past Medical History:   Diagnosis Date     Acquired hypothyroidism 3/12/2009     Anxiety      Anxiety state, unspecified      Back pain      Breast cyst      Carpal tunnel syndrome      Chronic pain     LOW BACK     Degenerative disc disease, lumbar      Depression      Depression, unspecified depression type      Diarrhea      Disease of thyroid gland     hypothyroid     Diverticulosis 10/4/2017    Incidental finding on colonoscopy 10/2017     DJD (degenerative joint disease)      Essential hypertension 4/21/2015     Essential hypertension, benign      Fatty liver      Fibromyalgia      Gastro-oesophageal reflux disease      Gastroesophageal reflux disease without esophagitis 4/21/2015     GERD (gastroesophageal reflux disease)      Hernia, ventral 4/27/2017     History of anesthesia complications     hypotension after surgery     History of blood clots       History of blood transfusion      History of transfusion      Hyperlipidemia      Hypertension      Insomnia, unspecified type      Left lower quadrant pain      Lumbago      Lung nodules     INTERMITTENT     Major depression      Migraine      Osteoarthritis      Osteopenia      Osteoporosis      Other abnormal glucose      Other and unspecified special symptom or syndrome, not elsewhere classified      PONV (postoperative nausea and vomiting)      Posttraumatic stress disorder      Raynaud's disease      Restless leg syndrome      S/P total knee replacement 11/27/2017     Spontaneous ecchymoses      Thrombosis of leg     with pregnancy     Unspecified episodic mood disorder     AFFECTIVE PSYCHOSIS     Unspecified hypothyroidism      Unspecified vitamin D deficiency      Varicose veins      Ventral hernia      Vitamin D deficiency        PAST SURGICAL HISTORY:  Past Surgical History:   Procedure Laterality Date     ARTHROCENTESIS      LEFT HIP TROCHANTERIC BURSA     ARTHROPLASTY KNEE UNICOMPARTMENT  10/31/2013    Procedure: ARTHROPLASTY KNEE UNICOMPARTMENT;  LEFT KNEE UNICOMPARTMENTAL ARTHROPLASTY (CAROLINE)^;  Surgeon: Chi Mittal MD;  Location:  OR     ARTICULAR DEBRIEDEMENT[      LEFT KNEE     BACK SURGERY       BREAST CYST EXCISION       BREAST SURGERY      bx     C TOTAL KNEE ARTHROPLASTY Right 11/27/2017    Procedure:  RIGHT TOTAL KNEE ARTHROPLASTY;  Surgeon: Kevin Ryder MD;  Location: [facility];  Service: Orthopedics     COLECTOMY N/A 6/2/2017    Procedure: RESECTION, SMALL INTESTINE, OPEN ADHESIOLYSIS;  Surgeon: Keyon Qureshi MD;  Location: [facility] OR;  Service:      COLON SURGERY       ENDOSCOPIC RETROGRADE CHOLANGIOPANCREATOGRAPHY       ENDOSCOPIC STRIPPING VEIN(S)      BILATERLY     GENITOURINARY SURGERY      bladder sling     GI SURGERY      hemorrhoidectomy     HEMORRHOID SURGERY       HERNIORRHAPHY INCISIONAL (LOCATION) N/A 6/2/2017    Procedure: LAPARASCOPIC  CONVERTED TO OPEN PRIMARY INCISIONAL HERNIA REPAIR;  Surgeon: Keyon Qureshi MD;  Location: [facility] OR;  Service:      HYSTERECTOMY  [id number]     HYSTERECTOMY VAGINAL       INCONTINENCE SURGERY       LAMINECTOMY LUMBAR TWO LEVELS      2003     LUMBAR FUSION       LUMBAR HARDWARE REMOVAL[       ORTHOPEDIC SURGERY      carpal tunnel     HI LAP,DIAGNOSTIC ABDOMEN N/A 8/22/2019    Procedure: DIAGNOSTIC LAPAROSCOPY, LYSIS OF ADHESION;  Surgeon: Svetlana Ron MD;  Location: [facility] OR;  Service: General     RELEASE CARPAL TUNNEL       STRIP VEIN       TRANSARTHISCOPIC SURGERY[      LEFT KNEE     US THYROID BIOPSY  4/19/2019     VEIN LIGATION AND STRIPPING Bilateral            CURRENT MEDICATIONS:    Current Facility-Administered Medications   Medication     denosumab (PROLIA) injection 60 mg     Current Outpatient Medications   Medication     amLODIPine (NORVASC) 10 MG tablet     AmLODIPine Besylate (NORVASC PO)     atorvastatin (LIPITOR) 20 MG tablet     botulinum toxin type A (BOTOX) 100 units injection     buPROPion (WELLBUTRIN XL) 150 MG 24 hr tablet     busPIRone (BUSPAR) 15 MG tablet     calcium-vitamin D (CALTRATE) 600-400 MG-UNIT per tablet     cholecalciferol 50 MCG (2000 UT) CAPS     citalopram (CELEXA) 20 MG tablet     CITALOPRAM HYDROBROMIDE PO     eletriptan (RELPAX) 40 MG tablet     furosemide (LASIX) 20 MG tablet     HYDROcodone-acetaminophen (NORCO) 5-325 MG tablet     hydrOXYzine (ATARAX) 25 MG tablet     irbesartan (AVAPRO) 300 MG tablet     levothyroxine (SYNTHROID/LEVOTHROID) 88 MCG tablet     LORazepam (ATIVAN) 1 MG tablet     LORAZEPAM PO     pramipexole (MIRAPEX) 0.25 MG tablet     propranolol (INDERAL) 10 MG tablet     rizatriptan (MAXALT) 10 MG tablet     traZODone (DESYREL) 50 MG tablet     triamcinolone (KENALOG) 0.1 % external cream     urea (CARMOL) 10 % external cream         ALLERGIES:  Allergies   Allergen Reactions     Cephalexin Nausea and Vomiting     Ace Inhibitors  "Other (See Comments)     Elevates blood pressure     Amitriptyline Hcl Other (See Comments)     elevates blood pressure     Atenolol Other (See Comments)     Aggravates reynauds     Celebrex [Celecoxib] GI Disturbance     Cephalosporins Unknown     Other reaction(s): *Unknown  HUT Comment: Pt doesn't remember   Pt doesn't remember        Clonidine Unknown     \"got very ill in ER\"     Codeine Sulfate Nausea and Vomiting     Darvocet [Propoxyphene N-Apap] Nausea and Vomiting     Dexamethasone Acetate Swelling     Erythromycin Nausea and Vomiting     Escitalopram Other (See Comments)     Other reaction(s): Headache  Headaches.       Gabapentin Unknown     \"Spotted\"     Hctz Unknown     \"depletes my sodium\"     Penicillins Swelling     Propoxyphene      Other reaction(s): Gastrointestinal  HUT Reaction: Gastrointestinal; HUT Reaction: Nausea And Vomiting; HUT Noted: [id number]     Tramadol Swelling     Swelling of tongue and face     Triamterene      Ultracet Other (See Comments)     Venlafaxine Nausea and Vomiting and Diarrhea     Zolpidem Other (See Comments) and Unknown     Other reaction(s): *Unknown  HUT Comment: Pt doesn't rember  Pt doesn't rember       Zolpidem Tartrate Unknown     Bacitracin Itching and Rash     Sulfanilamide Rash       FAMILY HISTORY:  Family History   Problem Relation Age of Onset     Dementia Mother      Arthritis Mother      Chronic Obstructive Pulmonary Disease Father      Cardiovascular Father      Hypertension Father      No Known Problems Sister      No Known Problems Daughter      No Known Problems Son      Cerebrovascular Disease Maternal Grandmother      Heart Disease Paternal Grandmother      Cerebrovascular Disease Paternal Grandmother      No Known Problems Sister      No Known Problems Sister      No Known Problems Son      No Known Problems Daughter      Breast Cancer Paternal Aunt         age in 50's       SOCIAL HISTORY:   Social History     Socioeconomic History     Marital " "status:      Spouse name: None     Number of children: None     Years of education: None     Highest education level: None   Occupational History     None   Tobacco Use     Smoking status: Never Smoker     Smokeless tobacco: Never Used   Substance and Sexual Activity     Alcohol use: Yes     Comment: Alcoholic Drinks/day: 1 cocktail daily     Drug use: No     Sexual activity: None   Other Topics Concern     None   Social History Narrative     None     Social Determinants of Health     Financial Resource Strain:      Difficulty of Paying Living Expenses:    Food Insecurity:      Worried About Running Out of Food in the Last Year:      Ran Out of Food in the Last Year:    Transportation Needs:      Lack of Transportation (Medical):      Lack of Transportation (Non-Medical):    Physical Activity:      Days of Exercise per Week:      Minutes of Exercise per Session:    Stress:      Feeling of Stress :    Social Connections:      Frequency of Communication with Friends and Family:      Frequency of Social Gatherings with Friends and Family:      Attends Hindu Services:      Active Member of Clubs or Organizations:      Attends Club or Organization Meetings:      Marital Status:    Intimate Partner Violence:      Fear of Current or Ex-Partner:      Emotionally Abused:      Physically Abused:      Sexually Abused:        VITALS:  BP (!) 151/70   Pulse 79   Temp 98.1  F (36.7  C)   Resp 16   Ht 1.575 m (5' 2\")   Wt 77.6 kg (171 lb)   SpO2 100%   BMI 31.28 kg/m      PHYSICAL EXAM    Physical Exam  Vitals and nursing note reviewed.   Constitutional:       General: She is not in acute distress.     Appearance: Normal appearance. She is not ill-appearing.      Comments: Appears mildly uncomfortable.   HENT:      Head: Normocephalic and atraumatic.      Right Ear: External ear normal.      Left Ear: External ear normal.      Nose: Nose normal.      Mouth/Throat:      Mouth: Mucous membranes are moist.   Eyes: "      Extraocular Movements: Extraocular movements intact.      Pupils: Pupils are equal, round, and reactive to light.   Cardiovascular:      Rate and Rhythm: Normal rate and regular rhythm.   Pulmonary:      Effort: Pulmonary effort is normal. No respiratory distress.      Breath sounds: Normal breath sounds. No stridor. No wheezing.   Abdominal:      General: There is no distension.      Palpations: Abdomen is soft. There is no mass.      Tenderness: There is no abdominal tenderness. There is no guarding or rebound.      Hernia: No hernia is present.   Musculoskeletal:         General: No swelling, tenderness or signs of injury. Normal range of motion.      Cervical back: Normal range of motion.   Skin:     General: Skin is warm and dry.      Capillary Refill: Capillary refill takes less than 2 seconds.   Neurological:      General: No focal deficit present.      Mental Status: She is alert and oriented to person, place, and time.      Cranial Nerves: No cranial nerve deficit.      Motor: No weakness.      Coordination: Coordination normal.      Gait: Gait normal.   Psychiatric:         Mood and Affect: Mood normal.          LAB:  All pertinent labs reviewed and interpreted.  Labs Ordered and Resulted from Time of ED Arrival Up to the Time of Departure from the ED - No data to display    RADIOLOGY:  Reviewed all pertinent imaging. Please see official radiology report.  No orders to display             I, Yuly Bradford, am serving as a scribe to document services personally performed by Dr. Brent Baptiste based on my observation and the provider's statements to me. I, Brent Baptiste, DO attest that Yuly Bradford is acting in a scribe capacity, has observed my performance of the services and has documented them in accordance with my direction.    Brent Baptiste D.O.  Emergency Medicine  [facility] EMERGENCY ROOM  [address]  [id number]  [phone]  Dept: [phone]     Brent Baptiste DO  10/23/21 1919

## 2021-10-23 NOTE — ED TRIAGE NOTES
Patient is here with a migraine headache for 14 days. She was here on Wednesday. She was told to come back if it continued.

## 2021-10-23 NOTE — TELEPHONE ENCOUNTER
Lo currently has a migraine and has been present for 14 days and had an infusion on the 8th at the neurology clinic for migraine.  Last Wednesday, October 20th and called [facility] clinic and told her to go to urgent care and then sent her to ER and had a second infusion in ER.  Discharge papers instructed that if headache does not get better to return to ED.  FNA advised to return to ED and patient agreed.      COVID 19 Nurse Triage Plan/Patient Instructions    Please be aware that novel coronavirus (COVID-19) may be circulating in the community. If you develop symptoms such as fever, cough, or SOB or if you have concerns about the presence of another infection including coronavirus (COVID-19), please contact your health care provider or visit [email or web address].     Disposition/Instructions    ED Visit recommended. Follow protocol based instructions.     Bring Your Own Device:  Please also bring your smart device(s) (smart phones, tablets, laptops) and their charging cables for your personal use and to communicate with your care team during your visit.    Thank you for taking steps to prevent the spread of this virus.  o Limit your contact with others.  o Wear a simple mask to cover your cough.  o Wash your hands well and often.    Resources    [facility]: About COVID-19: [email or web address]    CDC: What to Do If You're Sick: www.cdc.gov/coronavirus/2019-ncov/about/steps-when-sick.html    CDC: Ending Home Isolation: www.cdc.gov/coronavirus/2019-ncov/hcp/disposition-in-home-patients.html     CDC: Caring for Someone: www.cdc.gov/coronavirus/2019-ncov/if-you-are-sick/care-for-someone.html     [facility]: Interim Guidance for Hospital Discharge to Home: www.health.[facility].mn.us/diseases/coronavirus/hcp/hospdischarge.pdf    [facility] clinical trials (COVID-19 research studies): [email or web address].[email or web address]/umn-clinical-trials     Below are the COVID-19 hotlines at the [facility]  "Health ([facility]). Interpreters are available.   o For health questions: Call 976-490-2423 or 1-285.751.1852 (7 a.m. to 7 p.m.)  o For questions about schools and childcare: Call 842-953-8267 or 1-869.238.5815 (7 a.m. to 7 p.m.)                       Reason for Disposition    Patient sounds very sick or weak to the triager    Additional Information    Negative: Difficult to awaken or acting confused (e.g., disoriented, slurred speech)    Negative: [1] Weakness of the face, arm or leg on one side of the body AND [2] new onset    Negative: [1] Numbness of the face, arm or leg on one side of the body AND [2] new onset    Negative: [1] Loss of speech or garbled speech AND [2] new onset    Negative: Passed out (i.e., lost consciousness, collapsed and was not responding)    Negative: Sounds like a life-threatening emergency to the triager    Negative: Unable to walk, or can only walk with assistance (e.g., requires support)    Negative: Stiff neck (can't touch chin to chest)    Negative: Severe pain in one eye    Negative: [1] Other family members (or roommates) with headaches AND [2] possibility of carbon monoxide exposure    Negative: [1] SEVERE headache (e.g., excruciating) AND [2] \"worst headache\" of life    Negative: [1] SEVERE headache AND [2] sudden-onset (i.e., reaching maximum intensity within seconds)    Negative: [1] SEVERE headache AND [2] fever    Negative: Loss of vision or double vision (Exception: same as prior migraines)    Negative: [1] Fever > 100.0 F (37.8 C) AND [2] diabetes mellitus or weak immune system (e.g., HIV positive, cancer chemo, splenectomy, organ transplant, chronic steroids)    Protocols used: HEADACHE-A-AH      "

## 2021-10-24 NOTE — DISCHARGE INSTRUCTIONS
Please schedule an appointment with your neurologist at [facility] for evaluation of your headaches.  If you have worsening symptoms or concerns return to the ER for reevaluation

## 2021-10-24 NOTE — ED NOTES
Pt hx of HA for past 14 days. Pt seen here on Wednesday for similar sx. Unable to manage HA at home.

## 2021-10-27 ENCOUNTER — TRANSFERRED RECORDS (OUTPATIENT)
Dept: HEALTH INFORMATION MANAGEMENT | Facility: CLINIC | Age: 71
End: 2021-10-27
Payer: MEDICARE

## 2021-10-28 ENCOUNTER — TRANSFERRED RECORDS (OUTPATIENT)
Dept: HEALTH INFORMATION MANAGEMENT | Facility: CLINIC | Age: 71
End: 2021-10-28
Payer: MEDICARE

## 2021-11-04 ENCOUNTER — MEDICAL CORRESPONDENCE (OUTPATIENT)
Dept: HEALTH INFORMATION MANAGEMENT | Facility: CLINIC | Age: 71
End: 2021-11-04
Payer: MEDICARE

## 2021-11-12 ENCOUNTER — TRANSFERRED RECORDS (OUTPATIENT)
Dept: HEALTH INFORMATION MANAGEMENT | Facility: CLINIC | Age: 71
End: 2021-11-12
Payer: MEDICARE

## 2021-12-07 ENCOUNTER — OFFICE VISIT (OUTPATIENT)
Dept: INTERNAL MEDICINE | Facility: CLINIC | Age: 71
End: 2021-12-07
Payer: MEDICARE

## 2021-12-07 VITALS
HEART RATE: 64 BPM | WEIGHT: 174.8 LBS | DIASTOLIC BLOOD PRESSURE: 60 MMHG | BODY MASS INDEX: 31.97 KG/M2 | OXYGEN SATURATION: 98 % | SYSTOLIC BLOOD PRESSURE: 100 MMHG

## 2021-12-07 DIAGNOSIS — M81.8 OTHER OSTEOPOROSIS WITHOUT CURRENT PATHOLOGICAL FRACTURE: Primary | ICD-10-CM

## 2021-12-07 DIAGNOSIS — G43.709 CHRONIC MIGRAINE WITHOUT AURA WITHOUT STATUS MIGRAINOSUS, NOT INTRACTABLE: ICD-10-CM

## 2021-12-07 DIAGNOSIS — M72.2 PLANTAR FASCIITIS: ICD-10-CM

## 2021-12-07 PROCEDURE — 96372 THER/PROPH/DIAG INJ SC/IM: CPT | Performed by: PHARMACIST

## 2021-12-07 PROCEDURE — 99214 OFFICE O/P EST MOD 30 MIN: CPT | Performed by: INTERNAL MEDICINE

## 2021-12-07 ASSESSMENT — PATIENT HEALTH QUESTIONNAIRE - PHQ9: 5. POOR APPETITE OR OVEREATING: SEVERAL DAYS

## 2021-12-07 ASSESSMENT — ANXIETY QUESTIONNAIRES
IF YOU CHECKED OFF ANY PROBLEMS ON THIS QUESTIONNAIRE, HOW DIFFICULT HAVE THESE PROBLEMS MADE IT FOR YOU TO DO YOUR WORK, TAKE CARE OF THINGS AT HOME, OR GET ALONG WITH OTHER PEOPLE: NOT DIFFICULT AT ALL
3. WORRYING TOO MUCH ABOUT DIFFERENT THINGS: NOT AT ALL
5. BEING SO RESTLESS THAT IT IS HARD TO SIT STILL: NOT AT ALL
1. FEELING NERVOUS, ANXIOUS, OR ON EDGE: SEVERAL DAYS
2. NOT BEING ABLE TO STOP OR CONTROL WORRYING: SEVERAL DAYS
7. FEELING AFRAID AS IF SOMETHING AWFUL MIGHT HAPPEN: NOT AT ALL
6. BECOMING EASILY ANNOYED OR IRRITABLE: NOT AT ALL
GAD7 TOTAL SCORE: 3

## 2021-12-07 NOTE — PATIENT INSTRUCTIONS
Prolia 7th today.  Prolia 8th in 6 months with your AWV.    DXA in 5/2023 .   Phone number to schedule [phone].    Daily calcium need is 2430-5000 mg a day from the diet and supplements.  Calcium citrate is easier to digest.  Vitamin D 2000 IU daily recommended.    Let me know if you are taking Buspar.  Maybe bupropion triggered the headache?????

## 2021-12-07 NOTE — PROGRESS NOTES
(M81.8) Other osteoporosis without current pathological fracture  (primary encounter diagnosis)      (G43.009) Chronic migraine without aura without status migrainosus, not intractable  Chronic headache since October, improved slightly, seeing Neurology at [facility], had Botox in August and Vypeti in November. She had Buspar and Bupropion prescribe in the last few months for depression and anxiety management. Seeing [facility]. Maybe bupropion triggered the headache????? She will discuss with Psychiatry.    (M72.2) Plantar fasciitis  Comment: right foot, worse in the morning. We discussed using the foot roller and home treatment and referral to Podiatrist if not better.      Patient was educated on safety of Prolia utilizing Patient Counseling Chart for Healthcare Providers, as outlined by the Prolia REMS progam.     Return in about 6 months (around 6/7/2022) for AWV, Prolia.    Patient Instructions   Prolia 7th today.  Prolia 8th in 6 months with your AWV.    DXA in 5/2023 .   Phone number to schedule [phone].    Daily calcium need is 9878-0342 mg a day from the diet and supplements.  Calcium citrate is easier to digest.  Vitamin D 2000 IU daily recommended.    Let me know if you are taking Buspar.  Maybe bupropion triggered the headache?????          /60   Pulse 64   Wt 79.3 kg (174 lb 12.8 oz)   SpO2 98%   BMI 31.97 kg/m        Did you experience any problems with previous Prolia injection? no  Any medication change in the last 6 months? no  Did you take prednisone or other immunosupressant drugs in the last 6 months   (chemo, transplant, rheum, dermatology conditions)? no  Did you have any serious infection in the last 6 months?no  Any recent hospitalizations?no  Do you plan any dental work in the next 2-3 months?no  How much calcium do you take daily from the diet and supplements?1200 mg  How much vit D do you take daily? 2000 IU  Last DXA? 5/2021, Reviewed and discussed      Patient is  here today for the7 th Prolia injection. Patient tolerated previous injections well.   We discussed calcium and vit D daily needs today.       We discussed high risk of rebound vertebral fractures when Prolia suddenly stopped.    Next Prolia injection will be in 6 months.           This note has been dictated using voice recognition software. Any grammatical or context distortions are unintentional and inherent to the software      Patient Active Problem List   Diagnosis     Total knee replacement status     Anxiety     Acquired hypothyroidism     Chronic back pain     Fibromyalgia     Depression, unspecified depression type     Diverticulosis     Essential hypertension     Gastroesophageal reflux disease without esophagitis     Insomnia, unspecified type     Migraine     Mixed hyperlipidemia     Other osteoporosis without current pathological fracture     Restless leg syndrome     Thyroid nodule       Current Outpatient Medications   Medication     atorvastatin (LIPITOR) 20 MG tablet     botulinum toxin type A (BOTOX) 100 units injection     buPROPion (WELLBUTRIN XL) 150 MG 24 hr tablet     busPIRone (BUSPAR) 15 MG tablet     calcium-vitamin D (CALTRATE) 600-400 MG-UNIT per tablet     cholecalciferol 50 MCG (2000 UT) CAPS     CITALOPRAM HYDROBROMIDE PO     eletriptan (RELPAX) 40 MG tablet     furosemide (LASIX) 20 MG tablet     HYDROcodone-acetaminophen (NORCO) 5-325 MG tablet     irbesartan (AVAPRO) 300 MG tablet     levothyroxine (SYNTHROID/LEVOTHROID) 88 MCG tablet     LORazepam (ATIVAN) 1 MG tablet     LORAZEPAM PO     pramipexole (MIRAPEX) 0.25 MG tablet     propranolol (INDERAL) 10 MG tablet     traZODone (DESYREL) 50 MG tablet     urea (CARMOL) 10 % external cream     AmLODIPine Besylate (NORVASC PO)     Current Facility-Administered Medications   Medication     denosumab (PROLIA) injection 60 mg

## 2021-12-08 ASSESSMENT — ANXIETY QUESTIONNAIRES: GAD7 TOTAL SCORE: 3

## 2021-12-08 ASSESSMENT — PATIENT HEALTH QUESTIONNAIRE - PHQ9: SUM OF ALL RESPONSES TO PHQ QUESTIONS 1-9: 2

## 2021-12-30 ENCOUNTER — E-VISIT (OUTPATIENT)
Dept: INTERNAL MEDICINE | Facility: CLINIC | Age: 71
End: 2021-12-30
Payer: MEDICARE

## 2021-12-30 DIAGNOSIS — Z20.822 SUSPECTED COVID-19 VIRUS INFECTION: Primary | ICD-10-CM

## 2021-12-30 PROCEDURE — 99207 PR NO CHARGE LOS: CPT | Performed by: INTERNAL MEDICINE

## 2021-12-30 NOTE — PATIENT INSTRUCTIONS
Lo,      Based on your responses, you may have coronavirus (COVID-19). This illness can cause fever, cough and trouble breathing. Many people get a mild case and get better on their own. Some people can get very sick.    Will I be tested for COVID-19?  We would like to test you for COVID-19 virus. I have placed orders for this test.     To schedule: go to your [employer] home page and scroll down to the section that says  You have an appointment that needs to be scheduled  and click the large green button that says  Schedule Now  and follow the steps to find the next available openings.    If you are unable to complete these [facility] scheduling steps, please call [phone] to schedule your testing.     Return to work/school/ guidance:  Please let your workplace manager and staffing office know when your isolation ends.       If you receive a positive COVID-19 test result, follow the guidance of the those who are giving you the results. Usually the return to work is 10 days from symptom onset or positive test date, (or in some cases 20 days if you are immunocompromised). If your symptoms started after your positive test, the 10 days should start when your symptoms started.   o If you work at [employer], you must also be cleared by Employee Occupational Health and Safety to return to work.      If you receive a negative COVID-19 test result and did not have a high risk exposure to someone with a known positive COVID-19 test, you can return to work once you're free of fever for 24 hours without fever-reducing medication and your symptoms are improving or resolved.    If you receive a negative COVID-19 test and had a high-risk exposure to someone who has tested positive for COVID-19 then you can return to work 14 days after your last contact with the positive individual. Follow quarantine guidance given by your doctor or public health officials.     Sign up for Get[employer] Loop:  We know it's scary to hear  that you might have COVID-19. We want to track your symptoms to make sure you're okay over the next 2 weeks. Please look for an email from [employer]--this is a free, online program that we'll use to keep in touch. To sign up, follow the link in the email you will receive. Learn more at [email or web address]    How can I take care of myself?    Over the counter medications may help with your symptoms like congestion, cough, chills, or fever.    There are not many effective prescription treatments for early COVID-19. Hydroxychloroquine, ivermectin, and azithromycin are not effective or recommended for COVID-19.    If your symptoms started in the last 10 days, you may be able to receive a treatment with monoclonal antibodies. This treatment can lower your risk of severe illness and going to the hospital. It is given through an IV or under your skin (subcutaneous) and must be given at an infusion center. You must be 12 or older, weight at least 88 pounds, and have a positive COVID-19 test.     If you would like to sign up to be considered to receive the monoclonal antibody medicine, please complete a participation form through the [facility] here: MNRAP ([email or web address]). You may also call the [facility] COVID-19 Public Hotline at 1-697.291.5952 (open Mon-Fri: 9am-7pm and Sat: 10am-6pm).     Not all people who are eligible will receive the medicine, since supply is limited. You will be contacted in the next 1 to 2 business days only if you are selected. If you do not receive a call, you have not been selected to receive the medicine. If you have any questions about this medication, please contact your primary care provider. For more information, see [email or web address]      Get lots of rest. Drink extra fluids (unless a doctor has told you not to)    Take Tylenol (acetaminophen) or ibuprofen for fever or  pain. If you have liver or kidney problems, ask your family doctor if it's okay to take Tylenol o ibuprofen    Take over the counter medications for your symptoms, as directed by your doctor. You may also talk to your pharmacist.      If you have other health problems (like cancer, heart failure, an organ transplant or severe kidney disease): Call your specialty clinic if you don't feel better in the next 2 days.    Know when to call 911. Emergency warning signs include:  o Trouble breathing or shortness of breath  o Pain or pressure in the chest that doesn't go away  o Feeling confused like you haven't felt before, or not being able to wake up  o Bluish-colored lips or face    Where can I get more information?     [facility] - About COVID-19: [email or web address]     CDC - What to Do If You're Sick:     www.cdc.gov/coronavirus/2019-ncov/about/steps-when-sick.html    CDC - Ending Home Isolation:  https://www.cdc.gov/coronavirus/2019-ncov/your-health/quarantine-isolation.html    CDC - Caring for Someone:  www.cdc.gov/coronavirus/2019-ncov/if-you-are-sick/care-for-someone.html    [facility] clinical trials (COVID-19 research studies): [email or web address].[facility]-clinical-trials    Below are the COVID-19 hotlines at the [facility] ([facility]). Interpreters are available.  o For health questions: Call 203-475-6758 or 1-644.673.6140 (7 a.m. to 7 p.m.)  o For questions about schools and childcare: Call 946-614-7789 or 1-867.820.1522 (7 a.m. to 7 p.m.)

## 2022-01-06 ENCOUNTER — LAB (OUTPATIENT)
Dept: FAMILY MEDICINE | Facility: CLINIC | Age: 72
End: 2022-01-06
Attending: INTERNAL MEDICINE
Payer: MEDICARE

## 2022-01-06 DIAGNOSIS — Z20.822 SUSPECTED COVID-19 VIRUS INFECTION: ICD-10-CM

## 2022-01-06 PROCEDURE — U0005 INFEC AGEN DETEC AMPLI PROBE: HCPCS

## 2022-01-06 PROCEDURE — U0003 INFECTIOUS AGENT DETECTION BY NUCLEIC ACID (DNA OR RNA); SEVERE ACUTE RESPIRATORY SYNDROME CORONAVIRUS 2 (SARS-COV-2) (CORONAVIRUS DISEASE [COVID-19]), AMPLIFIED PROBE TECHNIQUE, MAKING USE OF HIGH THROUGHPUT TECHNOLOGIES AS DESCRIBED BY CMS-2020-01-R: HCPCS

## 2022-01-07 LAB — SARS-COV-2 RNA RESP QL NAA+PROBE: NEGATIVE

## 2022-01-31 DIAGNOSIS — G25.81 RESTLESS LEGS SYNDROME: ICD-10-CM

## 2022-01-31 RX ORDER — PRAMIPEXOLE DIHYDROCHLORIDE 0.25 MG/1
TABLET ORAL
Qty: 270 TABLET | Refills: 1 | Status: SHIPPED | OUTPATIENT
Start: 2022-01-31 | End: 2022-07-28

## 2022-01-31 NOTE — TELEPHONE ENCOUNTER
Last OV 12/7/21    pramipexole (MIRAPEX) 0.25 MG tablet  1 ordered        Edit         Summary: TAKE 1 TABLET (0.25 MG TOTAL) BY MOUTH 3 (THREE) TIMES A DAY., Disp-270 tablet, R-1, E-Prescribe     Start: 7/26/2021    Ord/Sold: 7/26/2021 (O)      Report    Adh:     Taking:     Long-term:       Pharmacy: [place] [id number] [place] [address]    Med Dose History         Patient Sig: TAKE 1 TABLET (0.25 MG TOTAL) BY MOUTH 3 (THREE) TIMES A DAY.       Ordered on: 7/26/2021       Authorized by: ARABELLA JAMES       Dispense: 270 tablet

## 2022-02-03 ENCOUNTER — MYC MEDICAL ADVICE (OUTPATIENT)
Dept: INTERNAL MEDICINE | Facility: CLINIC | Age: 72
End: 2022-02-03
Payer: MEDICARE

## 2022-02-04 NOTE — TELEPHONE ENCOUNTER
I spoke with Dr. Daniel, and his recommendation is that patient should be seen in the Emergency room. Patient advised and is going now.  Vandana Jackson CMA ............... 9:38 AM, 02/04/22

## 2022-02-09 ENCOUNTER — OFFICE VISIT (OUTPATIENT)
Dept: FAMILY MEDICINE | Facility: CLINIC | Age: 72
End: 2022-02-09
Payer: MEDICARE

## 2022-02-09 ENCOUNTER — ANCILLARY PROCEDURE (OUTPATIENT)
Dept: GENERAL RADIOLOGY | Facility: CLINIC | Age: 72
End: 2022-02-09
Attending: NURSE PRACTITIONER
Payer: MEDICARE

## 2022-02-09 VITALS
WEIGHT: 178 LBS | OXYGEN SATURATION: 97 % | BODY MASS INDEX: 32.56 KG/M2 | HEART RATE: 65 BPM | DIASTOLIC BLOOD PRESSURE: 68 MMHG | SYSTOLIC BLOOD PRESSURE: 116 MMHG

## 2022-02-09 DIAGNOSIS — R07.89 CHEST TIGHTNESS: Primary | ICD-10-CM

## 2022-02-09 DIAGNOSIS — R07.89 CHEST TIGHTNESS: ICD-10-CM

## 2022-02-09 LAB
ATRIAL RATE - MUSE: 64 BPM
DIASTOLIC BLOOD PRESSURE - MUSE: NORMAL MMHG
ERYTHROCYTE [DISTWIDTH] IN BLOOD BY AUTOMATED COUNT: 12.3 % (ref 10–15)
HCT VFR BLD AUTO: 37 % (ref 35–47)
HGB BLD-MCNC: 12.3 G/DL (ref 11.7–15.7)
INTERPRETATION ECG - MUSE: NORMAL
MCH RBC QN AUTO: 29.9 PG (ref 26.5–33)
MCHC RBC AUTO-ENTMCNC: 33.2 G/DL (ref 31.5–36.5)
MCV RBC AUTO: 90 FL (ref 78–100)
P AXIS - MUSE: 25 DEGREES
PLATELET # BLD AUTO: 264 10E3/UL (ref 150–450)
PR INTERVAL - MUSE: 140 MS
QRS DURATION - MUSE: 90 MS
QT - MUSE: 402 MS
QTC - MUSE: 414 MS
R AXIS - MUSE: 28 DEGREES
RBC # BLD AUTO: 4.11 10E6/UL (ref 3.8–5.2)
SYSTOLIC BLOOD PRESSURE - MUSE: NORMAL MMHG
T AXIS - MUSE: 35 DEGREES
TROPONIN I SERPL-MCNC: <0.01 NG/ML (ref 0–0.29)
VENTRICULAR RATE- MUSE: 64 BPM
WBC # BLD AUTO: 6.3 10E3/UL (ref 4–11)

## 2022-02-09 PROCEDURE — 85027 COMPLETE CBC AUTOMATED: CPT | Performed by: NURSE PRACTITIONER

## 2022-02-09 PROCEDURE — 99214 OFFICE O/P EST MOD 30 MIN: CPT | Performed by: NURSE PRACTITIONER

## 2022-02-09 PROCEDURE — 93010 ELECTROCARDIOGRAM REPORT: CPT | Mod: OFF | Performed by: INTERNAL MEDICINE

## 2022-02-09 PROCEDURE — 93005 ELECTROCARDIOGRAM TRACING: CPT | Performed by: NURSE PRACTITIONER

## 2022-02-09 PROCEDURE — 36415 COLL VENOUS BLD VENIPUNCTURE: CPT | Performed by: NURSE PRACTITIONER

## 2022-02-09 PROCEDURE — 84484 ASSAY OF TROPONIN QUANT: CPT | Performed by: NURSE PRACTITIONER

## 2022-02-09 PROCEDURE — 71046 X-RAY EXAM CHEST 2 VIEWS: CPT | Mod: TC | Performed by: RADIOLOGY

## 2022-02-09 RX ORDER — AMLODIPINE BESYLATE 5 MG/1
5 TABLET ORAL DAILY
COMMUNITY
Start: 2022-01-04 | End: 2022-04-11

## 2022-02-09 ASSESSMENT — PATIENT HEALTH QUESTIONNAIRE - PHQ9: 5. POOR APPETITE OR OVEREATING: SEVERAL DAYS

## 2022-02-09 ASSESSMENT — ANXIETY QUESTIONNAIRES
2. NOT BEING ABLE TO STOP OR CONTROL WORRYING: NOT AT ALL
GAD7 TOTAL SCORE: 2
5. BEING SO RESTLESS THAT IT IS HARD TO SIT STILL: NOT AT ALL
1. FEELING NERVOUS, ANXIOUS, OR ON EDGE: SEVERAL DAYS
7. FEELING AFRAID AS IF SOMETHING AWFUL MIGHT HAPPEN: NOT AT ALL
6. BECOMING EASILY ANNOYED OR IRRITABLE: NOT AT ALL
IF YOU CHECKED OFF ANY PROBLEMS ON THIS QUESTIONNAIRE, HOW DIFFICULT HAVE THESE PROBLEMS MADE IT FOR YOU TO DO YOUR WORK, TAKE CARE OF THINGS AT HOME, OR GET ALONG WITH OTHER PEOPLE: NOT DIFFICULT AT ALL
3. WORRYING TOO MUCH ABOUT DIFFERENT THINGS: NOT AT ALL

## 2022-02-09 NOTE — PROGRESS NOTES
"  Assessment & Plan     Chest tightness  EKG personally interpreted as normal sinus rhythm with no ST changes today.    Vital signs normal.    Reassuring physical exam.    She had a CT scan done in 2019 revealing prominent coronary artery calcifications.  For this reason, she had a stress test ordered which was unremarkable.    Today, she describes significant tightness extending across the left side of her chest and wrapping around to her back.  It radiates down her left arm.  Pain rated at 8 out of 10 in intensity.  I think is reasonable to proceed with repeat stress test at this time.    Most going to check a chest x-ray and troponin.    - EKG 12-lead, tracing only  - NM Lexiscan stress test; Future  - Troponin I; Future  - XR Chest 2 Views; Future  - CBC with platelets; Future  - Troponin I  - CBC with platelets    Patient Instructions   EKG is normal from today.    Chest x-ray.    Blood work.    Physical exam is reassuring.    Vital signs are reassuring.    You had a normal stress test in 2019.  We should probably get another stress test ordered based on your symptoms.  Please call [phone] to schedule.    If your work-up is normal, your symptoms may be due to acid reflux.  In that case, I would recommend omeprazole 20 mg every morning for 2 weeks      Review of external notes as documented elsewhere in note  Ordering of each unique test  16 minutes spent on the date of the encounter doing chart review, history and exam, documentation and further activities per the note     BMI:   Estimated body mass index is 32.56 kg/m  as calculated from the following:    Height as of 10/23/21: 1.575 m (5' 2\").    Weight as of this encounter: 80.7 kg (178 lb).   Weight management plan: Discussed healthy diet and exercise guidelines    See Patient Instructions    Return in about 3 months (around 5/9/2022) for Follow up.    Bret Caro, [facility]    Halina Blanchard is a 71 year " old who presents for the following health issues     HPI     Has had significant chest tightness for the last 2 weeks.  Symptoms occur every day, typically 3-4 times per day for about 20 minutes.    Not accompanied by any breathing difficulties or shortness of breath/dyspnea on exertion.    No cough or hemoptysis.    Symptoms seem to radiate across her chest to her back as well as radiate down her left arm.    She does not smoke.  2019 CT scan of her chest reveals multiple coronary artery calcifications.  She had a stress test at that time which was unremarkable.    Has not noticed any swelling in her lower extremities.  No orthopnea.    Review of Systems   Constitutional, HEENT, cardiovascular, pulmonary, gi and gu systems are negative, except as otherwise noted.      Objective    /68 (BP Location: Right arm, Patient Position: Sitting, Cuff Size: Adult Regular)   Pulse 65   Wt 80.7 kg (178 lb)   SpO2 97%   BMI 32.56 kg/m    Body mass index is 32.56 kg/m .  Physical Exam   GENERAL: healthy, alert and no distress  NECK: no adenopathy, no asymmetry, masses, or scars and thyroid normal to palpation  RESP: lungs clear to auscultation - no rales, rhonchi or wheezes  CV: regular rate and rhythm, normal S1 S2, no S3 or S4, no murmur, click or rub, no peripheral edema and peripheral pulses strong  ABDOMEN: soft, nontender, no hepatosplenomegaly, no masses and bowel sounds normal  MS: no gross musculoskeletal defects noted, no edema

## 2022-02-09 NOTE — PATIENT INSTRUCTIONS
EKG is normal from today.    Chest x-ray.    Blood work.    Physical exam is reassuring.    Vital signs are reassuring.    You had a normal stress test in 2019.  We should probably get another stress test ordered based on your symptoms.  Please call [phone] to schedule.    If your work-up is normal, your symptoms may be due to acid reflux.  In that case, I would recommend omeprazole 20 mg every morning for 2 weeks

## 2022-02-10 ASSESSMENT — ANXIETY QUESTIONNAIRES: GAD7 TOTAL SCORE: 2

## 2022-02-11 ENCOUNTER — MEDICAL CORRESPONDENCE (OUTPATIENT)
Dept: HEALTH INFORMATION MANAGEMENT | Facility: CLINIC | Age: 72
End: 2022-02-11
Payer: MEDICARE

## 2022-02-13 DIAGNOSIS — I87.2 VENOUS INSUFFICIENCY OF BOTH LOWER EXTREMITIES: ICD-10-CM

## 2022-02-14 ENCOUNTER — HOSPITAL ENCOUNTER (OUTPATIENT)
Dept: NUCLEAR MEDICINE | Facility: HOSPITAL | Age: 72
End: 2022-02-14
Attending: NURSE PRACTITIONER
Payer: MEDICARE

## 2022-02-14 ENCOUNTER — HOSPITAL ENCOUNTER (OUTPATIENT)
Dept: CARDIOLOGY | Facility: HOSPITAL | Age: 72
End: 2022-02-14
Attending: NURSE PRACTITIONER
Payer: MEDICARE

## 2022-02-14 ENCOUNTER — TELEPHONE (OUTPATIENT)
Dept: FAMILY MEDICINE | Facility: CLINIC | Age: 72
End: 2022-02-14

## 2022-02-14 DIAGNOSIS — R07.89 CHEST TIGHTNESS: ICD-10-CM

## 2022-02-14 LAB
CV STRESS CURRENT BP HE: NORMAL
CV STRESS CURRENT HR HE: 102
CV STRESS CURRENT HR HE: 102
CV STRESS CURRENT HR HE: 111
CV STRESS CURRENT HR HE: 111
CV STRESS CURRENT HR HE: 75
CV STRESS CURRENT HR HE: 76
CV STRESS CURRENT HR HE: 77
CV STRESS CURRENT HR HE: 78
CV STRESS CURRENT HR HE: 79
CV STRESS CURRENT HR HE: 79
CV STRESS CURRENT HR HE: 80
CV STRESS CURRENT HR HE: 81
CV STRESS CURRENT HR HE: 84
CV STRESS CURRENT HR HE: 87
CV STRESS CURRENT HR HE: 91
CV STRESS CURRENT HR HE: 91
CV STRESS CURRENT HR HE: 95
CV STRESS CURRENT HR HE: 96
CV STRESS CURRENT HR HE: 96
CV STRESS DEVIATION TIME HE: NORMAL
CV STRESS ECHO PERCENT HR HE: NORMAL
CV STRESS EXERCISE STAGE HE: NORMAL
CV STRESS FINAL RESTING BP HE: NORMAL
CV STRESS FINAL RESTING HR HE: 77
CV STRESS MAX HR HE: 111
CV STRESS MAX TREADMILL GRADE HE: 0
CV STRESS MAX TREADMILL SPEED HE: 0
CV STRESS PEAK DIA BP HE: NORMAL
CV STRESS PEAK SYS BP HE: NORMAL
CV STRESS PHASE HE: NORMAL
CV STRESS PROTOCOL HE: NORMAL
CV STRESS RESTING PT POSITION HE: NORMAL
CV STRESS ST DEVIATION AMOUNT HE: NORMAL
CV STRESS ST DEVIATION ELEVATION HE: NORMAL
CV STRESS ST EVELATION AMOUNT HE: NORMAL
CV STRESS TEST TYPE HE: NORMAL
CV STRESS TOTAL STAGE TIME MIN 1 HE: NORMAL
RATE PRESSURE PRODUCT: NORMAL
STRESS ECHO BASELINE DIASTOLIC HE: 68
STRESS ECHO BASELINE HR: 72
STRESS ECHO BASELINE SYSTOLIC BP: 141
STRESS ECHO CALCULATED PERCENT HR: 76 %
STRESS ECHO LAST STRESS DIASTOLIC BP: 74
STRESS ECHO LAST STRESS HR: 96
STRESS ECHO LAST STRESS SYSTOLIC BP: 155
STRESS ECHO TARGET HR: 147

## 2022-02-14 PROCEDURE — 343N000001 HC RX 343: Performed by: NURSE PRACTITIONER

## 2022-02-14 PROCEDURE — 78452 HT MUSCLE IMAGE SPECT MULT: CPT | Mod: 26 | Performed by: GENERAL ACUTE CARE HOSPITAL

## 2022-02-14 PROCEDURE — A9500 TC99M SESTAMIBI: HCPCS | Performed by: NURSE PRACTITIONER

## 2022-02-14 PROCEDURE — 250N000011 HC RX IP 250 OP 636: Performed by: NURSE PRACTITIONER

## 2022-02-14 PROCEDURE — G1004 CDSM NDSC: HCPCS

## 2022-02-14 PROCEDURE — 93017 CV STRESS TEST TRACING ONLY: CPT | Performed by: INTERNAL MEDICINE

## 2022-02-14 PROCEDURE — G1004 CDSM NDSC: HCPCS | Performed by: GENERAL ACUTE CARE HOSPITAL

## 2022-02-14 PROCEDURE — 93016 CV STRESS TEST SUPVJ ONLY: CPT | Performed by: INTERNAL MEDICINE

## 2022-02-14 PROCEDURE — 93018 CV STRESS TEST I&R ONLY: CPT | Mod: MG | Performed by: GENERAL ACUTE CARE HOSPITAL

## 2022-02-14 RX ORDER — CAFFEINE CITRATE 20 MG/ML
60 SOLUTION INTRAVENOUS
Status: DISCONTINUED | OUTPATIENT
Start: 2022-02-14 | End: 2022-02-14 | Stop reason: HOSPADM

## 2022-02-14 RX ORDER — CAFFEINE 200 MG
200 TABLET ORAL
Status: DISCONTINUED | OUTPATIENT
Start: 2022-02-14 | End: 2022-02-14 | Stop reason: HOSPADM

## 2022-02-14 RX ORDER — REGADENOSON 0.08 MG/ML
0.4 INJECTION, SOLUTION INTRAVENOUS ONCE
Status: COMPLETED | OUTPATIENT
Start: 2022-02-14 | End: 2022-02-14

## 2022-02-14 RX ORDER — ALBUTEROL SULFATE 0.83 MG/ML
2.5 SOLUTION RESPIRATORY (INHALATION)
Status: DISCONTINUED | OUTPATIENT
Start: 2022-02-14 | End: 2022-02-14 | Stop reason: HOSPADM

## 2022-02-14 RX ORDER — AMINOPHYLLINE 25 MG/ML
50 INJECTION, SOLUTION INTRAVENOUS
Status: DISCONTINUED | OUTPATIENT
Start: 2022-02-14 | End: 2022-02-14 | Stop reason: HOSPADM

## 2022-02-14 RX ADMIN — AMINOPHYLLINE 50 MG: 25 INJECTION, SOLUTION INTRAVENOUS at 09:31

## 2022-02-14 RX ADMIN — Medication 30.5 MILLICURIE: at 10:20

## 2022-02-14 RX ADMIN — Medication 8.3 MILLICURIE: at 08:08

## 2022-02-14 RX ADMIN — REGADENOSON 0.4 MG: 0.08 INJECTION, SOLUTION INTRAVENOUS at 09:26

## 2022-02-15 NOTE — TELEPHONE ENCOUNTER
Please call the patient and let her know that her stress test was negative. Tell her that she should try twice daily pepcid OTC for the next week to see if that makes any difference in her symptoms.

## 2022-02-16 RX ORDER — FUROSEMIDE 20 MG
40 TABLET ORAL DAILY
Qty: 180 TABLET | Refills: 2 | Status: SHIPPED | OUTPATIENT
Start: 2022-02-16 | End: 2022-08-25

## 2022-02-16 NOTE — TELEPHONE ENCOUNTER
"Last Written Prescription Date:  8/19/21  Last Fill Quantity: 180,  # refills: 1   Last office visit provider:  2/9/22     Requested Prescriptions   Pending Prescriptions Disp Refills     furosemide (LASIX) 20 MG tablet [Pharmacy Med Name: FUROSEMIDE 20 MG TABLET] 180 tablet 1     Sig: TAKE 2 TABLETS (40 MG) BY MOUTH DAILY       Diuretics (Including Combos) Protocol Passed - 2/16/2022 10:23 AM        Passed - Blood pressure under 140/90 in past 12 months     BP Readings from Last 3 Encounters:   02/09/22 116/68   12/07/21 100/60   10/23/21 137/65                 Passed - Recent (12 mo) or future (30 days) visit within the authorizing provider's specialty     Patient has had an office visit with the authorizing provider or a provider within the authorizing providers department within the previous 12 mos or has a future within next 30 days. See \"Patient Info\" tab in inbasket, or \"Choose Columns\" in Meds & Orders section of the refill encounter.              Passed - Medication is active on med list        Passed - Patient is age 18 or older        Passed - No active pregancy on record        Passed - Normal serum creatinine on file in past 12 months     Recent Labs   Lab Test 08/19/21  1427   CR 0.84              Passed - Normal serum potassium on file in past 12 months     Recent Labs   Lab Test 08/19/21  1427   POTASSIUM 4.3                    Passed - Normal serum sodium on file in past 12 months     Recent Labs   Lab Test 08/19/21  1427                 Passed - No positive pregnancy test in past 12 months             Bruce Nicholas RN 02/16/22 10:23 AM  "

## 2022-02-17 ENCOUNTER — TRANSFERRED RECORDS (OUTPATIENT)
Dept: HEALTH INFORMATION MANAGEMENT | Facility: CLINIC | Age: 72
End: 2022-02-17
Payer: MEDICARE

## 2022-03-07 ENCOUNTER — OFFICE VISIT (OUTPATIENT)
Dept: FAMILY MEDICINE | Facility: CLINIC | Age: 72
End: 2022-03-07
Payer: MEDICARE

## 2022-03-07 VITALS
TEMPERATURE: 98.1 F | RESPIRATION RATE: 12 BRPM | HEART RATE: 60 BPM | BODY MASS INDEX: 33.27 KG/M2 | SYSTOLIC BLOOD PRESSURE: 112 MMHG | WEIGHT: 180.8 LBS | HEIGHT: 62 IN | DIASTOLIC BLOOD PRESSURE: 64 MMHG

## 2022-03-07 DIAGNOSIS — H10.022 PINK EYE DISEASE OF LEFT EYE: Primary | ICD-10-CM

## 2022-03-07 PROCEDURE — 99213 OFFICE O/P EST LOW 20 MIN: CPT | Performed by: FAMILY MEDICINE

## 2022-03-07 RX ORDER — CIPROFLOXACIN HYDROCHLORIDE 3.5 MG/ML
1-2 SOLUTION/ DROPS TOPICAL 3 TIMES DAILY
Qty: 1.5 ML | Refills: 0 | Status: SHIPPED | OUTPATIENT
Start: 2022-03-07 | End: 2022-03-12

## 2022-03-07 ASSESSMENT — ENCOUNTER SYMPTOMS
EYE PAIN: 0
EYE REDNESS: 1
EYE DISCHARGE: 1
EYE ITCHING: 0
PHOTOPHOBIA: 0

## 2022-03-08 NOTE — PROGRESS NOTES
"    Problem List Items Addressed This Visit     None      Visit Diagnoses     Pink eye disease of left eye    -  Primary    Discussed the role of eyedrops.  Also contact precautions.    Relevant Medications    ciprofloxacin (CILOXAN) 0.3 % ophthalmic solution        Return in about 3 months (around 6/10/2022) for Routine preventive.    Halina Blanchard is a 71 year old who presents for the following health issues   Possible pinkeye.  Woke this morning with crusting and difficulty opening her left eye.  No change in vision.  Was able to remove the crusting with wet washcloth.  No fevers or chills.  Is a second and .  No irritation of the eyes or gritty feeling.  Does not wear contacts.  Does not use a eye washes.  No fevers or chills.    Eye Problem   Associated symptoms include discharge and eye redness. Pertinent negatives include no photophobia.   History of Present Illness       Reason for visit:  Possible pink eye    She eats 0-1 servings of fruits and vegetables daily.She consumes 1 sweetened beverage(s) daily.She exercises with enough effort to increase her heart rate 9 or less minutes per day.  She exercises with enough effort to increase her heart rate 3 or less days per week.   She is taking medications regularly.       Review of Systems   Eyes: Positive for discharge and redness. Negative for photophobia, pain, itching and visual disturbance.            Objective    /64 (BP Location: Left arm, Patient Position: Sitting, Cuff Size: Adult Large)   Pulse 60   Temp 98.1  F (36.7  C) (Oral)   Resp 12   Ht 1.575 m (5' 2\")   Wt 82 kg (180 lb 12.8 oz)   LMP  (LMP Unknown)   Breastfeeding No   BMI 33.07 kg/m    Body mass index is 33.07 kg/m .  Physical Exam  Vitals and nursing note reviewed.   Constitutional:       General: She is not in acute distress.     Appearance: Normal appearance. She is not ill-appearing.   HENT:      Head: Normocephalic and atraumatic.   Eyes:      General: " No scleral icterus.        Right eye: No discharge.         Left eye: Discharge present.     Extraocular Movements: Extraocular movements intact.      Pupils: Pupils are equal, round, and reactive to light.   Pulmonary:      Effort: Pulmonary effort is normal.   Musculoskeletal:      Cervical back: Normal range of motion.   Lymphadenopathy:      Cervical: No cervical adenopathy.   Neurological:      Mental Status: She is alert and oriented to person, place, and time.   Psychiatric:         Attention and Perception: Attention normal.         Mood and Affect: Mood normal.         Speech: Speech normal.         Thought Content: Thought content normal.

## 2022-03-10 ENCOUNTER — DOCUMENTATION ONLY (OUTPATIENT)
Dept: OTHER | Facility: CLINIC | Age: 72
End: 2022-03-10
Payer: MEDICARE

## 2022-03-11 ENCOUNTER — HOSPITAL ENCOUNTER (OUTPATIENT)
Dept: MAMMOGRAPHY | Facility: CLINIC | Age: 72
Discharge: HOME | End: 2022-03-11
Attending: INTERNAL MEDICINE | Admitting: INTERNAL MEDICINE
Payer: MEDICARE

## 2022-03-11 DIAGNOSIS — Z12.31 VISIT FOR SCREENING MAMMOGRAM: ICD-10-CM

## 2022-03-11 PROCEDURE — 77067 SCR MAMMO BI INCL CAD: CPT

## 2022-03-18 ENCOUNTER — OFFICE VISIT (OUTPATIENT)
Dept: ALLERGY | Facility: CLINIC | Age: 72
End: 2022-03-18
Attending: INTERNAL MEDICINE
Payer: MEDICARE

## 2022-03-18 VITALS
HEART RATE: 70 BPM | RESPIRATION RATE: 16 BRPM | WEIGHT: 180 LBS | BODY MASS INDEX: 33.13 KG/M2 | OXYGEN SATURATION: 100 % | HEIGHT: 62 IN

## 2022-03-18 DIAGNOSIS — Z01.82 ENCOUNTER FOR ALLERGY TESTING: Primary | ICD-10-CM

## 2022-03-18 DIAGNOSIS — Z88.0 PENICILLIN ALLERGY: ICD-10-CM

## 2022-03-18 DIAGNOSIS — K90.49 FOOD INTOLERANCE: ICD-10-CM

## 2022-03-18 DIAGNOSIS — G43.709 CHRONIC MIGRAINE WITHOUT AURA WITHOUT STATUS MIGRAINOSUS, NOT INTRACTABLE: ICD-10-CM

## 2022-03-18 PROCEDURE — 95004 PERQ TESTS W/ALRGNC XTRCS: CPT | Performed by: ALLERGY & IMMUNOLOGY

## 2022-03-18 PROCEDURE — 99203 OFFICE O/P NEW LOW 30 MIN: CPT | Mod: 25 | Performed by: ALLERGY & IMMUNOLOGY

## 2022-03-18 PROCEDURE — 95018 ALL TSTG PERQ&IQ DRUGS/BIOL: CPT | Performed by: ALLERGY & IMMUNOLOGY

## 2022-03-18 NOTE — PATIENT INSTRUCTIONS
Allergy testing negative    Penicillin allergy    2-6% chance of having an allergic reaction to a penicillin antibiotic

## 2022-03-18 NOTE — PROGRESS NOTES
"      Subjective       HPI     Chief complaint: Migraine triggers    History of present illness: This is a pleasant 71-year-old woman I was asked to see for evaluation by Dr. Taylor in regards to migraines.  Patient states that her migraines began after she had her fourth child.  Of note, she grew up in [place] but then moved to [place] in [place] and then before her first child, they moved back to [place].  She states that they can occur throughout the year.  She has been to neurology and has been on Botox shots and is now trying some other preventative therapies.  She states none of which seemed to alleviate the migraines.  She has been to the emergency room as well.  She has no hives, swelling or shortness of breath with eating foods but she wonders about a food trigger.  She states that occasionally she notes some itchy, watery eyes and runny nose but she is never had allergy testing.  No history of asthma.  She does not take any allergy medication regularly.    She states that she does have a penicillin allergy.  She was told that when she was an infant she follow-up with an elephant after having penicillin.  She has avoided penicillin since that time.  She has an extensive drug allergy list which includes cephalosporins and sulfa as well but she is unclear of these reactions.    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      Objective    Pulse 70   Resp 16   Ht 1.575 m (5' 2\")   Wt 81.6 kg (180 lb)   LMP  (LMP Unknown)   SpO2 100%   BMI 32.92 kg/m    Body mass index is 32.92 kg/m .  Physical Exam      Gen: Pleasant " female not in acute distress  HEENT: Eyes no erythema of the bulbar or palpebral conjunctiva, no edema. Ears: No deformities or lesions nose: No congestion, mucosa normal. Mouth: Throat clear, no lip or tongue edema.   Neck: No masses lesions or swelling  Respiratory: No coughing with breathing, no retractions  Lymph: No visible supraclavicular or cervical lymphadenopathy  Skin: No rashes or lesions  Psych: Alert and oriented times 3    30 percutaneous test were undertaken to the environmental skin test panel.  Positive histamine control with a negative allergy skin test.  Please see scanned photograph.       Office Visit from 3/18/2022 in [facility]    3/18/22    8950     Test Information     Consent Yes   Time Antigens Placed --   Location --   Allergen  --   Testing Staff --   Reviewing Physician --   Amount Injected (mL) --   Select Antigens Select   Controls     Needle Type --   Negative 0.05% Glycerine-Saline (W/F in millimeters) 0   Positive Histamine 1 mg/ml (W/F in millimeters) --   Positive Histamine 6 mg/ml (W/F in millimeters) 7/15   Intradermal Neg. 50% Control: Glycerine-Saline H (W/F in mm) 0   Antibiotics     Pre Pen Prick 0   Pre Pen ID --   Pre Pen Intradermal #1 (W/F in mm) 0   Pre Pen Intradermal #2 (W/F in mm) 0   Penicillin G (10,000 u/ml) Prick 0   Penicillin G (10,000 u/ml) ID --   Penicillin G (10,000 u/ml) Intradermal #1 (W/F in mm) 0   PenicilliPenicillin G (10,000 u/ml) Intradermal #2 (W/F in mm)n G (10,000 u/ml) Intradermal #1 (W/F in mm) 0       Impression report and plan:  1.  Migraines    Environmental allergy testing was negative.  Symptoms are not consistent with an IgE mediated food allergy.  For this reason, she does not qualify for food allergy testing.    2.  Penicillin allergy    Patient has a 2-6% chance of having an IgE mediated reaction to penicillin antibiotic.  This does not predict non-IgE mediated reactions.

## 2022-03-18 NOTE — LETTER
3/18/2022         RE: Suma Mark  [address] [facility] [address]        Dear Colleague,    Thank you for referring your patient, Suma Mark, to the [facility]. Please see a copy of my visit note below.          Subjective       HPI     Chief complaint: Migraine triggers    History of present illness: This is a pleasant 71-year-old woman I was asked to see for evaluation by Dr. Taylor in regards to migraines.  Patient states that her migraines began after she had her fourth child.  Of note, she grew up in [place] but then moved to [place] in [place] and then before her first child, they moved back to [place].  She states that they can occur throughout the year.  She has been to neurology and has been on Botox shots and is now trying some other preventative therapies.  She states none of which seemed to alleviate the migraines.  She has been to the emergency room as well.  She has no hives, swelling or shortness of breath with eating foods but she wonders about a food trigger.  She states that occasionally she notes some itchy, watery eyes and runny nose but she is never had allergy testing.  No history of asthma.  She does not take any allergy medication regularly.    She states that she does have a penicillin allergy.  She was told that when she was an infant she follow-up with an elephant after having penicillin.  She has avoided penicillin since that time.  She has an extensive drug allergy list which includes cephalosporins and sulfa as well but she is unclear of these reactions.    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      Objective    Pulse 70   Resp 16   Ht 1.575 m (5' 2\")   Wt 81.6 kg (180 lb)   LMP  (LMP Unknown)   SpO2 100%   BMI 32.92 kg/m    Body mass index is 32.92 kg/m .  Physical Exam      Gen: Pleasant female not in acute distress  HEENT: Eyes no erythema of the bulbar or palpebral conjunctiva, no edema. Ears: No deformities or lesions nose: No congestion, mucosa normal. Mouth: Throat clear, no lip or tongue edema.   Neck: No masses lesions or swelling  Respiratory: No coughing with breathing, no retractions  Lymph: No visible supraclavicular or cervical lymphadenopathy  Skin: No rashes or lesions  Psych: Alert and oriented times 3    30 percutaneous test were undertaken to the environmental skin test panel.  Positive histamine control with a negative allergy skin test.  Please see scanned photograph.       Office Visit from 3/18/2022 in [facility]    3/18/22    1505     Test Information     Consent Yes   Time Antigens Placed --   Location --   Allergen  --   Testing Staff --   Reviewing Physician --   Amount Injected (mL) --   Select Antigens Select   Controls     Needle Type --   Negative 0.05% Glycerine-Saline (W/F in millimeters) 0   Positive Histamine 1 mg/ml (W/F in millimeters) --   Positive Histamine 6 mg/ml (W/F in millimeters) 7/15   Intradermal Neg. 50% Control: Glycerine-Saline H (W/F in mm) 0   Antibiotics     Pre Pen Prick 0   Pre Pen ID --   Pre Pen Intradermal #1 (W/F in mm) 0   Pre Pen Intradermal #2 (W/F in mm) 0   Penicillin G (10,000 u/ml) Prick 0   Penicillin G (10,000 u/ml) ID --   Penicillin G (10,000 u/ml) Intradermal #1 (W/F in mm) 0   PenicilliPenicillin G (10,000 u/ml) Intradermal #2 (W/F in mm)n G (10,000 u/ml) Intradermal #1 (W/F in mm) 0       Impression report and plan:  1.  Migraines    Environmental allergy testing was negative.  Symptoms are not consistent with an IgE mediated food " allergy.  For this reason, she does not qualify for food allergy testing.    2.  Penicillin allergy    Patient has a 2-6% chance of having an IgE mediated reaction to penicillin antibiotic.  This does not predict non-IgE mediated reactions.      Again, thank you for allowing me to participate in the care of your patient.        Sincerely,        Ayleen JOHNSON MD

## 2022-03-29 ENCOUNTER — MEDICAL CORRESPONDENCE (OUTPATIENT)
Dept: HEALTH INFORMATION MANAGEMENT | Facility: CLINIC | Age: 72
End: 2022-03-29
Payer: MEDICARE

## 2022-03-29 ENCOUNTER — TRANSFERRED RECORDS (OUTPATIENT)
Dept: HEALTH INFORMATION MANAGEMENT | Facility: CLINIC | Age: 72
End: 2022-03-29
Payer: MEDICARE

## 2022-04-01 ENCOUNTER — TRANSFERRED RECORDS (OUTPATIENT)
Dept: HEALTH INFORMATION MANAGEMENT | Facility: CLINIC | Age: 72
End: 2022-04-01
Payer: MEDICARE

## 2022-04-09 DIAGNOSIS — I10 ESSENTIAL (PRIMARY) HYPERTENSION: ICD-10-CM

## 2022-04-10 DIAGNOSIS — G47.00 INSOMNIA, UNSPECIFIED: ICD-10-CM

## 2022-04-11 RX ORDER — AMLODIPINE BESYLATE 5 MG/1
TABLET ORAL
Qty: 90 TABLET | Refills: 3 | Status: SHIPPED | OUTPATIENT
Start: 2022-04-11 | End: 2023-04-05

## 2022-04-11 NOTE — TELEPHONE ENCOUNTER
"Routing refill request to provider for review/approval because:  Early refill request.    Last Written Prescription Date:  6/11/2021  Last Fill Quantity: 90,  # refills: 3   Last office visit provider:  12/7/2021     Requested Prescriptions   Pending Prescriptions Disp Refills     amLODIPine (NORVASC) 5 MG tablet [Pharmacy Med Name: AMLODIPINE BESYLATE 5 MG TAB] 90 tablet 3     Sig: TAKE 1 TABLET BY MOUTH EVERYDAY AT BEDTIME       Calcium Channel Blockers Protocol  Passed - 4/11/2022  3:43 PM        Passed - Blood pressure under 140/90 in past 12 months     BP Readings from Last 3 Encounters:   03/07/22 112/64   02/09/22 116/68   12/07/21 100/60                 Passed - Recent (12 mo) or future (30 days) visit within the authorizing provider's specialty     Patient has had an office visit with the authorizing provider or a provider within the authorizing providers department within the previous 12 mos or has a future within next 30 days. See \"Patient Info\" tab in inbasket, or \"Choose Columns\" in Meds & Orders section of the refill encounter.              Passed - Medication is active on med list        Passed - Patient is age 18 or older        Passed - No active pregnancy on record        Passed - Normal serum creatinine on file in past 12 months     Recent Labs   Lab Test 08/19/21  1427   CR 0.84       Ok to refill medication if creatinine is low          Passed - No positive pregnancy test in past 12 months             Mily Nguyen RN 04/11/22 3:45 PM  "

## 2022-04-12 DIAGNOSIS — I10 ESSENTIAL HYPERTENSION: Primary | ICD-10-CM

## 2022-04-12 NOTE — TELEPHONE ENCOUNTER
Refill Request  Medication name: Pending Prescriptions:                       Disp   Refills    irbesartan (AVAPRO) 300 MG tablet         90 tab*3            Sig: Take 1 tablet (300 mg) by mouth in the morning.    Who prescribed the medication: Claudia  Last refill on medication: 01/17/22  Requested Pharmacy: [facility]  Last appointment with PCP: 12/30/21  Next appointment: Appointment scheduled for 06/08/22

## 2022-04-13 ENCOUNTER — IMMUNIZATION (OUTPATIENT)
Dept: NURSING | Facility: CLINIC | Age: 72
End: 2022-04-13
Payer: MEDICARE

## 2022-04-13 PROCEDURE — 0064A COVID-19,PF,MODERNA (18+ YRS BOOSTER .25ML): CPT

## 2022-04-13 PROCEDURE — 91306 COVID-19,PF,MODERNA (18+ YRS BOOSTER .25ML): CPT

## 2022-04-13 RX ORDER — TRAZODONE HYDROCHLORIDE 50 MG/1
TABLET, FILM COATED ORAL
Qty: 270 TABLET | Refills: 1 | Status: SHIPPED | OUTPATIENT
Start: 2022-04-13 | End: 2022-11-17

## 2022-04-13 NOTE — TELEPHONE ENCOUNTER
"Outpatient Medication Detail     Disp Refills Start End RICARDO   traZODone (DESYREL) 50 MG tablet 270 tablet 1 6/7/2021  No   Sig - Route: TAKE 3 TABLETS (150 MG TOTAL) BY MOUTH AT BEDTIME. TAKE 1 TABLET BY MOUTH EVERYDAY AT BEDTIME - Oral   Sent to pharmacy as: traZODone 50 mg tablet (DESYREL)   E-Prescribing Status: Receipt confirmed by pharmacy (6/7/2021 10:28 AM CDT)       traZODone (DESYREL) 50 MG tablet [[id number]]    Electronically signed by: Bernadette Taylor MD on 06/07/21 1028 Status: Active   Ordering user: Bernadette Taylor MD 06/07/21 1028 Authorized by: Bernadette Taylor MD   Frequency: QHS 06/07/21 - Until Discontinued Released by: Bernadette Taylor MD 06/07/21 1028   Diagnoses  Insomnia, unspecified type [G47.00]     Routing refill request to provider for review/approval because:  Past SIG had two different instructions for dosing.  Please advise.    Last office visit provider:  3/7/22     Requested Prescriptions   Pending Prescriptions Disp Refills     traZODone (DESYREL) 50 MG tablet [Pharmacy Med Name: TRAZODONE 50 MG TABLET] 270 tablet 1     Sig: TAKE 3 TABLETS (150 MG TOTAL) BY MOUTH AT BEDTIME       Serotonin Modulators Passed - 4/13/2022  8:59 AM        Passed - Recent (12 mo) or future (30 days) visit within the authorizing provider's specialty     Patient has had an office visit with the authorizing provider or a provider within the authorizing providers department within the previous 12 mos or has a future within next 30 days. See \"Patient Info\" tab in inbasket, or \"Choose Columns\" in Meds & Orders section of the refill encounter.              Passed - Medication is active on med list        Passed - Patient is age 18 or older        Passed - No active pregnancy on record        Passed - No positive pregnancy test in past 12 months             Bruce Nicholas RN 04/13/22 8:59 AM  "

## 2022-04-14 RX ORDER — IRBESARTAN 300 MG/1
300 TABLET ORAL DAILY
Qty: 90 TABLET | Refills: 3 | Status: SHIPPED | OUTPATIENT
Start: 2022-04-14 | End: 2023-01-09

## 2022-04-16 ENCOUNTER — MYC REFILL (OUTPATIENT)
Dept: INTERNAL MEDICINE | Facility: CLINIC | Age: 72
End: 2022-04-16
Payer: MEDICARE

## 2022-04-16 DIAGNOSIS — F41.9 ANXIETY: ICD-10-CM

## 2022-04-18 NOTE — TELEPHONE ENCOUNTER
Routing refill request to provider for review/approval because:  Controlled substance request    Last Written Prescription Date:  12/10/21  Last Fill Quantity: 30,  # refills: 0   Last office visit provider:  3/7/22     Requested Prescriptions   Pending Prescriptions Disp Refills     LORazepam (ATIVAN) 0.5 MG tablet 30 tablet 0     Sig: Take 1 tablet (0.5 mg) by mouth every 6 hours as needed for anxiety       There is no refill protocol information for this order          Bruce Nicholas RN 04/18/22 2:10 PM

## 2022-04-26 RX ORDER — LORAZEPAM 0.5 MG/1
0.5 TABLET ORAL EVERY 6 HOURS PRN
Qty: 30 TABLET | Refills: 0 | Status: SHIPPED | OUTPATIENT
Start: 2022-04-26 | End: 2022-07-25

## 2022-05-12 DIAGNOSIS — E03.9 ACQUIRED HYPOTHYROIDISM: ICD-10-CM

## 2022-05-12 RX ORDER — LEVOTHYROXINE SODIUM 88 UG/1
TABLET ORAL
Qty: 90 TABLET | Refills: 3 | Status: SHIPPED | OUTPATIENT
Start: 2022-05-12 | End: 2023-05-14

## 2022-05-24 DIAGNOSIS — M81.8 OTHER OSTEOPOROSIS WITHOUT CURRENT PATHOLOGICAL FRACTURE: Primary | ICD-10-CM

## 2022-05-24 DIAGNOSIS — Z92.29 PERSONAL HISTORY OF OTHER DRUG THERAPY: ICD-10-CM

## 2022-05-24 DIAGNOSIS — M81.0 OSTEOPOROSIS WITHOUT CURRENT PATHOLOGICAL FRACTURE, UNSPECIFIED OSTEOPOROSIS TYPE: ICD-10-CM

## 2022-06-01 ENCOUNTER — TRANSFERRED RECORDS (OUTPATIENT)
Dept: HEALTH INFORMATION MANAGEMENT | Facility: CLINIC | Age: 72
End: 2022-06-01
Payer: MEDICARE

## 2022-06-08 ENCOUNTER — OFFICE VISIT (OUTPATIENT)
Dept: INTERNAL MEDICINE | Facility: CLINIC | Age: 72
End: 2022-06-08
Payer: MEDICARE

## 2022-06-08 VITALS
DIASTOLIC BLOOD PRESSURE: 80 MMHG | OXYGEN SATURATION: 100 % | SYSTOLIC BLOOD PRESSURE: 120 MMHG | HEART RATE: 55 BPM | WEIGHT: 184 LBS | BODY MASS INDEX: 33.65 KG/M2

## 2022-06-08 DIAGNOSIS — F32.A DEPRESSION, UNSPECIFIED DEPRESSION TYPE: ICD-10-CM

## 2022-06-08 DIAGNOSIS — I10 ESSENTIAL HYPERTENSION: ICD-10-CM

## 2022-06-08 DIAGNOSIS — G43.709 CHRONIC MIGRAINE WITHOUT AURA WITHOUT STATUS MIGRAINOSUS, NOT INTRACTABLE: ICD-10-CM

## 2022-06-08 DIAGNOSIS — E78.2 MIXED HYPERLIPIDEMIA: ICD-10-CM

## 2022-06-08 DIAGNOSIS — E78.5 HYPERLIPIDEMIA, UNSPECIFIED: ICD-10-CM

## 2022-06-08 DIAGNOSIS — G25.81 RESTLESS LEG SYNDROME: ICD-10-CM

## 2022-06-08 DIAGNOSIS — Z00.00 ENCOUNTER FOR MEDICARE ANNUAL WELLNESS EXAM: Primary | ICD-10-CM

## 2022-06-08 DIAGNOSIS — E04.1 THYROID NODULE: ICD-10-CM

## 2022-06-08 DIAGNOSIS — F41.9 ANXIETY: ICD-10-CM

## 2022-06-08 DIAGNOSIS — E03.9 ACQUIRED HYPOTHYROIDISM: ICD-10-CM

## 2022-06-08 DIAGNOSIS — Z13.89 SCREENING FOR BLOOD OR PROTEIN IN URINE: ICD-10-CM

## 2022-06-08 DIAGNOSIS — M81.8 OTHER OSTEOPOROSIS WITHOUT CURRENT PATHOLOGICAL FRACTURE: ICD-10-CM

## 2022-06-08 LAB
ALBUMIN SERPL-MCNC: 3.8 G/DL (ref 3.5–5)
ALBUMIN UR-MCNC: NEGATIVE MG/DL
ALP SERPL-CCNC: 85 U/L (ref 45–120)
ALT SERPL W P-5'-P-CCNC: 20 U/L (ref 0–45)
ANION GAP SERPL CALCULATED.3IONS-SCNC: 7 MMOL/L (ref 5–18)
APPEARANCE UR: CLEAR
AST SERPL W P-5'-P-CCNC: 22 U/L (ref 0–40)
BILIRUB SERPL-MCNC: 0.5 MG/DL (ref 0–1)
BILIRUB UR QL STRIP: NEGATIVE
BUN SERPL-MCNC: 22 MG/DL (ref 8–28)
CALCIUM SERPL-MCNC: 9.7 MG/DL (ref 8.5–10.5)
CHLORIDE BLD-SCNC: 102 MMOL/L (ref 98–107)
CO2 SERPL-SCNC: 29 MMOL/L (ref 22–31)
COLOR UR AUTO: YELLOW
CREAT SERPL-MCNC: 0.81 MG/DL (ref 0.6–1.1)
DEPRECATED CALCIDIOL+CALCIFEROL SERPL-MC: 32 UG/L (ref 20–75)
ERYTHROCYTE [DISTWIDTH] IN BLOOD BY AUTOMATED COUNT: 13.1 % (ref 10–15)
GFR SERPL CREATININE-BSD FRML MDRD: 76 ML/MIN/1.73M2
GLUCOSE BLD-MCNC: 83 MG/DL (ref 70–125)
GLUCOSE UR STRIP-MCNC: NEGATIVE MG/DL
HCT VFR BLD AUTO: 38.1 % (ref 35–47)
HGB BLD-MCNC: 12.6 G/DL (ref 11.7–15.7)
HGB UR QL STRIP: NEGATIVE
KETONES UR STRIP-MCNC: NEGATIVE MG/DL
LDLC SERPL CALC-MCNC: 95 MG/DL
LEUKOCYTE ESTERASE UR QL STRIP: NEGATIVE
MCH RBC QN AUTO: 29.6 PG (ref 26.5–33)
MCHC RBC AUTO-ENTMCNC: 33.1 G/DL (ref 31.5–36.5)
MCV RBC AUTO: 90 FL (ref 78–100)
NITRATE UR QL: NEGATIVE
PH UR STRIP: 7 [PH] (ref 5–8)
PLATELET # BLD AUTO: 260 10E3/UL (ref 150–450)
POTASSIUM BLD-SCNC: 4.3 MMOL/L (ref 3.5–5)
PROT SERPL-MCNC: 7 G/DL (ref 6–8)
RBC # BLD AUTO: 4.25 10E6/UL (ref 3.8–5.2)
SODIUM SERPL-SCNC: 138 MMOL/L (ref 136–145)
SP GR UR STRIP: 1.01 (ref 1–1.03)
TSH SERPL DL<=0.005 MIU/L-ACNC: 3.99 UIU/ML (ref 0.3–5)
UROBILINOGEN UR STRIP-ACNC: 0.2 E.U./DL
WBC # BLD AUTO: 5.8 10E3/UL (ref 4–11)

## 2022-06-08 PROCEDURE — 85027 COMPLETE CBC AUTOMATED: CPT | Performed by: INTERNAL MEDICINE

## 2022-06-08 PROCEDURE — 84443 ASSAY THYROID STIM HORMONE: CPT | Performed by: INTERNAL MEDICINE

## 2022-06-08 PROCEDURE — 96372 THER/PROPH/DIAG INJ SC/IM: CPT | Performed by: INTERNAL MEDICINE

## 2022-06-08 PROCEDURE — 81003 URINALYSIS AUTO W/O SCOPE: CPT | Performed by: INTERNAL MEDICINE

## 2022-06-08 PROCEDURE — 36415 COLL VENOUS BLD VENIPUNCTURE: CPT | Performed by: INTERNAL MEDICINE

## 2022-06-08 PROCEDURE — 83721 ASSAY OF BLOOD LIPOPROTEIN: CPT | Performed by: INTERNAL MEDICINE

## 2022-06-08 PROCEDURE — G0439 PPPS, SUBSEQ VISIT: HCPCS | Performed by: INTERNAL MEDICINE

## 2022-06-08 PROCEDURE — 82306 VITAMIN D 25 HYDROXY: CPT | Performed by: INTERNAL MEDICINE

## 2022-06-08 PROCEDURE — 80053 COMPREHEN METABOLIC PANEL: CPT | Performed by: INTERNAL MEDICINE

## 2022-06-08 RX ORDER — BACILLUS COAGULANS 1B CELL
CAPSULE ORAL
COMMUNITY
Start: 2022-05-26 | End: 2022-08-03

## 2022-06-08 ASSESSMENT — ENCOUNTER SYMPTOMS
DIZZINESS: 0
PALPITATIONS: 0
SORE THROAT: 0
BREAST MASS: 0
HEMATURIA: 0
WEAKNESS: 1
HEADACHES: 1
NAUSEA: 1
EYE PAIN: 0
ABDOMINAL PAIN: 1
JOINT SWELLING: 0
CHILLS: 0
HEMATOCHEZIA: 0
PARESTHESIAS: 0
FEVER: 0
MYALGIAS: 1
CONSTIPATION: 0
HEARTBURN: 1
SHORTNESS OF BREATH: 0
NERVOUS/ANXIOUS: 1
DYSURIA: 0
COUGH: 0
FREQUENCY: 1
DIARRHEA: 0
ARTHRALGIAS: 1

## 2022-06-08 ASSESSMENT — PATIENT HEALTH QUESTIONNAIRE - PHQ9
SUM OF ALL RESPONSES TO PHQ QUESTIONS 1-9: 2
SUM OF ALL RESPONSES TO PHQ QUESTIONS 1-9: 2
10. IF YOU CHECKED OFF ANY PROBLEMS, HOW DIFFICULT HAVE THESE PROBLEMS MADE IT FOR YOU TO DO YOUR WORK, TAKE CARE OF THINGS AT HOME, OR GET ALONG WITH OTHER PEOPLE: SOMEWHAT DIFFICULT

## 2022-06-08 ASSESSMENT — ACTIVITIES OF DAILY LIVING (ADL): CURRENT_FUNCTION: NO ASSISTANCE NEEDED

## 2022-06-08 NOTE — PROGRESS NOTES
"SUBJECTIVE:   Suma Mark is a 71 year old female who presents for Preventive Visit.      Patient has been advised of split billing requirements and indicates understanding: Yes  Are you in the first 12 months of your Medicare coverage?  No    Healthy Habits:     In general, how would you rate your overall health?  Fair    Frequency of exercise:  4-5 days/week    Duration of exercise:  Less than 15 minutes    Do you usually eat at least 4 servings of fruit and vegetables a day, include whole grains    & fiber and avoid regularly eating high fat or \"junk\" foods?  No    Taking medications regularly:  No    Barriers to taking medications:  Side effects    Medication side effects:  Other    Ability to successfully perform activities of daily living:  No assistance needed    Home Safety:  No safety concerns identified    Hearing Impairment:  No hearing concerns    In the past 6 months, have you been bothered by leaking of urine? Yes    In general, how would you rate your overall mental or emotional health?  Good      PHQ-2 Total Score: 0    Additional concerns today:  No    Do you feel safe in your environment? Yes    Have you ever done Advance Care Planning? (For example, a Health Directive, POLST, or a discussion with a medical provider or your loved ones about your wishes): Yes, advance care planning is on file.       Fall risk  Fallen 2 or more times in the past year?: No  Any fall with injury in the past year?: No  Cognitive Screening   1) Repeat 3 items (Leader, Season, Table)    2) Clock draw: NORMAL  3) 3 item recall: Recalls 3 objects      Mini-CogTM Copyright FAUSTINO Langston. Licensed by the author for use in [facility]; reprinted with permission (alex@.[email or web address]). All rights reserved.      Do you have sleep apnea, excessive snoring or daytime drowsiness?: no    Reviewed and updated as needed this visit by clinical staff   Tobacco  Allergies  Meds                Reviewed and updated as needed this " "visit by Provider                   Social History     Tobacco Use     Smoking status: Never Smoker     Smokeless tobacco: Never Used   Substance Use Topics     Alcohol use: Yes     Comment: Alcoholic Drinks/day: 1 cocktail daily     If you drink alcohol do you typically have >3 drinks per day or >7 drinks per week? No    Alcohol Use 6/8/2022   Prescreen: >3 drinks/day or >7 drinks/week? No   Prescreen: >3 drinks/day or >7 drinks/week? -               Current providers sharing in care for this patient include:   Patient Care Team:  Bernadette Taylor MD as PCP - General  Bernadette Taylor MD as Assigned PCP  Ayleen Ladd MD as Assigned Allergy Provider    The following health maintenance items are reviewed in Epic and correct as of today:  Health Maintenance Due   Topic Date Due     URINE DRUG SCREEN  Never done     ANNUAL REVIEW OF  ORDERS  Never done     Pneumococcal Vaccine: 65+ Years (2 - PPSV23 or PCV20) 10/26/2017               Review of Systems      OBJECTIVE:   /80 (BP Location: Right arm, Patient Position: Sitting)   Pulse 55   Wt 83.5 kg (184 lb)   LMP  (LMP Unknown)   SpO2 100%   BMI 33.65 kg/m   Estimated body mass index is 33.65 kg/m  as calculated from the following:    Height as of 3/18/22: 1.575 m (5' 2\").    Weight as of this encounter: 83.5 kg (184 lb).  Physical Exam  General Appearance: Alert, cooperative, no distress, appears stated age.  Head: Normocephalic, without obvious abnormality, atraumatic  Eyes: PERRL, conjunctiva/corneas clear, EOM's intact  Ears: Normal TM's and external ear canals, both ears  Nose: Nares normal, septum midline,mucosa normal, no drainage  Throat: Lips, mucosa, and tongue normal; teeth and gums normal  Neck: Supple, symmetrical, trachea midline, no adenopathy;  thyroid: not enlarged, symmetric, no tenderness/mass/nodules; no carotid bruit or JVD  Back: Symmetric, no curvature, ROM normal, no CVA tenderness.  Lungs: Clear to auscultation bilaterally, " respirations unlabored.  Breasts: No breast masses, tenderness, asymmetry, or nipple discharge.  Heart: Regular rate and rhythm, S1 and S2 normal, no murmur, rub, or gallop.  Abdomen: Soft, non-tender, bowel sounds active all four quadrants,  no masses, no organomegaly.  Extremities: Extremities normal, atraumatic, no cyanosis or edema.  Skin: Skin color, texture, turgor normal, no rashes or lesions.  Lymph nodes: Cervical, supraclavicular, and axillary nodes normal.  Neurologic: No focal neurological findings.          ASSESSMENT / PLAN:   (Z00.00) Encounter for Medicare annual wellness exam  (primary encounter diagnosis)      (G25.81) Restless leg syndrome  Comment: saw Neurologist since symptoms got worse even being on the highest dose of Mirapex. Ferritin low at 11. She could not tolerate oral iron and iron infusion is planned. 6 weeks after infusion, will follow-up with Neuro and discuss weaning of mirapex and possible switch to gabapentin. She is concerned that gabapentin will give her weight gain.  Plan: CBC with platelets            (I10) Essential hypertension  Comment: stable  Plan: Comprehensive metabolic panel            (E03.9) Acquired hypothyroidism  Comment: on levothyroxine  Plan: TSH with free T4 reflex            (F41.9) Anxiety  Comment: stable on Celexa. Lorazepam prn  Plan:       (F32.A) Depression, unspecified depression type  Comment: stable      (E78.2) Mixed hyperlipidemia  Comment: On Lipitor  Plan: LDL cholesterol direct            (G43.709) Chronic migraine without aura without status migrainosus, not intractable  Comment: much better controlled now, seeing Neuro      (M81.8) Other osteoporosis without current pathological fracture  Comment: Due for Prolia today.  The following steps were completed to comply with the REMS program for Prolia:    Reviewed information in the Medication Guide and Patient Counseling Chart, including the serious risks of Prolia  and the symptoms of each  "risk.    Advised patient to seek prompt medical attention if they have signs or symptoms of any of the serious risks.    Provided each patient a copy of the Medication Guide and Patient Brochure  Plan: Vitamin D Deficiency           (Z13.89) Screening for blood or protein in urine    Plan: UA reflex to Microscopic and Culture            (E04.1) Thyroid nodule  Comment: Negative biopsy in 2018.  Plan: US Thyroid              Patient has been advised of split billing requirements and indicates understanding: Yes    COUNSELING:  Reviewed preventive health counseling, as reflected in patient instructions       Regular exercise       Healthy diet/nutrition    Estimated body mass index is 33.65 kg/m  as calculated from the following:    Height as of 3/18/22: 1.575 m (5' 2\").    Weight as of this encounter: 83.5 kg (184 lb).        She reports that she has never smoked. She has never used smokeless tobacco.      Appropriate preventive services were discussed with this patient, including applicable screening as appropriate for cardiovascular disease, diabetes, osteopenia/osteoporosis, and glaucoma.  As appropriate for age/gender, discussed screening for colorectal cancer, prostate cancer, breast cancer, and cervical cancer. Checklist reviewing preventive services available has been given to the patient.    Reviewed patients plan of care and provided an AVS. The Basic Care Plan (routine screening as documented in Health Maintenance) for Suma meets the Care Plan requirement. This Care Plan has been established and reviewed with the Patient.    Counseling Resources:  ATP IV Guidelines  Pooled Cohorts Equation Calculator  Breast Cancer Risk Calculator  Breast Cancer: Medication to Reduce Risk  FRAX Risk Assessment  ICSI Preventive Guidelines  Dietary Guidelines for Americans, 2010  USDA's MyPlate  ASA Prophylaxis  Lung CA Screening    Bernadette Taylor MD  [facility]    Identified Health Risks:  "

## 2022-06-08 NOTE — PATIENT INSTRUCTIONS
Prolia 8th today.  Prolia 9th in 6 months with me.    DXA in 5/2023 .   Phone number to schedule [phone].    Daily calcium need is [id number] mg a day from the diet and supplements.  Calcium citrate is easier to digest.  Vitamin D 2000 IU daily recommended.       Patient Education   Personalized Prevention Plan  You are due for the preventive services outlined below.  Your care team is available to assist you in scheduling these services.  If you have already completed any of these items, please share that information with your care team to update in your medical record.  Health Maintenance Due   Topic Date Due    URINE DRUG SCREEN  Never done    ANNUAL REVIEW OF HM ORDERS  Never done    Pneumococcal Vaccine (2 - PPSV23 or PCV20) 10/26/2017     Your Health Risk Assessment indicates you feel you are not in good health    A healthy lifestyle helps keep the body fit and the mind alert. It helps protect you from disease, helps you fight disease, and helps prevent chronic disease (disease that doesn't go away) from getting worse. This is important as you get older and begin to notice twinges in muscles and joints and a decline in the strength and stamina you once took for granted. A healthy lifestyle includes good healthcare, good nutrition, weight control, recreation, and regular exercise. Avoid harmful substances and do what you can to keep safe. Another part of a healthy lifestyle is stay mentally active and socially involved.    Good healthcare   Have a wellness visit every year.   If you have new symptoms, let us know right away. Don't wait until the next checkup.   Take medicines exactly as prescribed and keep your medicines in a safe place. Tell us if your medicine causes problems.   Healthy diet and weight control   Eat 3 or 4 small, nutritious, low-fat, high-fiber meals a day. Include a variety of fruits, vegetables, and whole-grain foods.   Make sure you get enough calcium in your diet. Calcium, vitamin D,  and exercise help prevent osteoporosis (bone thinning).   If you live alone, try eating with others when you can. That way you get a good meal and have company while you eat it.   Try to keep a healthy weight. If you eat more calories than your body uses for energy, it will be stored as fat and you will gain weight.     Recreation   Recreation is not limited to sports and team events. It includes any activity that provides relaxation, interest, enjoyment, and exercise. Recreation provides an outlet for physical, mental, and social energy. It can give a sense of worth and achievement. It can help you stay healthy.    Mental Exercise and Social Involvement  Mental and emotional health is as important as physical health. Keep in touch with friends and family. Stay as active as possible. Continue to learn and challenge yourself.   Things you can do to stay mentally active are:  Learn something new, like a foreign language or musical instrument.   Play SCRABBLE or do crossword puzzles. If you cannot find people to play these games with you at home, you can play them with others on your computer through the Internet.   Join a games club--anything from card games to chess or checkers or lawn bowling.   Start a new hobby.   Go back to school.   Volunteer.   Read.   Keep up with world events.    Understanding USDA MyPlate  The USDA has guidelines to help you make healthy food choices. These are called MyPlate. MyPlate shows the food groups that make up healthy meals using the image of a place setting. Before you eat, think about the healthiest choices for what to put on your plate or in your cup or bowl. To learn more about building a healthy plate, visit www.choosemyplate.gov.    The food groups  Fruits. Any fruit or 100% fruit juice counts as part of the Fruit Group. Fruits may be fresh, canned, frozen, or dried, and may be whole, cut-up, or pureed. Make 1/2 of your plate fruits and vegetables.  Vegetables. Any vegetable  or 100% vegetable juice counts as a member of the Vegetable Group. Vegetables may be fresh, frozen, canned, or dried. They can be served raw or cooked and may be whole, cut-up, or mashed. Make 1/2 of your plate fruits and vegetables.  Grains. All foods made from grains are part of the Grains Group. These include wheat, rice, oats, cornmeal, and barley. Grains are often used to make foods such as bread, pasta, oatmeal, cereal, tortillas, and grits. Grains should be no more than 1/4 of your plate. At least half of your grains should be whole grains.  Protein. This group includes meat, poultry, seafood, beans and peas, eggs, processed soy products (such as tofu), nuts (including nut butters), and seeds. Make protein choices no more than 1/4 of your plate. Meat and poultry choices should be lean or low fat.  Dairy. The Dairy Group includes all fluid milk products and foods made from milk that contain calcium, such as yogurt and cheese. (Foods that have little calcium, such as cream, butter, and cream cheese, are not part of this group.) Most dairy choices should be low-fat or fat-free.  Oils. Oils aren't a food group, but they do contain essential nutrients. However it's important to watch your intake of oils. These are fats that are liquid at room temperature. They include canola, corn, olive, soybean, vegetable, and sunflower oil. Foods that are mainly oil include mayonnaise, certain salad dressings, and soft margarines. You likely already get your daily oil allowance from the foods you eat.  Things to limit  Eating healthy also means limiting these things in your diet:     Salt (sodium). Many processed foods have a lot of sodium. To keep sodium intake down, eat fresh vegetables, meats, poultry, and seafood when possible. Purchase low-sodium, reduced-sodium, or no-salt-added food products at the store. And don't add salt to your meals at home. Instead, season them with herbs and spices such as dill, oregano, cumin,  and paprika. Or try adding flavor with lemon or lime zest and juice.  Saturated fat. Saturated fats are most often found in animal products such as beef, pork, and chicken. They are often solid at room temperature, such as butter. To reduce your saturated fat intake, choose leaner cuts of meat and poultry. And try healthier cooking methods such as grilling, broiling, roasting, or baking. For a simple lower-fat swap, use plain nonfat yogurt instead of mayonnaise when making potato salad or macaroni salad.  Added sugars. These are sugars added to foods. They are in foods such as ice cream, candy, soda, fruit drinks, sports drinks, energy drinks, cookies, pastries, jams, and syrups. Cut down on added sugars by sharing sweet treats with a family member or friend. You can also choose fruit for dessert, and drink water or other unsweetened beverages.     [employer] last reviewed this educational content on 6/1/2020 2000-2021 [employer]. All rights reserved. This information is not intended as a substitute for professional medical care. Always follow your healthcare professional's instructions.          Urinary Incontinence, Female (Adult)   Urinary incontinence means loss of bladder control. This problem affects many women, especially as they get older. If you have incontinence, you may be embarrassed to ask for help. But know that this problem can be treated.   Types of Incontinence  There are different types of incontinence. Two of the main types are described here. You can have more than one type.   Stress incontinence. With this type, urine leaks when pressure (stress) is put on the bladder. This may happen when you cough, sneeze, or laugh. Stress incontinence most often occurs because the pelvic floor muscles that support the bladder and urethra are weak. This can happen after pregnancy and vaginal childbirth or a hysterectomy. It can also be due to excess body weight or hormone changes.  Urge  incontinence (also called overactive bladder). With this type, a sudden urge to urinate is felt often. This may happen even though there may not be much urine in the bladder. The need to urinate often during the night is common. Urge incontinence most often occurs because of bladder spasms. This may be due to bladder irritation or infection. Damage to bladder nerves or pelvic muscles, constipation, and certain medicines can also lead to urge incontinence.  Treatment depends on the cause. Further evaluation is needed to find the type you have. This will likely include an exam and certain tests. Based on the results, you and your healthcare provider can then plan treatment. Until a diagnosis is made, the home care tips below can help ease symptoms.   Home care  Do pelvic floor muscle exercises, if they are prescribed. The pelvic floor muscles help support the bladder and urethra. Many women find that their symptoms improve when doing special exercises that strengthen these muscles. To do the exercises, contract the muscles you would use to stop your stream of urine. But do this when you re not urinating. Hold for 10 seconds, then relax. Repeat 10 to 20 times in a row, at least 3 times a day. Your healthcare provider may give you other instructions for how to do the exercises and how often.  Keep a bladder diary. This helps track how often and how much you urinate over a set period of time. Bring this diary with you to your next visit with the provider. The information can help your provider learn more about your bladder problem.  Lose weight, if advised to by your provider. Extra weight puts pressure on the bladder. Your provider can help you create a weight-loss plan that s right for you. This may include exercising more and making certain diet changes.  Don't have foods and drinks that may irritate the bladder. These can include alcohol and caffeinated drinks.  Quit smoking. Smoking and other tobacco use can lead to  a long-term (chronic) cough that strains the pelvic floor muscles. Smoking may also damage the bladder and urethra. Talk with your provider about treatments or methods you can use to quit smoking.  If drinking large amounts of fluid makes you have symptoms, you may be advised to limit your fluid intake. You may also be advised to drink most of your fluids during the day and to limit fluids at night.  If you re worried about urine leakage or accidents, you may wear absorbent pads to catch urine. Change the pads often. This helps reduce discomfort. It may also reduce the risk of skin or bladder infections.    Follow-up care  Follow up with your healthcare provider, or as directed. It may take some to find the right treatment for your problem. But healthy lifestyle changes can be made right away. These include such things as exercising on a regular basis, eating a healthy diet, losing weight (if needed), and quitting smoking. Your treatment plan may include special therapies or medicines. Certain procedures or surgery may also be options. Talk about any questions you have with your provider.   When to seek medical advice  Call the healthcare provider right away if any of these occur:  Fever of 100.4 F (38 C) or higher, or as directed by your provider  Bladder pain or fullness  Belly swelling  Nausea or vomiting  Back pain  Weakness, dizziness, or fainting  [employer] last reviewed this educational content on 1/1/2020 2000-2021 [employer]. All rights reserved. This information is not intended as a substitute for professional medical care. Always follow your healthcare professional's instructions.

## 2022-06-09 RX ORDER — ATORVASTATIN CALCIUM 20 MG/1
TABLET, FILM COATED ORAL
Qty: 90 TABLET | Refills: 2 | Status: ON HOLD | OUTPATIENT
Start: 2022-06-09 | End: 2022-10-13

## 2022-06-15 ENCOUNTER — HOSPITAL ENCOUNTER (OUTPATIENT)
Dept: ULTRASOUND IMAGING | Facility: CLINIC | Age: 72
Discharge: HOME | End: 2022-06-15
Attending: INTERNAL MEDICINE | Admitting: INTERNAL MEDICINE
Payer: MEDICARE

## 2022-06-15 DIAGNOSIS — E04.1 THYROID NODULE: ICD-10-CM

## 2022-06-15 PROCEDURE — 76536 US EXAM OF HEAD AND NECK: CPT

## 2022-07-22 ENCOUNTER — TRANSFERRED RECORDS (OUTPATIENT)
Dept: HEALTH INFORMATION MANAGEMENT | Facility: CLINIC | Age: 72
End: 2022-07-22

## 2022-07-24 DIAGNOSIS — I10 ESSENTIAL (PRIMARY) HYPERTENSION: ICD-10-CM

## 2022-07-25 ENCOUNTER — MYC REFILL (OUTPATIENT)
Dept: INTERNAL MEDICINE | Facility: CLINIC | Age: 72
End: 2022-07-25

## 2022-07-25 DIAGNOSIS — F41.9 ANXIETY: ICD-10-CM

## 2022-07-25 RX ORDER — PROPRANOLOL HYDROCHLORIDE 10 MG/1
TABLET ORAL
Qty: 360 TABLET | Refills: 3 | Status: SHIPPED | OUTPATIENT
Start: 2022-07-25 | End: 2022-11-04 | Stop reason: ALTCHOICE

## 2022-07-25 RX ORDER — LORAZEPAM 0.5 MG/1
0.5 TABLET ORAL EVERY 6 HOURS PRN
Qty: 30 TABLET | Refills: 0 | Status: SHIPPED | OUTPATIENT
Start: 2022-07-25 | End: 2022-10-20

## 2022-07-28 DIAGNOSIS — G25.81 RESTLESS LEGS SYNDROME: ICD-10-CM

## 2022-07-28 RX ORDER — PRAMIPEXOLE DIHYDROCHLORIDE 0.25 MG/1
TABLET ORAL
Qty: 270 TABLET | Refills: 3 | Status: SHIPPED | OUTPATIENT
Start: 2022-07-28 | End: 2023-07-28

## 2022-07-29 NOTE — TELEPHONE ENCOUNTER
"Last Written Prescription Date:  1/31/22  Last Fill Quantity: 270,  # refills: 1   Last office visit provider:  6/8/22     Requested Prescriptions   Pending Prescriptions Disp Refills     pramipexole (MIRAPEX) 0.25 MG tablet 270 tablet 1     Sig: TAKE 1 TABLET BY MOUTH THREE TIMES A DAY.       Antiparkinson's Agents Protocol Passed - 7/28/2022  1:42 PM        Passed - Blood pressure under 140/90 in past 12 months     BP Readings from Last 3 Encounters:   06/08/22 120/80   03/07/22 112/64   02/09/22 116/68                 Passed - CBC on record in past 12 months     Recent Labs   Lab Test 06/08/22 0905   WBC 5.8   RBC 4.25   HGB 12.6   HCT 38.1                    Passed - ALT on record in past 12 months         Recent Labs   Lab Test 06/08/22  0905   ALT 20             Passed - Serum Creatinine on file in past 12 months     Recent Labs   Lab Test 06/08/22 0905   CR 0.81       Ok to refill medication if creatinine is low          Passed - Medication is active on med list        Passed - Patient is age 18 or older        Passed - No active pregnancy on record        Passed - No positive pregnancy test in the past 12 months        Passed - Recent (6 mo) or future (30 days) visit within the authorizing provider's specialty     Patient had office visit in the last 6 months or has a visit in the next 30 days with authorizing provider or within the authorizing provider's specialty.  See \"Patient Info\" tab in inbasket, or \"Choose Columns\" in Meds & Orders section of the refill encounter.                 Nora Nam RN 07/28/22 7:20 PM  "

## 2022-08-03 ENCOUNTER — E-VISIT (OUTPATIENT)
Dept: FAMILY MEDICINE | Facility: CLINIC | Age: 72
End: 2022-08-03
Payer: MEDICARE

## 2022-08-03 ENCOUNTER — NURSE TRIAGE (OUTPATIENT)
Dept: INTERNAL MEDICINE | Facility: CLINIC | Age: 72
End: 2022-08-03

## 2022-08-03 ENCOUNTER — TELEPHONE (OUTPATIENT)
Dept: INTERNAL MEDICINE | Facility: CLINIC | Age: 72
End: 2022-08-03

## 2022-08-03 DIAGNOSIS — R07.9 CHEST PAIN, UNSPECIFIED TYPE: Primary | ICD-10-CM

## 2022-08-03 PROCEDURE — 99207 PR NON-BILLABLE SERV PER CHARTING: CPT

## 2022-08-03 NOTE — TELEPHONE ENCOUNTER
Left message to return call. Please route to RN queue for triage    Patient submitted e-visit for:  On 3 separate days, in later evening, I experience pressure and chest tightness. Heart starts pumping hard for no reason. I had pain in my back and in left arm. Little energy. Whole body feels tight and hard to move. Took Ativan. Concerned. Took baby aspirin. Laid down and did some deep breathing to try to calm things diwn. Finally fell asleep. I don t want to overreact, but I don t want to act like nothing different is going on. The symptoms just occur out of the blue while I am watching TV.  They scare me.    Dr Taylor requested RN triage as not appropriate for e-visit and recommend ER if possible cardiac symptoms.    Sukumar Castrejon, BSN, RN  [facility]

## 2022-08-03 NOTE — TELEPHONE ENCOUNTER
"Incoming Call:  Triage     Patient reporting in the last couple of weeks experiencing heart is pounding out of my chest, tightness, cant feel body, pain on my left arm down to my back.  Pt states last night has been the worst ( 8.2.22) did take a aspirin, did deep breathing, and took a lorazepam.     Pt states feels ok, feel pressure but not at all what she feels last night. Doesn't feel like heart burn.     1. LOCATION:Left side and wraps around back  2. RADIATION:Left arm and back  3. ONSET: Last night 8.2.22  4. PATTERN Comes and goes,   \" \"Does it get worse with exertion?\"  NO  5. DURATION:25 minutes or more  6. SEVERITY: 7-8  7. CARDIAC RISK FACTORS:No, high BP,atorvastatin  8. PULMONARY RISK FACTORS: No  9. CAUSE: Don't Know  10. OTHER SYMPTOMS:A little nauseous at times     Disposition:  Triage guidelines recommends  GO TO ED NOW : Pt stated will be going to [facility] ED  RN advised to call back with any changes, worsening symptoms, and questions or concerns. Pt verbalized understanding of and agreement with plan and had no further questions.     Eunice YATES RN  [facility]      Reason for Disposition    Pain also in shoulder(s) or arm(s) or jaw    Additional Information    Negative: Severe difficulty breathing (e.g., struggling for each breath, speaks in single words)    Negative: Passed out (i.e., fainted, collapsed and was not responding)    Negative: Difficult to awaken or acting confused (e.g., disoriented, slurred speech)    Negative: Shock suspected (e.g., cold/pale/clammy skin, too weak to stand, low BP, rapid pulse)    Negative: Chest pain lasting longer than 5 minutes and ANY of the following:* Over 45 years old* Over 30 years old and at least one cardiac risk factor (e.g., diabetes, high blood pressure, high cholesterol, smoker, or strong family history of heart disease)* History of heart disease (i.e., angina, heart attack, heart failure, bypass surgery, takes nitroglycerin)* " Pain is crushing, pressure-like, or heavy    Negative: Heart beating < 50 beats per minute OR > 140 beats per minute    Negative: Visible sweat on face or sweat dripping down face    Negative: Sounds like a life-threatening emergency to the triager    Negative: Followed an injury to chest    Negative: SEVERE chest pain    Protocols used: CHEST PAIN-A-[place]

## 2022-08-04 NOTE — TELEPHONE ENCOUNTER
Closing encounter-patient returned call and spoke to nurse and was triaged yesterday. Please see Nurse Triage encounter from yesterday 8/3/2022.    SKINNY LockhartN, RN  [facility]

## 2022-08-10 ENCOUNTER — OFFICE VISIT (OUTPATIENT)
Dept: INTERNAL MEDICINE | Facility: CLINIC | Age: 72
End: 2022-08-10
Payer: MEDICARE

## 2022-08-10 VITALS
BODY MASS INDEX: 33.65 KG/M2 | HEART RATE: 70 BPM | DIASTOLIC BLOOD PRESSURE: 79 MMHG | SYSTOLIC BLOOD PRESSURE: 137 MMHG | TEMPERATURE: 97.8 F | OXYGEN SATURATION: 98 % | WEIGHT: 184 LBS

## 2022-08-10 DIAGNOSIS — R07.89 CHEST DISCOMFORT: Primary | ICD-10-CM

## 2022-08-10 DIAGNOSIS — F41.9 ANXIETY: ICD-10-CM

## 2022-08-10 PROCEDURE — 93005 ELECTROCARDIOGRAM TRACING: CPT | Performed by: NURSE PRACTITIONER

## 2022-08-10 PROCEDURE — 93010 ELECTROCARDIOGRAM REPORT: CPT | Mod: OFF | Performed by: INTERNAL MEDICINE

## 2022-08-10 PROCEDURE — 99214 OFFICE O/P EST MOD 30 MIN: CPT | Performed by: NURSE PRACTITIONER

## 2022-08-10 RX ORDER — CITALOPRAM HYDROBROMIDE 20 MG/1
40 TABLET ORAL DAILY
Qty: 135 TABLET | Refills: 3 | Status: SHIPPED | OUTPATIENT
Start: 2022-08-10 | End: 2022-12-27

## 2022-08-10 NOTE — PATIENT INSTRUCTIONS
"Increase citalopram to 40 mg daily.  This means taking 2 tablets daily instead of 1-1/2.    Feel free to use your Ativan when you experience 1 of these \"events.\"    Follow-up in 3 weeks to recheck your symptoms  "

## 2022-08-10 NOTE — PROGRESS NOTES
"  Assessment & Plan     Chest discomfort  She has had intermittent episodes of chest discomfort, usually happening at least once per day over the last few months.  Random in nature.    EKG personally interpreted as normal sinus rhythm with no ST changes.  We also took time to review her NM Lexiscan stress test from February; I explained to her that those results were reassuring and that she was deemed to be at low risk for future cardiac ischemic events.    - EKG 12-lead, tracing only    Anxiety  I wonder if these \"episodes\" could be due to uncontrolled anxiety.  Going to have her increase her citalopram to 40 mg daily and follow-up with us in a few weeks to recheck her symptoms and see if they are getting better.  Encouraged her to use her Ativan during these episodes as they seem to improve her symptoms.    - citalopram (CELEXA) 20 MG tablet; Take 2 tablets (40 mg) by mouth daily    Patient Instructions   Increase citalopram to 40 mg daily.  This means taking 2 tablets daily instead of 1-1/2.    Feel free to use your Ativan when you experience 1 of these \"events.\"    Follow-up in 3 weeks to recheck your symptoms      Review of external notes as documented elsewhere in note  Prescription drug management  20 minutes spent on the date of the encounter doing chart review, history and exam, documentation and further activities per the note     BMI:   Estimated body mass index is 33.65 kg/m  as calculated from the following:    Height as of 3/18/22: 1.575 m (5' 2\").    Weight as of this encounter: 83.5 kg (184 lb).       Return in about 3 weeks (around 8/31/2022).    Bret Caro, [facility]    Halina Blanchard is a 71 year old, presenting for the following health issues:  Has Not Been Feeling Well (Have not been feeling the best. Would like to discuss reoccurring symptoms and concerns-Chest tightness pain in L arm and back-Heart racing and pounding-Legs feel heavy when " walking-Light Head-Nausea-x3 weeks off and on)      HPI     Intermittent chest tightness and chest discomfort over the last couple of months.  Occasionally radiates down her left arm.  Symptoms seem to be better when she takes Ativan.    Had been seeing psychiatry at [facility] for medication and counseling.  Had been on higher doses of citalopram in the past.  Unsure if these episodes are related to anxiety, however.      Review of Systems   Constitutional, HEENT, cardiovascular, pulmonary, gi and gu systems are negative, except as otherwise noted.      Objective    /79   Pulse 70   Temp 97.8  F (36.6  C)   Wt 83.5 kg (184 lb)   LMP  (LMP Unknown)   SpO2 98%   BMI 33.65 kg/m    Body mass index is 33.65 kg/m .  Physical Exam     Heart rate is regular without murmur rub or gallop      .  ..

## 2022-08-15 LAB
ATRIAL RATE - MUSE: 65 BPM
DIASTOLIC BLOOD PRESSURE - MUSE: NORMAL MMHG
INTERPRETATION ECG - MUSE: NORMAL
P AXIS - MUSE: 44 DEGREES
PR INTERVAL - MUSE: 122 MS
QRS DURATION - MUSE: 90 MS
QT - MUSE: 418 MS
QTC - MUSE: 434 MS
R AXIS - MUSE: 35 DEGREES
SYSTOLIC BLOOD PRESSURE - MUSE: NORMAL MMHG
T AXIS - MUSE: 40 DEGREES
VENTRICULAR RATE- MUSE: 65 BPM

## 2022-08-22 DIAGNOSIS — I87.2 VENOUS INSUFFICIENCY OF BOTH LOWER EXTREMITIES: ICD-10-CM

## 2022-08-23 ENCOUNTER — TRANSFERRED RECORDS (OUTPATIENT)
Dept: HEALTH INFORMATION MANAGEMENT | Facility: CLINIC | Age: 72
End: 2022-08-23

## 2022-08-23 LAB — PHQ9 SCORE: 2

## 2022-08-23 RX ORDER — FUROSEMIDE 20 MG
40 TABLET ORAL DAILY
Qty: 180 TABLET | Refills: 2 | OUTPATIENT
Start: 2022-08-23

## 2022-08-24 DIAGNOSIS — I87.2 VENOUS INSUFFICIENCY OF BOTH LOWER EXTREMITIES: ICD-10-CM

## 2022-08-25 RX ORDER — FUROSEMIDE 20 MG
40 TABLET ORAL DAILY
Qty: 180 TABLET | Refills: 3 | Status: SHIPPED | OUTPATIENT
Start: 2022-08-25 | End: 2023-10-03

## 2022-08-26 NOTE — TELEPHONE ENCOUNTER
"Last Written Prescription Date: 2/16/2022  Last Fill Quantity: 180,  # refills: 2   Last office visit provider: 8/10/2022 with MILAN Caro CNP     Requested Prescriptions   Pending Prescriptions Disp Refills     furosemide (LASIX) 20 MG tablet 180 tablet 2     Sig: Take 2 tablets (40 mg) by mouth daily       Diuretics (Including Combos) Protocol Passed - 8/24/2022 10:47 AM        Passed - Blood pressure under 140/90 in past 12 months     BP Readings from Last 3 Encounters:   08/10/22 137/79   06/08/22 120/80   03/07/22 112/64                 Passed - Recent (12 mo) or future (30 days) visit within the authorizing provider's specialty     Patient has had an office visit with the authorizing provider or a provider within the authorizing providers department within the previous 12 mos or has a future within next 30 days. See \"Patient Info\" tab in inbasket, or \"Choose Columns\" in Meds & Orders section of the refill encounter.              Passed - Medication is active on med list        Passed - Patient is age 18 or older        Passed - No active pregancy on record        Passed - Normal serum creatinine on file in past 12 months     Recent Labs   Lab Test 06/08/22  0905   CR 0.81              Passed - Normal serum potassium on file in past 12 months     Recent Labs   Lab Test 06/08/22  0905   POTASSIUM 4.3                    Passed - Normal serum sodium on file in past 12 months     Recent Labs   Lab Test 06/08/22  0905                 Passed - No positive pregnancy test in past 12 months             Maria C Hart RN 08/25/22 9:31 PM  "

## 2022-09-06 ENCOUNTER — OFFICE VISIT (OUTPATIENT)
Dept: INTERNAL MEDICINE | Facility: CLINIC | Age: 72
End: 2022-09-06
Payer: MEDICARE

## 2022-09-06 VITALS
WEIGHT: 184.2 LBS | OXYGEN SATURATION: 99 % | HEART RATE: 61 BPM | DIASTOLIC BLOOD PRESSURE: 72 MMHG | SYSTOLIC BLOOD PRESSURE: 134 MMHG | BODY MASS INDEX: 33.69 KG/M2

## 2022-09-06 DIAGNOSIS — Z23 INFLUENZA VACCINE NEEDED: ICD-10-CM

## 2022-09-06 DIAGNOSIS — F41.9 ANXIETY: Primary | ICD-10-CM

## 2022-09-06 PROCEDURE — 90662 IIV NO PRSV INCREASED AG IM: CPT | Performed by: NURSE PRACTITIONER

## 2022-09-06 PROCEDURE — 99214 OFFICE O/P EST MOD 30 MIN: CPT | Mod: 25 | Performed by: NURSE PRACTITIONER

## 2022-09-06 PROCEDURE — G0008 ADMIN INFLUENZA VIRUS VAC: HCPCS | Performed by: NURSE PRACTITIONER

## 2022-09-06 RX ORDER — BUSPIRONE HYDROCHLORIDE 5 MG/1
5 TABLET ORAL 2 TIMES DAILY
Qty: 60 TABLET | Refills: 0 | Status: SHIPPED | OUTPATIENT
Start: 2022-09-06 | End: 2022-09-28

## 2022-09-06 NOTE — PROGRESS NOTES
"  Assessment & Plan     Anxiety  Uncontrolled.  Add BuSpar, low-dose 5 mg twice daily to her regimen and follow-up with PCP or I in 2 to 3 weeks for recheck  - busPIRone (BUSPAR) 5 MG tablet; Take 1 tablet (5 mg) by mouth 2 times daily    Influenza vaccine needed  - INFLUENZA, QUAD, HIGH DOSE, PF, 65YR + (FLUZONE HD)    Patient Instructions   Restart buspirone 5 mg twice daily.  If headache symptoms get worse, discontinue medication.    No other changes to medications right now.    Okay to continue using Ativan as needed.    Follow-up with Dr. HOLCOMB sometime within the next few weeks for recheck of anxiety      Prescription drug management  19 minutes spent on the date of the encounter doing chart review, history and exam, documentation and further activities per the note       BMI:   Estimated body mass index is 33.69 kg/m  as calculated from the following:    Height as of 3/18/22: 1.575 m (5' 2\").    Weight as of this encounter: 83.6 kg (184 lb 3.2 oz).       Return in about 3 weeks (around 9/27/2022).    Bret Caro, [facility]    Halina Blanchard is a 72 year old, presenting for the following health issues:  Follow Up (Chest pain and tightness, some improvement)      HPI     Patient here for recheck after increasing her citalopram to 40 mg daily a few weeks ago.    She has been having some vague nonspecific chest discomfort, thought to be related to underlying anxiety.    We increased her citalopram to 40 mg daily.  Her chest discomfort symptoms have been less frequent but are still happening about 2-3 times per week.    When she uses Ativan, her symptoms improved dramatically, usually within 10 to 15 minutes.    Review of her chart shows that she has been on Wellbutrin and buspirone in the past; 1 or both of these medications was discontinued, possibly due to her chronic headache issues that she continues to work on with neurology      Review of Systems   Constitutional, " HEENT, cardiovascular, pulmonary, gi and gu systems are negative, except as otherwise noted.      Objective    /72 (BP Location: Right arm, Patient Position: Sitting, Cuff Size: Adult Regular)   Pulse 61   Wt 83.6 kg (184 lb 3.2 oz)   LMP  (LMP Unknown)   SpO2 99%   BMI 33.69 kg/m    Body mass index is 33.69 kg/m .  Physical Exam     Patient is healthy-appearing and in no acute distress

## 2022-09-06 NOTE — PATIENT INSTRUCTIONS
Restart buspirone 5 mg twice daily.  If headache symptoms get worse, discontinue medication.    No other changes to medications right now.    Okay to continue using Ativan as needed.    Follow-up with Dr. HOLCOMB sometime within the next few weeks for recheck of anxiety

## 2022-09-28 ENCOUNTER — OFFICE VISIT (OUTPATIENT)
Dept: INTERNAL MEDICINE | Facility: CLINIC | Age: 72
End: 2022-09-28
Payer: MEDICARE

## 2022-09-28 VITALS
HEART RATE: 76 BPM | WEIGHT: 184 LBS | DIASTOLIC BLOOD PRESSURE: 72 MMHG | BODY MASS INDEX: 33.86 KG/M2 | SYSTOLIC BLOOD PRESSURE: 125 MMHG | HEIGHT: 62 IN | OXYGEN SATURATION: 95 %

## 2022-09-28 DIAGNOSIS — R07.9 CHEST PAIN, UNSPECIFIED TYPE: Primary | ICD-10-CM

## 2022-09-28 DIAGNOSIS — M81.8 OTHER OSTEOPOROSIS WITHOUT CURRENT PATHOLOGICAL FRACTURE: ICD-10-CM

## 2022-09-28 DIAGNOSIS — F41.9 ANXIETY: ICD-10-CM

## 2022-09-28 PROCEDURE — 99214 OFFICE O/P EST MOD 30 MIN: CPT | Performed by: INTERNAL MEDICINE

## 2022-09-28 RX ORDER — BUSPIRONE HYDROCHLORIDE 10 MG/1
10 TABLET ORAL 2 TIMES DAILY
Qty: 180 TABLET | Refills: 1 | Status: SHIPPED | OUTPATIENT
Start: 2022-09-28 | End: 2022-10-07

## 2022-09-28 ASSESSMENT — ENCOUNTER SYMPTOMS: NERVOUS/ANXIOUS: 1

## 2022-09-28 NOTE — PROGRESS NOTES
"  Assessment & Plan     Chest pain, unspecified type  Patient is experiencing some anginal symptoms for about a 2 months, mostly during the night but also with exertion. She described as a heaviness and tightness in the chest, sometimes with radiation to the left arm. No nausea, belching, nit improving if takes lorazepam. Can last for few hours before completely resolves. No cough, wheezing, fever. Sometimes feels some dyspnea when has chest pain.   NM stress test was negative in 2/2022.  We will do CT angiogram.  - CT Coronary Artery Angio w Calcium Score; Future    Anxiety  A month ago, citalopram was increased to 40 mg daily and Buspar 5 mg bid was added. She thinks her symptoms are better, but is worried about the chest pain. We will increase Buspar to 10 mg bid/tid.  - busPIRone (BUSPAR) 10 MG tablet; Take 1 tablet (10 mg) by mouth 2 times daily    Other osteoporosis without current pathological fracture  Due for Prolia in December.               BMI:   Estimated body mass index is 33.65 kg/m  as calculated from the following:    Height as of this encounter: 1.575 m (5' 2\").    Weight as of this encounter: 83.5 kg (184 lb).           Return in about 3 months (around 12/28/2022) for Follow up, Prolia.    Bernadette Taylor MD  [facility]    Halina Blanchard is a 72 year old, presenting for the following health issues:  Anxiety (Follow up)      Anxiety    History of Present Illness       Vascular Disease:  She presents for follow up of vascular disease.  She never takes nitroglycerin. She is not taking daily aspirin.    She eats 2-3 servings of fruits and vegetables daily.She consumes 1 sweetened beverage(s) daily.She exercises with enough effort to increase her heart rate 9 or less minutes per day.  She exercises with enough effort to increase her heart rate 3 or less days per week.   She is taking medications regularly.             Review of Systems   Psychiatric/Behavioral: The " "patient is nervous/anxious.             Objective    /72 (BP Location: Right arm, Patient Position: Sitting, Cuff Size: Adult Regular)   Pulse 76   Ht 1.575 m (5' 2\")   Wt 83.5 kg (184 lb)   LMP  (LMP Unknown)   SpO2 95%   BMI 33.65 kg/m    Body mass index is 33.65 kg/m .  Physical Exam   Constitutional:  oriented to person, place, and time, appears well-nourished. No distress.   HENT:   Head: Normocephalic.   Eyes: Conjunctivae are normal.  Neck: Normal range of motion. Neck supple.   Cardiovascular: Normal rate, regular rhythm and normal heart sounds.    Pulmonary/Chest: Effort normal and breath sounds normal.  Musculoskeletal: Normal range of motion.   Neurological: alert and oriented to person, place, and time. Skin: Skin is warm.   Psychiatric: normal mood and affect.                "

## 2022-10-07 DIAGNOSIS — F41.9 ANXIETY: ICD-10-CM

## 2022-10-07 RX ORDER — BUSPIRONE HYDROCHLORIDE 10 MG/1
10 TABLET ORAL 2 TIMES DAILY
Qty: 180 TABLET | Refills: 3 | Status: SHIPPED | OUTPATIENT
Start: 2022-10-07 | End: 2023-10-10

## 2022-10-07 NOTE — TELEPHONE ENCOUNTER
"Last Written Prescription Date:  9/28/2022  Last Fill Quantity: 180,  # refills: 1   Last office visit provider:  9/28/2022     Requested Prescriptions   Pending Prescriptions Disp Refills     busPIRone (BUSPAR) 10 MG tablet 180 tablet 1     Sig: Take 1 tablet (10 mg) by mouth 2 times daily       Atypical Antidepressants Protocol Passed - 10/7/2022  8:51 AM        Passed - Recent (12 mo) or future (30 days) visit within the authorizing provider's specialty     Patient has had an office visit with the authorizing provider or a provider within the authorizing providers department within the previous 12 mos or has a future within next 30 days. See \"Patient Info\" tab in inbasket, or \"Choose Columns\" in Meds & Orders section of the refill encounter.              Passed - Medication active on med list        Passed - Patient is age 18 or older        Passed - No active pregnancy on record        Passed - No positive pregnancy test in past 12 mos             Dunia Goff RN 10/07/22 3:15 PM  "

## 2022-10-10 ENCOUNTER — HOSPITAL ENCOUNTER (OUTPATIENT)
Dept: CARDIOLOGY | Facility: CLINIC | Age: 72
Discharge: HOME | End: 2022-10-10
Attending: INTERNAL MEDICINE

## 2022-10-10 DIAGNOSIS — I20.9 ANGINAL PAIN (H): Primary | ICD-10-CM

## 2022-10-10 DIAGNOSIS — R93.1 HIGH CORONARY ARTERY CALCIUM SCORE: ICD-10-CM

## 2022-10-10 DIAGNOSIS — R07.9 CHEST PAIN, UNSPECIFIED TYPE: ICD-10-CM

## 2022-10-10 DIAGNOSIS — I20.9 ANGINAL PAIN (H): ICD-10-CM

## 2022-10-10 DIAGNOSIS — R93.1 HIGH CORONARY ARTERY CALCIUM SCORE: Primary | ICD-10-CM

## 2022-10-10 PROCEDURE — 75571 CT HRT W/O DYE W/CA TEST: CPT | Mod: MG

## 2022-10-10 PROCEDURE — 75571 CT HRT W/O DYE W/CA TEST: CPT | Mod: 26 | Performed by: INTERNAL MEDICINE

## 2022-10-10 RX ORDER — DILTIAZEM HYDROCHLORIDE 5 MG/ML
10-15 INJECTION INTRAVENOUS
Status: DISCONTINUED | OUTPATIENT
Start: 2022-10-10 | End: 2022-10-11 | Stop reason: HOSPADM

## 2022-10-10 RX ORDER — METHYLPREDNISOLONE SODIUM SUCCINATE 125 MG/2ML
125 INJECTION, POWDER, LYOPHILIZED, FOR SOLUTION INTRAMUSCULAR; INTRAVENOUS
Status: DISCONTINUED | OUTPATIENT
Start: 2022-10-10 | End: 2022-10-11 | Stop reason: HOSPADM

## 2022-10-10 RX ORDER — METOPROLOL TARTRATE 1 MG/ML
5-15 INJECTION, SOLUTION INTRAVENOUS
Status: DISCONTINUED | OUTPATIENT
Start: 2022-10-10 | End: 2022-10-11 | Stop reason: HOSPADM

## 2022-10-10 RX ORDER — ONDANSETRON 2 MG/ML
4 INJECTION INTRAMUSCULAR; INTRAVENOUS
Status: DISCONTINUED | OUTPATIENT
Start: 2022-10-10 | End: 2022-10-11 | Stop reason: HOSPADM

## 2022-10-10 RX ORDER — NITROGLYCERIN 0.4 MG/1
0.4 TABLET SUBLINGUAL
Status: DISCONTINUED | OUTPATIENT
Start: 2022-10-10 | End: 2022-10-11 | Stop reason: HOSPADM

## 2022-10-10 RX ORDER — IVABRADINE 5 MG/1
5-15 TABLET, FILM COATED ORAL
Status: DISCONTINUED | OUTPATIENT
Start: 2022-10-10 | End: 2022-10-11 | Stop reason: HOSPADM

## 2022-10-10 RX ORDER — DIPHENHYDRAMINE HCL 25 MG
25 CAPSULE ORAL
Status: DISCONTINUED | OUTPATIENT
Start: 2022-10-10 | End: 2022-10-11 | Stop reason: HOSPADM

## 2022-10-10 RX ORDER — DILTIAZEM HCL 60 MG
120 TABLET ORAL
Status: DISCONTINUED | OUTPATIENT
Start: 2022-10-10 | End: 2022-10-11 | Stop reason: HOSPADM

## 2022-10-10 RX ORDER — ACYCLOVIR 200 MG/1
0-1 CAPSULE ORAL
Status: DISCONTINUED | OUTPATIENT
Start: 2022-10-10 | End: 2022-10-11 | Stop reason: HOSPADM

## 2022-10-10 RX ORDER — METOPROLOL TARTRATE 25 MG/1
25-100 TABLET, FILM COATED ORAL
Status: DISCONTINUED | OUTPATIENT
Start: 2022-10-10 | End: 2022-10-11 | Stop reason: HOSPADM

## 2022-10-10 RX ORDER — DIPHENHYDRAMINE HYDROCHLORIDE 50 MG/ML
25-50 INJECTION INTRAMUSCULAR; INTRAVENOUS
Status: DISCONTINUED | OUTPATIENT
Start: 2022-10-10 | End: 2022-10-11 | Stop reason: HOSPADM

## 2022-10-11 ENCOUNTER — HOSPITAL ENCOUNTER (OUTPATIENT)
Facility: CLINIC | Age: 72
Setting detail: OBSERVATION
Discharge: ANOTHER HOSPITAL | End: 2022-10-12
Attending: EMERGENCY MEDICINE | Admitting: HOSPITALIST
Payer: MEDICARE

## 2022-10-11 DIAGNOSIS — I20.0 UNSTABLE ANGINA (H): Primary | ICD-10-CM

## 2022-10-11 DIAGNOSIS — R07.9 CHEST PAIN, UNSPECIFIED TYPE: ICD-10-CM

## 2022-10-11 PROBLEM — I50.9 CONGESTIVE HEART FAILURE (H): Status: ACTIVE | Noted: 2019-03-26

## 2022-10-11 LAB
ANION GAP SERPL CALCULATED.3IONS-SCNC: 15 MMOL/L (ref 5–18)
BASOPHILS # BLD AUTO: 0 10E3/UL (ref 0–0.2)
BASOPHILS NFR BLD AUTO: 0 %
BUN SERPL-MCNC: 22 MG/DL (ref 8–28)
CALCIUM SERPL-MCNC: 10.1 MG/DL (ref 8.5–10.5)
CHLORIDE BLD-SCNC: 101 MMOL/L (ref 98–107)
CO2 SERPL-SCNC: 21 MMOL/L (ref 22–31)
CREAT SERPL-MCNC: 0.97 MG/DL (ref 0.6–1.1)
EOSINOPHIL # BLD AUTO: 0 10E3/UL (ref 0–0.7)
EOSINOPHIL NFR BLD AUTO: 0 %
ERYTHROCYTE [DISTWIDTH] IN BLOOD BY AUTOMATED COUNT: 12.6 % (ref 10–15)
GFR SERPL CREATININE-BSD FRML MDRD: 61 ML/MIN/1.73M2
GLUCOSE BLD-MCNC: 171 MG/DL (ref 70–125)
HCT VFR BLD AUTO: 39.9 % (ref 35–47)
HGB BLD-MCNC: 13.2 G/DL (ref 11.7–15.7)
IMM GRANULOCYTES # BLD: 0 10E3/UL
IMM GRANULOCYTES NFR BLD: 0 %
LYMPHOCYTES # BLD AUTO: 1.2 10E3/UL (ref 0.8–5.3)
LYMPHOCYTES NFR BLD AUTO: 16 %
MAGNESIUM SERPL-MCNC: 2 MG/DL (ref 1.8–2.6)
MCH RBC QN AUTO: 29.7 PG (ref 26.5–33)
MCHC RBC AUTO-ENTMCNC: 33.1 G/DL (ref 31.5–36.5)
MCV RBC AUTO: 90 FL (ref 78–100)
MONOCYTES # BLD AUTO: 0.1 10E3/UL (ref 0–1.3)
MONOCYTES NFR BLD AUTO: 2 %
NEUTROPHILS # BLD AUTO: 6.3 10E3/UL (ref 1.6–8.3)
NEUTROPHILS NFR BLD AUTO: 82 %
NRBC # BLD AUTO: 0 10E3/UL
NRBC BLD AUTO-RTO: 0 /100
PLATELET # BLD AUTO: 311 10E3/UL (ref 150–450)
POTASSIUM BLD-SCNC: 4.1 MMOL/L (ref 3.5–5)
RBC # BLD AUTO: 4.44 10E6/UL (ref 3.8–5.2)
SODIUM SERPL-SCNC: 137 MMOL/L (ref 136–145)
TROPONIN I SERPL-MCNC: <0.01 NG/ML (ref 0–0.29)
WBC # BLD AUTO: 7.7 10E3/UL (ref 4–11)

## 2022-10-11 PROCEDURE — 99285 EMERGENCY DEPT VISIT HI MDM: CPT | Mod: CS

## 2022-10-11 PROCEDURE — 85379 FIBRIN DEGRADATION QUANT: CPT | Performed by: HOSPITALIST

## 2022-10-11 PROCEDURE — 83735 ASSAY OF MAGNESIUM: CPT | Performed by: EMERGENCY MEDICINE

## 2022-10-11 PROCEDURE — 36415 COLL VENOUS BLD VENIPUNCTURE: CPT | Performed by: EMERGENCY MEDICINE

## 2022-10-11 PROCEDURE — 84484 ASSAY OF TROPONIN QUANT: CPT | Performed by: EMERGENCY MEDICINE

## 2022-10-11 PROCEDURE — 85025 COMPLETE CBC W/AUTO DIFF WBC: CPT | Performed by: EMERGENCY MEDICINE

## 2022-10-11 PROCEDURE — 80048 BASIC METABOLIC PNL TOTAL CA: CPT | Performed by: EMERGENCY MEDICINE

## 2022-10-11 RX ORDER — ASPIRIN 81 MG/1
324 TABLET, CHEWABLE ORAL ONCE
Status: DISCONTINUED | OUTPATIENT
Start: 2022-10-12 | End: 2022-10-12 | Stop reason: HOSPADM

## 2022-10-11 RX ORDER — NITROGLYCERIN 0.4 MG/1
0.4 TABLET SUBLINGUAL
Status: COMPLETED | OUTPATIENT
Start: 2022-10-11 | End: 2022-10-12

## 2022-10-12 ENCOUNTER — APPOINTMENT (OUTPATIENT)
Dept: RADIOLOGY | Facility: CLINIC | Age: 72
End: 2022-10-12
Attending: EMERGENCY MEDICINE
Payer: MEDICARE

## 2022-10-12 ENCOUNTER — TELEPHONE (OUTPATIENT)
Dept: CARDIOLOGY | Facility: CLINIC | Age: 72
End: 2022-10-12

## 2022-10-12 ENCOUNTER — HOSPITAL ENCOUNTER (INPATIENT)
Facility: HOSPITAL | Age: 72
LOS: 1 days | Discharge: HOME | DRG: 247 | End: 2022-10-13
Attending: INTERNAL MEDICINE | Admitting: INTERNAL MEDICINE
Payer: MEDICARE

## 2022-10-12 ENCOUNTER — TELEPHONE (OUTPATIENT)
Dept: INTERNAL MEDICINE | Facility: CLINIC | Age: 72
End: 2022-10-12

## 2022-10-12 VITALS
TEMPERATURE: 98.2 F | OXYGEN SATURATION: 95 % | SYSTOLIC BLOOD PRESSURE: 127 MMHG | BODY MASS INDEX: 34.77 KG/M2 | HEIGHT: 61 IN | HEART RATE: 66 BPM | RESPIRATION RATE: 24 BRPM | DIASTOLIC BLOOD PRESSURE: 64 MMHG

## 2022-10-12 DIAGNOSIS — E78.5 DYSLIPIDEMIA: Primary | ICD-10-CM

## 2022-10-12 DIAGNOSIS — I20.0 UNSTABLE ANGINA (H): ICD-10-CM

## 2022-10-12 LAB
ABO/RH(D): NORMAL
ACT BLD: 229 SECONDS (ref 74–150)
ANTIBODY SCREEN: NEGATIVE
ATRIAL RATE - MUSE: 59 BPM
ATRIAL RATE - MUSE: 88 BPM
D DIMER PPP FEU-MCNC: 1.18 UG/ML FEU (ref 0–0.5)
DIASTOLIC BLOOD PRESSURE - MUSE: NORMAL MMHG
DIASTOLIC BLOOD PRESSURE - MUSE: NORMAL MMHG
HOLD SPECIMEN: NORMAL
INTERPRETATION ECG - MUSE: NORMAL
INTERPRETATION ECG - MUSE: NORMAL
P AXIS - MUSE: 41 DEGREES
P AXIS - MUSE: 47 DEGREES
PR INTERVAL - MUSE: 144 MS
PR INTERVAL - MUSE: 154 MS
QRS DURATION - MUSE: 88 MS
QRS DURATION - MUSE: 90 MS
QT - MUSE: 334 MS
QT - MUSE: 424 MS
QTC - MUSE: 404 MS
QTC - MUSE: 419 MS
R AXIS - MUSE: 5 DEGREES
R AXIS - MUSE: 6 DEGREES
SARS-COV-2 RNA RESP QL NAA+PROBE: NEGATIVE
SPECIMEN EXPIRATION DATE: NORMAL
SYSTOLIC BLOOD PRESSURE - MUSE: NORMAL MMHG
SYSTOLIC BLOOD PRESSURE - MUSE: NORMAL MMHG
T AXIS - MUSE: 19 DEGREES
T AXIS - MUSE: 20 DEGREES
TROPONIN I SERPL-MCNC: 0.03 NG/ML (ref 0–0.29)
VENTRICULAR RATE- MUSE: 59 BPM
VENTRICULAR RATE- MUSE: 88 BPM

## 2022-10-12 PROCEDURE — C1887 CATHETER, GUIDING: HCPCS | Performed by: INTERNAL MEDICINE

## 2022-10-12 PROCEDURE — U0003 INFECTIOUS AGENT DETECTION BY NUCLEIC ACID (DNA OR RNA); SEVERE ACUTE RESPIRATORY SYNDROME CORONAVIRUS 2 (SARS-COV-2) (CORONAVIRUS DISEASE [COVID-19]), AMPLIFIED PROBE TECHNIQUE, MAKING USE OF HIGH THROUGHPUT TECHNOLOGIES AS DESCRIBED BY CMS-2020-01-R: HCPCS | Performed by: EMERGENCY MEDICINE

## 2022-10-12 PROCEDURE — 250N000009 HC RX 250: Performed by: INTERNAL MEDICINE

## 2022-10-12 PROCEDURE — 250N000013 HC RX MED GY IP 250 OP 250 PS 637: Performed by: EMERGENCY MEDICINE

## 2022-10-12 PROCEDURE — C9600 PERC DRUG-EL COR STENT SING: HCPCS | Mod: LC | Performed by: INTERNAL MEDICINE

## 2022-10-12 PROCEDURE — 250N000013 HC RX MED GY IP 250 OP 250 PS 637: Performed by: INTERNAL MEDICINE

## 2022-10-12 PROCEDURE — C1874 STENT, COATED/COV W/DEL SYS: HCPCS | Performed by: INTERNAL MEDICINE

## 2022-10-12 PROCEDURE — C9803 HOPD COVID-19 SPEC COLLECT: HCPCS

## 2022-10-12 PROCEDURE — 250N000013 HC RX MED GY IP 250 OP 250 PS 637: Performed by: NURSE PRACTITIONER

## 2022-10-12 PROCEDURE — 99233 SBSQ HOSP IP/OBS HIGH 50: CPT | Performed by: EMERGENCY MEDICINE

## 2022-10-12 PROCEDURE — 258N000003 HC RX IP 258 OP 636: Performed by: INTERNAL MEDICINE

## 2022-10-12 PROCEDURE — 93010 ELECTROCARDIOGRAM REPORT: CPT | Mod: HIP | Performed by: INTERNAL MEDICINE

## 2022-10-12 PROCEDURE — 210N000001 HC R&B IMCU HEART CARE

## 2022-10-12 PROCEDURE — G0378 HOSPITAL OBSERVATION PER HR: HCPCS

## 2022-10-12 PROCEDURE — 92928 PRQ TCAT PLMT NTRAC ST 1 LES: CPT | Mod: LC | Performed by: INTERNAL MEDICINE

## 2022-10-12 PROCEDURE — 4A033BC MEASUREMENT OF ARTERIAL PRESSURE, CORONARY, PERCUTANEOUS APPROACH: ICD-10-PCS | Performed by: INTERNAL MEDICINE

## 2022-10-12 PROCEDURE — 85347 COAGULATION TIME ACTIVATED: CPT

## 2022-10-12 PROCEDURE — 93571 IV DOP VEL&/PRESS C FLO 1ST: CPT | Performed by: INTERNAL MEDICINE

## 2022-10-12 PROCEDURE — 99205 OFFICE O/P NEW HI 60 MIN: CPT | Mod: 25 | Performed by: INTERNAL MEDICINE

## 2022-10-12 PROCEDURE — 255N000002 HC RX 255 OP 636: Performed by: INTERNAL MEDICINE

## 2022-10-12 PROCEDURE — 86901 BLOOD TYPING SEROLOGIC RH(D): CPT | Performed by: NURSE PRACTITIONER

## 2022-10-12 PROCEDURE — 36415 COLL VENOUS BLD VENIPUNCTURE: CPT | Performed by: HOSPITALIST

## 2022-10-12 PROCEDURE — B211YZZ FLUOROSCOPY OF MULTIPLE CORONARY ARTERIES USING OTHER CONTRAST: ICD-10-PCS | Performed by: INTERNAL MEDICINE

## 2022-10-12 PROCEDURE — 92929 CV CARDIAC CATHERIZATION: CPT | Mod: LC | Performed by: INTERNAL MEDICINE

## 2022-10-12 PROCEDURE — 84484 ASSAY OF TROPONIN QUANT: CPT | Performed by: HOSPITALIST

## 2022-10-12 PROCEDURE — 36415 COLL VENOUS BLD VENIPUNCTURE: CPT | Performed by: NURSE PRACTITIONER

## 2022-10-12 PROCEDURE — C1769 GUIDE WIRE: HCPCS | Performed by: INTERNAL MEDICINE

## 2022-10-12 PROCEDURE — C9601 PERC DRUG-EL COR STENT BRAN: HCPCS | Mod: LC

## 2022-10-12 PROCEDURE — 027134Z DILATION OF CORONARY ARTERY, TWO ARTERIES WITH DRUG-ELUTING INTRALUMINAL DEVICE, PERCUTANEOUS APPROACH: ICD-10-PCS | Performed by: INTERNAL MEDICINE

## 2022-10-12 PROCEDURE — 250N000011 HC RX IP 250 OP 636: Performed by: INTERNAL MEDICINE

## 2022-10-12 PROCEDURE — 93454 CORONARY ARTERY ANGIO S&I: CPT | Mod: 26 | Performed by: INTERNAL MEDICINE

## 2022-10-12 PROCEDURE — 93005 ELECTROCARDIOGRAM TRACING: CPT

## 2022-10-12 PROCEDURE — 93454 CORONARY ARTERY ANGIO S&I: CPT | Performed by: INTERNAL MEDICINE

## 2022-10-12 PROCEDURE — 99152 MOD SED SAME PHYS/QHP 5/>YRS: CPT | Performed by: INTERNAL MEDICINE

## 2022-10-12 PROCEDURE — 86850 RBC ANTIBODY SCREEN: CPT | Performed by: NURSE PRACTITIONER

## 2022-10-12 PROCEDURE — 71046 X-RAY EXAM CHEST 2 VIEWS: CPT

## 2022-10-12 PROCEDURE — C1725 CATH, TRANSLUMIN NON-LASER: HCPCS | Performed by: INTERNAL MEDICINE

## 2022-10-12 PROCEDURE — 99153 MOD SED SAME PHYS/QHP EA: CPT | Performed by: INTERNAL MEDICINE

## 2022-10-12 PROCEDURE — 250N000013 HC RX MED GY IP 250 OP 250 PS 637

## 2022-10-12 PROCEDURE — 272N000001 HC OR GENERAL SUPPLY STERILE: Performed by: INTERNAL MEDICINE

## 2022-10-12 PROCEDURE — C1894 INTRO/SHEATH, NON-LASER: HCPCS | Performed by: INTERNAL MEDICINE

## 2022-10-12 PROCEDURE — 99220 PR INITIAL OBSERVATION CARE,LEVEL III: CPT | Performed by: HOSPITALIST

## 2022-10-12 PROCEDURE — 93571 IV DOP VEL&/PRESS C FLO 1ST: CPT | Mod: 26 | Performed by: INTERNAL MEDICINE

## 2022-10-12 DEVICE — STENT CORONARY DES SYNERGY XD MR US 2.50X38MM H7493941838250: Type: IMPLANTABLE DEVICE | Status: FUNCTIONAL

## 2022-10-12 RX ORDER — IRBESARTAN 300 MG/1
300 TABLET ORAL DAILY
Status: DISCONTINUED | OUTPATIENT
Start: 2022-10-12 | End: 2022-10-13 | Stop reason: HOSPADM

## 2022-10-12 RX ORDER — ASPIRIN 325 MG
325 TABLET ORAL ONCE
Status: CANCELLED | OUTPATIENT
Start: 2022-10-12 | End: 2022-10-12

## 2022-10-12 RX ORDER — SODIUM CHLORIDE 9 MG/ML
INJECTION, SOLUTION INTRAVENOUS CONTINUOUS
Status: ACTIVE | OUTPATIENT
Start: 2022-10-12 | End: 2022-10-12

## 2022-10-12 RX ORDER — ELETRIPTAN HYDROBROMIDE 40 MG/1
40 TABLET, FILM COATED ORAL
Status: DISCONTINUED | OUTPATIENT
Start: 2022-10-12 | End: 2022-10-13 | Stop reason: HOSPADM

## 2022-10-12 RX ORDER — LIDOCAINE 40 MG/G
CREAM TOPICAL
Status: DISCONTINUED | OUTPATIENT
Start: 2022-10-12 | End: 2022-10-12 | Stop reason: HOSPADM

## 2022-10-12 RX ORDER — HYDRALAZINE HYDROCHLORIDE 20 MG/ML
10 INJECTION INTRAMUSCULAR; INTRAVENOUS EVERY 4 HOURS PRN
Status: DISCONTINUED | OUTPATIENT
Start: 2022-10-12 | End: 2022-10-13 | Stop reason: HOSPADM

## 2022-10-12 RX ORDER — FUROSEMIDE 20 MG
40 TABLET ORAL DAILY
Status: DISCONTINUED | OUTPATIENT
Start: 2022-10-12 | End: 2022-10-13 | Stop reason: HOSPADM

## 2022-10-12 RX ORDER — NALOXONE HYDROCHLORIDE 0.4 MG/ML
0.2 INJECTION, SOLUTION INTRAMUSCULAR; INTRAVENOUS; SUBCUTANEOUS
Status: ACTIVE | OUTPATIENT
Start: 2022-10-12 | End: 2022-10-12

## 2022-10-12 RX ORDER — FENTANYL CITRATE 50 UG/ML
INJECTION, SOLUTION INTRAMUSCULAR; INTRAVENOUS
Status: DISCONTINUED | OUTPATIENT
Start: 2022-10-12 | End: 2022-10-12 | Stop reason: HOSPADM

## 2022-10-12 RX ORDER — PROPRANOLOL HYDROCHLORIDE 20 MG/1
20 TABLET ORAL 2 TIMES DAILY
Status: DISCONTINUED | OUTPATIENT
Start: 2022-10-12 | End: 2022-10-13 | Stop reason: HOSPADM

## 2022-10-12 RX ORDER — ATORVASTATIN CALCIUM 40 MG/1
40 TABLET, FILM COATED ORAL DAILY
Status: DISCONTINUED | OUTPATIENT
Start: 2022-10-12 | End: 2022-10-13

## 2022-10-12 RX ORDER — ATORVASTATIN CALCIUM 10 MG/1
20 TABLET, FILM COATED ORAL AT BEDTIME
Status: DISCONTINUED | OUTPATIENT
Start: 2022-10-12 | End: 2022-10-12 | Stop reason: HOSPADM

## 2022-10-12 RX ORDER — ONDANSETRON 4 MG/1
4 TABLET, ORALLY DISINTEGRATING ORAL EVERY 6 HOURS PRN
Status: DISCONTINUED | OUTPATIENT
Start: 2022-10-12 | End: 2022-10-13 | Stop reason: HOSPADM

## 2022-10-12 RX ORDER — ASPIRIN 81 MG/1
81 TABLET, CHEWABLE ORAL ONCE
Status: DISCONTINUED | OUTPATIENT
Start: 2022-10-12 | End: 2022-10-13

## 2022-10-12 RX ORDER — IODIXANOL 320 MG/ML
INJECTION, SOLUTION INTRAVASCULAR
Status: DISCONTINUED | OUTPATIENT
Start: 2022-10-12 | End: 2022-10-12 | Stop reason: HOSPADM

## 2022-10-12 RX ORDER — ASPIRIN 81 MG/1
81 TABLET ORAL DAILY
Status: DISCONTINUED | OUTPATIENT
Start: 2022-10-13 | End: 2022-10-12 | Stop reason: HOSPADM

## 2022-10-12 RX ORDER — ASPIRIN 325 MG
325 TABLET ORAL ONCE
Status: COMPLETED | OUTPATIENT
Start: 2022-10-12 | End: 2022-10-12

## 2022-10-12 RX ORDER — PRASUGREL 10 MG/1
10 TABLET, FILM COATED ORAL DAILY
Status: DISCONTINUED | OUTPATIENT
Start: 2022-10-13 | End: 2022-10-13

## 2022-10-12 RX ORDER — NITROGLYCERIN 0.4 MG/1
0.4 TABLET SUBLINGUAL EVERY 5 MIN PRN
Status: DISCONTINUED | OUTPATIENT
Start: 2022-10-12 | End: 2022-10-13 | Stop reason: HOSPADM

## 2022-10-12 RX ORDER — FENTANYL CITRATE 50 UG/ML
25 INJECTION, SOLUTION INTRAMUSCULAR; INTRAVENOUS
Status: DISCONTINUED | OUTPATIENT
Start: 2022-10-12 | End: 2022-10-13 | Stop reason: HOSPADM

## 2022-10-12 RX ORDER — ONDANSETRON 2 MG/ML
4 INJECTION INTRAMUSCULAR; INTRAVENOUS EVERY 6 HOURS PRN
Status: DISCONTINUED | OUTPATIENT
Start: 2022-10-12 | End: 2022-10-13 | Stop reason: HOSPADM

## 2022-10-12 RX ORDER — ATORVASTATIN CALCIUM 40 MG/1
40 TABLET, FILM COATED ORAL DAILY
Qty: 90 TABLET | Refills: 3 | Status: SHIPPED | OUTPATIENT
Start: 2022-10-12 | End: 2022-10-13

## 2022-10-12 RX ORDER — PRAMIPEXOLE DIHYDROCHLORIDE 0.25 MG/1
0.25 TABLET ORAL 3 TIMES DAILY
Status: DISCONTINUED | OUTPATIENT
Start: 2022-10-12 | End: 2022-10-13 | Stop reason: HOSPADM

## 2022-10-12 RX ORDER — LORAZEPAM 0.5 MG/1
0.5 TABLET ORAL EVERY 6 HOURS PRN
Status: DISCONTINUED | OUTPATIENT
Start: 2022-10-12 | End: 2022-10-13 | Stop reason: HOSPADM

## 2022-10-12 RX ORDER — ACETAMINOPHEN 650 MG/1
650 SUPPOSITORY RECTAL EVERY 6 HOURS PRN
Status: DISCONTINUED | OUTPATIENT
Start: 2022-10-12 | End: 2022-10-12 | Stop reason: HOSPADM

## 2022-10-12 RX ORDER — BUSPIRONE HYDROCHLORIDE 10 MG/1
10 TABLET ORAL 2 TIMES DAILY
Status: DISCONTINUED | OUTPATIENT
Start: 2022-10-12 | End: 2022-10-13 | Stop reason: HOSPADM

## 2022-10-12 RX ORDER — CITALOPRAM HYDROBROMIDE 10 MG/1
40 TABLET ORAL DAILY
Status: DISCONTINUED | OUTPATIENT
Start: 2022-10-12 | End: 2022-10-13 | Stop reason: HOSPADM

## 2022-10-12 RX ORDER — ASPIRIN 81 MG/1
81 TABLET ORAL DAILY
Status: DISCONTINUED | OUTPATIENT
Start: 2022-10-13 | End: 2022-10-13 | Stop reason: HOSPADM

## 2022-10-12 RX ORDER — LEVOTHYROXINE SODIUM 88 UG/1
88 TABLET ORAL DAILY
Status: DISCONTINUED | OUTPATIENT
Start: 2022-10-13 | End: 2022-10-13 | Stop reason: HOSPADM

## 2022-10-12 RX ORDER — MAGNESIUM HYDROXIDE/ALUMINUM HYDROXICE/SIMETHICONE 120; 1200; 1200 MG/30ML; MG/30ML; MG/30ML
30 SUSPENSION ORAL EVERY 4 HOURS PRN
Status: DISCONTINUED | OUTPATIENT
Start: 2022-10-12 | End: 2022-10-12 | Stop reason: HOSPADM

## 2022-10-12 RX ORDER — AMLODIPINE BESYLATE 5 MG/1
5 TABLET ORAL AT BEDTIME
Status: DISCONTINUED | OUTPATIENT
Start: 2022-10-12 | End: 2022-10-12 | Stop reason: HOSPADM

## 2022-10-12 RX ORDER — SODIUM CHLORIDE 9 MG/ML
INJECTION, SOLUTION INTRAVENOUS CONTINUOUS
Status: DISCONTINUED | OUTPATIENT
Start: 2022-10-12 | End: 2022-10-12 | Stop reason: HOSPADM

## 2022-10-12 RX ORDER — HYDROCODONE BITARTRATE AND ACETAMINOPHEN 5; 325 MG/1; MG/1
1 TABLET ORAL DAILY PRN
Status: DISCONTINUED | OUTPATIENT
Start: 2022-10-12 | End: 2022-10-13 | Stop reason: HOSPADM

## 2022-10-12 RX ORDER — FLUMAZENIL 0.1 MG/ML
0.2 INJECTION, SOLUTION INTRAVENOUS
Status: ACTIVE | OUTPATIENT
Start: 2022-10-12 | End: 2022-10-12

## 2022-10-12 RX ORDER — NALOXONE HYDROCHLORIDE 0.4 MG/ML
0.4 INJECTION, SOLUTION INTRAMUSCULAR; INTRAVENOUS; SUBCUTANEOUS
Status: ACTIVE | OUTPATIENT
Start: 2022-10-12 | End: 2022-10-12

## 2022-10-12 RX ORDER — ACETAMINOPHEN 325 MG/1
650 TABLET ORAL EVERY 6 HOURS PRN
Status: DISCONTINUED | OUTPATIENT
Start: 2022-10-12 | End: 2022-10-12 | Stop reason: HOSPADM

## 2022-10-12 RX ORDER — NITROGLYCERIN 0.4 MG/1
0.4 TABLET SUBLINGUAL EVERY 5 MIN PRN
Status: DISCONTINUED | OUTPATIENT
Start: 2022-10-12 | End: 2022-10-12 | Stop reason: HOSPADM

## 2022-10-12 RX ORDER — PRASUGREL 5 MG/1
TABLET, FILM COATED ORAL
Status: DISCONTINUED | OUTPATIENT
Start: 2022-10-12 | End: 2022-10-12 | Stop reason: HOSPADM

## 2022-10-12 RX ORDER — TRAZODONE HYDROCHLORIDE 50 MG/1
150 TABLET, FILM COATED ORAL AT BEDTIME
Status: DISCONTINUED | OUTPATIENT
Start: 2022-10-12 | End: 2022-10-13 | Stop reason: HOSPADM

## 2022-10-12 RX ORDER — VITAMIN B COMPLEX
2000 TABLET ORAL DAILY
Status: DISCONTINUED | OUTPATIENT
Start: 2022-10-12 | End: 2022-10-13 | Stop reason: HOSPADM

## 2022-10-12 RX ORDER — AMLODIPINE BESYLATE 5 MG/1
5 TABLET ORAL AT BEDTIME
Status: DISCONTINUED | OUTPATIENT
Start: 2022-10-12 | End: 2022-10-13 | Stop reason: HOSPADM

## 2022-10-12 RX ORDER — PROPRANOLOL HYDROCHLORIDE 20 MG/1
20 TABLET ORAL ONCE
Status: COMPLETED | OUTPATIENT
Start: 2022-10-12 | End: 2022-10-12

## 2022-10-12 RX ORDER — PRAMIPEXOLE DIHYDROCHLORIDE 0.25 MG/1
0.25 TABLET ORAL 3 TIMES DAILY
Status: DISCONTINUED | OUTPATIENT
Start: 2022-10-12 | End: 2022-10-13

## 2022-10-12 RX ORDER — ATROPINE SULFATE 0.1 MG/ML
0.5 INJECTION INTRAVENOUS
Status: ACTIVE | OUTPATIENT
Start: 2022-10-12 | End: 2022-10-12

## 2022-10-12 RX ORDER — FENTANYL CITRATE 50 UG/ML
25 INJECTION, SOLUTION INTRAMUSCULAR; INTRAVENOUS
Status: DISCONTINUED | OUTPATIENT
Start: 2022-10-12 | End: 2022-10-12 | Stop reason: HOSPADM

## 2022-10-12 RX ORDER — DIAZEPAM 5 MG
5 TABLET ORAL
Status: COMPLETED | OUTPATIENT
Start: 2022-10-12 | End: 2022-10-12

## 2022-10-12 RX ORDER — HEPARIN SODIUM 1000 [USP'U]/ML
INJECTION, SOLUTION INTRAVENOUS; SUBCUTANEOUS
Status: DISCONTINUED | OUTPATIENT
Start: 2022-10-12 | End: 2022-10-12 | Stop reason: HOSPADM

## 2022-10-12 RX ADMIN — LORAZEPAM 0.5 MG: 0.5 TABLET ORAL at 23:19

## 2022-10-12 RX ADMIN — Medication 2000 UNITS: at 22:06

## 2022-10-12 RX ADMIN — PRAMIPEXOLE DIHYDROCHLORIDE 0.25 MG: 0.25 TABLET ORAL at 22:11

## 2022-10-12 RX ADMIN — BUSPIRONE HYDROCHLORIDE 10 MG: 10 TABLET ORAL at 22:13

## 2022-10-12 RX ADMIN — PROPRANOLOL HYDROCHLORIDE 20 MG: 20 TABLET ORAL at 22:13

## 2022-10-12 RX ADMIN — CITALOPRAM HYDROBROMIDE 40 MG: 10 TABLET ORAL at 22:05

## 2022-10-12 RX ADMIN — ATORVASTATIN CALCIUM 40 MG: 40 TABLET, FILM COATED ORAL at 19:17

## 2022-10-12 RX ADMIN — NITROGLYCERIN 0.4 MG: 0.4 TABLET SUBLINGUAL at 00:06

## 2022-10-12 RX ADMIN — AMLODIPINE BESYLATE 5 MG: 5 TABLET ORAL at 22:06

## 2022-10-12 RX ADMIN — PRAMIPEXOLE DIHYDROCHLORIDE 0.25 MG: 0.25 TABLET ORAL at 13:00

## 2022-10-12 RX ADMIN — PROPRANOLOL HYDROCHLORIDE 20 MG: 20 TABLET ORAL at 02:11

## 2022-10-12 RX ADMIN — ASPIRIN 325 MG: 325 TABLET, FILM COATED ORAL at 10:30

## 2022-10-12 RX ADMIN — IRBESARTAN 300 MG: 300 TABLET ORAL at 22:13

## 2022-10-12 RX ADMIN — TRAZODONE HYDROCHLORIDE 150 MG: 50 TABLET ORAL at 22:05

## 2022-10-12 RX ADMIN — DIAZEPAM 5 MG: 5 TABLET ORAL at 13:00

## 2022-10-12 ASSESSMENT — ACTIVITIES OF DAILY LIVING (ADL)
ADLS_ACUITY_SCORE: 20
ADLS_ACUITY_SCORE: 22
DIFFICULTY_EATING/SWALLOWING: NO
TOILETING_ISSUES: NO
ADLS_ACUITY_SCORE: 35
ADLS_ACUITY_SCORE: 35
CHANGE_IN_FUNCTIONAL_STATUS_SINCE_ONSET_OF_CURRENT_ILLNESS/INJURY: NO
WALKING_OR_CLIMBING_STAIRS_DIFFICULTY: NO
ADLS_ACUITY_SCORE: 35
ADLS_ACUITY_SCORE: 35
WEAR_GLASSES_OR_BLIND: YES
FALL_HISTORY_WITHIN_LAST_SIX_MONTHS: NO
ADLS_ACUITY_SCORE: 35
CONCENTRATING,_REMEMBERING_OR_MAKING_DECISIONS_DIFFICULTY: NO
ADLS_ACUITY_SCORE: 35
ADLS_ACUITY_SCORE: 35
DOING_ERRANDS_INDEPENDENTLY_DIFFICULTY: NO
ADLS_ACUITY_SCORE: 35
DRESSING/BATHING_DIFFICULTY: NO
ADLS_ACUITY_SCORE: 35
ADLS_ACUITY_SCORE: 20

## 2022-10-12 ASSESSMENT — ENCOUNTER SYMPTOMS
VOMITING: 0
NAUSEA: 1
DIAPHORESIS: 1
SHORTNESS OF BREATH: 1
LIGHT-HEADEDNESS: 1

## 2022-10-12 NOTE — TELEPHONE ENCOUNTER
Deisy Mejia 15 minutes ago (8:45 AM)       Appt is cancelled.  Thank you       You  [facility] Scheduling Registration Pool - e 23 minutes ago (8:37 AM)     LUDY  Looks like she is admitted. Please cancel her appt tomorrow.   Thank you!

## 2022-10-12 NOTE — Clinical Note
The first balloon was inserted into the circumflex.Max pressure = 20 aquiles. Total duration = 8 seconds.

## 2022-10-12 NOTE — PLAN OF CARE
"Vss. A&O. Pain rated 1 on 0-10 pain scale. NPO status maintained. Pt transferring to [facility] for a CV coronary angio via medical transport.     PRIMARY DIAGNOSIS: \"GENERIC\" NURSING  OUTPATIENT/OBSERVATION GOALS TO BE MET BEFORE DISCHARGE:  ADLs back to baseline: No    Activity and level of assistance: Up with standby assistance.    Pain status: Improved-controlled with oral pain medications.    Return to near baseline physical activity: No     Discharge Planner Nurse   Safe discharge environment identified: No  Barriers to discharge: Yes (medical clearance)       Entered by: Valeria Cuello RN 10/12/2022 9:25 AM     Please review provider order for any additional goals.   Nurse to notify provider when observation goals have been met and patient is ready for discharge.  "

## 2022-10-12 NOTE — H&P
"[facility] MEDICINE ADMISSION HISTORY AND PHYSICAL     Assessment & Plan       Suma Mark is a 72 year old female who presented with complaints of left-sided chest pain radiating to the back.  Has been undergoing outpatient evaluation for chest pain and had CT calcium screening that showed very high score.  She was supposed to have a CT coronary angiogram but this could not be completed due to the density of the calcification.    Medical history is notable for hypertension, hyperlipidemia, reflux.    Initial evaluation revealed slight hypertension, undetectable troponin, normal CBC.  Chest x-ray was negative.  EKG with some slight lateral ST depression.    Initial treatment included propranolol.      Assessment and plan:  Chest pain  She did have a negative stress test in February  CT calcium score was recently notable for dense calcifications precluding CT coronary angiogram  We will ask for cardiology recommendations in the morning  Nitro as needed  Continue to trend troponins  Check D-dimer, CTA chest if markedly elevated    Atrial fibrillation  Per ED RN notes patient was in A. fib with RVR initially but converted spontaneously  She does not have a pre-existing diagnosis of this  Continue to monitor on telemetry    Anxiety  Essential hypertension  Hyperlipidemia  Gastroesophageal reflux disease  Essential hypertension    Clinically Significant Risk Factors Present on Admission                 # Hypertension: home medication list includes antihypertensive(s)    # Obesity: Estimated body mass index is 34.77 kg/m  as calculated from the following:    Height as of this encounter: 1.549 m (5' 1\").    Weight as of 9/28/22: 83.5 kg (184 lb).          DVTP: Mechanical Prophylaxis/ Sequential Compression Devices  Code Status: Full Code  Disposition: Observation   Expected LOS: 1 day  Goals for the hospitalization: Cardiology evaluation, possible transfer for angiogram.  Diet: " Cardiac  Fluids: None    Disposition Plan      Expected Discharge Date: 10/13/2022               Chief Complaint  chest pain     HISTORY   Suma Mark is a 72 year old female who presented with complaints of chest pain.  Symptoms have been intermittent for the last couple of months.  She has been following with her primary care provider regarding this.  Pain is central, described as a pressure, associated with diaphoresis and nausea.  Also radiates to the arm.  No fever, cough, shortness of breath currently.  She does get short of breath when the pain is severe.  No abdominal pain.  No melena or hematochezia.  No dysuria currently.  She does have some slight lower extremity edema.      Past Medical History     Past Medical History:  3/12/2009: Acquired hypothyroidism  No date: Anxiety  No date: Anxiety state, unspecified  No date: Back pain  No date: Breast cyst  No date: Carpal tunnel syndrome  No date: Chronic pain      Comment:  LOW BACK  No date: Degenerative disc disease, lumbar  No date: Depression  No date: Depression, unspecified depression type  No date: Diarrhea  No date: Disease of thyroid gland      Comment:  hypothyroid  10/4/2017: Diverticulosis      Comment:  Incidental finding on colonoscopy 10/2017  No date: DJD (degenerative joint disease)  4/21/2015: Essential hypertension  No date: Essential hypertension, benign  No date: Fatty liver  No date: Fibromyalgia  No date: Gastro-oesophageal reflux disease  4/21/2015: Gastroesophageal reflux disease without esophagitis  No date: GERD (gastroesophageal reflux disease)  4/27/2017: Hernia, ventral  No date: History of anesthesia complications      Comment:  hypotension after surgery  No date: History of blood clots  No date: History of blood transfusion  No date: History of transfusion  No date: Hyperlipidemia  No date: Hypertension  No date: Insomnia, unspecified type  No date: Left lower quadrant pain  No date: Lumbago  No date: Lung nodules       Comment:  INTERMITTENT  No date: Major depression  No date: Migraine  No date: Osteoarthritis  No date: Osteopenia  No date: Osteoporosis  No date: Other abnormal glucose  No date: Other and unspecified special symptom or syndrome, not   elsewhere classified  No date: PONV (postoperative nausea and vomiting)  No date: Posttraumatic stress disorder  No date: Raynaud's disease  No date: Restless leg syndrome  11/27/2017: S/P total knee replacement  No date: Spontaneous ecchymoses  No date: Thrombosis of leg      Comment:  with pregnancy  No date: Unspecified episodic mood disorder      Comment:  AFFECTIVE PSYCHOSIS  No date: Unspecified hypothyroidism  No date: Unspecified vitamin D deficiency  No date: Varicose veins  No date: Ventral hernia  No date: Vitamin D deficiency     Surgical History     Past Surgical History:   Procedure Laterality Date     ARTHROCENTESIS      LEFT HIP TROCHANTERIC BURSA     ARTHROPLASTY KNEE UNICOMPARTMENT  10/31/2013    Procedure: ARTHROPLASTY KNEE UNICOMPARTMENT;  LEFT KNEE UNICOMPARTMENTAL ARTHROPLASTY (CAROLINE)^;  Surgeon: Chi Mittal MD;  Location: [facility] OR     ARTICULAR DEBRIEDEMENT[      LEFT KNEE     BACK SURGERY       BREAST CYST EXCISION       BREAST SURGERY      bx     COLECTOMY N/A 6/2/2017    Procedure: RESECTION, SMALL INTESTINE, OPEN ADHESIOLYSIS;  Surgeon: Keyon Qureshi MD;  Location: [facility];  Service:      COLON SURGERY       ENDOSCOPIC RETROGRADE CHOLANGIOPANCREATOGRAPHY       ENDOSCOPIC STRIPPING VEIN(S)      BILATERLY     GENITOURINARY SURGERY      bladder sling     GI SURGERY      hemorrhoidectomy     HEMORRHOID SURGERY       HERNIORRHAPHY INCISIONAL (LOCATION) N/A 6/2/2017    Procedure: LAPARASCOPIC CONVERTED TO OPEN PRIMARY INCISIONAL HERNIA REPAIR;  Surgeon: Keyon Qureshi MD;  Location: [facility];  Service:      HYSTERECTOMY  [id number]     HYSTERECTOMY VAGINAL       INCONTINENCE SURGERY       LAMINECTOMY LUMBAR TWO LEVELS      2003  "    LUMBAR FUSION       LUMBAR HARDWARE REMOVAL[       ORTHOPEDIC SURGERY      carpal tunnel     HI LAP,DIAGNOSTIC ABDOMEN N/A 8/22/2019    Procedure: DIAGNOSTIC LAPAROSCOPY, LYSIS OF ADHESION;  Surgeon: Svetlana Ron MD;  Location: [facility] Main OR;  Service: General     RELEASE CARPAL TUNNEL       STRIP VEIN       TRANSARTHISCOPIC SURGERY[      LEFT KNEE     US THYROID BIOPSY  4/19/2019     VEIN LIGATION AND STRIPPING Bilateral      ZZC TOTAL KNEE ARTHROPLASTY Right 11/27/2017    Procedure:  RIGHT TOTAL KNEE ARTHROPLASTY;  Surgeon: Kevin Ryder MD;  Location: [facility] Main OR;  Service: Orthopedics     Family History      Family History   Problem Relation Age of Onset     Dementia Mother      Arthritis Mother      Chronic Obstructive Pulmonary Disease Father      Cardiovascular Father      Hypertension Father      No Known Problems Sister      No Known Problems Daughter      No Known Problems Son      Cerebrovascular Disease Maternal Grandmother      Heart Disease Paternal Grandmother      Cerebrovascular Disease Paternal Grandmother      No Known Problems Sister      No Known Problems Sister      No Known Problems Son      No Known Problems Daughter      Breast Cancer Paternal Aunt         age in 50's      Social History      Social History     Tobacco Use     Smoking status: Never     Smokeless tobacco: Never   Substance Use Topics     Alcohol use: Yes     Comment: Alcoholic Drinks/day: 1 cocktail daily     Drug use: No      Allergies     Allergies   Allergen Reactions     Cephalexin Nausea and Vomiting     Ace Inhibitors Other (See Comments)     Elevates blood pressure     Amitriptyline Hcl Other (See Comments)     elevates blood pressure     Atenolol Other (See Comments)     Aggravates reynauds     Celebrex [Celecoxib] GI Disturbance     Cephalosporins Unknown     Other reaction(s): *Unknown  HUT Comment: Pt doesn't remember   Pt doesn't remember        Clonidine Unknown     \"got very ill in ER\"     " "Codeine Sulfate Nausea and Vomiting     Darvocet [Propoxyphene N-Apap] Nausea and Vomiting     Dexamethasone Acetate Swelling     Erythromycin Nausea and Vomiting     Escitalopram Other (See Comments)     Other reaction(s): Headache  Headaches.       Gabapentin Unknown     \"Spotted\"     Hctz Unknown     \"depletes my sodium\"     Propoxyphene      Other reaction(s): Gastrointestinal  HUT Reaction: Gastrointestinal; HUT Reaction: Nausea And Vomiting; HUT Noted: [id number]     Tramadol Swelling     Swelling of tongue and face     Triamterene      Ultracet Other (See Comments)     Venlafaxine Nausea and Vomiting and Diarrhea     Zolpidem Other (See Comments) and Unknown     Other reaction(s): *Unknown  HUT Comment: Pt doesn't rember  Pt doesn't rember       Zolpidem Tartrate Unknown     Bacitracin Itching and Rash     Sulfanilamide Rash     Prior to Admission Medications      Prior to Admission Medications   Prescriptions Last Dose Informant Patient Reported? Taking?   HYDROcodone-acetaminophen (NORCO) 5-325 MG tablet   No No   Sig: Take 1 tablet by mouth daily as needed (Severe headache)   LORazepam (ATIVAN) 0.5 MG tablet   No No   Sig: Take 1 tablet (0.5 mg) by mouth every 6 hours as needed for anxiety   amLODIPine (NORVASC) 5 MG tablet   No No   Sig: TAKE 1 TABLET BY MOUTH EVERYDAY AT BEDTIME   atorvastatin (LIPITOR) 20 MG tablet   No No   Sig: TAKE 1 TABLET BY MOUTH EVERYDAY AT BEDTIME   botulinum toxin type A (BOTOX) 100 units injection   Yes No   Sig: Every 3 months. [facility]   busPIRone (BUSPAR) 10 MG tablet   No No   Sig: Take 1 tablet (10 mg) by mouth 2 times daily   cholecalciferol 50 MCG (2000 UT) CAPS   Yes No   Sig: Take 2,000 Units by mouth   citalopram (CELEXA) 20 MG tablet   No No   Sig: Take 2 tablets (40 mg) by mouth daily   eletriptan (RELPAX) 40 MG tablet   No No   Sig: Take 1 tablet (40 mg) by mouth at onset of headache for migraine   furosemide (LASIX) 20 MG tablet   No No   Sig: Take 2 tablets " (40 mg) by mouth daily   irbesartan (AVAPRO) 300 MG tablet   No No   Sig: Take 1 tablet (300 mg) by mouth daily   levothyroxine (SYNTHROID/LEVOTHROID) 88 MCG tablet   No No   Sig: TAKE 1 TABLET BY MOUTH DAILY AT 6:00 AM.   pramipexole (MIRAPEX) 0.25 MG tablet   No No   Sig: TAKE 1 TABLET BY MOUTH THREE TIMES A DAY.   propranolol (INDERAL) 10 MG tablet   No No   Sig: TAKE 2 TABLETS BY MOUTH TWO TIMES A DAY.   traZODone (DESYREL) 50 MG tablet   No No   Sig: TAKE 3 TABLETS (150 MG TOTAL) BY MOUTH AT BEDTIME      Facility-Administered Medications Last Administration Doses Remaining   denosumab (PROLIA) injection 60 mg 6/8/2022  8:55 AM 1         Review of Systems     A 12 point comprehensive review of systems was negative except as noted above in HPI.    PHYSICAL EXAMINATION     Vitals      Temp:  [97.3  F (36.3  C)] 97.3  F (36.3  C)  Pulse:  [] 70  Resp:  [16-30] 25  BP: (122-148)/(59-83) 148/70  SpO2:  [93 %-97 %] 95 %    Examination   Physical Exam:    Gen: no acute distress, comfortable, alert, pleasant  ENT: no scleral icterus  Pulm: lungs are clear without wheezing, crackles, breathing comfortably at rest  CV: regular rate and rhythm, trace lower extremity pitting edema  GI: abdomen is soft, non-tender, non-distended with active bowel sounds.  MSK: no obvious deformities of the extremities  Derm: Not pale, no jaundice  Psych: appropriate affect      Pertinent Radiology     Radiology Results:   Recent Results (from the past 24 hour(s))   XR Chest 2 Views    Narrative    EXAM: XR CHEST 2 VIEWS  LOCATION: [facility]  DATE/TIME: 10/12/2022 12:46 AM    INDICATION: chest pain  COMPARISON: 02/09/2022      Impression    IMPRESSION: Negative chest.     EKG Results: personally reviewed.     Advance Care Planning        Austin Finn DO  [facility]  [facility]   Phone: #[phone]

## 2022-10-12 NOTE — Clinical Note
The first balloon was inserted into the circumflex and middle circumflex.Max pressure = 14 aquiles. Total duration = 14 seconds.

## 2022-10-12 NOTE — Clinical Note
The first balloon was inserted into the circumflex and middle circumflex.Max pressure = 18 aquiles. Total duration = 10 seconds.

## 2022-10-12 NOTE — Clinical Note
The first balloon was inserted into the circumflex and middle circumflex.Max pressure = 6 aquiles. Total duration = 13 seconds.

## 2022-10-12 NOTE — Clinical Note
The first balloon was inserted into the circumflex.Max pressure = 12 aquiles. Total duration = 28 seconds.

## 2022-10-12 NOTE — ED TRIAGE NOTES
"Patient presents with left sided chest pain radiating to the back that started at 2200; describes as heavy, tight, and sharp; was seen yesterday and found to have \"very high\" calcium, paperwork brought with patient shows results of a CT calcium screening; patient was instructed to come to the ER if she developed chest pain. Endorses SOB, denies lightheadedness;       "

## 2022-10-12 NOTE — PRE-PROCEDURE
GENERAL PRE-PROCEDURE:   Procedure:  Coronary angiogram with possible PCI  Date/Time:  10/12/2022 10:58 AM    Written consent obtained?: Yes    Risks and benefits: Risks, benefits and alternatives were discussed    Consent given by:  Patient  Patient states understanding of procedure being performed: Yes    Patient's understanding of procedure matches consent: Yes    Procedure consent matches procedure scheduled: Yes    Expected level of sedation:  Moderate  Appropriately NPO:  Yes  ASA Class:  3 (unstable angina, new onset atrial fibrillation, HTN, HLD, Class I obesity; BMI 34.77kg/m2, probable DONN)  Mallampati  :  Grade 3- soft palate visible, posterior pharyngeal wall not visible  Lungs:  Lungs clear with good breath sounds bilaterally  Heart:  Normal heart sounds and rate  History & Physical reviewed:  History and physical reviewed and no updates needed  Statement of review:  I have reviewed the lab findings, diagnostic data, medications, and the plan for sedation

## 2022-10-12 NOTE — Clinical Note
The first balloon was inserted into the circumflex and middle circumflex.Max pressure = 6 aquiles. Total duration = 25 seconds.

## 2022-10-12 NOTE — ED PROVIDER NOTES
EMERGENCY DEPARTMENT ENCOUNTER      NAME: Suma Mark  AGE: 72 year old female  YOB: 1950  MRN: [id number]  EVALUATION DATE & TIME: 10/11/2022 11:25 PM    PCP: Bernadette Taylor    ED PROVIDER: Maria C Jacob M.D.      Chief Complaint   Patient presents with     Chest Pain       FINAL IMPRESSION:  1. Chest pain, unspecified type        ED COURSE & MEDICAL DECISION MAKING:    Pertinent Labs & Imaging studies reviewed. (See chart for details)  72 year old female presents to the Emergency Department for evaluation of chest pain.  The patient has had on and off chest pain for the last 2 months.  She reports that this episode started at 10 PM when she was sitting.  It is a tightness banding across her chest, radiating to her left arm.  She feels slightly short of breath, diaphoretic, lightheaded with it.  She had a CT Coronary Calcium Scan done which placed her in high risk and has a follow-up appointment with cardiology on Thursday.  Given the severity and the persistence of her symptoms today she presented to the emergency department.  ECG with no acute ischemic changes.  Initial troponin is negative.  Given the patient's significant risk factors and concerning symptoms, the patient will be admitted for ACS rule out.    Medical Decision Making    Supplemental history from: N/A    External Record(s) Reviewed: Outpatient Record: CT Coronary Calcium Scan    Differential Diagnosis: See MDM charting for differential considered.     I performed an independent interpretation of the: EKG: See my documentation.    Discussed with radiology regarding test interpretation: N/A    Discussion of management with another provider: N/A    The following testing was considered but ultimately not selected: None    I considered prescription management with: N/A    The patient's care impacted: None and Coronary Heart Disease    Consideration of Admission/Observation: Yes: Observation    Care significantly affected by  Social Determinants of Health including: N/A       11:31 PM I met with the patient, obtained history, performed an initial exam, and discussed options and plan for diagnostics and treatment here in the ED.  1:45 AM Spoke with Dr. Finn, hospitalist, who accepts patient for admission.    At the conclusion of the encounter I discussed the results of all of the tests and the disposition. The questions were answered. The patient or family acknowledged understanding and was agreeable with the care plan.        MEDICATIONS GIVEN IN THE EMERGENCY:  Medications   aspirin (ASA) chewable tablet 324 mg (324 mg Oral Not Given 10/12/22 0005)   amLODIPine (NORVASC) tablet 5 mg (has no administration in time range)   atorvastatin (LIPITOR) tablet 20 mg (has no administration in time range)   nitroGLYcerin (NITROSTAT) sublingual tablet 0.4 mg (0.4 mg Sublingual Given 10/12/22 0006)   propranolol (INDERAL) tablet 20 mg (20 mg Oral Given 10/12/22 0211)       NEW PRESCRIPTIONS STARTED AT TODAY'S ER VISIT  New Prescriptions    No medications on file     =================================================================    HPI    Patient information was obtained from: patient    Use of : N/A    Suma Mark is a 72 year old female with a pertinent history of anxiety, HLD, HTN, who presents to this ED by walk in for evaluation of chest pain. Patient reports around 10:00 PM this evening while just sitting she developed diffuse chest pain described as tightness that extends to a band across her back as well. She reports pain is most significant in her left chest and has aching down her left arm. Pain was severe at 10 PM when first starting and has been constant since, but is now starting to let up. Currently rates at 5/10. Patient reports that she has been having episodes like this for the last 2 months, but the severity tonight started at 7/10 which is more than usual, and the pain has been more persistent tonight which had  patient concerned. She feels short of breath, lightheaded and clammy when she gets up with episodes like this tonight. Has also been feeling slightly nauseous, but no vomiting.    Patient reports that she has seen her PCP for episodes like this in the past. Reports that she had 2 stress tests in the past that were negative, but she is unsure of the date of last test. Reports that PCP had her try taking lorazepam and try deep breathing with episodes like this. Patient mentions that she also had a CT calcium score done in the last few days and because of it has cardiology follow up on Thursday (10/13/22) because she was placed in high risk group.    Patient is not a smoker. Endorses history of HTN and high cholesterol. Denies family history of premature CAD. Her dad had a MI in late age. She otherwise denies any complaints or concerns.    Per chart review, patient with a total calcium score of 1040 which places the patient at high risk for ACS.    Most recent Lexiscan stress test completed on 2/14/22. Results below.    The nuclear stress test is negative for inducible myocardial ischemia or infarction.    The left ventricular ejection fraction at stress is greater than 70%.    The patient is at a low risk of future cardiac ischemic events.    A prior study was conducted on 4/16/2019. This study has no change when compared with the prior study.    REVIEW OF SYSTEMS  Review of Systems   Constitutional: Positive for diaphoresis (clammy).   Respiratory: Positive for shortness of breath.    Cardiovascular: Positive for chest pain.   Gastrointestinal: Positive for nausea. Negative for vomiting.   Neurological: Positive for light-headedness.   All other systems reviewed and are negative.      PAST MEDICAL HISTORY:  Past Medical History:   Diagnosis Date     Acquired hypothyroidism 3/12/2009     Anxiety      Anxiety state, unspecified      Back pain      Breast cyst      Carpal tunnel syndrome      Chronic pain     LOW BACK      Degenerative disc disease, lumbar      Depression      Depression, unspecified depression type      Diarrhea      Disease of thyroid gland     hypothyroid     Diverticulosis 10/4/2017    Incidental finding on colonoscopy 10/2017     DJD (degenerative joint disease)      Essential hypertension 4/21/2015     Essential hypertension, benign      Fatty liver      Fibromyalgia      Gastro-oesophageal reflux disease      Gastroesophageal reflux disease without esophagitis 4/21/2015     GERD (gastroesophageal reflux disease)      Hernia, ventral 4/27/2017     History of anesthesia complications     hypotension after surgery     History of blood clots      History of blood transfusion      History of transfusion      Hyperlipidemia      Hypertension      Insomnia, unspecified type      Left lower quadrant pain      Lumbago      Lung nodules     INTERMITTENT     Major depression      Migraine      Osteoarthritis      Osteopenia      Osteoporosis      Other abnormal glucose      Other and unspecified special symptom or syndrome, not elsewhere classified      PONV (postoperative nausea and vomiting)      Posttraumatic stress disorder      Raynaud's disease      Restless leg syndrome      S/P total knee replacement 11/27/2017     Spontaneous ecchymoses      Thrombosis of leg     with pregnancy     Unspecified episodic mood disorder     AFFECTIVE PSYCHOSIS     Unspecified hypothyroidism      Unspecified vitamin D deficiency      Varicose veins      Ventral hernia      Vitamin D deficiency        PAST SURGICAL HISTORY:  Past Surgical History:   Procedure Laterality Date     ARTHROCENTESIS      LEFT HIP TROCHANTERIC BURSA     ARTHROPLASTY KNEE UNICOMPARTMENT  10/31/2013    Procedure: ARTHROPLASTY KNEE UNICOMPARTMENT;  LEFT KNEE UNICOMPARTMENTAL ARTHROPLASTY (CAROLINE)^;  Surgeon: Chi Mittal MD;  Location: [facility] OR     ARTICULAR DEBRIEDEMENT[      LEFT KNEE     BACK SURGERY       BREAST CYST EXCISION       BREAST SURGERY       bx     COLECTOMY N/A 6/2/2017    Procedure: RESECTION, SMALL INTESTINE, OPEN ADHESIOLYSIS;  Surgeon: Keyon Qureshi MD;  Location: [facility] OR;  Service:      COLON SURGERY       ENDOSCOPIC RETROGRADE CHOLANGIOPANCREATOGRAPHY       ENDOSCOPIC STRIPPING VEIN(S)      BILATERLY     GENITOURINARY SURGERY      bladder sling     GI SURGERY      hemorrhoidectomy     HEMORRHOID SURGERY       HERNIORRHAPHY INCISIONAL (LOCATION) N/A 6/2/2017    Procedure: LAPARASCOPIC CONVERTED TO OPEN PRIMARY INCISIONAL HERNIA REPAIR;  Surgeon: Keyon Qureshi MD;  Location: [facility] OR;  Service:      HYSTERECTOMY  [id number]     HYSTERECTOMY VAGINAL       INCONTINENCE SURGERY       LAMINECTOMY LUMBAR TWO LEVELS      2003     LUMBAR FUSION       LUMBAR HARDWARE REMOVAL[       ORTHOPEDIC SURGERY      carpal tunnel     MO LAP,DIAGNOSTIC ABDOMEN N/A 8/22/2019    Procedure: DIAGNOSTIC LAPAROSCOPY, LYSIS OF ADHESION;  Surgeon: Svetlana Ron MD;  Location: [facility] OR;  Service: General     RELEASE CARPAL TUNNEL       STRIP VEIN       TRANSARTHISCOPIC SURGERY[      LEFT KNEE     US THYROID BIOPSY  4/19/2019     VEIN LIGATION AND STRIPPING Bilateral      ZZC TOTAL KNEE ARTHROPLASTY Right 11/27/2017    Procedure:  RIGHT TOTAL KNEE ARTHROPLASTY;  Surgeon: Kevin Ryder MD;  Location: [facility];  Service: Orthopedics       CURRENT MEDICATIONS:    amLODIPine (NORVASC) 5 MG tablet  atorvastatin (LIPITOR) 20 MG tablet  botulinum toxin type A (BOTOX) 100 units injection  busPIRone (BUSPAR) 10 MG tablet  cholecalciferol 50 MCG (2000 UT) CAPS  citalopram (CELEXA) 20 MG tablet  eletriptan (RELPAX) 40 MG tablet  furosemide (LASIX) 20 MG tablet  HYDROcodone-acetaminophen (NORCO) 5-325 MG tablet  irbesartan (AVAPRO) 300 MG tablet  levothyroxine (SYNTHROID/LEVOTHROID) 88 MCG tablet  LORazepam (ATIVAN) 0.5 MG tablet  pramipexole (MIRAPEX) 0.25 MG tablet  propranolol (INDERAL) 10 MG tablet  traZODone (DESYREL) 50 MG  "tablet      ALLERGIES:  Allergies   Allergen Reactions     Cephalexin Nausea and Vomiting     Ace Inhibitors Other (See Comments)     Elevates blood pressure     Amitriptyline Hcl Other (See Comments)     elevates blood pressure     Atenolol Other (See Comments)     Aggravates reynauds     Celebrex [Celecoxib] GI Disturbance     Cephalosporins Unknown     Other reaction(s): *Unknown  HUT Comment: Pt doesn't remember   Pt doesn't remember        Clonidine Unknown     \"got very ill in ER\"     Codeine Sulfate Nausea and Vomiting     Darvocet [Propoxyphene N-Apap] Nausea and Vomiting     Dexamethasone Acetate Swelling     Erythromycin Nausea and Vomiting     Escitalopram Other (See Comments)     Other reaction(s): Headache  Headaches.       Gabapentin Unknown     \"Spotted\"     Hctz Unknown     \"depletes my sodium\"     Propoxyphene      Other reaction(s): Gastrointestinal  HUT Reaction: Gastrointestinal; HUT Reaction: Nausea And Vomiting; HUT Noted: [id number]     Tramadol Swelling     Swelling of tongue and face     Triamterene      Ultracet Other (See Comments)     Venlafaxine Nausea and Vomiting and Diarrhea     Zolpidem Other (See Comments) and Unknown     Other reaction(s): *Unknown  HUT Comment: Pt doesn't rember  Pt doesn't rember       Zolpidem Tartrate Unknown     Bacitracin Itching and Rash     Sulfanilamide Rash       FAMILY HISTORY:  Family History   Problem Relation Age of Onset     Dementia Mother      Arthritis Mother      Chronic Obstructive Pulmonary Disease Father      Cardiovascular Father      Hypertension Father      No Known Problems Sister      No Known Problems Daughter      No Known Problems Son      Cerebrovascular Disease Maternal Grandmother      Heart Disease Paternal Grandmother      Cerebrovascular Disease Paternal Grandmother      No Known Problems Sister      No Known Problems Sister      No Known Problems Son      No Known Problems Daughter      Breast Cancer Paternal Aunt         age in " "50's       SOCIAL HISTORY:   Social History     Socioeconomic History     Marital status:    Tobacco Use     Smoking status: Never     Smokeless tobacco: Never   Substance and Sexual Activity     Alcohol use: Yes     Comment: Alcoholic Drinks/day: 1 cocktail daily     Drug use: No       VITALS:  BP (!) 142/65   Pulse 96   Temp 97.3  F (36.3  C) (Oral)   Resp 19   Ht 1.549 m (5' 1\")   LMP  (LMP Unknown)   SpO2 97%   BMI 34.77 kg/m      PHYSICAL EXAM   Constitutional: Well developed, Well nourished, NAD  HENT: Normocephalic, Atraumatic, Bilateral external ears normal, Oropharynx normal, mucous membranes moist, Nose normal.   Neck- Normal range of motion, No tenderness, Supple, No stridor.  Eyes: PERRL, EOMI, Conjunctiva normal, No discharge.   Respiratory: Normal breath sounds, No respiratory distress  Cardiovascular: Normal heart rate, Regular rhythm  GI: Bowel sounds normal, Soft, No tenderness,   Musculoskeletal: No edema. Good range of motion in all major joints. No tenderness to palpation or major deformities noted.   Integument: Warm, Dry, No erythema, No rash  Neurologic: Alert & oriented x 3, Normal motor function, Normal sensory function, No focal deficits noted. Normal gait.   Psychiatric: Affect normal, Judgment normal, Mood normal.    LAB:  All pertinent labs reviewed and interpreted.  Results for orders placed or performed during the hospital encounter of 10/11/22   XR Chest 2 Views    Impression    IMPRESSION: Negative chest.   Extra Blue Top Tube   Result Value Ref Range    Hold Specimen JIC    Extra Red Top Tube   Result Value Ref Range    Hold Specimen JIC    Extra Green Top (Lithium Heparin) Tube   Result Value Ref Range    Hold Specimen JIC    Extra Purple Top Tube   Result Value Ref Range    Hold Specimen JIC    Basic metabolic panel   Result Value Ref Range    Sodium 137 136 - 145 mmol/L    Potassium 4.1 3.5 - 5.0 mmol/L    Chloride 101 98 - 107 mmol/L    Carbon Dioxide (CO2) 21 (L) " 22 - 31 mmol/L    Anion Gap 15 5 - 18 mmol/L    Urea Nitrogen 22 8 - 28 mg/dL    Creatinine 0.97 0.60 - 1.10 mg/dL    Calcium 10.1 8.5 - 10.5 mg/dL    Glucose 171 (H) 70 - 125 mg/dL    GFR Estimate 61 >60 mL/min/1.73m2   Result Value Ref Range    Troponin I <0.01 0.00 - 0.29 ng/mL   Result Value Ref Range    Magnesium 2.0 1.8 - 2.6 mg/dL   CBC with platelets and differential   Result Value Ref Range    WBC Count 7.7 4.0 - 11.0 10e3/uL    RBC Count 4.44 3.80 - 5.20 10e6/uL    Hemoglobin 13.2 11.7 - 15.7 g/dL    Hematocrit 39.9 35.0 - 47.0 %    MCV 90 78 - 100 fL    MCH 29.7 26.5 - 33.0 pg    MCHC 33.1 31.5 - 36.5 g/dL    RDW 12.6 10.0 - 15.0 %    Platelet Count 311 150 - 450 10e3/uL    % Neutrophils 82 %    % Lymphocytes 16 %    % Monocytes 2 %    % Eosinophils 0 %    % Basophils 0 %    % Immature Granulocytes 0 %    NRBCs per 100 WBC 0 <1 /100    Absolute Neutrophils 6.3 1.6 - 8.3 10e3/uL    Absolute Lymphocytes 1.2 0.8 - 5.3 10e3/uL    Absolute Monocytes 0.1 0.0 - 1.3 10e3/uL    Absolute Eosinophils 0.0 0.0 - 0.7 10e3/uL    Absolute Basophils 0.0 0.0 - 0.2 10e3/uL    Absolute Immature Granulocytes 0.0 <=0.4 10e3/uL    Absolute NRBCs 0.0 10e3/uL       RADIOLOGY:  Reviewed all pertinent imaging. Please see official radiology report.  XR Chest 2 Views   Final Result   IMPRESSION: Negative chest.          EKG:    Performed at: 23:04    Impression: Sinus rhythm. Left ventricular hypertrophy with repolarization abnormality.    Rate: 88 bpm  Rhythm: Sinus  Axis: 47, 5, 19  NC Interval: 144  QRS Interval: 88  QTc Interval: 404  ST Changes: ST now depressed in lateral leads compared with prior EKG.  Comparison: Compared with EKG of 8/10/2022, ST now depressed in lateral leads.    I have independently reviewed and interpreted the EKG(s) documented above.      I, Aldair Kristen, am serving as a scribe to document services personally performed by Dr. Maria C Jacob MD, based on my observation and the provider's statements to  me. I, Dr. Maria C Jacob MD attest that Aldair Peterson is acting in a scribe capacity, has observed my performance of the services and has documented them in accordance with my direction.    Maria C Jacob M.D.  Emergency Medicine  [facility] EMERGENCY ROOM  [address] [id number]  [phone]  Dept: [phone]     Maria C Jacob MD  10/12/22 0231

## 2022-10-12 NOTE — H&P (VIEW-ONLY)
Thank you, Dr. Finn, for asking the [facility] team to see Ms. Suma Mark for evaluation of chest pain.      Assessment/Recommendations   Assessment/Plan:  1.  Unstable angina: The patient developed intermittent pressure chest pain over last 2 to 3 months.  Initially lasted 5 to 10 minutes each episode, significantly prolonged episode last night of 40 minutes.  She also has shortness of breath, nausea and clammy symptoms when she has chest pain episode.  Her coronary CT calcium was reported significantly elevated coronary calcium score to 1040.  She had a nuclear stress test in February which was negative, but she developed the symptoms over last 2 to 3 months.  Her ECG has no significant ischemic or ST-T change.  Troponin are normal at this time.  We discussed further evaluation and management.  The patient's symptoms could be caused by significant coronary artery disease, unstable angina.  We discussed the options for further evaluation and management.  After discussion, coronary angiogram with possible PCI is recommended.  The patient understand the benefit and possible complications of from procedure.  She is aware that the risks of the procedure include but are not limited to: death, myocardial infarction, stroke, kidney dysfunction, vessel trauma, hemorrhage, need for emergency corrective surgery, allergy, and dysrhythmia and that treatment alternatives include medications, percutaneous intervention, and cardiac surgery singly or in combination.  Her questions are answered.  Cath Lab is called, the procedure is placed.    2.  Benign essential hypertension: Continue amlodipine 5 mg daily for blood pressure control.    3.  Dyslipidemia: Her LDL was 95.  She has been taking atorvastatin 20 mg for long time.  Increase atorvastatin to 80 mg at bedtime to better control LDL.  Recheck lipid profile and liver function in 2 months.    4.  The patient has no medical history of for atrial  "fibrillation.  Her ECG is reviewed.  Her nurse mentioned that possible episode of atrial fibrillation.  Discussed further evaluation.  Plan to have a 2 weeks event monitor when she is discharged from hospital.  The patient agreed with the plan.       History of Present Illness/Subjective    It is my pleasure to see Suma Mark at [facility] for evaluation of chest pain.  Suma Mark is a 72 year old female with a medical history of benign essential hypertension, dyslipidemia, significantly elevated coronary calcium score 1040.    The patient presents to emergency room for evaluation of worsening chest pain.  She developed intermittent chest pain over last 2 to 3 months.  She describes her chest pain as located anterior chest, pressure pain, significant, associated with nausea and diaphoresis.  No radiation.  It occurs anytime, more frequent at night.  Initially her chest pain lasted for 5 to 10 minutes each episode.  Her chest pain lasted about 40 minutes last night leading to ER visit.  She feels tired.  She has intermittent palpitations.  She has no dizziness.  She complains of headaches.  She has no orthopnea, PND or leg edema.    Her ECG showed sinus rhythm, no obvious ischemic ST-T change.  Troponin x2 are normal.  Chest x-ray was negative.       Physical Examination Review of Systems   /60   Pulse 68   Temp 97.9  F (36.6  C) (Oral)   Resp 20   Ht 1.549 m (5' 1\")   LMP  (LMP Unknown)   SpO2 97%   BMI 34.77 kg/m    Body mass index is 34.77 kg/m .  Wt Readings from Last 3 Encounters:   09/28/22 83.5 kg (184 lb)   09/06/22 83.6 kg (184 lb 3.2 oz)   08/10/22 83.5 kg (184 lb)     No intake or output data in the 24 hours ending 10/12/22 0833    General Appearance:   Awake, Alert, No acute distress.   HEENT:  No scleral icterus; the mucous membranes were moist.   Neck: No cervical bruits. No JVD. No thyromegaly.    Chest: The spine was straight. The chest was " symmetric.   Lungs:   Respirations unlabored; Lungs are clear to auscultation. No crackles.  No wheezing.   Cardiovascular:   Regular rhythm and rate, normal first and second heart sounds with no murmurs. No rubs or gallops.    Abdomen:  Soft. No tenderness. Bowels sounds are present   Extremities: Equal tibial pulses. No leg edema.   Skin: No rashes. Warm, Dry.   Musculoskeletal: No tenderness.   Neurologic: Oriented x 3. Mood and affect are appropriate.     A 12 point comprehensive review of systems was negative except as noted.        Medical History  Surgical History Family History Social History   Past Medical History:   Diagnosis Date     Acquired hypothyroidism 3/12/2009     Anxiety      Anxiety state, unspecified      Back pain      Breast cyst      Carpal tunnel syndrome      Chronic pain     LOW BACK     Degenerative disc disease, lumbar      Depression      Depression, unspecified depression type      Diarrhea      Disease of thyroid gland     hypothyroid     Diverticulosis 10/4/2017    Incidental finding on colonoscopy 10/2017     DJD (degenerative joint disease)      Essential hypertension 4/21/2015     Essential hypertension, benign      Fatty liver      Fibromyalgia      Gastro-oesophageal reflux disease      Gastroesophageal reflux disease without esophagitis 4/21/2015     GERD (gastroesophageal reflux disease)      Hernia, ventral 4/27/2017     History of anesthesia complications     hypotension after surgery     History of blood clots      History of blood transfusion      History of transfusion      Hyperlipidemia      Hypertension      Insomnia, unspecified type      Left lower quadrant pain      Lumbago      Lung nodules     INTERMITTENT     Major depression      Migraine      Osteoarthritis      Osteopenia      Osteoporosis      Other abnormal glucose      Other and unspecified special symptom or syndrome, not elsewhere classified      PONV (postoperative nausea and vomiting)      Posttraumatic  stress disorder      Raynaud's disease      Restless leg syndrome      S/P total knee replacement 11/27/2017     Spontaneous ecchymoses      Thrombosis of leg     with pregnancy     Unspecified episodic mood disorder     AFFECTIVE PSYCHOSIS     Unspecified hypothyroidism      Unspecified vitamin D deficiency      Varicose veins      Ventral hernia      Vitamin D deficiency     Past Surgical History:   Procedure Laterality Date     ARTHROCENTESIS      LEFT HIP TROCHANTERIC BURSA     ARTHROPLASTY KNEE UNICOMPARTMENT  10/31/2013    Procedure: ARTHROPLASTY KNEE UNICOMPARTMENT;  LEFT KNEE UNICOMPARTMENTAL ARTHROPLASTY (CAROLINE)^;  Surgeon: Chi Mittal MD;  Location:  OR     ARTICULAR DEBRIEDEMENT[      LEFT KNEE     BACK SURGERY       BREAST CYST EXCISION       BREAST SURGERY      bx     COLECTOMY N/A 6/2/2017    Procedure: RESECTION, SMALL INTESTINE, OPEN ADHESIOLYSIS;  Surgeon: Keyon Qureshi MD;  Location: [facility];  Service:      COLON SURGERY       ENDOSCOPIC RETROGRADE CHOLANGIOPANCREATOGRAPHY       ENDOSCOPIC STRIPPING VEIN(S)      BILATERLY     GENITOURINARY SURGERY      bladder sling     GI SURGERY      hemorrhoidectomy     HEMORRHOID SURGERY       HERNIORRHAPHY INCISIONAL (LOCATION) N/A 6/2/2017    Procedure: LAPARASCOPIC CONVERTED TO OPEN PRIMARY INCISIONAL HERNIA REPAIR;  Surgeon: Keyon Qureshi MD;  Location: [facility];  Service:      HYSTERECTOMY  [id number]     HYSTERECTOMY VAGINAL       INCONTINENCE SURGERY       LAMINECTOMY LUMBAR TWO LEVELS      2003     LUMBAR FUSION       LUMBAR HARDWARE REMOVAL[       ORTHOPEDIC SURGERY      carpal tunnel     NM LAP,DIAGNOSTIC ABDOMEN N/A 8/22/2019    Procedure: DIAGNOSTIC LAPAROSCOPY, LYSIS OF ADHESION;  Surgeon: Svetlana Ron MD;  [place] [facility] [place]: [place]     RELEASE CARPAL TUNNEL       STRIP VEIN       TRANSARTHISCOPIC SURGERY[      LEFT KNEE     US THYROID BIOPSY  4/19/2019     VEIN LIGATION AND STRIPPING  Bilateral      ZZC TOTAL KNEE ARTHROPLASTY Right 11/27/2017    Procedure:  RIGHT TOTAL KNEE ARTHROPLASTY;  Surgeon: Kevin Ryder MD;  Location: [facility] Main OR;  Service: Orthopedics    Family History   Problem Relation Age of Onset     Dementia Mother      Arthritis Mother      Chronic Obstructive Pulmonary Disease Father      Cardiovascular Father      Hypertension Father      No Known Problems Sister      No Known Problems Daughter      No Known Problems Son      Cerebrovascular Disease Maternal Grandmother      Heart Disease Paternal Grandmother      Cerebrovascular Disease Paternal Grandmother      No Known Problems Sister      No Known Problems Sister      No Known Problems Son      No Known Problems Daughter      Breast Cancer Paternal Aunt         age in 50's    Social History     Socioeconomic History     Marital status:      Spouse name: Not on file     Number of children: Not on file     Years of education: Not on file     Highest education level: Not on file   Occupational History     Not on file   Tobacco Use     Smoking status: Never     Smokeless tobacco: Never   Substance and Sexual Activity     Alcohol use: Yes     Comment: Alcoholic Drinks/day: 1 cocktail daily     Drug use: No     Sexual activity: Not on file   Other Topics Concern     Not on file   Social History Narrative     Not on file     Social Determinants of Health     Financial Resource Strain: Not on file   Food Insecurity: Not on file   Transportation Needs: Not on file   Physical Activity: Not on file   Stress: Not on file   Social Connections: Not on file   Intimate Partner Violence: Not on file   Housing Stability: Not on file          Medications  Allergies   Scheduled Meds:    amLODIPine  5 mg Oral At Bedtime     aspirin  324 mg Oral Once     [START ON 10/13/2022] aspirin  81 mg Oral Daily     atorvastatin  20 mg Oral At Bedtime     denosumab  60 mg Subcutaneous Q6 Months     Continuous Infusions:  PRN  "Meds:.acetaminophen **OR** acetaminophen, alum & mag hydroxide-simethicone, nitroGLYcerin Allergies   Allergen Reactions     Cephalexin Nausea and Vomiting     Ace Inhibitors Other (See Comments)     Elevates blood pressure     Amitriptyline Hcl Other (See Comments)     elevates blood pressure     Atenolol Other (See Comments)     Aggravates reynauds     Celebrex [Celecoxib] GI Disturbance     Cephalosporins Unknown     Other reaction(s): *Unknown  HUT Comment: Pt doesn't remember   Pt doesn't remember        Clonidine Unknown     \"got very ill in ER\"     Codeine Sulfate Nausea and Vomiting     Darvocet [Propoxyphene N-Apap] Nausea and Vomiting     Dexamethasone Acetate Swelling     Erythromycin Nausea and Vomiting     Escitalopram Other (See Comments)     Other reaction(s): Headache  Headaches.       Gabapentin Unknown     \"Spotted\"     Hctz Unknown     \"depletes my sodium\"     Propoxyphene      Other reaction(s): Gastrointestinal  HUT Reaction: Gastrointestinal; HUT Reaction: Nausea And Vomiting; HUT Noted: [id number]     Tramadol Swelling     Swelling of tongue and face     Triamterene      Ultracet Other (See Comments)     Venlafaxine Nausea and Vomiting and Diarrhea     Zolpidem Other (See Comments) and Unknown     Other reaction(s): *Unknown  HUT Comment: Pt doesn't rember  Pt doesn't rember       Zolpidem Tartrate Unknown     Bacitracin Itching and Rash     Sulfanilamide Rash         Lab Results    Chemistry/lipid CBC Cardiac Enzymes/BNP/TSH/INR   Lab Results   Component Value Date    CHOL 149 06/10/2021    HDL 56 06/10/2021    TRIG 113 06/10/2021    BUN 22 10/11/2022     10/11/2022    CO2 21 (L) 10/11/2022    Lab Results   Component Value Date    WBC 7.7 10/11/2022    HGB 13.2 10/11/2022    HCT 39.9 10/11/2022    MCV 90 10/11/2022     10/11/2022    Lab Results   Component Value Date    TROPONINI 0.03 10/12/2022    TSH 3.99 06/08/2022          "

## 2022-10-12 NOTE — PHARMACY-ADMISSION MEDICATION HISTORY
Pharmacy Note - Admission Medication History    Pertinent Provider Information: none     ______________________________________________________________________    Prior To Admission (PTA) med list completed and updated in EMR.       Current Facility-Administered Medications for the 10/11/22 encounter (Hospital Encounter)   Medication     denosumab (PROLIA) injection 60 mg     PTA Med List   Medication Sig Last Dose     amLODIPine (NORVASC) 5 MG tablet TAKE 1 TABLET BY MOUTH EVERYDAY AT BEDTIME 10/10/2022 at HS     atorvastatin (LIPITOR) 20 MG tablet TAKE 1 TABLET BY MOUTH EVERYDAY AT BEDTIME 10/10/2022 at HS     botulinum toxin type A (BOTOX) 100 units injection Every 3 months. [facility] ~2months ago     busPIRone (BUSPAR) 10 MG tablet Take 1 tablet (10 mg) by mouth 2 times daily 10/11/2022 at AM     cholecalciferol 50 MCG (2000 UT) CAPS Take 2,000 Units by mouth daily 10/11/2022 at AM     citalopram (CELEXA) 20 MG tablet Take 2 tablets (40 mg) by mouth daily 10/11/2022 at AM     eletriptan (RELPAX) 40 MG tablet Take 1 tablet (40 mg) by mouth at onset of headache for migraine Past Week at PRN     furosemide (LASIX) 20 MG tablet Take 2 tablets (40 mg) by mouth daily 10/11/2022 at AM     HYDROcodone-acetaminophen (NORCO) 5-325 MG tablet Take 1 tablet by mouth daily as needed (Severe headache) Past Week at PRN     irbesartan (AVAPRO) 300 MG tablet Take 1 tablet (300 mg) by mouth daily 10/11/2022 at AM     levothyroxine (SYNTHROID/LEVOTHROID) 88 MCG tablet TAKE 1 TABLET BY MOUTH DAILY AT 6:00 AM. 10/11/2022 at AM     LORazepam (ATIVAN) 0.5 MG tablet Take 1 tablet (0.5 mg) by mouth every 6 hours as needed for anxiety 10/11/2022 at night, prn     pramipexole (MIRAPEX) 0.25 MG tablet TAKE 1 TABLET BY MOUTH THREE TIMES A DAY. 10/11/2022 at x3     propranolol (INDERAL) 10 MG tablet TAKE 2 TABLETS BY MOUTH TWO TIMES A DAY. 10/11/2022 at PM     traZODone (DESYREL) 50 MG tablet TAKE 3 TABLETS (150 MG TOTAL) BY MOUTH AT  BEDTIME 10/10/2022 at        Information source(s): Patient and CareEverywhere/SureScripts  Method of interview communication: in-person    Summary of Changes to PTA Med List  New: none  Discontinued: none  Changed: none    Patient was asked about OTC/herbal products specifically.  PTA med list reflects this.    In the past week, patient estimated taking medication this percent of the time:  greater than 90%.    Allergies were reviewed, assessed, and updated with the patient.      Patient does not use any multi-dose medications prior to admission.    The information provided in this note is only as accurate as the sources available at the time of the update(s).    Thank you for the opportunity to participate in the care of this patient.    Deja Landry RPH  10/12/2022 7:37 AM

## 2022-10-12 NOTE — Clinical Note
The first balloon was inserted into the circumflex and middle circumflex.Max pressure = 10 aquiles. Total duration = 16 seconds.

## 2022-10-12 NOTE — TELEPHONE ENCOUNTER
[facility] Call Center    Phone Message    May a detailed message be left on voicemail: yes     Reason for Call: Other:     Pt is calling to inform that she is in ER now for chest pains.    Action Taken: Other: cardio    Travel Screening: Not Applicable     Thank you!  [facility]

## 2022-10-12 NOTE — CONSULTS
Thank you, Dr. Finn, for asking the [facility] team to see Ms. Suma Mark for evaluation of chest pain.      Assessment/Recommendations   Assessment/Plan:  1.  Unstable angina: The patient developed intermittent pressure chest pain over last 2 to 3 months.  Initially lasted 5 to 10 minutes each episode, significantly prolonged episode last night of 40 minutes.  She also has shortness of breath, nausea and clammy symptoms when she has chest pain episode.  Her coronary CT calcium was reported significantly elevated coronary calcium score to 1040.  She had a nuclear stress test in February which was negative, but she developed the symptoms over last 2 to 3 months.  Her ECG has no significant ischemic or ST-T change.  Troponin are normal at this time.  We discussed further evaluation and management.  The patient's symptoms could be caused by significant coronary artery disease, unstable angina.  We discussed the options for further evaluation and management.  After discussion, coronary angiogram with possible PCI is recommended.  The patient understand the benefit and possible complications of from procedure.  She is aware that the risks of the procedure include but are not limited to: death, myocardial infarction, stroke, kidney dysfunction, vessel trauma, hemorrhage, need for emergency corrective surgery, allergy, and dysrhythmia and that treatment alternatives include medications, percutaneous intervention, and cardiac surgery singly or in combination.  Her questions are answered.  Cath Lab is called, the procedure is placed.    2.  Benign essential hypertension: Continue amlodipine 5 mg daily for blood pressure control.    3.  Dyslipidemia: Her LDL was 95.  She has been taking atorvastatin 20 mg for long time.  Increase atorvastatin to 80 mg at bedtime to better control LDL.  Recheck lipid profile and liver function in 2 months.    4.  The patient has no medical history of for atrial  "fibrillation.  Her ECG is reviewed.  Her nurse mentioned that possible episode of atrial fibrillation.  Discussed further evaluation.  Plan to have a 2 weeks event monitor when she is discharged from hospital.  The patient agreed with the plan.       History of Present Illness/Subjective    It is my pleasure to see Suma Mark at [facility] for evaluation of chest pain.  Suma Mark is a 72 year old female with a medical history of benign essential hypertension, dyslipidemia, significantly elevated coronary calcium score 1040.    The patient presents to emergency room for evaluation of worsening chest pain.  She developed intermittent chest pain over last 2 to 3 months.  She describes her chest pain as located anterior chest, pressure pain, significant, associated with nausea and diaphoresis.  No radiation.  It occurs anytime, more frequent at night.  Initially her chest pain lasted for 5 to 10 minutes each episode.  Her chest pain lasted about 40 minutes last night leading to ER visit.  She feels tired.  She has intermittent palpitations.  She has no dizziness.  She complains of headaches.  She has no orthopnea, PND or leg edema.    Her ECG showed sinus rhythm, no obvious ischemic ST-T change.  Troponin x2 are normal.  Chest x-ray was negative.       Physical Examination Review of Systems   /60   Pulse 68   Temp 97.9  F (36.6  C) (Oral)   Resp 20   Ht 1.549 m (5' 1\")   LMP  (LMP Unknown)   SpO2 97%   BMI 34.77 kg/m    Body mass index is 34.77 kg/m .  Wt Readings from Last 3 Encounters:   09/28/22 83.5 kg (184 lb)   09/06/22 83.6 kg (184 lb 3.2 oz)   08/10/22 83.5 kg (184 lb)     No intake or output data in the 24 hours ending 10/12/22 0833    General Appearance:   Awake, Alert, No acute distress.   HEENT:  No scleral icterus; the mucous membranes were moist.   Neck: No cervical bruits. No JVD. No thyromegaly.    Chest: The spine was straight. The chest was " symmetric.   Lungs:   Respirations unlabored; Lungs are clear to auscultation. No crackles.  No wheezing.   Cardiovascular:   Regular rhythm and rate, normal first and second heart sounds with no murmurs. No rubs or gallops.    Abdomen:  Soft. No tenderness. Bowels sounds are present   Extremities: Equal tibial pulses. No leg edema.   Skin: No rashes. Warm, Dry.   Musculoskeletal: No tenderness.   Neurologic: Oriented x 3. Mood and affect are appropriate.     A 12 point comprehensive review of systems was negative except as noted.        Medical History  Surgical History Family History Social History   Past Medical History:   Diagnosis Date     Acquired hypothyroidism 3/12/2009     Anxiety      Anxiety state, unspecified      Back pain      Breast cyst      Carpal tunnel syndrome      Chronic pain     LOW BACK     Degenerative disc disease, lumbar      Depression      Depression, unspecified depression type      Diarrhea      Disease of thyroid gland     hypothyroid     Diverticulosis 10/4/2017    Incidental finding on colonoscopy 10/2017     DJD (degenerative joint disease)      Essential hypertension 4/21/2015     Essential hypertension, benign      Fatty liver      Fibromyalgia      Gastro-oesophageal reflux disease      Gastroesophageal reflux disease without esophagitis 4/21/2015     GERD (gastroesophageal reflux disease)      Hernia, ventral 4/27/2017     History of anesthesia complications     hypotension after surgery     History of blood clots      History of blood transfusion      History of transfusion      Hyperlipidemia      Hypertension      Insomnia, unspecified type      Left lower quadrant pain      Lumbago      Lung nodules     INTERMITTENT     Major depression      Migraine      Osteoarthritis      Osteopenia      Osteoporosis      Other abnormal glucose      Other and unspecified special symptom or syndrome, not elsewhere classified      PONV (postoperative nausea and vomiting)      Posttraumatic  stress disorder      Raynaud's disease      Restless leg syndrome      S/P total knee replacement 11/27/2017     Spontaneous ecchymoses      Thrombosis of leg     with pregnancy     Unspecified episodic mood disorder     AFFECTIVE PSYCHOSIS     Unspecified hypothyroidism      Unspecified vitamin D deficiency      Varicose veins      Ventral hernia      Vitamin D deficiency     Past Surgical History:   Procedure Laterality Date     ARTHROCENTESIS      LEFT HIP TROCHANTERIC BURSA     ARTHROPLASTY KNEE UNICOMPARTMENT  10/31/2013    Procedure: ARTHROPLASTY KNEE UNICOMPARTMENT;  LEFT KNEE UNICOMPARTMENTAL ARTHROPLASTY (CAROLINE)^;  Surgeon: Chi Mittal MD;  Location:  OR     ARTICULAR DEBRIEDEMENT[      LEFT KNEE     BACK SURGERY       BREAST CYST EXCISION       BREAST SURGERY      bx     COLECTOMY N/A 6/2/2017    Procedure: RESECTION, SMALL INTESTINE, OPEN ADHESIOLYSIS;  Surgeon: Keyon Qureshi MD;  Location: [facility];  Service:      COLON SURGERY       ENDOSCOPIC RETROGRADE CHOLANGIOPANCREATOGRAPHY       ENDOSCOPIC STRIPPING VEIN(S)      BILATERLY     GENITOURINARY SURGERY      bladder sling     GI SURGERY      hemorrhoidectomy     HEMORRHOID SURGERY       HERNIORRHAPHY INCISIONAL (LOCATION) N/A 6/2/2017    Procedure: LAPARASCOPIC CONVERTED TO OPEN PRIMARY INCISIONAL HERNIA REPAIR;  Surgeon: Keyon Qureshi MD;  Location: [facility];  Service:      HYSTERECTOMY  [id number]     HYSTERECTOMY VAGINAL       INCONTINENCE SURGERY       LAMINECTOMY LUMBAR TWO LEVELS      2003     LUMBAR FUSION       LUMBAR HARDWARE REMOVAL[       ORTHOPEDIC SURGERY      carpal tunnel     MS LAP,DIAGNOSTIC ABDOMEN N/A 8/22/2019    Procedure: DIAGNOSTIC LAPAROSCOPY, LYSIS OF ADHESION;  Surgeon: Svetlana Ron MD;  Location: [facility];  Service: General     RELEASE CARPAL TUNNEL       STRIP VEIN       TRANSARTHISCOPIC SURGERY[      LEFT KNEE     US THYROID BIOPSY  4/19/2019     VEIN LIGATION AND STRIPPING  Bilateral      ZZC TOTAL KNEE ARTHROPLASTY Right 11/27/2017    Procedure:  RIGHT TOTAL KNEE ARTHROPLASTY;  Surgeon: Kevin Ryder MD;  Location: [facility] Main OR;  Service: Orthopedics    Family History   Problem Relation Age of Onset     Dementia Mother      Arthritis Mother      Chronic Obstructive Pulmonary Disease Father      Cardiovascular Father      Hypertension Father      No Known Problems Sister      No Known Problems Daughter      No Known Problems Son      Cerebrovascular Disease Maternal Grandmother      Heart Disease Paternal Grandmother      Cerebrovascular Disease Paternal Grandmother      No Known Problems Sister      No Known Problems Sister      No Known Problems Son      No Known Problems Daughter      Breast Cancer Paternal Aunt         age in 50's    Social History     Socioeconomic History     Marital status:      Spouse name: Not on file     Number of children: Not on file     Years of education: Not on file     Highest education level: Not on file   Occupational History     Not on file   Tobacco Use     Smoking status: Never     Smokeless tobacco: Never   Substance and Sexual Activity     Alcohol use: Yes     Comment: Alcoholic Drinks/day: 1 cocktail daily     Drug use: No     Sexual activity: Not on file   Other Topics Concern     Not on file   Social History Narrative     Not on file     Social Determinants of Health     Financial Resource Strain: Not on file   Food Insecurity: Not on file   Transportation Needs: Not on file   Physical Activity: Not on file   Stress: Not on file   Social Connections: Not on file   Intimate Partner Violence: Not on file   Housing Stability: Not on file          Medications  Allergies   Scheduled Meds:    amLODIPine  5 mg Oral At Bedtime     aspirin  324 mg Oral Once     [START ON 10/13/2022] aspirin  81 mg Oral Daily     atorvastatin  20 mg Oral At Bedtime     denosumab  60 mg Subcutaneous Q6 Months     Continuous Infusions:  PRN  "Meds:.acetaminophen **OR** acetaminophen, alum & mag hydroxide-simethicone, nitroGLYcerin Allergies   Allergen Reactions     Cephalexin Nausea and Vomiting     Ace Inhibitors Other (See Comments)     Elevates blood pressure     Amitriptyline Hcl Other (See Comments)     elevates blood pressure     Atenolol Other (See Comments)     Aggravates reynauds     Celebrex [Celecoxib] GI Disturbance     Cephalosporins Unknown     Other reaction(s): *Unknown  HUT Comment: Pt doesn't remember   Pt doesn't remember        Clonidine Unknown     \"got very ill in ER\"     Codeine Sulfate Nausea and Vomiting     Darvocet [Propoxyphene N-Apap] Nausea and Vomiting     Dexamethasone Acetate Swelling     Erythromycin Nausea and Vomiting     Escitalopram Other (See Comments)     Other reaction(s): Headache  Headaches.       Gabapentin Unknown     \"Spotted\"     Hctz Unknown     \"depletes my sodium\"     Propoxyphene      Other reaction(s): Gastrointestinal  HUT Reaction: Gastrointestinal; HUT Reaction: Nausea And Vomiting; HUT Noted: [id number]     Tramadol Swelling     Swelling of tongue and face     Triamterene      Ultracet Other (See Comments)     Venlafaxine Nausea and Vomiting and Diarrhea     Zolpidem Other (See Comments) and Unknown     Other reaction(s): *Unknown  HUT Comment: Pt doesn't rember  Pt doesn't rember       Zolpidem Tartrate Unknown     Bacitracin Itching and Rash     Sulfanilamide Rash         Lab Results    Chemistry/lipid CBC Cardiac Enzymes/BNP/TSH/INR   Lab Results   Component Value Date    CHOL 149 06/10/2021    HDL 56 06/10/2021    TRIG 113 06/10/2021    BUN 22 10/11/2022     10/11/2022    CO2 21 (L) 10/11/2022    Lab Results   Component Value Date    WBC 7.7 10/11/2022    HGB 13.2 10/11/2022    HCT 39.9 10/11/2022    MCV 90 10/11/2022     10/11/2022    Lab Results   Component Value Date    TROPONINI 0.03 10/12/2022    TSH 3.99 06/08/2022          "

## 2022-10-12 NOTE — PROGRESS NOTES
[facility] Hospitalist new admit to update patient's arrival from [facility].

## 2022-10-12 NOTE — SIGNIFICANT EVENT
Significant Event Note    Time of event: 9:51 AM October 12, 2022    Description of event:  Called the ER to determine patient's treatment plan after reading cardiology's recommendation for angiogram today.  [place] that patient had been transferred over to [facility] approximately 15 minutes ago and is no longer in the building.  I have not had an opportunity to see the patient nor enter any transfer orders.    Plan:  Asked ER staff to give my direct phone number to Cath Lab at [facility] and to update me if patient is going to be returning to [facility] or staying at [facility] after her procedure.  Spoke with ER charge nurse and she will talk to the care team so that the hospitalist team can be notified of all transfers in a timely fashion in the future.    Discussed with: ER charge nurse    John Jin MD

## 2022-10-12 NOTE — Clinical Note
The first balloon was inserted into the circumflex and middle circumflex.Max pressure = 6 aquiles. Total duration = 22 seconds.

## 2022-10-12 NOTE — DISCHARGE INSTRUCTIONS

## 2022-10-12 NOTE — PROGRESS NOTES
Daily Progress Note    Assessment/Plan:  72-year-old female transferred from [facility] for unstable angina and underwent coronary angiogram with subsequent EUGENIA x2.    1.  Coronary artery disease/unstable angina: Seen by cardiology at [facility] and transferred to [facility].  Angiography showed multivessel coronary artery disease with culprit lesion a 90% stenosis within the large OM 2.  Started on high intensity statin, DAPT for minimum of 6 months and outpatient cardiology follow-up recommended.    2.  Anxiety disorder: Continue home medications    3.  Hypertension: Continue home antihypertensives    4.  BMI 34.77    5. ?  Paroxysmal atrial fibrillation: In the ED at [facility] apparently had a possible episode of atrial fibrillation.  Seen by cardiology, recommending 2-week event monitor when she is discharged from the hospital.    Code status:Full Code        Barriers to Discharge: Postprocedural recovery    Disposition: Anticipate discharge tomorrow    Subjective:  Lo is new to me today, chart reviewed and discussed with staff.  Overall she feels better, no further chest pain.  No nausea or vomiting or dyspnea.  No fevers or chills.        Current Medications Reviewed via EHR List    Objective:  Vital signs in last 24 hours:  [unfilled]  .prog  Weight:   @THISENCWEIGHTS(1)@  Weight change:   There is no height or weight on file to calculate BMI.    Intake/Output last 3 shifts:  No intake/output data recorded.  Intake/Output this shift:  I/O this shift:  In: 566.25 [P.O.:360; I.V.:206.25]  Out: -     Physical Exam:  General: No apparent distress  CV: Regular rate and rhythm  Lungs: Clear to auscultation  Abdomen: Soft, nontender        Imaging:  Personally Reviewed.  XR Chest 2 Views    Result Date: 10/12/2022  EXAM: XR CHEST 2 VIEWS LOCATION: [facility] DATE/TIME: 10/12/2022 12:46 AM INDICATION: chest pain COMPARISON: 02/09/2022     IMPRESSION: Negative  chest.    Cardiac Catheterization    Result Date: 10/12/2022  1.  Multivessel CAD as described below.  Culprit lesion is a 90% stenosis within a large OM 2. LM: No significant disease LAD: Angiographically moderate proximal-mid LAD disease that is not flow-limiting (FFR 0.83).  Further down the vessel, there is a discrete 70-80% stenosis of the distal LAD as well as a discrete 60-70% stenosis of the apical LAD. LCx: Moderate proximal LAD disease with a 90% culprit stenosis of the large OM 2 RCA: Moderate diffuse proximal-mid RCA disease 2.  Successful PCI of the OM 2 lesion extending back to the proximal LCx with placement of a single 2.5 x 38 mm Synergy EUGENIA, postdilated with a 2.75 mm NC balloon.    CT Coronary Calcium Scan    Result Date: 10/10/2022  Procedure: CT CALCIUM SCREENING Examination Date: 10/10/2022 1:50 PM  Clinical Information: Chest pain, unspecified type Ordering Provider: Bernadette Taylor PROCEDURE: High-resolution, ECG synchronized multi-slice computed tomography was performed without incident. Coronary calcification was analyzed using SentinelOne calcium scoring software. Scan protocol was optimized to minimize radiation exposure. The total radiation exposure was calculated to be 31 DLP and 0.43 mSv. FINDINGS: Overall quality of the study: Poor. The study was meant to be a CT coronary angiogram. However, the patient had such density of calcification that luminal assessment would not be possible. Therefore, study was converted to a calcium score.. CORONARY ARTERY CALCIUM SCORES: Left main coronary artery: 75.1 Left anterior descending coronary artery: 532 Circumflex coronary artery: 127 Right coronary artery: 306 TOTAL CALCIUM SCORE: 1040 The total Agatston calcium score is 1040. This places the patient in the 98th percentile for age and gender matched control group. The ascending aorta, at the level of the right pulmonary artery on this noncontrast study, appears to be mildly dilated measuring 3.92 x  3.89 cm. Please review Radiology report for incidental noncardiac findings that will follow separately BACKGROUND A coronary artery calcium (CAC) score is a measurement of the amount of calcium (hard plaque) in the walls of the arteries that supply the heart muscle. Numerous studies have indicated that this test is a reliable measure of risk for adverse cardiovascular events, such as heart attack and stroke. ? MANAGEMENT The American Heart Association/American College of Cardiology (AHA/ACC) 2018 Guideline on the Management of Blood Cholesterol states: ?If CAC is zero, treatment with statin therapy may be withheld or delayed, except in certain very high-risk individuals such as cigarette smokers, those with diabetes mellitus, and those with a strong family history of premature atherosclerotic disease. A CAC score of 1 to 99 favors statin therapy, especially in those ?55 years of age. For any patient, if the CAC score is ?100 Agatston units or ?75th percentile, statin therapy is indicated unless otherwise deferred by the outcome of clinician-patient risk discussion.? ? The Society of Cardiovascular CT (SCCT) CAC guideline recommends the following: CAC score 0: statin is generally not recommended CAC score 1-99: moderate-intensity statin generally recommended CAC score 100-299: moderate to high-intensity statin + Aspirin 81mg CAC score >300: high intensity statin + Aspirin 81mg ? GENERAL RECOMMENDATIONS Adoption and maintenance of a healthy lifestyle is recommended for all people. This should include regular, appropriate exercise and observance of a proper diet, to ensure balanced nutrition and weight control. Tobacco use should be avoided. Cholesterol has been linked to coronary atherosclerosis, and we strongly encourage adhering to the recommendations of the 2018 ACC/AHA guidelines on the Management of Blood Cholesterol. For primary prevention, these include calculation of 10-year ASCVD risk and initiation of statin  therapy based on estimated ASCVD risk and LDL cholesterol. The JOYNER risk score, which combines traditional risk factors and CAC, is available online on the JOYNER website (https://www.joyner-nhlbi.org/MESACHDRisk/MesaRiskScore/RiskScore.aspx). (Thai GRESHAM, et al. J Am Franco Cardiol. 2015 Oct 13;66(15):1643-53.) ? However, note that these are general recommendations only, and as with all such matters, the personal physician should be consulted regarding recommendations appropriate for the individual. If further guidance is needed, you can schedule an appointment with one of our Preventive Cardiologists ([email or web address]). ? References: 1. 2018 AHA/ACC/AACVPR/AAPA/ABC/ACPM/ADA/AGS/APhA/ASPC/NLA/PCNA Guideline on the Management of Blood Cholesterol 2. CAC-DRS: Coronary Artery Calcium Data and Reporting System. An expert consensus document of the Society of Cardiovascular Computed Tomography (SCCT) link SUMAN CHO MD   SYSTEM ID:  [id number]    Radiologist Consult For Cardiology    Result Date: 10/11/2022  RADIOLOGIST CONSULT FOR CARDIOLOGY 10/10/2022 1:50 PM HISTORY: Chest pain, unspecified type COMPARISON: 12/11/2018     IMPRESSION: A 3 mm subpleural nodule in the right lower lobe is unchanged since at least 2018 and therefore benign. No significant extracardiac findings. LEISA HENRY MD   SYSTEM ID:  [id number]      Lab Results:  Personally Reviewed.   Fingerstick Blood Glucose: @IICBBTO19FXL(POCGLUFGR:10)@    Last Hbg A1C: No results found for: HGBA1C   No results found for: INR, PROTIME  Recent Results (from the past 24 hour(s))   Extra Blue Top Tube    Collection Time: 10/11/22 11:03 PM   Result Value Ref Range    Hold Specimen JIC    Extra Red Top Tube    Collection Time: 10/11/22 11:03 PM   Result Value Ref Range    Hold Specimen JIC    Extra Green Top (Lithium Heparin) Tube    Collection Time: 10/11/22 11:03 PM   Result Value Ref Range    Hold Specimen JIC     Extra Purple Top Tube    Collection Time: 10/11/22 11:03 PM   Result Value Ref Range    Hold Specimen [facility]    Basic metabolic panel    Collection Time: 10/11/22 11:03 PM   Result Value Ref Range    Sodium 137 136 - 145 mmol/L    Potassium 4.1 3.5 - 5.0 mmol/L    Chloride 101 98 - 107 mmol/L    Carbon Dioxide (CO2) 21 (L) 22 - 31 mmol/L    Anion Gap 15 5 - 18 mmol/L    Urea Nitrogen 22 8 - 28 mg/dL    Creatinine 0.97 0.60 - 1.10 mg/dL    Calcium 10.1 8.5 - 10.5 mg/dL    Glucose 171 (H) 70 - 125 mg/dL    GFR Estimate 61 >60 mL/min/1.73m2   Troponin I    Collection Time: 10/11/22 11:03 PM   Result Value Ref Range    Troponin I <0.01 0.00 - 0.29 ng/mL   Magnesium    Collection Time: 10/11/22 11:03 PM   Result Value Ref Range    Magnesium 2.0 1.8 - 2.6 mg/dL   CBC with platelets and differential    Collection Time: 10/11/22 11:03 PM   Result Value Ref Range    WBC Count 7.7 4.0 - 11.0 10e3/uL    RBC Count 4.44 3.80 - 5.20 10e6/uL    Hemoglobin 13.2 11.7 - 15.7 g/dL    Hematocrit 39.9 35.0 - 47.0 %    MCV 90 78 - 100 fL    MCH 29.7 26.5 - 33.0 pg    MCHC 33.1 31.5 - 36.5 g/dL    RDW 12.6 10.0 - 15.0 %    Platelet Count 311 150 - 450 10e3/uL    % Neutrophils 82 %    % Lymphocytes 16 %    % Monocytes 2 %    % Eosinophils 0 %    % Basophils 0 %    % Immature Granulocytes 0 %    NRBCs per 100 WBC 0 <1 /100    Absolute Neutrophils 6.3 1.6 - 8.3 10e3/uL    Absolute Lymphocytes 1.2 0.8 - 5.3 10e3/uL    Absolute Monocytes 0.1 0.0 - 1.3 10e3/uL    Absolute Eosinophils 0.0 0.0 - 0.7 10e3/uL    Absolute Basophils 0.0 0.0 - 0.2 10e3/uL    Absolute Immature Granulocytes 0.0 <=0.4 10e3/uL    Absolute NRBCs 0.0 10e3/uL   D dimer quantitative    Collection Time: 10/11/22 11:03 PM   Result Value Ref Range    D-Dimer Quantitative 1.18 (H) 0.00 - 0.50 ug/mL FEU   EKG 12-lead, tracing only    Collection Time: 10/11/22 11:04 PM   Result Value Ref Range    Systolic Blood Pressure  mmHg    Diastolic Blood Pressure  mmHg    Ventricular Rate 88  BPM    Atrial Rate 88 BPM    NH Interval 144 ms    QRS Duration 88 ms     ms    QTc 404 ms    P Axis 47 degrees    R AXIS 5 degrees    T Axis 19 degrees    Interpretation ECG       Sinus rhythm  Left ventricular hypertrophy with repolarization abnormality  Abnormal ECG  When compared with ECG of 10-AUG-2022 15:23,  ST now depressed in Lateral leads  Confirmed by SEE ED PROVIDER NOTE FOR, ECG INTERPRETATION ([id number]),  CHEY SENA ([id number]) on 10/12/2022 12:16:26 AM     Troponin I - now then in 4 hours x 2    Collection Time: 10/12/22  3:54 AM   Result Value Ref Range    Troponin I 0.03 0.00 - 0.29 ng/mL   Asymptomatic COVID-19 Virus (Coronavirus) by PCR Nasopharyngeal    Collection Time: 10/12/22  3:55 AM    Specimen: Nasopharyngeal; Swab   Result Value Ref Range    SARS CoV2 PCR Negative Negative   Adult Type and Screen    Collection Time: 10/12/22 10:20 AM   Result Value Ref Range    ABO/RH(D) A POS     Antibody Screen Negative Negative    SPECIMEN EXPIRATION DATE [id number]    Activated clotting time celite, POCT    Collection Time: 10/12/22  2:13 PM   Result Value Ref Range    Activated Clotting Time (Celite) POCT 229 (H) 74 - 150 seconds   ECG 12-lead, with Muse    Collection Time: 10/12/22  3:25 PM   Result Value Ref Range    Systolic Blood Pressure  mmHg    Diastolic Blood Pressure  mmHg    Ventricular Rate 59 BPM    Atrial Rate 59 BPM    NH Interval 154 ms    QRS Duration 90 ms     ms    QTc 419 ms    P Axis 41 degrees    R AXIS 6 degrees    T Axis 20 degrees    Interpretation ECG       Sinus bradycardia  Minimal voltage criteria for LVH, may be normal variant  Borderline ECG  When compared with ECG of 11-OCT-2022 23:04,  Vent. rate has decreased BY  29 BPM  ST no longer depressed in Inferior leads  ST no longer depressed in Lateral leads  Confirmed by DRAKE ALICEA MD LOC:WW ([id number]) on 10/12/2022 4:27:48 PM             Advanced Care Planning    Time > 35 minutes with greater than 50%  of time spent in counseling and coordination of care.    Ihsan Chang MD  Date: 10/12/2022  Time: 6:37 PM  [facility]  Family Medicine

## 2022-10-12 NOTE — PLAN OF CARE
Patient was kept comfortable during post-procedure stay.VSS. Denies pain. Right radial access site remains dry & free from signs of bleeding. Appointment with NP made & included in AVS. Daughter stated that they will speak to a new cardiologist tomorrow but cannot name who it is. Advised to make sure to make a follow-up appointment with the cardiologist in 4 to 6 weeks. Daughter verbalized understanding. Dr. Martinez was able to speak with patient's , daughters and son post procedure. Post-op instructions regarding right hand/wrist activity restrictions reviewed with  patient, spouse and daughter. Able to ask questions. Verbalized no concerns. Belongings brought up to patient's room by family. Transferred to P3 in stable condition. Report given to Preet NAVARRETE.

## 2022-10-12 NOTE — ED NOTES
Patient initial 12 lead showed afib w/RVR with rate of 135; patient denies hx; has currently converted to normal sinus with rate of 84.

## 2022-10-12 NOTE — INTERVAL H&P NOTE
"I have reviewed the surgical (or preoperative) H&P that is linked to this encounter, and examined the patient. There are no significant changes    Clinical Conditions Present on Arrival:  Clinically Significant Risk Factors Present on Admission              # Anion Gap Metabolic Acidosis: AG = 15 mmol/L (Ref range: 5 - 18 mmol/L) on admission, will monitor and treat as appropriate      # Obesity: Estimated body mass index is 34.77 kg/m  as calculated from the following:    Height as of 10/11/22: 1.549 m (5' 1\").    Weight as of 9/28/22: 83.5 kg (184 lb).       "

## 2022-10-13 ENCOUNTER — APPOINTMENT (OUTPATIENT)
Dept: OCCUPATIONAL THERAPY | Facility: HOSPITAL | Age: 72
DRG: 247 | End: 2022-10-13
Attending: INTERNAL MEDICINE
Payer: MEDICARE

## 2022-10-13 VITALS
HEART RATE: 60 BPM | DIASTOLIC BLOOD PRESSURE: 74 MMHG | RESPIRATION RATE: 20 BRPM | WEIGHT: 186.7 LBS | BODY MASS INDEX: 35.28 KG/M2 | TEMPERATURE: 98 F | OXYGEN SATURATION: 96 % | SYSTOLIC BLOOD PRESSURE: 155 MMHG

## 2022-10-13 DIAGNOSIS — R07.9 CHEST PAIN, UNSPECIFIED TYPE: Primary | ICD-10-CM

## 2022-10-13 LAB
ANION GAP SERPL CALCULATED.3IONS-SCNC: 8 MMOL/L (ref 7–15)
ATRIAL RATE - MUSE: 57 BPM
ATRIAL RATE - MUSE: 64 BPM
BUN SERPL-MCNC: 19.8 MG/DL (ref 8–23)
CALCIUM SERPL-MCNC: 8.7 MG/DL (ref 8.8–10.2)
CHLORIDE SERPL-SCNC: 104 MMOL/L (ref 98–107)
CREAT SERPL-MCNC: 0.77 MG/DL (ref 0.51–0.95)
DEPRECATED HCO3 PLAS-SCNC: 25 MMOL/L (ref 22–29)
DIASTOLIC BLOOD PRESSURE - MUSE: NORMAL MMHG
DIASTOLIC BLOOD PRESSURE - MUSE: NORMAL MMHG
GFR SERPL CREATININE-BSD FRML MDRD: 80 ML/MIN/1.73M2
GLUCOSE SERPL-MCNC: 89 MG/DL (ref 70–99)
INTERPRETATION ECG - MUSE: NORMAL
INTERPRETATION ECG - MUSE: NORMAL
P AXIS - MUSE: 31 DEGREES
P AXIS - MUSE: 47 DEGREES
POTASSIUM SERPL-SCNC: 4.1 MMOL/L (ref 3.4–5.3)
PR INTERVAL - MUSE: 142 MS
PR INTERVAL - MUSE: 148 MS
QRS DURATION - MUSE: 90 MS
QRS DURATION - MUSE: 92 MS
QT - MUSE: 406 MS
QT - MUSE: 444 MS
QTC - MUSE: 418 MS
QTC - MUSE: 432 MS
R AXIS - MUSE: 1 DEGREES
R AXIS - MUSE: 3 DEGREES
SODIUM SERPL-SCNC: 137 MMOL/L (ref 136–145)
SYSTOLIC BLOOD PRESSURE - MUSE: NORMAL MMHG
SYSTOLIC BLOOD PRESSURE - MUSE: NORMAL MMHG
T AXIS - MUSE: 19 DEGREES
T AXIS - MUSE: 21 DEGREES
VENTRICULAR RATE- MUSE: 57 BPM
VENTRICULAR RATE- MUSE: 64 BPM

## 2022-10-13 PROCEDURE — 99232 SBSQ HOSP IP/OBS MODERATE 35: CPT | Mod: 25 | Performed by: INTERNAL MEDICINE

## 2022-10-13 PROCEDURE — 36415 COLL VENOUS BLD VENIPUNCTURE: CPT | Performed by: INTERNAL MEDICINE

## 2022-10-13 PROCEDURE — 250N000013 HC RX MED GY IP 250 OP 250 PS 637: Performed by: EMERGENCY MEDICINE

## 2022-10-13 PROCEDURE — 97110 THERAPEUTIC EXERCISES: CPT | Mod: GO

## 2022-10-13 PROCEDURE — 250N000013 HC RX MED GY IP 250 OP 250 PS 637: Performed by: INTERNAL MEDICINE

## 2022-10-13 PROCEDURE — 97535 SELF CARE MNGMENT TRAINING: CPT | Mod: GO

## 2022-10-13 PROCEDURE — 93010 ELECTROCARDIOGRAM REPORT: CPT | Mod: HIP | Performed by: INTERNAL MEDICINE

## 2022-10-13 PROCEDURE — 93005 ELECTROCARDIOGRAM TRACING: CPT

## 2022-10-13 PROCEDURE — 80048 BASIC METABOLIC PNL TOTAL CA: CPT | Performed by: INTERNAL MEDICINE

## 2022-10-13 PROCEDURE — 97165 OT EVAL LOW COMPLEX 30 MIN: CPT | Mod: GO

## 2022-10-13 PROCEDURE — 99239 HOSP IP/OBS DSCHRG MGMT >30: CPT | Performed by: EMERGENCY MEDICINE

## 2022-10-13 RX ORDER — NITROGLYCERIN 0.4 MG/1
TABLET SUBLINGUAL
Qty: 10 TABLET | Refills: 0 | Status: SHIPPED | OUTPATIENT
Start: 2022-10-13 | End: 2022-11-16

## 2022-10-13 RX ORDER — CLOPIDOGREL BISULFATE 75 MG/1
75 TABLET ORAL DAILY
Status: DISCONTINUED | OUTPATIENT
Start: 2022-10-14 | End: 2022-10-13 | Stop reason: HOSPADM

## 2022-10-13 RX ORDER — ATORVASTATIN CALCIUM 40 MG/1
80 TABLET, FILM COATED ORAL DAILY
Status: DISCONTINUED | OUTPATIENT
Start: 2022-10-13 | End: 2022-10-13 | Stop reason: HOSPADM

## 2022-10-13 RX ORDER — CLOPIDOGREL BISULFATE 75 MG/1
75 TABLET ORAL DAILY
Qty: 30 TABLET | Refills: 0 | Status: SHIPPED | OUTPATIENT
Start: 2022-10-14 | End: 2022-10-19

## 2022-10-13 RX ORDER — ATORVASTATIN CALCIUM 80 MG/1
80 TABLET, FILM COATED ORAL AT BEDTIME
Qty: 30 TABLET | Refills: 0 | Status: SHIPPED | OUTPATIENT
Start: 2022-10-13 | End: 2022-10-19

## 2022-10-13 RX ORDER — CLOPIDOGREL BISULFATE 75 MG/1
300 TABLET ORAL ONCE
Status: COMPLETED | OUTPATIENT
Start: 2022-10-13 | End: 2022-10-13

## 2022-10-13 RX ADMIN — Medication 2000 UNITS: at 08:34

## 2022-10-13 RX ADMIN — PRASUGREL 10 MG: 10 TABLET, FILM COATED ORAL at 08:40

## 2022-10-13 RX ADMIN — PRAMIPEXOLE DIHYDROCHLORIDE 0.25 MG: 0.25 TABLET ORAL at 08:34

## 2022-10-13 RX ADMIN — PROPRANOLOL HYDROCHLORIDE 20 MG: 20 TABLET ORAL at 08:35

## 2022-10-13 RX ADMIN — LEVOTHYROXINE SODIUM 88 MCG: 88 TABLET ORAL at 05:19

## 2022-10-13 RX ADMIN — CLOPIDOGREL BISULFATE 300 MG: 75 TABLET ORAL at 10:01

## 2022-10-13 RX ADMIN — IRBESARTAN 300 MG: 300 TABLET ORAL at 08:35

## 2022-10-13 RX ADMIN — CITALOPRAM HYDROBROMIDE 40 MG: 10 TABLET ORAL at 08:34

## 2022-10-13 RX ADMIN — FUROSEMIDE 40 MG: 20 TABLET ORAL at 08:37

## 2022-10-13 RX ADMIN — BUSPIRONE HYDROCHLORIDE 10 MG: 10 TABLET ORAL at 08:35

## 2022-10-13 RX ADMIN — Medication 81 MG: at 08:34

## 2022-10-13 ASSESSMENT — ACTIVITIES OF DAILY LIVING (ADL)
ADLS_ACUITY_SCORE: 22
PREVIOUS_RESPONSIBILITIES: MEAL PREP;LAUNDRY;HOUSEKEEPING;MEDICATION MANAGEMENT;FINANCES;DRIVING
ADLS_ACUITY_SCORE: 22

## 2022-10-13 NOTE — PLAN OF CARE
Patient arrived on the unit with 7 mL air remaining in TR band.  TR band removal procedure continued on unit, after removing air small amount of bleeding occurred.  Air reinserted, follow up continuation of TR band removal continued without issue.    No complaints of chest pain or any pain.      Problem: Plan of Care - These are the overarching goals to be used throughout the patient stay.    Goal: Plan of Care Review  Description: The Plan of Care Review/Shift note should be completed every shift.  The Outcome Evaluation is a brief statement about your assessment that the patient is improving, declining, or no change.  This information will be displayed automatically on your shift note.  Outcome: Progressing  Goal: Absence of Hospital-Acquired Illness or Injury  Intervention: Identify and Manage Fall Risk  Recent Flowsheet Documentation  Taken 10/12/2022 2000 by Alvin Mendoza, RN  Safety Promotion/Fall Prevention: activity supervised  Intervention: Prevent Skin Injury  Recent Flowsheet Documentation  Taken 10/12/2022 2000 by Alvin Mendoza, RN  Body Position: position changed independently  Goal: Readiness for Transition of Care  Intervention: Mutually Develop Transition Plan  Recent Flowsheet Documentation  Taken 10/12/2022 1800 by Alvin Mendoza, RN  Equipment Currently Used at Home: none   Goal Outcome Evaluation:

## 2022-10-13 NOTE — PLAN OF CARE
Cardiac Rehab Discharge Summary    Reason for therapy discharge:    All goals and outcomes met, no further needs identified.    Progress towards therapy goal(s). See goals on Care Plan in Epic electronic health record for goal details.  Goals met    Therapy recommendation(s):    No further therapy is recommended.    Goal Outcome Evaluation:

## 2022-10-13 NOTE — DISCHARGE SUMMARY
[facility] MEDICINE  DISCHARGE SUMMARY     Primary Care Physician: Bernadette Taylor  Admission Date: 10/12/2022   Discharge Provider: Reji Chang MD Discharge Date: 10/13/2022   Diet:   Active Diet and Nourishment Order   Procedures     Low Saturated Fat Na <2400 mg     Diet       Code Status: Full Code   Activity: DCACTIVITY: Activity as tolerated        Condition at Discharge: Good     REASON FOR PRESENTATION(See Admission Note for Details)   Unstable angina    PRINCIPAL & ACTIVE DISCHARGE DIAGNOSES     Principal Problem:    Unstable angina (H)      PENDING LABS     Unresulted Labs Ordered in the Past 30 Days of this Admission     No orders found for last 31 day(s).            PROCEDURES ( this hospitalization only)      Procedure(s):  CV CORONARY ANGIOGRAM  Fractional Flow Ratio Wire  Percutaneous Coronary Intervention Stent    RECOMMENDATIONS TO OUTPATIENT PROVIDER FOR F/U VISIT     Follow-up Appointments     Follow-up and recommended labs and tests       Follow-up per cardiology  Follow-up with primary physician within 1 week for continuity of care.             Follow-up with primary care physician within the next 2 weeks for continuity of care    DISPOSITION     Home    SUMMARY OF HOSPITAL COURSE:      72-year-old female transferred from [facility] for unstable angina and underwent coronary angiogram with subsequent EUGENIA x2.  She has been seen by cardiology today and cleared for discharge.     1.  Coronary artery disease/unstable angina: Seen by cardiology at [facility] and transferred to [facility].  Angiography showed multivessel coronary artery disease with culprit lesion a 90% stenosis within the large OM 2.  Started on high intensity statin, DAPT for minimum of 6 months and outpatient cardiology follow-up recommended.     2.  Anxiety disorder: Continue home medications     3.  Hypertension: Continue home antihypertensives     4.  BMI  34.77     5. ?  Paroxysmal atrial fibrillation: In the ED at [facility] apparently had a possible episode of atrial fibrillation.  Seen by cardiology, recommending 2-week event monitor when she is discharged from the hospital.     Discharge Medications with Med changes:     Current Discharge Medication List      START taking these medications    Details   aspirin (ASA) 81 MG EC tablet Take 1 tablet (81 mg) by mouth daily  Qty: 30 tablet, Refills: 0    Associated Diagnoses: Unstable angina (H)      clopidogrel (PLAVIX) 75 MG tablet Take 1 tablet (75 mg) by mouth daily  Qty: 30 tablet, Refills: 0    Associated Diagnoses: Unstable angina (H)      nitroGLYcerin (NITROSTAT) 0.4 MG sublingual tablet For chest pain place 1 tablet under the tongue every 5 minutes for 3 doses. If symptoms persist 5 minutes after 1st dose call 911.  Qty: 10 tablet, Refills: 0    Associated Diagnoses: Unstable angina (H)         CONTINUE these medications which have CHANGED    Details   atorvastatin (LIPITOR) 40 MG tablet Take 1 tablet (40 mg) by mouth daily  Qty: 90 tablet, Refills: 3    Associated Diagnoses: Unstable angina (H)         CONTINUE these medications which have NOT CHANGED    Details   amLODIPine (NORVASC) 5 MG tablet TAKE 1 TABLET BY MOUTH EVERYDAY AT BEDTIME  Qty: 90 tablet, Refills: 3    Associated Diagnoses: Essential (primary) hypertension      botulinum toxin type A (BOTOX) 100 units injection Every 3 months. [facility]      busPIRone (BUSPAR) 10 MG tablet Take 1 tablet (10 mg) by mouth 2 times daily  Qty: 180 tablet, Refills: 3    Associated Diagnoses: Anxiety      cholecalciferol 50 MCG (2000 UT) CAPS Take 2,000 Units by mouth daily      citalopram (CELEXA) 20 MG tablet Take 2 tablets (40 mg) by mouth daily  Qty: 135 tablet, Refills: 3    Associated Diagnoses: Anxiety      eletriptan (RELPAX) 40 MG tablet Take 1 tablet (40 mg) by mouth at onset of headache for migraine  Qty: 10 tablet, Refills: 0    Associated Diagnoses:  Other migraine with status migrainosus, intractable      furosemide (LASIX) 20 MG tablet Take 2 tablets (40 mg) by mouth daily  Qty: 180 tablet, Refills: 3    Associated Diagnoses: Venous insufficiency of both lower extremities      HYDROcodone-acetaminophen (NORCO) 5-325 MG tablet Take 1 tablet by mouth daily as needed (Severe headache)  Qty: 5 tablet, Refills: 0    Associated Diagnoses: Chronic migraine without aura without status migrainosus, not intractable      irbesartan (AVAPRO) 300 MG tablet Take 1 tablet (300 mg) by mouth daily  Qty: 90 tablet, Refills: 3    Associated Diagnoses: Essential hypertension      levothyroxine (SYNTHROID/LEVOTHROID) 88 MCG tablet TAKE 1 TABLET BY MOUTH DAILY AT 6:00 AM.  Qty: 90 tablet, Refills: 3    Associated Diagnoses: Acquired hypothyroidism      LORazepam (ATIVAN) 0.5 MG tablet Take 1 tablet (0.5 mg) by mouth every 6 hours as needed for anxiety  Qty: 30 tablet, Refills: 0    Associated Diagnoses: Anxiety      pramipexole (MIRAPEX) 0.25 MG tablet TAKE 1 TABLET BY MOUTH THREE TIMES A DAY.  Qty: 270 tablet, Refills: 3    Associated Diagnoses: Restless legs syndrome      propranolol (INDERAL) 10 MG tablet TAKE 2 TABLETS BY MOUTH TWO TIMES A DAY.  Qty: 360 tablet, Refills: 3    Associated Diagnoses: Essential (primary) hypertension      traZODone (DESYREL) 50 MG tablet TAKE 3 TABLETS (150 MG TOTAL) BY MOUTH AT BEDTIME  Qty: 270 tablet, Refills: 1    Associated Diagnoses: Insomnia, unspecified                   Rationale for medication changes:      See summary        Consults       HOSPITALIST IP CONSULT  NUTRITION SERVICES ADULT IP CONSULT  CARDIAC REHAB IP CONSULT  PHARMACY IP CONSULT  SMOKING CESSATION PROGRAM IP CONSULT    Immunizations given this encounter     Most Recent Immunizations   Administered Date(s) Administered     COVID-19,PF,Moderna 11/02/2021     COVID-19,PF,Moderna Booster 04/13/2022     FLU 6-35 months 10/09/2012     FLUAD(HD)65+ QUAD 09/10/2021     Flu 65+  Years 09/19/2019     Flu, Unspecified 10/06/2015     HepA, Unspecified 12/14/2006     HepB, Unspecified 06/13/2006     HepB-Adult 06/13/2006     Influenza (H1N1) 12/29/2009     Influenza (High Dose) 3 valent vaccine 10/26/2017     Influenza (IIV3) PF 09/21/2011     Influenza, Quad, High Dose, Pf, 65yr+ (Fluzone HD) 09/06/2022     MMR 06/13/2006     Pneumo Conj 13-V (2010&after) 10/12/2015     Pneumococcal 23 valent 10/26/2012     Td,adult,historic,unspecified 07/21/2018     Tdap (Adacel,Boostrix) 07/22/2018     Typhoid Oral 06/13/2006     Typhoid, Unspecified Formulation 06/13/2006     Zoster vaccine recombinant adjuvanted (SHINGRIX) 08/05/2020     Zoster vaccine, live 06/24/2014           Anticoagulation Information      Recent INR results: No results for input(s): INR in the last 168 hours.  Warfarin doses (if applicable) or name of other anticoagulant:       SIGNIFICANT IMAGING FINDINGS     No results found for this visit on 10/12/22.    SIGNIFICANT LABORATORY FINDINGS             Discharge Orders        Reason aspirin not prescribed from this order set    Patient not currently discharging     Reason for your hospital stay    angina     Follow-up and recommended labs and tests     Follow-up per cardiology  Follow-up with primary physician within 1 week for continuity of care.     Activity    Your activity upon discharge: activity as tolerated     Diet    Follow this diet upon discharge: Orders Placed This Encounter      Low Saturated Fat Na <2400 mg       Examination   Physical Exam   Temp:  [97.8  F (36.6  C)-98.7  F (37.1  C)] 98  F (36.7  C)  Pulse:  [55-78] 60  Resp:  [12-23] 20  BP: (118-155)/(58-84) 155/74  SpO2:  [94 %-97 %] 96 %  Wt Readings from Last 1 Encounters:   10/13/22 84.7 kg (186 lb 11.2 oz)       General: No apparent distress, no further chest pain, feels back to baseline although tired as she slept poorly last night  Lungs: Clear to auscultation  Heart: Regular rate and rhythm  Extremities: No  edema      Please see EMR for more detailed significant labs, imaging, consultant notes etc.    I, Reji Chang MD, personally saw the patient today and spent greater than 30 minutes discharging this patient.    Reji Chang MD  [facility]    CC:Bernadette Taylor

## 2022-10-13 NOTE — PLAN OF CARE
Problem: Cardiac Catheterization (Diagnostic/Interventional)  Goal: Acceptable Pain Control  Outcome: Progressing  Intervention: Prevent or Manage Pain  Recent Flowsheet Documentation  Taken 10/13/2022 0000 by Jhon Yuen RN  Pain Management Interventions:    declines    repositioned    rest     Problem: Cardiac Catheterization (Diagnostic/Interventional)  Goal: Stable Heart Rate and Rhythm  Outcome: Progressing  Goal: Absence of Bleeding  Outcome: Progressing  Goal: Absence of Contrast-Induced Injury  Outcome: Progressing  Goal: Absence of Embolism Signs and Symptoms  Outcome: Progressing  Goal: Anesthesia/Sedation Recovery  Outcome: Progressing  Intervention: Optimize Anesthesia Recovery  Recent Flowsheet Documentation  Taken 10/13/2022 0000 by Jhon Yuen RN  Safety Promotion/Fall Prevention:    activity supervised    nonskid shoes/slippers when out of bed  Reorientation Measures: calendar in view  Goal: Acceptable Pain Control  Outcome: Progressing  Intervention: Prevent or Manage Pain  Recent Flowsheet Documentation  Taken 10/13/2022 0000 by Jhon Yuen RN  Pain Management Interventions:    declines    repositioned    rest  Goal: Absence of Vascular Access Complication  Outcome: Progressing  Intervention: Prevent Access Site Complications  Recent Flowsheet Documentation  Taken 10/13/2022 0000 by Jhon Yuen RN  Activity Management: activity adjusted per tolerance       Goal Outcome Evaluation: Patient is aox4. Patient anxious and prn ativan effective. Patient c/o restless leg and repositioning helpful per patient. Patient denied chest pain and SOB. Has sinus alcira with prolonged QT. HR in the 50s. Right radial/angio site has strong pulse. No hematoma noted. Patient up with SBA and makes needs known. Continue to monitor.

## 2022-10-13 NOTE — PROGRESS NOTES
Occupational Therapy      10/13/22 7832   Appointment Info   Signing Clinician's Name / Credentials (OT) Thais García OTR/L   Living Environment   People in Home spouse   Current Living Arrangements house   Home Accessibility stairs to enter home   Number of Stairs, Main Entrance 6   Transportation Anticipated family or friend will provide   Living Environment Comments Pt has W/I shower w/ GB   Self-Care   Usual Activity Tolerance good   Current Activity Tolerance good   Regular Exercise No   Equipment Currently Used at Home none   Fall history within last six months no   Activity/Exercise/Self-Care Comment Ind. w/ ADL/IADL tasks   Instrumental Activities of Daily Living (IADL)   Previous Responsibilities meal prep;laundry;housekeeping;medication management;finances;driving   General Information   Onset of Illness/Injury or Date of Surgery 10/12/22   Referring Physician Ousmane Orellana MD   Patient/Family Therapy Goal Statement (OT) 72-year-old female transferred from [facility] for unstable angina and underwent coronary angiogram with subsequent EUGENIA x2.   Existing Precautions/Restrictions cardiac  (R. Radial wrist precautions)   Cognitive Status Examination   Orientation Status orientation to person, place and time   Range of Motion Comprehensive   General Range of Motion no range of motion deficits identified   Bed Mobility   Bed Mobility No deficits identified   Transfers   Transfers sit-stand transfer;bed-chair transfer   Transfer Skill: Bed to Chair/Chair to Bed   Bed-Chair [place] (Transfers) modified independence   Sit-Stand Transfer   Sit-Stand [place] (Transfers) modified independence   Balance   Balance Assessment no deficits were identified   Clinical Impression   Criteria for Skilled Therapeutic Interventions Met (OT) Evaluation only   OT Diagnosis decreased ADL ind.   OT Problem List-Impairments impacting ADL problems related to;activity tolerance impaired   Assessment of  Occupational Performance 1-3 Performance Deficits   Planned Therapy Interventions (OT) ADL retraining   Clinical Decision Making Complexity (OT) low complexity   Risk & Benefits of therapy have been explained evaluation/treatment results reviewed;patient   OT Total Evaluation Time   OT Eval, Low Complexity Minutes (61308) 10   OT Goals   Therapy Frequency (OT) One time eval and treatment   OT Predicted Duration/Target Date for Goal Attainment 10/18/22   OT Goals Transfers;Cardiac Phase 1   OT: Transfer Modified independent;with assistive device   OT: Understanding of cardiac education to maximize quality of life, condition management, and health outcomes Patient;Verbalize   OT: Perform aerobic activity with stable cardiovascular response continuous;10 minutes   OT: Functional/aerobic ambulation tolerance with stable cardiovascular response in order to return to home and community environment Modified independent;Within precautions;200 feet   OT: Navigation of stairs simulating home set up with stable cardiovascular response in order to return to home and community environment Modified independent;6 stairs

## 2022-10-13 NOTE — PROGRESS NOTES
Cardiology Progress Note    Assessment:  Coronary artery disease status post stenting of obtuse marginal branch, hemodynamically stable, normal troponin  Chest pain, mild, nonexertional, nonpleuritic, normal EKG and physical exam today  Anxiety disorder    Plan:  Discharge home to follow-up with nurse practitioner and Dr. Worley as previously ordered    Patient tells me that she cannot afford prasugrel.  We will reload her with clopidogrel 300 mg today and start 75 mg a day tomorrow  Outpatients cardiac rehab    Subjective:   Underwent PCI to obtuse marginal yesterday.  She continues to have mild residual left-sided chest discomfort.  The pain does not get worse with physical activities or breathing.  Denies any associated symptoms.    Objective:   BP (!) 155/74 (BP Location: Left arm)   Pulse 60   Temp 98  F (36.7  C) (Oral)   Resp 20   Wt 84.7 kg (186 lb 11.2 oz)   LMP  (LMP Unknown)   SpO2 96%   BMI 35.28 kg/m      Intake/Output Summary (Last 24 hours) at 10/13/2022 0944  Last data filed at 10/13/2022 0512  Gross per 24 hour   Intake 806.25 ml   Output 1050 ml   Net -243.75 ml         Physical Exam:  GENERAL: no distress  NECK: No JVD  LUNGS: Clear to auscultation.  CARDIAC: regular  rhythm, S1 & S2 normal.  No heaves, thrills, gallops or murmurs.  ABDOMEN: flat, negative hepatosplenomegaly, soft and non-tender.  EXTREMITIES: No evidence of cyanosis, clubbing or edema.    Current Facility-Administered Medications Ordered in Epic   Medication Dose Route Frequency Provider Last Rate Last Admin     amLODIPine (NORVASC) tablet 5 mg  5 mg Oral At Bedtime Reji Chang MD   5 mg at 10/12/22 2206     aspirin EC tablet 81 mg  81 mg Oral Daily Ousmane Orellana MD   81 mg at 10/13/22 0834     atorvastatin (LIPITOR) tablet 40 mg  40 mg Oral Daily Ousmane Orellana MD   40 mg at 10/12/22 1917     busPIRone (BUSPAR) tablet 10 mg  10 mg Oral BID Reji Chang MD   10 mg at 10/13/22 0835      citalopram (celeXA) tablet 40 mg  40 mg Oral Daily Reji Chang MD   40 mg at 10/13/22 0834     clopidogrel (PLAVIX) tablet 300 mg  300 mg Oral Once Ramiro Britton MD         [START ON 10/14/2022] clopidogrel (PLAVIX) tablet 75 mg  75 mg Oral Daily Ramiro Britton MD         eletriptan (RELPAX) tablet 40 mg  40 mg Oral at onset of headache Reji Chang MD         fentaNYL (PF) (SUBLIMAZE) injection 25 mcg  25 mcg Intravenous Q15 Min PRN Ousmane Orellana MD         furosemide (LASIX) tablet 40 mg  40 mg Oral Daily Reji Chang MD   40 mg at 10/13/22 0837     HOLD: Metformin and metformin containing medications on day of procedure and 48 hours after IV contrast given for patients with acute kidney injury or severe chronic kidney disease (stage IV or stage V; i.e., eGFR less than 30)   Does not apply HOLD Ousmane Orellana MD         hydrALAZINE (APRESOLINE) injection 10 mg  10 mg Intravenous Q4H PRN Ousmane Orellana MD         HYDROcodone-acetaminophen (NORCO) 5-325 MG per tablet 1 tablet  1 tablet Oral Daily PRN Reji Chang MD         irbesartan (AVAPRO) tablet 300 mg  300 mg Oral Daily Reji Chang MD   300 mg at 10/13/22 0835     levothyroxine (SYNTHROID/LEVOTHROID) tablet 88 mcg  88 mcg Oral Daily Reji Chang MD   88 mcg at 10/13/22 0519     LORazepam (ATIVAN) tablet 0.5 mg  0.5 mg Oral Q6H PRN Reji Chang MD   0.5 mg at 10/12/22 2319     midazolam (VERSED) injection 0.5 mg  0.5 mg Intravenous Q5 Min PRN Ousmane Orellana MD         nitroGLYcerin (NITROSTAT) sublingual tablet 0.4 mg  0.4 mg Sublingual Q5 Min PRN Ousmane Orellana MD         ondansetron (ZOFRAN ODT) ODT tab 4 mg  4 mg Oral Q6H PRN Ousmane Orellana MD        Or     ondansetron (ZOFRAN) injection 4 mg  4 mg Intravenous Q6H PRN Ousmane Orellana MD         Percutaneous Coronary Intervention orders placed (this is information for BPA  alerting)   Does not apply DOES NOT GO TO Ousmane Reynolds MD         pramipexole (MIRAPEX) tablet 0.25 mg  0.25 mg Oral TID Reji Chang MD   0.25 mg at 10/13/22 0834     propranolol (INDERAL) tablet 20 mg  20 mg Oral BID Reji Chang MD   20 mg at 10/13/22 0835     Reason beta blocker order not selected   Does not apply DOES NOT GO TO Ousmane Reynolds MD         traZODone (DESYREL) tablet 150 mg  150 mg Oral At Bedtime Reji Chang MD   150 mg at 10/12/22 2205     Vitamin D3 (CHOLECALCIFEROL) tablet 2,000 Units  2,000 Units Oral Daily Reji Chang MD   2,000 Units at 10/13/22 0834     Current Outpatient Medications Ordered in Epic   Medication Sig Dispense Refill     atorvastatin (LIPITOR) 40 MG tablet Take 1 tablet (40 mg) by mouth daily 90 tablet 3       Cardiographics:    Telemetry: Normal sinus rhythm  ECG: 10/13/2022 normal sinus rhythm no ischemia    Coronary angio:  1.  Multivessel CAD as described below.  Culprit lesion is a 90% stenosis within a large OM 2.     LM: No significant disease  LAD: Angiographically moderate proximal-mid LAD disease that is not flow-limiting (FFR 0.83).  Further down the vessel, there is a discrete 70-80% stenosis of the distal LAD as well as a discrete 60-70% stenosis of the apical LAD.  LCx: Moderate proximal LAD disease with a 90% culprit stenosis of the large OM 2  RCA: Moderate diffuse proximal-mid RCA disease     2.  Successful PCI of the OM 2 lesion extending back to the proximal LCx with placement of a single 2.5 x 38 mm Synergy EUGENIA, postdilated with a 2.75 mm NC balloon.       Lab Results    Chemistry/lipid CBC Cardiac Enzymes/BNP/TSH/INR   Recent Labs   Lab Test 06/08/22  0905 06/10/21  1454   CHOL  --  149   HDL  --  56   LDL 95 70   TRIG  --  113     Recent Labs   Lab Test 06/08/22  0905 06/10/21  1454 07/31/20  0755   LDL 95 70 159*     Recent Labs   Lab Test 10/13/22  0525      POTASSIUM 4.1    CHLORIDE 104   CO2 25   GLC 89   BUN 19.8   CR 0.77   GFRESTIMATED 80   ISSA 8.7*     Recent Labs   Lab Test 10/13/22  0525 10/11/22  2303 06/08/22  0905   CR 0.77 0.97 0.81     No results for input(s): A1C in the last 91499 hours.       Recent Labs   Lab Test 10/11/22  2303   WBC 7.7   HGB 13.2   HCT 39.9   MCV 90        Recent Labs   Lab Test 10/11/22  2303 06/08/22  0905 02/09/22  1503   HGB 13.2 12.6 12.3    Recent Labs   Lab Test 10/12/22  0354 10/11/22  2303 02/09/22  1503   TROPONINI 0.03 <0.01 <0.01     No results for input(s): BNP, NTBNPI, NTBNP in the last 32849 hours.  Recent Labs   Lab Test 06/08/22  0905   TSH 3.99     No results for input(s): INR in the last 30944 hours.

## 2022-10-18 PROBLEM — E78.5 DYSLIPIDEMIA, GOAL LDL BELOW 70: Status: ACTIVE | Noted: 2019-04-08

## 2022-10-18 NOTE — UTILIZATION REVIEW
Medicare Post Discharge Review:  Admission Status; Secondary Review Determination   Under the authority of the Utilization Management Committee, the utilization review process indicated a secondary review on [facility]. The review outcome is based on review of the medical records, discussions with staff, and applying clinical experience noted on the date of the review.   (x) Inpatient Status Appropriate - This patient's medical care is consistent with medical management for inpatient care and reasonable inpatient medical practice.     RATIONALE FOR DETERMINATION   72 yr old female presented 10/12/22 after evaluation in outside ED for chest pain presenting on 10/11/22.  Deemed unstable angina and transferred for coronary angiography given high calcium score and ongoing chest pain.  Noted multivessel CAD and 90% stenosis within large OM2 and stenting placed.  Also noted episode of Afib with RVR at outside hospital.  This patient crossed 2 midnights including time spent at outside hospital and in Observation status also at outside hospital prior to transfer.  Given ongoing chest pain and concern for unstable angina, it was determined by cardiology that patient needed angiogram and procedure before being able to safely discharge.    At the time of admission with the information available to the attending physician more than 2 nights Hospital complex care was anticipated, based on patient risk of adverse outcome if treated as outpatient and complex care required. Inpatient admission is appropriate based on the Medicare guidelines.   The information on this document is developed by the utilization review team in order for the business office to ensure compliance. This only denotes the appropriateness of proper admission status and does not reflect the quality of care rendered.   The definitions of Inpatient Status and Observation Status used in making the determination above are those provided in the CMS Coverage  Manual, Chapter 1 and Chapter 6, section 70.4.   Sincerely,   Yarelis Gregg MD  Utilization Review  Physician Advisor  [facility]

## 2022-10-19 ENCOUNTER — OFFICE VISIT (OUTPATIENT)
Dept: CARDIOLOGY | Facility: CLINIC | Age: 72
End: 2022-10-19
Payer: MEDICARE

## 2022-10-19 VITALS
BODY MASS INDEX: 34.77 KG/M2 | WEIGHT: 184 LBS | SYSTOLIC BLOOD PRESSURE: 112 MMHG | RESPIRATION RATE: 16 BRPM | OXYGEN SATURATION: 99 % | HEART RATE: 64 BPM | DIASTOLIC BLOOD PRESSURE: 60 MMHG

## 2022-10-19 DIAGNOSIS — I25.110 CORONARY ARTERY DISEASE INVOLVING NATIVE CORONARY ARTERY OF NATIVE HEART WITH UNSTABLE ANGINA PECTORIS (H): ICD-10-CM

## 2022-10-19 DIAGNOSIS — I20.0 UNSTABLE ANGINA (H): Primary | ICD-10-CM

## 2022-10-19 DIAGNOSIS — E78.5 DYSLIPIDEMIA, GOAL LDL BELOW 70: ICD-10-CM

## 2022-10-19 DIAGNOSIS — I48.0 PAROXYSMAL ATRIAL FIBRILLATION (H): ICD-10-CM

## 2022-10-19 DIAGNOSIS — I10 ESSENTIAL HYPERTENSION: ICD-10-CM

## 2022-10-19 DIAGNOSIS — R93.1 HIGH CORONARY ARTERY CALCIUM SCORE: ICD-10-CM

## 2022-10-19 DIAGNOSIS — I25.10 CORONARY ARTERY DISEASE INVOLVING NATIVE CORONARY ARTERY OF NATIVE HEART WITHOUT ANGINA PECTORIS: Primary | ICD-10-CM

## 2022-10-19 PROCEDURE — 99215 OFFICE O/P EST HI 40 MIN: CPT | Performed by: NURSE PRACTITIONER

## 2022-10-19 RX ORDER — CLOPIDOGREL BISULFATE 75 MG/1
75 TABLET ORAL DAILY
Qty: 90 TABLET | Refills: 3 | Status: SHIPPED | OUTPATIENT
Start: 2022-10-19 | End: 2023-09-12

## 2022-10-19 RX ORDER — ATORVASTATIN CALCIUM 80 MG/1
80 TABLET, FILM COATED ORAL AT BEDTIME
Qty: 90 TABLET | Refills: 3 | Status: SHIPPED | OUTPATIENT
Start: 2022-10-19 | End: 2023-11-14

## 2022-10-19 NOTE — PATIENT INSTRUCTIONS
Suma Mark,    It was a pleasure to see you today at the [facility].     My recommendations after this visit include:    - No medications changes made today    - Schedule 2 weeks event monitor     - Please seek medical attention if you develop recurrent chest pain or shortness of breath or similar symptoms you experienced prior to recent cardiac event    - Schedule lipid level test/AST/ALT (liver enzymes) in 4 weeks. Make sure to fast for 8-12 hours prior to lab work. Okay to have plain water, black coffee or tea without creamer and sugar    - Follow up with Dr. Worley in 4-6 weeks    - Please call [phone], if you have any questions or concerns    Shalonda Sadler CNP    Medication     Take all your medications as prescribed  Do not stop any medications without talking with a healthcare provider    Exercise      Physical activity is important for overall health  Set a goal of 150 minutes of exercise each week  For example, 30 minutes of exercise 5 days each week.    These 30 minutes can be broken into shorter periods of 15 minutes twice daily or 10 minutes three times daily  Start any exercise program slowly and work towards the goal of 150 minutes each week  For example, you may start with 10 minutes and plan to add a few minutes each week as you get stronger   Examples of exercise include walking, swimming, or biking  Remember to stretch and stay hydrated with exercise    Diet     A heart healthy diet includes:  A variety of fruits and vegetables  Whole grains  Low-fat dairy (fat-free, 1% fat, and low-fat)  Lean meats and poultry without skin   Fish (eat fish 2 times each week)  Nuts  Limit saturated fat to about 13 grams each day (based on a 2000 calorie diet)  Limit red meat  Limit sugars (sweets and sugary beverages)  Limit your portion sizes  Do not add salt to your food when cooking or at the table  Limit alcohol intake (no more than 1 drink each day for women or 2 drinks each  day for men)    Weight Loss     Work on losing weight with diet and exercise  You BMI (body mass index) should be between 18.5-24.9  This is a calculation of your weight and height  Please ask your healthcare provider for your BMI    Manage Other Chronic Health Conditions     Control cholesterol  Eat a diet low in saturated fat  Exercise   Take a statin medication as prescribed  Manage blood pressure  Eat a diet low in sodium  Exercise  Reduce stress  Lose weight   Take blood pressure medications as prescribed  Control blood sugars if diabetic  Monitor sugars and carbohydrates in your diet  Lose weight   Take diabetes medications as prescribed  Follow-up with your primary care provider to make sure your blood sugars are well controlled    Stress Reduction     Find time each day to relax  Reading, listening to music, yoga, meditation, exercise, spending time with friends and family, volunteering   Get 6-8 hours of sleep each night    Smoking Cessation     Smoking causes numerous health problems including coronary artery disease  It is never too late to quit  Set realistic goals for quitting  Decrease the number of cigarettes used each week  Use nicotine gum or patches to help you quit    Information from the [employer].  Please visit their website at www.heart.org

## 2022-10-19 NOTE — LETTER
10/19/2022    Bernadette Taylor MD  [address] [facility] [address]    RE: Suma Puenteell       Dear Colleague,     I had the pleasure of seeing Suma Mark in the [facility].          Assessment/Recommendations   Assessment:    1.  Coronary artery disease: Patient was hospitalized from October 12 through October 13, 2022 with unstable angina.  Coronary angiogram on 10/12/2022 showed 90% stenosis and large OM 2 which was successfully treated with a drug-eluting stent.  Coronary angiogram also showed moderate diffuse disease in RCA, moderate proximal to mid LAD disease with no flow-limiting per FFR but noted 70% lesion in distal LAD.    Patient was recommended to consider repeat stress testing as an outpatient or return to Cath Lab f staged PCI of LAD if pending clinical symptoms.    On dual antiplatelet therapy with ASA 81 mg indefinitely and  Clopidogrel (Plavix) 75 mg daily for 12 months.  Patient reports her chest pain (sharp and tightness) is down from 10/10 to 3-4 intermittently throughout the day not necessarily associated with exertion.    She continues to have occasional lightheadedness and fatigue although some improvement since recent stent placement.    Cardiac rehab has not been ordered/scheduled    2.  Dyslipidemia with LDL goal <70/Obesity with a BMI of: Suma Mark is on high intensity statin therapy with atorvastatin 80 mg daily.  Dose was increased in the hospital tolerating with no adverse effect.. Most recent LDL is 70.    3.  Hypertension: Her blood pressure is stable.  On Avapro, amlodipine, and propanolol.    4.  Paroxysmal atrial fibrillation: Patient was noted to have an episode of atrial fibrillation at [facility] ED.  No recurrent during hospitalization.  Patient was recommended 2 weeks event monitor as an outpatient.    Patient is on furosemide.  She states she takes it for lower extremity edema.  She has been on that medication for a long time.  Recent  BMP stable.    Plan:  - We discussed the importance of antiplatelet therapy and talking with her cardiologist prior to stopping these medications for any reason.  We discussed about utilization of as needed nitroglycerin.     -Instructed patient to avoid high intense exercise or lifting heavy objects until further direction.    - Encouraged to seek medical attention if recurrent chest pain or shortness of breath.    - Risk factor modification and lifestyle management topics were discussed including managing comorbidities, weight loss, heart healthy diet, exercise, stress reduction and alcohol use.      - Fasting lipid profile check in 4- weeks with AST and ALT liver enzyme. Order placed    - Will also check with Dr. Calderon and Dr. Martinez if we need to hold off on cardiac rehab until her angina is addressed.    -Schedule 2 weeks event monitor.    -We will consider echo in the near future as an outpatient    - We discussed a diet low in saturated fat, weight loss, and exercise along with medication for better control of cholesterol.  Highly encouraged to participate in nutrition class in cardiac rehab.    - Continue current hypertension regimen    Follow up with Dr. Worley in 4-6 weeks     History of Present Illness/Subjective    Ms. Suma Mark is a 72 year old female with a past medical history of severely elevated coronary calcium score, hypertension, dyslipidemia, migraine, recent hospitalization with unstable angina with diagnosis of coronary disease with status post PCI/EUGENIA to OM2 on 10/12/2022 who is seen at [facility] for post coronary intervention follow up.     Today, Lo is here accompanied by her .  Her chest discomfort has reduced from 10/10 down to 3-4/10.  She reports that she is getting intermittent chest discomfort throughout the day not necessarily associated with exertion.  She reports occasional lightheadedness and ongoing fatigue although some  improvement since recent stent placement.  She denies shortness of breath, orthopnea, PND, palpitations, abdominal fullness/bloating and lower extremity edema.  Patient works as a  and wondering if she can resume her work.    Coronary Angiogram done on 10/12/2022: reviewed:  Conclusion  1.  Multivessel CAD as described below.  Culprit lesion is a 90% stenosis within a large OM 2.     LM: No significant disease  LAD: Angiographically moderate proximal-mid LAD disease that is not flow-limiting (FFR 0.83).  Further down the vessel, there is a discrete 70-80% stenosis of the distal LAD as well as a discrete 60-70% stenosis of the apical LAD.  LCx: Moderate proximal LAD disease with a 90% culprit stenosis of the large OM 2  RCA: Moderate diffuse proximal-mid RCA disease     2.  Successful PCI of the OM 2 lesion extending back to the proximal LCx with placement of a single 2.5 x 38 mm Synergy EUGENIA, postdilated with a 2.75 mm NC balloon.      Plan    - Remove TR band per protocol; wrist precautions  - DAPT for a minimum of 6 months.  After this, would consider indefinite P2 Y 12 inhibitor monotherapy  - High intensity statin  - Cardiac rehab referral  - Establish with outpatient cardiologist.  Pending clinical symptoms, can consider repeat stress testing (prior Lexiscan may have been negative due to balanced ischemia), versus return to the Cath Lab for staged PCI of the LAD.        Physical Examination Review of Systems   /60 (BP Location: Right arm, Patient Position: Sitting, Cuff Size: Adult Regular)   Pulse 64   Resp 16   Wt 83.5 kg (184 lb)   LMP  (LMP Unknown)   SpO2 99%   BMI 34.77 kg/m    Body mass index is 34.77 kg/m .  Wt Readings from Last 3 Encounters:   10/19/22 83.5 kg (184 lb)   10/13/22 84.7 kg (186 lb 11.2 oz)   09/28/22 83.5 kg (184 lb)     General Appearance:   no distress, normal body habitus   ENT/Mouth: membranes moist, no oral lesions or bleeding gums.      EYES:  no  scleral icterus, normal conjunctivae   Neck: no carotid bruits or thyromegaly   Chest/Lungs:   lungs are clear to auscultation, no rales or wheezing, equal chest wall expansion    Cardiovascular:    Heart rate regular. Normal first and second heart sounds with no murmurs, rubs, or gallops; the carotid, radial and posterior tibial pulses are intact, JVP is difficult to assess due to the patient's obesity and body habitus   , no edema bilaterally    Abdomen:  no organomegaly, masses, bruits, or tenderness; bowel sounds are present   Extremities  Puncture Site: no cyanosis or clubbing  Right radial site is soft with mild bruising.  Radial pulses and Pedal pulses intact and symmetrical.  CMS intact.   Skin: no xanthelasma, warm.    Neurologic: normal  bilateral, no tremors     Psychiatric: alert and oriented x3, calm                 Negative unless noted in HPI     Medical History  Surgical History Family History Social History   Past Medical History:   Diagnosis Date     Acquired hypothyroidism 3/12/2009     Anxiety      Anxiety state, unspecified      Back pain      Breast cyst      Carpal tunnel syndrome      Chronic pain     LOW BACK     Degenerative disc disease, lumbar      Depression      Depression, unspecified depression type      Diarrhea      Disease of thyroid gland     hypothyroid     Diverticulosis 10/4/2017    Incidental finding on colonoscopy 10/2017     DJD (degenerative joint disease)      Essential hypertension 4/21/2015     Essential hypertension, benign      Fatty liver      Fibromyalgia      Gastro-oesophageal reflux disease      Gastroesophageal reflux disease without esophagitis 4/21/2015     GERD (gastroesophageal reflux disease)      Hernia, ventral 4/27/2017     History of anesthesia complications     hypotension after surgery     History of blood clots      History of blood transfusion      History of transfusion      Hyperlipidemia      Hypertension      Insomnia, unspecified type       Left lower quadrant pain      Lumbago      Lung nodules     INTERMITTENT     Major depression      Migraine      Osteoarthritis      Osteopenia      Osteoporosis      Other abnormal glucose      Other and unspecified special symptom or syndrome, not elsewhere classified      PONV (postoperative nausea and vomiting)      Posttraumatic stress disorder      Raynaud's disease      Restless leg syndrome      S/P total knee replacement 11/27/2017     Spontaneous ecchymoses      Thrombosis of leg     with pregnancy     Unspecified episodic mood disorder     AFFECTIVE PSYCHOSIS     Unspecified hypothyroidism      Unspecified vitamin D deficiency      Varicose veins      Ventral hernia      Vitamin D deficiency     Past Surgical History:   Procedure Laterality Date     ARTHROCENTESIS      LEFT HIP TROCHANTERIC BURSA     ARTHROPLASTY KNEE UNICOMPARTMENT  10/31/2013    Procedure: ARTHROPLASTY KNEE UNICOMPARTMENT;  LEFT KNEE UNICOMPARTMENTAL ARTHROPLASTY (CAROLINE)^;  Surgeon: Chi Mittal MD;  Location:  OR     ARTICULAR DEBRIEDEMENT[      LEFT KNEE     BACK SURGERY       BREAST CYST EXCISION       BREAST SURGERY      bx     COLECTOMY N/A 6/2/2017    Procedure: RESECTION, SMALL INTESTINE, OPEN ADHESIOLYSIS;  Surgeon: Keyon Qureshi MD;  Location: [facility];  Service:      COLON SURGERY       CV CORONARY ANGIOGRAM N/A 10/12/2022    Procedure: CV CORONARY ANGIOGRAM;  Surgeon: You Martinez MD;  Location: [facility] CV     CV FRACTIONAL FLOW RATIO WIRE N/A 10/12/2022    Procedure: Fractional Flow Ratio Wire;  Surgeon: You Martinez MD;  Location: [facility] CV     CV PCI STENT DRUG ELUTING N/A 10/12/2022    Procedure: Percutaneous Coronary Intervention Stent;  Surgeon: You Martinez MD;  Location: [place] [facility] [facility] CV     ENDOSCOPIC RETROGRADE CHOLANGIOPANCREATOGRAPHY       ENDOSCOPIC STRIPPING VEIN(S)      BILATERLY     GENITOURINARY SURGERY      bladder sling     GI SURGERY       hemorrhoidectomy     HEMORRHOID SURGERY       HERNIORRHAPHY INCISIONAL (LOCATION) N/A 6/2/2017    Procedure: LAPARASCOPIC CONVERTED TO OPEN PRIMARY INCISIONAL HERNIA REPAIR;  Surgeon: Keyon Qureshi MD;  Location: [facility];  Service:      HYSTERECTOMY  [id number]     HYSTERECTOMY VAGINAL       INCONTINENCE SURGERY       LAMINECTOMY LUMBAR TWO LEVELS      2003     LUMBAR FUSION       LUMBAR HARDWARE REMOVAL[       ORTHOPEDIC SURGERY      carpal tunnel     [place] LAP,DIAGNOSTIC ABDOMEN N/A 8/22/2019    Procedure: DIAGNOSTIC LAPAROSCOPY, LYSIS OF ADHESION;  Surgeon: Svetlana Ron MD;  Location: [facility];  Service: General     RELEASE CARPAL TUNNEL       STRIP VEIN       TRANSARTHISCOPIC SURGERY[      LEFT KNEE     US THYROID BIOPSY  4/19/2019     VEIN LIGATION AND STRIPPING Bilateral      ZZC TOTAL KNEE ARTHROPLASTY Right 11/27/2017    Procedure:  RIGHT TOTAL KNEE ARTHROPLASTY;  Surgeon: Kevin Ryder MD;  Location: [facility];  Service: Orthopedics    Family History   Problem Relation Age of Onset     Dementia Mother      Arthritis Mother      Chronic Obstructive Pulmonary Disease Father      Cardiovascular Father      Hypertension Father      No Known Problems Sister      No Known Problems Daughter      No Known Problems Son      Cerebrovascular Disease Maternal Grandmother      Heart Disease Paternal Grandmother      Cerebrovascular Disease Paternal Grandmother      No Known Problems Sister      No Known Problems Sister      No Known Problems Son      No Known Problems Daughter      Breast Cancer Paternal Aunt         age in 50's    Social History     Socioeconomic History     Marital status:      Spouse name: Not on file     Number of children: Not on file     Years of education: Not on file     Highest education level: Not on file   Occupational History     Not on file   Tobacco Use     Smoking status: Never     Smokeless tobacco: Never   Substance and Sexual Activity      Alcohol use: Yes     Comment: Alcoholic Drinks/day: 1 cocktail daily     Drug use: No     Sexual activity: Not on file   Other Topics Concern     Not on file   Social History Narrative     Not on file     Social Determinants of Health     Financial Resource Strain: Not on file   Food Insecurity: Not on file   Transportation Needs: Not on file   Physical Activity: Not on file   Stress: Not on file   Social Connections: Not on file   Intimate Partner Violence: Not on file   Housing Stability: Not on file          Medications  Allergies   Current Outpatient Medications   Medication Sig Dispense Refill     amLODIPine (NORVASC) 5 MG tablet TAKE 1 TABLET BY MOUTH EVERYDAY AT BEDTIME 90 tablet 3     aspirin (ASA) 81 MG EC tablet Take 1 tablet (81 mg) by mouth daily 30 tablet 0     atorvastatin (LIPITOR) 80 MG tablet Take 1 tablet (80 mg) by mouth At Bedtime 90 tablet 3     botulinum toxin type A (BOTOX) 100 units injection Every 3 months. [facility]       busPIRone (BUSPAR) 10 MG tablet Take 1 tablet (10 mg) by mouth 2 times daily 180 tablet 3     cholecalciferol 50 MCG (2000 UT) CAPS Take 2,000 Units by mouth daily       citalopram (CELEXA) 20 MG tablet Take 2 tablets (40 mg) by mouth daily 135 tablet 3     clopidogrel (PLAVIX) 75 MG tablet Take 1 tablet (75 mg) by mouth daily 90 tablet 3     eletriptan (RELPAX) 40 MG tablet Take 1 tablet (40 mg) by mouth at onset of headache for migraine 10 tablet 0     furosemide (LASIX) 20 MG tablet Take 2 tablets (40 mg) by mouth daily 180 tablet 3     HYDROcodone-acetaminophen (NORCO) 5-325 MG tablet Take 1 tablet by mouth daily as needed (Severe headache) 5 tablet 0     irbesartan (AVAPRO) 300 MG tablet Take 1 tablet (300 mg) by mouth daily 90 tablet 3     levothyroxine (SYNTHROID/LEVOTHROID) 88 MCG tablet TAKE 1 TABLET BY MOUTH DAILY AT 6:00 AM. 90 tablet 3     LORazepam (ATIVAN) 0.5 MG tablet Take 1 tablet (0.5 mg) by mouth every 6 hours as needed for anxiety 30 tablet 0      "nitroGLYcerin (NITROSTAT) 0.4 MG sublingual tablet For chest pain place 1 tablet under the tongue every 5 minutes for 3 doses. If symptoms persist 5 minutes after 1st dose call 911. 10 tablet 0     pramipexole (MIRAPEX) 0.25 MG tablet TAKE 1 TABLET BY MOUTH THREE TIMES A DAY. 270 tablet 3     propranolol (INDERAL) 10 MG tablet TAKE 2 TABLETS BY MOUTH TWO TIMES A DAY. 360 tablet 3     traZODone (DESYREL) 50 MG tablet TAKE 3 TABLETS (150 MG TOTAL) BY MOUTH AT BEDTIME 270 tablet 1    Allergies   Allergen Reactions     Cephalexin Nausea and Vomiting     Ace Inhibitors Other (See Comments)     Elevates blood pressure     Amitriptyline Hcl Other (See Comments)     elevates blood pressure     Atenolol Other (See Comments)     Aggravates reynauds     Celebrex [Celecoxib] GI Disturbance     Cephalosporins Unknown     Other reaction(s): *Unknown  HUT Comment: Pt doesn't remember   Pt doesn't remember        Clonidine Unknown     \"got very ill in ER\"     Codeine Sulfate Nausea and Vomiting     Darvocet [Propoxyphene N-Apap] Nausea and Vomiting     Dexamethasone Acetate Swelling     Erythromycin Nausea and Vomiting     Escitalopram Other (See Comments)     Other reaction(s): Headache  Headaches.       Gabapentin Unknown     \"Spotted\"     Hctz Unknown     \"depletes my sodium\"     Propoxyphene      Other reaction(s): Gastrointestinal  HUT Reaction: Gastrointestinal; HUT Reaction: Nausea And Vomiting; HUT Noted: [id number]     Tramadol Swelling     Swelling of tongue and face     Triamterene      Ultracet Other (See Comments)     Venlafaxine Nausea and Vomiting and Diarrhea     Zolpidem Other (See Comments) and Unknown     Other reaction(s): *Unknown  HUT Comment: Pt doesn't rember  Pt doesn't rember       Zolpidem Tartrate Unknown     Bacitracin Itching and Rash     Sulfanilamide Rash         Lab Results    Chemistry/lipid CBC Cardiac Enzymes/BNP/TSH/INR   Lab Results   Component Value Date    CHOL 149 06/10/2021    HDL 56 " 06/10/2021    TRIG 113 06/10/2021    BUN 19.8 10/13/2022     10/13/2022    CO2 25 10/13/2022    Lab Results   Component Value Date    WBC 7.7 10/11/2022    HGB 13.2 10/11/2022    HCT 39.9 10/11/2022    MCV 90 10/11/2022     10/11/2022    Lab Results   Component Value Date    TROPONINI 0.03 10/12/2022    TSH 3.99 06/08/2022         50 minutes spent on the date of encounter doing chart review, review of test results, interpretation with above tests, patient visit, documentation, discussion with other provider and discussion with family.        This note has been dictated using voice recognition software. Any grammatical, typographical, or context distortions are unintentional and inherent to the software          Thank you for allowing me to participate in the care of your patient.      Sincerely,     ANDREA [facility]fonso [facility]  cc:   Bernadette Taylor MD  [address] [place] [address]

## 2022-10-19 NOTE — PROGRESS NOTES
Assessment/Recommendations   Assessment:    1.  Coronary artery disease: Patient was hospitalized from October 12 through October 13, 2022 with unstable angina.  Coronary angiogram on 10/12/2022 showed 90% stenosis and large OM 2 which was successfully treated with a drug-eluting stent.  Coronary angiogram also showed moderate diffuse disease in RCA, moderate proximal to mid LAD disease with no flow-limiting per FFR but noted 70% lesion in distal LAD.    Patient was recommended to consider repeat stress testing as an outpatient or return to Cath Lab f staged PCI of LAD if pending clinical symptoms.    On dual antiplatelet therapy with ASA 81 mg indefinitely and  Clopidogrel (Plavix) 75 mg daily for 12 months.  Patient reports her chest pain (sharp and tightness) is down from 10/10 to 3-4 intermittently throughout the day not necessarily associated with exertion.    She continues to have occasional lightheadedness and fatigue although some improvement since recent stent placement.    Cardiac rehab has not been ordered/scheduled    2.  Dyslipidemia with LDL goal <70/Obesity with a BMI of: Suma Mark is on high intensity statin therapy with atorvastatin 80 mg daily.  Dose was increased in the hospital tolerating with no adverse effect.. Most recent LDL is 70.    3.  Hypertension: Her blood pressure is stable.  On Avapro, amlodipine, and propanolol.    4.  Paroxysmal atrial fibrillation: Patient was noted to have an episode of atrial fibrillation at [facility] ED.  No recurrent during hospitalization.  Patient was recommended 2 weeks event monitor as an outpatient.    Patient is on furosemide.  She states she takes it for lower extremity edema.  She has been on that medication for a long time.  Recent BMP stable.    Plan:  - We discussed the importance of antiplatelet therapy and talking with her cardiologist prior to stopping these medications for any reason.  We discussed about utilization of as needed  nitroglycerin.     -Instructed patient to avoid high intense exercise or lifting heavy objects until further direction.    - Encouraged to seek medical attention if recurrent chest pain or shortness of breath.    - Risk factor modification and lifestyle management topics were discussed including managing comorbidities, weight loss, heart healthy diet, exercise, stress reduction and alcohol use.      - Fasting lipid profile check in 4- weeks with AST and ALT liver enzyme. Order placed    - Will also check with Dr. Calderon and Dr. Martinez if we need to hold off on cardiac rehab until her angina is addressed.    -Schedule 2 weeks event monitor.    -We will consider echo in the near future as an outpatient    - We discussed a diet low in saturated fat, weight loss, and exercise along with medication for better control of cholesterol.  Highly encouraged to participate in nutrition class in cardiac rehab.    - Continue current hypertension regimen    Follow up with Dr. Worley in 4-6 weeks     History of Present Illness/Subjective    Ms. Suma Mark is a 72 year old female with a past medical history of severely elevated coronary calcium score, hypertension, dyslipidemia, migraine, recent hospitalization with unstable angina with diagnosis of coronary disease with status post PCI/EUGENIA to OM2 on 10/12/2022 who is seen at [facility] for post coronary intervention follow up.     Today, Lo is here accompanied by her .  Her chest discomfort has reduced from 10/10 down to 3-4/10.  She reports that she is getting intermittent chest discomfort throughout the day not necessarily associated with exertion.  She reports occasional lightheadedness and ongoing fatigue although some improvement since recent stent placement.  She denies shortness of breath, orthopnea, PND, palpitations, abdominal fullness/bloating and lower extremity edema.  Patient works as a  and wondering if  she can resume her work.    Coronary Angiogram done on 10/12/2022: reviewed:  Conclusion  1.  Multivessel CAD as described below.  Culprit lesion is a 90% stenosis within a large OM 2.     LM: No significant disease  LAD: Angiographically moderate proximal-mid LAD disease that is not flow-limiting (FFR 0.83).  Further down the vessel, there is a discrete 70-80% stenosis of the distal LAD as well as a discrete 60-70% stenosis of the apical LAD.  LCx: Moderate proximal LAD disease with a 90% culprit stenosis of the large OM 2  RCA: Moderate diffuse proximal-mid RCA disease     2.  Successful PCI of the OM 2 lesion extending back to the proximal LCx with placement of a single 2.5 x 38 mm Synergy EUGENIA, postdilated with a 2.75 mm NC balloon.      Plan    - Remove TR band per protocol; wrist precautions  - DAPT for a minimum of 6 months.  After this, would consider indefinite P2 Y 12 inhibitor monotherapy  - High intensity statin  - Cardiac rehab referral  - Establish with outpatient cardiologist.  Pending clinical symptoms, can consider repeat stress testing (prior Lexiscan may have been negative due to balanced ischemia), versus return to the Cath Lab for staged PCI of the LAD.        Physical Examination Review of Systems   /60 (BP Location: Right arm, Patient Position: Sitting, Cuff Size: Adult Regular)   Pulse 64   Resp 16   Wt 83.5 kg (184 lb)   LMP  (LMP Unknown)   SpO2 99%   BMI 34.77 kg/m    Body mass index is 34.77 kg/m .  Wt Readings from Last 3 Encounters:   10/19/22 83.5 kg (184 lb)   10/13/22 84.7 kg (186 lb 11.2 oz)   09/28/22 83.5 kg (184 lb)     General Appearance:   no distress, normal body habitus   ENT/Mouth: membranes moist, no oral lesions or bleeding gums.      EYES:  no scleral icterus, normal conjunctivae   Neck: no carotid bruits or thyromegaly   Chest/Lungs:   lungs are clear to auscultation, no rales or wheezing, equal chest wall expansion    Cardiovascular:    Heart rate regular.  Normal first and second heart sounds with no murmurs, rubs, or gallops; the carotid, radial and posterior tibial pulses are intact, JVP is difficult to assess due to the patient's obesity and body habitus   , no edema bilaterally    Abdomen:  no organomegaly, masses, bruits, or tenderness; bowel sounds are present   Extremities  Puncture Site: no cyanosis or clubbing  Right radial site is soft with mild bruising.  Radial pulses and Pedal pulses intact and symmetrical.  CMS intact.   Skin: no xanthelasma, warm.    Neurologic: normal  bilateral, no tremors     Psychiatric: alert and oriented x3, calm                 Negative unless noted in HPI     Medical History  Surgical History Family History Social History   Past Medical History:   Diagnosis Date     Acquired hypothyroidism 3/12/2009     Anxiety      Anxiety state, unspecified      Back pain      Breast cyst      Carpal tunnel syndrome      Chronic pain     LOW BACK     Degenerative disc disease, lumbar      Depression      Depression, unspecified depression type      Diarrhea      Disease of thyroid gland     hypothyroid     Diverticulosis 10/4/2017    Incidental finding on colonoscopy 10/2017     DJD (degenerative joint disease)      Essential hypertension 4/21/2015     Essential hypertension, benign      Fatty liver      Fibromyalgia      Gastro-oesophageal reflux disease      Gastroesophageal reflux disease without esophagitis 4/21/2015     GERD (gastroesophageal reflux disease)      Hernia, ventral 4/27/2017     History of anesthesia complications     hypotension after surgery     History of blood clots      History of blood transfusion      History of transfusion      Hyperlipidemia      Hypertension      Insomnia, unspecified type      Left lower quadrant pain      Lumbago      Lung nodules     INTERMITTENT     Major depression      Migraine      Osteoarthritis      Osteopenia      Osteoporosis      Other abnormal glucose      Other and unspecified  special symptom or syndrome, not elsewhere classified      PONV (postoperative nausea and vomiting)      Posttraumatic stress disorder      Raynaud's disease      Restless leg syndrome      S/P total knee replacement 11/27/2017     Spontaneous ecchymoses      Thrombosis of leg     with pregnancy     Unspecified episodic mood disorder     AFFECTIVE PSYCHOSIS     Unspecified hypothyroidism      Unspecified vitamin D deficiency      Varicose veins      Ventral hernia      Vitamin D deficiency     Past Surgical History:   Procedure Laterality Date     ARTHROCENTESIS      LEFT HIP TROCHANTERIC BURSA     ARTHROPLASTY KNEE UNICOMPARTMENT  10/31/2013    Procedure: ARTHROPLASTY KNEE UNICOMPARTMENT;  LEFT KNEE UNICOMPARTMENTAL ARTHROPLASTY (CAROLINE)^;  Surgeon: Chi Mittal MD;  Location:  OR     ARTICULAR DEBRIEDEMENT[      LEFT KNEE     BACK SURGERY       BREAST CYST EXCISION       BREAST SURGERY      bx     COLECTOMY N/A 6/2/2017    Procedure: RESECTION, SMALL INTESTINE, OPEN ADHESIOLYSIS;  Surgeon: Keyon Qureshi MD;  Location: [facility];  Service:      COLON SURGERY       CV CORONARY ANGIOGRAM N/A 10/12/2022    Procedure: CV CORONARY ANGIOGRAM;  Surgeon: You Martinez MD;  Location: [facility] CV     CV FRACTIONAL FLOW RATIO WIRE N/A 10/12/2022    Procedure: Fractional Flow Ratio Wire;  Surgeon: You Martinez MD;  Location: [facility] CV     CV PCI STENT DRUG ELUTING N/A 10/12/2022    Procedure: Percutaneous Coronary Intervention Stent;  Surgeon: You Martinez MD;  Location: [facility] CV     ENDOSCOPIC RETROGRADE CHOLANGIOPANCREATOGRAPHY       ENDOSCOPIC STRIPPING VEIN(S)      BILATERLY     GENITOURINARY SURGERY      bladder sling     GI SURGERY      hemorrhoidectomy     HEMORRHOID SURGERY       HERNIORRHAPHY INCISIONAL (LOCATION) N/A 6/2/2017    Procedure: LAPARASCOPIC CONVERTED TO OPEN PRIMARY INCISIONAL HERNIA REPAIR;  Surgeon: Keyon Qureshi MD;  Location: [facility] Main OR;  Service:      HYSTERECTOMY  [id number]     HYSTERECTOMY VAGINAL       INCONTINENCE SURGERY       LAMINECTOMY LUMBAR TWO LEVELS      2003     LUMBAR FUSION       LUMBAR HARDWARE REMOVAL[       ORTHOPEDIC SURGERY      carpal tunnel     [place] LAP,DIAGNOSTIC ABDOMEN N/A 8/22/2019    Procedure: DIAGNOSTIC LAPAROSCOPY, LYSIS OF ADHESION;  Surgeon: Svetlana Ron MD;  Location: [facility] Main OR;  Service: General     RELEASE CARPAL TUNNEL       STRIP VEIN       TRANSARTHISCOPIC SURGERY[      LEFT KNEE     US THYROID BIOPSY  4/19/2019     VEIN LIGATION AND STRIPPING Bilateral      ZZC TOTAL KNEE ARTHROPLASTY Right 11/27/2017    Procedure:  RIGHT TOTAL KNEE ARTHROPLASTY;  Surgeon: Kevin Ryder MD;  Location: [facility] Main OR;  Service: Orthopedics    Family History   Problem Relation Age of Onset     Dementia Mother      Arthritis Mother      Chronic Obstructive Pulmonary Disease Father      Cardiovascular Father      Hypertension Father      No Known Problems Sister      No Known Problems Daughter      No Known Problems Son      Cerebrovascular Disease Maternal Grandmother      Heart Disease Paternal Grandmother      Cerebrovascular Disease Paternal Grandmother      No Known Problems Sister      No Known Problems Sister      No Known Problems Son      No Known Problems Daughter      Breast Cancer Paternal Aunt         age in 50's    Social History     Socioeconomic History     Marital status:      Spouse name: Not on file     Number of children: Not on file     Years of education: Not on file     Highest education level: Not on file   Occupational History     Not on file   Tobacco Use     Smoking status: Never     Smokeless tobacco: Never   Substance and Sexual Activity     Alcohol use: Yes     Comment: Alcoholic Drinks/day: 1 cocktail daily     Drug use: No     Sexual activity: Not on file   Other Topics Concern     Not on file   Social History Narrative     Not on file     Social Determinants  of Health     Financial Resource Strain: Not on file   Food Insecurity: Not on file   Transportation Needs: Not on file   Physical Activity: Not on file   Stress: Not on file   Social Connections: Not on file   Intimate Partner Violence: Not on file   Housing Stability: Not on file          Medications  Allergies   Current Outpatient Medications   Medication Sig Dispense Refill     amLODIPine (NORVASC) 5 MG tablet TAKE 1 TABLET BY MOUTH EVERYDAY AT BEDTIME 90 tablet 3     aspirin (ASA) 81 MG EC tablet Take 1 tablet (81 mg) by mouth daily 30 tablet 0     atorvastatin (LIPITOR) 80 MG tablet Take 1 tablet (80 mg) by mouth At Bedtime 90 tablet 3     botulinum toxin type A (BOTOX) 100 units injection Every 3 months. [facility]       busPIRone (BUSPAR) 10 MG tablet Take 1 tablet (10 mg) by mouth 2 times daily 180 tablet 3     cholecalciferol 50 MCG (2000 UT) CAPS Take 2,000 Units by mouth daily       citalopram (CELEXA) 20 MG tablet Take 2 tablets (40 mg) by mouth daily 135 tablet 3     clopidogrel (PLAVIX) 75 MG tablet Take 1 tablet (75 mg) by mouth daily 90 tablet 3     eletriptan (RELPAX) 40 MG tablet Take 1 tablet (40 mg) by mouth at onset of headache for migraine 10 tablet 0     furosemide (LASIX) 20 MG tablet Take 2 tablets (40 mg) by mouth daily 180 tablet 3     HYDROcodone-acetaminophen (NORCO) 5-325 MG tablet Take 1 tablet by mouth daily as needed (Severe headache) 5 tablet 0     irbesartan (AVAPRO) 300 MG tablet Take 1 tablet (300 mg) by mouth daily 90 tablet 3     levothyroxine (SYNTHROID/LEVOTHROID) 88 MCG tablet TAKE 1 TABLET BY MOUTH DAILY AT 6:00 AM. 90 tablet 3     LORazepam (ATIVAN) 0.5 MG tablet Take 1 tablet (0.5 mg) by mouth every 6 hours as needed for anxiety 30 tablet 0     nitroGLYcerin (NITROSTAT) 0.4 MG sublingual tablet For chest pain place 1 tablet under the tongue every 5 minutes for 3 doses. If symptoms persist 5 minutes after 1st dose call 911. 10 tablet 0     pramipexole (MIRAPEX) 0.25 MG  "tablet TAKE 1 TABLET BY MOUTH THREE TIMES A DAY. 270 tablet 3     propranolol (INDERAL) 10 MG tablet TAKE 2 TABLETS BY MOUTH TWO TIMES A DAY. 360 tablet 3     traZODone (DESYREL) 50 MG tablet TAKE 3 TABLETS (150 MG TOTAL) BY MOUTH AT BEDTIME 270 tablet 1    Allergies   Allergen Reactions     Cephalexin Nausea and Vomiting     Ace Inhibitors Other (See Comments)     Elevates blood pressure     Amitriptyline Hcl Other (See Comments)     elevates blood pressure     Atenolol Other (See Comments)     Aggravates reynauds     Celebrex [Celecoxib] GI Disturbance     Cephalosporins Unknown     Other reaction(s): *Unknown  HUT Comment: Pt doesn't remember   Pt doesn't remember        Clonidine Unknown     \"got very ill in ER\"     Codeine Sulfate Nausea and Vomiting     Darvocet [Propoxyphene N-Apap] Nausea and Vomiting     Dexamethasone Acetate Swelling     Erythromycin Nausea and Vomiting     Escitalopram Other (See Comments)     Other reaction(s): Headache  Headaches.       Gabapentin Unknown     \"Spotted\"     Hctz Unknown     \"depletes my sodium\"     Propoxyphene      Other reaction(s): Gastrointestinal  HUT Reaction: Gastrointestinal; HUT Reaction: Nausea And Vomiting; HUT Noted: [id number]     Tramadol Swelling     Swelling of tongue and face     Triamterene      Ultracet Other (See Comments)     Venlafaxine Nausea and Vomiting and Diarrhea     Zolpidem Other (See Comments) and Unknown     Other reaction(s): *Unknown  HUT Comment: Pt doesn't rember  Pt doesn't rember       Zolpidem Tartrate Unknown     Bacitracin Itching and Rash     Sulfanilamide Rash         Lab Results    Chemistry/lipid CBC Cardiac Enzymes/BNP/TSH/INR   Lab Results   Component Value Date    CHOL 149 06/10/2021    HDL 56 06/10/2021    TRIG 113 06/10/2021    BUN 19.8 10/13/2022     10/13/2022    CO2 25 10/13/2022    Lab Results   Component Value Date    WBC 7.7 10/11/2022    HGB 13.2 10/11/2022    HCT 39.9 10/11/2022    MCV 90 10/11/2022     " 10/11/2022    Lab Results   Component Value Date    TROPONINI 0.03 10/12/2022    TSH 3.99 06/08/2022         50 minutes spent on the date of encounter doing chart review, review of test results, interpretation with above tests, patient visit, documentation, discussion with other provider and discussion with family.        This note has been dictated using voice recognition software. Any grammatical, typographical, or context distortions are unintentional and inherent to the software

## 2022-10-19 NOTE — H&P (VIEW-ONLY)
Assessment/Recommendations   Assessment:    1.  Coronary artery disease: Patient was hospitalized from October 12 through October 13, 2022 with unstable angina.  Coronary angiogram on 10/12/2022 showed 90% stenosis and large OM 2 which was successfully treated with a drug-eluting stent.  Coronary angiogram also showed moderate diffuse disease in RCA, moderate proximal to mid LAD disease with no flow-limiting per FFR but noted 70% lesion in distal LAD.    Patient was recommended to consider repeat stress testing as an outpatient or return to Cath Lab f staged PCI of LAD if pending clinical symptoms.    On dual antiplatelet therapy with ASA 81 mg indefinitely and  Clopidogrel (Plavix) 75 mg daily for 12 months.  Patient reports her chest pain (sharp and tightness) is down from 10/10 to 3-4 intermittently throughout the day not necessarily associated with exertion.    She continues to have occasional lightheadedness and fatigue although some improvement since recent stent placement.    Cardiac rehab has not been ordered/scheduled    2.  Dyslipidemia with LDL goal <70/Obesity with a BMI of: Suma Mark is on high intensity statin therapy with atorvastatin 80 mg daily.  Dose was increased in the hospital tolerating with no adverse effect.. Most recent LDL is 70.    3.  Hypertension: Her blood pressure is stable.  On Avapro, amlodipine, and propanolol.    4.  Paroxysmal atrial fibrillation: Patient was noted to have an episode of atrial fibrillation at [facility] ED.  No recurrent during hospitalization.  Patient was recommended 2 weeks event monitor as an outpatient.    Patient is on furosemide.  She states she takes it for lower extremity edema.  She has been on that medication for a long time.  Recent BMP stable.    Plan:  - We discussed the importance of antiplatelet therapy and talking with her cardiologist prior to stopping these medications for any reason.  We discussed about utilization of as needed  nitroglycerin.     -Instructed patient to avoid high intense exercise or lifting heavy objects until further direction.    - Encouraged to seek medical attention if recurrent chest pain or shortness of breath.    - Risk factor modification and lifestyle management topics were discussed including managing comorbidities, weight loss, heart healthy diet, exercise, stress reduction and alcohol use.      - Fasting lipid profile check in 4- weeks with AST and ALT liver enzyme. Order placed    - Will also check with Dr. Calderon and Dr. Martinez if we need to hold off on cardiac rehab until her angina is addressed.    -Schedule 2 weeks event monitor.    -We will consider echo in the near future as an outpatient    - We discussed a diet low in saturated fat, weight loss, and exercise along with medication for better control of cholesterol.  Highly encouraged to participate in nutrition class in cardiac rehab.    - Continue current hypertension regimen    Follow up with Dr. Worley in 4-6 weeks     History of Present Illness/Subjective    Ms. Suma Mark is a 72 year old female with a past medical history of severely elevated coronary calcium score, hypertension, dyslipidemia, migraine, recent hospitalization with unstable angina with diagnosis of coronary disease with status post PCI/EUGENIA to OM2 on 10/12/2022 who is seen at [facility] for post coronary intervention follow up.     Today, Lo is here accompanied by her .  Her chest discomfort has reduced from 10/10 down to 3-4/10.  She reports that she is getting intermittent chest discomfort throughout the day not necessarily associated with exertion.  She reports occasional lightheadedness and ongoing fatigue although some improvement since recent stent placement.  She denies shortness of breath, orthopnea, PND, palpitations, abdominal fullness/bloating and lower extremity edema.  Patient works as a  and wondering if  she can resume her work.    Coronary Angiogram done on 10/12/2022: reviewed:  Conclusion  1.  Multivessel CAD as described below.  Culprit lesion is a 90% stenosis within a large OM 2.     LM: No significant disease  LAD: Angiographically moderate proximal-mid LAD disease that is not flow-limiting (FFR 0.83).  Further down the vessel, there is a discrete 70-80% stenosis of the distal LAD as well as a discrete 60-70% stenosis of the apical LAD.  LCx: Moderate proximal LAD disease with a 90% culprit stenosis of the large OM 2  RCA: Moderate diffuse proximal-mid RCA disease     2.  Successful PCI of the OM 2 lesion extending back to the proximal LCx with placement of a single 2.5 x 38 mm Synergy EUGENIA, postdilated with a 2.75 mm NC balloon.      Plan    - Remove TR band per protocol; wrist precautions  - DAPT for a minimum of 6 months.  After this, would consider indefinite P2 Y 12 inhibitor monotherapy  - High intensity statin  - Cardiac rehab referral  - Establish with outpatient cardiologist.  Pending clinical symptoms, can consider repeat stress testing (prior Lexiscan may have been negative due to balanced ischemia), versus return to the Cath Lab for staged PCI of the LAD.        Physical Examination Review of Systems   /60 (BP Location: Right arm, Patient Position: Sitting, Cuff Size: Adult Regular)   Pulse 64   Resp 16   Wt 83.5 kg (184 lb)   LMP  (LMP Unknown)   SpO2 99%   BMI 34.77 kg/m    Body mass index is 34.77 kg/m .  Wt Readings from Last 3 Encounters:   10/19/22 83.5 kg (184 lb)   10/13/22 84.7 kg (186 lb 11.2 oz)   09/28/22 83.5 kg (184 lb)     General Appearance:   no distress, normal body habitus   ENT/Mouth: membranes moist, no oral lesions or bleeding gums.      EYES:  no scleral icterus, normal conjunctivae   Neck: no carotid bruits or thyromegaly   Chest/Lungs:   lungs are clear to auscultation, no rales or wheezing, equal chest wall expansion    Cardiovascular:    Heart rate regular.  Normal first and second heart sounds with no murmurs, rubs, or gallops; the carotid, radial and posterior tibial pulses are intact, JVP is difficult to assess due to the patient's obesity and body habitus   , no edema bilaterally    Abdomen:  no organomegaly, masses, bruits, or tenderness; bowel sounds are present   Extremities  Puncture Site: no cyanosis or clubbing  Right radial site is soft with mild bruising.  Radial pulses and Pedal pulses intact and symmetrical.  CMS intact.   Skin: no xanthelasma, warm.    Neurologic: normal  bilateral, no tremors     Psychiatric: alert and oriented x3, calm                 Negative unless noted in HPI     Medical History  Surgical History Family History Social History   Past Medical History:   Diagnosis Date     Acquired hypothyroidism 3/12/2009     Anxiety      Anxiety state, unspecified      Back pain      Breast cyst      Carpal tunnel syndrome      Chronic pain     LOW BACK     Degenerative disc disease, lumbar      Depression      Depression, unspecified depression type      Diarrhea      Disease of thyroid gland     hypothyroid     Diverticulosis 10/4/2017    Incidental finding on colonoscopy 10/2017     DJD (degenerative joint disease)      Essential hypertension 4/21/2015     Essential hypertension, benign      Fatty liver      Fibromyalgia      Gastro-oesophageal reflux disease      Gastroesophageal reflux disease without esophagitis 4/21/2015     GERD (gastroesophageal reflux disease)      Hernia, ventral 4/27/2017     History of anesthesia complications     hypotension after surgery     History of blood clots      History of blood transfusion      History of transfusion      Hyperlipidemia      Hypertension      Insomnia, unspecified type      Left lower quadrant pain      Lumbago      Lung nodules     INTERMITTENT     Major depression      Migraine      Osteoarthritis      Osteopenia      Osteoporosis      Other abnormal glucose      Other and unspecified  special symptom or syndrome, not elsewhere classified      PONV (postoperative nausea and vomiting)      Posttraumatic stress disorder      Raynaud's disease      Restless leg syndrome      S/P total knee replacement 11/27/2017     Spontaneous ecchymoses      Thrombosis of leg     with pregnancy     Unspecified episodic mood disorder     AFFECTIVE PSYCHOSIS     Unspecified hypothyroidism      Unspecified vitamin D deficiency      Varicose veins      Ventral hernia      Vitamin D deficiency     Past Surgical History:   Procedure Laterality Date     ARTHROCENTESIS      LEFT HIP TROCHANTERIC BURSA     ARTHROPLASTY KNEE UNICOMPARTMENT  10/31/2013    Procedure: ARTHROPLASTY KNEE UNICOMPARTMENT;  LEFT KNEE UNICOMPARTMENTAL ARTHROPLASTY (CAROLINE)^;  Surgeon: Chi Mittal MD;  Location:  OR     ARTICULAR DEBRIEDEMENT[      LEFT KNEE     BACK SURGERY       BREAST CYST EXCISION       BREAST SURGERY      bx     COLECTOMY N/A 6/2/2017    Procedure: RESECTION, SMALL INTESTINE, OPEN ADHESIOLYSIS;  Surgeon: Keyon Qureshi MD;  Location: [facility];  Service:      COLON SURGERY       CV CORONARY ANGIOGRAM N/A 10/12/2022    Procedure: CV CORONARY ANGIOGRAM;  Surgeon: You Martinez MD;  Location: [facility] CV     CV FRACTIONAL FLOW RATIO WIRE N/A 10/12/2022    Procedure: Fractional Flow Ratio Wire;  Surgeon: You Martinez MD;  Location: [facility] CV     CV PCI STENT DRUG ELUTING N/A 10/12/2022    Procedure: Percutaneous Coronary Intervention Stent;  Surgeon: You Martinez MD;  Location: [facility] CV     ENDOSCOPIC RETROGRADE CHOLANGIOPANCREATOGRAPHY       ENDOSCOPIC STRIPPING VEIN(S)      BILATERLY     GENITOURINARY SURGERY      bladder sling     GI SURGERY      hemorrhoidectomy     HEMORRHOID SURGERY       HERNIORRHAPHY INCISIONAL (LOCATION) N/A 6/2/2017    Procedure: LAPARASCOPIC CONVERTED TO OPEN PRIMARY INCISIONAL HERNIA REPAIR;  Surgeon: Keyon Qureshi MD;  Location: [facility] Main OR;  Service:      HYSTERECTOMY  [id number]     HYSTERECTOMY VAGINAL       INCONTINENCE SURGERY       LAMINECTOMY LUMBAR TWO LEVELS      2003     LUMBAR FUSION       LUMBAR HARDWARE REMOVAL[       ORTHOPEDIC SURGERY      carpal tunnel     FL LAP,DIAGNOSTIC ABDOMEN N/A 8/22/2019    Procedure: DIAGNOSTIC LAPAROSCOPY, LYSIS OF ADHESION;  Surgeon: Svetlana Ron MD;  Location: [facility] Main OR;  Service: General     RELEASE CARPAL TUNNEL       STRIP VEIN       TRANSARTHISCOPIC SURGERY[      LEFT KNEE     US THYROID BIOPSY  4/19/2019     VEIN LIGATION AND STRIPPING Bilateral      ZZC TOTAL KNEE ARTHROPLASTY Right 11/27/2017    Procedure:  RIGHT TOTAL KNEE ARTHROPLASTY;  Surgeon: Kevin Ryder MD;  Location: [facility] Main OR;  Service: Orthopedics    Family History   Problem Relation Age of Onset     Dementia Mother      Arthritis Mother      Chronic Obstructive Pulmonary Disease Father      Cardiovascular Father      Hypertension Father      No Known Problems Sister      No Known Problems Daughter      No Known Problems Son      Cerebrovascular Disease Maternal Grandmother      Heart Disease Paternal Grandmother      Cerebrovascular Disease Paternal Grandmother      No Known Problems Sister      No Known Problems Sister      No Known Problems Son      No Known Problems Daughter      Breast Cancer Paternal Aunt         age in 50's    Social History     Socioeconomic History     Marital status:      Spouse name: Not on file     Number of children: Not on file     Years of education: Not on file     Highest education level: Not on file   Occupational History     Not on file   Tobacco Use     Smoking status: Never     Smokeless tobacco: Never   Substance and Sexual Activity     Alcohol use: Yes     Comment: Alcoholic Drinks/day: 1 cocktail daily     Drug use: No     Sexual activity: Not on file   Other Topics Concern     Not on file   Social History Narrative     Not on file     Social Determinants  of Health     Financial Resource Strain: Not on file   Food Insecurity: Not on file   Transportation Needs: Not on file   Physical Activity: Not on file   Stress: Not on file   Social Connections: Not on file   Intimate Partner Violence: Not on file   Housing Stability: Not on file          Medications  Allergies   Current Outpatient Medications   Medication Sig Dispense Refill     amLODIPine (NORVASC) 5 MG tablet TAKE 1 TABLET BY MOUTH EVERYDAY AT BEDTIME 90 tablet 3     aspirin (ASA) 81 MG EC tablet Take 1 tablet (81 mg) by mouth daily 30 tablet 0     atorvastatin (LIPITOR) 80 MG tablet Take 1 tablet (80 mg) by mouth At Bedtime 90 tablet 3     botulinum toxin type A (BOTOX) 100 units injection Every 3 months. [facility]       busPIRone (BUSPAR) 10 MG tablet Take 1 tablet (10 mg) by mouth 2 times daily 180 tablet 3     cholecalciferol 50 MCG (2000 UT) CAPS Take 2,000 Units by mouth daily       citalopram (CELEXA) 20 MG tablet Take 2 tablets (40 mg) by mouth daily 135 tablet 3     clopidogrel (PLAVIX) 75 MG tablet Take 1 tablet (75 mg) by mouth daily 90 tablet 3     eletriptan (RELPAX) 40 MG tablet Take 1 tablet (40 mg) by mouth at onset of headache for migraine 10 tablet 0     furosemide (LASIX) 20 MG tablet Take 2 tablets (40 mg) by mouth daily 180 tablet 3     HYDROcodone-acetaminophen (NORCO) 5-325 MG tablet Take 1 tablet by mouth daily as needed (Severe headache) 5 tablet 0     irbesartan (AVAPRO) 300 MG tablet Take 1 tablet (300 mg) by mouth daily 90 tablet 3     levothyroxine (SYNTHROID/LEVOTHROID) 88 MCG tablet TAKE 1 TABLET BY MOUTH DAILY AT 6:00 AM. 90 tablet 3     LORazepam (ATIVAN) 0.5 MG tablet Take 1 tablet (0.5 mg) by mouth every 6 hours as needed for anxiety 30 tablet 0     nitroGLYcerin (NITROSTAT) 0.4 MG sublingual tablet For chest pain place 1 tablet under the tongue every 5 minutes for 3 doses. If symptoms persist 5 minutes after 1st dose call 911. 10 tablet 0     pramipexole (MIRAPEX) 0.25 MG  "tablet TAKE 1 TABLET BY MOUTH THREE TIMES A DAY. 270 tablet 3     propranolol (INDERAL) 10 MG tablet TAKE 2 TABLETS BY MOUTH TWO TIMES A DAY. 360 tablet 3     traZODone (DESYREL) 50 MG tablet TAKE 3 TABLETS (150 MG TOTAL) BY MOUTH AT BEDTIME 270 tablet 1    Allergies   Allergen Reactions     Cephalexin Nausea and Vomiting     Ace Inhibitors Other (See Comments)     Elevates blood pressure     Amitriptyline Hcl Other (See Comments)     elevates blood pressure     Atenolol Other (See Comments)     Aggravates reynauds     Celebrex [Celecoxib] GI Disturbance     Cephalosporins Unknown     Other reaction(s): *Unknown  HUT Comment: Pt doesn't remember   Pt doesn't remember        Clonidine Unknown     \"got very ill in ER\"     Codeine Sulfate Nausea and Vomiting     Darvocet [Propoxyphene N-Apap] Nausea and Vomiting     Dexamethasone Acetate Swelling     Erythromycin Nausea and Vomiting     Escitalopram Other (See Comments)     Other reaction(s): Headache  Headaches.       Gabapentin Unknown     \"Spotted\"     Hctz Unknown     \"depletes my sodium\"     Propoxyphene      Other reaction(s): Gastrointestinal  HUT Reaction: Gastrointestinal; HUT Reaction: Nausea And Vomiting; HUT Noted: [id number]     Tramadol Swelling     Swelling of tongue and face     Triamterene      Ultracet Other (See Comments)     Venlafaxine Nausea and Vomiting and Diarrhea     Zolpidem Other (See Comments) and Unknown     Other reaction(s): *Unknown  HUT Comment: Pt doesn't rember  Pt doesn't rember       Zolpidem Tartrate Unknown     Bacitracin Itching and Rash     Sulfanilamide Rash         Lab Results    Chemistry/lipid CBC Cardiac Enzymes/BNP/TSH/INR   Lab Results   Component Value Date    CHOL 149 06/10/2021    HDL 56 06/10/2021    TRIG 113 06/10/2021    BUN 19.8 10/13/2022     10/13/2022    CO2 25 10/13/2022    Lab Results   Component Value Date    WBC 7.7 10/11/2022    HGB 13.2 10/11/2022    HCT 39.9 10/11/2022    MCV 90 10/11/2022     " 10/11/2022    Lab Results   Component Value Date    TROPONINI 0.03 10/12/2022    TSH 3.99 06/08/2022         50 minutes spent on the date of encounter doing chart review, review of test results, interpretation with above tests, patient visit, documentation, discussion with other provider and discussion with family.        This note has been dictated using voice recognition software. Any grammatical, typographical, or context distortions are unintentional and inherent to the software

## 2022-10-20 ENCOUNTER — OFFICE VISIT (OUTPATIENT)
Dept: INTERNAL MEDICINE | Facility: CLINIC | Age: 72
End: 2022-10-20
Payer: MEDICARE

## 2022-10-20 VITALS
SYSTOLIC BLOOD PRESSURE: 115 MMHG | HEART RATE: 65 BPM | DIASTOLIC BLOOD PRESSURE: 58 MMHG | OXYGEN SATURATION: 97 % | WEIGHT: 183 LBS | BODY MASS INDEX: 34.58 KG/M2

## 2022-10-20 DIAGNOSIS — F41.9 ANXIETY: ICD-10-CM

## 2022-10-20 DIAGNOSIS — I48.0 PAROXYSMAL ATRIAL FIBRILLATION (H): ICD-10-CM

## 2022-10-20 DIAGNOSIS — I10 ESSENTIAL HYPERTENSION: ICD-10-CM

## 2022-10-20 DIAGNOSIS — I25.118 CORONARY ARTERY DISEASE INVOLVING NATIVE HEART WITH OTHER FORM OF ANGINA PECTORIS, UNSPECIFIED VESSEL OR LESION TYPE (H): Primary | ICD-10-CM

## 2022-10-20 PROCEDURE — 99496 TRANSJ CARE MGMT HIGH F2F 7D: CPT | Mod: 25 | Performed by: NURSE PRACTITIONER

## 2022-10-20 PROCEDURE — 0134A COVID-19,PF,MODERNA BIVALENT: CPT | Performed by: NURSE PRACTITIONER

## 2022-10-20 PROCEDURE — 91313 COVID-19,PF,MODERNA BIVALENT: CPT | Performed by: NURSE PRACTITIONER

## 2022-10-20 RX ORDER — LORAZEPAM 0.5 MG/1
0.5 TABLET ORAL EVERY 6 HOURS PRN
Qty: 30 TABLET | Refills: 0 | Status: SHIPPED | OUTPATIENT
Start: 2022-10-20 | End: 2023-04-18

## 2022-10-20 NOTE — PROGRESS NOTES
"  Assessment & Plan     Coronary artery disease   Coronary angiogram on 10/12/2022 revealed 90% stenosis and large OM 2 which was successfully treated with drug-eluting stent.    Now on dual antiplatelet therapy with ASA 81 mg indefinitely and Plavix 75 mg daily for 12 months.    She saw cardiology yesterday and still reporting intermittent mild chest pain not necessarily associated with exertion.    There is going to be a conversation about whether she should participate in cardiac rehab, given that she still having anginal symptoms.    There is also discussion about performing a repeat stress test as an outpatient versus returning to Cath Lab for staged PCI of LAD.      Essential hypertension  Controlled on current regimen    Anxiety  She has been using Ativan sparingly for anxiety symptoms  - LORazepam (ATIVAN) 0.5 MG tablet; Take 1 tablet (0.5 mg) by mouth every 6 hours as needed for anxiety    Paroxysmal atrial fibrillation (H)  She was noted to have an episode of atrial fibrillation in the ED.  Plans on pursuing a 2-week event monitor as an outpatient    Patient Instructions   Vital signs look okay today.    COVID shot today.    Dr. HOLCOMB in December.    Follow-up with cardiology as planned.        Review of external notes as documented elsewhere in note  15 minutes spent on the date of the encounter doing chart review, history and exam, documentation and further activities per the note     MED REC REQUIRED  Post Medication Reconciliation Status:  Discharge medications reconciled, continue medications without change    BMI:   Estimated body mass index is 34.58 kg/m  as calculated from the following:    Height as of 10/11/22: 1.549 m (5' 1\").    Weight as of this encounter: 83 kg (183 lb).     Bret Caro, [facility]    Halina Blanchadr is a 72 year old, presenting for the following health issues:    Hospital F/U (Been feeling some pain in chest since discharge, sometimes " lasts longer than the rest, can go around to the back, was told possibly valve/artery adjusting to stent )      HPI     Was briefly hospitalized for PCI of the LAD.  Still having some mild chest discomfort.  Has not used her nitroglycerin yet.    Saw cardiology yesterday.      Hospital Follow-up Visit:    Hospital/Nursing Home/IP Rehab Facility: [facility]  Date of Admission: 10/12/2022  Date of Discharge: 10/13/2022  Reason(s) for Admission: Dyslipidemia    Was your hospitalization related to COVID-19? No   Problems taking medications regularly:  None  Medication changes since discharge: None  Problems adhering to non-medication therapy:  None    Summary of hospitalization:  [facility] discharge summary reviewed  Diagnostic Tests/Treatments reviewed.  Follow up needed: none  Other Healthcare Providers Involved in Patient s Care:         None  Update since discharge: improved.         Plan of care communicated with patient             Review of Systems   Constitutional, HEENT, cardiovascular, pulmonary, gi and gu systems are negative, except as otherwise noted.      Objective    /58 (BP Location: Right arm, Patient Position: Sitting, Cuff Size: Adult Large)   Pulse 65   Wt 83 kg (183 lb)   LMP  (LMP Unknown)   SpO2 97%   BMI 34.58 kg/m    Body mass index is 34.58 kg/m .  Physical Exam     Heart rate is regular without murmur rub or gallop.

## 2022-10-20 NOTE — PATIENT INSTRUCTIONS
Vital signs look okay today.    COVID shot today.    Dr. HOLCOMB in December.    Follow-up with cardiology as planned.

## 2022-10-21 ENCOUNTER — TELEPHONE (OUTPATIENT)
Dept: CARDIOLOGY | Facility: CLINIC | Age: 72
End: 2022-10-21

## 2022-10-21 DIAGNOSIS — I25.110 CORONARY ARTERY DISEASE INVOLVING NATIVE CORONARY ARTERY WITH UNSTABLE ANGINA PECTORIS (H): Primary | ICD-10-CM

## 2022-10-21 NOTE — TELEPHONE ENCOUNTER
----- Message -----  From: You Martinez MD  Sent: 10/19/2022   1:15 PM CDT  To: Jonaa Sanchez RN, *    Reviewed the films again, and I think the LAD can be evaluated with a stress test.      Thanks    ----- Message -----  From: Shalonda Sadler APRN CNP  Sent: 10/19/2022  12:31 PM CDT  To: Ousmane Orellana MD, You Martinez MD    Hi Dr. Martinez and Dr. Orellana,  Saw pt today for post PCI follow up.  Her chest pain has improved from 10/10 down to 3-4/10 (describes as sharp/tight intermittent through out the day not necessarily associated with exertion. Still having occasional lightheadedness and ongoing fatigue although some improvement.  Should we arrange staged PCI to distal LAD or repeat stress test although her last 2 stress tests were negative?  Should she also hold off on cardiac rehab until we address her symptoms?  Please address.  Thank you!  CY    ==  Reviewed with patient by Nata Linares RN. -[email or web address]

## 2022-10-28 ENCOUNTER — HOSPITAL ENCOUNTER (OUTPATIENT)
Dept: CARDIOLOGY | Facility: CLINIC | Age: 72
Discharge: HOME | End: 2022-10-28
Attending: NURSE PRACTITIONER
Payer: MEDICARE

## 2022-10-28 ENCOUNTER — HOSPITAL ENCOUNTER (OUTPATIENT)
Dept: NUCLEAR MEDICINE | Facility: CLINIC | Age: 72
Discharge: HOME | End: 2022-10-28
Attending: INTERNAL MEDICINE
Payer: MEDICARE

## 2022-10-28 ENCOUNTER — HOSPITAL ENCOUNTER (OUTPATIENT)
Dept: CARDIOLOGY | Facility: CLINIC | Age: 72
Discharge: HOME | End: 2022-10-28
Attending: INTERNAL MEDICINE
Payer: MEDICARE

## 2022-10-28 DIAGNOSIS — I48.0 PAROXYSMAL ATRIAL FIBRILLATION (H): ICD-10-CM

## 2022-10-28 DIAGNOSIS — I25.110 CORONARY ARTERY DISEASE INVOLVING NATIVE CORONARY ARTERY WITH UNSTABLE ANGINA PECTORIS (H): ICD-10-CM

## 2022-10-28 LAB
CV STRESS CURRENT BP HE: NORMAL
CV STRESS CURRENT HR HE: 61
CV STRESS CURRENT HR HE: 63
CV STRESS CURRENT HR HE: 64
CV STRESS CURRENT HR HE: 69
CV STRESS CURRENT HR HE: 70
CV STRESS CURRENT HR HE: 71
CV STRESS CURRENT HR HE: 73
CV STRESS CURRENT HR HE: 77
CV STRESS CURRENT HR HE: 78
CV STRESS CURRENT HR HE: 79
CV STRESS CURRENT HR HE: 81
CV STRESS CURRENT HR HE: 82
CV STRESS CURRENT HR HE: 82
CV STRESS CURRENT HR HE: 86
CV STRESS CURRENT HR HE: 87
CV STRESS CURRENT HR HE: 88
CV STRESS CURRENT HR HE: 88
CV STRESS DEVIATION TIME HE: NORMAL
CV STRESS ECHO PERCENT HR HE: NORMAL
CV STRESS EXERCISE STAGE HE: NORMAL
CV STRESS FINAL RESTING BP HE: NORMAL
CV STRESS FINAL RESTING HR HE: 69
CV STRESS MAX HR HE: 89
CV STRESS MAX TREADMILL GRADE HE: 0
CV STRESS MAX TREADMILL SPEED HE: 0
CV STRESS PEAK DIA BP HE: NORMAL
CV STRESS PEAK SYS BP HE: NORMAL
CV STRESS PHASE HE: NORMAL
CV STRESS PROTOCOL HE: NORMAL
CV STRESS RESTING PT POSITION HE: NORMAL
CV STRESS ST DEVIATION AMOUNT HE: NORMAL
CV STRESS ST DEVIATION ELEVATION HE: NORMAL
CV STRESS ST EVELATION AMOUNT HE: NORMAL
CV STRESS TEST TYPE HE: NORMAL
CV STRESS TOTAL STAGE TIME MIN 1 HE: NORMAL
NUC STRESS EJECTION FRACTION: 71 %
RATE PRESSURE PRODUCT: NORMAL
STRESS ECHO BASELINE DIASTOLIC HE: 80
STRESS ECHO BASELINE HR: 64
STRESS ECHO BASELINE SYSTOLIC BP: 154
STRESS ECHO CALCULATED PERCENT HR: 61 %
STRESS ECHO LAST STRESS DIASTOLIC BP: 70
STRESS ECHO LAST STRESS HR: 82
STRESS ECHO LAST STRESS SYSTOLIC BP: 144
STRESS ECHO TARGET HR: 146
STRESS/REST PERFUSION RATIO: 1.23

## 2022-10-28 PROCEDURE — G1010 CDSM STANSON: HCPCS

## 2022-10-28 PROCEDURE — 93016 CV STRESS TEST SUPVJ ONLY: CPT | Performed by: INTERNAL MEDICINE

## 2022-10-28 PROCEDURE — 93270 REMOTE 30 DAY ECG REV/REPORT: CPT

## 2022-10-28 PROCEDURE — 78452 HT MUSCLE IMAGE SPECT MULT: CPT | Mod: 26 | Performed by: INTERNAL MEDICINE

## 2022-10-28 PROCEDURE — 93017 CV STRESS TEST TRACING ONLY: CPT | Performed by: INTERNAL MEDICINE

## 2022-10-28 PROCEDURE — G1010 CDSM STANSON: HCPCS | Performed by: INTERNAL MEDICINE

## 2022-10-28 PROCEDURE — 343N000001 HC RX 343: Performed by: INTERNAL MEDICINE

## 2022-10-28 PROCEDURE — A9500 TC99M SESTAMIBI: HCPCS | Performed by: INTERNAL MEDICINE

## 2022-10-28 PROCEDURE — 250N000011 HC RX IP 250 OP 636: Performed by: INTERNAL MEDICINE

## 2022-10-28 PROCEDURE — 93018 CV STRESS TEST I&R ONLY: CPT | Performed by: INTERNAL MEDICINE

## 2022-10-28 PROCEDURE — 93017 CV STRESS TEST TRACING ONLY: CPT

## 2022-10-28 RX ORDER — CAFFEINE 200 MG
200 TABLET ORAL
Status: DISCONTINUED | OUTPATIENT
Start: 2022-10-28 | End: 2022-10-28 | Stop reason: HOSPADM

## 2022-10-28 RX ORDER — CAFFEINE CITRATE 20 MG/ML
60 SOLUTION INTRAVENOUS
Status: DISCONTINUED | OUTPATIENT
Start: 2022-10-28 | End: 2022-10-28 | Stop reason: HOSPADM

## 2022-10-28 RX ORDER — ALBUTEROL SULFATE 0.83 MG/ML
2.5 SOLUTION RESPIRATORY (INHALATION)
Status: DISCONTINUED | OUTPATIENT
Start: 2022-10-28 | End: 2022-10-28 | Stop reason: HOSPADM

## 2022-10-28 RX ORDER — REGADENOSON 0.08 MG/ML
0.4 INJECTION, SOLUTION INTRAVENOUS ONCE
Status: COMPLETED | OUTPATIENT
Start: 2022-10-28 | End: 2022-10-28

## 2022-10-28 RX ORDER — AMINOPHYLLINE 25 MG/ML
50 INJECTION, SOLUTION INTRAVENOUS
Status: DISCONTINUED | OUTPATIENT
Start: 2022-10-28 | End: 2022-10-28 | Stop reason: HOSPADM

## 2022-10-28 RX ADMIN — AMINOPHYLLINE 50 MG: 25 INJECTION, SOLUTION INTRAVENOUS at 08:35

## 2022-10-28 RX ADMIN — Medication 8.5 MILLICURIE: at 07:10

## 2022-10-28 RX ADMIN — Medication 30.8 MILLICURIE: at 08:30

## 2022-10-28 RX ADMIN — REGADENOSON 0.4 MG: 0.08 INJECTION, SOLUTION INTRAVENOUS at 08:30

## 2022-11-03 ENCOUNTER — TELEPHONE (OUTPATIENT)
Dept: CARDIOLOGY | Facility: CLINIC | Age: 72
End: 2022-11-03

## 2022-11-03 DIAGNOSIS — I48.0 PAROXYSMAL ATRIAL FIBRILLATION (H): Primary | ICD-10-CM

## 2022-11-03 DIAGNOSIS — I10 ESSENTIAL HYPERTENSION: ICD-10-CM

## 2022-11-03 DIAGNOSIS — I20.0 UNSTABLE ANGINA (H): ICD-10-CM

## 2022-11-03 DIAGNOSIS — R94.39 ABNORMAL STRESS TEST: ICD-10-CM

## 2022-11-03 NOTE — TELEPHONE ENCOUNTER
Documented a-fib, pt c/o intermittent chest pain that happens a few times a day, and mild shortness of breath.    ----- Message from MICHAEL Woodruff sent at 11/3/2022  7:25 AM CDT -----  Regarding: MD Tiffanie nolasco saved for your review, thanks!

## 2022-11-03 NOTE — TELEPHONE ENCOUNTER
===View-only below this line===  ----- Message -----  From: Shalonda Sadler APRN CNP  Sent: 11/3/2022  11:29 AM CDT  To: Mary Jasmine RN, Jacek Worley MD, *    Hi Dr. Martinez,  Could you please review her stress and let us know if you have further recommendations?  Thank you!    Dr. Worley,  Would you recommend starting pt on anticoagulation therapy?  She is currently on ASA 81 mg and Plavix.  Pt is scheduled to see you in January.  Please address.  Thank you!

## 2022-11-04 ENCOUNTER — DOCUMENTATION ONLY (OUTPATIENT)
Dept: CARDIOLOGY | Facility: CLINIC | Age: 72
End: 2022-11-04

## 2022-11-04 DIAGNOSIS — Z01.812 PRE-PROCEDURE LAB EXAM: ICD-10-CM

## 2022-11-04 DIAGNOSIS — I48.0 PAROXYSMAL ATRIAL FIBRILLATION (H): Primary | ICD-10-CM

## 2022-11-04 RX ORDER — METOPROLOL TARTRATE 50 MG
50 TABLET ORAL 2 TIMES DAILY
Qty: 180 TABLET | Refills: 3 | Status: SHIPPED | OUTPATIENT
Start: 2022-11-04 | End: 2022-12-06 | Stop reason: ALTCHOICE

## 2022-11-04 NOTE — TELEPHONE ENCOUNTER
From: Mary Jasmine RN  Sent: 11/4/2022  10:18 AM CDT  To: Nata Linares      ----- Message -----  From: Shalonda Sadler APRN CNP  Sent: 11/4/2022  10:03 AM CDT  To: Mary Jasmine RN, Jacek Worley MD, *    Sounds good. Thank you Dr. Martinez!    Hi Mary Fang,  Please arrange pt for staged PCI to LAD as recommended by Dr. Martinez.    Since Dr. Worley is out of office till Monday, let's have her stop propanolol 20 mg twice a day and start metoprolol tartrate 50 mg twice a day for rate control and Eliquis 5 mg twice a day for stroke prophylaxis. Monitor for bleeding given on triple therapy. We will have to address post staged PCI how long she should stay on triple therapy.    Follow up with Afib clinic in 2-3 weeks. If no opening, I am happy to see her.  Let me know if further questions    Thank you!  CY    ----- Message -----  From: You Martinez MD  Sent: 11/4/2022   9:29 AM CDT  To: Mary Jasmine RN, ANDREA Alfonso CNP, *    If she is having chest pain, we can set her up for PCI of the distal LAD    Thanks    ----- Message -----  From: Shalonda Sadler APRN CNP  Sent: 11/3/2022   4:27 PM CDT  To: Mary Jasmine RN, Jacek Worley MD, *    Per note from Mary Fang from this morning, looks like she has been having intermittent chest pain with mild shortness of breath at times. She is wearing her event monitor and did show atrial fibrillation RVR. I am not sure if her symptoms are d/t afib or CAD.      ----- Message -----  From: You Martinez MD  Sent: 11/3/2022   4:20 PM CDT  To: Mary Jasmine RN, ANDREA Alfonso CNP, *    Given the small area of ischemia that corresponds to the apical LAD lesion, would be reasonable to continue current medical therapy. If she has any change in her symptoms, she can let us know    Thanks    ----- Message -----  From: Shalonda Sadler APRN CNP  Sent: 11/3/2022  11:29 AM CDT  To: Mary Jasmine RN, Jacek Worley MD, *    Hi Dr. Martinez,  Could you please review her stress and let us know if you have  further recommendations?  Thank you!    Dr. Worley,  Would you recommend starting pt on anticoagulation therapy?  She is currently on ASA 81 mg and Plavix.  Pt is scheduled to see you in January.  Please address.  Thank you!  CY        ----- Message -----  From: Mary Jasmine, RN  Sent: 11/3/2022   9:49 AM CDT  To: ANDREA Alfonso CNP    ----- Message from Mary Jasmine RN sent at 11/3/2022  9:49 AM CDT -----  Shalonda,    Intermittent chest pain  Mild shortness of breath at times.  Not anticoagulated.    She said she hasn't heard from Dr Martinez regarding stress test results done last week.    Any new recommendations?      Thanks,  Mary Fang

## 2022-11-04 NOTE — PROGRESS NOTES
Spoke with patient regarding recommendations from Dr. Martinez. Pt scheduled for pci on 11/8 @07:30 am.     Discussed med changes, stress test, need for scheduling in afib clinic. Scheduled 12/22. Will send [facility] instructions for pci. Needs lab appt.

## 2022-11-07 ENCOUNTER — TELEPHONE (OUTPATIENT)
Dept: CARDIOLOGY | Facility: CLINIC | Age: 72
End: 2022-11-07

## 2022-11-07 ENCOUNTER — LAB (OUTPATIENT)
Dept: LAB | Facility: CLINIC | Age: 72
End: 2022-11-07
Attending: INTERNAL MEDICINE
Payer: MEDICARE

## 2022-11-07 DIAGNOSIS — Z01.812 PRE-PROCEDURE LAB EXAM: ICD-10-CM

## 2022-11-07 DIAGNOSIS — I48.0 PAROXYSMAL ATRIAL FIBRILLATION (H): ICD-10-CM

## 2022-11-07 DIAGNOSIS — I10 ESSENTIAL HYPERTENSION: ICD-10-CM

## 2022-11-07 DIAGNOSIS — I20.0 UNSTABLE ANGINA (H): ICD-10-CM

## 2022-11-07 DIAGNOSIS — R94.39 ABNORMAL STRESS TEST: ICD-10-CM

## 2022-11-07 LAB
ANION GAP SERPL CALCULATED.3IONS-SCNC: 13 MMOL/L (ref 7–15)
BUN SERPL-MCNC: 13.8 MG/DL (ref 8–23)
CALCIUM SERPL-MCNC: 9.7 MG/DL (ref 8.8–10.2)
CHLORIDE SERPL-SCNC: 103 MMOL/L (ref 98–107)
CREAT SERPL-MCNC: 0.82 MG/DL (ref 0.51–0.95)
DEPRECATED HCO3 PLAS-SCNC: 24 MMOL/L (ref 22–29)
ERYTHROCYTE [DISTWIDTH] IN BLOOD BY AUTOMATED COUNT: 12.7 % (ref 10–15)
GFR SERPL CREATININE-BSD FRML MDRD: 75 ML/MIN/1.73M2
GLUCOSE SERPL-MCNC: 93 MG/DL (ref 70–99)
HCT VFR BLD AUTO: 37.5 % (ref 35–47)
HGB BLD-MCNC: 12.2 G/DL (ref 11.7–15.7)
MCH RBC QN AUTO: 30 PG (ref 26.5–33)
MCHC RBC AUTO-ENTMCNC: 32.5 G/DL (ref 31.5–36.5)
MCV RBC AUTO: 92 FL (ref 78–100)
PLATELET # BLD AUTO: 281 10E3/UL (ref 150–450)
POTASSIUM SERPL-SCNC: 4.2 MMOL/L (ref 3.4–5.3)
RBC # BLD AUTO: 4.07 10E6/UL (ref 3.8–5.2)
SARS-COV-2 RNA RESP QL NAA+PROBE: NEGATIVE
SODIUM SERPL-SCNC: 140 MMOL/L (ref 136–145)
WBC # BLD AUTO: 6.4 10E3/UL (ref 4–11)

## 2022-11-07 PROCEDURE — 85027 COMPLETE CBC AUTOMATED: CPT

## 2022-11-07 PROCEDURE — 36415 COLL VENOUS BLD VENIPUNCTURE: CPT

## 2022-11-07 PROCEDURE — U0003 INFECTIOUS AGENT DETECTION BY NUCLEIC ACID (DNA OR RNA); SEVERE ACUTE RESPIRATORY SYNDROME CORONAVIRUS 2 (SARS-COV-2) (CORONAVIRUS DISEASE [COVID-19]), AMPLIFIED PROBE TECHNIQUE, MAKING USE OF HIGH THROUGHPUT TECHNOLOGIES AS DESCRIBED BY CMS-2020-01-R: HCPCS

## 2022-11-07 PROCEDURE — U0005 INFEC AGEN DETEC AMPLI PROBE: HCPCS

## 2022-11-07 PROCEDURE — 80048 BASIC METABOLIC PNL TOTAL CA: CPT

## 2022-11-07 NOTE — TELEPHONE ENCOUNTER
"Discussed procedure medications for pci tomorrow. Pt is still on plavix. Pt informed had not started on eliquis as recommended. As pci is scheduled tomorrow and would have been held prior however. Pt is very concerned with starting triple therapy.     Per Dr. Martinez:    \"Monitor for bleeding given on triple therapy. We will have to address post staged PCI how long she should stay on triple therapy.\"      Will discuss further tomorrow after procedure.-garrick  "

## 2022-11-08 ENCOUNTER — HOSPITAL ENCOUNTER (OUTPATIENT)
Facility: HOSPITAL | Age: 72
Discharge: HOME | End: 2022-11-08
Attending: INTERNAL MEDICINE | Admitting: INTERNAL MEDICINE
Payer: MEDICARE

## 2022-11-08 ENCOUNTER — PREP FOR PROCEDURE (OUTPATIENT)
Dept: CARDIOLOGY | Facility: CLINIC | Age: 72
End: 2022-11-08

## 2022-11-08 VITALS
OXYGEN SATURATION: 96 % | HEART RATE: 54 BPM | WEIGHT: 183.3 LBS | SYSTOLIC BLOOD PRESSURE: 120 MMHG | RESPIRATION RATE: 16 BRPM | TEMPERATURE: 97.7 F | DIASTOLIC BLOOD PRESSURE: 65 MMHG | HEIGHT: 61 IN | BODY MASS INDEX: 34.61 KG/M2

## 2022-11-08 DIAGNOSIS — R94.39 ABNORMAL STRESS TEST: ICD-10-CM

## 2022-11-08 DIAGNOSIS — E78.5 DYSLIPIDEMIA, GOAL LDL BELOW 70: ICD-10-CM

## 2022-11-08 DIAGNOSIS — I20.0 UNSTABLE ANGINA (H): ICD-10-CM

## 2022-11-08 DIAGNOSIS — I25.110 CORONARY ARTERY DISEASE INVOLVING NATIVE CORONARY ARTERY WITH UNSTABLE ANGINA PECTORIS (H): ICD-10-CM

## 2022-11-08 DIAGNOSIS — R94.39 ABNORMAL STRESS TEST: Primary | ICD-10-CM

## 2022-11-08 DIAGNOSIS — I10 ESSENTIAL HYPERTENSION: ICD-10-CM

## 2022-11-08 DIAGNOSIS — I48.0 PAROXYSMAL ATRIAL FIBRILLATION (H): ICD-10-CM

## 2022-11-08 DIAGNOSIS — Z95.5 S/P PRIMARY ANGIOPLASTY WITH CORONARY STENT: Primary | ICD-10-CM

## 2022-11-08 LAB
ABO/RH(D): NORMAL
ACT BLD: 207 SECONDS (ref 74–150)
ACT BLD: 305 SECONDS (ref 74–150)
ANTIBODY SCREEN: NEGATIVE
ATRIAL RATE - MUSE: 54 BPM
ATRIAL RATE - MUSE: 57 BPM
DIASTOLIC BLOOD PRESSURE - MUSE: NORMAL MMHG
DIASTOLIC BLOOD PRESSURE - MUSE: NORMAL MMHG
INTERPRETATION ECG - MUSE: NORMAL
INTERPRETATION ECG - MUSE: NORMAL
P AXIS - MUSE: 38 DEGREES
P AXIS - MUSE: 50 DEGREES
PR INTERVAL - MUSE: 132 MS
PR INTERVAL - MUSE: 162 MS
QRS DURATION - MUSE: 92 MS
QRS DURATION - MUSE: 94 MS
QT - MUSE: 428 MS
QT - MUSE: 440 MS
QTC - MUSE: 416 MS
QTC - MUSE: 417 MS
R AXIS - MUSE: 11 DEGREES
R AXIS - MUSE: 25 DEGREES
SPECIMEN EXPIRATION DATE: NORMAL
SYSTOLIC BLOOD PRESSURE - MUSE: NORMAL MMHG
SYSTOLIC BLOOD PRESSURE - MUSE: NORMAL MMHG
T AXIS - MUSE: 25 DEGREES
T AXIS - MUSE: 29 DEGREES
VENTRICULAR RATE- MUSE: 54 BPM
VENTRICULAR RATE- MUSE: 57 BPM

## 2022-11-08 PROCEDURE — 250N000011 HC RX IP 250 OP 636: Performed by: INTERNAL MEDICINE

## 2022-11-08 PROCEDURE — 86850 RBC ANTIBODY SCREEN: CPT | Performed by: NURSE PRACTITIONER

## 2022-11-08 PROCEDURE — C1874 STENT, COATED/COV W/DEL SYS: HCPCS | Performed by: INTERNAL MEDICINE

## 2022-11-08 PROCEDURE — 255N000002 HC RX 255 OP 636: Performed by: INTERNAL MEDICINE

## 2022-11-08 PROCEDURE — 999N000054 HC STATISTIC EKG NON-CHARGEABLE

## 2022-11-08 PROCEDURE — 250N000013 HC RX MED GY IP 250 OP 250 PS 637: Performed by: INTERNAL MEDICINE

## 2022-11-08 PROCEDURE — C1725 CATH, TRANSLUMIN NON-LASER: HCPCS | Performed by: INTERNAL MEDICINE

## 2022-11-08 PROCEDURE — 99153 MOD SED SAME PHYS/QHP EA: CPT | Performed by: INTERNAL MEDICINE

## 2022-11-08 PROCEDURE — 99152 MOD SED SAME PHYS/QHP 5/>YRS: CPT | Performed by: INTERNAL MEDICINE

## 2022-11-08 PROCEDURE — C9600 PERC DRUG-EL COR STENT SING: HCPCS | Mod: LD | Performed by: INTERNAL MEDICINE

## 2022-11-08 PROCEDURE — 258N000003 HC RX IP 258 OP 636: Performed by: INTERNAL MEDICINE

## 2022-11-08 PROCEDURE — 250N000009 HC RX 250: Performed by: INTERNAL MEDICINE

## 2022-11-08 PROCEDURE — C1887 CATHETER, GUIDING: HCPCS | Performed by: INTERNAL MEDICINE

## 2022-11-08 PROCEDURE — 93010 ELECTROCARDIOGRAM REPORT: CPT | Mod: HOP | Performed by: INTERNAL MEDICINE

## 2022-11-08 PROCEDURE — 93005 ELECTROCARDIOGRAM TRACING: CPT

## 2022-11-08 PROCEDURE — 272N000001 HC OR GENERAL SUPPLY STERILE: Performed by: INTERNAL MEDICINE

## 2022-11-08 PROCEDURE — 86901 BLOOD TYPING SEROLOGIC RH(D): CPT | Performed by: NURSE PRACTITIONER

## 2022-11-08 PROCEDURE — 85347 COAGULATION TIME ACTIVATED: CPT

## 2022-11-08 PROCEDURE — C1894 INTRO/SHEATH, NON-LASER: HCPCS | Performed by: INTERNAL MEDICINE

## 2022-11-08 PROCEDURE — 36415 COLL VENOUS BLD VENIPUNCTURE: CPT | Performed by: NURSE PRACTITIONER

## 2022-11-08 PROCEDURE — C1769 GUIDE WIRE: HCPCS | Performed by: INTERNAL MEDICINE

## 2022-11-08 PROCEDURE — 92928 PRQ TCAT PLMT NTRAC ST 1 LES: CPT | Mod: LD | Performed by: INTERNAL MEDICINE

## 2022-11-08 DEVICE — STENT RESOLUTE ONYX DE 2.7FR 2.50X22MM RONYX DE25022UX: Type: IMPLANTABLE DEVICE | Site: CORONARY | Status: FUNCTIONAL

## 2022-11-08 RX ORDER — NALOXONE HYDROCHLORIDE 0.4 MG/ML
0.4 INJECTION, SOLUTION INTRAMUSCULAR; INTRAVENOUS; SUBCUTANEOUS
Status: DISCONTINUED | OUTPATIENT
Start: 2022-11-08 | End: 2022-11-08 | Stop reason: HOSPADM

## 2022-11-08 RX ORDER — SODIUM CHLORIDE 9 MG/ML
INJECTION, SOLUTION INTRAVENOUS CONTINUOUS
Status: DISCONTINUED | OUTPATIENT
Start: 2022-11-08 | End: 2022-11-08 | Stop reason: HOSPADM

## 2022-11-08 RX ORDER — CLOPIDOGREL BISULFATE 75 MG/1
TABLET ORAL
Status: DISCONTINUED | OUTPATIENT
Start: 2022-11-08 | End: 2022-11-08 | Stop reason: HOSPADM

## 2022-11-08 RX ORDER — IODIXANOL 320 MG/ML
INJECTION, SOLUTION INTRAVASCULAR
Status: DISCONTINUED | OUTPATIENT
Start: 2022-11-08 | End: 2022-11-08 | Stop reason: HOSPADM

## 2022-11-08 RX ORDER — LIDOCAINE 40 MG/G
CREAM TOPICAL
Status: DISCONTINUED | OUTPATIENT
Start: 2022-11-08 | End: 2022-11-08 | Stop reason: HOSPADM

## 2022-11-08 RX ORDER — NALOXONE HYDROCHLORIDE 0.4 MG/ML
0.2 INJECTION, SOLUTION INTRAMUSCULAR; INTRAVENOUS; SUBCUTANEOUS
Status: DISCONTINUED | OUTPATIENT
Start: 2022-11-08 | End: 2022-11-08 | Stop reason: HOSPADM

## 2022-11-08 RX ORDER — PRAMIPEXOLE DIHYDROCHLORIDE 0.25 MG/1
0.25 TABLET ORAL 3 TIMES DAILY
Status: DISCONTINUED | OUTPATIENT
Start: 2022-11-08 | End: 2022-11-08 | Stop reason: HOSPADM

## 2022-11-08 RX ORDER — HYDRALAZINE HYDROCHLORIDE 20 MG/ML
10 INJECTION INTRAMUSCULAR; INTRAVENOUS EVERY 4 HOURS PRN
Status: DISCONTINUED | OUTPATIENT
Start: 2022-11-08 | End: 2022-11-08 | Stop reason: HOSPADM

## 2022-11-08 RX ORDER — HEPARIN SODIUM 1000 [USP'U]/ML
INJECTION, SOLUTION INTRAVENOUS; SUBCUTANEOUS
Status: DISCONTINUED | OUTPATIENT
Start: 2022-11-08 | End: 2022-11-08 | Stop reason: HOSPADM

## 2022-11-08 RX ORDER — METOPROLOL TARTRATE 1 MG/ML
5 INJECTION, SOLUTION INTRAVENOUS
Status: DISCONTINUED | OUTPATIENT
Start: 2022-11-08 | End: 2022-11-08 | Stop reason: HOSPADM

## 2022-11-08 RX ORDER — FENTANYL CITRATE 50 UG/ML
INJECTION, SOLUTION INTRAMUSCULAR; INTRAVENOUS
Status: DISCONTINUED | OUTPATIENT
Start: 2022-11-08 | End: 2022-11-08 | Stop reason: HOSPADM

## 2022-11-08 RX ORDER — FLUMAZENIL 0.1 MG/ML
0.2 INJECTION, SOLUTION INTRAVENOUS
Status: DISCONTINUED | OUTPATIENT
Start: 2022-11-08 | End: 2022-11-08 | Stop reason: HOSPADM

## 2022-11-08 RX ORDER — NITROGLYCERIN 0.4 MG/1
0.4 TABLET SUBLINGUAL EVERY 5 MIN PRN
Status: DISCONTINUED | OUTPATIENT
Start: 2022-11-08 | End: 2022-11-08 | Stop reason: HOSPADM

## 2022-11-08 RX ORDER — FENTANYL CITRATE 50 UG/ML
25 INJECTION, SOLUTION INTRAMUSCULAR; INTRAVENOUS
Status: CANCELLED | OUTPATIENT
Start: 2022-11-08

## 2022-11-08 RX ORDER — DIAZEPAM 5 MG
5 TABLET ORAL
Status: CANCELLED | OUTPATIENT
Start: 2022-11-08

## 2022-11-08 RX ORDER — OXYCODONE HYDROCHLORIDE 5 MG/1
10 TABLET ORAL EVERY 4 HOURS PRN
Status: DISCONTINUED | OUTPATIENT
Start: 2022-11-08 | End: 2022-11-08 | Stop reason: HOSPADM

## 2022-11-08 RX ORDER — SODIUM CHLORIDE 9 MG/ML
INJECTION, SOLUTION INTRAVENOUS CONTINUOUS
Status: CANCELLED | OUTPATIENT
Start: 2022-11-08

## 2022-11-08 RX ORDER — FENTANYL CITRATE 50 UG/ML
25 INJECTION, SOLUTION INTRAMUSCULAR; INTRAVENOUS
Status: DISCONTINUED | OUTPATIENT
Start: 2022-11-08 | End: 2022-11-08 | Stop reason: HOSPADM

## 2022-11-08 RX ORDER — DIAZEPAM 5 MG
5 TABLET ORAL
Status: COMPLETED | OUTPATIENT
Start: 2022-11-08 | End: 2022-11-08

## 2022-11-08 RX ORDER — ASPIRIN 81 MG/1
243 TABLET, CHEWABLE ORAL ONCE
Status: CANCELLED | OUTPATIENT
Start: 2022-11-08

## 2022-11-08 RX ORDER — ATROPINE SULFATE 0.1 MG/ML
0.5 INJECTION INTRAVENOUS
Status: DISCONTINUED | OUTPATIENT
Start: 2022-11-08 | End: 2022-11-08 | Stop reason: HOSPADM

## 2022-11-08 RX ORDER — ONDANSETRON 4 MG/1
4 TABLET, ORALLY DISINTEGRATING ORAL EVERY 6 HOURS PRN
Status: DISCONTINUED | OUTPATIENT
Start: 2022-11-08 | End: 2022-11-08 | Stop reason: HOSPADM

## 2022-11-08 RX ORDER — OXYCODONE HYDROCHLORIDE 5 MG/1
5 TABLET ORAL EVERY 4 HOURS PRN
Status: DISCONTINUED | OUTPATIENT
Start: 2022-11-08 | End: 2022-11-08 | Stop reason: HOSPADM

## 2022-11-08 RX ORDER — LIDOCAINE 40 MG/G
CREAM TOPICAL
Status: CANCELLED | OUTPATIENT
Start: 2022-11-08

## 2022-11-08 RX ORDER — ASPIRIN 81 MG/1
243 TABLET, CHEWABLE ORAL ONCE
Status: DISCONTINUED | OUTPATIENT
Start: 2022-11-08 | End: 2022-11-08 | Stop reason: HOSPADM

## 2022-11-08 RX ORDER — NITROGLYCERIN 5 MG/ML
VIAL (ML) INTRAVENOUS
Status: DISCONTINUED | OUTPATIENT
Start: 2022-11-08 | End: 2022-11-08 | Stop reason: HOSPADM

## 2022-11-08 RX ORDER — ASPIRIN 325 MG
325 TABLET ORAL ONCE
Status: CANCELLED | OUTPATIENT
Start: 2022-11-08 | End: 2022-11-08

## 2022-11-08 RX ORDER — ASPIRIN 325 MG
325 TABLET ORAL ONCE
Status: DISCONTINUED | OUTPATIENT
Start: 2022-11-08 | End: 2022-11-08 | Stop reason: HOSPADM

## 2022-11-08 RX ORDER — ONDANSETRON 2 MG/ML
4 INJECTION INTRAMUSCULAR; INTRAVENOUS EVERY 6 HOURS PRN
Status: DISCONTINUED | OUTPATIENT
Start: 2022-11-08 | End: 2022-11-08 | Stop reason: HOSPADM

## 2022-11-08 RX ORDER — ACETAMINOPHEN 325 MG/1
650 TABLET ORAL EVERY 4 HOURS PRN
Status: DISCONTINUED | OUTPATIENT
Start: 2022-11-08 | End: 2022-11-08 | Stop reason: HOSPADM

## 2022-11-08 RX ADMIN — DIAZEPAM 5 MG: 5 TABLET ORAL at 09:10

## 2022-11-08 RX ADMIN — SODIUM CHLORIDE: 9 INJECTION, SOLUTION INTRAVENOUS at 08:53

## 2022-11-08 ASSESSMENT — ACTIVITIES OF DAILY LIVING (ADL)
ADLS_ACUITY_SCORE: 35

## 2022-11-08 NOTE — Clinical Note
The first balloon was inserted into the left anterior descending and distal left anterior descending.Max pressure = 6 aquiles. Total duration = 30 seconds.

## 2022-11-08 NOTE — PROGRESS NOTES
Discharged to home post PCI to LAD. Right wrist site had small hematoma and bruising but site is stable at discharge. Pt is very aware of precautions and has no questions. Instructed to resume plavix and ASA tomorrow and hold Eliquis until seen by Shayan Gr in clinic per Dr. Jin.  F/u Appt is scheduled with Shalonda in clinic also.

## 2022-11-08 NOTE — Clinical Note
Stent deployed in the distal left anterior descending. Max pressure = 16 aquiles. Total duration = 27 seconds.

## 2022-11-08 NOTE — DISCHARGE INSTRUCTIONS

## 2022-11-08 NOTE — Clinical Note
The first balloon was inserted into the left anterior descending and distal left anterior descending.Max pressure = 12 aquiles. Total duration = 15 seconds.     Max pressure = 10 aquiles. Total duration = 8 seconds.    Balloon reinflated a second time: Max pressure = 10 aquiles. Total duration = 8 seconds. Max pressure = 16 aquiles. Total duration = 12 seconds.

## 2022-11-08 NOTE — TELEPHONE ENCOUNTER
"Triple therapy addressed with pt from procedure today    Per Dr. Jin:    \"Successful PCI distal LAD.  No residual stenosis.  Recommend dual antiplatelet therapy for 12 months, or stop ASA if/when Eliquis is started for afib, but continue Plavix while on Eliquis\"-LIYAH  "

## 2022-11-08 NOTE — INTERVAL H&P NOTE
"I have reviewed the surgical (or preoperative) H&P that is linked to this encounter, and examined the patient. There are no significant changes    Clinical Conditions Present on Arrival:  Clinically Significant Risk Factors Present on Admission                  # Drug Induced Coagulation Defect: home medication list includes an anticoagulant medication  # Drug Induced Platelet Defect: home medication list includes an antiplatelet medication  # Obesity: Estimated body mass index is 34.63 kg/m  as calculated from the following:    Height as of this encounter: 1.549 m (5' 1\").    Weight as of this encounter: 83.1 kg (183 lb 4.8 oz).       "

## 2022-11-08 NOTE — PRE-PROCEDURE
GENERAL PRE-PROCEDURE:   Procedure:  Staged LAD PCI  Date/Time:  11/8/2022 8:10 AM    Written consent obtained?: Yes    Risks and benefits: Risks, benefits and alternatives were discussed    Consent given by:  Patient  Patient states understanding of procedure being performed: Yes    Patient's understanding of procedure matches consent: Yes    Procedure consent matches procedure scheduled: Yes    Expected level of sedation:  Moderate  Appropriately NPO:  Yes  ASA Class:  3 (CAD, HTN, dyslipidemia, PAF, Class I obesity; BMI 34.63kg/m2, probable DONN)  Mallampati  :  Grade 3- soft palate visible, posterior pharyngeal wall not visible  Lungs:  Lungs clear with good breath sounds bilaterally  Heart:  Normal heart sounds and rate  History & Physical reviewed:  History and physical reviewed and no updates needed  Statement of review:  I have reviewed the lab findings, diagnostic data, medications, and the plan for sedation

## 2022-11-14 PROCEDURE — 93228 REMOTE 30 DAY ECG REV/REPORT: CPT | Performed by: INTERNAL MEDICINE

## 2022-11-15 ENCOUNTER — LAB (OUTPATIENT)
Dept: CARDIOLOGY | Facility: CLINIC | Age: 72
End: 2022-11-15
Payer: MEDICARE

## 2022-11-15 DIAGNOSIS — I20.0 UNSTABLE ANGINA (H): ICD-10-CM

## 2022-11-15 LAB
ALT SERPL W P-5'-P-CCNC: 20 U/L (ref 0–45)
AST SERPL W P-5'-P-CCNC: 17 U/L (ref 0–40)
CHOLEST SERPL-MCNC: 132 MG/DL
FASTING STATUS PATIENT QL REPORTED: YES
HDLC SERPL-MCNC: 43 MG/DL
LDLC SERPL CALC-MCNC: 68 MG/DL
TRIGL SERPL-MCNC: 106 MG/DL

## 2022-11-15 PROCEDURE — 36415 COLL VENOUS BLD VENIPUNCTURE: CPT

## 2022-11-15 PROCEDURE — 84450 TRANSFERASE (AST) (SGOT): CPT

## 2022-11-15 PROCEDURE — 80061 LIPID PANEL: CPT

## 2022-11-15 PROCEDURE — 84460 ALANINE AMINO (ALT) (SGPT): CPT

## 2022-11-16 ENCOUNTER — OFFICE VISIT (OUTPATIENT)
Dept: CARDIOLOGY | Facility: CLINIC | Age: 72
End: 2022-11-16
Payer: MEDICARE

## 2022-11-16 VITALS
BODY MASS INDEX: 34.39 KG/M2 | SYSTOLIC BLOOD PRESSURE: 104 MMHG | WEIGHT: 182 LBS | DIASTOLIC BLOOD PRESSURE: 58 MMHG | RESPIRATION RATE: 14 BRPM | HEART RATE: 56 BPM

## 2022-11-16 DIAGNOSIS — I48.0 PAROXYSMAL ATRIAL FIBRILLATION (H): ICD-10-CM

## 2022-11-16 DIAGNOSIS — I25.110 CORONARY ARTERY DISEASE INVOLVING NATIVE CORONARY ARTERY OF NATIVE HEART WITH UNSTABLE ANGINA PECTORIS (H): Primary | ICD-10-CM

## 2022-11-16 DIAGNOSIS — G47.00 INSOMNIA, UNSPECIFIED: ICD-10-CM

## 2022-11-16 DIAGNOSIS — I20.0 UNSTABLE ANGINA (H): ICD-10-CM

## 2022-11-16 DIAGNOSIS — E78.5 DYSLIPIDEMIA, GOAL LDL BELOW 70: ICD-10-CM

## 2022-11-16 DIAGNOSIS — I10 ESSENTIAL HYPERTENSION: ICD-10-CM

## 2022-11-16 PROCEDURE — 99215 OFFICE O/P EST HI 40 MIN: CPT | Performed by: NURSE PRACTITIONER

## 2022-11-16 RX ORDER — NITROGLYCERIN 0.4 MG/1
TABLET SUBLINGUAL
Qty: 30 TABLET | Refills: 1 | Status: SHIPPED | OUTPATIENT
Start: 2022-11-16

## 2022-11-16 NOTE — PATIENT INSTRUCTIONS
Suma Mark,    It was a pleasure to see you today at the [facility].     My recommendations after this visit include:    - Stop Aspirin     - Start Eliquis 5 mg twice a day for atrial fibrillation (to prevent stroke).    - Continue on Clopidogrel (Plavix) for 12 months and then stop. Restart Aspirin 81 mg daily after 1 year.     - Monitor for bleeding     - Please seek medical attention if you develop recurrent chest pain or shortness of breath or similar symptoms you experienced prior to recent cardiac event    - Cardiac rehab as scheduled    - Follow up with      - Please call [phone], if you have any questions or concerns    Shalonda Sadler CNP    Medication     Take all your medications as prescribed  Do not stop any medications without talking with a healthcare provider    Exercise      Physical activity is important for overall health  Set a goal of 150 minutes of exercise each week  For example, 30 minutes of exercise 5 days each week.    These 30 minutes can be broken into shorter periods of 15 minutes twice daily or 10 minutes three times daily  Start any exercise program slowly and work towards the goal of 150 minutes each week  For example, you may start with 10 minutes and plan to add a few minutes each week as you get stronger   Examples of exercise include walking, swimming, or biking  Remember to stretch and stay hydrated with exercise    Diet     A heart healthy diet includes:  A variety of fruits and vegetables  Whole grains  Low-fat dairy (fat-free, 1% fat, and low-fat)  Lean meats and poultry without skin   Fish (eat fish 2 times each week)  Nuts  Limit saturated fat to about 13 grams each day (based on a 2000 calorie diet)  Limit red meat  Limit sugars (sweets and sugary beverages)  Limit your portion sizes  Do not add salt to your food when cooking or at the table  Limit alcohol intake (no more than 1 drink each day for women or 2 drinks each day for  men)    Weight Loss     Work on losing weight with diet and exercise  You BMI (body mass index) should be between 18.5-24.9  This is a calculation of your weight and height  Please ask your healthcare provider for your BMI    Manage Other Chronic Health Conditions     Control cholesterol  Eat a diet low in saturated fat  Exercise   Take a statin medication as prescribed  Manage blood pressure  Eat a diet low in sodium  Exercise  Reduce stress  Lose weight   Take blood pressure medications as prescribed  Control blood sugars if diabetic  Monitor sugars and carbohydrates in your diet  Lose weight   Take diabetes medications as prescribed  Follow-up with your primary care provider to make sure your blood sugars are well controlled    Stress Reduction     Find time each day to relax  Reading, listening to music, yoga, meditation, exercise, spending time with friends and family, volunteering   Get 6-8 hours of sleep each night    Smoking Cessation     Smoking causes numerous health problems including coronary artery disease  It is never too late to quit  Set realistic goals for quitting  Decrease the number of cigarettes used each week  Use nicotine gum or patches to help you quit    Information from the [employer].  Please visit their website at www.heart.org

## 2022-11-16 NOTE — LETTER
11/16/2022    Bernadette Taylor MD  [address] [facility] [address]    RE: Suma Parksowell       Dear Colleague,     I had the pleasure of seeing Suma Mark in the [facility].          Assessment/Recommendations   Assessment:    1.  Coronary artery disease with status post PCI/EUGENIA to OM2 10/12/2022: Patient continues to have some chest discomfort sharp/tightness intermittently.  Her nuclear stress test on 10/28/2022 showed ECG negative for ischemia but noted small area of mild ischemia in distal anterior and apical segments of the LV.  Given ongoing symptoms with abnormal stress test, she underwent repeat coronary angiogram on 11/8/2022 and had successful drug-eluting stent placement to lesion in distal LAD.    Patient reports improvement in her chest pain since recent stent placement although she still gets mild chest pain but less frequent.  She has not had to take any nitro since recent stent placement to LAD.    Her lightheadedness and fatigue has resolved.    Patient was recommended dual antiplatelet therapy with ASA 81 mg indefinitely and  Clopidogrel (Plavix) 75 mg daily for 12 months but recommended stop aspirin if started on Eliquis.  Patient has not started Eliquis yet.  She is currently on aspirin and clopidogrel.    Cardiac rehab is scheduled.    2.  Dyslipidemia with LDL goal <70/f: Suma Mark is on high intensity statin therapy with atorvastatin 80 mg daily.  Dose was increased in the hospital tolerating with no adverse effect.. Most recent LDL is 68- at goal    3.  Hypertension: Blood pressures well controlled.  On Avapro, amlodipine, and Metroprolol tartrate.  Patient is also on furosemide which she takes for lower extremity edema.  Most recent BMP stable.    4.  Paroxysmal atrial fibrillation: Heart rate today stable and regular in 50s.   Recent event monitor showed an episode of atrial fibrillation with RVR heart rate in 120s documented on November 2 to  November 3.  No ventricular arrhythmia, bradycardia or significant pauses noted.      Her CHADS2 Vasc score is 6. Discussed with primary cardiology, Dr. Worley.  Patient was recommended Eliquis 5 mg twice a day for stroke prophylaxis which she has not started yet.  We discussed briefly about atrial fibrillation and risk of stroke based on [id number] score.  Patient has been asked about alternative therapy for Eliquis.  We briefly discussed about watchman device and provided information.      Patient is scheduled to see Shayan Gr CNP in A. fib clinic on 12/6/2022    Plan:  -Stop aspirin 81 mg daily    -Start Eliquis 5 mg twice a day.  Monitor for bleeding.    -Continue clopidogrel for 12 months and then stop and restart aspirin 81 mg daily.    -I provided information on coronary artery disease and heart healthy diet    -Cardiac rehab as scheduled.    -We will continue to monitor her vitals and cardiac rehab.  If she is symptomatic with bradycardia or low blood pressure, will consider cutting back on Metroprolol tartrate.    -Continue current hypertension regimen    Follow-up in A. fib clinic as scheduled during last clinic visit, patient was experiencing some chest discomfort    Follow up with Dr. Worley  as scheduled in January 2023     History of Present Illness/Subjective    Ms. Suma Mark is a 72 year old female with a past medical history of severely elevated coronary calcium score, hypertension, dyslipidemia, migraine, recent hospitalization with unstable angina with diagnosis of coronary disease with status post PCI/EUGENIA to OM2 on 10/12/2022 who is seen at [facility] for post coronary intervention follow up.     During last clinic visit, patient was experiencing some intermittent chest discomfort rating 3-4/10.  She had an abnormal stress test.  With ongoing chest discomfort, she was recommended to have repeat coronary angiogram.  Patient underwent successful PCI/EUGENIA  to distal LAD on 11/20/2022.    Today, Lo is here accompanied by her .  She reports feeling improvement in her chest discomfort since recent stent placement although she still gets mild but less frequent.  Her fatigue and lightheadedness is resolved.  She denies fatigue, lightheadedness, shortness of breath, dyspnea on exertion, orthopnea, PND, palpitations, abdominal fullness/bloating and lower extremity edema.     Coronary Angiogram on 11/8/22-reviewed:  Conclusion     Dist LAD lesion is 85% stenosed.     Successful PCI distal LAD.  No residual stenosis.  Recommend dual antiplatelet therapy for 12 months, or stop ASA if/when Eliquis is started for afib, but continue Plavix while on Eliquis.          Plan      Follow bedrest per protocol    Continued medical management and lifestyle modifications for cardiovascular risk factor optimizations.    Cardiac rehabilitation.     Recommendations  General Recommendations:  - Patient given specific instructions regarding care of arteriotomy site, activity restrictions, signs and symptoms of cardiac or vascular complications and to seek immediate medical evaluation should they occur.     Medications:  - Continue high dose statin therapy indefinitely.     Event monitor done on 10/28/2022-reviewed   conclusion  [facility]  Cardiac Electrophysiology      Cardiac Event Monitor Report (DERIC type)     Results:    Indication for study: Paroxysmal atrial fibrillation    Time monitored: 12 days..      The baseline rhythm transmission demonstrated sinus rhythm with heart rates in the mid 70s. The DC interval, QRS duration, and QT interval, all appear to be normal.    The patient had 27 manually activated rhythm recordings.  Symptoms were reported and included primarily chest pain but also lightheadedness.  The patient's rhythm demonstrated normal sinus rhythm with heart rates ranging between 56 and 75 bpm.  There was no ectopy or other pathologic rhythm disturbance  demonstrated.    The patient had 8 auto triggered recordings.  Symptoms were not reported.  The patient's rhythm demonstrated normal sinus rhythm with the exception of the recording from November 2 which demonstrated atrial fibrillation with rapid ventricular response (120s).     Impression:    Abnormal multi day patient activated monitor by virtue of the presence of atrial fibrillation (note that the automated over read called this atrial flutter which is incorrect).  The patient did not have any symptoms in association with the arrhythmia event and the overall duration/burden cannot be estimated from this type of monitor as opposed to utilizing a MCOT type monitor which does provide burden data.    The patient's symptomatic recordings did not correlate with any arrhythmia.    No sustained ventricular arrhythmia.    No profound bradycardia or significant pauses.      Coronary Angiogram done on 10/12/2022: reviewed:  Conclusion  1.  Multivessel CAD as described below.  Culprit lesion is a 90% stenosis within a large OM 2.     LM: No significant disease  LAD: Angiographically moderate proximal-mid LAD disease that is not flow-limiting (FFR 0.83).  Further down the vessel, there is a discrete 70-80% stenosis of the distal LAD as well as a discrete 60-70% stenosis of the apical LAD.  LCx: Moderate proximal LAD disease with a 90% culprit stenosis of the large OM 2  RCA: Moderate diffuse proximal-mid RCA disease     2.  Successful PCI of the OM 2 lesion extending back to the proximal LCx with placement of a single 2.5 x 38 mm Synergy EUGENIA, postdilated with a 2.75 mm NC balloon.      Plan    - Remove TR band per protocol; wrist precautions  - DAPT for a minimum of 6 months.  After this, would consider indefinite P2 Y 12 inhibitor monotherapy  - High intensity statin  - Cardiac rehab referral  - Establish with outpatient cardiologist.  Pending clinical symptoms, can consider repeat stress testing (prior Lexiscan may have  been negative due to balanced ischemia), versus return to the Cath Lab for staged PCI of the LAD.        Physical Examination Review of Systems   /58 (BP Location: Right arm, Patient Position: Sitting, Cuff Size: Adult Regular)   Pulse 56   Resp 14   Wt 82.6 kg (182 lb)   LMP  (LMP Unknown)   BMI 34.39 kg/m    Body mass index is 34.39 kg/m .  Wt Readings from Last 3 Encounters:   11/16/22 82.6 kg (182 lb)   11/08/22 83.1 kg (183 lb 4.8 oz)   10/20/22 83 kg (183 lb)     General Appearance:   no distress, normal body habitus   ENT/Mouth: membranes moist, no oral lesions or bleeding gums.      EYES:  no scleral icterus, normal conjunctivae   Neck: no carotid bruits or thyromegaly   Chest/Lungs:   lungs are clear to auscultation, no rales or wheezing, equal chest wall expansion    Cardiovascular:    Bradycardia. Normal first and second heart sounds with no murmurs, rubs, or gallops; the carotid, radial and posterior tibial pulses are intact, JVP is difficult to assess due to the patient's obesity and body habitus   , no edema bilaterally    Abdomen:  no organomegaly, masses, bruits, or tenderness; bowel sounds are present   Extremities  Puncture Site: no cyanosis or clubbing  Right radial site is soft with mild bruising.  Radial pulses and Pedal pulses intact and symmetrical.  CMS intact.   Skin: no xanthelasma, warm.    Neurologic: normal  bilateral, no tremors     Psychiatric: alert and oriented x3, calm                 Negative unless noted in HPI     Medical History  Surgical History Family History Social History   Past Medical History:   Diagnosis Date     Acquired hypothyroidism 3/12/2009     Anxiety      Anxiety state, unspecified      Back pain      Breast cyst      Carpal tunnel syndrome      Chronic pain     LOW BACK     Degenerative disc disease, lumbar      Depression      Depression, unspecified depression type      Diarrhea      Disease of thyroid gland     hypothyroid     Diverticulosis  10/4/2017    Incidental finding on colonoscopy 10/2017     DJD (degenerative joint disease)      Essential hypertension 4/21/2015     Essential hypertension, benign      Fatty liver      Fibromyalgia      Gastro-oesophageal reflux disease      Gastroesophageal reflux disease without esophagitis 4/21/2015     GERD (gastroesophageal reflux disease)      Hernia, ventral 4/27/2017     History of anesthesia complications     hypotension after surgery     History of blood clots      History of blood transfusion      History of transfusion      Hyperlipidemia      Hypertension      Insomnia, unspecified type      Left lower quadrant pain      Lumbago      Lung nodules     INTERMITTENT     Major depression      Migraine      Osteoarthritis      Osteopenia      Osteoporosis      Other abnormal glucose      Other and unspecified special symptom or syndrome, not elsewhere classified      PONV (postoperative nausea and vomiting)      Posttraumatic stress disorder      Raynaud's disease      Restless leg syndrome      S/P total knee replacement 11/27/2017     Spontaneous ecchymoses      Thrombosis of leg     with pregnancy     Unspecified episodic mood disorder     AFFECTIVE PSYCHOSIS     Unspecified hypothyroidism      Unspecified vitamin D deficiency      Varicose veins      Ventral hernia      Vitamin D deficiency     Past Surgical History:   Procedure Laterality Date     ARTHROCENTESIS      LEFT HIP TROCHANTERIC BURSA     ARTHROPLASTY KNEE UNICOMPARTMENT  10/31/2013    Procedure: ARTHROPLASTY KNEE UNICOMPARTMENT;  LEFT KNEE UNICOMPARTMENTAL ARTHROPLASTY (CAROLINE)^;  Surgeon: Chi Mittal MD;  Location:  OR     ARTICULAR DEBRIEDEMENT[      LEFT KNEE     BACK SURGERY       BREAST CYST EXCISION       BREAST SURGERY      bx     COLECTOMY N/A 6/2/2017    Procedure: RESECTION, SMALL INTESTINE, OPEN ADHESIOLYSIS;  Surgeon: Keyon Qureshi MD;  Location: [facility];  Service:      COLON SURGERY       CV CORONARY  ANGIOGRAM N/A 10/12/2022    Procedure: CV CORONARY ANGIOGRAM;  Surgeon: You Martinez MD;  Location: [facility] CV     CV FRACTIONAL FLOW RATIO WIRE N/A 10/12/2022    Procedure: Fractional Flow Ratio Wire;  Surgeon: You Martinez MD;  Location: [facility] CV     CV PCI STENT DRUG ELUTING N/A 10/12/2022    Procedure: Percutaneous Coronary Intervention Stent;  Surgeon: You Martinez MD;  Location: [facility] CV     CV PCI STENT DRUG ELUTING N/A 11/8/2022    Procedure: Percutaneous Coronary Intervention - Stent;  Surgeon: Bret Jin MD;  Location: [facility] CV     ENDOSCOPIC RETROGRADE CHOLANGIOPANCREATOGRAPHY       ENDOSCOPIC STRIPPING VEIN(S)      BILATERLY     GENITOURINARY SURGERY      bladder sling     GI SURGERY      hemorrhoidectomy     HEMORRHOID SURGERY       HERNIORRHAPHY INCISIONAL (LOCATION) N/A 6/2/2017    Procedure: LAPARASCOPIC CONVERTED TO OPEN PRIMARY INCISIONAL HERNIA REPAIR;  Surgeon: Keyon Qureshi MD;  Location: [facility];  Service:      HYSTERECTOMY  [id number]     HYSTERECTOMY VAGINAL       INCONTINENCE SURGERY       LAMINECTOMY LUMBAR TWO LEVELS      2003     LUMBAR FUSION       LUMBAR HARDWARE REMOVAL[       ORTHOPEDIC SURGERY      carpal tunnel     UT LAP,DIAGNOSTIC ABDOMEN N/A 8/22/2019    Procedure: DIAGNOSTIC LAPAROSCOPY, LYSIS OF ADHESION;  Surgeon: Svetlana Ron MD;  Location: [facility] OR;  Service: General     RELEASE CARPAL TUNNEL       STRIP VEIN       TRANSARTHISCOPIC SURGERY[      LEFT KNEE     US THYROID BIOPSY  4/19/2019     VEIN LIGATION AND STRIPPING Bilateral      ZZC TOTAL KNEE ARTHROPLASTY Right 11/27/2017    Procedure:  RIGHT TOTAL KNEE ARTHROPLASTY;  Surgeon: Kevin Ryder MD;  Location: [facility] OR;  Service: Orthopedics    Family History   Problem Relation Age of Onset     Dementia Mother      Arthritis Mother      Chronic Obstructive Pulmonary Disease Father      Cardiovascular Father       Hypertension Father      No Known Problems Sister      No Known Problems Daughter      No Known Problems Son      Cerebrovascular Disease Maternal Grandmother      Heart Disease Paternal Grandmother      Cerebrovascular Disease Paternal Grandmother      No Known Problems Sister      No Known Problems Sister      No Known Problems Son      No Known Problems Daughter      Breast Cancer Paternal Aunt         age in 50's    Social History     Socioeconomic History     Marital status:      Spouse name: Not on file     Number of children: Not on file     Years of education: Not on file     Highest education level: Not on file   Occupational History     Not on file   Tobacco Use     Smoking status: Never     Smokeless tobacco: Never   Substance and Sexual Activity     Alcohol use: Yes     Comment: Alcoholic Drinks/day: 1 cocktail daily     Drug use: No     Sexual activity: Not on file   Other Topics Concern     Not on file   Social History Narrative     Not on file     Social Determinants of Health     Financial Resource Strain: Not on file   Food Insecurity: Not on file   Transportation Needs: Not on file   Physical Activity: Not on file   Stress: Not on file   Social Connections: Not on file   Intimate Partner Violence: Not on file   Housing Stability: Not on file          Medications  Allergies   Current Outpatient Medications   Medication Sig Dispense Refill     amLODIPine (NORVASC) 5 MG tablet TAKE 1 TABLET BY MOUTH EVERYDAY AT BEDTIME 90 tablet 3     atorvastatin (LIPITOR) 80 MG tablet Take 1 tablet (80 mg) by mouth At Bedtime 90 tablet 3     botulinum toxin type A (BOTOX) 100 units injection Every 3 months. [facility]       busPIRone (BUSPAR) 10 MG tablet Take 1 tablet (10 mg) by mouth 2 times daily 180 tablet 3     cholecalciferol 50 MCG (2000 UT) CAPS Take 2,000 Units by mouth daily       citalopram (CELEXA) 20 MG tablet Take 2 tablets (40 mg) by mouth daily 135 tablet 3     clopidogrel (PLAVIX) 75 MG  "tablet Take 1 tablet (75 mg) by mouth daily 90 tablet 3     eletriptan (RELPAX) 40 MG tablet Take 1 tablet (40 mg) by mouth at onset of headache for migraine 10 tablet 0     furosemide (LASIX) 20 MG tablet Take 2 tablets (40 mg) by mouth daily 180 tablet 3     HYDROcodone-acetaminophen (NORCO) 5-325 MG tablet Take 1 tablet by mouth daily as needed (Severe headache) 5 tablet 0     irbesartan (AVAPRO) 300 MG tablet Take 1 tablet (300 mg) by mouth daily 90 tablet 3     levothyroxine (SYNTHROID/LEVOTHROID) 88 MCG tablet TAKE 1 TABLET BY MOUTH DAILY AT 6:00 AM. 90 tablet 3     LORazepam (ATIVAN) 0.5 MG tablet Take 1 tablet (0.5 mg) by mouth every 6 hours as needed for anxiety 30 tablet 0     metoprolol tartrate (LOPRESSOR) 50 MG tablet Take 1 tablet (50 mg) by mouth 2 times daily 180 tablet 3     pramipexole (MIRAPEX) 0.25 MG tablet TAKE 1 TABLET BY MOUTH THREE TIMES A DAY. 270 tablet 3     traZODone (DESYREL) 50 MG tablet TAKE 3 TABLETS (150 MG TOTAL) BY MOUTH AT BEDTIME 270 tablet 1     apixaban ANTICOAGULANT (ELIQUIS) 5 MG tablet Take 1 tablet (5 mg) by mouth 2 times daily for 30 days (Patient not taking: Reported on 11/16/2022) 60 tablet 0     nitroGLYcerin (NITROSTAT) 0.4 MG sublingual tablet For chest pain place 1 tablet under the tongue every 5 minutes for 3 doses. If symptoms persist 5 minutes after 1st dose call 911. (Patient not taking: Reported on 11/16/2022) 30 tablet 1    Allergies   Allergen Reactions     Cephalexin Nausea and Vomiting     Ace Inhibitors Other (See Comments)     Elevates blood pressure     Amitriptyline Hcl Other (See Comments)     elevates blood pressure     Atenolol Other (See Comments)     Aggravates reynauds     Celebrex [Celecoxib] GI Disturbance     Cephalosporins Unknown     Pt doesn't remember        Clonidine Unknown     \"got very ill in ER\"     Codeine Sulfate Nausea and Vomiting     Darvocet [Propoxyphene N-Apap] Nausea and Vomiting     Dexamethasone Acetate Swelling     " "Erythromycin Nausea and Vomiting     Escitalopram Other (See Comments)     Headaches.       Gabapentin Unknown     \"Spotted\"     Hctz Unknown     \"depletes my sodium\"     Propoxyphene      HUT Reaction: Gastrointestinal; HUT Reaction: Nausea And Vomiting; HUT Noted: [id number]     Tramadol Swelling     Swelling of tongue and face     Triamterene      Ultracet Other (See Comments)     Venlafaxine Nausea and Vomiting and Diarrhea     Zolpidem Other (See Comments) and Unknown     Pt doesn't rember       Zolpidem Tartrate Unknown     Bacitracin Itching and Rash     Sulfanilamide Rash         Lab Results    Chemistry/lipid CBC Cardiac Enzymes/BNP/TSH/INR   Lab Results   Component Value Date    CHOL 132 11/15/2022    HDL 43 (L) 11/15/2022    TRIG 106 11/15/2022    BUN 13.8 11/07/2022     11/07/2022    CO2 24 11/07/2022    Lab Results   Component Value Date    WBC 6.4 11/07/2022    HGB 12.2 11/07/2022    HCT 37.5 11/07/2022    MCV 92 11/07/2022     11/07/2022    Lab Results   Component Value Date    TROPONINI 0.03 10/12/2022    TSH 3.99 06/08/2022         57 minutes spent on the date of encounter doing chart review, review of test results, interpretation with above tests, patient visit, documentation and discussion with family.        This note has been dictated using voice recognition software. Any grammatical, typographical, or context distortions are unintentional and inherent to the software          [facility]  cc:   Bret Jin MD  [address]  [place] [address]        "

## 2022-11-16 NOTE — PROGRESS NOTES
Assessment/Recommendations   Assessment:    1.  Coronary artery disease with status post PCI/EUGENIA to OM2 10/12/2022: Patient continues to have some chest discomfort sharp/tightness intermittently.  Her nuclear stress test on 10/28/2022 showed ECG negative for ischemia but noted small area of mild ischemia in distal anterior and apical segments of the LV.  Given ongoing symptoms with abnormal stress test, she underwent repeat coronary angiogram on 11/8/2022 and had successful drug-eluting stent placement to lesion in distal LAD.    Patient reports improvement in her chest pain since recent stent placement although she still gets mild chest pain but less frequent.  She has not had to take any nitro since recent stent placement to LAD.    Her lightheadedness and fatigue has resolved.    Patient was recommended dual antiplatelet therapy with ASA 81 mg indefinitely and  Clopidogrel (Plavix) 75 mg daily for 12 months but recommended stop aspirin if started on Eliquis.  Patient has not started Eliquis yet.  She is currently on aspirin and clopidogrel.    Cardiac rehab is scheduled.    2.  Dyslipidemia with LDL goal <70/f: Suma Mark is on high intensity statin therapy with atorvastatin 80 mg daily.  Dose was increased in the hospital tolerating with no adverse effect.. Most recent LDL is 68- at goal    3.  Hypertension: Blood pressures well controlled.  On Avapro, amlodipine, and Metroprolol tartrate.  Patient is also on furosemide which she takes for lower extremity edema.  Most recent BMP stable.    4.  Paroxysmal atrial fibrillation: Heart rate today stable and regular in 50s.   Recent event monitor showed an episode of atrial fibrillation with RVR heart rate in 120s documented on November 2 to November 3.  No ventricular arrhythmia, bradycardia or significant pauses noted.      Her CHADS2 Vasc score is 6. Discussed with primary cardiology, Dr. Worley.  Patient was recommended Eliquis 5 mg twice a day for  stroke prophylaxis which she has not started yet.  We discussed briefly about atrial fibrillation and risk of stroke based on [id number] score.  Patient has been asked about alternative therapy for Eliquis.  We briefly discussed about watchman device and provided information.      Patient is scheduled to see Shayan Gr CNP in A. fib clinic on 12/6/2022    Plan:  -Stop aspirin 81 mg daily    -Start Eliquis 5 mg twice a day.  Monitor for bleeding.    -Continue clopidogrel for 12 months and then stop and restart aspirin 81 mg daily.    -I provided information on coronary artery disease and heart healthy diet    -Cardiac rehab as scheduled.    -We will continue to monitor her vitals and cardiac rehab.  If she is symptomatic with bradycardia or low blood pressure, will consider cutting back on Metroprolol tartrate.    -Continue current hypertension regimen    Follow-up in A. fib clinic as scheduled during last clinic visit, patient was experiencing some chest discomfort    Follow up with Dr. Worley  as scheduled in January 2023     History of Present Illness/Subjective    Ms. Suma Mark is a 72 year old female with a past medical history of severely elevated coronary calcium score, hypertension, dyslipidemia, migraine, recent hospitalization with unstable angina with diagnosis of coronary disease with status post PCI/EUGENIA to OM2 on 10/12/2022 who is seen at [facility] for post coronary intervention follow up.     During last clinic visit, patient was experiencing some intermittent chest discomfort rating 3-4/10.  She had an abnormal stress test.  With ongoing chest discomfort, she was recommended to have repeat coronary angiogram.  Patient underwent successful PCI/EUGENIA to distal LAD on 11/20/2022.    Today, Lo is here accompanied by her .  She reports feeling improvement in her chest discomfort since recent stent placement although she still gets mild but less frequent.   Her fatigue and lightheadedness is resolved.  She denies fatigue, lightheadedness, shortness of breath, dyspnea on exertion, orthopnea, PND, palpitations, abdominal fullness/bloating and lower extremity edema.     Coronary Angiogram on 11/8/22-reviewed:  Conclusion     Dist LAD lesion is 85% stenosed.     Successful PCI distal LAD.  No residual stenosis.  Recommend dual antiplatelet therapy for 12 months, or stop ASA if/when Eliquis is started for afib, but continue Plavix while on Eliquis.          Plan      Follow bedrest per protocol    Continued medical management and lifestyle modifications for cardiovascular risk factor optimizations.    Cardiac rehabilitation.     Recommendations  General Recommendations:  - Patient given specific instructions regarding care of arteriotomy site, activity restrictions, signs and symptoms of cardiac or vascular complications and to seek immediate medical evaluation should they occur.     Medications:  - Continue high dose statin therapy indefinitely.     Event monitor done on 10/28/2022-reviewed   conclusion  [facility]  Cardiac Electrophysiology      Cardiac Event Monitor Report (DERIC type)     Results:    Indication for study: Paroxysmal atrial fibrillation    Time monitored: 12 days..      The baseline rhythm transmission demonstrated sinus rhythm with heart rates in the mid 70s. The CO interval, QRS duration, and QT interval, all appear to be normal.    The patient had 27 manually activated rhythm recordings.  Symptoms were reported and included primarily chest pain but also lightheadedness.  The patient's rhythm demonstrated normal sinus rhythm with heart rates ranging between 56 and 75 bpm.  There was no ectopy or other pathologic rhythm disturbance demonstrated.    The patient had 8 auto triggered recordings.  Symptoms were not reported.  The patient's rhythm demonstrated normal sinus rhythm with the exception of the recording from November 2 which  demonstrated atrial fibrillation with rapid ventricular response (120s).     Impression:    Abnormal multi day patient activated monitor by virtue of the presence of atrial fibrillation (note that the automated over read called this atrial flutter which is incorrect).  The patient did not have any symptoms in association with the arrhythmia event and the overall duration/burden cannot be estimated from this type of monitor as opposed to utilizing a MCOT type monitor which does provide burden data.    The patient's symptomatic recordings did not correlate with any arrhythmia.    No sustained ventricular arrhythmia.    No profound bradycardia or significant pauses.      Coronary Angiogram done on 10/12/2022: reviewed:  Conclusion  1.  Multivessel CAD as described below.  Culprit lesion is a 90% stenosis within a large OM 2.     LM: No significant disease  LAD: Angiographically moderate proximal-mid LAD disease that is not flow-limiting (FFR 0.83).  Further down the vessel, there is a discrete 70-80% stenosis of the distal LAD as well as a discrete 60-70% stenosis of the apical LAD.  LCx: Moderate proximal LAD disease with a 90% culprit stenosis of the large OM 2  RCA: Moderate diffuse proximal-mid RCA disease     2.  Successful PCI of the OM 2 lesion extending back to the proximal LCx with placement of a single 2.5 x 38 mm Synergy EUGENIA, postdilated with a 2.75 mm NC balloon.      Plan    - Remove TR band per protocol; wrist precautions  - DAPT for a minimum of 6 months.  After this, would consider indefinite P2 Y 12 inhibitor monotherapy  - High intensity statin  - Cardiac rehab referral  - Establish with outpatient cardiologist.  Pending clinical symptoms, can consider repeat stress testing (prior Lexiscan may have been negative due to balanced ischemia), versus return to the Cath Lab for staged PCI of the LAD.        Physical Examination Review of Systems   /58 (BP Location: Right arm, Patient Position:  Sitting, Cuff Size: Adult Regular)   Pulse 56   Resp 14   Wt 82.6 kg (182 lb)   LMP  (LMP Unknown)   BMI 34.39 kg/m    Body mass index is 34.39 kg/m .  Wt Readings from Last 3 Encounters:   11/16/22 82.6 kg (182 lb)   11/08/22 83.1 kg (183 lb 4.8 oz)   10/20/22 83 kg (183 lb)     General Appearance:   no distress, normal body habitus   ENT/Mouth: membranes moist, no oral lesions or bleeding gums.      EYES:  no scleral icterus, normal conjunctivae   Neck: no carotid bruits or thyromegaly   Chest/Lungs:   lungs are clear to auscultation, no rales or wheezing, equal chest wall expansion    Cardiovascular:    Bradycardia. Normal first and second heart sounds with no murmurs, rubs, or gallops; the carotid, radial and posterior tibial pulses are intact, JVP is difficult to assess due to the patient's obesity and body habitus   , no edema bilaterally    Abdomen:  no organomegaly, masses, bruits, or tenderness; bowel sounds are present   Extremities  Puncture Site: no cyanosis or clubbing  Right radial site is soft with mild bruising.  Radial pulses and Pedal pulses intact and symmetrical.  CMS intact.   Skin: no xanthelasma, warm.    Neurologic: normal  bilateral, no tremors     Psychiatric: alert and oriented x3, calm                 Negative unless noted in HPI     Medical History  Surgical History Family History Social History   Past Medical History:   Diagnosis Date     Acquired hypothyroidism 3/12/2009     Anxiety      Anxiety state, unspecified      Back pain      Breast cyst      Carpal tunnel syndrome      Chronic pain     LOW BACK     Degenerative disc disease, lumbar      Depression      Depression, unspecified depression type      Diarrhea      Disease of thyroid gland     hypothyroid     Diverticulosis 10/4/2017    Incidental finding on colonoscopy 10/2017     DJD (degenerative joint disease)      Essential hypertension 4/21/2015     Essential hypertension, benign      Fatty liver      Fibromyalgia       Gastro-oesophageal reflux disease      Gastroesophageal reflux disease without esophagitis 4/21/2015     GERD (gastroesophageal reflux disease)      Hernia, ventral 4/27/2017     History of anesthesia complications     hypotension after surgery     History of blood clots      History of blood transfusion      History of transfusion      Hyperlipidemia      Hypertension      Insomnia, unspecified type      Left lower quadrant pain      Lumbago      Lung nodules     INTERMITTENT     Major depression      Migraine      Osteoarthritis      Osteopenia      Osteoporosis      Other abnormal glucose      Other and unspecified special symptom or syndrome, not elsewhere classified      PONV (postoperative nausea and vomiting)      Posttraumatic stress disorder      Raynaud's disease      Restless leg syndrome      S/P total knee replacement 11/27/2017     Spontaneous ecchymoses      Thrombosis of leg     with pregnancy     Unspecified episodic mood disorder     AFFECTIVE PSYCHOSIS     Unspecified hypothyroidism      Unspecified vitamin D deficiency      Varicose veins      Ventral hernia      Vitamin D deficiency     Past Surgical History:   Procedure Laterality Date     ARTHROCENTESIS      LEFT HIP TROCHANTERIC BURSA     ARTHROPLASTY KNEE UNICOMPARTMENT  10/31/2013    Procedure: ARTHROPLASTY KNEE UNICOMPARTMENT;  LEFT KNEE UNICOMPARTMENTAL ARTHROPLASTY (CAROLINE)^;  Surgeon: Chi Mittal MD;  Location:  OR     ARTICULAR DEBRIEDEMENT[      LEFT KNEE     BACK SURGERY       BREAST CYST EXCISION       BREAST SURGERY      bx     COLECTOMY N/A 6/2/2017    Procedure: RESECTION, SMALL INTESTINE, OPEN ADHESIOLYSIS;  Surgeon: Keyon Qureshi MD;  Location: [facility];  Service:      COLON SURGERY       CV CORONARY ANGIOGRAM N/A 10/12/2022    Procedure: CV CORONARY ANGIOGRAM;  Surgeon: You Martinez MD;  Location: [facility] CATH LAB CV     CV FRACTIONAL FLOW RATIO WIRE N/A 10/12/2022    Procedure: Fractional Flow  Ratio Wire;  Surgeon: You Martinez MD;  Location: [facility] CV     CV PCI STENT DRUG ELUTING N/A 10/12/2022    Procedure: Percutaneous Coronary Intervention Stent;  Surgeon: You Martinez MD;  Location: [facility] CV     CV PCI STENT DRUG ELUTING N/A 11/8/2022    Procedure: Percutaneous Coronary Intervention - Stent;  Surgeon: rBet Jin MD;  Location: [facility] CV     ENDOSCOPIC RETROGRADE CHOLANGIOPANCREATOGRAPHY       ENDOSCOPIC STRIPPING VEIN(S)      BILATERLY     GENITOURINARY SURGERY      bladder sling     GI SURGERY      hemorrhoidectomy     HEMORRHOID SURGERY       HERNIORRHAPHY INCISIONAL (LOCATION) N/A 6/2/2017    Procedure: LAPARASCOPIC CONVERTED TO OPEN PRIMARY INCISIONAL HERNIA REPAIR;  Surgeon: Keyon Qureshi MD;  Location: [facility];  Service:      HYSTERECTOMY  [id number]     HYSTERECTOMY VAGINAL       INCONTINENCE SURGERY       LAMINECTOMY LUMBAR TWO LEVELS      2003     LUMBAR FUSION       LUMBAR HARDWARE REMOVAL[       ORTHOPEDIC SURGERY      carpal tunnel     SC LAP,DIAGNOSTIC ABDOMEN N/A 8/22/2019    Procedure: DIAGNOSTIC LAPAROSCOPY, LYSIS OF ADHESION;  Surgeon: Svetlana Ron MD;  Location: [facility];  Service: General     RELEASE CARPAL TUNNEL       STRIP VEIN       TRANSARTHISCOPIC SURGERY[      LEFT KNEE     US THYROID BIOPSY  4/19/2019     VEIN LIGATION AND STRIPPING Bilateral      ZZC TOTAL KNEE ARTHROPLASTY Right 11/27/2017    Procedure:  RIGHT TOTAL KNEE ARTHROPLASTY;  Surgeon: Kevin Ryder MD;  Location: [facility];  Service: Orthopedics    Family History   Problem Relation Age of Onset     Dementia Mother      Arthritis Mother      Chronic Obstructive Pulmonary Disease Father      Cardiovascular Father      Hypertension Father      No Known Problems Sister      No Known Problems Daughter      No Known Problems Son      Cerebrovascular Disease Maternal Grandmother      Heart Disease Paternal Grandmother       Cerebrovascular Disease Paternal Grandmother      No Known Problems Sister      No Known Problems Sister      No Known Problems Son      No Known Problems Daughter      Breast Cancer Paternal Aunt         age in 50's    Social History     Socioeconomic History     Marital status:      Spouse name: Not on file     Number of children: Not on file     Years of education: Not on file     Highest education level: Not on file   Occupational History     Not on file   Tobacco Use     Smoking status: Never     Smokeless tobacco: Never   Substance and Sexual Activity     Alcohol use: Yes     Comment: Alcoholic Drinks/day: 1 cocktail daily     Drug use: No     Sexual activity: Not on file   Other Topics Concern     Not on file   Social History Narrative     Not on file     Social Determinants of Health     Financial Resource Strain: Not on file   Food Insecurity: Not on file   Transportation Needs: Not on file   Physical Activity: Not on file   Stress: Not on file   Social Connections: Not on file   Intimate Partner Violence: Not on file   Housing Stability: Not on file          Medications  Allergies   Current Outpatient Medications   Medication Sig Dispense Refill     amLODIPine (NORVASC) 5 MG tablet TAKE 1 TABLET BY MOUTH EVERYDAY AT BEDTIME 90 tablet 3     atorvastatin (LIPITOR) 80 MG tablet Take 1 tablet (80 mg) by mouth At Bedtime 90 tablet 3     botulinum toxin type A (BOTOX) 100 units injection Every 3 months. [facility]       busPIRone (BUSPAR) 10 MG tablet Take 1 tablet (10 mg) by mouth 2 times daily 180 tablet 3     cholecalciferol 50 MCG (2000 UT) CAPS Take 2,000 Units by mouth daily       citalopram (CELEXA) 20 MG tablet Take 2 tablets (40 mg) by mouth daily 135 tablet 3     clopidogrel (PLAVIX) 75 MG tablet Take 1 tablet (75 mg) by mouth daily 90 tablet 3     eletriptan (RELPAX) 40 MG tablet Take 1 tablet (40 mg) by mouth at onset of headache for migraine 10 tablet 0     furosemide (LASIX) 20 MG tablet  "Take 2 tablets (40 mg) by mouth daily 180 tablet 3     HYDROcodone-acetaminophen (NORCO) 5-325 MG tablet Take 1 tablet by mouth daily as needed (Severe headache) 5 tablet 0     irbesartan (AVAPRO) 300 MG tablet Take 1 tablet (300 mg) by mouth daily 90 tablet 3     levothyroxine (SYNTHROID/LEVOTHROID) 88 MCG tablet TAKE 1 TABLET BY MOUTH DAILY AT 6:00 AM. 90 tablet 3     LORazepam (ATIVAN) 0.5 MG tablet Take 1 tablet (0.5 mg) by mouth every 6 hours as needed for anxiety 30 tablet 0     metoprolol tartrate (LOPRESSOR) 50 MG tablet Take 1 tablet (50 mg) by mouth 2 times daily 180 tablet 3     pramipexole (MIRAPEX) 0.25 MG tablet TAKE 1 TABLET BY MOUTH THREE TIMES A DAY. 270 tablet 3     traZODone (DESYREL) 50 MG tablet TAKE 3 TABLETS (150 MG TOTAL) BY MOUTH AT BEDTIME 270 tablet 1     apixaban ANTICOAGULANT (ELIQUIS) 5 MG tablet Take 1 tablet (5 mg) by mouth 2 times daily for 30 days (Patient not taking: Reported on 11/16/2022) 60 tablet 0     nitroGLYcerin (NITROSTAT) 0.4 MG sublingual tablet For chest pain place 1 tablet under the tongue every 5 minutes for 3 doses. If symptoms persist 5 minutes after 1st dose call 911. (Patient not taking: Reported on 11/16/2022) 30 tablet 1    Allergies   Allergen Reactions     Cephalexin Nausea and Vomiting     Ace Inhibitors Other (See Comments)     Elevates blood pressure     Amitriptyline Hcl Other (See Comments)     elevates blood pressure     Atenolol Other (See Comments)     Aggravates reynauds     Celebrex [Celecoxib] GI Disturbance     Cephalosporins Unknown     Pt doesn't remember        Clonidine Unknown     \"got very ill in ER\"     Codeine Sulfate Nausea and Vomiting     Darvocet [Propoxyphene N-Apap] Nausea and Vomiting     Dexamethasone Acetate Swelling     Erythromycin Nausea and Vomiting     Escitalopram Other (See Comments)     Headaches.       Gabapentin Unknown     \"Spotted\"     Hctz Unknown     \"depletes my sodium\"     Propoxyphene      HUT Reaction: " Gastrointestinal; HUT Reaction: Nausea And Vomiting; HUT Noted: [id number]     Tramadol Swelling     Swelling of tongue and face     Triamterene      Ultracet Other (See Comments)     Venlafaxine Nausea and Vomiting and Diarrhea     Zolpidem Other (See Comments) and Unknown     Pt doesn't rember       Zolpidem Tartrate Unknown     Bacitracin Itching and Rash     Sulfanilamide Rash         Lab Results    Chemistry/lipid CBC Cardiac Enzymes/BNP/TSH/INR   Lab Results   Component Value Date    CHOL 132 11/15/2022    HDL 43 (L) 11/15/2022    TRIG 106 11/15/2022    BUN 13.8 11/07/2022     11/07/2022    CO2 24 11/07/2022    Lab Results   Component Value Date    WBC 6.4 11/07/2022    HGB 12.2 11/07/2022    HCT 37.5 11/07/2022    MCV 92 11/07/2022     11/07/2022    Lab Results   Component Value Date    TROPONINI 0.03 10/12/2022    TSH 3.99 06/08/2022         57 minutes spent on the date of encounter doing chart review, review of test results, interpretation with above tests, patient visit, documentation and discussion with family.        This note has been dictated using voice recognition software. Any grammatical, typographical, or context distortions are unintentional and inherent to the software

## 2022-11-17 RX ORDER — TRAZODONE HYDROCHLORIDE 50 MG/1
TABLET, FILM COATED ORAL
Qty: 270 TABLET | Refills: 1 | Status: SHIPPED | OUTPATIENT
Start: 2022-11-17 | End: 2023-05-15

## 2022-11-17 NOTE — TELEPHONE ENCOUNTER
"Drug interaction warning    Last Written Prescription Date:  4/13/22  Last Fill Quantity: 270,  # refills: 1   Last office visit provider:  10/20/22     Requested Prescriptions   Pending Prescriptions Disp Refills     traZODone (DESYREL) 50 MG tablet [Pharmacy Med Name: TRAZODONE 50 MG TABLET] 270 tablet 1     Sig: TAKE 3 TABLETS BY MOUTH AT BEDTIME.       Serotonin Modulators Passed - 11/16/2022 12:22 AM        Passed - Recent (12 mo) or future (30 days) visit within the authorizing provider's specialty     Patient has had an office visit with the authorizing provider or a provider within the authorizing providers department within the previous 12 mos or has a future within next 30 days. See \"Patient Info\" tab in inbasket, or \"Choose Columns\" in Meds & Orders section of the refill encounter.              Passed - Medication is active on med list        Passed - Patient is age 18 or older        Passed - No active pregnancy on record        Passed - No positive pregnancy test in past 12 months             Suma Jaramillo RN 11/16/22 6:20 PM  "

## 2022-11-25 ENCOUNTER — HOSPITAL ENCOUNTER (OUTPATIENT)
Dept: CARDIAC REHAB | Facility: CLINIC | Age: 72
Discharge: HOME | End: 2022-11-25
Attending: INTERNAL MEDICINE
Payer: MEDICARE

## 2022-11-25 DIAGNOSIS — Z95.5 S/P PRIMARY ANGIOPLASTY WITH CORONARY STENT: ICD-10-CM

## 2022-11-25 DIAGNOSIS — I25.10 CORONARY ARTERY DISEASE INVOLVING NATIVE CORONARY ARTERY OF NATIVE HEART WITHOUT ANGINA PECTORIS: ICD-10-CM

## 2022-11-25 PROCEDURE — 93797 PHYS/QHP OP CAR RHAB WO ECG: CPT | Mod: XU

## 2022-11-25 PROCEDURE — 93798 PHYS/QHP OP CAR RHAB W/ECG: CPT

## 2022-11-27 ENCOUNTER — HOSPITAL ENCOUNTER (EMERGENCY)
Facility: CLINIC | Age: 72
Discharge: HOME | End: 2022-11-28
Attending: EMERGENCY MEDICINE | Admitting: EMERGENCY MEDICINE
Payer: MEDICARE

## 2022-11-27 DIAGNOSIS — R07.9 ACUTE CHEST PAIN: ICD-10-CM

## 2022-11-27 DIAGNOSIS — I48.91 ATRIAL FIBRILLATION WITH RVR (H): ICD-10-CM

## 2022-11-27 LAB
ALBUMIN SERPL-MCNC: 4.2 G/DL (ref 3.5–5)
ALP SERPL-CCNC: 77 U/L (ref 45–120)
ALT SERPL W P-5'-P-CCNC: 21 U/L (ref 0–45)
ANION GAP SERPL CALCULATED.3IONS-SCNC: 13 MMOL/L (ref 5–18)
APTT PPP: 27 SECONDS (ref 22–38)
AST SERPL W P-5'-P-CCNC: 18 U/L (ref 0–40)
BASOPHILS # BLD AUTO: 0.1 10E3/UL (ref 0–0.2)
BASOPHILS NFR BLD AUTO: 1 %
BILIRUB SERPL-MCNC: 0.5 MG/DL (ref 0–1)
BNP SERPL-MCNC: 102 PG/ML (ref 0–133)
BUN SERPL-MCNC: 20 MG/DL (ref 8–28)
CALCIUM SERPL-MCNC: 10.1 MG/DL (ref 8.5–10.5)
CHLORIDE BLD-SCNC: 102 MMOL/L (ref 98–107)
CO2 SERPL-SCNC: 26 MMOL/L (ref 22–31)
CREAT SERPL-MCNC: 1.08 MG/DL (ref 0.6–1.1)
EOSINOPHIL # BLD AUTO: 0.2 10E3/UL (ref 0–0.7)
EOSINOPHIL NFR BLD AUTO: 3 %
ERYTHROCYTE [DISTWIDTH] IN BLOOD BY AUTOMATED COUNT: 12.8 % (ref 10–15)
GFR SERPL CREATININE-BSD FRML MDRD: 54 ML/MIN/1.73M2
GLUCOSE BLD-MCNC: 113 MG/DL (ref 70–125)
HCT VFR BLD AUTO: 37.1 % (ref 35–47)
HGB BLD-MCNC: 12.1 G/DL (ref 11.7–15.7)
IMM GRANULOCYTES # BLD: 0 10E3/UL
IMM GRANULOCYTES NFR BLD: 0 %
INR PPP: 1.07 (ref 0.85–1.15)
LYMPHOCYTES # BLD AUTO: 2.2 10E3/UL (ref 0.8–5.3)
LYMPHOCYTES NFR BLD AUTO: 34 %
MAGNESIUM SERPL-MCNC: 1.9 MG/DL (ref 1.8–2.6)
MCH RBC QN AUTO: 29.3 PG (ref 26.5–33)
MCHC RBC AUTO-ENTMCNC: 32.6 G/DL (ref 31.5–36.5)
MCV RBC AUTO: 90 FL (ref 78–100)
MONOCYTES # BLD AUTO: 0.6 10E3/UL (ref 0–1.3)
MONOCYTES NFR BLD AUTO: 9 %
NEUTROPHILS # BLD AUTO: 3.4 10E3/UL (ref 1.6–8.3)
NEUTROPHILS NFR BLD AUTO: 53 %
NRBC # BLD AUTO: 0 10E3/UL
NRBC BLD AUTO-RTO: 0 /100
PLATELET # BLD AUTO: 298 10E3/UL (ref 150–450)
POTASSIUM BLD-SCNC: 3.2 MMOL/L (ref 3.5–5)
PROT SERPL-MCNC: 7.4 G/DL (ref 6–8)
RBC # BLD AUTO: 4.13 10E6/UL (ref 3.8–5.2)
SODIUM SERPL-SCNC: 141 MMOL/L (ref 136–145)
WBC # BLD AUTO: 6.5 10E3/UL (ref 4–11)

## 2022-11-27 PROCEDURE — 96374 THER/PROPH/DIAG INJ IV PUSH: CPT

## 2022-11-27 PROCEDURE — 80053 COMPREHEN METABOLIC PANEL: CPT | Performed by: STUDENT IN AN ORGANIZED HEALTH CARE EDUCATION/TRAINING PROGRAM

## 2022-11-27 PROCEDURE — 84484 ASSAY OF TROPONIN QUANT: CPT | Performed by: STUDENT IN AN ORGANIZED HEALTH CARE EDUCATION/TRAINING PROGRAM

## 2022-11-27 PROCEDURE — 93005 ELECTROCARDIOGRAM TRACING: CPT | Performed by: STUDENT IN AN ORGANIZED HEALTH CARE EDUCATION/TRAINING PROGRAM

## 2022-11-27 PROCEDURE — 85610 PROTHROMBIN TIME: CPT | Performed by: STUDENT IN AN ORGANIZED HEALTH CARE EDUCATION/TRAINING PROGRAM

## 2022-11-27 PROCEDURE — 36415 COLL VENOUS BLD VENIPUNCTURE: CPT | Performed by: STUDENT IN AN ORGANIZED HEALTH CARE EDUCATION/TRAINING PROGRAM

## 2022-11-27 PROCEDURE — 83735 ASSAY OF MAGNESIUM: CPT | Performed by: STUDENT IN AN ORGANIZED HEALTH CARE EDUCATION/TRAINING PROGRAM

## 2022-11-27 PROCEDURE — 99285 EMERGENCY DEPT VISIT HI MDM: CPT | Mod: 25

## 2022-11-27 PROCEDURE — 83880 ASSAY OF NATRIURETIC PEPTIDE: CPT | Performed by: STUDENT IN AN ORGANIZED HEALTH CARE EDUCATION/TRAINING PROGRAM

## 2022-11-27 PROCEDURE — 85025 COMPLETE CBC W/AUTO DIFF WBC: CPT | Performed by: STUDENT IN AN ORGANIZED HEALTH CARE EDUCATION/TRAINING PROGRAM

## 2022-11-27 PROCEDURE — 250N000011 HC RX IP 250 OP 636: Performed by: EMERGENCY MEDICINE

## 2022-11-27 PROCEDURE — 85730 THROMBOPLASTIN TIME PARTIAL: CPT | Performed by: STUDENT IN AN ORGANIZED HEALTH CARE EDUCATION/TRAINING PROGRAM

## 2022-11-27 PROCEDURE — 82040 ASSAY OF SERUM ALBUMIN: CPT | Performed by: STUDENT IN AN ORGANIZED HEALTH CARE EDUCATION/TRAINING PROGRAM

## 2022-11-27 PROCEDURE — 93005 ELECTROCARDIOGRAM TRACING: CPT | Performed by: EMERGENCY MEDICINE

## 2022-11-27 PROCEDURE — 96375 TX/PRO/DX INJ NEW DRUG ADDON: CPT

## 2022-11-27 RX ORDER — FENTANYL CITRATE 50 UG/ML
50 INJECTION, SOLUTION INTRAMUSCULAR; INTRAVENOUS ONCE
Status: COMPLETED | OUTPATIENT
Start: 2022-11-28 | End: 2022-11-27

## 2022-11-27 RX ORDER — DIMENHYDRINATE 50 MG/ML
25 INJECTION, SOLUTION INTRAMUSCULAR; INTRAVENOUS ONCE
Status: COMPLETED | OUTPATIENT
Start: 2022-11-28 | End: 2022-11-27

## 2022-11-27 RX ORDER — DILTIAZEM HYDROCHLORIDE 5 MG/ML
20 INJECTION INTRAVENOUS ONCE
Status: COMPLETED | OUTPATIENT
Start: 2022-11-27 | End: 2022-11-27

## 2022-11-27 RX ADMIN — DILTIAZEM HYDROCHLORIDE 20 MG: 5 INJECTION INTRAVENOUS at 23:24

## 2022-11-27 RX ADMIN — FENTANYL CITRATE 50 MCG: 50 INJECTION, SOLUTION INTRAMUSCULAR; INTRAVENOUS at 23:42

## 2022-11-27 RX ADMIN — DIMENHYDRINATE 25 MG: 50 INJECTION, SOLUTION INTRAMUSCULAR; INTRAVENOUS at 23:41

## 2022-11-28 ENCOUNTER — APPOINTMENT (OUTPATIENT)
Dept: RADIOLOGY | Facility: CLINIC | Age: 72
End: 2022-11-28
Attending: EMERGENCY MEDICINE
Payer: MEDICARE

## 2022-11-28 VITALS
BODY MASS INDEX: 34.55 KG/M2 | RESPIRATION RATE: 20 BRPM | OXYGEN SATURATION: 98 % | SYSTOLIC BLOOD PRESSURE: 145 MMHG | WEIGHT: 183 LBS | DIASTOLIC BLOOD PRESSURE: 70 MMHG | HEIGHT: 61 IN | HEART RATE: 62 BPM | TEMPERATURE: 98.2 F

## 2022-11-28 LAB
ATRIAL RATE - MUSE: 120 BPM
DIASTOLIC BLOOD PRESSURE - MUSE: NORMAL MMHG
INTERPRETATION ECG - MUSE: NORMAL
P AXIS - MUSE: NORMAL DEGREES
PR INTERVAL - MUSE: NORMAL MS
QRS DURATION - MUSE: 92 MS
QT - MUSE: 318 MS
QTC - MUSE: 514 MS
R AXIS - MUSE: 21 DEGREES
SYSTOLIC BLOOD PRESSURE - MUSE: NORMAL MMHG
T AXIS - MUSE: -52 DEGREES
TROPONIN I SERPL-MCNC: 0.01 NG/ML (ref 0–0.29)
TROPONIN I SERPL-MCNC: 0.02 NG/ML (ref 0–0.29)
VENTRICULAR RATE- MUSE: 157 BPM

## 2022-11-28 PROCEDURE — 36415 COLL VENOUS BLD VENIPUNCTURE: CPT | Performed by: EMERGENCY MEDICINE

## 2022-11-28 PROCEDURE — 258N000003 HC RX IP 258 OP 636: Performed by: EMERGENCY MEDICINE

## 2022-11-28 PROCEDURE — 71045 X-RAY EXAM CHEST 1 VIEW: CPT

## 2022-11-28 PROCEDURE — 84484 ASSAY OF TROPONIN QUANT: CPT | Performed by: EMERGENCY MEDICINE

## 2022-11-28 RX ADMIN — SODIUM CHLORIDE 500 ML: 9 INJECTION, SOLUTION INTRAVENOUS at 00:05

## 2022-11-28 ASSESSMENT — ACTIVITIES OF DAILY LIVING (ADL)
ADLS_ACUITY_SCORE: 35

## 2022-11-28 NOTE — ED NOTES
Report received from Jaelyn Torres RN.  Resting comfortably.  Sinus bradycardia on monitor.  at bedside.

## 2022-11-28 NOTE — DISCHARGE INSTRUCTIONS
Call your cardiologist this morning and advise them of your rapid heart rate episode that required a visit to the emergency department and medications to slow your rate  Continue your medications as previously prescribed  If you develop heart racing again, you can take an extra dose of your metoprolol.  You should return to the emergency department if you are symptomatic with your fast heart rate like chest pain, shortness of breath or vomiting.

## 2022-11-28 NOTE — ED PROVIDER NOTES
EMERGENCY DEPARTMENT ENCOUnter      NAME: Suma Mark  AGE: 72 year old female  YOB: 1950  MRN: [id number]  EVALUATION DATE & TIME: No admission date for patient encounter.    PCP: Bernadette Taylor    ED PROVIDER: Apurva Núñez MD      Chief Complaint   Patient presents with     Chest Pain         FINAL IMPRESSION:  1. Atrial fibrillation with RVR (H)    2. Acute chest pain          ED COURSE & MEDICAL DECISION MAKING:      In summary, the patient is a 72-year-old female that presents to the emergency department for evaluation of palpitations and chest pain thought secondary to atrial fib with RVR.  Diltiazem administration caused rate control and conversion to a normal sinus rhythm.  The patient did have evidence of ST depression with her tachyarrhythmia which resolved with rate control.  Troponins were negative x2.  We will discharge to home with close outpatient follow-up with primary care and cardiology    11:28 PM I met with the patient to obtain patient history and performed a physical exam. Discussed plan for ED work up including potential diagnostic studies and interventions.  Diltiazem 20 mg IV was administered.  11:53 PM Nursing staff updated me.  Since rate was controlled but her blood pressure was now 90 systolic.  Normal saline 500 mL bolus was administered  12:14 AM Rechecked and updated patient.  Blood pressure was improved to 1 teens systolic.  Fentanyl 50 mcg IV was administered for chest pain.  12:17 AM Rechecked and updated patient. Patient's pressure dropped in 60's due to fentanyl and fluid bolus is in progress.  0030-blood pressure improved into the low 100s systolic  2:46 AM Rechecked and updated patient. Patient states she currently has no chest pain.  0345-denies chest pain.  She is comfortable.  We are awaiting second troponin.  0445-discussed second troponin with patient and plan for discharge.  Patient is comfortable with this plan.    At the conclusion of the  "encounter I discussed the results of all of the tests and the disposition. The questions were answered. The patient or family acknowledged understanding and was agreeable with the care plan.         MEDICATIONS GIVEN IN THE EMERGENCY:  Medications   diltiazem (CARDIZEM) injection 20 mg (20 mg Intravenous Given 11/27/22 2324)   fentaNYL (PF) (SUBLIMAZE) injection 50 mcg (50 mcg Intravenous Given 11/27/22 2342)   dimenhyDRINATE (DRAMAMINE) injection 25 mg (25 mg Intravenous Given 11/27/22 2341)   0.9% sodium chloride BOLUS (0 mLs Intravenous Stopped 11/28/22 0015)       NEW PRESCRIPTIONS STARTED AT TODAY'S ER VISIT  New Prescriptions    No medications on file          =================================================================    HPI    Suma Mark is a 72 year old female with a pertinent history of Afib, CLEMENT, hyperlipidemia, essential hypertension, blood clots, CHF, Pulmonary embolisms, S/P primary angioplasty with coronary stent, who presents to this ED via self walk-in for evaluation of chest pain.    Patient reports at 10:22 PM today when she was about to go to bed, she felt her \"heart racing.\" She states she usually has sporadic heart rates and was seen twice last month for the same issue. She hasn't been able to lower her heart race since. She endorses chest pain as well. She describes the chest pain as \"tightness,\" nothing better or worse with it, and 9/10 pain. She also endorses light headedness, neck pain, and \"feeling clammy.\" She takes thinners, eliquis and plavix which she started a week ago. She denies nausea and vomiting. She had 1 drink this evening and the last time she ate or drank was 6-6:30 PM today. There were no other concerns/complaints at this time.    Phx: She has history of migraines and chronic lower back pain. She is allergic to multiple medications. She had 2 stents placed within the past month and has an upcoming appointment on 12/6 for follow up with her Afib.  SHx: She is a " non smoker and an occasional alcohol user.       REVIEW OF SYSTEMS     Constitutional:  Denies fever or chills  HENT:  Denies sore throat   Respiratory:  Denies cough or shortness of breath   Cardiovascular:  Endorses chest pain or palpitations  GI:  Denies abdominal pain, nausea, or vomiting  Musculoskeletal: Endorses neck pain. Denies any new extremity pain   Skin:  Denies rash   Neurologic: Endorses light headedness. Denies headache, focal weakness or sensory changes    All other systems reviewed and are negative      PAST MEDICAL HISTORY:  Past Medical History:   Diagnosis Date     Acquired hypothyroidism 3/12/2009     Anxiety      Anxiety state, unspecified      Back pain      Breast cyst      Carpal tunnel syndrome      Chronic pain     LOW BACK     Degenerative disc disease, lumbar      Depression      Depression, unspecified depression type      Diarrhea      Disease of thyroid gland     hypothyroid     Diverticulosis 10/4/2017    Incidental finding on colonoscopy 10/2017     DJD (degenerative joint disease)      Essential hypertension 4/21/2015     Essential hypertension, benign      Fatty liver      Fibromyalgia      Gastro-oesophageal reflux disease      Gastroesophageal reflux disease without esophagitis 4/21/2015     GERD (gastroesophageal reflux disease)      Hernia, ventral 4/27/2017     History of anesthesia complications     hypotension after surgery     History of blood clots      History of blood transfusion      History of transfusion      Hyperlipidemia      Hypertension      Insomnia, unspecified type      Left lower quadrant pain      Lumbago      Lung nodules     INTERMITTENT     Major depression      Migraine      Osteoarthritis      Osteopenia      Osteoporosis      Other abnormal glucose      Other and unspecified special symptom or syndrome, not elsewhere classified      PONV (postoperative nausea and vomiting)      Posttraumatic stress disorder      Raynaud's disease      Restless leg  syndrome      S/P total knee replacement 11/27/2017     Spontaneous ecchymoses      Thrombosis of leg     with pregnancy     Unspecified episodic mood disorder     AFFECTIVE PSYCHOSIS     Unspecified hypothyroidism      Unspecified vitamin D deficiency      Varicose veins      Ventral hernia      Vitamin D deficiency        PAST SURGICAL HISTORY:  Past Surgical History:   Procedure Laterality Date     ARTHROCENTESIS      LEFT HIP TROCHANTERIC BURSA     ARTHROPLASTY KNEE UNICOMPARTMENT  10/31/2013    Procedure: ARTHROPLASTY KNEE UNICOMPARTMENT;  LEFT KNEE UNICOMPARTMENTAL ARTHROPLASTY (CAROLINE)^;  Surgeon: Chi Mittal MD;  Location:  OR     ARTICULAR DEBRIEDEMENT[      LEFT KNEE     BACK SURGERY       BREAST CYST EXCISION       BREAST SURGERY      bx     COLECTOMY N/A 6/2/2017    Procedure: RESECTION, SMALL INTESTINE, OPEN ADHESIOLYSIS;  Surgeon: Keyon Qureshi MD;  Location: [facility];  Service:      COLON SURGERY       CV CORONARY ANGIOGRAM N/A 10/12/2022    Procedure: CV CORONARY ANGIOGRAM;  Surgeon: You Martinez MD;  Location: [facility] CV     CV FRACTIONAL FLOW RATIO WIRE N/A 10/12/2022    Procedure: Fractional Flow Ratio Wire;  Surgeon: You Martinez MD;  Location: [facility] CV     CV PCI STENT DRUG ELUTING N/A 10/12/2022    Procedure: Percutaneous Coronary Intervention Stent;  Surgeon: You Martinez MD;  Location: [facility] CV     CV PCI STENT DRUG ELUTING N/A 11/8/2022    Procedure: Percutaneous Coronary Intervention - Stent;  Surgeon: Bret Jin MD;  Location: [facility] CV     ENDOSCOPIC RETROGRADE CHOLANGIOPANCREATOGRAPHY       ENDOSCOPIC STRIPPING VEIN(S)      BILATERLY     GENITOURINARY SURGERY      bladder sling     GI SURGERY      hemorrhoidectomy     HEMORRHOID SURGERY       HERNIORRHAPHY INCISIONAL (LOCATION) N/A 6/2/2017    Procedure: LAPARASCOPIC CONVERTED TO OPEN PRIMARY INCISIONAL HERNIA REPAIR;  Surgeon: Keyon Qureshi MD;   "Location: [facility] Main OR;  Service:      HYSTERECTOMY  [id number]     HYSTERECTOMY VAGINAL       INCONTINENCE SURGERY       LAMINECTOMY LUMBAR TWO LEVELS      2003     LUMBAR FUSION       LUMBAR HARDWARE REMOVAL[       ORTHOPEDIC SURGERY      carpal tunnel     [place] LAP,DIAGNOSTIC ABDOMEN N/A 8/22/2019    Procedure: DIAGNOSTIC LAPAROSCOPY, LYSIS OF ADHESION;  Surgeon: Svetlana Ron MD;  Location: [facility] Main OR;  Service: General     RELEASE CARPAL TUNNEL       STRIP VEIN       TRANSARTHISCOPIC SURGERY[      LEFT KNEE     US THYROID BIOPSY  4/19/2019     VEIN LIGATION AND STRIPPING Bilateral      ZZC TOTAL KNEE ARTHROPLASTY Right 11/27/2017    Procedure:  RIGHT TOTAL KNEE ARTHROPLASTY;  Surgeon: Kevin Ryder MD;  Location: [facility] OR;  Service: Orthopedics           CURRENT MEDICATIONS:    amLODIPine (NORVASC) 5 MG tablet  apixaban ANTICOAGULANT (ELIQUIS) 5 MG tablet  atorvastatin (LIPITOR) 80 MG tablet  botulinum toxin type A (BOTOX) 100 units injection  busPIRone (BUSPAR) 10 MG tablet  cholecalciferol 50 MCG (2000 UT) CAPS  citalopram (CELEXA) 20 MG tablet  clopidogrel (PLAVIX) 75 MG tablet  eletriptan (RELPAX) 40 MG tablet  furosemide (LASIX) 20 MG tablet  HYDROcodone-acetaminophen (NORCO) 5-325 MG tablet  irbesartan (AVAPRO) 300 MG tablet  levothyroxine (SYNTHROID/LEVOTHROID) 88 MCG tablet  LORazepam (ATIVAN) 0.5 MG tablet  metoprolol tartrate (LOPRESSOR) 50 MG tablet  nitroGLYcerin (NITROSTAT) 0.4 MG sublingual tablet  pramipexole (MIRAPEX) 0.25 MG tablet  traZODone (DESYREL) 50 MG tablet        ALLERGIES:  Allergies   Allergen Reactions     Cephalexin Nausea and Vomiting     Ace Inhibitors Other (See Comments)     Elevates blood pressure     Amitriptyline Hcl Other (See Comments)     elevates blood pressure     Atenolol Other (See Comments)     Aggravates reynauds     Celebrex [Celecoxib] GI Disturbance     Cephalosporins Unknown     Pt doesn't remember        Clonidine Unknown     \"got very " "ill in ER\"     Codeine Sulfate Nausea and Vomiting     Darvocet [Propoxyphene N-Apap] Nausea and Vomiting     Dexamethasone Acetate Swelling     Erythromycin Nausea and Vomiting     Escitalopram Other (See Comments)     Headaches.       Gabapentin Unknown     \"Spotted\"     Hctz Unknown     \"depletes my sodium\"     Propoxyphene      HUT Reaction: Gastrointestinal; HUT Reaction: Nausea And Vomiting; HUT Noted: [id number]     Tramadol Swelling     Swelling of tongue and face     Triamterene      Ultracet Other (See Comments)     Venlafaxine Nausea and Vomiting and Diarrhea     Zolpidem Other (See Comments) and Unknown     Pt doesn't rember       Zolpidem Tartrate Unknown     Bacitracin Itching and Rash     Sulfanilamide Rash       FAMILY HISTORY:  Family History   Problem Relation Age of Onset     Dementia Mother      Arthritis Mother      Chronic Obstructive Pulmonary Disease Father      Cardiovascular Father      Hypertension Father      No Known Problems Sister      No Known Problems Daughter      No Known Problems Son      Cerebrovascular Disease Maternal Grandmother      Heart Disease Paternal Grandmother      Cerebrovascular Disease Paternal Grandmother      No Known Problems Sister      No Known Problems Sister      No Known Problems Son      No Known Problems Daughter      Breast Cancer Paternal Aunt         age in 50's       SOCIAL HISTORY:   Social History     Socioeconomic History     Marital status:      Spouse name: None     Number of children: None     Years of education: None     Highest education level: None   Tobacco Use     Smoking status: Never     Smokeless tobacco: Never   Substance and Sexual Activity     Alcohol use: Yes     Comment: Alcoholic Drinks/day: 1 cocktail daily     Drug use: No       VITALS:  Patient Vitals for the past 24 hrs:   BP Temp Temp src Pulse Resp SpO2 Height Weight   11/28/22 0328 128/73 -- -- 63 18 98 % -- --   11/28/22 0049 104/64 -- -- 52 20 96 % -- --   11/28/22 " "0015 100/62 -- -- 56 17 95 % -- --   11/28/22 0008 (!) 83/54 -- -- 54 -- 95 % -- --   11/28/22 0001 (!) 65/47 -- -- -- -- 94 % -- --   11/28/22 0000 (!) 68/47 -- -- 56 15 -- -- --   11/27/22 2345 95/55 -- -- 59 14 -- -- --   11/27/22 2311 138/75 97  F (36.1  C) Temporal 94 18 97 % 1.549 m (5' 1\") 83 kg (183 lb)       PHYSICAL EXAM    Constitutional:  Well developed, Well nourished,  HENT:  Normocephalic, Atraumatic, Bilateral external ears normal, Oropharynx moist, Nose normal.   Neck:  Normal range of motion, No meningismus, No stridor.   Eyes:  EOMI, Conjunctiva normal, No discharge.   Respiratory:  Normal breath sounds, No respiratory distress, No wheezing, No chest tenderness.   Cardiovascular: Tachycardia, irregularly irregular rhythm, No murmurs  GI:  Soft, No tenderness, No guarding,   Musculoskeletal:  Neurovascularly intact distally, No edema, No tenderness, No cyanosis, Good range of motion in all major joints.   Integument:  Warm, Dry, No erythema, No rash.   Lymphatic:  No lymphadenopathy noted.   Neurologic:  Alert & oriented , Normal motor function,  No focal deficits noted.   Psychiatric:  Affect normal, Judgment normal, Mood normal.      LAB:  All pertinent labs reviewed and interpreted.  Results for orders placed or performed during the hospital encounter of 11/27/22   XR Chest Port 1 View    Impression    IMPRESSION: Minor strand of linear atelectasis left base. Lungs are otherwise clear. No adenopathy or effusion. Mild cardiac enlargement. Coronary artery stent. Normal pulmonary vascularity. Atherosclerotic thoracic aorta. Mild degenerative changes both   shoulders and the spine. Mild thoracolumbar curve. Monitoring leads overlying the chest.   Comprehensive metabolic panel   Result Value Ref Range    Sodium 141 136 - 145 mmol/L    Potassium 3.2 (L) 3.5 - 5.0 mmol/L    Chloride 102 98 - 107 mmol/L    Carbon Dioxide (CO2) 26 22 - 31 mmol/L    Anion Gap 13 5 - 18 mmol/L    Urea Nitrogen 20 8 - 28 " mg/dL    Creatinine 1.08 0.60 - 1.10 mg/dL    Calcium 10.1 8.5 - 10.5 mg/dL    Glucose 113 70 - 125 mg/dL    Alkaline Phosphatase 77 45 - 120 U/L    AST 18 0 - 40 U/L    ALT 21 0 - 45 U/L    Protein Total 7.4 6.0 - 8.0 g/dL    Albumin 4.2 3.5 - 5.0 g/dL    Bilirubin Total 0.5 0.0 - 1.0 mg/dL    GFR Estimate 54 (L) >60 mL/min/1.73m2   Result Value Ref Range    Troponin I 0.01 0.00 - 0.29 ng/mL   Result Value Ref Range    Magnesium 1.9 1.8 - 2.6 mg/dL   Result Value Ref Range    INR 1.07 0.85 - 1.15   Partial thromboplastin time   Result Value Ref Range    aPTT 27 22 - 38 Seconds   B-Type Natriuretic Peptide ([facility] Only)   Result Value Ref Range     0 - 133 pg/mL   CBC with platelets and differential   Result Value Ref Range    WBC Count 6.5 4.0 - 11.0 10e3/uL    RBC Count 4.13 3.80 - 5.20 10e6/uL    Hemoglobin 12.1 11.7 - 15.7 g/dL    Hematocrit 37.1 35.0 - 47.0 %    MCV 90 78 - 100 fL    MCH 29.3 26.5 - 33.0 pg    MCHC 32.6 31.5 - 36.5 g/dL    RDW 12.8 10.0 - 15.0 %    Platelet Count 298 150 - 450 10e3/uL    % Neutrophils 53 %    % Lymphocytes 34 %    % Monocytes 9 %    % Eosinophils 3 %    % Basophils 1 %    % Immature Granulocytes 0 %    NRBCs per 100 WBC 0 <1 /100    Absolute Neutrophils 3.4 1.6 - 8.3 10e3/uL    Absolute Lymphocytes 2.2 0.8 - 5.3 10e3/uL    Absolute Monocytes 0.6 0.0 - 1.3 10e3/uL    Absolute Eosinophils 0.2 0.0 - 0.7 10e3/uL    Absolute Basophils 0.1 0.0 - 0.2 10e3/uL    Absolute Immature Granulocytes 0.0 <=0.4 10e3/uL    Absolute NRBCs 0.0 10e3/uL   Result Value Ref Range    Troponin I 0.02 0.00 - 0.29 ng/mL       RADIOLOGY:  I have independently reviewed and interpreted the above imaging, pending the final radiology read.  XR Chest Port 1 View   Final Result   IMPRESSION: Minor strand of linear atelectasis left base. Lungs are otherwise clear. No adenopathy or effusion. Mild cardiac enlargement. Coronary artery stent. Normal pulmonary vascularity. Atherosclerotic thoracic aorta. Mild  degenerative changes both    shoulders and the spine. Mild thoracolumbar curve. Monitoring leads overlying the chest.          EK-rate is 157 atrial fibs ST depression is noted in V3 V4 V5 and V6  2332-rate is 67, trolled fib ST depression is resolved  2346-rate is 59, sinus, there is no ST segment elevation or depression appreciated    I have independently reviewed and interpreted this EKG          I, Milka Jones, am serving as a scribe to document services personally performed by Dr. Núñez based on my observation and the provider's statements to me. I, Apurva Núñez MD attest that Milka Jones is acting in a scribe capacity, has observed my performance of the services and has documented them in accordance with my direction.    Apurva Núñez MD  Emergency Medicine  [facility] EMERGENCY ROOM  [address] [id number] [phone]  Dept: [phone]     Apurva Núñez MD  [id number]

## 2022-11-28 NOTE — ED TRIAGE NOTES
History of A-fib, at 2220 went into a-fib with chest pain, headache, SOB and weakness.  History of 2 stents since October.     Triage Assessment     Row Name 11/27/22 1654       Triage Assessment (Adult)    Airway WDL WDL       Respiratory WDL    Respiratory WDL WDL       Skin Circulation/Temperature WDL    Skin Circulation/Temperature WDL X       Cardiac WDL    Cardiac WDL WDL       Peripheral/Neurovascular WDL    Peripheral Neurovascular WDL WDL       Cognitive/Neuro/Behavioral WDL    Cognitive/Neuro/Behavioral WDL WDL

## 2022-11-29 ENCOUNTER — HOSPITAL ENCOUNTER (OUTPATIENT)
Dept: CARDIAC REHAB | Facility: CLINIC | Age: 72
Discharge: HOME | End: 2022-11-29
Attending: INTERNAL MEDICINE
Payer: MEDICARE

## 2022-11-29 PROCEDURE — 93798 PHYS/QHP OP CAR RHAB W/ECG: CPT

## 2022-11-30 ENCOUNTER — APPOINTMENT (OUTPATIENT)
Dept: LAB | Facility: CLINIC | Age: 72
End: 2022-11-30
Payer: MEDICARE

## 2022-11-30 DIAGNOSIS — E87.6 HYPOKALEMIA: ICD-10-CM

## 2022-11-30 LAB — POTASSIUM BLD-SCNC: 4.2 MMOL/L (ref 3.5–5)

## 2022-11-30 PROCEDURE — 36415 COLL VENOUS BLD VENIPUNCTURE: CPT

## 2022-11-30 PROCEDURE — 84132 ASSAY OF SERUM POTASSIUM: CPT

## 2022-12-02 ENCOUNTER — HOSPITAL ENCOUNTER (OUTPATIENT)
Dept: CARDIAC REHAB | Facility: CLINIC | Age: 72
Discharge: HOME | End: 2022-12-02
Attending: INTERNAL MEDICINE
Payer: MEDICARE

## 2022-12-02 PROCEDURE — 93798 PHYS/QHP OP CAR RHAB W/ECG: CPT

## 2022-12-04 DIAGNOSIS — I10 ESSENTIAL HYPERTENSION: ICD-10-CM

## 2022-12-04 DIAGNOSIS — I48.0 PAROXYSMAL ATRIAL FIBRILLATION (H): ICD-10-CM

## 2022-12-05 PROBLEM — I48.0 PAROXYSMAL ATRIAL FIBRILLATION (H): Status: ACTIVE | Noted: 2022-12-05

## 2022-12-05 RX ORDER — APIXABAN 5 MG/1
TABLET, FILM COATED ORAL
Qty: 60 TABLET | Refills: 0 | Status: SHIPPED | OUTPATIENT
Start: 2022-12-05 | End: 2023-01-02

## 2022-12-05 NOTE — PROGRESS NOTES
Thank you, Dr. Taylor, for asking the [facility] team to see Ms. Suma Mark to evaluate PAF.    Assessment/Recommendations     Assessment/Plan:    Diagnoses and all orders for this visit:  Essential hypertension  Paroxysmal atrial fibrillation (H) and complaining of direct symptoms with atrial fibrillation now though she has been asymptomatic during heart monitor in the past.  This was before she was aware of A. fib diagnosis.  Complaining of chest pain and palpitations with atrial fibrillation.  She was on metoprolol tartrate 50 mg p.o. twice daily at the time of her A. fib episode.  Heart rate in the 160s.  Given as needed metoprolol tartrate 25 mg to take for better heart rate control.  She has had no further episodes of A. fib since ER visit.  I recommended to switch from metoprolol to tartrate 50 mg p.o. twice daily to sotalol 80 mg p.o. twice daily starting Tuesday morning and twelve-lead ECG on Friday, December 9 for QTC check.  I discussed the role of antiarrhythmics versus ablation.  She recently had 2 stents placed.  I recommended that she allow time between cardiac interventions before considering any other procedures.  I did not recommend as needed metoprolol use on top of sotalol for now until I see how she adjusts to use of sotalol.  This can be discussed on follow-up with Dr. Worley in early January as planned.  No further EP follow-up ordered and will rely on cardiology referring back as needed.  Snoring and some daytime sleepiness per .  She also has problems with insomnia.  I discussed high association between DONN and atrial fibrillation.  I recommended an e visit with sleep physician for consideration of sleep study.  Until then I recommended that she sleep on her side is much as possible.  I discussed positional devices.  Congestive heart failure, unspecified HF chronicity, unspecified heart failure type (H) and no signs or symptoms of decompensated heart failure.   On furosemide 40 mg every day.  No change in diuretic dose made.  Coronary artery disease involving native coronary artery of native heart without angina pectoris and has had no appear chest pain issues since stent.  She had many questions about her coronary angiograms and they were again reviewed in detail and this visit.  She appears very anxious.  She is very concerned that she will need another intervention.  I discussed disease identified that was significant has been addressed by her procedures.  I discussed the second procedure was not a surprise as it was anticipated she may need a staged PCI.  Offered reassurance.  If she remains anxious she may need to see her primary physician for scheduled medication to address anxiety and situational depression.      [id number] score of 6 and new on Eliquis.  Follow up in clinic with Dr. Worley as planned.     History of Present Illness/Subjective     Suma Mark is a very pleasant 72 year old female who comes in today for EP follow up for PAF.  Suma Mark has a known history of severely elevated coronary calcium score, hypertension, dyslipidemia, migraine, recent hospitalization with unstable angina with diagnosis of coronary disease with status post PCI/EUGENIA to OM2 on 10/12/2022.  With ongoing chest discomfort, she was recommended to have repeat coronary angiogram.  Patient underwent successful PCI/EUGENIA to distal LAD on 11/20/2022.  She has been noted to have PAF and documented on recent event monitor and started on anticoagulation.  Lo reports that on November 27 she was getting ready for bed when she noticed that her heart rate was erratic.  She had her  check her pulse.  It was in the 160s.  She was having some chest pressure at that time.  She went to the emergency room.  She converted to normal rhythm.  She is very anxious and wants to avoid further episodes of atrial fibrillation.  I assured her that now that she is on anticoagulation she is  covered for stroke risk associated with this rhythm.  I discussed that her heart is built to take higher heart rates as normal for this to happen at times.  She is adamant she needs better control of her rhythm.  See above.  She is accompanied by her .    Cardiographics (reviewed):  Coronary Angiogram on 11/8/22-reviewed:  Conclusion     Dist LAD lesion is 85% stenosed.     Successful PCI distal LAD.  No residual stenosis.  Recommend dual antiplatelet therapy for 12 months, or stop ASA if/when Eliquis is started for afib, but continue Plavix while on Eliquis.   10/28/2022 Cardiac Event Monitor Report (DERIC type)     Results:    Indication for study: Paroxysmal atrial fibrillation    Time monitored: 12 days..      The baseline rhythm transmission demonstrated sinus rhythm with heart rates in the mid 70s. The FL interval, QRS duration, and QT interval, all appear to be normal.    The patient had 27 manually activated rhythm recordings.  Symptoms were reported and included primarily chest pain but also lightheadedness.  The patient's rhythm demonstrated normal sinus rhythm with heart rates ranging between 56 and 75 bpm.  There was no ectopy or other pathologic rhythm disturbance demonstrated.    The patient had 8 auto triggered recordings.  Symptoms were not reported.  The patient's rhythm demonstrated normal sinus rhythm with the exception of the recording from November 2 which demonstrated atrial fibrillation with rapid ventricular response (120s).     Impression:    Abnormal multi day patient activated monitor by virtue of the presence of atrial fibrillation (note that the automated over read called this atrial flutter which is incorrect).  The patient did not have any symptoms in association with the arrhythmia event and the overall duration/burden cannot be estimated from this type of monitor as opposed to utilizing a MCOT type monitor which does provide burden data.    The patient's symptomatic recordings  did not correlate with any arrhythmia.    No sustained ventricular arrhythmia.    No profound bradycardia or significant pauses.        Coronary Angiogram done on 10/12/2022: reviewed:  Conclusion  1.  Multivessel CAD as described below.  Culprit lesion is a 90% stenosis within a large OM 2.     LM: No significant disease  LAD: Angiographically moderate proximal-mid LAD disease that is not flow-limiting (FFR 0.83).  Further down the vessel, there is a discrete 70-80% stenosis of the distal LAD as well as a discrete 60-70% stenosis of the apical LAD.  LCx: Moderate proximal LAD disease with a 90% culprit stenosis of the large OM 2  RCA: Moderate diffuse proximal-mid RCA disease     2.  Successful PCI of the OM 2 lesion extending back to the proximal LCx with placement of a single 2.5 x 38 mm Synergy EUGENIA, postdilated with a 2.75 mm NC balloon.        Plan     - Remove TR band per protocol; wrist precautions  - DAPT for a minimum of 6 months.  After this, would consider indefinite P2 Y 12 inhibitor monotherapy           Problem List:  Patient Active Problem List   Diagnosis     Total knee replacement status     Anxiety     Acquired hypothyroidism     Chronic back pain     Fibromyalgia     Depression, unspecified depression type     Diverticulosis     Essential hypertension     Gastroesophageal reflux disease without esophagitis     Insomnia, unspecified type     Migraine     Dyslipidemia, goal LDL below 70     Other osteoporosis without current pathological fracture     Restless leg syndrome     Thyroid nodule     Congestive heart failure (H)     Unstable angina (H)     Coronary artery disease involving native coronary artery with unstable angina pectoris (H)     S/P primary angioplasty with coronary stent     Paroxysmal atrial fibrillation (H)     Revi  e  Physical Examination Review of Systems   w fystems  LMP  (LMP Unknown)   There is no height or weight on file to calculate BMI.  Wt Readings from Last 3 Encounters:    11/27/22 83 kg (183 lb)   11/16/22 82.6 kg (182 lb)   11/08/22 83.1 kg (183 lb 4.8 oz)     General Appearance:   Alert, well-appearing and in no acute distress.   HEENT: Atraumatic, normocephalic.  No scleral icterus, normal conjunctivae; mucous membranes pink and moist.     Chest: Chest symmetric, spine straight.   Lungs:   Respirations unlabored.   Cardiovascular:   Normal first and second heart sounds with no murmurs, rubs, or gallops.  Regular, regular.   Normal JVD, no edema.       Extremities: No cyanosis or clubbing   Musculoskeletal: Moves all extremities   Skin: Warm, dry, intact.    Neurologic: Mood and affect are appropriate, alert and oriented to person, place, time, and situation     ROS: 10 point ROS neg other than the symptoms noted above in the HPI.     Medical History  Surgical History Family History Social History     Past Medical History:   Diagnosis Date     Acquired hypothyroidism 3/12/2009     Anxiety      Anxiety state, unspecified      Back pain      Breast cyst      Carpal tunnel syndrome      Chronic pain     LOW BACK     Degenerative disc disease, lumbar      Depression      Depression, unspecified depression type      Diarrhea      Disease of thyroid gland     hypothyroid     Diverticulosis 10/4/2017    Incidental finding on colonoscopy 10/2017     DJD (degenerative joint disease)      Essential hypertension 4/21/2015     Essential hypertension, benign      Fatty liver      Fibromyalgia      Gastro-oesophageal reflux disease      Gastroesophageal reflux disease without esophagitis 4/21/2015     GERD (gastroesophageal reflux disease)      Hernia, ventral 4/27/2017     History of anesthesia complications     hypotension after surgery     History of blood clots      History of blood transfusion      History of transfusion      Hyperlipidemia      Hypertension      Insomnia, unspecified type      Left lower quadrant pain      Lumbago      Lung nodules     INTERMITTENT     Major depression       Migraine      Osteoarthritis      Osteopenia      Osteoporosis      Other abnormal glucose      Other and unspecified special symptom or syndrome, not elsewhere classified      PONV (postoperative nausea and vomiting)      Posttraumatic stress disorder      Raynaud's disease      Restless leg syndrome      S/P total knee replacement 11/27/2017     Spontaneous ecchymoses      Thrombosis of leg     with pregnancy     Unspecified episodic mood disorder     AFFECTIVE PSYCHOSIS     Unspecified hypothyroidism      Unspecified vitamin D deficiency      Varicose veins      Ventral hernia      Vitamin D deficiency     Past Surgical History:   Procedure Laterality Date     ARTHROCENTESIS      LEFT HIP TROCHANTERIC BURSA     ARTHROPLASTY KNEE UNICOMPARTMENT  10/31/2013    Procedure: ARTHROPLASTY KNEE UNICOMPARTMENT;  LEFT KNEE UNICOMPARTMENTAL ARTHROPLASTY (CAROLINE)^;  Surgeon: Chi Mittal MD;  Location:  OR     ARTICULAR DEBRIEDEMENT[      LEFT KNEE     BACK SURGERY       BREAST CYST EXCISION       BREAST SURGERY      bx     COLECTOMY N/A 6/2/2017    Procedure: RESECTION, SMALL INTESTINE, OPEN ADHESIOLYSIS;  Surgeon: Keyon Qureshi MD;  Location: [facility];  Service:      COLON SURGERY       CV CORONARY ANGIOGRAM N/A 10/12/2022    Procedure: CV CORONARY ANGIOGRAM;  Surgeon: You Martinez MD;  Location: [facility] CV     CV FRACTIONAL FLOW RATIO WIRE N/A 10/12/2022    Procedure: Fractional Flow Ratio Wire;  Surgeon: You Martinez MD;  Location: [facility]     CV PCI STENT DRUG ELUTING N/A 10/12/2022    Procedure: Percutaneous Coronary Intervention Stent;  Surgeon: You Martinez MD;  Location: [facility]     CV PCI STENT DRUG ELUTING N/A 11/8/2022    Procedure: Percutaneous Coronary Intervention - Stent;  Surgeon: Bret Jin MD;  Location: [facility] CV     ENDOSCOPIC RETROGRADE CHOLANGIOPANCREATOGRAPHY       ENDOSCOPIC STRIPPING VEIN(S)      BILATERLY      GENITOURINARY SURGERY      bladder sling     GI SURGERY      hemorrhoidectomy     HEMORRHOID SURGERY       HERNIORRHAPHY INCISIONAL (LOCATION) N/A 6/2/2017    Procedure: LAPARASCOPIC CONVERTED TO OPEN PRIMARY INCISIONAL HERNIA REPAIR;  Surgeon: Keyon Qureshi MD;  Location: [facility];  Service:      HYSTERECTOMY  [id number]     HYSTERECTOMY VAGINAL       INCONTINENCE SURGERY       LAMINECTOMY LUMBAR TWO LEVELS      2003     LUMBAR FUSION       LUMBAR HARDWARE REMOVAL[       ORTHOPEDIC SURGERY      carpal tunnel     [place] LAP,DIAGNOSTIC ABDOMEN N/A 8/22/2019    Procedure: DIAGNOSTIC LAPAROSCOPY, LYSIS OF ADHESION;  Surgeon: Svetlana Ron MD;  Location: [facility] OR;  Service: General     RELEASE CARPAL TUNNEL       STRIP VEIN       TRANSARTHISCOPIC SURGERY[      LEFT KNEE     US THYROID BIOPSY  4/19/2019     VEIN LIGATION AND STRIPPING Bilateral      ZZC TOTAL KNEE ARTHROPLASTY Right 11/27/2017    Procedure:  RIGHT TOTAL KNEE ARTHROPLASTY;  Surgeon: Kevin Ryder MD;  Location: [facility];  Service: Orthopedics    Family History   Problem Relation Age of Onset     Dementia Mother      Arthritis Mother      Chronic Obstructive Pulmonary Disease Father      Cardiovascular Father      Hypertension Father      No Known Problems Sister      No Known Problems Daughter      No Known Problems Son      Cerebrovascular Disease Maternal Grandmother      Heart Disease Paternal Grandmother      Cerebrovascular Disease Paternal Grandmother      No Known Problems Sister      No Known Problems Sister      No Known Problems Son      No Known Problems Daughter      Breast Cancer Paternal Aunt         age in 50's    History   Smoking Status     Never   Smokeless Tobacco     Never     Social History    Substance and Sexual Activity      Alcohol use: Yes        Comment: Alcoholic Drinks/day: 1 cocktail daily       Medications  Allergies     Current Outpatient Medications   Medication Sig Dispense Refill      amLODIPine (NORVASC) 5 MG tablet TAKE 1 TABLET BY MOUTH EVERYDAY AT BEDTIME 90 tablet 3     atorvastatin (LIPITOR) 80 MG tablet Take 1 tablet (80 mg) by mouth At Bedtime 90 tablet 3     botulinum toxin type A (BOTOX) 100 units injection Every 3 months. [facility]       busPIRone (BUSPAR) 10 MG tablet Take 1 tablet (10 mg) by mouth 2 times daily 180 tablet 3     cholecalciferol 50 MCG (2000 UT) CAPS Take 2,000 Units by mouth daily       citalopram (CELEXA) 20 MG tablet Take 2 tablets (40 mg) by mouth daily 135 tablet 3     clopidogrel (PLAVIX) 75 MG tablet Take 1 tablet (75 mg) by mouth daily 90 tablet 3     eletriptan (RELPAX) 40 MG tablet Take 1 tablet (40 mg) by mouth at onset of headache for migraine 10 tablet 0     furosemide (LASIX) 20 MG tablet Take 2 tablets (40 mg) by mouth daily 180 tablet 3     HYDROcodone-acetaminophen (NORCO) 5-325 MG tablet Take 1 tablet by mouth daily as needed (Severe headache) 5 tablet 0     irbesartan (AVAPRO) 300 MG tablet Take 1 tablet (300 mg) by mouth daily 90 tablet 3     levothyroxine (SYNTHROID/LEVOTHROID) 88 MCG tablet TAKE 1 TABLET BY MOUTH DAILY AT 6:00 AM. 90 tablet 3     LORazepam (ATIVAN) 0.5 MG tablet Take 1 tablet (0.5 mg) by mouth every 6 hours as needed for anxiety 30 tablet 0     metoprolol tartrate (LOPRESSOR) 25 MG tablet As needed for heart rate >110 90 tablet 0     metoprolol tartrate (LOPRESSOR) 50 MG tablet Take 1 tablet (50 mg) by mouth 2 times daily 180 tablet 3     nitroGLYcerin (NITROSTAT) 0.4 MG sublingual tablet For chest pain place 1 tablet under the tongue every 5 minutes for 3 doses. If symptoms persist 5 minutes after 1st dose call 911. (Patient not taking: Reported on 11/16/2022) 30 tablet 1     potassium chloride ER (K-TAB) 20 MEQ CR tablet Take 2 tablets (40 mEq) by mouth 2 times daily 60 tablet 1     pramipexole (MIRAPEX) 0.25 MG tablet TAKE 1 TABLET BY MOUTH THREE TIMES A DAY. 270 tablet 3     traZODone (DESYREL) 50 MG tablet TAKE 3 TABLETS  "BY MOUTH AT BEDTIME. 270 tablet 1      Allergies   Allergen Reactions     Cephalexin Nausea and Vomiting     Ace Inhibitors Other (See Comments)     Elevates blood pressure     Amitriptyline Hcl Other (See Comments)     elevates blood pressure     Atenolol Other (See Comments)     Aggravates reynauds     Celebrex [Celecoxib] GI Disturbance     Cephalosporins Unknown     Pt doesn't remember        Clonidine Unknown     \"got very ill in ER\"     Codeine Sulfate Nausea and Vomiting     Darvocet [Propoxyphene N-Apap] Nausea and Vomiting     Dexamethasone Acetate Swelling     Erythromycin Nausea and Vomiting     Escitalopram Other (See Comments)     Headaches.       Gabapentin Unknown     \"Spotted\"     Hctz Unknown     \"depletes my sodium\"     Propoxyphene      HUT Reaction: Gastrointestinal; HUT Reaction: Nausea And Vomiting; HUT Noted: [id number]     Tramadol Swelling     Swelling of tongue and face     Triamterene      Ultracet Other (See Comments)     Venlafaxine Nausea and Vomiting and Diarrhea     Zolpidem Other (See Comments) and Unknown     Pt doesn't rember       Zolpidem Tartrate Unknown     Bacitracin Itching and Rash     Sulfanilamide Rash      Medical, surgical, family, social history, and medications were all reviewed and updated as necessary.   Lab Results    Chemistry/lipid CBC Cardiac Enzymes/BNP/TSH/INR   Recent Labs   Lab Test 11/15/22  0800   CHOL 132   HDL 43*   LDL 68   TRIG 106     Recent Labs   Lab Test 11/15/22  0800 06/08/22  0905 06/10/21  1454   LDL 68 95 70     Recent Labs   Lab Test 11/30/22  1534 11/27/22  2315   NA  --  141   POTASSIUM 4.2 3.2*   CHLORIDE  --  102   CO2  --  26   GLC  --  113   BUN  --  20   CR  --  1.08   GFRESTIMATED  --  54*   ISSA  --  10.1     Recent Labs   Lab Test 11/27/22  2315 11/07/22  0959 10/13/22  0525   CR 1.08 0.82 0.77     No results for input(s): A1C in the last 95422 hours.       Recent Labs   Lab Test 11/27/22  2315   WBC 6.5   HGB 12.1   HCT 37.1   MCV 90 "        Recent Labs   Lab Test 11/27/22  2315 11/07/22  0959 10/11/22  2303   HGB 12.1 12.2 13.2    Recent Labs   Lab Test 11/28/22  0139 11/27/22  2315 10/12/22  0354   TROPONINI 0.02 0.01 0.03     Recent Labs   Lab Test 11/27/22  2315        Recent Labs   Lab Test 06/08/22  0905   TSH 3.99     Recent Labs   Lab Test 11/27/22 2315   INR 1.07          Total Time- 65 minutes spent on date of encounter doing chart review, history and exam, documentation and further activities as noted above.  This note has been dictated using voice recognition software. Any grammatical, typographical, or context distortions are unintentional and inherent to the software.

## 2022-12-06 ENCOUNTER — OFFICE VISIT (OUTPATIENT)
Dept: CARDIOLOGY | Facility: CLINIC | Age: 72
End: 2022-12-06
Attending: NURSE PRACTITIONER
Payer: MEDICARE

## 2022-12-06 ENCOUNTER — APPOINTMENT (OUTPATIENT)
Dept: PULMONOLOGY | Facility: OTHER | Age: 72
End: 2022-12-06
Attending: FAMILY MEDICINE
Payer: MEDICARE

## 2022-12-06 ENCOUNTER — E-CONSULT (OUTPATIENT)
Dept: PULMONOLOGY | Facility: OTHER | Age: 72
End: 2022-12-06
Payer: MEDICARE

## 2022-12-06 VITALS
BODY MASS INDEX: 34.17 KG/M2 | SYSTOLIC BLOOD PRESSURE: 102 MMHG | HEIGHT: 61 IN | RESPIRATION RATE: 16 BRPM | WEIGHT: 181 LBS | HEART RATE: 59 BPM | DIASTOLIC BLOOD PRESSURE: 44 MMHG

## 2022-12-06 DIAGNOSIS — I48.0 PAROXYSMAL ATRIAL FIBRILLATION (H): Primary | ICD-10-CM

## 2022-12-06 DIAGNOSIS — I50.9 CONGESTIVE HEART FAILURE, UNSPECIFIED HF CHRONICITY, UNSPECIFIED HEART FAILURE TYPE (H): ICD-10-CM

## 2022-12-06 DIAGNOSIS — I10 ESSENTIAL HYPERTENSION: ICD-10-CM

## 2022-12-06 DIAGNOSIS — I25.10 CORONARY ARTERY DISEASE INVOLVING NATIVE CORONARY ARTERY OF NATIVE HEART WITHOUT ANGINA PECTORIS: ICD-10-CM

## 2022-12-06 DIAGNOSIS — R06.83 SNORING: ICD-10-CM

## 2022-12-06 PROCEDURE — 99451 NTRPROF PH1/NTRNET/EHR 5/>: CPT | Performed by: FAMILY MEDICINE

## 2022-12-06 PROCEDURE — 99215 OFFICE O/P EST HI 40 MIN: CPT | Performed by: NURSE PRACTITIONER

## 2022-12-06 PROCEDURE — 99207 E-CONSULT TO SLEEP MEDICINE (ADULT OUTPT PROVIDER TO SPECIALIST WRITTEN QUESTION & RESPONSE): CPT | Performed by: NURSE PRACTITIONER

## 2022-12-06 RX ORDER — SOTALOL HYDROCHLORIDE 80 MG/1
80 TABLET ORAL EVERY 12 HOURS
Qty: 60 TABLET | Refills: 3 | Status: SHIPPED | OUTPATIENT
Start: 2022-12-06 | End: 2023-04-03

## 2022-12-06 NOTE — PROGRESS NOTES
12/6/2022     E-Consult has been accepted.    Interprofessional consultation requested by:  Svetlana Gr APRN CNP      Clinical Question/Purpose:      Patient assessment and information reviewed:     Pertinent PMHx of fibromyalgia, CAD, NAFLD, GERD, HLD, hypothyroidism, HTN, paroxysmal atrial fibrillation, RLS, PTSD, MDD, anxiety, insomnia.    LVEF estimated at 71% at stress on NM lexiscan stress test performed 10/28/2022.    BMI 34.2.    Report of snoring, hypersomnia.    Based on available history, STOPBANG score of 4+.    Recommendations:     Based on available history, she would appear to be a reasonable candidate for home sleep testing.  I have placed orders for Noxturnal T3 home sleep testing.       The recommendations provided in this E-Consult are based on a review of clinical data pertinent to the clinical question presented, without a review of the patient's complete medical record or, the benefit of a comprehensive in-person or virtual patient evaluation. This consultation should not replace the clinical judgement and evaluation of the provider ordering this E-Consult. Any new clinical issues, or changes in patient status since the filing of this E-Consult will need to be taken into account when assessing these recommendations. Please contact me if you have further questions.    My total time spent reviewing clinical information and formulating assessment was 10 minutes.        John Aguilar MD, MD

## 2022-12-06 NOTE — PATIENT INSTRUCTIONS
Suma Mark,    It was a pleasure to see you today at the [facility].     My recommendations after this visit include:    Tomorrow morning stop metoprolol and start sotalol 80 mg 1 tab orally every 12 hours.    ECG 12/9/22 at [facility]  for QT check-schedule before cardiac rehab.    E consult with sleep physician.    Please call if atrial fibrillation episodes more frequent .      To followup with Dr. Worley as planned.      My contact information:  Shayan Gr, PATTIE  After Hours or Scheduling  [phone]  My Nurse---Umu Worthington [phone]

## 2022-12-06 NOTE — LETTER
12/6/2022    Bernadette Taylor MD  [address] [facility] [address]    RE: Suma WEEKS Jas       Dear Colleague,     I had the pleasure of seeing Suma Mark in the [facility].    Thank you, Dr. Taylor, for asking the [facility] team to see Ms. Suma Mark to evaluate PAF.    Assessment/Recommendations     Assessment/Plan:    Diagnoses and all orders for this visit:  Essential hypertension  Paroxysmal atrial fibrillation (H) and complaining of direct symptoms with atrial fibrillation now though she has been asymptomatic during heart monitor in the past.  This was before she was aware of A. fib diagnosis.  Complaining of chest pain and palpitations with atrial fibrillation.  She was on metoprolol tartrate 50 mg p.o. twice daily at the time of her A. fib episode.  Heart rate in the 160s.  Given as needed metoprolol tartrate 25 mg to take for better heart rate control.  She has had no further episodes of A. fib since ER visit.  I recommended to switch from metoprolol to tartrate 50 mg p.o. twice daily to sotalol 80 mg p.o. twice daily starting Tuesday morning and twelve-lead ECG on Friday, December 9 for QTC check.  I discussed the role of antiarrhythmics versus ablation.  She recently had 2 stents placed.  I recommended that she allow time between cardiac interventions before considering any other procedures.  I did not recommend as needed metoprolol use on top of sotalol for now until I see how she adjusts to use of sotalol.  This can be discussed on follow-up with Dr. Worley in early January as planned.  No further EP follow-up ordered and will rely on cardiology referring back as needed.  Snoring and some daytime sleepiness per .  She also has problems with insomnia.  I discussed high association between DONN and atrial fibrillation.  I recommended an e visit with sleep physician for consideration of sleep study.  Until then I recommended that she sleep on  her side is much as possible.  I discussed positional devices.  Congestive heart failure, unspecified HF chronicity, unspecified heart failure type (H) and no signs or symptoms of decompensated heart failure.  On furosemide 40 mg every day.  No change in diuretic dose made.  Coronary artery disease involving native coronary artery of native heart without angina pectoris and has had no appear chest pain issues since stent.  She had many questions about her coronary angiograms and they were again reviewed in detail and this visit.  She appears very anxious.  She is very concerned that she will need another intervention.  I discussed disease identified that was significant has been addressed by her procedures.  I discussed the second procedure was not a surprise as it was anticipated she may need a staged PCI.  Offered reassurance.  If she remains anxious she may need to see her primary physician for scheduled medication to address anxiety and situational depression.      [id number] score of 6 and new on Eliquis.  Follow up in clinic with Dr. Worley as planned.     History of Present Illness/Subjective     Suma Mark is a very pleasant 72 year old female who comes in today for EP follow up for PAF.  Suma Mark has a known history of severely elevated coronary calcium score, hypertension, dyslipidemia, migraine, recent hospitalization with unstable angina with diagnosis of coronary disease with status post PCI/EUGENIA to OM2 on 10/12/2022.  With ongoing chest discomfort, she was recommended to have repeat coronary angiogram.  Patient underwent successful PCI/EUGENIA to distal LAD on 11/20/2022.  She has been noted to have PAF and documented on recent event monitor and started on anticoagulation.  Lo reports that on November 27 she was getting ready for bed when she noticed that her heart rate was erratic.  She had her  check her pulse.  It was in the 160s.  She was having some chest pressure at that time.  She  went to the emergency room.  She converted to normal rhythm.  She is very anxious and wants to avoid further episodes of atrial fibrillation.  I assured her that now that she is on anticoagulation she is covered for stroke risk associated with this rhythm.  I discussed that her heart is built to take higher heart rates as normal for this to happen at times.  She is adamant she needs better control of her rhythm.  See above.  She is accompanied by her .    Cardiographics (reviewed):  Coronary Angiogram on 11/8/22-reviewed:  Conclusion     Dist LAD lesion is 85% stenosed.     Successful PCI distal LAD.  No residual stenosis.  Recommend dual antiplatelet therapy for 12 months, or stop ASA if/when Eliquis is started for afib, but continue Plavix while on Eliquis.   10/28/2022 Cardiac Event Monitor Report (DERIC type)     Results:    Indication for study: Paroxysmal atrial fibrillation    Time monitored: 12 days..      The baseline rhythm transmission demonstrated sinus rhythm with heart rates in the mid 70s. The TX interval, QRS duration, and QT interval, all appear to be normal.    The patient had 27 manually activated rhythm recordings.  Symptoms were reported and included primarily chest pain but also lightheadedness.  The patient's rhythm demonstrated normal sinus rhythm with heart rates ranging between 56 and 75 bpm.  There was no ectopy or other pathologic rhythm disturbance demonstrated.    The patient had 8 auto triggered recordings.  Symptoms were not reported.  The patient's rhythm demonstrated normal sinus rhythm with the exception of the recording from November 2 which demonstrated atrial fibrillation with rapid ventricular response (120s).     Impression:    Abnormal multi day patient activated monitor by virtue of the presence of atrial fibrillation (note that the automated over read called this atrial flutter which is incorrect).  The patient did not have any symptoms in association with the  arrhythmia event and the overall duration/burden cannot be estimated from this type of monitor as opposed to utilizing a MCOT type monitor which does provide burden data.    The patient's symptomatic recordings did not correlate with any arrhythmia.    No sustained ventricular arrhythmia.    No profound bradycardia or significant pauses.        Coronary Angiogram done on 10/12/2022: reviewed:  Conclusion  1.  Multivessel CAD as described below.  Culprit lesion is a 90% stenosis within a large OM 2.     LM: No significant disease  LAD: Angiographically moderate proximal-mid LAD disease that is not flow-limiting (FFR 0.83).  Further down the vessel, there is a discrete 70-80% stenosis of the distal LAD as well as a discrete 60-70% stenosis of the apical LAD.  LCx: Moderate proximal LAD disease with a 90% culprit stenosis of the large OM 2  RCA: Moderate diffuse proximal-mid RCA disease     2.  Successful PCI of the OM 2 lesion extending back to the proximal LCx with placement of a single 2.5 x 38 mm Synergy EUGENIA, postdilated with a 2.75 mm NC balloon.        Plan     - Remove TR band per protocol; wrist precautions  - DAPT for a minimum of 6 months.  After this, would consider indefinite P2 Y 12 inhibitor monotherapy           Problem List:  Patient Active Problem List   Diagnosis     Total knee replacement status     Anxiety     Acquired hypothyroidism     Chronic back pain     Fibromyalgia     Depression, unspecified depression type     Diverticulosis     Essential hypertension     Gastroesophageal reflux disease without esophagitis     Insomnia, unspecified type     Migraine     Dyslipidemia, goal LDL below 70     Other osteoporosis without current pathological fracture     Restless leg syndrome     Thyroid nodule     Congestive heart failure (H)     Unstable angina (H)     Coronary artery disease involving native coronary artery with unstable angina pectoris (H)     S/P primary angioplasty with coronary stent      Paroxysmal atrial fibrillation (H)     Revi  e  Physical Examination Review of Systems   w fystems  LMP  (LMP Unknown)   There is no height or weight on file to calculate BMI.  Wt Readings from Last 3 Encounters:   11/27/22 83 kg (183 lb)   11/16/22 82.6 kg (182 lb)   11/08/22 83.1 kg (183 lb 4.8 oz)     General Appearance:   Alert, well-appearing and in no acute distress.   HEENT: Atraumatic, normocephalic.  No scleral icterus, normal conjunctivae; mucous membranes pink and moist.     Chest: Chest symmetric, spine straight.   Lungs:   Respirations unlabored.   Cardiovascular:   Normal first and second heart sounds with no murmurs, rubs, or gallops.  Regular, regular.   Normal JVD, no edema.       Extremities: No cyanosis or clubbing   Musculoskeletal: Moves all extremities   Skin: Warm, dry, intact.    Neurologic: Mood and affect are appropriate, alert and oriented to person, place, time, and situation     ROS: 10 point ROS neg other than the symptoms noted above in the HPI.     Medical History  Surgical History Family History Social History     Past Medical History:   Diagnosis Date     Acquired hypothyroidism 3/12/2009     Anxiety      Anxiety state, unspecified      Back pain      Breast cyst      Carpal tunnel syndrome      Chronic pain     LOW BACK     Degenerative disc disease, lumbar      Depression      Depression, unspecified depression type      Diarrhea      Disease of thyroid gland     hypothyroid     Diverticulosis 10/4/2017    Incidental finding on colonoscopy 10/2017     DJD (degenerative joint disease)      Essential hypertension 4/21/2015     Essential hypertension, benign      Fatty liver      Fibromyalgia      Gastro-oesophageal reflux disease      Gastroesophageal reflux disease without esophagitis 4/21/2015     GERD (gastroesophageal reflux disease)      Hernia, ventral 4/27/2017     History of anesthesia complications     hypotension after surgery     History of blood clots      History of  blood transfusion      History of transfusion      Hyperlipidemia      Hypertension      Insomnia, unspecified type      Left lower quadrant pain      Lumbago      Lung nodules     INTERMITTENT     Major depression      Migraine      Osteoarthritis      Osteopenia      Osteoporosis      Other abnormal glucose      Other and unspecified special symptom or syndrome, not elsewhere classified      PONV (postoperative nausea and vomiting)      Posttraumatic stress disorder      Raynaud's disease      Restless leg syndrome      S/P total knee replacement 11/27/2017     Spontaneous ecchymoses      Thrombosis of leg     with pregnancy     Unspecified episodic mood disorder     AFFECTIVE PSYCHOSIS     Unspecified hypothyroidism      Unspecified vitamin D deficiency      Varicose veins      Ventral hernia      Vitamin D deficiency     Past Surgical History:   Procedure Laterality Date     ARTHROCENTESIS      LEFT HIP TROCHANTERIC BURSA     ARTHROPLASTY KNEE UNICOMPARTMENT  10/31/2013    Procedure: ARTHROPLASTY KNEE UNICOMPARTMENT;  LEFT KNEE UNICOMPARTMENTAL ARTHROPLASTY (CAROLINE)^;  Surgeon: Chi Mittal MD;  Location:  OR     ARTICULAR DEBRIEDEMENT[      LEFT KNEE     BACK SURGERY       BREAST CYST EXCISION       BREAST SURGERY      bx     COLECTOMY N/A 6/2/2017    Procedure: RESECTION, SMALL INTESTINE, OPEN ADHESIOLYSIS;  Surgeon: Keyon Qureshi MD;  Location: [facility];  Service:      COLON SURGERY       CV CORONARY ANGIOGRAM N/A 10/12/2022    Procedure: CV CORONARY ANGIOGRAM;  Surgeon: You Martinez MD;  Location: [facility] CV     CV FRACTIONAL FLOW RATIO WIRE N/A 10/12/2022    Procedure: Fractional Flow Ratio Wire;  Surgeon: You Martinez MD;  Location: [facility] CV     CV PCI STENT DRUG ELUTING N/A 10/12/2022    Procedure: Percutaneous Coronary Intervention Stent;  Surgeon: You Martinez MD;  Location: [facility] CV     CV PCI STENT DRUG ELUTING N/A 11/8/2022     Procedure: Percutaneous Coronary Intervention - Stent;  Surgeon: Bret Jin MD;  Location: [facility] CATH LAB CV     ENDOSCOPIC RETROGRADE CHOLANGIOPANCREATOGRAPHY       ENDOSCOPIC STRIPPING VEIN(S)      BILATERLY     GENITOURINARY SURGERY      bladder sling     GI SURGERY      hemorrhoidectomy     HEMORRHOID SURGERY       HERNIORRHAPHY INCISIONAL (LOCATION) N/A 6/2/2017    Procedure: LAPARASCOPIC CONVERTED TO OPEN PRIMARY INCISIONAL HERNIA REPAIR;  Surgeon: Keyon Qureshi MD;  Location: [facility];  Service:      HYSTERECTOMY  [id number]     HYSTERECTOMY VAGINAL       INCONTINENCE SURGERY       LAMINECTOMY LUMBAR TWO LEVELS      2003     LUMBAR FUSION       LUMBAR HARDWARE REMOVAL[       ORTHOPEDIC SURGERY      carpal tunnel     MT LAP,DIAGNOSTIC ABDOMEN N/A 8/22/2019    Procedure: DIAGNOSTIC LAPAROSCOPY, LYSIS OF ADHESION;  Surgeon: Svetlana Ron MD;  Location: [facility];  Service: General     RELEASE CARPAL TUNNEL       STRIP VEIN       TRANSARTHISCOPIC SURGERY[      LEFT KNEE     US THYROID BIOPSY  4/19/2019     VEIN LIGATION AND STRIPPING Bilateral      ZZC TOTAL KNEE ARTHROPLASTY Right 11/27/2017    Procedure:  RIGHT TOTAL KNEE ARTHROPLASTY;  Surgeon: Kevin Ryder MD;  Location: [facility];  Service: Orthopedics    Family History   Problem Relation Age of Onset     Dementia Mother      Arthritis Mother      Chronic Obstructive Pulmonary Disease Father      Cardiovascular Father      Hypertension Father      No Known Problems Sister      No Known Problems Daughter      No Known Problems Son      Cerebrovascular Disease Maternal Grandmother      Heart Disease Paternal Grandmother      Cerebrovascular Disease Paternal Grandmother      No Known Problems Sister      No Known Problems Sister      No Known Problems Son      No Known Problems Daughter      Breast Cancer Paternal Aunt         age in 50's    History   Smoking Status     Never   Smokeless Tobacco     Never     Social  History    Substance and Sexual Activity      Alcohol use: Yes        Comment: Alcoholic Drinks/day: 1 cocktail daily       Medications  Allergies     Current Outpatient Medications   Medication Sig Dispense Refill     amLODIPine (NORVASC) 5 MG tablet TAKE 1 TABLET BY MOUTH EVERYDAY AT BEDTIME 90 tablet 3     atorvastatin (LIPITOR) 80 MG tablet Take 1 tablet (80 mg) by mouth At Bedtime 90 tablet 3     botulinum toxin type A (BOTOX) 100 units injection Every 3 months. [facility]       busPIRone (BUSPAR) 10 MG tablet Take 1 tablet (10 mg) by mouth 2 times daily 180 tablet 3     cholecalciferol 50 MCG (2000 UT) CAPS Take 2,000 Units by mouth daily       citalopram (CELEXA) 20 MG tablet Take 2 tablets (40 mg) by mouth daily 135 tablet 3     clopidogrel (PLAVIX) 75 MG tablet Take 1 tablet (75 mg) by mouth daily 90 tablet 3     eletriptan (RELPAX) 40 MG tablet Take 1 tablet (40 mg) by mouth at onset of headache for migraine 10 tablet 0     furosemide (LASIX) 20 MG tablet Take 2 tablets (40 mg) by mouth daily 180 tablet 3     HYDROcodone-acetaminophen (NORCO) 5-325 MG tablet Take 1 tablet by mouth daily as needed (Severe headache) 5 tablet 0     irbesartan (AVAPRO) 300 MG tablet Take 1 tablet (300 mg) by mouth daily 90 tablet 3     levothyroxine (SYNTHROID/LEVOTHROID) 88 MCG tablet TAKE 1 TABLET BY MOUTH DAILY AT 6:00 AM. 90 tablet 3     LORazepam (ATIVAN) 0.5 MG tablet Take 1 tablet (0.5 mg) by mouth every 6 hours as needed for anxiety 30 tablet 0     metoprolol tartrate (LOPRESSOR) 25 MG tablet As needed for heart rate >110 90 tablet 0     metoprolol tartrate (LOPRESSOR) 50 MG tablet Take 1 tablet (50 mg) by mouth 2 times daily 180 tablet 3     nitroGLYcerin (NITROSTAT) 0.4 MG sublingual tablet For chest pain place 1 tablet under the tongue every 5 minutes for 3 doses. If symptoms persist 5 minutes after 1st dose call 911. (Patient not taking: Reported on 11/16/2022) 30 tablet 1     potassium chloride ER (K-TAB) 20 MEQ  "CR tablet Take 2 tablets (40 mEq) by mouth 2 times daily 60 tablet 1     pramipexole (MIRAPEX) 0.25 MG tablet TAKE 1 TABLET BY MOUTH THREE TIMES A DAY. 270 tablet 3     traZODone (DESYREL) 50 MG tablet TAKE 3 TABLETS BY MOUTH AT BEDTIME. 270 tablet 1      Allergies   Allergen Reactions     Cephalexin Nausea and Vomiting     Ace Inhibitors Other (See Comments)     Elevates blood pressure     Amitriptyline Hcl Other (See Comments)     elevates blood pressure     Atenolol Other (See Comments)     Aggravates reynauds     Celebrex [Celecoxib] GI Disturbance     Cephalosporins Unknown     Pt doesn't remember        Clonidine Unknown     \"got very ill in ER\"     Codeine Sulfate Nausea and Vomiting     Darvocet [Propoxyphene N-Apap] Nausea and Vomiting     Dexamethasone Acetate Swelling     Erythromycin Nausea and Vomiting     Escitalopram Other (See Comments)     Headaches.       Gabapentin Unknown     \"Spotted\"     Hctz Unknown     \"depletes my sodium\"     Propoxyphene      HUT Reaction: Gastrointestinal; HUT Reaction: Nausea And Vomiting; HUT Noted: [id number]     Tramadol Swelling     Swelling of tongue and face     Triamterene      Ultracet Other (See Comments)     Venlafaxine Nausea and Vomiting and Diarrhea     Zolpidem Other (See Comments) and Unknown     Pt doesn't rember       Zolpidem Tartrate Unknown     Bacitracin Itching and Rash     Sulfanilamide Rash      Medical, surgical, family, social history, and medications were all reviewed and updated as necessary.   Lab Results    Chemistry/lipid CBC Cardiac Enzymes/BNP/TSH/INR   Recent Labs   Lab Test 11/15/22  0800   CHOL 132   HDL 43*   LDL 68   TRIG 106     Recent Labs   Lab Test 11/15/22  0800 06/08/22  0905 06/10/21  1454   LDL 68 95 70     Recent Labs   Lab Test 11/30/22  1534 11/27/22  2315   NA  --  141   POTASSIUM 4.2 3.2*   CHLORIDE  --  102   CO2  --  26   GLC  --  113   BUN  --  20   CR  --  1.08   GFRESTIMATED  --  54*   ISSA  --  10.1     Recent Labs "   Lab Test 11/27/22 2315 11/07/22  0959 10/13/22  0525   CR 1.08 0.82 0.77     No results for input(s): A1C in the last 05903 hours.       Recent Labs   Lab Test 11/27/22 2315   WBC 6.5   HGB 12.1   HCT 37.1   MCV 90        Recent Labs   Lab Test 11/27/22 2315 11/07/22  0959 10/11/22  2303   HGB 12.1 12.2 13.2    Recent Labs   Lab Test 11/28/22  0139 11/27/22  2315 10/12/22  0354   TROPONINI 0.02 0.01 0.03     Recent Labs   Lab Test 11/27/22 2315        Recent Labs   Lab Test 06/08/22  0905   TSH 3.99     Recent Labs   Lab Test 11/27/22 2315   INR 1.07          Total Time- 65 minutes spent on date of encounter doing chart review, history and exam, documentation and further activities as noted above.  This note has been dictated using voice recognition software. Any grammatical, typographical, or context distortions are unintentional and inherent to the software.                       Thank you for allowing me to participate in the care of your patient.    Sincerely,     ANDREA Ramirez [facility]

## 2022-12-07 ENCOUNTER — HOSPITAL ENCOUNTER (OUTPATIENT)
Dept: CARDIAC REHAB | Facility: CLINIC | Age: 72
Discharge: HOME | End: 2022-12-07
Attending: INTERNAL MEDICINE
Payer: MEDICARE

## 2022-12-07 PROCEDURE — 93798 PHYS/QHP OP CAR RHAB W/ECG: CPT

## 2022-12-09 ENCOUNTER — HOSPITAL ENCOUNTER (OUTPATIENT)
Dept: CARDIAC REHAB | Facility: CLINIC | Age: 72
Discharge: HOME | End: 2022-12-09
Attending: INTERNAL MEDICINE
Payer: MEDICARE

## 2022-12-09 ENCOUNTER — ALLIED HEALTH/NURSE VISIT (OUTPATIENT)
Dept: CARDIOLOGY | Facility: CLINIC | Age: 72
End: 2022-12-09
Payer: MEDICARE

## 2022-12-09 VITALS
HEIGHT: 61 IN | SYSTOLIC BLOOD PRESSURE: 116 MMHG | BODY MASS INDEX: 34.61 KG/M2 | DIASTOLIC BLOOD PRESSURE: 64 MMHG | RESPIRATION RATE: 16 BRPM | WEIGHT: 183.3 LBS | HEART RATE: 57 BPM

## 2022-12-09 DIAGNOSIS — I48.0 PAROXYSMAL ATRIAL FIBRILLATION (H): ICD-10-CM

## 2022-12-09 LAB
ATRIAL RATE - MUSE: 57 BPM
DIASTOLIC BLOOD PRESSURE - MUSE: NORMAL MMHG
INTERPRETATION ECG - MUSE: NORMAL
P AXIS - MUSE: 34 DEGREES
PR INTERVAL - MUSE: 150 MS
QRS DURATION - MUSE: 86 MS
QT - MUSE: 454 MS
QTC - MUSE: 441 MS
R AXIS - MUSE: 10 DEGREES
SYSTOLIC BLOOD PRESSURE - MUSE: NORMAL MMHG
T AXIS - MUSE: 41 DEGREES
VENTRICULAR RATE- MUSE: 57 BPM

## 2022-12-09 PROCEDURE — 93798 PHYS/QHP OP CAR RHAB W/ECG: CPT

## 2022-12-09 PROCEDURE — 99207 PR NO CHARGE NURSE ONLY: CPT

## 2022-12-09 PROCEDURE — 93000 ELECTROCARDIOGRAM COMPLETE: CPT | Performed by: INTERNAL MEDICINE

## 2022-12-09 NOTE — PROGRESS NOTES
EKG Visit    Pt here for EKG, QTc check after starting 80mg Sotalol BID on Tuesday 12/6/22    EKG shows SB with HR of 51 bpm, QT of 454 ms, and QTc of 441 ms  QTc within acceptable limits, pts EKG was compared to previous EKG in EMR, EKG is stable, and there has been minimal change in QTc  Vital signes were reviewed and are within normal limits    Pt was instructed to continue current medication as prescribed, results would be sent to ordering provider for review, pt will be contacted with further changes if necessary and pt verbalized understanding     Results sent to  Tahira Mukherjee NP [facility] for further review and recommendations if necessary  12/9/2022 12:50 PM  Norma Collins RN         Per Jan OV 12/6:  I recommended to switch from metoprolol to tartrate 50 mg p.o. twice daily to sotalol 80 mg p.o. twice daily starting Tuesday morning and twelve-lead ECG on Friday, December 9 for QTC check.

## 2022-12-12 NOTE — PROGRESS NOTES
Tahira Mukherjee, APRN CNP  [facility] Ep Support Pool - [facility]  EKG reviewed, QTc interval okay.  Continue sotalol.   Thanks,   Tahira

## 2022-12-13 ENCOUNTER — OFFICE VISIT (OUTPATIENT)
Dept: INTERNAL MEDICINE | Facility: CLINIC | Age: 72
End: 2022-12-13
Payer: MEDICARE

## 2022-12-13 ENCOUNTER — ANCILLARY PROCEDURE (OUTPATIENT)
Dept: GENERAL RADIOLOGY | Facility: CLINIC | Age: 72
End: 2022-12-13
Attending: INTERNAL MEDICINE
Payer: MEDICARE

## 2022-12-13 ENCOUNTER — HOSPITAL ENCOUNTER (OUTPATIENT)
Dept: CARDIAC REHAB | Facility: CLINIC | Age: 72
Discharge: HOME | End: 2022-12-13
Attending: INTERNAL MEDICINE
Payer: MEDICARE

## 2022-12-13 VITALS
DIASTOLIC BLOOD PRESSURE: 63 MMHG | WEIGHT: 184.8 LBS | HEIGHT: 61 IN | RESPIRATION RATE: 16 BRPM | HEART RATE: 64 BPM | TEMPERATURE: 97.8 F | BODY MASS INDEX: 34.89 KG/M2 | SYSTOLIC BLOOD PRESSURE: 116 MMHG | OXYGEN SATURATION: 94 %

## 2022-12-13 DIAGNOSIS — I25.110 CORONARY ARTERY DISEASE INVOLVING NATIVE CORONARY ARTERY OF NATIVE HEART WITH UNSTABLE ANGINA PECTORIS (H): ICD-10-CM

## 2022-12-13 DIAGNOSIS — I50.9 CONGESTIVE HEART FAILURE, UNSPECIFIED HF CHRONICITY, UNSPECIFIED HEART FAILURE TYPE (H): ICD-10-CM

## 2022-12-13 DIAGNOSIS — M79.89 SWOLLEN FINGER: ICD-10-CM

## 2022-12-13 DIAGNOSIS — I48.0 PAROXYSMAL ATRIAL FIBRILLATION (H): ICD-10-CM

## 2022-12-13 DIAGNOSIS — E03.9 ACQUIRED HYPOTHYROIDISM: ICD-10-CM

## 2022-12-13 DIAGNOSIS — M81.0 SENILE OSTEOPOROSIS: Primary | ICD-10-CM

## 2022-12-13 DIAGNOSIS — Z95.5 S/P PRIMARY ANGIOPLASTY WITH CORONARY STENT: ICD-10-CM

## 2022-12-13 DIAGNOSIS — M54.2 NECK PAIN: ICD-10-CM

## 2022-12-13 PROBLEM — I20.0 UNSTABLE ANGINA (H): Status: RESOLVED | Noted: 2022-10-12 | Resolved: 2022-12-13

## 2022-12-13 PROBLEM — M81.8 OTHER OSTEOPOROSIS WITHOUT CURRENT PATHOLOGICAL FRACTURE: Status: RESOLVED | Noted: 2017-12-07 | Resolved: 2022-12-13

## 2022-12-13 LAB
ANION GAP SERPL CALCULATED.3IONS-SCNC: 12 MMOL/L (ref 7–15)
BUN SERPL-MCNC: 18.2 MG/DL (ref 8–23)
CALCIUM SERPL-MCNC: 9.8 MG/DL (ref 8.8–10.2)
CHLORIDE SERPL-SCNC: 103 MMOL/L (ref 98–107)
CREAT SERPL-MCNC: 0.85 MG/DL (ref 0.51–0.95)
DEPRECATED HCO3 PLAS-SCNC: 25 MMOL/L (ref 22–29)
GFR SERPL CREATININE-BSD FRML MDRD: 71 ML/MIN/1.73M2
GLUCOSE SERPL-MCNC: 100 MG/DL (ref 70–99)
POTASSIUM SERPL-SCNC: 4.3 MMOL/L (ref 3.4–5.3)
SODIUM SERPL-SCNC: 140 MMOL/L (ref 136–145)
TSH SERPL DL<=0.005 MIU/L-ACNC: 3.53 UIU/ML (ref 0.3–4.2)

## 2022-12-13 PROCEDURE — 36415 COLL VENOUS BLD VENIPUNCTURE: CPT | Performed by: INTERNAL MEDICINE

## 2022-12-13 PROCEDURE — 93798 PHYS/QHP OP CAR RHAB W/ECG: CPT

## 2022-12-13 PROCEDURE — 73140 X-RAY EXAM OF FINGER(S): CPT | Mod: TC | Performed by: RADIOLOGY

## 2022-12-13 PROCEDURE — 80048 BASIC METABOLIC PNL TOTAL CA: CPT | Performed by: INTERNAL MEDICINE

## 2022-12-13 PROCEDURE — 96372 THER/PROPH/DIAG INJ SC/IM: CPT | Performed by: INTERNAL MEDICINE

## 2022-12-13 PROCEDURE — 99215 OFFICE O/P EST HI 40 MIN: CPT | Mod: 25 | Performed by: INTERNAL MEDICINE

## 2022-12-13 PROCEDURE — 84443 ASSAY THYROID STIM HORMONE: CPT | Performed by: INTERNAL MEDICINE

## 2022-12-13 RX ORDER — TIZANIDINE 2 MG/1
2 TABLET ORAL 3 TIMES DAILY PRN
Qty: 30 TABLET | Refills: 1 | Status: SHIPPED | OUTPATIENT
Start: 2022-12-13 | End: 2023-03-10

## 2022-12-13 ASSESSMENT — PATIENT HEALTH QUESTIONNAIRE - PHQ9
SUM OF ALL RESPONSES TO PHQ QUESTIONS 1-9: 1
10. IF YOU CHECKED OFF ANY PROBLEMS, HOW DIFFICULT HAVE THESE PROBLEMS MADE IT FOR YOU TO DO YOUR WORK, TAKE CARE OF THINGS AT HOME, OR GET ALONG WITH OTHER PEOPLE: NOT DIFFICULT AT ALL
SUM OF ALL RESPONSES TO PHQ QUESTIONS 1-9: 1

## 2022-12-13 NOTE — PATIENT INSTRUCTIONS
Prolia 9th today.  Prolia 10th in 6 months with AWV with me.    DXA in 5/2023 .   Phone number to schedule [phone].    Daily calcium need is 4785-3582 mg a day from the diet and supplements.  Calcium citrate is easier to digest.  Vitamin D 2000 IU daily recommended.    Risk of rebound vertebral fractures is higher when Prolia suddenly stopped or dose was missed.      Prolia and Covid vaccine should be  for at least a week.     You can take Tylenol arthritis and use Voltaren gel OTC for the finger swelling.    Look up online exercise for neck pain and arthritis in the neck. You can use Voltaren gel, Aspercream with lidocaine, OTC patches/gels. I will send Rx or muscle relaxant.  
No

## 2022-12-13 NOTE — PROGRESS NOTES
(M81.0) Senile osteoporosis  (primary encounter diagnosis)  Comment: Surgical history significant for hysterectomy at age of 35 and early menopause which was treated with hormonal therapy for 5-6 years only.  eGFR 54 on 11/27/22.  On Prolia since 8/2018. Tolerating it well.  DXA 5/2021:  1. The spine bone density L1-L4 was not done because of the significant artifacts.  2. Femoral bone densities show left femoral neck T- score -2.0 and right femoral neck T-score -1.9 and significant improvement of 3.8% on the right hip compared to 2019.  3. Left forearm bone density with T-score -2.5.    Component      Latest Ref Rng & Units 11/27/2022   Sodium      136 - 145 mmol/L 141   Potassium      3.5 - 5.0 mmol/L 3.2 (L)   Chloride      98 - 107 mmol/L 102   Carbon Dioxide      22 - 31 mmol/L 26   Anion Gap      5 - 18 mmol/L 13   Urea Nitrogen      8 - 28 mg/dL 20   Creatinine      0.60 - 1.10 mg/dL 1.08   Calcium      8.5 - 10.5 mg/dL 10.1   Glucose      70 - 125 mg/dL 113   Alkaline Phosphatase      45 - 120 U/L 77   AST      0 - 40 U/L 18   ALT      0 - 45 U/L 21   Protein Total      6.0 - 8.0 g/dL 7.4   Albumin      3.5 - 5.0 g/dL 4.2   Bilirubin Total      0.0 - 1.0 mg/dL 0.5   GFR Estimate      >60 mL/min/1.73m2 54 (L)     Plan: DX Hip/Pelvis/Spine            (I25.110) Coronary artery disease involving native coronary artery of native heart with unstable angina pectoris (H)      (Z95.5) S/P primary angioplasty with coronary stent  Coronary angiogram on 10/12/2022 revealed 90% stenosis and large OM 2 which was successfully treated with drug-eluting stent.    10/12/22 - PCI for a left circumflex lesion causing unstable angina.  11/8/22 - Successful PCI distal LAD.  No residual stenosis.  Recommend dual antiplatelet therapy for 12 months, or stop ASA if/when Eliquis is started for afib, but continue Plavix while on Eliquis    She is experiencing chest discomfort 1-2x/week, that can last for 30 min, usually resolve with  Ngl use. She is doing cardiac rehab with no symptoms. No dyspnea, edema. She will see Cardiologist in few weeks. She will start making notes about the chest pain symptoms. It's nothing like before stent placement, does not radiate.    (I48.0) Paroxysmal atrial fibrillation (H)  Comment: New diagnosis, first time was seen in the ED before the stent placement. Holter 10/28/22:    Abnormal multi day patient activated monitor by virtue of the presence of atrial fibrillation (note that the automated over read called this atrial flutter which is incorrect).  The patient did not have any symptoms in association with the arrhythmia event and the overall duration/burden cannot be estimated from this type of monitor as opposed to utilizing a MCOT type monitor which does provide burden data.    The patient's symptomatic recordings did not correlate with any arrhythmia.    No sustained ventricular arrhythmia.    No profound bradycardia or significant pauses.    On sotalol 80 mg p.o. twice daily  And Eliquis. I discussed the role of antiarrhythmics versus ablation.  She recently had 2 stents placed.   dual antiplatelet therapy for 12 months    (I50.9) Congestive heart failure, unspecified HF chronicity, unspecified heart failure type (H)  Comment: no signs or symptoms of decompensated heart failure.  On furosemide 40 mg every day. She was started on potassium when furosemide prescribed, but was not able to tolerate it because of diarrhea. K 3.2 on the last labs. I will recheck today. We ill probably have to try potassium pills again, but we discussed how she can get potassium in her diet.  Plan: Basic metabolic panel            (E03.9) Acquired hypothyroidism  Comment: On levothyroxine, will check labs today  Plan: TSH with free T4 reflex            (M79.89) Swollen finger  Comment: for the last 2-3 days, noticed palpable tender nodule on the 3rd PIP. No injury. Possible pesudogout vs osteoarthritis. Home treatment  "discussed.  Plan: XR Finger Right G/E 2 Views            (M54.2) Neck pain  Comment: for the last 3-4 weeks, right sided neck pain and stiffness with no radiculopathy. Change of the pillow and stretching did not help. Home exercise, OTC meds and PT and muscle relaxant discussed.  Plan: tiZANidine (ZANAFLEX) 2 MG tablet            Total time spent on the date of this encounter doing: chart review, review of test results, patient visit, physical exam, education, counseling, developing plan of care, and documenting =  43   Minutes.    Patient was educated on safety of Prolia utilizing Patient Counseling Chart for Healthcare Providers, as outlined by the Prolia REMS progam.     Return in about 6 months (around 6/13/2023) for AWV, Prolia.    Patient Instructions   Prolia 9th today.  Prolia 10th in 6 months with AWV with me.    DXA in 5/2023 .   Phone number to schedule [phone].    Daily calcium need is 8022-5744 mg a day from the diet and supplements.  Calcium citrate is easier to digest.  Vitamin D 2000 IU daily recommended.    Risk of rebound vertebral fractures is higher when Prolia suddenly stopped or dose was missed.      Prolia and Covid vaccine should be  for at least a week.     You can take Tylenol arthritis and use Voltaren gel OTC for the finger swelling.    Look up online exercise for neck pain and arthritis in the neck. You can use Voltaren gel, Aspercream with lidocaine, OTC patches/gels. I will send Rx or muscle relaxant.          /63   Pulse 64   Temp 97.8  F (36.6  C) (Oral)   Resp 16   Ht 1.549 m (5' 1\")   Wt 83.8 kg (184 lb 12.8 oz)   LMP  (LMP Unknown)   SpO2 94%   BMI 34.92 kg/m        Did you experience any problems with previous Prolia injection? no  Any medication change in the last 6 months? no  Did you take prednisone or other immunosupressant drugs in the last 6 months   (chemo, transplant, rheum, dermatology conditions)? no  Did you have any serious infection in the " last 6 months?no  Any recent hospitalizations?no  Do you plan any dental work in the next 2-3 months?no  How much calcium do you take daily from the diet and supplements?1200 mg  How much vit D do you take daily? 2000 IU  Last DXA? 5/2021 Reviewed and discussed      Patient is here today for the  Prolia injection. Patient tolerated previous injections well.   We discussed calcium and vit D daily needs today.   I reviewed the lab results.      ROS: complete ROS negative.  Physical exam:  Constitutional:  oriented to person, place, and time, appears well-nourished. No distress.   HENT:   Head: Normocephalic.   Mouth/Throat: Oropharynx is clear and moist.   Eyes: Conjunctivae are normal. Pupils are equal, round, and reactive to light.   Neck: Normal range of motion. Neck supple.   Cardiovascular: Normal rate, regular rhythm and normal heart sounds.    Pulmonary/Chest: Effort normal and breath sounds normal.   Abdominal: Soft. Bowel sounds are normal.   Musculoskeletal: Normal range of motion.   Neurological: alert and oriented to person, place, and time. Skin: Skin is warm.   Psychiatric: normal mood and affect.  We discussed high risk of rebound vertebral fractures when Prolia suddenly stopped.    Next Prolia injection will be in 6 months.           This note has been dictated using voice recognition software. Any grammatical or context distortions are unintentional and inherent to the software      Patient Active Problem List   Diagnosis     Total knee replacement status     Anxiety     Acquired hypothyroidism     Chronic back pain     Fibromyalgia     Depression, unspecified depression type     Diverticulosis     Essential hypertension     Gastroesophageal reflux disease without esophagitis     Insomnia, unspecified type     Migraine     Dyslipidemia, goal LDL below 70     Restless leg syndrome     Thyroid nodule     Congestive heart failure (H)     Coronary artery disease involving native coronary artery with  unstable angina pectoris (H)     S/P primary angioplasty with coronary stent     Paroxysmal atrial fibrillation (H)     Senile osteoporosis       Current Outpatient Medications   Medication     amLODIPine (NORVASC) 5 MG tablet     atorvastatin (LIPITOR) 80 MG tablet     botulinum toxin type A (BOTOX) 100 units injection     busPIRone (BUSPAR) 10 MG tablet     cholecalciferol 50 MCG (2000 UT) CAPS     citalopram (CELEXA) 20 MG tablet     clopidogrel (PLAVIX) 75 MG tablet     eletriptan (RELPAX) 40 MG tablet     ELIQUIS ANTICOAGULANT 5 MG tablet     furosemide (LASIX) 20 MG tablet     HYDROcodone-acetaminophen (NORCO) 5-325 MG tablet     irbesartan (AVAPRO) 300 MG tablet     levothyroxine (SYNTHROID/LEVOTHROID) 88 MCG tablet     LORazepam (ATIVAN) 0.5 MG tablet     nitroGLYcerin (NITROSTAT) 0.4 MG sublingual tablet     pramipexole (MIRAPEX) 0.25 MG tablet     sotalol (BETAPACE) 80 MG tablet     tiZANidine (ZANAFLEX) 2 MG tablet     traZODone (DESYREL) 50 MG tablet     ubrogepant (UBRELVY) 100 MG tablet     No current facility-administered medications for this visit.

## 2022-12-15 ENCOUNTER — HOSPITAL ENCOUNTER (OUTPATIENT)
Dept: CARDIAC REHAB | Facility: CLINIC | Age: 72
Discharge: HOME | End: 2022-12-15
Attending: INTERNAL MEDICINE
Payer: MEDICARE

## 2022-12-15 PROCEDURE — 93798 PHYS/QHP OP CAR RHAB W/ECG: CPT

## 2022-12-20 ENCOUNTER — HOSPITAL ENCOUNTER (OUTPATIENT)
Dept: CARDIAC REHAB | Facility: CLINIC | Age: 72
Discharge: HOME | End: 2022-12-20
Attending: INTERNAL MEDICINE
Payer: MEDICARE

## 2022-12-20 PROCEDURE — 93798 PHYS/QHP OP CAR RHAB W/ECG: CPT

## 2022-12-22 ENCOUNTER — HOSPITAL ENCOUNTER (OUTPATIENT)
Dept: CARDIAC REHAB | Facility: CLINIC | Age: 72
Discharge: HOME | End: 2022-12-22
Attending: INTERNAL MEDICINE
Payer: MEDICARE

## 2022-12-22 PROCEDURE — 93798 PHYS/QHP OP CAR RHAB W/ECG: CPT

## 2022-12-27 ENCOUNTER — MYC MEDICAL ADVICE (OUTPATIENT)
Dept: INTERNAL MEDICINE | Facility: CLINIC | Age: 72
End: 2022-12-27

## 2022-12-27 DIAGNOSIS — F41.9 ANXIETY: ICD-10-CM

## 2022-12-27 RX ORDER — CITALOPRAM HYDROBROMIDE 20 MG/1
40 TABLET ORAL DAILY
Qty: 180 TABLET | Refills: 2 | Status: SHIPPED | OUTPATIENT
Start: 2022-12-27 | End: 2023-04-03

## 2022-12-28 ENCOUNTER — HOSPITAL ENCOUNTER (OUTPATIENT)
Dept: CARDIAC REHAB | Facility: CLINIC | Age: 72
Discharge: HOME | End: 2022-12-28
Attending: INTERNAL MEDICINE
Payer: MEDICARE

## 2022-12-28 PROCEDURE — 93798 PHYS/QHP OP CAR RHAB W/ECG: CPT

## 2022-12-30 ENCOUNTER — HOSPITAL ENCOUNTER (OUTPATIENT)
Dept: CARDIAC REHAB | Facility: CLINIC | Age: 72
Discharge: HOME | End: 2022-12-30
Attending: INTERNAL MEDICINE
Payer: MEDICARE

## 2022-12-30 PROCEDURE — 93798 PHYS/QHP OP CAR RHAB W/ECG: CPT

## 2023-01-03 ENCOUNTER — HOSPITAL ENCOUNTER (OUTPATIENT)
Dept: CARDIAC REHAB | Facility: CLINIC | Age: 73
Discharge: HOME | End: 2023-01-03
Attending: INTERNAL MEDICINE
Payer: MEDICARE

## 2023-01-03 PROCEDURE — 93797 PHYS/QHP OP CAR RHAB WO ECG: CPT

## 2023-01-03 PROCEDURE — 93798 PHYS/QHP OP CAR RHAB W/ECG: CPT

## 2023-01-04 ENCOUNTER — MYC MEDICAL ADVICE (OUTPATIENT)
Dept: CARDIOLOGY | Facility: CLINIC | Age: 73
End: 2023-01-04

## 2023-01-04 NOTE — TELEPHONE ENCOUNTER
From: Shalonda Sadler APRN CNP  Sent: 1/4/2023  12:38 PM CST  To: Nata Linares  Subject: FW: My prescription for Yane Peace  Pt is scheduled to see Dr. Worley on 1/9/22.  One option is if we have Xarelto 30 days free coupon she can use and then discus with Dr. Worley or  1 week worth prescription if pharmacy allows or switch to warfarin.  Could you please discuss with patient and let me know what she would prefers?  Thank you!  CY

## 2023-01-05 DIAGNOSIS — I48.0 PAROXYSMAL ATRIAL FIBRILLATION (H): ICD-10-CM

## 2023-01-05 DIAGNOSIS — I10 ESSENTIAL HYPERTENSION: ICD-10-CM

## 2023-01-05 NOTE — TELEPHONE ENCOUNTER
Spoke with patient, indicated has enough refill of eliquis until nov with Dr. Worley on 01/09. Will discuss options for anticoagulation at that time.-LIYAH

## 2023-01-06 ENCOUNTER — HOSPITAL ENCOUNTER (OUTPATIENT)
Dept: CARDIAC REHAB | Facility: CLINIC | Age: 73
Discharge: HOME | End: 2023-01-06
Attending: INTERNAL MEDICINE
Payer: MEDICARE

## 2023-01-06 PROCEDURE — 93798 PHYS/QHP OP CAR RHAB W/ECG: CPT

## 2023-01-09 ENCOUNTER — HOSPITAL ENCOUNTER (OUTPATIENT)
Dept: CARDIAC REHAB | Facility: CLINIC | Age: 73
Discharge: HOME | End: 2023-01-09
Attending: INTERNAL MEDICINE
Payer: MEDICARE

## 2023-01-09 ENCOUNTER — OFFICE VISIT (OUTPATIENT)
Dept: CARDIOLOGY | Facility: CLINIC | Age: 73
End: 2023-01-09
Attending: NURSE PRACTITIONER
Payer: MEDICARE

## 2023-01-09 VITALS
SYSTOLIC BLOOD PRESSURE: 98 MMHG | HEART RATE: 68 BPM | RESPIRATION RATE: 16 BRPM | HEIGHT: 61 IN | BODY MASS INDEX: 34 KG/M2 | WEIGHT: 180.1 LBS | DIASTOLIC BLOOD PRESSURE: 48 MMHG

## 2023-01-09 DIAGNOSIS — I10 ESSENTIAL HYPERTENSION: ICD-10-CM

## 2023-01-09 DIAGNOSIS — I50.32 CHRONIC HEART FAILURE WITH PRESERVED EJECTION FRACTION (HFPEF) (H): ICD-10-CM

## 2023-01-09 DIAGNOSIS — I48.0 PAROXYSMAL ATRIAL FIBRILLATION (H): ICD-10-CM

## 2023-01-09 DIAGNOSIS — I25.119 CORONARY ARTERY DISEASE INVOLVING NATIVE CORONARY ARTERY OF NATIVE HEART WITH ANGINA PECTORIS (H): Primary | ICD-10-CM

## 2023-01-09 LAB
ANION GAP SERPL CALCULATED.3IONS-SCNC: 10 MMOL/L (ref 5–18)
BUN SERPL-MCNC: 21 MG/DL (ref 8–28)
CALCIUM SERPL-MCNC: 9.6 MG/DL (ref 8.5–10.5)
CHLORIDE BLD-SCNC: 101 MMOL/L (ref 98–107)
CO2 SERPL-SCNC: 26 MMOL/L (ref 22–31)
CREAT SERPL-MCNC: 1 MG/DL (ref 0.6–1.1)
ERYTHROCYTE [DISTWIDTH] IN BLOOD BY AUTOMATED COUNT: 13.2 % (ref 10–15)
GFR SERPL CREATININE-BSD FRML MDRD: 59 ML/MIN/1.73M2
GLUCOSE BLD-MCNC: 103 MG/DL (ref 70–125)
HCT VFR BLD AUTO: 36.9 % (ref 35–47)
HGB BLD-MCNC: 11.9 G/DL (ref 11.7–15.7)
MCH RBC QN AUTO: 29 PG (ref 26.5–33)
MCHC RBC AUTO-ENTMCNC: 32.2 G/DL (ref 31.5–36.5)
MCV RBC AUTO: 90 FL (ref 78–100)
PLATELET # BLD AUTO: 319 10E3/UL (ref 150–450)
POTASSIUM BLD-SCNC: 4.6 MMOL/L (ref 3.5–5)
RBC # BLD AUTO: 4.11 10E6/UL (ref 3.8–5.2)
SODIUM SERPL-SCNC: 137 MMOL/L (ref 136–145)
WBC # BLD AUTO: 6.9 10E3/UL (ref 4–11)

## 2023-01-09 PROCEDURE — 36415 COLL VENOUS BLD VENIPUNCTURE: CPT | Performed by: INTERNAL MEDICINE

## 2023-01-09 PROCEDURE — 93798 PHYS/QHP OP CAR RHAB W/ECG: CPT

## 2023-01-09 PROCEDURE — 99214 OFFICE O/P EST MOD 30 MIN: CPT | Performed by: INTERNAL MEDICINE

## 2023-01-09 PROCEDURE — 80048 BASIC METABOLIC PNL TOTAL CA: CPT | Performed by: INTERNAL MEDICINE

## 2023-01-09 PROCEDURE — 85027 COMPLETE CBC AUTOMATED: CPT | Performed by: INTERNAL MEDICINE

## 2023-01-09 RX ORDER — ISOSORBIDE MONONITRATE 30 MG/1
30 TABLET, EXTENDED RELEASE ORAL DAILY
Qty: 30 TABLET | Refills: 11 | Status: SHIPPED | OUTPATIENT
Start: 2023-01-09 | End: 2023-12-07

## 2023-01-09 RX ORDER — IRBESARTAN 150 MG/1
150 TABLET ORAL DAILY
Qty: 90 TABLET | Refills: 3 | Status: SHIPPED | OUTPATIENT
Start: 2023-01-09 | End: 2024-01-04

## 2023-01-09 NOTE — LETTER
1/9/2023    Bernadette Taylor MD  [address] [facility] [address]    RE: Suma Mark       Dear Colleague,     I had the pleasure of seeing Suma Mark in the [facility].            Assessment/Plan:   1.  Coronary artery disease, s/p EUGENIA to mid LCX/OM1 and EUGENIA to distal LAD, chest pressure: The patient's chest pressure is not exertional.  It is frequent at rest.  Usually, she takes 2 nitros.  I am not quite sure her chest pressure is caused by anginal equivalent or paroxysmal atrial fibrillation.  Her coronary PCI has fixed obstructive lesion, no obstructive lesion left.  We discussed further evaluation and management.  After discussion, 7 days event monitor is requested to rule out paroxysmal atrial fibrillation related chest pressure.  Also request echocardiogram for evaluation of her heart function and structure.  Meantime, start isosorbide mononitrate 30 mg daily.  Continue aspirin, atorvastatin.    2.  Chronic congestive heart failure with preserved ejection fraction: She did not complain of shortness of breath.  She has no leg edema.  No JVDs.  Her lungs are clear.  I think she is compensated at this time.  Continue Lasix 40 mg daily.  Routine labs CBC and BMP.    3.  Paroxysmal atrial fibrillation: She did not complain of palpitations.  But she has a frequent chest pressure which could be caused by episodes of atrial fibrillation.  Event monitor evaluation as discussed above.  Continue sotalol 80 m twice a day.  Her [id number] score is 5.  She is on Eliquis 5 mg twice a day.  It costs $590 each month.  We discussed the options such as Xarelto and Pradaxa.  Also refer the patient to watchman device for further discussion.    4.  Primary hypertension: Her blood pressure at a low side.  Reduced irbesartan from 300 mg daily to 150 mg daily to avoid dizziness and lightheadedness.    5.  Dyslipidemia: Continue atorvastatin 80 mg at bedtime.  LDL was 68.    6.  Obesity: Lifestyle  modification.    We will follow-up her echo, cardiac monitor, laboratory test reports, discuss the findings with the patient.    Thank you for the opportunity to be involved in the care of Suma Mark. If you have any questions, please feel free to contact me.  I will see the patient again in 2 months and as needed.    Much or all of the text in this note was generated through the use of Dragon Dictate voice-to-text software. Errors in spelling or words which seem out of context are unintentional. Sound alike errors, in particular, may have escaped editing.       History of Present Illness:   It is my pleasure to see Suma Mark at the [facility] for routine cardiology follow up.  Suma Mark is a 72 year old female with a medical history of coronary artery disease status post EUGENIA to mid LCx/OM1, EUGENIA to distal LAD on November 8, 2022, primary hypertension, dyslipidemia, chronic congestive heart failure with preserved ejection fraction, paroxysmal atrial fibrillation, obesity.    The patient states that she has frequent episodes of chest pressure.  Usually she needs to nitro to relieve her symptoms.  It is not exertional.  Her chest pressure occurred at rest.  She did not have palpitations.  She has lightheadedness when she takes nitro.  She has no orthopnea, PND or leg edema.  Her weight has been stable.  Her blood pressure is at a low side.  Her heart rate has been well controlled.    Past Medical History:     Patient Active Problem List   Diagnosis     Total knee replacement status     Anxiety     Acquired hypothyroidism     Chronic back pain     Fibromyalgia     Depression, unspecified depression type     Diverticulosis     Essential hypertension     Gastroesophageal reflux disease without esophagitis     Insomnia, unspecified type     Migraine     Dyslipidemia, goal LDL below 70     Restless leg syndrome     Thyroid nodule     Congestive heart failure (H)     Coronary  artery disease involving native coronary artery with unstable angina pectoris (H)     S/P primary angioplasty with coronary stent     Paroxysmal atrial fibrillation (H)     Senile osteoporosis       Past Surgical History:     Past Surgical History:   Procedure Laterality Date     ARTHROCENTESIS      LEFT HIP TROCHANTERIC BURSA     ARTHROPLASTY KNEE UNICOMPARTMENT  10/31/2013    Procedure: ARTHROPLASTY KNEE UNICOMPARTMENT;  LEFT KNEE UNICOMPARTMENTAL ARTHROPLASTY (CAROLINE)^;  Surgeon: Chi Mittal MD;  Location: [facility] OR     ARTICULAR DEBRIEDEMENT[      LEFT KNEE     BACK SURGERY       BREAST CYST EXCISION       BREAST SURGERY      bx     COLECTOMY N/A 6/2/2017    Procedure: RESECTION, SMALL INTESTINE, OPEN ADHESIOLYSIS;  Surgeon: Keyon Qureshi MD;  Location: [facility];  Service:      COLON SURGERY       CV CORONARY ANGIOGRAM N/A 10/12/2022    Procedure: CV CORONARY ANGIOGRAM;  Surgeon: You Martinez MD;  Location: [facility] CV     CV FRACTIONAL FLOW RATIO WIRE N/A 10/12/2022    Procedure: Fractional Flow Ratio Wire;  Surgeon: You Martinez MD;  Location: [facility] CV     CV PCI STENT DRUG ELUTING N/A 10/12/2022    Procedure: Percutaneous Coronary Intervention Stent;  Surgeon: You Martinez MD;  Location: [facility] LAB CV     CV PCI STENT DRUG ELUTING N/A 11/8/2022    Procedure: Percutaneous Coronary Intervention - Stent;  Surgeon: Bret Jin MD;  Location: [facility] CV     ENDOSCOPIC RETROGRADE CHOLANGIOPANCREATOGRAPHY       ENDOSCOPIC STRIPPING VEIN(S)      BILATERLY     GENITOURINARY SURGERY      bladder sling     GI SURGERY      hemorrhoidectomy     HEMORRHOID SURGERY       HERNIORRHAPHY INCISIONAL (LOCATION) N/A 6/2/2017    Procedure: LAPARASCOPIC CONVERTED TO OPEN PRIMARY INCISIONAL HERNIA REPAIR;  Surgeon: Keyon Qureshi MD;  Location: [facility];  Service:      HYSTERECTOMY  [id number]     HYSTERECTOMY VAGINAL       INCONTINENCE SURGERY        LAMINECTOMY LUMBAR TWO LEVELS      2003     LUMBAR FUSION       LUMBAR HARDWARE REMOVAL[       ORTHOPEDIC SURGERY      carpal tunnel     MD LAP,DIAGNOSTIC ABDOMEN N/A 8/22/2019    Procedure: DIAGNOSTIC LAPAROSCOPY, LYSIS OF ADHESION;  Surgeon: Svetlana Ron MD;  Location: [facility] OR;  Service: General     RELEASE CARPAL TUNNEL       STRIP VEIN       TRANSARTHISCOPIC SURGERY[      LEFT KNEE     US THYROID BIOPSY  4/19/2019     VEIN LIGATION AND STRIPPING Bilateral      ZZC TOTAL KNEE ARTHROPLASTY Right 11/27/2017    Procedure:  RIGHT TOTAL KNEE ARTHROPLASTY;  Surgeon: Kevin Ryder MD;  Location: [facility] OR;  Service: Orthopedics       Family History:     Family History   Problem Relation Age of Onset     Dementia Mother      Arthritis Mother      Chronic Obstructive Pulmonary Disease Father      Cardiovascular Father      Hypertension Father      No Known Problems Sister      No Known Problems Daughter      No Known Problems Son      Cerebrovascular Disease Maternal Grandmother      Heart Disease Paternal Grandmother      Cerebrovascular Disease Paternal Grandmother      No Known Problems Sister      No Known Problems Sister      No Known Problems Son      No Known Problems Daughter      Breast Cancer Paternal Aunt         age in 50's        Social History:    reports that she has never smoked. She has never used smokeless tobacco. She reports current alcohol use. She reports that she does not use drugs.    Review of Systems:   12 systems are reviewed negative except for in HPI.    Meds:     Current Outpatient Medications:      amLODIPine (NORVASC) 5 MG tablet, TAKE 1 TABLET BY MOUTH EVERYDAY AT BEDTIME, Disp: 90 tablet, Rfl: 3     apixaban ANTICOAGULANT (ELIQUIS ANTICOAGULANT) 5 MG tablet, Take 1 tablet (5 mg) by mouth every 12 hours, Disp: 180 tablet, Rfl: 3     atorvastatin (LIPITOR) 80 MG tablet, Take 1 tablet (80 mg) by mouth At Bedtime, Disp: 90 tablet, Rfl: 3     botulinum toxin type A  (BOTOX) 100 units injection, Every 3 months. [facility], Disp: , Rfl:      busPIRone (BUSPAR) 10 MG tablet, Take 1 tablet (10 mg) by mouth 2 times daily, Disp: 180 tablet, Rfl: 3     cholecalciferol 50 MCG (2000 UT) CAPS, Take 2,000 Units by mouth daily, Disp: , Rfl:      citalopram (CELEXA) 20 MG tablet, Take 2 tablets (40 mg) by mouth daily, Disp: 180 tablet, Rfl: 2     clopidogrel (PLAVIX) 75 MG tablet, Take 1 tablet (75 mg) by mouth daily, Disp: 90 tablet, Rfl: 3     eletriptan (RELPAX) 40 MG tablet, Take 1 tablet (40 mg) by mouth at onset of headache for migraine, Disp: 10 tablet, Rfl: 0     furosemide (LASIX) 20 MG tablet, Take 2 tablets (40 mg) by mouth daily, Disp: 180 tablet, Rfl: 3     HYDROcodone-acetaminophen (NORCO) 5-325 MG tablet, Take 1 tablet by mouth daily as needed (Severe headache), Disp: 5 tablet, Rfl: 0     irbesartan (AVAPRO) 150 MG tablet, Take 1 tablet (150 mg) by mouth daily, Disp: 90 tablet, Rfl: 3     isosorbide mononitrate (IMDUR) 30 MG 24 hr tablet, Take 1 tablet (30 mg) by mouth daily, Disp: 30 tablet, Rfl: 11     levothyroxine (SYNTHROID/LEVOTHROID) 88 MCG tablet, TAKE 1 TABLET BY MOUTH DAILY AT 6:00 AM., Disp: 90 tablet, Rfl: 3     LORazepam (ATIVAN) 0.5 MG tablet, Take 1 tablet (0.5 mg) by mouth every 6 hours as needed for anxiety, Disp: 30 tablet, Rfl: 0     nitroGLYcerin (NITROSTAT) 0.4 MG sublingual tablet, For chest pain place 1 tablet under the tongue every 5 minutes for 3 doses. If symptoms persist 5 minutes after 1st dose call 911., Disp: 30 tablet, Rfl: 1     pramipexole (MIRAPEX) 0.25 MG tablet, TAKE 1 TABLET BY MOUTH THREE TIMES A DAY., Disp: 270 tablet, Rfl: 3     sotalol (BETAPACE) 80 MG tablet, Take 1 tablet (80 mg) by mouth every 12 hours, Disp: 60 tablet, Rfl: 3     tiZANidine (ZANAFLEX) 2 MG tablet, Take 1 tablet (2 mg) by mouth 3 times daily as needed for muscle spasms, Disp: 30 tablet, Rfl: 1     traZODone (DESYREL) 50 MG tablet, TAKE 3 TABLETS BY MOUTH AT  "BEDTIME., Disp: 270 tablet, Rfl: 1     ubrogepant (UBRELVY) 100 MG tablet, UBRELVY 100 MG TABS, Disp: , Rfl:     Allergies:   Cephalexin, Ace inhibitors, Amitriptyline hcl, Atenolol, Celebrex [celecoxib], Cephalosporins, Clonidine, Codeine sulfate, Darvocet [propoxyphene n-apap], Dexamethasone acetate, Erythromycin, Escitalopram, Gabapentin, Hctz, Potassium, Propoxyphene, Tramadol, Triamterene, Ultracet, Venlafaxine, Zolpidem, Zolpidem tartrate, Bacitracin, and Sulfanilamide      Objective:      Physical Exam  81.7 kg (180 lb 1.6 oz)  1.549 m (5' 1\")  Body mass index is 34.03 kg/m .  BP 98/48 (BP Location: Left arm, Patient Position: Sitting, Cuff Size: Adult Regular)   Pulse 68   Resp 16   Ht 1.549 m (5' 1\")   Wt 81.7 kg (180 lb 1.6 oz)   LMP  (LMP Unknown)   BMI 34.03 kg/m      General Appearance:   Awake, Alert, No acute distress.   HEENT:  Pupil equal and reactive to light. No scleral icterus; the mucous membranes were moist.   Neck: No cervical bruits. No JVD. No thyromegaly.     Chest: The spine was straight. The chest was symmetric.   Lungs:   Respirations unlabored; Lungs are clear to auscultation. No crackles. No wheezing.   Cardiovascular:   Regular rhythm and rate, normal first and second heart sounds with no murmurs. No rubs or gallops.    Abdomen:  Obese. Soft. No tenderness. Non-distended. Bowels sounds are present   Extremities: Equal tibial pulses. No leg edema.   Skin: No rashes or ulcers. Warm, Dry.   Musculoskeletal: No tenderness. No deformity.   Neurologic: Mood and affect are appropriate. No focal deficits.         EKG: Personally reviewed  Sinus bradycardia   Otherwise normal ECG   When compared with ECG of 27-NOV-2022 23:46,   No significant change was found    Cardiac Imaging Studies  Coronary angiogram with PCI on November 8, 2022:  EUGENIA to mid LC and OM1 and EUGENIA to distal LAD    Lab Review   Lab Results   Component Value Date     12/13/2022    CO2 25 12/13/2022    CO2 26 " 11/27/2022    BUN 18.2 12/13/2022    BUN 20 11/27/2022     Lab Results   Component Value Date    WBC 6.5 11/27/2022    HGB 12.1 11/27/2022    HCT 37.1 11/27/2022    MCV 90 11/27/2022     11/27/2022     Lab Results   Component Value Date    CHOL 132 11/15/2022    CHOL 149 06/10/2021    CHOL 234 (H) 07/31/2020     Lab Results   Component Value Date    HDL 43 (L) 11/15/2022    HDL 56 06/10/2021    HDL 53 07/31/2020     No components found for: LDLCALC  Lab Results   Component Value Date    TRIG 106 11/15/2022    TRIG 113 06/10/2021    TRIG 111 07/31/2020     No results found for: CHOLHDL  Lab Results   Component Value Date    TROPONINI 0.02 11/28/2022     Lab Results   Component Value Date     11/27/2022     Lab Results   Component Value Date    TSH 3.53 12/13/2022    TSH 3.99 06/08/2022                 Thank you for allowing me to participate in the care of your patient.      Sincerely,     Jacek Worley MD     [facility]  cc:   ANDREA Alfonso CNP  [address]  [place] [phone]

## 2023-01-09 NOTE — PROGRESS NOTES
Assessment/Plan:   1.  Coronary artery disease, s/p EUGENIA to mid LCX/OM1 and EUGENIA to distal LAD, chest pressure: The patient's chest pressure is not exertional.  It is frequent at rest.  Usually, she takes 2 nitros.  I am not quite sure her chest pressure is caused by anginal equivalent or paroxysmal atrial fibrillation.  Her coronary PCI has fixed obstructive lesion, no obstructive lesion left.  We discussed further evaluation and management.  After discussion, 7 days event monitor is requested to rule out paroxysmal atrial fibrillation related chest pressure.  Also request echocardiogram for evaluation of her heart function and structure.  Meantime, start isosorbide mononitrate 30 mg daily.  Continue aspirin, atorvastatin.    2.  Chronic congestive heart failure with preserved ejection fraction: She did not complain of shortness of breath.  She has no leg edema.  No JVDs.  Her lungs are clear.  I think she is compensated at this time.  Continue Lasix 40 mg daily.  Routine labs CBC and BMP.    3.  Paroxysmal atrial fibrillation: She did not complain of palpitations.  But she has a frequent chest pressure which could be caused by episodes of atrial fibrillation.  Event monitor evaluation as discussed above.  Continue sotalol 80 m twice a day.  Her [id number] score is 5.  She is on Eliquis 5 mg twice a day.  It costs $590 each month.  We discussed the options such as Xarelto and Pradaxa.  Also refer the patient to watchman device for further discussion.    Addendum:  She has documented nonvalvular atrial fibrillation (NVAF) and is currently on oral anticoagulant therapy Eliquis   [id number] -VASc = 5 (age, hypertension, CAD, CHF, female)   HAS-BLED risk score: 3 (age, hypertension, CAD)  Indication(s) to consider non-oral anticoagulation therapy: Watchman device  Pt has no contra-indication to come off oral anti-coagulant therapy if LAAC device is successfully placed.     I have personally reviewed the patients  chart and discussed the following with the patient/family; 1) The choices available for reducing stroke risk from atrial fibrillation, 2) Treatment options available including respective risk/benefits, and 3) What factors are most important for the patient in making their decision.  The ACC shared decision making tool https://www.cardiosmart.org/SDM/Decision-Aids/Find-Decision-Aids/Atrial-Fibrillation  was used to guide this conversation.   The patient was counseled that their decision could be made at this time or in the future if more time was needed to consider their decision.       The patient is an appropriate candidate to proceed with left atrial appendage screening and implant.     4.  Primary hypertension: Her blood pressure at a low side.  Reduced irbesartan from 300 mg daily to 150 mg daily to avoid dizziness and lightheadedness.    5.  Dyslipidemia: Continue atorvastatin 80 mg at bedtime.  LDL was 68.    6.  Obesity: Lifestyle modification.    We will follow-up her echo, cardiac monitor, laboratory test reports, discuss the findings with the patient.    Thank you for the opportunity to be involved in the care of Suma Mark. If you have any questions, please feel free to contact me.  I will see the patient again in 2 months and as needed.    Much or all of the text in this note was generated through the use of Dragon Dictate voice-to-text software. Errors in spelling or words which seem out of context are unintentional. Sound alike errors, in particular, may have escaped editing.       History of Present Illness:   It is my pleasure to see Suma Mark at the [facility] clinic for routine cardiology follow up.  Suma Mark is a 72 year old female with a medical history of coronary artery disease status post EUGENIA to mid LCx/OM1, EUGENIA to distal LAD on November 8, 2022, primary hypertension, dyslipidemia, chronic congestive heart failure with preserved ejection fraction,  paroxysmal atrial fibrillation, obesity.    The patient states that she has frequent episodes of chest pressure.  Usually she needs to nitro to relieve her symptoms.  It is not exertional.  Her chest pressure occurred at rest.  She did not have palpitations.  She has lightheadedness when she takes nitro.  She has no orthopnea, PND or leg edema.  Her weight has been stable.  Her blood pressure is at a low side.  Her heart rate has been well controlled.    Past Medical History:     Patient Active Problem List   Diagnosis     Total knee replacement status     Anxiety     Acquired hypothyroidism     Chronic back pain     Fibromyalgia     Depression, unspecified depression type     Diverticulosis     Essential hypertension     Gastroesophageal reflux disease without esophagitis     Insomnia, unspecified type     Migraine     Dyslipidemia, goal LDL below 70     Restless leg syndrome     Thyroid nodule     Congestive heart failure (H)     Coronary artery disease involving native coronary artery with unstable angina pectoris (H)     S/P primary angioplasty with coronary stent     Paroxysmal atrial fibrillation (H)     Senile osteoporosis       Past Surgical History:     Past Surgical History:   Procedure Laterality Date     ARTHROCENTESIS      LEFT HIP TROCHANTERIC BURSA     ARTHROPLASTY KNEE UNICOMPARTMENT  10/31/2013    Procedure: ARTHROPLASTY KNEE UNICOMPARTMENT;  LEFT KNEE UNICOMPARTMENTAL ARTHROPLASTY (CAROLINE)^;  Surgeon: Chi Mittal MD;  Location:  OR     ARTICULAR DEBRIEDEMENT[      LEFT KNEE     BACK SURGERY       BREAST CYST EXCISION       BREAST SURGERY      bx     COLECTOMY N/A 6/2/2017    Procedure: RESECTION, SMALL INTESTINE, OPEN ADHESIOLYSIS;  Surgeon: Keyon Qureshi MD;  Location: [facility];  Service:      COLON SURGERY       CV CORONARY ANGIOGRAM N/A 10/12/2022    Procedure: CV CORONARY ANGIOGRAM;  Surgeon: You Martinez MD;  Location: [facility] CATH LAB CV     CV FRACTIONAL FLOW  RATIO WIRE N/A 10/12/2022    Procedure: Fractional Flow Ratio Wire;  Surgeon: You Martinez MD;  Location: [facility] CV     CV PCI STENT DRUG ELUTING N/A 10/12/2022    Procedure: Percutaneous Coronary Intervention Stent;  Surgeon: You Martinez MD;  Location: [facility] CV     CV PCI STENT DRUG ELUTING N/A 11/8/2022    Procedure: Percutaneous Coronary Intervention - Stent;  Surgeon: Bret Jin MD;  Location: [facility] CV     ENDOSCOPIC RETROGRADE CHOLANGIOPANCREATOGRAPHY       ENDOSCOPIC STRIPPING VEIN(S)      BILATERLY     GENITOURINARY SURGERY      bladder sling     GI SURGERY      hemorrhoidectomy     HEMORRHOID SURGERY       HERNIORRHAPHY INCISIONAL (LOCATION) N/A 6/2/2017    Procedure: LAPARASCOPIC CONVERTED TO OPEN PRIMARY INCISIONAL HERNIA REPAIR;  Surgeon: Keyon Qureshi MD;  Location: [facility];  Service:      HYSTERECTOMY  [id number]     HYSTERECTOMY VAGINAL       INCONTINENCE SURGERY       LAMINECTOMY LUMBAR TWO LEVELS      2003     LUMBAR FUSION       LUMBAR HARDWARE REMOVAL[       ORTHOPEDIC SURGERY      carpal tunnel     FL LAP,DIAGNOSTIC ABDOMEN N/A 8/22/2019    Procedure: DIAGNOSTIC LAPAROSCOPY, LYSIS OF ADHESION;  Surgeon: Svetlana Ron MD;  Location: [facility];  Service: General     RELEASE CARPAL TUNNEL       STRIP VEIN       TRANSARTHISCOPIC SURGERY[      LEFT KNEE     US THYROID BIOPSY  4/19/2019     VEIN LIGATION AND STRIPPING Bilateral      ZZC TOTAL KNEE ARTHROPLASTY Right 11/27/2017    Procedure:  RIGHT TOTAL KNEE ARTHROPLASTY;  Surgeon: Kevin Ryder MD;  Location: [facility];  Service: Orthopedics       Family History:     Family History   Problem Relation Age of Onset     Dementia Mother      Arthritis Mother      Chronic Obstructive Pulmonary Disease Father      Cardiovascular Father      Hypertension Father      No Known Problems Sister      No Known Problems Daughter      No Known Problems Son      Cerebrovascular  Disease Maternal Grandmother      Heart Disease Paternal Grandmother      Cerebrovascular Disease Paternal Grandmother      No Known Problems Sister      No Known Problems Sister      No Known Problems Son      No Known Problems Daughter      Breast Cancer Paternal Aunt         age in 50's        Social History:    reports that she has never smoked. She has never used smokeless tobacco. She reports current alcohol use. She reports that she does not use drugs.    Review of Systems:   12 systems are reviewed negative except for in HPI.    Meds:     Current Outpatient Medications:      amLODIPine (NORVASC) 5 MG tablet, TAKE 1 TABLET BY MOUTH EVERYDAY AT BEDTIME, Disp: 90 tablet, Rfl: 3     apixaban ANTICOAGULANT (ELIQUIS ANTICOAGULANT) 5 MG tablet, Take 1 tablet (5 mg) by mouth every 12 hours, Disp: 180 tablet, Rfl: 3     atorvastatin (LIPITOR) 80 MG tablet, Take 1 tablet (80 mg) by mouth At Bedtime, Disp: 90 tablet, Rfl: 3     botulinum toxin type A (BOTOX) 100 units injection, Every 3 months. [facility], Disp: , Rfl:      busPIRone (BUSPAR) 10 MG tablet, Take 1 tablet (10 mg) by mouth 2 times daily, Disp: 180 tablet, Rfl: 3     cholecalciferol 50 MCG (2000 UT) CAPS, Take 2,000 Units by mouth daily, Disp: , Rfl:      citalopram (CELEXA) 20 MG tablet, Take 2 tablets (40 mg) by mouth daily, Disp: 180 tablet, Rfl: 2     clopidogrel (PLAVIX) 75 MG tablet, Take 1 tablet (75 mg) by mouth daily, Disp: 90 tablet, Rfl: 3     eletriptan (RELPAX) 40 MG tablet, Take 1 tablet (40 mg) by mouth at onset of headache for migraine, Disp: 10 tablet, Rfl: 0     furosemide (LASIX) 20 MG tablet, Take 2 tablets (40 mg) by mouth daily, Disp: 180 tablet, Rfl: 3     HYDROcodone-acetaminophen (NORCO) 5-325 MG tablet, Take 1 tablet by mouth daily as needed (Severe headache), Disp: 5 tablet, Rfl: 0     irbesartan (AVAPRO) 150 MG tablet, Take 1 tablet (150 mg) by mouth daily, Disp: 90 tablet, Rfl: 3     isosorbide mononitrate (IMDUR) 30 MG 24 hr  "tablet, Take 1 tablet (30 mg) by mouth daily, Disp: 30 tablet, Rfl: 11     levothyroxine (SYNTHROID/LEVOTHROID) 88 MCG tablet, TAKE 1 TABLET BY MOUTH DAILY AT 6:00 AM., Disp: 90 tablet, Rfl: 3     LORazepam (ATIVAN) 0.5 MG tablet, Take 1 tablet (0.5 mg) by mouth every 6 hours as needed for anxiety, Disp: 30 tablet, Rfl: 0     nitroGLYcerin (NITROSTAT) 0.4 MG sublingual tablet, For chest pain place 1 tablet under the tongue every 5 minutes for 3 doses. If symptoms persist 5 minutes after 1st dose call 911., Disp: 30 tablet, Rfl: 1     pramipexole (MIRAPEX) 0.25 MG tablet, TAKE 1 TABLET BY MOUTH THREE TIMES A DAY., Disp: 270 tablet, Rfl: 3     sotalol (BETAPACE) 80 MG tablet, Take 1 tablet (80 mg) by mouth every 12 hours, Disp: 60 tablet, Rfl: 3     tiZANidine (ZANAFLEX) 2 MG tablet, Take 1 tablet (2 mg) by mouth 3 times daily as needed for muscle spasms, Disp: 30 tablet, Rfl: 1     traZODone (DESYREL) 50 MG tablet, TAKE 3 TABLETS BY MOUTH AT BEDTIME., Disp: 270 tablet, Rfl: 1     ubrogepant (UBRELVY) 100 MG tablet, UBRELVY 100 MG TABS, Disp: , Rfl:     Allergies:   Cephalexin, Ace inhibitors, Amitriptyline hcl, Atenolol, Celebrex [celecoxib], Cephalosporins, Clonidine, Codeine sulfate, Darvocet [propoxyphene n-apap], Dexamethasone acetate, Erythromycin, Escitalopram, Gabapentin, Hctz, Potassium, Propoxyphene, Tramadol, Triamterene, Ultracet, Venlafaxine, Zolpidem, Zolpidem tartrate, Bacitracin, and Sulfanilamide      Objective:      Physical Exam  81.7 kg (180 lb 1.6 oz)  1.549 m (5' 1\")  Body mass index is 34.03 kg/m .  BP 98/48 (BP Location: Left arm, Patient Position: Sitting, Cuff Size: Adult Regular)   Pulse 68   Resp 16   Ht 1.549 m (5' 1\")   Wt 81.7 kg (180 lb 1.6 oz)   LMP  (LMP Unknown)   BMI 34.03 kg/m      General Appearance:   Awake, Alert, No acute distress.   HEENT:  Pupil equal and reactive to light. No scleral icterus; the mucous membranes were moist.   Neck: No cervical bruits. No JVD. No " thyromegaly.     Chest: The spine was straight. The chest was symmetric.   Lungs:   Respirations unlabored; Lungs are clear to auscultation. No crackles. No wheezing.   Cardiovascular:   Regular rhythm and rate, normal first and second heart sounds with no murmurs. No rubs or gallops.    Abdomen:  Obese. Soft. No tenderness. Non-distended. Bowels sounds are present   Extremities: Equal tibial pulses. No leg edema.   Skin: No rashes or ulcers. Warm, Dry.   Musculoskeletal: No tenderness. No deformity.   Neurologic: Mood and affect are appropriate. No focal deficits.         EKG: Personally reviewed  Sinus bradycardia   Otherwise normal ECG   When compared with ECG of 27-NOV-2022 23:46,   No significant change was found    Cardiac Imaging Studies  Coronary angiogram with PCI on November 8, 2022:  EUGENIA to mid LC and OM1 and EUGENIA to distal LAD    Lab Review   Lab Results   Component Value Date     12/13/2022    CO2 25 12/13/2022    CO2 26 11/27/2022    BUN 18.2 12/13/2022    BUN 20 11/27/2022     Lab Results   Component Value Date    WBC 6.5 11/27/2022    HGB 12.1 11/27/2022    HCT 37.1 11/27/2022    MCV 90 11/27/2022     11/27/2022     Lab Results   Component Value Date    CHOL 132 11/15/2022    CHOL 149 06/10/2021    CHOL 234 (H) 07/31/2020     Lab Results   Component Value Date    HDL 43 (L) 11/15/2022    HDL 56 06/10/2021    HDL 53 07/31/2020     No components found for: LDLCALC  Lab Results   Component Value Date    TRIG 106 11/15/2022    TRIG 113 06/10/2021    TRIG 111 07/31/2020     No results found for: CHOLHDL  Lab Results   Component Value Date    TROPONINI 0.02 11/28/2022     Lab Results   Component Value Date     11/27/2022     Lab Results   Component Value Date    TSH 3.53 12/13/2022    TSH 3.99 06/08/2022

## 2023-01-12 ENCOUNTER — HOSPITAL ENCOUNTER (OUTPATIENT)
Dept: CARDIAC REHAB | Facility: CLINIC | Age: 73
Discharge: HOME | End: 2023-01-12
Attending: INTERNAL MEDICINE
Payer: MEDICARE

## 2023-01-12 DIAGNOSIS — I48.0 PAROXYSMAL ATRIAL FIBRILLATION (H): Primary | ICD-10-CM

## 2023-01-12 PROCEDURE — 93798 PHYS/QHP OP CAR RHAB W/ECG: CPT

## 2023-01-13 ENCOUNTER — MYC MEDICAL ADVICE (OUTPATIENT)
Dept: CARDIOLOGY | Facility: CLINIC | Age: 73
End: 2023-01-13

## 2023-01-13 DIAGNOSIS — I50.9 CONGESTIVE HEART FAILURE, UNSPECIFIED HF CHRONICITY, UNSPECIFIED HEART FAILURE TYPE (H): Primary | ICD-10-CM

## 2023-01-13 DIAGNOSIS — I48.0 PAROXYSMAL ATRIAL FIBRILLATION (H): ICD-10-CM

## 2023-01-16 ENCOUNTER — PREP FOR PROCEDURE (OUTPATIENT)
Dept: CARDIOLOGY | Facility: CLINIC | Age: 73
End: 2023-01-16

## 2023-01-16 ENCOUNTER — HOSPITAL ENCOUNTER (OUTPATIENT)
Dept: CARDIAC REHAB | Facility: CLINIC | Age: 73
Discharge: HOME | End: 2023-01-16
Attending: INTERNAL MEDICINE
Payer: MEDICARE

## 2023-01-16 ENCOUNTER — TELEPHONE (OUTPATIENT)
Dept: CARDIOLOGY | Facility: CLINIC | Age: 73
End: 2023-01-16
Payer: MEDICARE

## 2023-01-16 PROCEDURE — 93798 PHYS/QHP OP CAR RHAB W/ECG: CPT

## 2023-01-16 NOTE — TELEPHONE ENCOUNTER
"LAAC Referral made from Dr. Worley. Called patient to let them know next steps would be to have a consult with Marilyn to determine if they would be a good candidate for LAAC procedure and determine type of imaging moving forward. Patient stated they would like to move forward with scheduling this appointment and instructed that they would call with in the next week to schedule this. Routed to scheduling. Gave direct number for call back with any questions. Routed Message to scheduling -SC    ----- Message -----  From: Kimberly Almeida RN  Sent: 1/16/2023  12:09 PM CST  To: [facility] Left Atrial Appendage Closure Pool - Lhe  Subject: FW: Eliquis                                        ----- Message -----  From: Jacek Worley MD  Sent: 1/16/2023  10:17 AM CST  To: Kimberly Almeida RN  Subject: FW: Yane Harris,  Could you please contact this patient for Watchman device placement?  Erica Lindo      ----- Message -----  From: Suma Mark \"Lo\"  Sent: 1/15/2023  10:40 AM CST  To: Jacek Worley MD  Subject: Yane                                          Thank you. I have not heard from anyone regarding an appointment about the Watchman implant. Does it take awhile to hear from the team of doctors who do the implants?  "

## 2023-01-17 ENCOUNTER — HOSPITAL ENCOUNTER (OUTPATIENT)
Dept: CARDIOLOGY | Facility: CLINIC | Age: 73
Discharge: HOME | End: 2023-01-17
Attending: INTERNAL MEDICINE
Payer: MEDICARE

## 2023-01-17 DIAGNOSIS — I48.0 PAROXYSMAL ATRIAL FIBRILLATION (H): ICD-10-CM

## 2023-01-17 LAB — LVEF ECHO: NORMAL

## 2023-01-17 PROCEDURE — 93306 TTE W/DOPPLER COMPLETE: CPT | Mod: 26 | Performed by: INTERNAL MEDICINE

## 2023-01-17 PROCEDURE — 93306 TTE W/DOPPLER COMPLETE: CPT

## 2023-01-17 PROCEDURE — 93270 REMOTE 30 DAY ECG REV/REPORT: CPT

## 2023-01-17 NOTE — TELEPHONE ENCOUNTER
Consult scheduled -SC    ----- Message -----  From: Cyndee Wang  Sent: 1/17/2023   1:04 PM CST  To: Linda Washington RN    02/23  ----- Message -----  From: Linda Washington RN  Sent: 1/16/2023   1:25 PM CST  To: Cyndee Wang    ----- Message from Linda Washington RN sent at 1/16/2023  1:25 PM CST -----  Sahil Cotter-  Could you set up consult appointment with Marilyn stewart?   Thank you! -Linda

## 2023-01-18 ENCOUNTER — TRANSFERRED RECORDS (OUTPATIENT)
Dept: HEALTH INFORMATION MANAGEMENT | Facility: CLINIC | Age: 73
End: 2023-01-18

## 2023-01-19 ENCOUNTER — HOSPITAL ENCOUNTER (OUTPATIENT)
Dept: CARDIAC REHAB | Facility: CLINIC | Age: 73
Discharge: HOME | End: 2023-01-19
Attending: INTERNAL MEDICINE
Payer: MEDICARE

## 2023-01-19 PROCEDURE — 93798 PHYS/QHP OP CAR RHAB W/ECG: CPT

## 2023-01-23 ENCOUNTER — HOSPITAL ENCOUNTER (OUTPATIENT)
Dept: CARDIAC REHAB | Facility: CLINIC | Age: 73
Discharge: HOME | End: 2023-01-23
Attending: INTERNAL MEDICINE
Payer: MEDICARE

## 2023-01-23 PROCEDURE — 93798 PHYS/QHP OP CAR RHAB W/ECG: CPT

## 2023-01-25 ENCOUNTER — HOSPITAL ENCOUNTER (OUTPATIENT)
Dept: CARDIAC REHAB | Facility: CLINIC | Age: 73
Discharge: HOME | End: 2023-01-25
Attending: INTERNAL MEDICINE
Payer: MEDICARE

## 2023-01-25 PROCEDURE — 93798 PHYS/QHP OP CAR RHAB W/ECG: CPT

## 2023-01-26 ENCOUNTER — TRANSFERRED RECORDS (OUTPATIENT)
Dept: HEALTH INFORMATION MANAGEMENT | Facility: CLINIC | Age: 73
End: 2023-01-26

## 2023-01-26 PROCEDURE — 93228 REMOTE 30 DAY ECG REV/REPORT: CPT | Performed by: INTERNAL MEDICINE

## 2023-01-30 ENCOUNTER — HOSPITAL ENCOUNTER (OUTPATIENT)
Dept: CARDIAC REHAB | Facility: CLINIC | Age: 73
Discharge: HOME | End: 2023-01-30
Attending: INTERNAL MEDICINE
Payer: MEDICARE

## 2023-01-30 PROCEDURE — 93798 PHYS/QHP OP CAR RHAB W/ECG: CPT

## 2023-01-31 ENCOUNTER — OFFICE VISIT (OUTPATIENT)
Dept: PHYSICAL MEDICINE AND REHAB | Facility: CLINIC | Age: 73
End: 2023-01-31
Attending: INTERNAL MEDICINE
Payer: MEDICARE

## 2023-01-31 VITALS
BODY MASS INDEX: 33.61 KG/M2 | DIASTOLIC BLOOD PRESSURE: 61 MMHG | WEIGHT: 178 LBS | SYSTOLIC BLOOD PRESSURE: 129 MMHG | OXYGEN SATURATION: 95 % | HEIGHT: 61 IN | HEART RATE: 70 BPM

## 2023-01-31 DIAGNOSIS — R20.0 NUMBNESS AND TINGLING OF BOTH UPPER EXTREMITIES: ICD-10-CM

## 2023-01-31 DIAGNOSIS — R20.2 NUMBNESS AND TINGLING OF BOTH UPPER EXTREMITIES: ICD-10-CM

## 2023-01-31 DIAGNOSIS — M54.2 CHRONIC NECK PAIN: Primary | ICD-10-CM

## 2023-01-31 DIAGNOSIS — G89.29 CHRONIC NECK PAIN: Primary | ICD-10-CM

## 2023-01-31 DIAGNOSIS — F40.240 CLAUSTROPHOBIA: ICD-10-CM

## 2023-01-31 PROCEDURE — 99214 OFFICE O/P EST MOD 30 MIN: CPT | Performed by: NURSE PRACTITIONER

## 2023-01-31 RX ORDER — DIAZEPAM 5 MG
TABLET ORAL
Qty: 1 TABLET | Refills: 0 | Status: SHIPPED | OUTPATIENT
Start: 2023-01-31 | End: 2023-02-23

## 2023-01-31 ASSESSMENT — PAIN SCALES - GENERAL: PAINLEVEL: EXTREME PAIN (8)

## 2023-01-31 NOTE — PATIENT INSTRUCTIONS
~Please call our [facility] Nurse Navigation line [phone] with any questions or concerns about your treatment plan, if symptoms worsen and you would like to be seen urgently, or if you have problems controlling bladder and bowel function.       Importance of specialized Physical Therapy: Discussed the importance of core strengthening, ROM, stretching exercises with the patient and how each of these entities is important in decreasing pain.  Explained to the patient that the purpose of physical therapy is to teach the patient a home exercise program individualized to them and their specific health concerns.  These exercises need to be performed every day in order to decrease pain and prevent future occurrences of pain.           [facility] Scheduling #: [phone]

## 2023-01-31 NOTE — PROGRESS NOTES
ASSESSMENT: Suma Mark is a 72 year old female presents for consultation at the request of PCP Bernadette Taylor, with past medical history significant for hypothyroidism, hypertension, dyslipidemia, congestive heart failure, CAD-history of coronary angioplasty with stent placement on Eliquis anticoagulation therapy, atrial fibrillation, diverticulosis, GERD, fibromyalgia, migraine, chronic LBP, osteoporosis, depression, insomnia, total knee replacement, who presents today for new patient evaluation of :     -Chronic generalized neck pain with some consistency with facet mediated pain as well as myofascial pain.    -Intermittent bilateral upper extremity numbness and tingling.    -History lumbar fusion.    Patient is neurologically intact on exam. No myelopathic or red flag symptoms.      No flowsheet data found.         Diagnoses and all orders for this visit:  Chronic neck pain  -     Spine  Referral  -     MR Cervical Spine w/o Contrast; Future  -     diclofenac (VOLTAREN) 1 % topical gel; Apply 2-4 g topically 4 times daily  -     diazepam (VALIUM) 5 MG tablet; Valium 5 mg p.o., take 1 tablet prior to MRI as instructed by radiology.  Must have .  Numbness and tingling of both upper extremities  -     MR Cervical Spine w/o Contrast; Future  -     diclofenac (VOLTAREN) 1 % topical gel; Apply 2-4 g topically 4 times daily  -     diazepam (VALIUM) 5 MG tablet; Valium 5 mg p.o., take 1 tablet prior to MRI as instructed by radiology.  Must have .  Claustrophobia  -     MR Cervical Spine w/o Contrast; Future  -     diazepam (VALIUM) 5 MG tablet; Valium 5 mg p.o., take 1 tablet prior to MRI as instructed by radiology.  Must have .     PLAN:  Reviewed spine anatomy and disease process. Discussed diagnosis and treatment options with the patient today. A shared decision making model was used. The patient's values and choices were respected. The following represents what was discussed and  decided upon by the provider and the patient.     -DIAGNOSTIC TESTS:   -- Ordered cervical spine MRI [place] radiology open sided lying MRI due to claustrophobia with Valium to further evaluate ongoing chronic neck pain and upper extremity numbness and tingling.  -- Lumbar spine MRI report from 2015 with solid dorsolateral fusion L3 to the sacrum with atrophy of the associated musculature L2-3 mild disc height loss with disc bulging and facet arthropathy above her fusion.    -PHYSICAL THERAPY: Encourage patient to continue with home exercises from recent physical therapy sessions for hours chronic neck pain with [facility], she does have further sessions scheduled.  Discussed the importance of core strengthening, ROM, stretching exercises with the patient and how each of these entities is important in decreasing pain.  Explained to the patient that the purpose of physical therapy is to teach the patient a home exercise program.  These exercises need to be performed every day in order to decrease pain and prevent future occurrences of pain.  Likened it to brushing one's teeth.      -MEDICATIONS: Continue with tizanidine 2 mg 2-3 times daily as needed.  Patient unable to take oral NSAIDs due to anticoagulation medication, will try a short course of diclofenac gel that she can utilize 3 times daily to her neck pain to see if we can improve pain and inflammation.  Discussed multiple medication options today with patient. Discussed risks, side effects, and proper use of medications. Patient verbalized understanding.    -INTERVENTIONS: Could consider injections pending imaging review.  Potential medial branch block, pain is bilateral but greater on the right.  **Eliquis anticoagulation therapy.  Discussed risks and benefits of injections with patient today.    -PATIENT EDUCATION: Total time of 46 minutes, on the day of service, spent with the patient, reviewing the chart, placing orders, and documenting.   -Today we also  discussed the issues related to the current COVID-19 pandemic, the pros and cons of the current treatment plan, the CDC guidelines such as social distancing, washing the hands, masking, and covering the cough.    -FOLLOW-UP:   Follow-up [facility] message for imaging results and plan.    Advised patient to call the [facility] if symptoms worsen or you have problems controlling bladder and bowel function.   ______________________________________________________________________    SUBJECTIVE:   Suma Mrak  is a 72 year old female who presents today for new patient evaluation of neck pain generalized cervical spine ongoing for 1 month with no known injury, pain initially greater on the right and she does report that it still greater on the right but does occur bilaterally.  She denies any arm pain but does report intermittent numbness and tingling in her arms for the last month as well, this is more minimal and not constant.  Otherwise patient denies upper extremity weakness.  Denies any recent trips or falls or balance changes.  Denies bowel or bladder loss control.    Patient is working with [facility] for her chronic low back pain as well and considering radiofrequency ablation above her fusion.    -Treatment to Date: History lumbar fusion 2003  Physical therapy x3 sessions [facility] January 2023 neck pain    History of lumbar radiofrequency ablation above her fusion with benefit, is hoping to get a repeat ablation completed with [facility], they are working with insurance to get this covered at this time.    -Medications:  Tizanidine 2 mg    Current Outpatient Medications   Medication     apixaban ANTICOAGULANT (ELIQUIS) 5 MG tablet     clopidogrel (PLAVIX) 75 MG tablet     diazepam (VALIUM) 5 MG tablet     diclofenac (VOLTAREN) 1 % topical gel     HYDROcodone-acetaminophen (NORCO) 5-325 MG tablet     tiZANidine (ZANAFLEX) 2 MG tablet     amLODIPine (NORVASC) 5 MG tablet     apixaban ANTICOAGULANT (ELIQUIS  "ANTICOAGULANT) 5 MG tablet     atorvastatin (LIPITOR) 80 MG tablet     botulinum toxin type A (BOTOX) 100 units injection     busPIRone (BUSPAR) 10 MG tablet     cholecalciferol 50 MCG (2000 UT) CAPS     citalopram (CELEXA) 20 MG tablet     eletriptan (RELPAX) 40 MG tablet     furosemide (LASIX) 20 MG tablet     irbesartan (AVAPRO) 150 MG tablet     isosorbide mononitrate (IMDUR) 30 MG 24 hr tablet     levothyroxine (SYNTHROID/LEVOTHROID) 88 MCG tablet     LORazepam (ATIVAN) 0.5 MG tablet     nitroGLYcerin (NITROSTAT) 0.4 MG sublingual tablet     pramipexole (MIRAPEX) 0.25 MG tablet     sotalol (BETAPACE) 80 MG tablet     traZODone (DESYREL) 50 MG tablet     ubrogepant (UBRELVY) 100 MG tablet     No current facility-administered medications for this visit.       Allergies   Allergen Reactions     Cephalexin Nausea and Vomiting     Ace Inhibitors Other (See Comments)     Elevates blood pressure     Amitriptyline Hcl Other (See Comments)     elevates blood pressure     Atenolol Other (See Comments)     Aggravates reynauds     Celebrex [Celecoxib] GI Disturbance     Cephalosporins Unknown     Pt doesn't remember        Clonidine Unknown     \"got very ill in ER\"     Codeine Sulfate Nausea and Vomiting     Darvocet [Propoxyphene N-Apap] Nausea and Vomiting     Dexamethasone Acetate Swelling     Erythromycin Nausea and Vomiting     Escitalopram Other (See Comments)     Headaches.       Gabapentin Unknown     \"Spotted\"     Hctz Unknown     \"depletes my sodium\"     Potassium GI Disturbance     Propoxyphene      HUT Reaction: Gastrointestinal; HUT Reaction: Nausea And Vomiting; HUT Noted: [id number]     Tramadol Swelling     Swelling of tongue and face     Triamterene      Ultracet Other (See Comments)     Venlafaxine Nausea and Vomiting and Diarrhea     Zolpidem Other (See Comments) and Unknown     Pt doesn't rember       Zolpidem Tartrate Unknown     Bacitracin Itching and Rash     Sulfanilamide Rash       Past Medical " History:   Diagnosis Date     Acquired hypothyroidism 3/12/2009     Anxiety      Anxiety state, unspecified      Back pain      Breast cyst      Carpal tunnel syndrome      Chronic pain     LOW BACK     Degenerative disc disease, lumbar      Depression      Depression, unspecified depression type      Diarrhea      Disease of thyroid gland     hypothyroid     Diverticulosis 10/4/2017    Incidental finding on colonoscopy 10/2017     DJD (degenerative joint disease)      Essential hypertension 4/21/2015     Essential hypertension, benign      Fatty liver      Fibromyalgia      Gastro-oesophageal reflux disease      Gastroesophageal reflux disease without esophagitis 4/21/2015     GERD (gastroesophageal reflux disease)      Hernia, ventral 4/27/2017     History of anesthesia complications     hypotension after surgery     History of blood clots      History of blood transfusion      History of transfusion      Hyperlipidemia      Hypertension      Insomnia, unspecified type      Left lower quadrant pain      Lumbago      Lung nodules     INTERMITTENT     Major depression      Migraine      Osteoarthritis      Osteopenia      Osteoporosis      Other abnormal glucose      Other and unspecified special symptom or syndrome, not elsewhere classified      PONV (postoperative nausea and vomiting)      Posttraumatic stress disorder      Raynaud's disease      Restless leg syndrome      S/P total knee replacement 11/27/2017     Spontaneous ecchymoses      Thrombosis of leg     with pregnancy     Unspecified episodic mood disorder     AFFECTIVE PSYCHOSIS     Unspecified hypothyroidism      Unspecified vitamin D deficiency      Varicose veins      Ventral hernia      Vitamin D deficiency         Patient Active Problem List   Diagnosis     Total knee replacement status     Anxiety     Acquired hypothyroidism     Chronic back pain     Fibromyalgia     Depression, unspecified depression type     Diverticulosis     Essential  hypertension     Gastroesophageal reflux disease without esophagitis     Insomnia, unspecified type     Migraine     Dyslipidemia, goal LDL below 70     Restless leg syndrome     Thyroid nodule     Congestive heart failure (H)     Coronary artery disease involving native coronary artery with unstable angina pectoris (H)     S/P primary angioplasty with coronary stent     Paroxysmal atrial fibrillation (H)     Senile osteoporosis       Past Surgical History:   Procedure Laterality Date     ARTHROCENTESIS      LEFT HIP TROCHANTERIC BURSA     ARTHROPLASTY KNEE UNICOMPARTMENT  10/31/2013    Procedure: ARTHROPLASTY KNEE UNICOMPARTMENT;  LEFT KNEE UNICOMPARTMENTAL ARTHROPLASTY (CAROLINE)^;  Surgeon: Chi Mittal MD;  Location:  OR     ARTICULAR DEBRIEDEMENT[      LEFT KNEE     BACK SURGERY       BREAST CYST EXCISION       BREAST SURGERY      bx     COLECTOMY N/A 6/2/2017    Procedure: RESECTION, SMALL INTESTINE, OPEN ADHESIOLYSIS;  Surgeon: Keyon Qureshi MD;  Location: [facility];  Service:      COLON SURGERY       CV CORONARY ANGIOGRAM N/A 10/12/2022    Procedure: CV CORONARY ANGIOGRAM;  Surgeon: You Martinez MD;  Location: [facility] CV     CV FRACTIONAL FLOW RATIO WIRE N/A 10/12/2022    Procedure: Fractional Flow Ratio Wire;  Surgeon: You Martinez MD;  Location: [facility] CV     CV PCI STENT DRUG ELUTING N/A 10/12/2022    Procedure: Percutaneous Coronary Intervention Stent;  Surgeon: You Martinez MD;  Location: [facility] CV     CV PCI STENT DRUG ELUTING N/A 11/8/2022    Procedure: Percutaneous Coronary Intervention - Stent;  Surgeon: Bret Jin MD;  Location: [facility] CV     ENDOSCOPIC RETROGRADE CHOLANGIOPANCREATOGRAPHY       ENDOSCOPIC STRIPPING VEIN(S)      BILATERLY     GENITOURINARY SURGERY      bladder sling     GI SURGERY      hemorrhoidectomy     HEMORRHOID SURGERY       HERNIORRHAPHY INCISIONAL (LOCATION) N/A 6/2/2017    Procedure:  LAPARASCOPIC CONVERTED TO OPEN PRIMARY INCISIONAL HERNIA REPAIR;  Surgeon: Keyon Qureshi MD;  Location: [facility] Main OR;  Service:      HYSTERECTOMY  [phone]     HYSTERECTOMY VAGINAL       INCONTINENCE SURGERY       LAMINECTOMY LUMBAR TWO LEVELS      2003     LUMBAR FUSION       LUMBAR HARDWARE REMOVAL[       ORTHOPEDIC SURGERY      carpal tunnel     VA LAP,DIAGNOSTIC ABDOMEN N/A 8/22/2019    Procedure: DIAGNOSTIC LAPAROSCOPY, LYSIS OF ADHESION;  Surgeon: Svetlana Ron MD;  Location: [facility] Main OR;  Service: General     RELEASE CARPAL TUNNEL       STRIP VEIN       TRANSARTHISCOPIC SURGERY[      LEFT KNEE     US THYROID BIOPSY  4/19/2019     VEIN LIGATION AND STRIPPING Bilateral      ZZC TOTAL KNEE ARTHROPLASTY Right 11/27/2017    Procedure:  RIGHT TOTAL KNEE ARTHROPLASTY;  Surgeon: Kevin Ryder MD;  Location: [facility] OR;  Service: Orthopedics       Family History   Problem Relation Age of Onset     Dementia Mother      Arthritis Mother      Chronic Obstructive Pulmonary Disease Father      Cardiovascular Father      Hypertension Father      No Known Problems Sister      No Known Problems Daughter      No Known Problems Son      Cerebrovascular Disease Maternal Grandmother      Heart Disease Paternal Grandmother      Cerebrovascular Disease Paternal Grandmother      No Known Problems Sister      No Known Problems Sister      No Known Problems Son      No Known Problems Daughter      Breast Cancer Paternal Aunt         age in 50's       Reviewed past medical, surgical, and family history with patient found on new patient intake packet located in EMR [employer] tab.     SOCIAL HX: Patient is , retired teacher.  Patient denies smoking/tobacco use.  Does report alcohol use, denies history being a heavy drinker.  Denies recreational drug use.    ROS: Positive for joint pain, muscle pain, easy bruising, anxiety, chest pain, leg swelling, headache, ringing in ears, weight gain.  Specifically  "negative for bowel/bladder dysfunction, balance changes, headache, dizziness, foot drop, fevers, chills, appetite changes, nausea/vomiting, unexplained weight loss. Otherwise 13 systems reviewed are negative. Please see the patient's intake questionnaire from today for details.    OBJECTIVE:  /61 (BP Location: Right arm, Patient Position: Sitting)   Pulse 70   Ht 5' 1\" (1.549 m)   Wt 178 lb (80.7 kg)   LMP  (LMP Unknown)   SpO2 95%   BMI 33.63 kg/m      PHYSICAL EXAMINATION:  --CONSTITUTIONAL: Vital signs as above. No acute distress. The patient is well nourished and well groomed.  --PSYCHIATRIC: The patient is awake, alert, oriented to person, place, time and answering questions appropriately with clear speech. Appropriate mood and affect   --HEENT: Sclera are non-injected. Extraocular muscles are intact. Thyroid moves easily upon swallowing.  Moist oral mucosa.  --SKIN: Skin over the face, bilateral upper extremities, and posterior torso is clean, dry, intact without rashes.  --LYMPH NODES: No palpable or tender anterior/posterior cervical, submandibular, or supraclavicular lymph nodes.    --RESPIRATORY: Normal rhythm and effort. No abnormal accessory muscle breathing patterns noted.   --GROSS MOTOR: Easily arises from a seated position. Toe walking and heel walking are normal.    --CERVICAL SPINE: Inspection reveals no evidence of deformity. Range of motion is not limited in all movements due to pain, significant limitations with bilateral right greater than left lateral rotation and head tilt.  Generalized tenderness to palpation cervical paraspinals and trapezius region bilaterally as well as upper thoracic region.  Spurling maneuver negative to arm pain.  --SHOULDERS: Full range of motion bilaterally: abduction, flexion, cross chest movement internal/external rotation. Negative empty can.  --UPPER EXTREMITY MOTOR TESTING:  Wrist flexion left 5/5, right 5/5  Wrist extension left 5/5, right " 5/5  Pronators left 5/5, right 5/5  Biceps left 5/5, right 5/5   Triceps left 5/5, right 5/5   Shoulder abduction left 5/5, right 5/5   left 5/5, right 5/5  --NEUROLOGIC: CN III-XII are grossly intact. 2/4 symmetric biceps, brachioradialis, triceps reflexes bilaterally. Sensation to upper extremities is intact.  Negative Negron's bilaterally.    --VASCULAR: 2/4 radial pulses bilaterally. Warm upper limbs bilaterally. Capillary refill in the upper extremities is less than 1 second.    RESULTS: Prior medical records from [facility] and [facility] Everywhere were reviewed today.

## 2023-01-31 NOTE — LETTER
1/31/2023         RE: Suma Mark  [address] [address]        Dear Colleague,    Thank you for referring your patient, Suma Mark, to the [facility]. Please see a copy of my visit note below.    ASSESSMENT: Suma Mark is a 72 year old female presents for consultation at the request of PCP Bernadette Taylor, with past medical history significant for hypothyroidism, hypertension, dyslipidemia, congestive heart failure, CAD-history of coronary angioplasty with stent placement on Eliquis anticoagulation therapy, atrial fibrillation, diverticulosis, GERD, fibromyalgia, migraine, chronic LBP, osteoporosis, depression, insomnia, total knee replacement, who presents today for new patient evaluation of :     -Chronic generalized neck pain with some consistency with facet mediated pain as well as myofascial pain.    -Intermittent bilateral upper extremity numbness and tingling.    -History lumbar fusion.    Patient is neurologically intact on exam. No myelopathic or red flag symptoms.      No flowsheet data found.         Diagnoses and all orders for this visit:  Chronic neck pain  -     Spine  Referral  -     MR Cervical Spine w/o Contrast; Future  -     diclofenac (VOLTAREN) 1 % topical gel; Apply 2-4 g topically 4 times daily  -     diazepam (VALIUM) 5 MG tablet; Valium 5 mg p.o., take 1 tablet prior to MRI as instructed by radiology.  Must have .  Numbness and tingling of both upper extremities  -     MR Cervical Spine w/o Contrast; Future  -     diclofenac (VOLTAREN) 1 % topical gel; Apply 2-4 g topically 4 times daily  -     diazepam (VALIUM) 5 MG tablet; Valium 5 mg p.o., take 1 tablet prior to MRI as instructed by radiology.  Must have .  Claustrophobia  -     MR Cervical Spine w/o Contrast; Future  -     diazepam (VALIUM) 5 MG tablet; Valium 5 mg p.o., take 1 tablet prior to MRI as instructed by radiology.  Must have .      PLAN:  Reviewed spine anatomy and disease process. Discussed diagnosis and treatment options with the patient today. A shared decision making model was used. The patient's values and choices were respected. The following represents what was discussed and decided upon by the provider and the patient.     -DIAGNOSTIC TESTS:   -- Ordered cervical spine MRI [facility] open sided lying MRI due to claustrophobia with Valium to further evaluate ongoing chronic neck pain and upper extremity numbness and tingling.  -- Lumbar spine MRI report from 2015 with solid dorsolateral fusion L3 to the sacrum with atrophy of the associated musculature L2-3 mild disc height loss with disc bulging and facet arthropathy above her fusion.    -PHYSICAL THERAPY: Encourage patient to continue with home exercises from recent physical therapy sessions for hours chronic neck pain with [facility], she does have further sessions scheduled.  Discussed the importance of core strengthening, ROM, stretching exercises with the patient and how each of these entities is important in decreasing pain.  Explained to the patient that the purpose of physical therapy is to teach the patient a home exercise program.  These exercises need to be performed every day in order to decrease pain and prevent future occurrences of pain.  Likened it to brushing one's teeth.      -MEDICATIONS: Continue with tizanidine 2 mg 2-3 times daily as needed.  Patient unable to take oral NSAIDs due to anticoagulation medication, will try a short course of diclofenac gel that she can utilize 3 times daily to her neck pain to see if we can improve pain and inflammation.  Discussed multiple medication options today with patient. Discussed risks, side effects, and proper use of medications. Patient verbalized understanding.    -INTERVENTIONS: Could consider injections pending imaging review.  Potential medial branch block, pain is bilateral but greater on the right.  **Eliquis  anticoagulation therapy.  Discussed risks and benefits of injections with patient today.    -PATIENT EDUCATION: Total time of 46 minutes, on the day of service, spent with the patient, reviewing the chart, placing orders, and documenting.   -Today we also discussed the issues related to the current COVID-19 pandemic, the pros and cons of the current treatment plan, the CDC guidelines such as social distancing, washing the hands, masking, and covering the cough.    -FOLLOW-UP:   Follow-up [facility] message for imaging results and plan.    Advised patient to call the Spine Center if symptoms worsen or you have problems controlling bladder and bowel function.   ______________________________________________________________________    SUBJECTIVE:   Suma Mark  is a 72 year old female who presents today for new patient evaluation of neck pain generalized cervical spine ongoing for 1 month with no known injury, pain initially greater on the right and she does report that it still greater on the right but does occur bilaterally.  She denies any arm pain but does report intermittent numbness and tingling in her arms for the last month as well, this is more minimal and not constant.  Otherwise patient denies upper extremity weakness.  Denies any recent trips or falls or balance changes.  Denies bowel or bladder loss control.    Patient is working with [facility] for her chronic low back pain as well and considering radiofrequency ablation above her fusion.    -Treatment to Date: History lumbar fusion 2003  Physical therapy x3 sessions [facility] [place] January 2023 neck pain    History of lumbar radiofrequency ablation above her fusion with benefit, is hoping to get a repeat ablation completed with [facility], they are working with insurance to get this covered at this time.    -Medications:  Tizanidine 2 mg    Current Outpatient Medications   Medication     apixaban ANTICOAGULANT (ELIQUIS) 5 MG tablet     clopidogrel (PLAVIX) 75  "MG tablet     diazepam (VALIUM) 5 MG tablet     diclofenac (VOLTAREN) 1 % topical gel     HYDROcodone-acetaminophen (NORCO) 5-325 MG tablet     tiZANidine (ZANAFLEX) 2 MG tablet     amLODIPine (NORVASC) 5 MG tablet     apixaban ANTICOAGULANT (ELIQUIS ANTICOAGULANT) 5 MG tablet     atorvastatin (LIPITOR) 80 MG tablet     botulinum toxin type A (BOTOX) 100 units injection     busPIRone (BUSPAR) 10 MG tablet     cholecalciferol 50 MCG (2000 UT) CAPS     citalopram (CELEXA) 20 MG tablet     eletriptan (RELPAX) 40 MG tablet     furosemide (LASIX) 20 MG tablet     irbesartan (AVAPRO) 150 MG tablet     isosorbide mononitrate (IMDUR) 30 MG 24 hr tablet     levothyroxine (SYNTHROID/LEVOTHROID) 88 MCG tablet     LORazepam (ATIVAN) 0.5 MG tablet     nitroGLYcerin (NITROSTAT) 0.4 MG sublingual tablet     pramipexole (MIRAPEX) 0.25 MG tablet     sotalol (BETAPACE) 80 MG tablet     traZODone (DESYREL) 50 MG tablet     ubrogepant (UBRELVY) 100 MG tablet     No current facility-administered medications for this visit.       Allergies   Allergen Reactions     Cephalexin Nausea and Vomiting     Ace Inhibitors Other (See Comments)     Elevates blood pressure     Amitriptyline Hcl Other (See Comments)     elevates blood pressure     Atenolol Other (See Comments)     Aggravates reynauds     Celebrex [Celecoxib] GI Disturbance     Cephalosporins Unknown     Pt doesn't remember        Clonidine Unknown     \"got very ill in ER\"     Codeine Sulfate Nausea and Vomiting     Darvocet [Propoxyphene N-Apap] Nausea and Vomiting     Dexamethasone Acetate Swelling     Erythromycin Nausea and Vomiting     Escitalopram Other (See Comments)     Headaches.       Gabapentin Unknown     \"Spotted\"     Hctz Unknown     \"depletes my sodium\"     Potassium GI Disturbance     Propoxyphene      HUT Reaction: Gastrointestinal; HUT Reaction: Nausea And Vomiting; HUT Noted: [id number]     Tramadol Swelling     Swelling of tongue and face     Triamterene      " Ultracet Other (See Comments)     Venlafaxine Nausea and Vomiting and Diarrhea     Zolpidem Other (See Comments) and Unknown     Pt doesn't rember       Zolpidem Tartrate Unknown     Bacitracin Itching and Rash     Sulfanilamide Rash       Past Medical History:   Diagnosis Date     Acquired hypothyroidism 3/12/2009     Anxiety      Anxiety state, unspecified      Back pain      Breast cyst      Carpal tunnel syndrome      Chronic pain     LOW BACK     Degenerative disc disease, lumbar      Depression      Depression, unspecified depression type      Diarrhea      Disease of thyroid gland     hypothyroid     Diverticulosis 10/4/2017    Incidental finding on colonoscopy 10/2017     DJD (degenerative joint disease)      Essential hypertension 4/21/2015     Essential hypertension, benign      Fatty liver      Fibromyalgia      Gastro-oesophageal reflux disease      Gastroesophageal reflux disease without esophagitis 4/21/2015     GERD (gastroesophageal reflux disease)      Hernia, ventral 4/27/2017     History of anesthesia complications     hypotension after surgery     History of blood clots      History of blood transfusion      History of transfusion      Hyperlipidemia      Hypertension      Insomnia, unspecified type      Left lower quadrant pain      Lumbago      Lung nodules     INTERMITTENT     Major depression      Migraine      Osteoarthritis      Osteopenia      Osteoporosis      Other abnormal glucose      Other and unspecified special symptom or syndrome, not elsewhere classified      PONV (postoperative nausea and vomiting)      Posttraumatic stress disorder      Raynaud's disease      Restless leg syndrome      S/P total knee replacement 11/27/2017     Spontaneous ecchymoses      Thrombosis of leg     with pregnancy     Unspecified episodic mood disorder     AFFECTIVE PSYCHOSIS     Unspecified hypothyroidism      Unspecified vitamin D deficiency      Varicose veins      Ventral hernia      Vitamin D  deficiency         Patient Active Problem List   Diagnosis     Total knee replacement status     Anxiety     Acquired hypothyroidism     Chronic back pain     Fibromyalgia     Depression, unspecified depression type     Diverticulosis     Essential hypertension     Gastroesophageal reflux disease without esophagitis     Insomnia, unspecified type     Migraine     Dyslipidemia, goal LDL below 70     Restless leg syndrome     Thyroid nodule     Congestive heart failure (H)     Coronary artery disease involving native coronary artery with unstable angina pectoris (H)     S/P primary angioplasty with coronary stent     Paroxysmal atrial fibrillation (H)     Senile osteoporosis       Past Surgical History:   Procedure Laterality Date     ARTHROCENTESIS      LEFT HIP TROCHANTERIC BURSA     ARTHROPLASTY KNEE UNICOMPARTMENT  10/31/2013    Procedure: ARTHROPLASTY KNEE UNICOMPARTMENT;  LEFT KNEE UNICOMPARTMENTAL ARTHROPLASTY (CAROLINE)^;  Surgeon: Chi Mittal MD;  Location:  OR     ARTICULAR DEBRIEDEMENT[      LEFT KNEE     BACK SURGERY       BREAST CYST EXCISION       BREAST SURGERY      bx     COLECTOMY N/A 6/2/2017    Procedure: RESECTION, SMALL INTESTINE, OPEN ADHESIOLYSIS;  Surgeon: Keyon Qureshi MD;  Location: [facility];  Service:      COLON SURGERY       CV CORONARY ANGIOGRAM N/A 10/12/2022    Procedure: CV CORONARY ANGIOGRAM;  Surgeon: You Martinez MD;  Location: [facility] CV     CV FRACTIONAL FLOW RATIO WIRE N/A 10/12/2022    Procedure: Fractional Flow Ratio Wire;  Surgeon: You Martinez MD;  Location: [facility] CV     CV PCI STENT DRUG ELUTING N/A 10/12/2022    Procedure: Percutaneous Coronary Intervention Stent;  Surgeon: oYu Martinez MD;  Location: [facility] CV     CV PCI STENT DRUG ELUTING N/A 11/8/2022    Procedure: Percutaneous Coronary Intervention - Stent;  Surgeon: Bret Jin MD;  Location: [facility] CV     ENDOSCOPIC RETROGRADE  CHOLANGIOPANCREATOGRAPHY       ENDOSCOPIC STRIPPING VEIN(S)      BILATERLY     GENITOURINARY SURGERY      bladder sling     GI SURGERY      hemorrhoidectomy     HEMORRHOID SURGERY       HERNIORRHAPHY INCISIONAL (LOCATION) N/A 6/2/2017    Procedure: LAPARASCOPIC CONVERTED TO OPEN PRIMARY INCISIONAL HERNIA REPAIR;  Surgeon: Keyon Qureshi MD;  Location: [facility] OR;  Service:      HYSTERECTOMY  [id number]     HYSTERECTOMY VAGINAL       INCONTINENCE SURGERY       LAMINECTOMY LUMBAR TWO LEVELS      2003     LUMBAR FUSION       LUMBAR HARDWARE REMOVAL[       ORTHOPEDIC SURGERY      carpal tunnel     AR LAP,DIAGNOSTIC ABDOMEN N/A 8/22/2019    Procedure: DIAGNOSTIC LAPAROSCOPY, LYSIS OF ADHESION;  Surgeon: Svetlana Ron MD;  Location: [facility] OR;  Service: General     RELEASE CARPAL TUNNEL       STRIP VEIN       TRANSARTHISCOPIC SURGERY[      LEFT KNEE     US THYROID BIOPSY  4/19/2019     VEIN LIGATION AND STRIPPING Bilateral      ZZC TOTAL KNEE ARTHROPLASTY Right 11/27/2017    Procedure:  RIGHT TOTAL KNEE ARTHROPLASTY;  Surgeon: Kevin Ryder MD;  Location: [facility] OR;  Service: Orthopedics       Family History   Problem Relation Age of Onset     Dementia Mother      Arthritis Mother      Chronic Obstructive Pulmonary Disease Father      Cardiovascular Father      Hypertension Father      No Known Problems Sister      No Known Problems Daughter      No Known Problems Son      Cerebrovascular Disease Maternal Grandmother      Heart Disease Paternal Grandmother      Cerebrovascular Disease Paternal Grandmother      No Known Problems Sister      No Known Problems Sister      No Known Problems Son      No Known Problems Daughter      Breast Cancer Paternal Aunt         age in 50's       Reviewed past medical, surgical, and family history with patient found on new patient intake packet located in EMR [employer] tab.     SOCIAL HX: Patient is , retired teacher.  Patient denies smoking/tobacco use.   "Does report alcohol use, denies history being a heavy drinker.  Denies recreational drug use.    ROS: Positive for joint pain, muscle pain, easy bruising, anxiety, chest pain, leg swelling, headache, ringing in ears, weight gain.  Specifically negative for bowel/bladder dysfunction, balance changes, headache, dizziness, foot drop, fevers, chills, appetite changes, nausea/vomiting, unexplained weight loss. Otherwise 13 systems reviewed are negative. Please see the patient's intake questionnaire from today for details.    OBJECTIVE:  /61 (BP Location: Right arm, Patient Position: Sitting)   Pulse 70   Ht 5' 1\" (1.549 m)   Wt 178 lb (80.7 kg)   LMP  (LMP Unknown)   SpO2 95%   BMI 33.63 kg/m      PHYSICAL EXAMINATION:  --CONSTITUTIONAL: Vital signs as above. No acute distress. The patient is well nourished and well groomed.  --PSYCHIATRIC: The patient is awake, alert, oriented to person, place, time and answering questions appropriately with clear speech. Appropriate mood and affect   --HEENT: Sclera are non-injected. Extraocular muscles are intact. Thyroid moves easily upon swallowing.  Moist oral mucosa.  --SKIN: Skin over the face, bilateral upper extremities, and posterior torso is clean, dry, intact without rashes.  --LYMPH NODES: No palpable or tender anterior/posterior cervical, submandibular, or supraclavicular lymph nodes.    --RESPIRATORY: Normal rhythm and effort. No abnormal accessory muscle breathing patterns noted.   --GROSS MOTOR: Easily arises from a seated position. Toe walking and heel walking are normal.    --CERVICAL SPINE: Inspection reveals no evidence of deformity. Range of motion is not limited in all movements due to pain, significant limitations with bilateral right greater than left lateral rotation and head tilt.  Generalized tenderness to palpation cervical paraspinals and trapezius region bilaterally as well as upper thoracic region.  Spurling maneuver negative to arm " pain.  --SHOULDERS: Full range of motion bilaterally: abduction, flexion, cross chest movement internal/external rotation. Negative empty can.  --UPPER EXTREMITY MOTOR TESTING:  Wrist flexion left 5/5, right 5/5  Wrist extension left 5/5, right 5/5  Pronators left 5/5, right 5/5  Biceps left 5/5, right 5/5   Triceps left 5/5, right 5/5   Shoulder abduction left 5/5, right 5/5   left 5/5, right 5/5  --NEUROLOGIC: CN III-XII are grossly intact. 2/4 symmetric biceps, brachioradialis, triceps reflexes bilaterally. Sensation to upper extremities is intact.  Negative Negron's bilaterally.    --VASCULAR: 2/4 radial pulses bilaterally. Warm upper limbs bilaterally. Capillary refill in the upper extremities is less than 1 second.    RESULTS: Prior medical records from [facility] and [facility]where were reviewed today.           Again, thank you for allowing me to participate in the care of your patient.        Sincerely,        Reny Camejo, CNP

## 2023-02-03 ENCOUNTER — HOSPITAL ENCOUNTER (OUTPATIENT)
Dept: CARDIAC REHAB | Facility: CLINIC | Age: 73
Discharge: HOME | End: 2023-02-03
Attending: INTERNAL MEDICINE
Payer: MEDICARE

## 2023-02-03 PROCEDURE — 93798 PHYS/QHP OP CAR RHAB W/ECG: CPT

## 2023-02-08 ENCOUNTER — HOSPITAL ENCOUNTER (OUTPATIENT)
Dept: CARDIAC REHAB | Facility: CLINIC | Age: 73
Discharge: HOME | End: 2023-02-08
Attending: INTERNAL MEDICINE
Payer: MEDICARE

## 2023-02-08 PROCEDURE — 93798 PHYS/QHP OP CAR RHAB W/ECG: CPT

## 2023-02-09 ENCOUNTER — HOSPITAL ENCOUNTER (OUTPATIENT)
Dept: CARDIAC REHAB | Facility: CLINIC | Age: 73
Discharge: HOME | End: 2023-02-09
Attending: INTERNAL MEDICINE
Payer: MEDICARE

## 2023-02-09 PROCEDURE — 93797 PHYS/QHP OP CAR RHAB WO ECG: CPT

## 2023-02-09 PROCEDURE — 93798 PHYS/QHP OP CAR RHAB W/ECG: CPT

## 2023-02-16 ENCOUNTER — TRANSFERRED RECORDS (OUTPATIENT)
Dept: HEALTH INFORMATION MANAGEMENT | Facility: CLINIC | Age: 73
End: 2023-02-16
Payer: MEDICARE

## 2023-02-16 ENCOUNTER — TRANSFERRED RECORDS (OUTPATIENT)
Dept: HEALTH INFORMATION MANAGEMENT | Facility: CLINIC | Age: 73
End: 2023-02-16

## 2023-02-20 ENCOUNTER — MYC MEDICAL ADVICE (OUTPATIENT)
Dept: INTERNAL MEDICINE | Facility: CLINIC | Age: 73
End: 2023-02-20
Payer: MEDICARE

## 2023-02-21 ENCOUNTER — VIRTUAL VISIT (OUTPATIENT)
Dept: PHYSICAL MEDICINE AND REHAB | Facility: REHABILITATION | Age: 73
End: 2023-02-21
Payer: MEDICARE

## 2023-02-21 ENCOUNTER — TELEPHONE (OUTPATIENT)
Dept: CARDIOLOGY | Facility: CLINIC | Age: 73
End: 2023-02-21

## 2023-02-21 ENCOUNTER — MYC MEDICAL ADVICE (OUTPATIENT)
Dept: PHYSICAL MEDICINE AND REHAB | Facility: CLINIC | Age: 73
End: 2023-02-21
Payer: MEDICARE

## 2023-02-21 DIAGNOSIS — G95.0 SYRINX OF SPINAL CORD (H): Primary | ICD-10-CM

## 2023-02-21 DIAGNOSIS — F40.240 CLAUSTROPHOBIA: ICD-10-CM

## 2023-02-21 DIAGNOSIS — G89.29 CHRONIC NECK PAIN: Primary | ICD-10-CM

## 2023-02-21 DIAGNOSIS — E07.9 THYROID MASS: ICD-10-CM

## 2023-02-21 DIAGNOSIS — G95.0 SYRINX OF SPINAL CORD (H): ICD-10-CM

## 2023-02-21 DIAGNOSIS — M54.2 CHRONIC NECK PAIN: ICD-10-CM

## 2023-02-21 DIAGNOSIS — M54.2 CHRONIC NECK PAIN: Primary | ICD-10-CM

## 2023-02-21 DIAGNOSIS — G89.29 CHRONIC NECK PAIN: ICD-10-CM

## 2023-02-21 PROCEDURE — 99442 PR PHYSICIAN TELEPHONE EVALUATION 11-20 MIN: CPT | Mod: 95 | Performed by: NURSE PRACTITIONER

## 2023-02-21 RX ORDER — DIAZEPAM 5 MG
TABLET ORAL
Qty: 1 TABLET | Refills: 0 | Status: SHIPPED | OUTPATIENT
Start: 2023-02-21 | End: 2023-03-10

## 2023-02-21 NOTE — LETTER
2/21/2023         RE: Suma Mark  [address] [address]        Dear Colleague,    Thank you for referring your patient, Suma Mark, to the [facility] [place]. Please see a copy of my visit note below.    Lo is a 72 year old who is being evaluated via a billable telephone visit.      How would you like to obtain your AVS? MyChart  Phone call duration: 12 minutes    Assessment:     Diagnoses and all orders for this visit:  Syrinx of spinal cord (H)  -     diazepam (VALIUM) 5 MG tablet; Valium 5 mg p.o., take 1 tablet prior to MRI as instructed by radiology.  Must have .  -     Neurosurgery Referral  Claustrophobia  -     diazepam (VALIUM) 5 MG tablet; Valium 5 mg p.o., take 1 tablet prior to MRI as instructed by radiology.  Must have .  Chronic neck pain  -     Neurosurgery Referral     Suma Mark is a 72 year old y.o. female with past medical history significant for hypothyroidism, hypertension, dyslipidemia, congestive heart failure, CAD-history of coronary angioplasty with stent placement on Eliquis anticoagulation therapy, atrial fibrillation, diverticulosis, GERD, fibromyalgia, migraine, chronic LBP, osteoporosis, depression, insomnia, total knee replacement who presents today for follow-up via telephone regarding:      -Chronic generalized neck pain with some consistency with facet mediated pain as well as myofascial pain.     -Intermittent bilateral upper extremity numbness and tingling.     -History lumbar fusion.    I have reviewed the note as documented. This accurately captures the substance of my conversation with the patient.    Plan:     A shared decision making plan was used.  The patient's values and choices were respected. Prior medical records were reviewed today. The following represents what was discussed and decided upon by the provider and the patient.     -DIAGNOSTIC TESTS: Images were personally reviewed and interpreted.    --Ordered cervical spine MRI with and without contrast open sided lying to be completed at [place] [facility] with oral Valium to further evaluate cervical syrinx.  --Thyroid ultrasound ordered to review RIGHT thyroid mass.  --Cervical spine MRI [place] [facility] open lying 2/16/2023 with degenerative changes throughout with facet arthropathy.  No high-grade central stenosis.  Varying degrees of foraminal stenosis with severe right foraminal stenosis at C5-6 and benign-appearing right-sided perineural cyst at C7-T1 with right foraminal stenosis.  Facet arthropathy greatest on the right at C2-3, left greater than right at C3-4, bilateral at C4-5, severe right at C5-6 and bilateral at C6-7.  Cervical syrinx from C4-C7.  Right-sided thyroid mass noted on imaging, radiology recommended thyroid ultrasound.     -INTERVENTIONS: Could consider right medial branch block pending updated imaging and neurosurgery consult for syrinx.  If she does not do well with medial branch block we could consider a right C5-6 TFESI as well she also has right foraminal stenosis at this level.    -Referral placed to neurosurgery to evaluate cervical syrinx.    -MEDICATIONS: No changes in medications today  Discussed side effects of medications and proper use. Patient verbalized understanding.    -PHYSICAL THERAPY: Encourage patient continue with home exercises from recent physical therapy sessions.  Discussed the importance of core strengthening, ROM, stretching exercises with the patient and how each of these entities is important in decreasing pain.  Explained to the patient that the purpose of physical therapy is to teach the patient a home exercise program.  These exercises need to be performed every day in order to decrease pain and prevent future occurrences of pain.        -PATIENT EDUCATION: 17 minutes of total visit time was spent on the telephone with the patient today, 60 % of the visit was spent on counseling, education, and  coordinating care.   -5 minutes spent outside of visit time, non-face-to-face time, reviewing chart.  -Today we also discussed the issues related to the current COVID-19 pandemic, the pros and cons of the current treatment plan, the CDC guidelines such as social distancing, washing the hands, and covering the cough.    -FOLLOW UP: Follow-up [facility] message for imaging results as well as neurosurgery consult.  Advised to contact clinic if symptoms worsen or change.    Subjective:     Suma Mark is a 72 year old female who presents today for follow-up via telephone regarding ongoing generalized neck pain greater on the right with radiation of pain into the temporal regions with headaches.      Patient denies bilateral arm pain, denies upper extremity weakness, denies recent trips or falls or balance changes.  Denies bowel or bladder loss control.    -Treatment to Date: History lumbar fusion 2003  Physical therapy x3 sessions [facility] January 2023 neck pain     History of lumbar radiofrequency ablation above her fusion with benefit, is hoping to get a repeat ablation completed with [facility], they are working with insurance to get this covered at this time.     -Medications:  Tizanidine 2 mg    Patient Active Problem List   Diagnosis     Total knee replacement status     Anxiety     Acquired hypothyroidism     Chronic back pain     Fibromyalgia     Depression, unspecified depression type     Diverticulosis     Essential hypertension     Gastroesophageal reflux disease without esophagitis     Insomnia, unspecified type     Migraine     Dyslipidemia, goal LDL below 70     Restless leg syndrome     Thyroid nodule     Congestive heart failure (H)     Coronary artery disease involving native coronary artery with unstable angina pectoris (H)     S/P primary angioplasty with coronary stent     Paroxysmal atrial fibrillation (H)     Senile osteoporosis       Current Outpatient Medications   Medication     diazepam  "(VALIUM) 5 MG tablet     amLODIPine (NORVASC) 5 MG tablet     apixaban ANTICOAGULANT (ELIQUIS ANTICOAGULANT) 5 MG tablet     apixaban ANTICOAGULANT (ELIQUIS) 5 MG tablet     atorvastatin (LIPITOR) 80 MG tablet     botulinum toxin type A (BOTOX) 100 units injection     busPIRone (BUSPAR) 10 MG tablet     cholecalciferol 50 MCG (2000 UT) CAPS     citalopram (CELEXA) 20 MG tablet     clopidogrel (PLAVIX) 75 MG tablet     diazepam (VALIUM) 5 MG tablet     diclofenac (VOLTAREN) 1 % topical gel     eletriptan (RELPAX) 40 MG tablet     furosemide (LASIX) 20 MG tablet     HYDROcodone-acetaminophen (NORCO) 5-325 MG tablet     irbesartan (AVAPRO) 150 MG tablet     isosorbide mononitrate (IMDUR) 30 MG 24 hr tablet     levothyroxine (SYNTHROID/LEVOTHROID) 88 MCG tablet     LORazepam (ATIVAN) 0.5 MG tablet     nitroGLYcerin (NITROSTAT) 0.4 MG sublingual tablet     pramipexole (MIRAPEX) 0.25 MG tablet     sotalol (BETAPACE) 80 MG tablet     tiZANidine (ZANAFLEX) 2 MG tablet     traZODone (DESYREL) 50 MG tablet     ubrogepant (UBRELVY) 100 MG tablet     No current facility-administered medications for this visit.       Allergies   Allergen Reactions     Cephalexin Nausea and Vomiting     Ace Inhibitors Other (See Comments)     Elevates blood pressure     Amitriptyline Hcl Other (See Comments)     elevates blood pressure     Atenolol Other (See Comments)     Aggravates reynauds     Celebrex [Celecoxib] GI Disturbance     Cephalosporins Unknown     Pt doesn't remember        Clonidine Unknown     \"got very ill in ER\"     Codeine Sulfate Nausea and Vomiting     Darvocet [Propoxyphene N-Apap] Nausea and Vomiting     Dexamethasone Acetate Swelling     Erythromycin Nausea and Vomiting     Escitalopram Other (See Comments)     Headaches.       Gabapentin Unknown     \"Spotted\"     Hctz Unknown     \"depletes my sodium\"     Potassium GI Disturbance     Propoxyphene      HUT Reaction: Gastrointestinal; HUT Reaction: Nausea And Vomiting; HUT " Noted: 20150911     Tramadol Swelling     Swelling of tongue and face     Triamterene      Ultracet Other (See Comments)     Venlafaxine Nausea and Vomiting and Diarrhea     Zolpidem Other (See Comments) and Unknown     Pt doesn't rember       Zolpidem Tartrate Unknown     Bacitracin Itching and Rash     Sulfanilamide Rash       Past Medical History:   Diagnosis Date     Acquired hypothyroidism 3/12/2009     Anxiety      Anxiety state, unspecified      Back pain      Breast cyst      Carpal tunnel syndrome      Chronic pain     LOW BACK     Degenerative disc disease, lumbar      Depression      Depression, unspecified depression type      Diarrhea      Disease of thyroid gland     hypothyroid     Diverticulosis 10/4/2017    Incidental finding on colonoscopy 10/2017     DJD (degenerative joint disease)      Essential hypertension 4/21/2015     Essential hypertension, benign      Fatty liver      Fibromyalgia      Gastro-oesophageal reflux disease      Gastroesophageal reflux disease without esophagitis 4/21/2015     GERD (gastroesophageal reflux disease)      Hernia, ventral 4/27/2017     History of anesthesia complications     hypotension after surgery     History of blood clots      History of blood transfusion      History of transfusion      Hyperlipidemia      Hypertension      Insomnia, unspecified type      Left lower quadrant pain      Lumbago      Lung nodules     INTERMITTENT     Major depression      Migraine      Osteoarthritis      Osteopenia      Osteoporosis      Other abnormal glucose      Other and unspecified special symptom or syndrome, not elsewhere classified      PONV (postoperative nausea and vomiting)      Posttraumatic stress disorder      Raynaud's disease      Restless leg syndrome      S/P total knee replacement 11/27/2017     Spontaneous ecchymoses      Thrombosis of leg     with pregnancy     Unspecified episodic mood disorder     AFFECTIVE PSYCHOSIS     Unspecified hypothyroidism       Unspecified vitamin D deficiency      Varicose veins      Ventral hernia      Vitamin D deficiency         Review of Systems  ROS: Specifically negative for bowel/bladder dysfunction, balance changes, headache, dizziness, foot drop, fevers, chills, appetite changes, nausea/vomiting, unexplained weight loss. Otherwise 13 systems reviewed are negative. Please see the patient's intake questionnaire from today for details.    Reviewed Social, Family, Past Medical and Past Surgical history with patient, no significant changes noted since prior visit.     Objective:     Virtual Telephone Visit: No physical exam completed.  --PSYCHIATRIC: The patient is verbally awake, alert, oriented to person, place, time and answering questions appropriately with clear speech. Appropriate mood.    Imaging of the cervical spine: Spine imaging was reviewed today.     Cervical spine MRI reviewed from [facility] [facility]                    Again, thank you for allowing me to participate in the care of your patient.        Sincerely,        Reny Camejo, CNP

## 2023-02-21 NOTE — PROGRESS NOTES
Lo is a 72 year old who is being evaluated via a billable telephone visit.      How would you like to obtain your AVS? Karla  Phone call duration: 12 minutes    Assessment:     Diagnoses and all orders for this visit:  Syrinx of spinal cord (H)  -     diazepam (VALIUM) 5 MG tablet; Valium 5 mg p.o., take 1 tablet prior to MRI as instructed by radiology.  Must have .  -     Neurosurgery Referral  Claustrophobia  -     diazepam (VALIUM) 5 MG tablet; Valium 5 mg p.o., take 1 tablet prior to MRI as instructed by radiology.  Must have .  Chronic neck pain  -     Neurosurgery Referral     Suma Mark is a 72 year old y.o. female with past medical history significant for hypothyroidism, hypertension, dyslipidemia, congestive heart failure, CAD-history of coronary angioplasty with stent placement on Eliquis anticoagulation therapy, atrial fibrillation, diverticulosis, GERD, fibromyalgia, migraine, chronic LBP, osteoporosis, depression, insomnia, total knee replacement who presents today for follow-up via telephone regarding:      -Chronic generalized neck pain with some consistency with facet mediated pain as well as myofascial pain.     -Intermittent bilateral upper extremity numbness and tingling.     -History lumbar fusion.    I have reviewed the note as documented. This accurately captures the substance of my conversation with the patient.    Plan:     A shared decision making plan was used.  The patient's values and choices were respected. Prior medical records were reviewed today. The following represents what was discussed and decided upon by the provider and the patient.     -DIAGNOSTIC TESTS: Images were personally reviewed and interpreted.   --Ordered cervical spine MRI with and without contrast open sided lying to be completed at [facility] with oral Valium to further evaluate cervical syrinx.  --Thyroid ultrasound ordered to review RIGHT thyroid mass.  --Cervical spine MRI [facility] open lying 2/16/2023 with degenerative changes throughout with facet arthropathy.  No high-grade central stenosis.  Varying degrees of foraminal stenosis with severe right foraminal stenosis at C5-6 and benign-appearing right-sided perineural cyst at C7-T1 with right foraminal stenosis.  Facet arthropathy greatest on the right at C2-3, left greater than right at C3-4, bilateral at C4-5, severe right at C5-6 and bilateral at C6-7.  Cervical syrinx from C4-C7.  Right-sided thyroid mass noted on imaging, radiology recommended thyroid ultrasound.     -INTERVENTIONS: Could consider right medial branch block pending updated imaging and neurosurgery consult for syrinx.  If she does not do well with medial branch block we could consider a right C5-6 TFESI as well she also has right foraminal stenosis at this level.    -Referral placed to neurosurgery to evaluate cervical syrinx.    -MEDICATIONS: No changes in medications today  Discussed side effects of medications and proper use. Patient verbalized understanding.    -PHYSICAL THERAPY: Encourage patient continue with home exercises from recent physical therapy sessions.  Discussed the importance of core strengthening, ROM, stretching exercises with the patient and how each of these entities is important in decreasing pain.  Explained to the patient that the purpose of physical therapy is to teach the patient a home exercise program.  These exercises need to be performed every day in order to decrease pain and prevent future occurrences of pain.        -PATIENT EDUCATION: 17 minutes of total visit time was spent on the telephone with the patient today, 60 % of the visit was spent on counseling, education, and coordinating care.   -5 minutes spent outside of visit time, non-face-to-face time, reviewing chart.  -Today we also discussed the issues related to the current COVID-19 pandemic, the pros and cons of the current treatment plan, the CDC guidelines such as  social distancing, washing the hands, and covering the cough.    -FOLLOW UP: Follow-up [facility] [employer] message for imaging results as well as neurosurgery consult.  Advised to contact clinic if symptoms worsen or change.    Subjective:     Suma Mark is a 72 year old female who presents today for follow-up via telephone regarding ongoing generalized neck pain greater on the right with radiation of pain into the temporal regions with headaches.      Patient denies bilateral arm pain, denies upper extremity weakness, denies recent trips or falls or balance changes.  Denies bowel or bladder loss control.    -Treatment to Date: History lumbar fusion 2003  Physical therapy x3 sessions [facility] January 2023 neck pain     History of lumbar radiofrequency ablation above her fusion with benefit, is hoping to get a repeat ablation completed with [facility], they are working with insurance to get this covered at this time.     -Medications:  Tizanidine 2 mg    Patient Active Problem List   Diagnosis     Total knee replacement status     Anxiety     Acquired hypothyroidism     Chronic back pain     Fibromyalgia     Depression, unspecified depression type     Diverticulosis     Essential hypertension     Gastroesophageal reflux disease without esophagitis     Insomnia, unspecified type     Migraine     Dyslipidemia, goal LDL below 70     Restless leg syndrome     Thyroid nodule     Congestive heart failure (H)     Coronary artery disease involving native coronary artery with unstable angina pectoris (H)     S/P primary angioplasty with coronary stent     Paroxysmal atrial fibrillation (H)     Senile osteoporosis       Current Outpatient Medications   Medication     diazepam (VALIUM) 5 MG tablet     amLODIPine (NORVASC) 5 MG tablet     apixaban ANTICOAGULANT (ELIQUIS ANTICOAGULANT) 5 MG tablet     apixaban ANTICOAGULANT (ELIQUIS) 5 MG tablet     atorvastatin (LIPITOR) 80 MG tablet     botulinum toxin type A (BOTOX) 100 units  "injection     busPIRone (BUSPAR) 10 MG tablet     cholecalciferol 50 MCG (2000 UT) CAPS     citalopram (CELEXA) 20 MG tablet     clopidogrel (PLAVIX) 75 MG tablet     diazepam (VALIUM) 5 MG tablet     diclofenac (VOLTAREN) 1 % topical gel     eletriptan (RELPAX) 40 MG tablet     furosemide (LASIX) 20 MG tablet     HYDROcodone-acetaminophen (NORCO) 5-325 MG tablet     irbesartan (AVAPRO) 150 MG tablet     isosorbide mononitrate (IMDUR) 30 MG 24 hr tablet     levothyroxine (SYNTHROID/LEVOTHROID) 88 MCG tablet     LORazepam (ATIVAN) 0.5 MG tablet     nitroGLYcerin (NITROSTAT) 0.4 MG sublingual tablet     pramipexole (MIRAPEX) 0.25 MG tablet     sotalol (BETAPACE) 80 MG tablet     tiZANidine (ZANAFLEX) 2 MG tablet     traZODone (DESYREL) 50 MG tablet     ubrogepant (UBRELVY) 100 MG tablet     No current facility-administered medications for this visit.       Allergies   Allergen Reactions     Cephalexin Nausea and Vomiting     Ace Inhibitors Other (See Comments)     Elevates blood pressure     Amitriptyline Hcl Other (See Comments)     elevates blood pressure     Atenolol Other (See Comments)     Aggravates reynauds     Celebrex [Celecoxib] GI Disturbance     Cephalosporins Unknown     Pt doesn't remember        Clonidine Unknown     \"got very ill in ER\"     Codeine Sulfate Nausea and Vomiting     Darvocet [Propoxyphene N-Apap] Nausea and Vomiting     Dexamethasone Acetate Swelling     Erythromycin Nausea and Vomiting     Escitalopram Other (See Comments)     Headaches.       Gabapentin Unknown     \"Spotted\"     Hctz Unknown     \"depletes my sodium\"     Potassium GI Disturbance     Propoxyphene      HUT Reaction: Gastrointestinal; HUT Reaction: Nausea And Vomiting; HUT Noted: 20150911     Tramadol Swelling     Swelling of tongue and face     Triamterene      Ultracet Other (See Comments)     Venlafaxine Nausea and Vomiting and Diarrhea     Zolpidem Other (See Comments) and Unknown     Pt doesn't rember       Zolpidem " Tartrate Unknown     Bacitracin Itching and Rash     Sulfanilamide Rash       Past Medical History:   Diagnosis Date     Acquired hypothyroidism 3/12/2009     Anxiety      Anxiety state, unspecified      Back pain      Breast cyst      Carpal tunnel syndrome      Chronic pain     LOW BACK     Degenerative disc disease, lumbar      Depression      Depression, unspecified depression type      Diarrhea      Disease of thyroid gland     hypothyroid     Diverticulosis 10/4/2017    Incidental finding on colonoscopy 10/2017     DJD (degenerative joint disease)      Essential hypertension 4/21/2015     Essential hypertension, benign      Fatty liver      Fibromyalgia      Gastro-oesophageal reflux disease      Gastroesophageal reflux disease without esophagitis 4/21/2015     GERD (gastroesophageal reflux disease)      Hernia, ventral 4/27/2017     History of anesthesia complications     hypotension after surgery     History of blood clots      History of blood transfusion      History of transfusion      Hyperlipidemia      Hypertension      Insomnia, unspecified type      Left lower quadrant pain      Lumbago      Lung nodules     INTERMITTENT     Major depression      Migraine      Osteoarthritis      Osteopenia      Osteoporosis      Other abnormal glucose      Other and unspecified special symptom or syndrome, not elsewhere classified      PONV (postoperative nausea and vomiting)      Posttraumatic stress disorder      Raynaud's disease      Restless leg syndrome      S/P total knee replacement 11/27/2017     Spontaneous ecchymoses      Thrombosis of leg     with pregnancy     Unspecified episodic mood disorder     AFFECTIVE PSYCHOSIS     Unspecified hypothyroidism      Unspecified vitamin D deficiency      Varicose veins      Ventral hernia      Vitamin D deficiency         Review of Systems  ROS: Specifically negative for bowel/bladder dysfunction, balance changes, headache, dizziness, foot drop, fevers, chills,  appetite changes, nausea/vomiting, unexplained weight loss. Otherwise 13 systems reviewed are negative. Please see the patient's intake questionnaire from today for details.    Reviewed Social, Family, Past Medical and Past Surgical history with patient, no significant changes noted since prior visit.     Objective:     Virtual Telephone Visit: No physical exam completed.  --PSYCHIATRIC: The patient is verbally awake, alert, oriented to person, place, time and answering questions appropriately with clear speech. Appropriate mood.    Imaging of the cervical spine: Spine imaging was reviewed today.     Cervical spine MRI reviewed from [facility] [facility]

## 2023-02-21 NOTE — TELEPHONE ENCOUNTER
Phone call to patient regarding upcoming appointment with [facility] for [facility] consult. She states she has capability to do virtual visit from home and has done this previously. Her consult will be changed to a virtual visit for the same time on Thursday 2/23/23 at 2:50 pm. She provided back up phone number of [phone] for any connection issues. She has been provided with writer's direct office number for any concerns regarding her appt. She is appreciative of call. No further questions at this time. MATT

## 2023-02-23 ENCOUNTER — VIRTUAL VISIT (OUTPATIENT)
Dept: CARDIOLOGY | Facility: CLINIC | Age: 73
End: 2023-02-23
Payer: MEDICARE

## 2023-02-23 DIAGNOSIS — Z95.5 S/P PRIMARY ANGIOPLASTY WITH CORONARY STENT: ICD-10-CM

## 2023-02-23 DIAGNOSIS — I48.0 PAROXYSMAL ATRIAL FIBRILLATION (H): Primary | ICD-10-CM

## 2023-02-23 DIAGNOSIS — I50.9 CONGESTIVE HEART FAILURE, UNSPECIFIED HF CHRONICITY, UNSPECIFIED HEART FAILURE TYPE (H): ICD-10-CM

## 2023-02-23 DIAGNOSIS — I10 ESSENTIAL HYPERTENSION: ICD-10-CM

## 2023-02-23 DIAGNOSIS — E78.5 DYSLIPIDEMIA, GOAL LDL BELOW 70: ICD-10-CM

## 2023-02-23 DIAGNOSIS — I25.110 CORONARY ARTERY DISEASE INVOLVING NATIVE CORONARY ARTERY OF NATIVE HEART WITH UNSTABLE ANGINA PECTORIS (H): ICD-10-CM

## 2023-02-23 PROCEDURE — 99215 OFFICE O/P EST HI 40 MIN: CPT | Mod: VID | Performed by: INTERNAL MEDICINE
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
## 2023-02-24 ENCOUNTER — TELEPHONE (OUTPATIENT)
Dept: CARDIOLOGY | Facility: CLINIC | Age: 73
End: 2023-02-24

## 2023-02-24 ENCOUNTER — LAB (OUTPATIENT)
Dept: LAB | Facility: CLINIC | Age: 73
End: 2023-02-24
Payer: MEDICARE

## 2023-02-24 DIAGNOSIS — I50.9 CONGESTIVE HEART FAILURE, UNSPECIFIED HF CHRONICITY, UNSPECIFIED HEART FAILURE TYPE (H): ICD-10-CM

## 2023-02-24 DIAGNOSIS — I48.0 PAROXYSMAL ATRIAL FIBRILLATION (H): Primary | ICD-10-CM

## 2023-02-24 LAB
ANION GAP SERPL CALCULATED.3IONS-SCNC: 9 MMOL/L (ref 5–18)
BUN SERPL-MCNC: 19 MG/DL (ref 8–28)
CALCIUM SERPL-MCNC: 9.5 MG/DL (ref 8.5–10.5)
CHLORIDE BLD-SCNC: 101 MMOL/L (ref 98–107)
CO2 SERPL-SCNC: 28 MMOL/L (ref 22–31)
CREAT SERPL-MCNC: 0.83 MG/DL (ref 0.6–1.1)
GFR SERPL CREATININE-BSD FRML MDRD: 74 ML/MIN/1.73M2
GLUCOSE BLD-MCNC: 98 MG/DL (ref 70–125)
POTASSIUM BLD-SCNC: 4.1 MMOL/L (ref 3.5–5)
SODIUM SERPL-SCNC: 138 MMOL/L (ref 136–145)

## 2023-02-24 PROCEDURE — 82374 ASSAY BLOOD CARBON DIOXIDE: CPT

## 2023-02-24 PROCEDURE — 82310 ASSAY OF CALCIUM: CPT

## 2023-02-24 PROCEDURE — 36415 COLL VENOUS BLD VENIPUNCTURE: CPT

## 2023-02-24 NOTE — TELEPHONE ENCOUNTER
BMP resulted today showing stable kidney function. Per INES Midd[place], CT Pulm Vein will be ordered to evaluate anatomy for LAAC. Patient also in need of SDM. She had appt with Dr. Worley 1/9/23, will see if this appt appropriate for this conversation. Otherwise, she has upcoming appt with Dr. Bonilla 3/6/2023.     Phone call to patient. She confirms she has had CT with contrast previously. We discussed stable lab results and that she will need CT performed at [facility] to evaluate NAVIN anatomy. She verbalized understanding. She does endorse some claustrophobia. Informed her that if she feels this will be an issue, she is welcome to call back and we can discuss alternatives. She feels she will try CT. Will place order and route to scheduling to arrange. No further questions or concerns at this time. [facility]    ----- Message from Marilyn Middleton PA-C sent at 2/23/2023  3:36 PM CST -----  She wants to move forward.  CT pulmonary vein for screening.  I did order BMP and she will go get this tomorrow.  Her last 2 GFR readings were 54 and 59 respectively.    She may end up needing an ablation.  She is a patient of Dr. Worley but is seeing Dr. Bonilla In early March because Dr. Worley has no availability.  She has seen Shayan in the past but has now failed sotalol in my eyes.  I did briefly talk with her about this.  If Dr. Bonilla does not address, she may be calling you to be referred back to Shayan to discuss ablation    C

## 2023-03-01 NOTE — TELEPHONE ENCOUNTER
----- Message -----  From: Cyndee Wang  Sent: 2/28/2023   6:11 PM CST  To: Vikki Johnson RN    3/28  ----- Message -----  From: Vikki Johnson RN  Sent: 2/24/2023  11:07 AM CST  To: Cyndee Wang    ----- Message from Vikki Johnson RN sent at 2/24/2023 11:07 AM CST -----  Please call patient for pre-LAAC CT. I asked Li to addend office visit from 1/9/23, otherwise she will see LBF 3/6/23. Thanks!    Vikki

## 2023-03-02 ENCOUNTER — TELEPHONE (OUTPATIENT)
Dept: PHYSICAL MEDICINE AND REHAB | Facility: CLINIC | Age: 73
End: 2023-03-02
Payer: MEDICARE

## 2023-03-02 ENCOUNTER — HOSPITAL ENCOUNTER (OUTPATIENT)
Dept: MRI IMAGING | Facility: HOSPITAL | Age: 73
Discharge: HOME | End: 2023-03-02
Attending: NURSE PRACTITIONER
Payer: MEDICARE

## 2023-03-02 DIAGNOSIS — G95.0 SYRINX OF SPINAL CORD (H): ICD-10-CM

## 2023-03-02 DIAGNOSIS — G89.29 CHRONIC NECK PAIN: ICD-10-CM

## 2023-03-02 DIAGNOSIS — M54.2 CHRONIC NECK PAIN: ICD-10-CM

## 2023-03-02 DIAGNOSIS — I10 ESSENTIAL HYPERTENSION: Primary | ICD-10-CM

## 2023-03-02 PROCEDURE — A9585 GADOBUTROL INJECTION: HCPCS | Performed by: NURSE PRACTITIONER

## 2023-03-02 PROCEDURE — 72156 MRI NECK SPINE W/O & W/DYE: CPT | Mod: MF

## 2023-03-02 PROCEDURE — 255N000002 HC RX 255 OP 636: Performed by: NURSE PRACTITIONER

## 2023-03-02 RX ORDER — GADOBUTROL 604.72 MG/ML
0.1 INJECTION INTRAVENOUS ONCE
Status: COMPLETED | OUTPATIENT
Start: 2023-03-02 | End: 2023-03-02

## 2023-03-02 RX ORDER — IRBESARTAN 300 MG/1
TABLET ORAL
Qty: 90 TABLET | Refills: 6 | Status: SHIPPED | OUTPATIENT
Start: 2023-03-02 | End: 2023-03-09 | Stop reason: DRUGHIGH

## 2023-03-02 RX ADMIN — GADOBUTROL 8 ML: 604.72 INJECTION INTRAVENOUS at 12:49

## 2023-03-02 ASSESSMENT — SLEEP AND FATIGUE QUESTIONNAIRES
HOW LIKELY ARE YOU TO NOD OFF OR FALL ASLEEP WHILE SITTING INACTIVE IN A PUBLIC PLACE: SLIGHT CHANCE OF DOZING
HOW LIKELY ARE YOU TO NOD OFF OR FALL ASLEEP WHILE SITTING AND READING: SLIGHT CHANCE OF DOZING
HOW LIKELY ARE YOU TO NOD OFF OR FALL ASLEEP WHILE SITTING QUIETLY AFTER LUNCH WITHOUT ALCOHOL: WOULD NEVER DOZE
HOW LIKELY ARE YOU TO NOD OFF OR FALL ASLEEP WHEN YOU ARE A PASSENGER IN A CAR FOR AN HOUR WITHOUT A BREAK: SLIGHT CHANCE OF DOZING
HOW LIKELY ARE YOU TO NOD OFF OR FALL ASLEEP WHILE SITTING AND TALKING TO SOMEONE: WOULD NEVER DOZE
HOW LIKELY ARE YOU TO NOD OFF OR FALL ASLEEP WHILE LYING DOWN TO REST IN THE AFTERNOON WHEN CIRCUMSTANCES PERMIT: SLIGHT CHANCE OF DOZING
HOW LIKELY ARE YOU TO NOD OFF OR FALL ASLEEP IN A CAR, WHILE STOPPED FOR A FEW MINUTES IN TRAFFIC: WOULD NEVER DOZE
HOW LIKELY ARE YOU TO NOD OFF OR FALL ASLEEP WHILE WATCHING TV: HIGH CHANCE OF DOZING

## 2023-03-02 NOTE — TELEPHONE ENCOUNTER
"Routing refill request to provider for review/approval because:  Irbesartan 150 mg tablet daily listed on med list, refill request if for 300 mg daily. Per visit 12/13/22, PCP lists as taking 300 mg daily, Per cardiology note 2/23/23, says to continue to take 150 mg daily. Please advise on dosing.   Irbesartan 150 mg every day  Last Written Prescription Date:  1/9/23  Last Fill Quantity: 90,  # refills: 3   Last office visit provider:  12/13/22     Requested Prescriptions   Pending Prescriptions Disp Refills     irbesartan (AVAPRO) 300 MG tablet [Pharmacy Med Name: IRBESARTAN 300 MG TABLET] 90 tablet 6     Sig: TAKE 1 TABLET BY MOUTH EVERY DAY       Angiotensin-II Receptors Passed - 3/2/2023 12:29 AM        Passed - Last blood pressure under 140/90 in past 12 months     BP Readings from Last 3 Encounters:   01/31/23 129/61   01/09/23 98/48   12/13/22 116/63                 Passed - Recent (12 mo) or future (30 days) visit within the authorizing provider's specialty     Patient has had an office visit with the authorizing provider or a provider within the authorizing providers department within the previous 12 mos or has a future within next 30 days. See \"Patient Info\" tab in inbasket, or \"Choose Columns\" in Meds & Orders section of the refill encounter.              Passed - Medication is active on med list        Passed - Patient is age 18 or older        Passed - No active pregnancy on record        Passed - Normal serum creatinine on file in past 12 months     Recent Labs   Lab Test 02/24/23  0830   CR 0.83       Ok to refill medication if creatinine is low          Passed - Normal serum potassium on file in past 12 months     Recent Labs   Lab Test 02/24/23  0830   POTASSIUM 4.1                    Passed - No positive pregnancy test in past 12 months             ENE RUTLEDGE RN 03/02/23 2:58 PM  "

## 2023-03-02 NOTE — TELEPHONE ENCOUNTER
"Call received from Lizbeth in radiology. Pt currently at radiology dept and there is order for US thyroid from Reny. Upon her chart review, the thyroid mass seen on imaging of cervical spine MRI is a known mass since 2019. Pt had follow-up US in 6/2022 which states, \"Stable 4 cm TI RADS 3 nodule replacing the right thyroid lobe. Previous FNA in 2019 confirmed benign follicular nodule with abundant colloid. This could be followed up with ultrasound in 2 years.\"    She inquires if US needed at this time. Reny Camejo CNP not in clinic so discussed with Flores CHACON. Since last US in 6/2022 was stable and 2 year follow-up recommended the patient should follow this plan.   US thyroid will not be completed today.        "

## 2023-03-03 ENCOUNTER — VIRTUAL VISIT (OUTPATIENT)
Dept: URGENT CARE | Facility: CLINIC | Age: 73
End: 2023-03-03
Payer: MEDICARE

## 2023-03-03 ENCOUNTER — NURSE TRIAGE (OUTPATIENT)
Dept: INTERNAL MEDICINE | Facility: CLINIC | Age: 73
End: 2023-03-03

## 2023-03-03 DIAGNOSIS — I25.110 CORONARY ARTERY DISEASE INVOLVING NATIVE CORONARY ARTERY OF NATIVE HEART WITH UNSTABLE ANGINA PECTORIS (H): ICD-10-CM

## 2023-03-03 DIAGNOSIS — I50.9 CONGESTIVE HEART FAILURE, UNSPECIFIED HF CHRONICITY, UNSPECIFIED HEART FAILURE TYPE (H): ICD-10-CM

## 2023-03-03 DIAGNOSIS — U07.1 INFECTION DUE TO 2019 NOVEL CORONAVIRUS: Primary | ICD-10-CM

## 2023-03-03 DIAGNOSIS — I48.0 PAROXYSMAL ATRIAL FIBRILLATION (H): ICD-10-CM

## 2023-03-03 PROCEDURE — 99214 OFFICE O/P EST MOD 30 MIN: CPT | Mod: CS

## 2023-03-03 NOTE — TELEPHONE ENCOUNTER
RN COVID TREATMENT VISIT  03/03/23      The patient has been triaged and does not require a higher level of care.    Suma Mark  72 year old  Current weight? 178 lbs    Has the patient been seen by a primary care provider at an [facility] or [facility] within the past two years? Yes.   Have you been in close proximity to/do you have a known exposure to a person with a confirmed case of influenza? No.     General treatment eligibility:  Date of positive COVID test (PCR or at home)?  3/2/23    Are you or have you been hospitalized for this COVID-19 infection? No.   Have you received monoclonal antibodies or antiviral treatment for COVID-19 since this positive test? No.   Do you have any of the following conditions that place you at risk of being very sick from COVID-19?   - Age 50 years or older  - Heart conditions such as cardiomyopathies, congenital heart defects, coronary artery disease, heart arrhythmias, heart failure, hypertension, valve disorders   - Mental health disorders including mood disorders, depression, schizophrenia spectrum disorders   Yes, patient has at least one high risk condition as noted above.     Current COVID symptoms:   - cough  - fatigue  - muscle or body aches  - headache  - congestion or runny nose  Yes. Patient has at least one symptom as selected.     How many days since symptoms started? 5 days or less. Established patient, 12 years or older weighing at least 88.2 lbs, who has symptoms that started in the past 5 days, has not been hospitalized nor received treatment already, and is at risk for being very sick from COVID-19.     Treatment eligibility by RN:    Are you currently pregnant or nursing? No    Do you have a clinically significant hypersensitivity to nirmatrelvir or ritonavir, or toxic epidermal necrolysis (TEN) or Damico-Fredy Syndrome? No    Do you have a history of hepatitis, any hepatic impairment on the Problem List (such as  Child-Boyd Class C, cirrhosis, fatty liver disease, alcoholic liver disease), or was the last liver lab (hepatic panel, ALT, AST, ALK Phos, bilirubin) elevated in the past 6 months? No    Do you have any history of severe renal impairment (eGFR < 30mL/min)? No    Is patient eligible to continue?   Yes, patient meets all eligibility requirements for the RN COVID treatment (as denoted by all no responses above).     Current Outpatient Medications   Medication Sig Dispense Refill     amLODIPine (NORVASC) 5 MG tablet TAKE 1 TABLET BY MOUTH EVERYDAY AT BEDTIME 90 tablet 3     apixaban ANTICOAGULANT (ELIQUIS ANTICOAGULANT) 5 MG tablet Take 1 tablet (5 mg) by mouth every 12 hours 180 tablet 3     atorvastatin (LIPITOR) 80 MG tablet Take 1 tablet (80 mg) by mouth At Bedtime 90 tablet 3     botulinum toxin type A (BOTOX) 100 units injection Every 3 months. [facility]       busPIRone (BUSPAR) 10 MG tablet Take 1 tablet (10 mg) by mouth 2 times daily 180 tablet 3     cholecalciferol 50 MCG (2000 UT) CAPS Take 2,000 Units by mouth daily       citalopram (CELEXA) 20 MG tablet Take 2 tablets (40 mg) by mouth daily 180 tablet 2     clopidogrel (PLAVIX) 75 MG tablet Take 1 tablet (75 mg) by mouth daily 90 tablet 3     diazepam (VALIUM) 5 MG tablet Valium 5 mg p.o., take 1 tablet prior to MRI as instructed by radiology.  Must have . 1 tablet 0     diclofenac (VOLTAREN) 1 % topical gel Apply 2-4 g topically 4 times daily 150 g 3     eletriptan (RELPAX) 40 MG tablet Take 1 tablet (40 mg) by mouth at onset of headache for migraine 10 tablet 0     furosemide (LASIX) 20 MG tablet Take 2 tablets (40 mg) by mouth daily 180 tablet 3     HYDROcodone-acetaminophen (NORCO) 5-325 MG tablet Take 1 tablet by mouth daily as needed (Severe headache) 5 tablet 0     irbesartan (AVAPRO) 150 MG tablet Take 1 tablet (150 mg) by mouth daily 90 tablet 3     irbesartan (AVAPRO) 300 MG tablet TAKE 1 TABLET BY MOUTH EVERY DAY 90 tablet 6      isosorbide mononitrate (IMDUR) 30 MG 24 hr tablet Take 1 tablet (30 mg) by mouth daily 30 tablet 11     levothyroxine (SYNTHROID/LEVOTHROID) 88 MCG tablet TAKE 1 TABLET BY MOUTH DAILY AT 6:00 AM. 90 tablet 3     LORazepam (ATIVAN) 0.5 MG tablet Take 1 tablet (0.5 mg) by mouth every 6 hours as needed for anxiety 30 tablet 0     nitroGLYcerin (NITROSTAT) 0.4 MG sublingual tablet For chest pain place 1 tablet under the tongue every 5 minutes for 3 doses. If symptoms persist 5 minutes after 1st dose call 911. 30 tablet 1     pramipexole (MIRAPEX) 0.25 MG tablet TAKE 1 TABLET BY MOUTH THREE TIMES A DAY. 270 tablet 3     sotalol (BETAPACE) 80 MG tablet Take 1 tablet (80 mg) by mouth every 12 hours 60 tablet 3     tiZANidine (ZANAFLEX) 2 MG tablet Take 1 tablet (2 mg) by mouth 3 times daily as needed for muscle spasms (Patient not taking: Reported on 2/23/2023) 30 tablet 1     traZODone (DESYREL) 50 MG tablet TAKE 3 TABLETS BY MOUTH AT BEDTIME. 270 tablet 1     ubrogepant (UBRELVY) 100 MG tablet UBRELVY 100 MG TABS         Medications from List 1 of the standing order (on medications that exclude the use of Paxlovid) that patient is taking: NONE. Is patient taking Christian's Wort? No  Is patient taking Christian's Wort or any meds from List 1? No.   Medications from List 2 of the standing order (on meds that provider needs to adjust) that patient is taking: amlodipine (Norvasc), explained a provider visit is necessary to discuss medication adjustments while taking Paxlovid.  apixaban (Eliquis), explained a provider visit is necessary to discuss medication adjustments while taking Paxlovid.  buspirone (Buspar), explained a provider visit is necessary to discuss medication adjustments while taking Paxlovid.  clopidogrel (Plavix), explained a provider visit is necessary to discuss medication adjustments while taking Paxlovid.  hydrocodone (Norco, Lorcet), explained a provider visit is necessary to discuss medication  adjustments while taking Paxlovid. Is patient on any of the meds from List 2? Yes. Patient will be scheduled or transferred to a  at the end of this call.   Domi Lei RN

## 2023-03-03 NOTE — PATIENT INSTRUCTIONS
Symptoms began February 28  Stay home and away from others through March 5  You may be around others wearing a well fitting mask on March 6- March 10  Your isolation restrictions are over on March 11 as long as your symptoms are improving and you have been fever free for 24 hours, even if you still test positive for COVID.  However, if you test negative twice, at least 48 hours apart between days 6-10, you can stop wearing your mask earlier    Check O2 levels. If your number stays at 90 or below at rest, go to the emergency room.    Visit the CDC websites for more information and most up to date guidelines:  www.cdc.gov/coronavirus/2019-ncov/your-health/isolation.html  www.cdc.gov/coronavirus/2019-ncov/hcp/duration-isolation.html

## 2023-03-03 NOTE — TELEPHONE ENCOUNTER
Patient tested positive for COVID 3/2/23. Symptoms onset 2/28 of moderate dry cough, moderate nasal congestion, muscle aches, fatigue and mild headache. Denied SOB, wheezing, chest pain, fever, sore throat. Patient is not taking tylenol or cold/flu medication. No oximeter available. Patient has never had COVID and is up to date with vaccines. No known exposure but was at a gymnastic tournament this past weekend.     Patient qualifies for treatment and is interested. Reviewed red flag symptoms and when to be seen in ED. Reviewed quarantine recommendations. RN treatment protocol started.     Domi Lei RN      Reason for Disposition    [1] HIGH RISK for severe COVID complications (e.g., weak immune system, age > 64 years, obesity with BMI of 30 or higher, pregnant, chronic lung disease or other chronic medical condition) AND [2] COVID symptoms (e.g., cough, fever)  (Exceptions: Already seen by PCP and no new or worsening symptoms.)    Additional Information    Negative: SEVERE difficulty breathing (e.g., struggling for each breath, speaks in single words)    Negative: Difficult to awaken or acting confused (e.g., disoriented, slurred speech)    Negative: Bluish (or gray) lips or face now    Negative: Shock suspected (e.g., cold/pale/clammy skin, too weak to stand, low BP, rapid pulse)    Negative: Sounds like a life-threatening emergency to the triager    Negative: SEVERE or constant chest pain or pressure  (Exception: Mild central chest pain, present only when coughing.)    Negative: MODERATE difficulty breathing (e.g., speaks in phrases, SOB even at rest, pulse 100-120)    Negative: Headache and stiff neck (can't touch chin to chest)    Negative: Oxygen level (e.g., pulse oximetry) 90 percent or lower    Negative: Chest pain or pressure  (Exception: MILD central chest pain, present only when coughing)    Negative: Patient sounds very sick or weak to the triager    Protocols used: CORONAVIRUS (COVID-19) DIAGNOSED  OR [id number]

## 2023-03-08 PROBLEM — I50.32 CHRONIC HEART FAILURE WITH PRESERVED EJECTION FRACTION (HFPEF) (H): Status: ACTIVE | Noted: 2019-03-26

## 2023-03-08 PROBLEM — K57.90 DIVERTICULOSIS: Status: ACTIVE | Noted: 2017-10-04

## 2023-03-08 PROBLEM — I25.110 CORONARY ARTERY DISEASE INVOLVING NATIVE CORONARY ARTERY WITH UNSTABLE ANGINA PECTORIS (H): Status: ACTIVE | Noted: 2022-10-18

## 2023-03-08 PROBLEM — M54.9 CHRONIC BACK PAIN: Status: ACTIVE | Noted: 2017-12-07

## 2023-03-08 PROBLEM — K57.92 DIVERTICULITIS: Status: RESOLVED | Noted: 2017-12-07 | Resolved: 2023-03-08

## 2023-03-08 PROBLEM — F41.1 GENERALIZED ANXIETY DISORDER: Chronic | Status: ACTIVE | Noted: 2017-12-07

## 2023-03-08 PROBLEM — M54.9 CHRONIC BACK PAIN: Chronic | Status: ACTIVE | Noted: 2017-12-07

## 2023-03-08 PROBLEM — E55.9 VITAMIN D DEFICIENCY: Status: ACTIVE | Noted: 2017-12-07

## 2023-03-08 PROBLEM — M79.7 FIBROMYALGIA: Status: ACTIVE | Noted: 2018-04-24

## 2023-03-08 PROBLEM — K57.92 DIVERTICULITIS: Status: ACTIVE | Noted: 2017-12-07

## 2023-03-08 PROBLEM — E87.1 HYPONATREMIA: Status: RESOLVED | Noted: 2017-12-07 | Resolved: 2023-03-08

## 2023-03-08 PROBLEM — I25.110 CORONARY ARTERY DISEASE INVOLVING NATIVE CORONARY ARTERY OF NATIVE HEART WITH UNSTABLE ANGINA PECTORIS (H): Chronic | Status: ACTIVE | Noted: 2022-10-18

## 2023-03-08 PROBLEM — I87.2 VENOUS INSUFFICIENCY OF BOTH LOWER EXTREMITIES: Chronic | Status: ACTIVE | Noted: 2019-11-04

## 2023-03-08 PROBLEM — E87.1 HYPONATREMIA: Status: ACTIVE | Noted: 2017-12-07

## 2023-03-08 PROBLEM — G43.909 MIGRAINE: Status: ACTIVE | Noted: 2017-04-04

## 2023-03-08 PROBLEM — I26.99 PULMONARY EMBOLISM (H): Status: ACTIVE | Noted: 2019-03-25

## 2023-03-08 PROBLEM — G43.909 MIGRAINE: Chronic | Status: ACTIVE | Noted: 2017-04-04

## 2023-03-08 PROBLEM — I48.0 PAROXYSMAL ATRIAL FIBRILLATION (H): Chronic | Status: ACTIVE | Noted: 2022-12-05

## 2023-03-08 PROBLEM — G89.4 CHRONIC PAIN SYNDROME: Chronic | Status: ACTIVE | Noted: 2023-03-08

## 2023-03-08 PROBLEM — M81.0 SENILE OSTEOPOROSIS: Status: ACTIVE | Noted: 2022-12-13

## 2023-03-08 PROBLEM — K57.90 DIVERTICULOSIS: Chronic | Status: ACTIVE | Noted: 2017-10-04

## 2023-03-08 PROBLEM — G47.00 INSOMNIA, UNSPECIFIED TYPE: Status: ACTIVE | Noted: 2021-08-19

## 2023-03-08 PROBLEM — I87.2 VENOUS INSUFFICIENCY OF BOTH LOWER EXTREMITIES: Status: ACTIVE | Noted: 2019-11-04

## 2023-03-08 PROBLEM — G89.29 CHRONIC BACK PAIN: Status: ACTIVE | Noted: 2017-12-07

## 2023-03-08 PROBLEM — F41.1 GENERALIZED ANXIETY DISORDER: Status: ACTIVE | Noted: 2017-12-07

## 2023-03-08 PROBLEM — E78.5 DYSLIPIDEMIA, GOAL LDL BELOW 70: Chronic | Status: ACTIVE | Noted: 2019-04-08

## 2023-03-08 PROBLEM — E04.1 THYROID NODULE: Status: ACTIVE | Noted: 2021-08-19

## 2023-03-08 PROBLEM — M79.7 FIBROMYALGIA: Chronic | Status: ACTIVE | Noted: 2018-04-24

## 2023-03-08 PROBLEM — G89.29 CHRONIC BACK PAIN: Chronic | Status: ACTIVE | Noted: 2017-12-07

## 2023-03-09 ENCOUNTER — OFFICE VISIT (OUTPATIENT)
Dept: SLEEP MEDICINE | Facility: CLINIC | Age: 73
End: 2023-03-09
Attending: NURSE PRACTITIONER
Payer: MEDICARE

## 2023-03-09 VITALS
WEIGHT: 177 LBS | RESPIRATION RATE: 12 BRPM | DIASTOLIC BLOOD PRESSURE: 66 MMHG | OXYGEN SATURATION: 95 % | SYSTOLIC BLOOD PRESSURE: 108 MMHG | HEIGHT: 61 IN | HEART RATE: 62 BPM | BODY MASS INDEX: 33.42 KG/M2

## 2023-03-09 DIAGNOSIS — R06.00 DYSPNEA AND RESPIRATORY ABNORMALITY: Primary | ICD-10-CM

## 2023-03-09 DIAGNOSIS — I10 ESSENTIAL HYPERTENSION: Chronic | ICD-10-CM

## 2023-03-09 DIAGNOSIS — F41.9 ANXIETY: ICD-10-CM

## 2023-03-09 DIAGNOSIS — R53.83 MALAISE AND FATIGUE: ICD-10-CM

## 2023-03-09 DIAGNOSIS — E66.09 CLASS 1 OBESITY DUE TO EXCESS CALORIES WITH SERIOUS COMORBIDITY AND BODY MASS INDEX (BMI) OF 33.0 TO 33.9 IN ADULT: Chronic | ICD-10-CM

## 2023-03-09 DIAGNOSIS — R06.89 DYSPNEA AND RESPIRATORY ABNORMALITY: Primary | ICD-10-CM

## 2023-03-09 DIAGNOSIS — G47.9 DISTURBANCE IN SLEEP BEHAVIOR: ICD-10-CM

## 2023-03-09 DIAGNOSIS — G25.81 RESTLESS LEG SYNDROME: ICD-10-CM

## 2023-03-09 DIAGNOSIS — I48.0 PAROXYSMAL ATRIAL FIBRILLATION (H): ICD-10-CM

## 2023-03-09 DIAGNOSIS — G89.4 CHRONIC PAIN SYNDROME: Chronic | ICD-10-CM

## 2023-03-09 DIAGNOSIS — R53.81 MALAISE AND FATIGUE: ICD-10-CM

## 2023-03-09 DIAGNOSIS — E66.811 CLASS 1 OBESITY DUE TO EXCESS CALORIES WITH SERIOUS COMORBIDITY AND BODY MASS INDEX (BMI) OF 33.0 TO 33.9 IN ADULT: Chronic | ICD-10-CM

## 2023-03-09 DIAGNOSIS — I25.110 CORONARY ARTERY DISEASE INVOLVING NATIVE CORONARY ARTERY OF NATIVE HEART WITH UNSTABLE ANGINA PECTORIS (H): Chronic | ICD-10-CM

## 2023-03-09 PROBLEM — F32.A DEPRESSION, UNSPECIFIED DEPRESSION TYPE: Status: RESOLVED | Noted: 2021-08-19 | Resolved: 2023-03-09

## 2023-03-09 PROBLEM — M81.0 SENILE OSTEOPOROSIS: Chronic | Status: ACTIVE | Noted: 2022-12-13

## 2023-03-09 PROBLEM — F51.04 CHRONIC INSOMNIA: Status: ACTIVE | Noted: 2021-08-19

## 2023-03-09 PROCEDURE — 99205 OFFICE O/P NEW HI 60 MIN: CPT | Performed by: INTERNAL MEDICINE

## 2023-03-09 RX ORDER — ZOLPIDEM TARTRATE 5 MG/1
TABLET ORAL
Qty: 1 TABLET | Refills: 0 | Status: SHIPPED | OUTPATIENT
Start: 2023-03-09 | End: 2023-07-20

## 2023-03-09 RX ORDER — BUSPIRONE HYDROCHLORIDE 10 MG/1
10 TABLET ORAL 2 TIMES DAILY
Qty: 180 TABLET | Refills: 3 | OUTPATIENT
Start: 2023-03-09

## 2023-03-09 NOTE — NURSING NOTE
PSG and follow-up appointment with provider have both been scheduled. PSG hand-out given to patient at clinic visit. AVS printed and given to patient.  Jodie Peng, CMA

## 2023-03-09 NOTE — PROGRESS NOTES
Outpatient Sleep Medicine Consultation:      Name: Suma Mark MRN# [id number]   Age: 72 year old YOB: 1950     Date of Consultation: March 9, 2023  Consultation is requested by: ANDREA Evans CNP  [address], [address]  [place] [address] Tahira Mukherjee  Primary care provider: Bernadette Taylor       Reason for Sleep Consult:     Suma Mark is sent by Tahira Mukherjee for a sleep consultation regarding atrial fibrillation.    Patient s Reason for visit  Suma Mark main reason for visit: Cardiologist recommended it as I have irregular AFIB  Patient states problem(s) started: Staying asleep, falling asleep, tired-along time. The AFIB was just detected in October 2022  Suma Mark's goals for this visit: Wouls a sleep study benefit me and provide me with some methods to uss yo get a better night s sleep and help control AFIB           Assessment and Plan:     Loud snoring, sleep maintenance difficulties, fatigue (ESS 7), morning headaches, nocturia, heartburn at night, night sweats  Comorbid coronary artery disease, hypertension, paroxysmal atrial fibrillation   - Polysomnogram (using 4% desaturation/Medicare/ AASM 1B scoring rules) for moderate probability obstructive sleep apnea.  Ambien if needed  - Will follow-up for results    Sleep onset, maintenance difficulties (ASHLEY 16)  Suspect psychophysiologic insomnia comorbid to anxiety and complicated by restless leg syndrome, chronic pain, and possible  sleep disordered breathing. We discussed stimulus control, sleep restriction and regular wake times.   - Tylenol 2 at bedtime  - Read the book Say Good Night To Insomnia    - Consider referral for cognitive behavioral training     Restless leg syndrome with evidence of augmentation   Managed by PCP Claudia with help from [facility] with mirapex 0.25 TID. S/p recent iron infusions  SSRIS (celexa), antihistamines/ tricyclic antidepressants (trazadone), antipsychotics, and  low iron levels can cause or worsen restless leg syndrome symptoms   - Recommend changing trazodone, consider changing celexa  - Consider stopping mirapex, consider retry gabapentin, trial of methadone       Summary Counseling:    Sleep Testing Reviewed  Obstructive Sleep Apnea Reviewed  Complications of Untreated Sleep Apnea Reviewed  Treatment options reviewed        I spent 60 minutes with patient including counseling, and 15 minutes with chart review, and documentation     CC: Tahira Segalbron          History of Present Illness:       SLEEP-WAKE SCHEDULE:     Work/School Days: Patient goes to school/work: No   Usually gets into bed at   Takes patient about 20 minutes or sometimes longer to fall asleep  Has trouble falling asleep 2-3 nights per week nights per week. She feels wide awake, can't settle down, body does not want to go to sleep. Gets frustrated and gets up and walks, use Ipad, read. She has an over-active mind  50%, restless leg syndrome 50%  This has been a problem for 'a long time'   Wakes up in the middle of the night 2-3 times.  Wakes up due to Pain;External stimuli (bed partner, pets, noise, etc);Use the bathroom;Nightmares;Uncertain;Other, Usually discomfort/restless legs  She has trouble falling back asleep At least 2 days times a week for same reasons   It usually takes Sometimes over an hour to get back to sleep  Patient is usually up at 5:00 AM  Uses alarm: No    Weekends/Non-work Days/All Other Days:  Usually gets into bed at 10-10:30   Takes patient about 20 min or more to fall asleep  Patient is usually up at 5:00 AM  Uses alarm: No    Sleep Need  Patient gets    sleep on average   Patient thinks she needs about 5-6 hours sleep    Suma MICKY Mark prefers to sleep in this position(s): Side   Patient states they do the following activities in bed:   No reported electronics    Naps  Patient takes a purposeful nap No e times a week and naps are usually Lay down an read for an hour in  duration  She feels better after a nap: Yes  She dozes off unintentionally   days per week  Patient has had a driving accident or near-miss due to sleepiness/drowsiness: No      SLEEP DISRUPTIONS:    Breathing/Snoring  Patient snores:Yes, has a bed partner, loud  Other people complain about her snoring: No  Patient has been told she stops breathing in her sleep:No  She has issues with the following: Morning headaches;Morning mouth dryness;Stuffy nose when you wake up;Heartburn or reflux at night;Getting up to urinate more than once    Movement:  Patient gets pain, discomfort, with an urge to move:  Yes   Patient carries a diagnosus of restless leg syndrome   Cant sit still, feel crawly, tingly. It happens in day, mostly at night (7-1030)  Daytime symptoms started about 2 years  It happens when she is resting:  Yes  It happens more at night:  No  Patient has been told she kicks her legs at night:  No     Behaviors in Sleep:  Suma Mark has experienced the following behaviors while sleeping:   Sleep-talking;  Kicking or punching 1/3-4 months  Not sure if associated with dreams  She has experienced sudden muscle weakness during the day: Yes    Is there anything else you would like your sleep provider to know: No      CAFFEINE AND OTHER SUBSTANCES:    Patient consumes caffeinated beverages per day:  2 coffee 1soda  Last caffeine use is usually: 8:00 AM  List of any prescribed or over the counter stimulants that patient takes: Will bring list of meds to copy  List of any prescribed or over the counter sleep medication patient takes: Trazodone  List of previous sleep medications that patient has tried: Benedtyl  Patient drinks alcohol to help them sleep: No  Patient drinks alcohol near bedtime: No    Trazodone every night  ATIVAN 1/3 weeks  Norco 1/3 weeks    Family History:  Patient has a family member been diagnosed with a sleep disorder: No            SCALES:    EPWORTH SLEEPINESS SCALE      [place] Sleepiness  "Scale ( CHANTE Carballo  [id number]<br>ESS - [place]/English - Final version - 21 Nov 07 - [facility] [place].) 3/9/2023   Sitting and reading -   Watching TV -   Sitting, inactive in a public place (e.g. a theatre or a meeting) -   As a passenger in a car for an hour without a break -   Lying down to rest in the afternoon when circumstances permit -   Sitting and talking to someone -   Sitting quietly after a lunch without alcohol -   In a car, while stopped for a few minutes in traffic -   [place] Score (MC) -   [place] Score (Sleep) 7         INSOMNIA SEVERITY INDEX (ASHLEY)      Insomnia Severity Index (ASHLEY) 3/9/2023   Difficulty falling asleep 2   Difficulty staying asleep 3   Problems waking up too early 3   How SATISFIED/DISSATISFIED are you with your CURRENT sleep pattern? 3   How NOTICEABLE to others do you think your sleep problem is in terms of impairing the quality of your life? 1   How WORRIED/DISTRESSED are you about your current sleep problem? 2   To what extent do you consider your sleep problem to INTERFERE with your daily functioning (e.g. daytime fatigue, mood, ability to function at work/daily chores, concentration, memory, mood, etc.) CURRENTLY? 2   ASHLEY Total Score 16       Guidelines for Scoring/Interpretation:  Total score categories:  0-7 = No clinically significant insomnia   8-14 = Subthreshold insomnia   15-21 = Clinical insomnia (moderate severity)  22-28 = Clinical insomnia (severe)  Used via courtesy of www.[employer].va.gov with permission from Thang Yanes PhD., [facility]        Allergies:    Allergies   Allergen Reactions     Cephalexin Nausea and Vomiting     Ace Inhibitors Other (See Comments)     Elevates blood pressure     Amitriptyline Hcl Other (See Comments)     elevates blood pressure     Atenolol Other (See Comments)     Aggravates reynauds     Celebrex [Celecoxib] GI Disturbance     Cephalosporins Unknown     Pt doesn't remember        Clonidine Unknown     \"got very ill " "in ER\"     Codeine Sulfate Nausea and Vomiting     Darvocet [Propoxyphene N-Apap] Nausea and Vomiting     Dexamethasone Acetate Swelling     Erythromycin Nausea and Vomiting     Escitalopram Other (See Comments)     Headaches.       Gabapentin Unknown     \"Spotted\"     Hctz Unknown     \"depletes my sodium\"     Potassium GI Disturbance     Propoxyphene      HUT Reaction: Gastrointestinal; HUT Reaction: Nausea And Vomiting; HUT Noted: [id number]     Tramadol Swelling     Swelling of tongue and face     Triamterene      Ultracet Other (See Comments)     Venlafaxine Nausea and Vomiting and Diarrhea     Zolpidem Other (See Comments) and Unknown     Pt doesn't rember       Zolpidem Tartrate Unknown     Bacitracin Itching and Rash     Sulfanilamide Rash       Medications:    Current Outpatient Medications   Medication Sig Dispense Refill     amLODIPine (NORVASC) 5 MG tablet TAKE 1 TABLET BY MOUTH EVERYDAY AT BEDTIME 90 tablet 3     apixaban ANTICOAGULANT (ELIQUIS ANTICOAGULANT) 5 MG tablet Take 1 tablet (5 mg) by mouth every 12 hours 180 tablet 3     atorvastatin (LIPITOR) 80 MG tablet Take 1 tablet (80 mg) by mouth At Bedtime 90 tablet 3     botulinum toxin type A (BOTOX) 100 units injection Every 3 months. [facility]       busPIRone (BUSPAR) 10 MG tablet Take 1 tablet (10 mg) by mouth 2 times daily 180 tablet 3     cholecalciferol 50 MCG (2000 UT) CAPS Take 2,000 Units by mouth daily       citalopram (CELEXA) 20 MG tablet Take 2 tablets (40 mg) by mouth daily 180 tablet 2     clopidogrel (PLAVIX) 75 MG tablet Take 1 tablet (75 mg) by mouth daily 90 tablet 3     eletriptan (RELPAX) 40 MG tablet Take 1 tablet (40 mg) by mouth at onset of headache for migraine 10 tablet 0     furosemide (LASIX) 20 MG tablet Take 2 tablets (40 mg) by mouth daily 180 tablet 3     HYDROcodone-acetaminophen (NORCO) 5-325 MG tablet Take 1 tablet by mouth daily as needed (Severe headache) 5 tablet 0     irbesartan (AVAPRO) 150 MG tablet Take 1 " tablet (150 mg) by mouth daily 90 tablet 3     isosorbide mononitrate (IMDUR) 30 MG 24 hr tablet Take 1 tablet (30 mg) by mouth daily 30 tablet 11     levothyroxine (SYNTHROID/LEVOTHROID) 88 MCG tablet TAKE 1 TABLET BY MOUTH DAILY AT 6:00 AM. 90 tablet 3     LORazepam (ATIVAN) 0.5 MG tablet Take 1 tablet (0.5 mg) by mouth every 6 hours as needed for anxiety 30 tablet 0     molnupiravir (LAGEVRIO) 200 MG capsule Take 4 capsules (800 mg) by mouth every 12 hours 40 each 0     nitroGLYcerin (NITROSTAT) 0.4 MG sublingual tablet For chest pain place 1 tablet under the tongue every 5 minutes for 3 doses. If symptoms persist 5 minutes after 1st dose call 911. 30 tablet 1     pramipexole (MIRAPEX) 0.25 MG tablet TAKE 1 TABLET BY MOUTH THREE TIMES A DAY. 270 tablet 3     sotalol (BETAPACE) 80 MG tablet Take 1 tablet (80 mg) by mouth every 12 hours 60 tablet 3     tiZANidine (ZANAFLEX) 2 MG tablet Take 1 tablet (2 mg) by mouth 3 times daily as needed for muscle spasms 30 tablet 1     ubrogepant (UBRELVY) 100 MG tablet UBRELVY 100 MG TABS       diazepam (VALIUM) 5 MG tablet Valium 5 mg p.o., take 1 tablet prior to MRI as instructed by radiology.  Must have . (Patient not taking: Reported on 3/9/2023) 1 tablet 0     diclofenac (VOLTAREN) 1 % topical gel Apply 2-4 g topically 4 times daily (Patient not taking: Reported on 3/9/2023) 150 g 3     traZODone (DESYREL) 50 MG tablet TAKE 3 TABLETS BY MOUTH AT BEDTIME. (Patient not taking: Reported on 3/9/2023) 270 tablet 1       Problem List:  Patient Active Problem List    Diagnosis Date Noted     Chronic pain syndrome 03/08/2023     Priority: High     Coronary artery disease involving native coronary artery of native heart with unstable angina pectoris (H) 10/18/2022     Priority: High      s/p PCI with EUGENIA to OM2 on 10/12/22 and PCI with EUGENIA to dLAD on 11/8/22.        Paroxysmal atrial fibrillation (H) 12/05/2022     Priority: Medium     Depression, unspecified depression type  08/19/2021     Priority: Medium     Dyslipidemia, goal LDL below 70 04/08/2019     Priority: Medium     Chronic heart failure with preserved ejection fraction (HFpEF) (H) 03/26/2019     Priority: Medium     Fibromyalgia 04/24/2018     Priority: Medium     Generalized anxiety disorder 12/07/2017     Priority: Medium     Migraine 04/04/2017     Priority: Medium     Managed by darrell.       Essential hypertension 04/21/2015     Priority: Medium     Senile osteoporosis 12/13/2022     Priority: Low     Chronic insomnia 08/19/2021     Priority: Low     Thyroid nodule 08/19/2021     Priority: Low     Venous insufficiency of both lower extremities 11/04/2019     Priority: Low     Chronic back pain 12/07/2017     Priority: Low     Vitamin D deficiency 12/07/2017     Priority: Low     Diverticulosis 10/04/2017     Priority: Low     Incidental finding on colonoscopy 10/2017       Gastroesophageal reflux disease without esophagitis 04/21/2015     Priority: Low     Restless leg syndrome 04/21/2015     Priority: Low     S/p iron infusion [facility] 5/2022       Total knee replacement status 10/31/2013     Priority: Low     Acquired hypothyroidism 03/12/2009     Priority: Low        Past Medical/Surgical History:  Past Medical History:   Diagnosis Date     Anxiety state, unspecified      Back pain      Diverticulitis 12/07/2017     DJD (degenerative joint disease)      Essential hypertension, benign      Fibromyalgia      Gastro-oesophageal reflux disease      Hernia, ventral 04/27/2017     History of anesthesia complications     hypotension after surgery     History of blood transfusion      Hyperlipidemia      Hyponatremia 12/07/2017     Major depression      Migraine      Osteopenia      PONV (postoperative nausea and vomiting)      Posttraumatic stress disorder      Raynaud's disease      Restless leg syndrome      S/P total knee replacement 11/27/2017     Thrombosis of leg     with pregnancy     Unspecified hypothyroidism       Past Surgical History:   Procedure Laterality Date     ARTHROPLASTY KNEE UNICOMPARTMENT  10/31/2013    Procedure: ARTHROPLASTY KNEE UNICOMPARTMENT;  LEFT KNEE UNICOMPARTMENTAL ARTHROPLASTY (CAROLINE)^;  Surgeon: Chi Mittal MD;  Location:  OR     BREAST SURGERY      bx     COLECTOMY N/A 06/02/2017    Procedure: RESECTION, SMALL INTESTINE, OPEN ADHESIOLYSIS;  Surgeon: Keyon Qureshi MD;  Location: [facility];  Service:      CV CORONARY ANGIOGRAM N/A 10/12/2022    Procedure: CV CORONARY ANGIOGRAM;  Surgeon: You Martinez MD;  Location: [facility] CV     CV FRACTIONAL FLOW RATIO WIRE N/A 10/12/2022    Procedure: Fractional Flow Ratio Wire;  Surgeon: You Martinez MD;  Location: [facility] CV     CV PCI STENT DRUG ELUTING N/A 10/12/2022    Procedure: Percutaneous Coronary Intervention Stent;  Surgeon: You Martinez MD;  Location: [facility] CV     CV PCI STENT DRUG ELUTING N/A 11/08/2022    Procedure: Percutaneous Coronary Intervention - Stent;  Surgeon: Bret Jin MD;  Location: [facility] CV     ENDOSCOPIC RETROGRADE CHOLANGIOPANCREATOGRAPHY       ENDOSCOPIC STRIPPING VEIN(S)      BILATERLY     GENITOURINARY SURGERY      bladder sling     GI SURGERY  10/02/2015    Rectal prolaspse. s/p Abdominal rectopexy, sacral colpopexy, proctoscopy, cystoscopy     HERNIORRHAPHY INCISIONAL (LOCATION) N/A 06/02/2017    Procedure: LAPARASCOPIC CONVERTED TO OPEN PRIMARY INCISIONAL HERNIA REPAIR;  Surgeon: Keyon Qureshi MD;  Location: [facility];  Service:      HYSTERECTOMY  1985 1985-1986     LAMINECTOMY LUMBAR TWO LEVELS  2006     LAPAROSCOPY N/A 08/22/2019    Procedure: DIAGNOSTIC LAPAROSCOPY, LYSIS OF ADHESION;  Surgeon: Svetlana Ron MD;  Location: [facility];  Service: General     LUMBAR HARDWARE REMOVAL[  03/22/2012    REMOVAL LUMBAR HARDWARE 03/22/2012   L3-S1; Type CD Horizon m8 instrumentation Dr. Murray     RELEASE CARPAL TUNNEL        TOTAL KNEE ARTHROPLASTY Right 11/27/2017    Procedure:  RIGHT TOTAL KNEE ARTHROPLASTY;  Surgeon: Kevin Ryder MD;  Location: [facility] OR;  Service: Orthopedics     US THYROID BIOPSY  04/19/2019       Social History:  Social History     Socioeconomic History     Marital status:      Spouse name: Not on file     Number of children: Not on file     Years of education: Not on file     Highest education level: Not on file   Occupational History     Not on file   Tobacco Use     Smoking status: Never     Passive exposure: Never     Smokeless tobacco: Never   Vaping Use     Vaping Use: Never used   Substance and Sexual Activity     Alcohol use: Yes     Comment: Alcoholic Drinks/day: 1 cocktail daily     Drug use: No     Sexual activity: Not on file   Other Topics Concern     Not on file   Social History Narrative     Not on file     Social Determinants of Health     Financial Resource Strain: Not on file   Food Insecurity: Not on file   Transportation Needs: Not on file   Physical Activity: Not on file   Stress: Not on file   Social Connections: Not on file   Intimate Partner Violence: Not on file   Housing Stability: Not on file       Family History:  Family History   Problem Relation Age of Onset     Dementia Mother      Arthritis Mother      Chronic Obstructive Pulmonary Disease Father      Cardiovascular Father      Hypertension Father      No Known Problems Sister      No Known Problems Daughter      No Known Problems Son      Cerebrovascular Disease Maternal Grandmother      Heart Disease Paternal Grandmother      Cerebrovascular Disease Paternal Grandmother      No Known Problems Sister      No Known Problems Sister      No Known Problems Son      No Known Problems Daughter      Breast Cancer Paternal Aunt         age in 50's       Review of Systems:  A complete review of systems reviewed by me is negative with the exeption of what has been mentioned in the history of present illness.  In the last  "TWO WEEKS have you experienced any of the following symptoms?  Night Sweats: Yes  Pain at Night: Yes  Dry Mouth in the Morning: Yes  Heartburn at Night: Yes  Urinating More than Once at Night: Yes  Losing Control of Urine at Night: No  Joint Pains at Night: Yes  Headaches in Morning: Yes  Weakness in Arms or Legs: Yes  Depressed Mood: No  Anxiety: No     Physical Examination:  Vitals: /66   Pulse 62   Resp 12   Ht 1.549 m (5' 1\")   Wt 80.3 kg (177 lb)   LMP  (LMP Unknown)   SpO2 95%   BMI 33.44 kg/m    BMI= Body mass index is 33.44 kg/m .    Neck Cir (cm): 42 cm    SpO2 Readings from Last 4 Encounters:   03/09/23 95%   01/31/23 95%   12/13/22 94%   11/28/22 98%       GENERAL APPEARANCE: alert and no distress  EYES: Eyes grossly normal to inspection  NECK: generous size  LUNGS: no shortness of breath , cough  NEURO: mentation intact, speech normal and cranial nerves 2-12 appear intact  PSYCH: affect normal/bright           Data: All pertinent previous laboratory data reviewed     Recent Labs   Lab Test 02/24/23  0830 01/09/23  1403    137   POTASSIUM 4.1 4.6   CHLORIDE 101 101   CO2 28 26   ANIONGAP 9 10   GLC 98 103   BUN 19 21   CR 0.83 1.00   ISSA 9.5 9.6       Recent Labs   Lab Test 01/09/23  1403   WBC 6.9   RBC 4.11   HGB 11.9   HCT 36.9   MCV 90   MCH 29.0   MCHC 32.2   RDW 13.2          Recent Labs   Lab Test 11/27/22  2315   PROTTOTAL 7.4   ALBUMIN 4.2   BILITOTAL 0.5   ALKPHOS 77   AST 18   ALT 21       TSH (uIU/mL)   Date Value   12/13/2022 3.53   06/08/2022 3.99   06/10/2021 1.67       Ferritin   Date/Time Value Ref Range Status   11/07/2019 03:02 PM 31 10 - 130 ng/mL Final       Chest x-ray: XR Chest 2 Views 10/12/2022  Narrative  EXAM: XR CHEST 2 VIEWS  LOCATION: [facility]  DATE/TIME: 10/12/2022 12:46 AM  INDICATION: chest pain  COMPARISON: 02/09/2022  Impression  IMPRESSION: Negative chest.      Suresh Ferguson MD 3/9/2023         "

## 2023-03-09 NOTE — TELEPHONE ENCOUNTER
Refill request received from [facility] via fax for a refill of Buspirone HCL 5mg tablet #60. Order pended.    Last OV/VV on 12/23/2022  Next scheduled appointment with PCP 6/13/2023

## 2023-03-09 NOTE — NURSING NOTE
"Chief Complaint   Patient presents with     Sleep Study     Here for a sleep consult per cardiology for Congestive heart failure, unspecified HF chronicity, unspecified heart failure type & Paroxysmal atrial fibrillation. Snores per her husbands report. Also concerns with staying asleep. Gets up a couple times a night.           Initial /66   Pulse 62   Resp 12   Ht 1.549 m (5' 1\")   Wt 80.3 kg (177 lb)   LMP  (LMP Unknown)   SpO2 95%   BMI 33.44 kg/m   Estimated body mass index is 33.44 kg/m  as calculated from the following:    Height as of this encounter: 1.549 m (5' 1\").    Weight as of this encounter: 80.3 kg (177 lb).    Medication Reconciliation: complete  ESS: 7  Neck circumference: 42 centimeters.  DME: N/A  Jodie Peng CMA      "

## 2023-03-09 NOTE — PATIENT INSTRUCTIONS
Read the book Say Good Night To Insomnia       Positioning Device margarito, slumber bump, tennis ball tshirt.   This example shows a pillow that straps around the waist. It may be appropriate for those whose sleep study shows milder sleep apnea that occurs primarily when lying flat on one's back. Preliminary studies have shown benefit but effectiveness at home should be verified.

## 2023-03-10 ENCOUNTER — OFFICE VISIT (OUTPATIENT)
Dept: NEUROSURGERY | Facility: CLINIC | Age: 73
End: 2023-03-10
Attending: NURSE PRACTITIONER
Payer: MEDICARE

## 2023-03-10 VITALS
DIASTOLIC BLOOD PRESSURE: 78 MMHG | OXYGEN SATURATION: 97 % | WEIGHT: 177 LBS | HEART RATE: 61 BPM | SYSTOLIC BLOOD PRESSURE: 140 MMHG | HEIGHT: 61 IN | BODY MASS INDEX: 33.42 KG/M2

## 2023-03-10 DIAGNOSIS — M54.2 NECK PAIN: Primary | ICD-10-CM

## 2023-03-10 PROCEDURE — 99205 OFFICE O/P NEW HI 60 MIN: CPT | Performed by: NURSE PRACTITIONER

## 2023-03-10 RX ORDER — PREDNISONE 20 MG/1
60 TABLET ORAL DAILY
Qty: 9 TABLET | Refills: 0 | Status: SHIPPED | OUTPATIENT
Start: 2023-03-10 | End: 2023-03-13

## 2023-03-10 RX ORDER — METAXALONE 800 MG/1
800 TABLET ORAL 3 TIMES DAILY PRN
Qty: 30 TABLET | Refills: 0 | Status: SHIPPED | OUTPATIENT
Start: 2023-03-10 | End: 2023-03-30

## 2023-03-10 NOTE — PATIENT INSTRUCTIONS
We can try a prednisone burst for your headache and neck pain but I would defer further management to the spine center and your neurologist.   Cervical syrinx is likely an incidental finding and unrelated  We can repeat an MRI at some point in the future ( at least six months from now) if you have a change in symptoms, would get cervical MRI with and without contrast at that time.   I will order skelaxin but defer to either your primary or Reny for ongoing neck pain and headache management.     No planned follow up with neurosurgery, feel free to call with ongoing questions and concerns.       Talk to your pharmacist about drug interactions prior to taking skelaxin.

## 2023-03-10 NOTE — Clinical Note
No planned neurosurgery intervention or follow, Lo would like to consider injections with you. Please give her a call, thanks!

## 2023-03-10 NOTE — PROGRESS NOTES
NEUROSURGERY CONSULTATION NOTE    3/11/2023       CHIEF COMPLAINT: syrinx     HPI:    Suma Mark is a 72 year old female who is sent to us in consultation by Reny Camejo PA-C   for evaluation of cervical syrinx. Lo presented to the spine center with ongoing complaints of neck pain and headache. She has hx of chronic migraine but her current has lasted quite some time. She also notes chronic neck pain, does not typically radiate into the arms but rather radiates into the temporal regions associated with headaches. She has gotten botox, many medications in the past. Cervical imaging was obtained that revealed incidental presumed syrinx from C4-7. Repeat contrasted study did not have associated tumor. Patient does not have chiari malformation noted on brain imaging. On exam, apart from brisk reflexes, she has no symptoms of cervical myelopathy. No difficulty with gait, strength intact, no dropping things, etc. Only complaint is only neck pain and headaches.     We reviewed imaging and presence of cervical syrinx, this is particularly small and likely congenital abnormality. I offered repeat imaging to ensure stability, Lo declined and would like to pursue if change in symptoms. Does have hx of claustrophobia. Follows with [facility] clinic for headaches.        As for neck pain, recommended ongoing conservative management with spine center - if she would like to discuss surgical intervention if needed can return at any time.     Past Medical History:   Diagnosis Date     Anxiety state, unspecified      Back pain      Diverticulitis 12/07/2017     DJD (degenerative joint disease)      Essential hypertension, benign      Fibromyalgia      Gastro-oesophageal reflux disease      Hernia, ventral 04/27/2017     History of anesthesia complications     hypotension after surgery     History of blood transfusion      Hyperlipidemia      Hyponatremia 12/07/2017     Major depression      Migraine      Osteopenia       PONV (postoperative nausea and vomiting)      Posttraumatic stress disorder      Raynaud's disease      Restless leg syndrome      S/P total knee replacement 11/27/2017     Thrombosis of leg     with pregnancy     Unspecified hypothyroidism      Past Surgical History:   Procedure Laterality Date     ARTHROPLASTY KNEE UNICOMPARTMENT  10/31/2013    Procedure: ARTHROPLASTY KNEE UNICOMPARTMENT;  LEFT KNEE UNICOMPARTMENTAL ARTHROPLASTY (CAROLINE)^;  Surgeon: Chi Mittal MD;  Location:  OR     BREAST SURGERY      bx     COLECTOMY N/A 06/02/2017    Procedure: RESECTION, SMALL INTESTINE, OPEN ADHESIOLYSIS;  Surgeon: Keyon Qureshi MD;  Location: [facility] OR;  Service:      CV CORONARY ANGIOGRAM N/A 10/12/2022    Procedure: CV CORONARY ANGIOGRAM;  Surgeon: You Martinez MD;  Location: [facility] CV     CV FRACTIONAL FLOW RATIO WIRE N/A 10/12/2022    Procedure: Fractional Flow Ratio Wire;  Surgeon: You Martinez MD;  Location: [facility] CV     CV PCI STENT DRUG ELUTING N/A 10/12/2022    Procedure: Percutaneous Coronary Intervention Stent;  Surgeon: You Martinez MD;  Location: [facility] CV     CV PCI STENT DRUG ELUTING N/A 11/08/2022    Procedure: Percutaneous Coronary Intervention - Stent;  Surgeon: Bret Jin MD;  Location: [facility] CV     ENDOSCOPIC RETROGRADE CHOLANGIOPANCREATOGRAPHY       ENDOSCOPIC STRIPPING VEIN(S)      BILATERLY     GENITOURINARY SURGERY      bladder sling     GI SURGERY  10/02/2015    Rectal prolaspse. s/p Abdominal rectopexy, sacral colpopexy, proctoscopy, cystoscopy     HERNIORRHAPHY INCISIONAL (LOCATION) N/A 06/02/2017    Procedure: LAPARASCOPIC CONVERTED TO OPEN PRIMARY INCISIONAL HERNIA REPAIR;  Surgeon: Keyon Qureshi MD;  Location: [facility];  Service:      HYSTERECTOMY  1985 1985-1986     LAMINECTOMY LUMBAR TWO LEVELS  2006     LAPAROSCOPY N/A 08/22/2019    Procedure: DIAGNOSTIC LAPAROSCOPY, LYSIS OF  ADHESION;  Surgeon: Svetlana Ron MD;  Location: [facility] OR;  Service: General     LUMBAR HARDWARE REMOVAL[  03/22/2012    REMOVAL LUMBAR HARDWARE 03/22/2012   L3-S1; Type CD Horizon m8 instrumentation Dr. Murray     RELEASE CARPAL TUNNEL       TONSILLECTOMY  1954    ???     TOTAL KNEE ARTHROPLASTY Right 11/27/2017    Procedure:  RIGHT TOTAL KNEE ARTHROPLASTY;  Surgeon: Kevin Ryder MD;  Location: [facility] OR;  Service: Orthopedics     US THYROID BIOPSY  04/19/2019         REVIEW OF SYSTEMS:  Pt denies changes in bowel or bladder habits. No incontinence. A full 14 point review of systems was otherwise completed and is negative aside from that mentioned above in the HPI    MEDICATIONS:  Current Outpatient Medications   Medication Sig Dispense Refill     amLODIPine (NORVASC) 5 MG tablet TAKE 1 TABLET BY MOUTH EVERYDAY AT BEDTIME 90 tablet 3     apixaban ANTICOAGULANT (ELIQUIS ANTICOAGULANT) 5 MG tablet Take 1 tablet (5 mg) by mouth every 12 hours 180 tablet 3     atorvastatin (LIPITOR) 80 MG tablet Take 1 tablet (80 mg) by mouth At Bedtime 90 tablet 3     botulinum toxin type A (BOTOX) 100 units injection Every 3 months. [facility]       busPIRone (BUSPAR) 10 MG tablet Take 1 tablet (10 mg) by mouth 2 times daily 180 tablet 3     cholecalciferol 50 MCG (2000 UT) CAPS Take 2,000 Units by mouth daily       citalopram (CELEXA) 20 MG tablet Take 2 tablets (40 mg) by mouth daily 180 tablet 2     clopidogrel (PLAVIX) 75 MG tablet Take 1 tablet (75 mg) by mouth daily 90 tablet 3     diclofenac (VOLTAREN) 1 % topical gel Apply 2-4 g topically 4 times daily 150 g 3     eletriptan (RELPAX) 40 MG tablet Take 1 tablet (40 mg) by mouth at onset of headache for migraine 10 tablet 0     furosemide (LASIX) 20 MG tablet Take 2 tablets (40 mg) by mouth daily 180 tablet 3     HYDROcodone-acetaminophen (NORCO) 5-325 MG tablet Take 1 tablet by mouth daily as needed (Severe headache) 5 tablet 0     irbesartan (AVAPRO)  "150 MG tablet Take 1 tablet (150 mg) by mouth daily 90 tablet 3     isosorbide mononitrate (IMDUR) 30 MG 24 hr tablet Take 1 tablet (30 mg) by mouth daily 30 tablet 11     levothyroxine (SYNTHROID/LEVOTHROID) 88 MCG tablet TAKE 1 TABLET BY MOUTH DAILY AT 6:00 AM. 90 tablet 3     LORazepam (ATIVAN) 0.5 MG tablet Take 1 tablet (0.5 mg) by mouth every 6 hours as needed for anxiety 30 tablet 0     metaxalone (SKELAXIN) 800 MG tablet Take 1 tablet (800 mg) by mouth 3 times daily as needed for muscle spasms 30 tablet 0     molnupiravir (LAGEVRIO) 200 MG capsule Take 4 capsules (800 mg) by mouth every 12 hours 40 each 0     nitroGLYcerin (NITROSTAT) 0.4 MG sublingual tablet For chest pain place 1 tablet under the tongue every 5 minutes for 3 doses. If symptoms persist 5 minutes after 1st dose call 911. 30 tablet 1     pramipexole (MIRAPEX) 0.25 MG tablet TAKE 1 TABLET BY MOUTH THREE TIMES A DAY. 270 tablet 3     predniSONE (DELTASONE) 20 MG tablet Take 3 tablets (60 mg) by mouth daily for 3 days 9 tablet 0     sotalol (BETAPACE) 80 MG tablet Take 1 tablet (80 mg) by mouth every 12 hours 60 tablet 3     traZODone (DESYREL) 50 MG tablet TAKE 3 TABLETS BY MOUTH AT BEDTIME. 270 tablet 1     ubrogepant (UBRELVY) 100 MG tablet UBRELVY 100 MG TABS       zolpidem (AMBIEN) 5 MG tablet Take tablet by mouth 15 minutes prior to sleep, for Sleep Study 1 tablet 0         ALLERGIES/SENSITIVITIES:     Allergies   Allergen Reactions     Cephalexin Nausea and Vomiting     Ace Inhibitors Other (See Comments)     Elevates blood pressure     Amitriptyline Hcl Other (See Comments)     elevates blood pressure     Atenolol Other (See Comments)     Aggravates reynauds     Celebrex [Celecoxib] GI Disturbance     Cephalosporins Unknown     Pt doesn't remember        Clonidine Unknown     \"got very ill in ER\"     Codeine Sulfate Nausea and Vomiting     Darvocet [Propoxyphene N-Apap] Nausea and Vomiting     Dexamethasone Acetate Swelling     " "Erythromycin Nausea and Vomiting     Escitalopram Other (See Comments)     Headaches.       Gabapentin Unknown     \"Spotted\"     Hctz Unknown     \"depletes my sodium\"     Potassium GI Disturbance     Propoxyphene      HUT Reaction: Gastrointestinal; HUT Reaction: Nausea And Vomiting; HUT Noted: [id number]     Tramadol Swelling     Swelling of tongue and face     Triamterene      Ultracet Other (See Comments)     Venlafaxine Nausea and Vomiting and Diarrhea     Zolpidem Other (See Comments) and Unknown     Pt doesn't rember       Zolpidem Tartrate Unknown     Bacitracin Itching and Rash     Sulfanilamide Rash       PERTINENT SOCIAL HISTORY:   Social History     Socioeconomic History     Marital status:      Spouse name: None     Number of children: None     Years of education: None     Highest education level: None   Occupational History     Occupation: , occasionally still subs     Employer: RETIRED   Tobacco Use     Smoking status: Never     Passive exposure: Never     Smokeless tobacco: Never   Vaping Use     Vaping Use: Never used   Substance and Sexual Activity     Alcohol use: Yes     Comment: Alcoholic Drinks/day: 1 cocktail daily     Drug use: No         FAMILY HISTORY:  Family History   Problem Relation Age of Onset     Dementia Mother      Arthritis Mother      Chronic Obstructive Pulmonary Disease Father      Cardiovascular Father      Hypertension Father      No Known Problems Sister      No Known Problems Daughter      No Known Problems Son      Cerebrovascular Disease Maternal Grandmother      Heart Disease Paternal Grandmother      Cerebrovascular Disease Paternal Grandmother      No Known Problems Sister      No Known Problems Sister      No Known Problems Son      No Known Problems Daughter      Breast Cancer Paternal Aunt         age in 50's        PHYSICAL EXAM:     Constitution: BP (!) 140/78   Pulse 61   Ht 1.549 m (5' 1\")   Wt 80.3 kg (177 lb)   LMP  (LMP Unknown)   " SpO2 97%   BMI 33.44 kg/m  .   Awake, alert and in NAD  Eyes: Conjugate gaze. Conjunctiva benign without icterus or injection  Heart: RRR  Lungs: Non-labored respiration without accessory muscle use  Skin: No obvious rash or lesion  Psych: Appropriate mood and affect, alert and oriented x 3  Mental Status:  Speech is fluent.  Recent and remote memory are intact.  Attention span and concentration are normal.     Cranial Nerves:  CN2: No funduscopic exam performed. CN3,4 & 6: Pupillary light response, lateral and vertical gaze normal.  No nystagmus. CN7: No facial weakness, smile, facial symmetry intact. CN8: Intact to spoken voice.      Motor: Normal bulk and tone all muscle groups of upper and lower extremities.     Right Left  Right Left   Deltoid 5 5 Hip flexion 5 5   Biceps 5 5 Hip extension 5 5   Triceps 5 5 Knee flexion 5 5   Wrist ex 5 5 Knee ex 5 5   Wrist flex 5 5 Dorsiflex 5 5   Finger ex 5 5 Plantar flex 5 5   Hand intrinsic 5 5 EHL 5 5    Full Full         Sensory: Sensation intact bilaterally to light touch and temperature throughout. Sensation is intact to pinprick in the bilateral LE.      Coordination:  Gait is WNL. Pt is able to heel and toe walk without difficulty. Tandem gait is WNL. NILO in the UE is WNL     Reflexes; 2+ supinator, biceps, triceps. 2+ patellar and achilles.     Musculoskeletal: Negative straight leg raise bilaterally. Negative SHAHNAZ testing. Negative Moriah finger test    IMAGING: I personally reviewed all radiographic images    MRI Cervical reviewed x 2 and reviewed with Dr. Roman.   Tiny syrinx within the cervical spinal cord at the C6-C7 level is unchanged. No abnormal enhancement to indicate an underlying lesion. T2 hyperintense enhancing right thyroid nodule/mass measures 2.4 x 2.3 x 4 cm. Degenerative changes without central   spinal canal stenosis. Neural foraminal narrowing most pronounced on the right at C5-C6. No high-grade stenosis.                                                                       IMPRESSION:  1.  No pathologic enhancement to indicate an underlying lesion related to the tiny spinal cord syrinx at C6-C7.  2.  Mild degenerative changes.  3.  4 cm right thyroid nodule/mass.         CONSULTATION ASSESSMENT AND PLAN:    Suma Mark is a 72 year old female with chronic neck pain and headaches, incidental syrinx found on cervical MRI for which she was sent to neurosurgery to review. No chiari malformation, hydrocephalus etc. No trauma. Imaging reviewed with Dr. Roman, this is incidental, does not contribute to her overall clinical picture. Could consider MRI six months and if stable no further follow up wound be needed, Lo would prefer to recheck if change in symptoms which is reasonable.     CC chart to spine center for ongoing conservative management and follow up.   Lo asked for recommendation for treatment of headache and neck pain, no recent steroids - I offered this and alternative muscle relaxant with the caveat that her primary and the spine center may be better suited to make adjustments for her. She verbalized understanding.     No planned follow up.       I spent more than 60 minutes in this apt, examining the pt, reviewing the scans, reviewing notes from chart, discussing treatment options with risks and benefits and coordinating care. >50 % clinic time was spent in face to face counseling and coordinating care    Justine Elizabeth NP       Cc:   Bernadette Taylor

## 2023-03-10 NOTE — TELEPHONE ENCOUNTER
"Last Written Prescription Date:  10/7/2022  Last Fill Quantity: 180,  # refills: 3   Last office visit provider:  10/20/2022     Requested Prescriptions   Pending Prescriptions Disp Refills     busPIRone (BUSPAR) 10 MG tablet 180 tablet 3     Sig: Take 1 tablet (10 mg) by mouth 2 times daily       Atypical Antidepressants Protocol Passed - 3/9/2023  9:04 PM        Passed - Recent (12 mo) or future (30 days) visit within the authorizing provider's specialty     Patient has had an office visit with the authorizing provider or a provider within the authorizing providers department within the previous 12 mos or has a future within next 30 days. See \"Patient Info\" tab in inbasket, or \"Choose Columns\" in Meds & Orders section of the refill encounter.              Passed - Medication active on med list        Passed - Patient is age 18 or older        Passed - No active pregnancy on record        Passed - No positive pregnancy test in past 12 mos             Sammie Andrade RN 03/09/23 9:04 PM  "

## 2023-03-10 NOTE — LETTER
3/10/2023         RE: Suma Mark  [address] [facility] [address]        Dear Colleague,    Thank you for referring your patient, Suma Mark, to the [facility]. Please see a copy of my visit note below.            NEUROSURGERY CONSULTATION NOTE    3/11/2023       CHIEF COMPLAINT: syrinx     HPI:    Suma Mark is a 72 year old female who is sent to us in consultation by Reny Camejo PA-C   for evaluation of cervical syrinx. Lo presented to the spine center with ongoing complaints of neck pain and headache. She has hx of chronic migraine but her current has lasted quite some time. She also notes chronic neck pain, does not typically radiate into the arms but rather radiates into the temporal regions associated with headaches. She has gotten botox, many medications in the past. Cervical imaging was obtained that revealed incidental presumed syrinx from C4-7. Repeat contrasted study did not have associated tumor. Patient does not have chiari malformation noted on brain imaging. On exam, apart from brisk reflexes, she has no symptoms of cervical myelopathy. No difficulty with gait, strength intact, no dropping things, etc. Only complaint is only neck pain and headaches.     We reviewed imaging and presence of cervical syrinx, this is particularly small and likely congenital abnormality. I offered repeat imaging to ensure stability, oL declined and would like to pursue if change in symptoms. Does have hx of claustrophobia. Follows with [facility] for headaches.        As for neck pain, recommended ongoing conservative management with spine center - if she would like to discuss surgical intervention if needed can return at any time.     Past Medical History:   Diagnosis Date     Anxiety state, unspecified      Back pain      Diverticulitis 12/07/2017     DJD (degenerative joint disease)      Essential hypertension, benign      Fibromyalgia       Gastro-oesophageal reflux disease      Hernia, ventral 04/27/2017     History of anesthesia complications     hypotension after surgery     History of blood transfusion      Hyperlipidemia      Hyponatremia 12/07/2017     Major depression      Migraine      Osteopenia      PONV (postoperative nausea and vomiting)      Posttraumatic stress disorder      Raynaud's disease      Restless leg syndrome      S/P total knee replacement 11/27/2017     Thrombosis of leg     with pregnancy     Unspecified hypothyroidism      Past Surgical History:   Procedure Laterality Date     ARTHROPLASTY KNEE UNICOMPARTMENT  10/31/2013    Procedure: ARTHROPLASTY KNEE UNICOMPARTMENT;  LEFT KNEE UNICOMPARTMENTAL ARTHROPLASTY (CAROLINE)^;  Surgeon: Chi Mittal MD;  Location:  OR     BREAST SURGERY      bx     COLECTOMY N/A 06/02/2017    Procedure: RESECTION, SMALL INTESTINE, OPEN ADHESIOLYSIS;  Surgeon: Keyon Qureshi MD;  Location: [facility];  Service:      CV CORONARY ANGIOGRAM N/A 10/12/2022    Procedure: CV CORONARY ANGIOGRAM;  Surgeon: You Martinez MD;  Location: [facility] CV     CV FRACTIONAL FLOW RATIO WIRE N/A 10/12/2022    Procedure: Fractional Flow Ratio Wire;  Surgeon: You Martinez MD;  Location: [facility] CV     CV PCI STENT DRUG ELUTING N/A 10/12/2022    Procedure: Percutaneous Coronary Intervention Stent;  Surgeon: You Martinez MD;  Location: [facility] CV     CV PCI STENT DRUG ELUTING N/A 11/08/2022    Procedure: Percutaneous Coronary Intervention - Stent;  Surgeon: Bret Jin MD;  Location: [facility] CV     ENDOSCOPIC RETROGRADE CHOLANGIOPANCREATOGRAPHY       ENDOSCOPIC STRIPPING VEIN(S)      BILATERLY     GENITOURINARY SURGERY      bladder sling     GI SURGERY  10/02/2015    Rectal prolaspse. s/p Abdominal rectopexy, sacral colpopexy, proctoscopy, cystoscopy     HERNIORRHAPHY INCISIONAL (LOCATION) N/A 06/02/2017    Procedure: LAPARASCOPIC CONVERTED TO OPEN  PRIMARY INCISIONAL HERNIA REPAIR;  Surgeon: Keyon Qureshi MD;  Location: [facility];  Service:      HYSTERECTOMY  1985 1985-1986     LAMINECTOMY LUMBAR TWO LEVELS  2006     LAPAROSCOPY N/A 08/22/2019    Procedure: DIAGNOSTIC LAPAROSCOPY, LYSIS OF ADHESION;  Surgeon: Svetlana Ron MD;  Location: [facility] OR;  Service: General     LUMBAR HARDWARE REMOVAL[  03/22/2012    REMOVAL LUMBAR HARDWARE 03/22/2012   L3-S1; Type CD Horizon m8 instrumentation Dr. Murray     RELEASE CARPAL TUNNEL       TONSILLECTOMY  1954    ???     TOTAL KNEE ARTHROPLASTY Right 11/27/2017    Procedure:  RIGHT TOTAL KNEE ARTHROPLASTY;  Surgeon: Kevin Ryder MD;  Location: [facility];  Service: Orthopedics     US THYROID BIOPSY  04/19/2019         REVIEW OF SYSTEMS:  Pt denies changes in bowel or bladder habits. No incontinence. A full 14 point review of systems was otherwise completed and is negative aside from that mentioned above in the HPI    MEDICATIONS:  Current Outpatient Medications   Medication Sig Dispense Refill     amLODIPine (NORVASC) 5 MG tablet TAKE 1 TABLET BY MOUTH EVERYDAY AT BEDTIME 90 tablet 3     apixaban ANTICOAGULANT (ELIQUIS ANTICOAGULANT) 5 MG tablet Take 1 tablet (5 mg) by mouth every 12 hours 180 tablet 3     atorvastatin (LIPITOR) 80 MG tablet Take 1 tablet (80 mg) by mouth At Bedtime 90 tablet 3     botulinum toxin type A (BOTOX) 100 units injection Every 3 months. [facility]       busPIRone (BUSPAR) 10 MG tablet Take 1 tablet (10 mg) by mouth 2 times daily 180 tablet 3     cholecalciferol 50 MCG (2000 UT) CAPS Take 2,000 Units by mouth daily       citalopram (CELEXA) 20 MG tablet Take 2 tablets (40 mg) by mouth daily 180 tablet 2     clopidogrel (PLAVIX) 75 MG tablet Take 1 tablet (75 mg) by mouth daily 90 tablet 3     diclofenac (VOLTAREN) 1 % topical gel Apply 2-4 g topically 4 times daily 150 g 3     eletriptan (RELPAX) 40 MG tablet Take 1 tablet (40 mg) by mouth at onset of  headache for migraine 10 tablet 0     furosemide (LASIX) 20 MG tablet Take 2 tablets (40 mg) by mouth daily 180 tablet 3     HYDROcodone-acetaminophen (NORCO) 5-325 MG tablet Take 1 tablet by mouth daily as needed (Severe headache) 5 tablet 0     irbesartan (AVAPRO) 150 MG tablet Take 1 tablet (150 mg) by mouth daily 90 tablet 3     isosorbide mononitrate (IMDUR) 30 MG 24 hr tablet Take 1 tablet (30 mg) by mouth daily 30 tablet 11     levothyroxine (SYNTHROID/LEVOTHROID) 88 MCG tablet TAKE 1 TABLET BY MOUTH DAILY AT 6:00 AM. 90 tablet 3     LORazepam (ATIVAN) 0.5 MG tablet Take 1 tablet (0.5 mg) by mouth every 6 hours as needed for anxiety 30 tablet 0     metaxalone (SKELAXIN) 800 MG tablet Take 1 tablet (800 mg) by mouth 3 times daily as needed for muscle spasms 30 tablet 0     molnupiravir (LAGEVRIO) 200 MG capsule Take 4 capsules (800 mg) by mouth every 12 hours 40 each 0     nitroGLYcerin (NITROSTAT) 0.4 MG sublingual tablet For chest pain place 1 tablet under the tongue every 5 minutes for 3 doses. If symptoms persist 5 minutes after 1st dose call 911. 30 tablet 1     pramipexole (MIRAPEX) 0.25 MG tablet TAKE 1 TABLET BY MOUTH THREE TIMES A DAY. 270 tablet 3     predniSONE (DELTASONE) 20 MG tablet Take 3 tablets (60 mg) by mouth daily for 3 days 9 tablet 0     sotalol (BETAPACE) 80 MG tablet Take 1 tablet (80 mg) by mouth every 12 hours 60 tablet 3     traZODone (DESYREL) 50 MG tablet TAKE 3 TABLETS BY MOUTH AT BEDTIME. 270 tablet 1     ubrogepant (UBRELVY) 100 MG tablet UBRELVY 100 MG TABS       zolpidem (AMBIEN) 5 MG tablet Take tablet by mouth 15 minutes prior to sleep, for Sleep Study 1 tablet 0         ALLERGIES/SENSITIVITIES:     Allergies   Allergen Reactions     Cephalexin Nausea and Vomiting     Ace Inhibitors Other (See Comments)     Elevates blood pressure     Amitriptyline Hcl Other (See Comments)     elevates blood pressure     Atenolol Other (See Comments)     Aggravates reynauds     Celebrex  "[Celecoxib] GI Disturbance     Cephalosporins Unknown     Pt doesn't remember        Clonidine Unknown     \"got very ill in ER\"     Codeine Sulfate Nausea and Vomiting     Darvocet [Propoxyphene N-Apap] Nausea and Vomiting     Dexamethasone Acetate Swelling     Erythromycin Nausea and Vomiting     Escitalopram Other (See Comments)     Headaches.       Gabapentin Unknown     \"Spotted\"     Hctz Unknown     \"depletes my sodium\"     Potassium GI Disturbance     Propoxyphene      HUT Reaction: Gastrointestinal; HUT Reaction: Nausea And Vomiting; HUT Noted: [id number]     Tramadol Swelling     Swelling of tongue and face     Triamterene      Ultracet Other (See Comments)     Venlafaxine Nausea and Vomiting and Diarrhea     Zolpidem Other (See Comments) and Unknown     Pt doesn't rember       Zolpidem Tartrate Unknown     Bacitracin Itching and Rash     Sulfanilamide Rash       PERTINENT SOCIAL HISTORY:   Social History     Socioeconomic History     Marital status:      Spouse name: None     Number of children: None     Years of education: None     Highest education level: None   Occupational History     Occupation: , occasionally still subs     Employer: RETIRED   Tobacco Use     Smoking status: Never     Passive exposure: Never     Smokeless tobacco: Never   Vaping Use     Vaping Use: Never used   Substance and Sexual Activity     Alcohol use: Yes     Comment: Alcoholic Drinks/day: 1 cocktail daily     Drug use: No         FAMILY HISTORY:  Family History   Problem Relation Age of Onset     Dementia Mother      Arthritis Mother      Chronic Obstructive Pulmonary Disease Father      Cardiovascular Father      Hypertension Father      No Known Problems Sister      No Known Problems Daughter      No Known Problems Son      Cerebrovascular Disease Maternal Grandmother      Heart Disease Paternal Grandmother      Cerebrovascular Disease Paternal Grandmother      No Known Problems Sister      No Known " "Problems Sister      No Known Problems Son      No Known Problems Daughter      Breast Cancer Paternal Aunt         age in 50's        PHYSICAL EXAM:     Constitution: BP (!) 140/78   Pulse 61   Ht 1.549 m (5' 1\")   Wt 80.3 kg (177 lb)   LMP  (LMP Unknown)   SpO2 97%   BMI 33.44 kg/m  .   Awake, alert and in NAD  Eyes: Conjugate gaze. Conjunctiva benign without icterus or injection  Heart: RRR  Lungs: Non-labored respiration without accessory muscle use  Skin: No obvious rash or lesion  Psych: Appropriate mood and affect, alert and oriented x 3  Mental Status:  Speech is fluent.  Recent and remote memory are intact.  Attention span and concentration are normal.     Cranial Nerves:  CN2: No funduscopic exam performed. CN3,4 & 6: Pupillary light response, lateral and vertical gaze normal.  No nystagmus. CN7: No facial weakness, smile, facial symmetry intact. CN8: Intact to spoken voice.      Motor: Normal bulk and tone all muscle groups of upper and lower extremities.     Right Left  Right Left   Deltoid 5 5 Hip flexion 5 5   Biceps 5 5 Hip extension 5 5   Triceps 5 5 Knee flexion 5 5   Wrist ex 5 5 Knee ex 5 5   Wrist flex 5 5 Dorsiflex 5 5   Finger ex 5 5 Plantar flex 5 5   Hand intrinsic 5 5 EHL 5 5    Full Full         Sensory: Sensation intact bilaterally to light touch and temperature throughout. Sensation is intact to pinprick in the bilateral LE.      Coordination:  Gait is WNL. Pt is able to heel and toe walk without difficulty. Tandem gait is WNL. NILO in the UE is WNL     Reflexes; 2+ supinator, biceps, triceps. 2+ patellar and achilles.     Musculoskeletal: Negative straight leg raise bilaterally. Negative SHAHNAZ testing. Negative Moriah finger test    IMAGING: I personally reviewed all radiographic images    MRI Cervical reviewed x 2 and reviewed with Dr. Roman.   Tiny syrinx within the cervical spinal cord at the C6-C7 level is unchanged. No abnormal enhancement to indicate an underlying " lesion. T2 hyperintense enhancing right thyroid nodule/mass measures 2.4 x 2.3 x 4 cm. Degenerative changes without central   spinal canal stenosis. Neural foraminal narrowing most pronounced on the right at C5-C6. No high-grade stenosis.                                                                      IMPRESSION:  1.  No pathologic enhancement to indicate an underlying lesion related to the tiny spinal cord syrinx at C6-C7.  2.  Mild degenerative changes.  3.  4 cm right thyroid nodule/mass.         CONSULTATION ASSESSMENT AND PLAN:    Suma Mark is a 72 year old female with chronic neck pain and headaches, incidental syrinx found on cervical MRI for which she was sent to neurosurgery to review. No chiari malformation, hydrocephalus etc. No trauma. Imaging reviewed with Dr. Roman, this is incidental, does not contribute to her overall clinical picture. Could consider MRI six months and if stable no further follow up wound be needed, Lo would prefer to recheck if change in symptoms which is reasonable.     CC chart to spine center for ongoing conservative management and follow up.   Lo asked for recommendation for treatment of headache and neck pain, no recent steroids - I offered this and alternative muscle relaxant with the caveat that her primary and the spine center may be better suited to make adjustments for her. She verbalized understanding.     No planned follow up.       I spent more than 60 minutes in this apt, examining the pt, reviewing the scans, reviewing notes from chart, discussing treatment options with risks and benefits and coordinating care. >50 % clinic time was spent in face to face counseling and coordinating care    Justine Elizabeth NP       Cc:   Bernadette Taylor          Again, thank you for allowing me to participate in the care of your patient.        Sincerely,        Justine Elizabeth NP

## 2023-03-14 NOTE — ANESTHESIA POSTPROCEDURE EVALUATION
Patient: Suma Mark  RESECTION, SMALL INTESTINE, OPEN ADHESIOLYSIS, LAPARASCOPIC CONVERTED TO OPEN PRIMARY INCISIONAL HERNIA REPAIR  Anesthesia type: general    Patient location: PACU  Last vitals:   Vitals:    06/02/17 1645   BP: 109/55   Pulse: 64   Resp: 10   Temp:    SpO2: 98%     Post vital signs: stable  Level of consciousness: awake and responds to simple questions  Post-anesthesia pain: pain controlled  Post-anesthesia nausea and vomiting: no  Pulmonary: unassisted, return to baseline  Cardiovascular: stable and blood pressure at baseline  Hydration: adequate  Anesthetic events: no    QCDR Measures:  ASA# 11 - Korina-op Cardiac Arrest: ASA11B - Patient did NOT experience unanticipated cardiac arrest  ASA# 12 - Korina-op Mortality Rate: ASA12B - Patient did NOT die  ASA# 13 - PACU Re-Intubation Rate: ASA13B - Patient did NOT require a new airway mgmt  ASA# 10 - Composite Anes Safety: ASA10A - No serious adverse event  ASA# 38 - New Corneal Injury: ASA38A - No new exposure keratitis or corneal abrasion in PACU    Additional Notes:   
No. HUSSEIN screening performed.  STOP BANG Legend: 0-2 = LOW Risk; 3-4 = INTERMEDIATE Risk; 5-8 = HIGH Risk

## 2023-03-15 ENCOUNTER — MYC MEDICAL ADVICE (OUTPATIENT)
Dept: PHYSICAL MEDICINE AND REHAB | Facility: REHABILITATION | Age: 73
End: 2023-03-15
Payer: MEDICARE

## 2023-03-15 DIAGNOSIS — M47.812 ARTHROPATHY OF CERVICAL FACET JOINT: ICD-10-CM

## 2023-03-15 DIAGNOSIS — M54.2 CHRONIC NECK PAIN: Primary | ICD-10-CM

## 2023-03-15 DIAGNOSIS — G89.29 CHRONIC NECK PAIN: Primary | ICD-10-CM

## 2023-03-15 NOTE — TELEPHONE ENCOUNTER
"Per Reny Camejo CNP, \"Nurse navigators please call patient and notify her that I did place a order for a right C5, C6, C7 medial branch block to diagnostically determine how much of her right neck pain is coming from the facet arthropathy/arthritis at the C5-6 and C6-7 levels.  Please go over the medial branch block process, she may be candidate for radiofrequency ablation if she gets benefit with this injection.   If she gets no benefit with this injection I would recommend trying a a right C3, C4, C5 MBB targeting the right C3-4 and C4-5 facet arthropathy.   If she has a positive response with this process on the right we could repeat on the left down the road.\"    Call placed to pt to discuss. Pt stated understanding and would like to proceed. Pt familiar with the process as she reports she had MBBs/RFA for her lumbar spine at a different facility in the past.     Injection requirements reviewed. Pt is on Plavix and Eliquis and is aware she does NOT need to hold these medications prior to procedure. She is also aware that her pain level should be 5/10 or greater the day of injections.     Transferred to scheduling to make appt.         "

## 2023-03-23 ENCOUNTER — ANCILLARY ORDERS (OUTPATIENT)
Dept: RADIOLOGY | Facility: CLINIC | Age: 73
End: 2023-03-23

## 2023-03-28 ENCOUNTER — HOSPITAL ENCOUNTER (OUTPATIENT)
Dept: CT IMAGING | Facility: CLINIC | Age: 73
Discharge: HOME | End: 2023-03-28
Attending: INTERNAL MEDICINE | Admitting: INTERNAL MEDICINE
Payer: MEDICARE

## 2023-03-28 ENCOUNTER — ANCILLARY ORDERS (OUTPATIENT)
Dept: CARDIOLOGY | Facility: CLINIC | Age: 73
End: 2023-03-28

## 2023-03-28 DIAGNOSIS — I48.0 PAROXYSMAL ATRIAL FIBRILLATION (H): ICD-10-CM

## 2023-03-28 LAB — BSA FOR ECHO PROCEDURE: 0 M2

## 2023-03-28 PROCEDURE — G1010 CDSM STANSON: HCPCS | Performed by: INTERNAL MEDICINE

## 2023-03-28 PROCEDURE — G1010 CDSM STANSON: HCPCS

## 2023-03-28 PROCEDURE — 250N000011 HC RX IP 250 OP 636: Performed by: INTERNAL MEDICINE

## 2023-03-28 PROCEDURE — 75572 CT HRT W/3D IMAGE: CPT | Mod: 26 | Performed by: INTERNAL MEDICINE

## 2023-03-28 RX ORDER — IOPAMIDOL 755 MG/ML
100 INJECTION, SOLUTION INTRAVASCULAR ONCE
Status: COMPLETED | OUTPATIENT
Start: 2023-03-28 | End: 2023-03-28

## 2023-03-28 RX ADMIN — IOPAMIDOL 100 ML: 755 INJECTION, SOLUTION INTRAVENOUS at 06:51

## 2023-03-29 ENCOUNTER — MYC MEDICAL ADVICE (OUTPATIENT)
Dept: CARDIOLOGY | Facility: CLINIC | Age: 73
End: 2023-03-29
Payer: MEDICARE

## 2023-03-30 ENCOUNTER — TELEPHONE (OUTPATIENT)
Dept: CARDIOLOGY | Facility: CLINIC | Age: 73
End: 2023-03-30

## 2023-03-30 ENCOUNTER — OFFICE VISIT (OUTPATIENT)
Dept: CARDIOLOGY | Facility: CLINIC | Age: 73
End: 2023-03-30
Attending: INTERNAL MEDICINE
Payer: MEDICARE

## 2023-03-30 VITALS
BODY MASS INDEX: 34.39 KG/M2 | OXYGEN SATURATION: 97 % | SYSTOLIC BLOOD PRESSURE: 122 MMHG | RESPIRATION RATE: 18 BRPM | DIASTOLIC BLOOD PRESSURE: 60 MMHG | HEART RATE: 65 BPM | WEIGHT: 182 LBS

## 2023-03-30 DIAGNOSIS — I25.83 CORONARY ARTERIOSCLEROSIS DUE TO LIPID RICH PLAQUE: Primary | ICD-10-CM

## 2023-03-30 DIAGNOSIS — I48.0 PAROXYSMAL ATRIAL FIBRILLATION (H): ICD-10-CM

## 2023-03-30 DIAGNOSIS — M54.42 CHRONIC RIGHT-SIDED LOW BACK PAIN WITH BILATERAL SCIATICA: Chronic | ICD-10-CM

## 2023-03-30 DIAGNOSIS — G89.29 CHRONIC RIGHT-SIDED LOW BACK PAIN WITH BILATERAL SCIATICA: Chronic | ICD-10-CM

## 2023-03-30 DIAGNOSIS — I10 ESSENTIAL HYPERTENSION: Chronic | ICD-10-CM

## 2023-03-30 DIAGNOSIS — E78.5 DYSLIPIDEMIA, GOAL LDL BELOW 70: Chronic | ICD-10-CM

## 2023-03-30 DIAGNOSIS — M54.41 CHRONIC RIGHT-SIDED LOW BACK PAIN WITH BILATERAL SCIATICA: Chronic | ICD-10-CM

## 2023-03-30 DIAGNOSIS — E66.09 CLASS 1 OBESITY DUE TO EXCESS CALORIES WITH SERIOUS COMORBIDITY AND BODY MASS INDEX (BMI) OF 33.0 TO 33.9 IN ADULT: Chronic | ICD-10-CM

## 2023-03-30 DIAGNOSIS — I48.0 PAROXYSMAL ATRIAL FIBRILLATION (H): Primary | Chronic | ICD-10-CM

## 2023-03-30 DIAGNOSIS — E66.811 CLASS 1 OBESITY DUE TO EXCESS CALORIES WITH SERIOUS COMORBIDITY AND BODY MASS INDEX (BMI) OF 33.0 TO 33.9 IN ADULT: Chronic | ICD-10-CM

## 2023-03-30 DIAGNOSIS — E03.9 ACQUIRED HYPOTHYROIDISM: Chronic | ICD-10-CM

## 2023-03-30 PROBLEM — I25.110 CORONARY ARTERY DISEASE INVOLVING NATIVE CORONARY ARTERY OF NATIVE HEART WITH UNSTABLE ANGINA PECTORIS (H): Chronic | Status: RESOLVED | Noted: 2022-10-18 | Resolved: 2023-03-30

## 2023-03-30 PROCEDURE — 99214 OFFICE O/P EST MOD 30 MIN: CPT | Performed by: INTERNAL MEDICINE

## 2023-03-30 NOTE — LETTER
3/30/2023    Bernadette Taylor MD  [address] [facility] [address]    RE: Suma Mark       Dear Colleague,     I had the pleasure of seeing Suma Mark in the [facility].      [facility]  Heart Care Clinic Follow-up Note    Assessment & Plan        (I25.10,  I25.83) Coronary arteriosclerosis due to lipid rich plaque  (primary encounter diagnosis)  Comment: Angiography urgently secondary to positive troponin October 2022 showed normal left main, proximal LAD 50% lesion with a distal 85% lesion followed by 50% lesion not fractional flow reserve significant, circumflex with a proximal 40% lesion with a second obtuse marginal artery 95% lesion that received a 2.5 x 38 mm Synergy drug-coated stent, and the right coronary artery with a proximal 55% followed by distal 40% lesion.  Due to abnormal nuclear stress test she subsequently had intervention on the distal LAD in November 2022 with a resolute [place] 2.5 x 22 mm stent.  Still has stable angina, discomfort when she walks up a flight of steps or in the evening while sitting and goes away with rest or 5 to 10 minutes relaxation.  For the most part controlled on nitrates.  We did discuss if it does worsen to let us know in which case we might consider repeat stress testing or possibly relook angiography.    (I48.0) Paroxysmal atrial fibrillation (H)  Comment: Symptomatic, for the most part controlled on sotalol, and given advanced [id number] score on chronic Eliquis therapy.  Minimal breakthroughs, did discuss ablation but would hold off at this point time.  Would recommend possibly left atrial appendage occlusion device.    (E78.5) Dyslipidemia, goal LDL below 70  Comment: Total cholesterol 132 with an LDL of 68 which is acceptable and great.    (I10) Essential hypertension  Comment: Under good control currently.    (E66.09,  Z68.33) Class 1 obesity due to excess calories with serious comorbidity and body mass index (BMI) of  33.0 to 33.9 in adult  Comment: Work on weight loss, agree with sleep study which is scheduled towards the end of spring.    (E03.9) Acquired hypothyroidism  Comment: So noted with TSH of 3.53.    (M54.41,  M54.42,  G89.29) Chronic right-sided low back pain with bilateral sciatica  Comment: Getting ready to have dental work as well as radiofrequency nerve ablation on the back.  Did discuss she speak with those practitioners but might need to consider holding Eliquis and Plavix for those.    Plan  1.  If going to back and dental surgery, wait for about 6 months and hold Plavix for 5 days and Eliquis for 3 days.  2.  Follow-up with Dr. Guicho Worley primary cardiologist around May, 6 months out from most recent stent.  3.  Let us know if atrial fibrillation worsens or chest discomfort worsens in which case we will see sooner.    Subjective  CC: 72-year-old white female being seen urgently in follow-up.  Dr. Ron was not available so she was signed to me.  She comes in tell me she says that she has palpitations maybe for 2 or 3 minutes, 2 or 3 times a week.  They are better than they were.  She has chest discomfort, a tightness sensation which radiates to either arm at different times, with maybe some associated diaphoresis and shortness of breath.  This will come on while walking up a hill or while sitting in the evening.  She will stop walking will go away or she will relax at home and will go away.  She tells me her legs are extremely heavy and she has some generalized fatigue.  There is no true syncope or presyncope or peripheral edema.    Medications  Current Outpatient Medications   Medication Sig Dispense Refill     amLODIPine (NORVASC) 5 MG tablet TAKE 1 TABLET BY MOUTH EVERYDAY AT BEDTIME 90 tablet 3     apixaban ANTICOAGULANT (ELIQUIS ANTICOAGULANT) 5 MG tablet Take 1 tablet (5 mg) by mouth every 12 hours 180 tablet 3     atorvastatin (LIPITOR) 80 MG tablet Take 1 tablet (80 mg) by mouth At Bedtime 90 tablet 3      botulinum toxin type A (BOTOX) 100 units injection Every 3 months. [facility]       busPIRone (BUSPAR) 10 MG tablet Take 1 tablet (10 mg) by mouth 2 times daily 180 tablet 3     cholecalciferol 50 MCG (2000 UT) CAPS Take 2,000 Units by mouth daily       citalopram (CELEXA) 20 MG tablet Take 2 tablets (40 mg) by mouth daily 180 tablet 2     clopidogrel (PLAVIX) 75 MG tablet Take 1 tablet (75 mg) by mouth daily 90 tablet 3     diclofenac (VOLTAREN) 1 % topical gel Apply 2-4 g topically 4 times daily 150 g 3     eletriptan (RELPAX) 40 MG tablet Take 1 tablet (40 mg) by mouth at onset of headache for migraine 10 tablet 0     furosemide (LASIX) 20 MG tablet Take 2 tablets (40 mg) by mouth daily 180 tablet 3     HYDROcodone-acetaminophen (NORCO) 5-325 MG tablet Take 1 tablet by mouth daily as needed (Severe headache) 5 tablet 0     irbesartan (AVAPRO) 150 MG tablet Take 1 tablet (150 mg) by mouth daily 90 tablet 3     isosorbide mononitrate (IMDUR) 30 MG 24 hr tablet Take 1 tablet (30 mg) by mouth daily 30 tablet 11     levothyroxine (SYNTHROID/LEVOTHROID) 88 MCG tablet TAKE 1 TABLET BY MOUTH DAILY AT 6:00 AM. 90 tablet 3     LORazepam (ATIVAN) 0.5 MG tablet Take 1 tablet (0.5 mg) by mouth every 6 hours as needed for anxiety 30 tablet 0     nitroGLYcerin (NITROSTAT) 0.4 MG sublingual tablet For chest pain place 1 tablet under the tongue every 5 minutes for 3 doses. If symptoms persist 5 minutes after 1st dose call 911. 30 tablet 1     pramipexole (MIRAPEX) 0.25 MG tablet TAKE 1 TABLET BY MOUTH THREE TIMES A DAY. 270 tablet 3     sotalol (BETAPACE) 80 MG tablet Take 1 tablet (80 mg) by mouth every 12 hours 60 tablet 3     traZODone (DESYREL) 50 MG tablet TAKE 3 TABLETS BY MOUTH AT BEDTIME. 270 tablet 1     ubrogepant (UBRELVY) 100 MG tablet UBRELVY 100 MG TABS       zolpidem (AMBIEN) 5 MG tablet Take tablet by mouth 15 minutes prior to sleep, for Sleep Study 1 tablet 0       Objective  /60 (BP Location: Right arm,  Patient Position: Sitting, Cuff Size: Adult Regular)   Pulse 65   Resp 18   Wt 82.6 kg (182 lb)   LMP  (LMP Unknown)   SpO2 97%   BMI 34.39 kg/m      General Appearance:    Alert, cooperative, no distress, appears stated age   Head:    Normocephalic, without obvious abnormality, atraumatic   Throat:   Lips, mucosa, and tongue normal; teeth and gums normal   Neck:   Supple, symmetrical, trachea midline, no adenopathy;        thyroid:  No enlargement/tenderness/nodules; no carotid    bruit or JVD   Back:     Symmetric, no curvature, ROM normal, no CVA tenderness   Lungs:     Clear to auscultation bilaterally, respirations unlabored   Chest wall:    No tenderness or deformity   Heart:    Regular rate and rhythm, S1 and S2 normal, no murmur, rub   or gallop   Abdomen:     Soft, non-tender, bowel sounds active all four quadrants,     no masses, no organomegaly   Extremities:   Normal, atraumatic, no cyanosis or edema   Pulses:   2+ and symmetric all extremities   Skin:   Skin color, texture, turgor normal, no rashes or lesions     Results    Lab Results personally reviewed   Lab Results   Component Value Date    CHOL 132 11/15/2022    CHOL 149 06/10/2021     Lab Results   Component Value Date    HDL 43 (L) 11/15/2022    HDL 56 06/10/2021     No components found for: LDLCALC  Lab Results   Component Value Date    TRIG 106 11/15/2022    TRIG 113 06/10/2021     Lab Results   Component Value Date    WBC 6.9 01/09/2023    HGB 11.9 01/09/2023    HCT 36.9 01/09/2023     01/09/2023     Lab Results   Component Value Date    BUN 19 02/24/2023     02/24/2023    CO2 28 02/24/2023       Reviewed electrocardiogram sinus rhythm with good corrected QT interval              Thank you for allowing me to participate in the care of your patient.      Sincerely,     LANA JUDD MD     [facility]  cc:   Jacek Worley MD  [address]  [place]  [address]

## 2023-03-30 NOTE — PATIENT INSTRUCTIONS
Mrs. Suma Mark,  It certainly was nice to meet you today.  Per our conversation you had some blockages that got stented, as well as atrial fibrillation that is controlled on sotalol as well as Eliquis.  If the atrial fibrillation does get longer or worse, or you need to stop sotalol, I would not hesitate to consider ablation.  In addition, we will continue to evaluate you for the Watchman device.  Do let us know if the palpitations get worse.  As far as the blockages are concerned, if the chest discomfort gets somewhat more unpredictable let us know as we might need to do stress test or possibly consider looking inside the heart with an angiogram again.  In terms of the back ablation, as well as dental procedure, I defer to those practitioners.  If they do not want you on blood thinner you would need to hold the Eliquis for 3 days as well as the Plavix for 5 days.  We would asked that she wait to 6 months after your last stent, which would be roughly May, before you did this.    I will plan on you seeing Dr. Worley around May, 6 months out from your last stent.  Harjinder Bonilla

## 2023-03-30 NOTE — TELEPHONE ENCOUNTER
Phone call to patient to discuss results of pre-LAAC CT. Informed patient of suitable anatomy for LAAC procedure. Discussed pre and post procedure expectations, including appts the week of the procedure, need for responsible adult at home the night of the procedure, driving restrictions post-procedure, and 6 week post-LAAC appt, and 3 month post-LAAC DIANA. Patient agreeable to plan moving forward. Would like to have LAAC procedure 6/8/2023 with Dr. Saldivar. Informed patient scheduling will be in touch to finalize procedure appointments but that they may return call to writer at their leisure. Patient has writer's direct office number for further questions or concerns. No further questions or concerns at this time.        ----- Message from Rajinder Fischer MD sent at 3/29/2023  7:27 AM CDT -----  Hi team,  CT images reviewed.  Okay to proceed with scheduling patient for LAAC.  Thanks  Rajinder      ----- Message -----  From: Vikki Johnson RN  Sent: 3/28/2023   8:52 AM CDT  To: Vikki Johnson RN, Chaim Navas MD, #    Please review patient pre-LAAC CT.    Ok to schedule patient for LAAC procedure? Thanks!    Vikki

## 2023-03-30 NOTE — PROGRESS NOTES
[facility]  Heart Care Clinic Follow-up Note    Assessment & Plan        (I25.10,  I25.83) Coronary arteriosclerosis due to lipid rich plaque  (primary encounter diagnosis)  Comment: Angiography urgently secondary to positive troponin October 2022 showed normal left main, proximal LAD 50% lesion with a distal 85% lesion followed by 50% lesion not fractional flow reserve significant, circumflex with a proximal 40% lesion with a second obtuse marginal artery 95% lesion that received a 2.5 x 38 mm Synergy drug-coated stent, and the right coronary artery with a proximal 55% followed by distal 40% lesion.  Due to abnormal nuclear stress test she subsequently had intervention on the distal LAD in November 2022 with a resolute Makanda 2.5 x 22 mm stent.  Still has stable angina, discomfort when she walks up a flight of steps or in the evening while sitting and goes away with rest or 5 to 10 minutes relaxation.  For the most part controlled on nitrates.  We did discuss if it does worsen to let us know in which case we might consider repeat stress testing or possibly relook angiography.    (I48.0) Paroxysmal atrial fibrillation (H)  Comment: Symptomatic, for the most part controlled on sotalol, and given advanced [id number] score on chronic Eliquis therapy.  Minimal breakthroughs, did discuss ablation but would hold off at this point time.  Would recommend possibly left atrial appendage occlusion device.    (E78.5) Dyslipidemia, goal LDL below 70  Comment: Total cholesterol 132 with an LDL of 68 which is acceptable and great.    (I10) Essential hypertension  Comment: Under good control currently.    (E66.09,  Z68.33) Class 1 obesity due to excess calories with serious comorbidity and body mass index (BMI) of 33.0 to 33.9 in adult  Comment: Work on weight loss, agree with sleep study which is scheduled towards the end of spring.    (E03.9) Acquired hypothyroidism  Comment: So noted with TSH of 3.53.    (M54.41,   M54.42,  G89.29) Chronic right-sided low back pain with bilateral sciatica  Comment: Getting ready to have dental work as well as radiofrequency nerve ablation on the back.  Did discuss she speak with those practitioners but might need to consider holding Eliquis and Plavix for those.    Plan  1.  If going to back and dental surgery, wait for about 6 months and hold Plavix for 5 days and Eliquis for 3 days.  2.  Follow-up with Dr. Guicho Worley primary cardiologist around May, 6 months out from most recent stent.  3.  Let us know if atrial fibrillation worsens or chest discomfort worsens in which case we will see sooner.    Subjective  CC: 72-year-old white female being seen urgently in follow-up.  Dr. Ron was not available so she was signed to me.  She comes in tell me she says that she has palpitations maybe for 2 or 3 minutes, 2 or 3 times a week.  They are better than they were.  She has chest discomfort, a tightness sensation which radiates to either arm at different times, with maybe some associated diaphoresis and shortness of breath.  This will come on while walking up a hill or while sitting in the evening.  She will stop walking will go away or she will relax at home and will go away.  She tells me her legs are extremely heavy and she has some generalized fatigue.  There is no true syncope or presyncope or peripheral edema.    Medications  Current Outpatient Medications   Medication Sig Dispense Refill     amLODIPine (NORVASC) 5 MG tablet TAKE 1 TABLET BY MOUTH EVERYDAY AT BEDTIME 90 tablet 3     apixaban ANTICOAGULANT (ELIQUIS ANTICOAGULANT) 5 MG tablet Take 1 tablet (5 mg) by mouth every 12 hours 180 tablet 3     atorvastatin (LIPITOR) 80 MG tablet Take 1 tablet (80 mg) by mouth At Bedtime 90 tablet 3     botulinum toxin type A (BOTOX) 100 units injection Every 3 months. [facility]       busPIRone (BUSPAR) 10 MG tablet Take 1 tablet (10 mg) by mouth 2 times daily 180 tablet 3     cholecalciferol 50 MCG (2000  UT) CAPS Take 2,000 Units by mouth daily       citalopram (CELEXA) 20 MG tablet Take 2 tablets (40 mg) by mouth daily 180 tablet 2     clopidogrel (PLAVIX) 75 MG tablet Take 1 tablet (75 mg) by mouth daily 90 tablet 3     diclofenac (VOLTAREN) 1 % topical gel Apply 2-4 g topically 4 times daily 150 g 3     eletriptan (RELPAX) 40 MG tablet Take 1 tablet (40 mg) by mouth at onset of headache for migraine 10 tablet 0     furosemide (LASIX) 20 MG tablet Take 2 tablets (40 mg) by mouth daily 180 tablet 3     HYDROcodone-acetaminophen (NORCO) 5-325 MG tablet Take 1 tablet by mouth daily as needed (Severe headache) 5 tablet 0     irbesartan (AVAPRO) 150 MG tablet Take 1 tablet (150 mg) by mouth daily 90 tablet 3     isosorbide mononitrate (IMDUR) 30 MG 24 hr tablet Take 1 tablet (30 mg) by mouth daily 30 tablet 11     levothyroxine (SYNTHROID/LEVOTHROID) 88 MCG tablet TAKE 1 TABLET BY MOUTH DAILY AT 6:00 AM. 90 tablet 3     LORazepam (ATIVAN) 0.5 MG tablet Take 1 tablet (0.5 mg) by mouth every 6 hours as needed for anxiety 30 tablet 0     nitroGLYcerin (NITROSTAT) 0.4 MG sublingual tablet For chest pain place 1 tablet under the tongue every 5 minutes for 3 doses. If symptoms persist 5 minutes after 1st dose call 911. 30 tablet 1     pramipexole (MIRAPEX) 0.25 MG tablet TAKE 1 TABLET BY MOUTH THREE TIMES A DAY. 270 tablet 3     sotalol (BETAPACE) 80 MG tablet Take 1 tablet (80 mg) by mouth every 12 hours 60 tablet 3     traZODone (DESYREL) 50 MG tablet TAKE 3 TABLETS BY MOUTH AT BEDTIME. 270 tablet 1     ubrogepant (UBRELVY) 100 MG tablet UBRELVY 100 MG TABS       zolpidem (AMBIEN) 5 MG tablet Take tablet by mouth 15 minutes prior to sleep, for Sleep Study 1 tablet 0       Objective  /60 (BP Location: Right arm, Patient Position: Sitting, Cuff Size: Adult Regular)   Pulse 65   Resp 18   Wt 82.6 kg (182 lb)   LMP  (LMP Unknown)   SpO2 97%   BMI 34.39 kg/m      General Appearance:    Alert, cooperative, no  distress, appears stated age   Head:    Normocephalic, without obvious abnormality, atraumatic   Throat:   Lips, mucosa, and tongue normal; teeth and gums normal   Neck:   Supple, symmetrical, trachea midline, no adenopathy;        thyroid:  No enlargement/tenderness/nodules; no carotid    bruit or JVD   Back:     Symmetric, no curvature, ROM normal, no CVA tenderness   Lungs:     Clear to auscultation bilaterally, respirations unlabored   Chest wall:    No tenderness or deformity   Heart:    Regular rate and rhythm, S1 and S2 normal, no murmur, rub   or gallop   Abdomen:     Soft, non-tender, bowel sounds active all four quadrants,     no masses, no organomegaly   Extremities:   Normal, atraumatic, no cyanosis or edema   Pulses:   2+ and symmetric all extremities   Skin:   Skin color, texture, turgor normal, no rashes or lesions     Results    Lab Results personally reviewed   Lab Results   Component Value Date    CHOL 132 11/15/2022    CHOL 149 06/10/2021     Lab Results   Component Value Date    HDL 43 (L) 11/15/2022    HDL 56 06/10/2021     No components found for: LDLCALC  Lab Results   Component Value Date    TRIG 106 11/15/2022    TRIG 113 06/10/2021     Lab Results   Component Value Date    WBC 6.9 01/09/2023    HGB 11.9 01/09/2023    HCT 36.9 01/09/2023     01/09/2023     Lab Results   Component Value Date    BUN 19 02/24/2023     02/24/2023    CO2 28 02/24/2023       Reviewed electrocardiogram sinus rhythm with good corrected QT interval

## 2023-04-02 DIAGNOSIS — I48.0 PAROXYSMAL ATRIAL FIBRILLATION (H): ICD-10-CM

## 2023-04-03 ENCOUNTER — HOSPITAL ENCOUNTER (INPATIENT)
Facility: HOSPITAL | Age: 73
Setting detail: SURGERY ADMIT
End: 2023-04-03
Attending: INTERNAL MEDICINE | Admitting: INTERNAL MEDICINE
Payer: MEDICARE

## 2023-04-03 DIAGNOSIS — F41.9 ANXIETY: ICD-10-CM

## 2023-04-03 DIAGNOSIS — I48.0 PAROXYSMAL ATRIAL FIBRILLATION (H): ICD-10-CM

## 2023-04-03 RX ORDER — CITALOPRAM HYDROBROMIDE 20 MG/1
40 TABLET ORAL DAILY
Qty: 180 TABLET | Refills: 2 | Status: SHIPPED | OUTPATIENT
Start: 2023-04-03 | End: 2023-06-26

## 2023-04-03 RX ORDER — BUSPIRONE HYDROCHLORIDE 10 MG/1
10 TABLET ORAL 2 TIMES DAILY
Qty: 180 TABLET | Refills: 3 | OUTPATIENT
Start: 2023-04-03

## 2023-04-03 RX ORDER — SOTALOL HYDROCHLORIDE 80 MG/1
TABLET ORAL
Qty: 60 TABLET | Refills: 3 | Status: SHIPPED | OUTPATIENT
Start: 2023-04-03 | End: 2023-07-21

## 2023-04-03 NOTE — TELEPHONE ENCOUNTER
----- Message -----  From: Cyndee Wang  Sent: 4/3/2023   3:36 PM CDT  To: Vikki Johnson RN    done  ----- Message -----  From: Vikki Johnson RN  Sent: 3/30/2023   4:31 PM CDT  To: Cyndee Wang    ----- Message from Vikki Johnson RN sent at 3/30/2023  4:31 PM CDT -----  Please call patient to schedule pre-ops, post-op, and LAAC procedure 6/8/2023 with Dr. Saldivar  Patient DOES NOT have a device. SDM 1/9/23 with Dr. Worley  Case request, intra-Op DIANA, and 3 month post-LAAC DIANA orders in.   Thanks!    Vikki MARTINEZ RN

## 2023-04-05 DIAGNOSIS — I10 ESSENTIAL (PRIMARY) HYPERTENSION: ICD-10-CM

## 2023-04-05 RX ORDER — AMLODIPINE BESYLATE 5 MG/1
TABLET ORAL
Qty: 90 TABLET | Refills: 1 | Status: SHIPPED | OUTPATIENT
Start: 2023-04-05 | End: 2023-10-03

## 2023-04-05 NOTE — TELEPHONE ENCOUNTER
"Last Written Prescription Date:  4/11/2022  Last Fill Quantity: 90,  # refills: 3   Last office visit provider:  12/13/2022     Requested Prescriptions   Pending Prescriptions Disp Refills     amLODIPine (NORVASC) 5 MG tablet [Pharmacy Med Name: AMLODIPINE BESYLATE 5 MG TAB] 90 tablet 1     Sig: TAKE 1 TABLET BY MOUTH EVERYDAY AT BEDTIME       Calcium Channel Blockers Protocol  Passed - 4/5/2023 12:31 AM        Passed - Blood pressure under 140/90 in past 12 months     BP Readings from Last 3 Encounters:   03/30/23 122/60   03/10/23 (!) 140/78   03/09/23 108/66                 Passed - Recent (12 mo) or future (30 days) visit within the authorizing provider's specialty     Patient has had an office visit with the authorizing provider or a provider within the authorizing providers department within the previous 12 mos or has a future within next 30 days. See \"Patient Info\" tab in inbasket, or \"Choose Columns\" in Meds & Orders section of the refill encounter.              Passed - Medication is active on med list        Passed - Patient is age 18 or older        Passed - No active pregnancy on record        Passed - Normal serum creatinine on file in past 12 months     Recent Labs   Lab Test 02/24/23  0830   CR 0.83       Ok to refill medication if creatinine is low          Passed - No positive pregnancy test in past 12 months             Carrol Jefferson RN 04/05/23 4:09 PM  "

## 2023-04-06 ENCOUNTER — RADIOLOGY INJECTION OFFICE VISIT (OUTPATIENT)
Dept: PHYSICAL MEDICINE AND REHAB | Facility: CLINIC | Age: 73
End: 2023-04-06
Attending: NURSE PRACTITIONER
Payer: MEDICARE

## 2023-04-06 VITALS
TEMPERATURE: 97.6 F | RESPIRATION RATE: 16 BRPM | SYSTOLIC BLOOD PRESSURE: 108 MMHG | OXYGEN SATURATION: 97 % | DIASTOLIC BLOOD PRESSURE: 60 MMHG | HEART RATE: 61 BPM

## 2023-04-06 DIAGNOSIS — M54.2 CHRONIC NECK PAIN: ICD-10-CM

## 2023-04-06 DIAGNOSIS — G89.29 CHRONIC NECK PAIN: ICD-10-CM

## 2023-04-06 DIAGNOSIS — M47.812 ARTHROPATHY OF CERVICAL FACET JOINT: ICD-10-CM

## 2023-04-06 PROCEDURE — 64490 INJ PARAVERT F JNT C/T 1 LEV: CPT | Mod: RT | Performed by: PAIN MEDICINE

## 2023-04-06 PROCEDURE — 64491 INJ PARAVERT F JNT C/T 2 LEV: CPT | Mod: RT | Performed by: PAIN MEDICINE

## 2023-04-06 RX ORDER — BUPIVACAINE HYDROCHLORIDE 5 MG/ML
INJECTION, SOLUTION EPIDURAL; INTRACAUDAL
Status: COMPLETED | OUTPATIENT
Start: 2023-04-06 | End: 2023-04-06

## 2023-04-06 RX ORDER — LIDOCAINE HYDROCHLORIDE 10 MG/ML
INJECTION, SOLUTION EPIDURAL; INFILTRATION; INTRACAUDAL; PERINEURAL
Status: COMPLETED | OUTPATIENT
Start: 2023-04-06 | End: 2023-04-06

## 2023-04-06 RX ADMIN — LIDOCAINE HYDROCHLORIDE 3 ML: 10 INJECTION, SOLUTION EPIDURAL; INFILTRATION; INTRACAUDAL; PERINEURAL at 12:02

## 2023-04-06 RX ADMIN — BUPIVACAINE HYDROCHLORIDE 1.5 ML: 5 INJECTION, SOLUTION EPIDURAL; INTRACAUDAL at 12:03

## 2023-04-06 ASSESSMENT — PAIN SCALES - GENERAL
PAINLEVEL: EXTREME PAIN (8)
PAINLEVEL: NO PAIN (1)

## 2023-04-06 NOTE — PATIENT INSTRUCTIONS
DISCHARGE INSTRUCTIONS    During office hours (8:00 a.m.- 4:00 p.m.) questions or concerns may be answered  by calling [facility] Nurses at  [phone].  Messages received after hours will be returned the following business day.      In the case of an emergency, please dial 911 or seek assistance at the nearest Emergency Room/Urgent Care facility.     All Patients:  You may experience an increase in your symptoms for the first 2 days, once the numbing medication wears off.    You may resume your regular medication, no pain medication until you have completed your diary    You may shower. No swimming, tub bath or hot tub for 24 hours; remove bandage after 4 hours    Continue your activities that can cause you pain to test the blocks.                         You should not drive for the next 3 to 5 hours (have someone drive you)           POSSIBLE PROCEDURE SIDE EFFECTS  -Call Sp[facility] Center if concerned-    Increased Pain  Increased numbness/tingling     Nausea/Vomiting  Bruising/bleeding at site (hematoma)             Swelling at site (edema) Headache  Difficulty walking  Infection        Fever greater than 100.5    Please be advised that your blood glucose levels may be increased for the next 5-7 days as a result of the steroid you received with your injection today.  It is recommended that you monitor your blood glucose levels closely over the next week and direct any additional questions regarding your blood glucose management to your primary doctor.

## 2023-04-11 ENCOUNTER — TELEPHONE (OUTPATIENT)
Dept: PHYSICAL MEDICINE AND REHAB | Facility: CLINIC | Age: 73
End: 2023-04-11
Payer: MEDICARE

## 2023-04-11 DIAGNOSIS — M54.2 CHRONIC NECK PAIN: Primary | ICD-10-CM

## 2023-04-11 DIAGNOSIS — G89.29 CHRONIC NECK PAIN: Primary | ICD-10-CM

## 2023-04-11 DIAGNOSIS — M47.812 ARTHROPATHY OF CERVICAL FACET JOINT: ICD-10-CM

## 2023-04-11 NOTE — TELEPHONE ENCOUNTER
Patient expressed desire to move forward with confirmatory right C5, C6, C7 medial branch blocks. Order for confirmatory blocks was placed by [facility]. Pt informed that they will need a  on the day of their procedure. Reviewed with pt that they should not do anything for pain control on the day of their procedure and that they will need to have a starting pain rating of 5/10 or higher. Notified pt that they should call and inform staff if they become ill or are taking antibiotics at the time of their procedure, and that they will need to wear a mask while in the facility. Pt verbalized understanding of these instructions and was transferred to the  for scheduling.

## 2023-04-18 ENCOUNTER — TRANSFERRED RECORDS (OUTPATIENT)
Dept: HEALTH INFORMATION MANAGEMENT | Facility: CLINIC | Age: 73
End: 2023-04-18

## 2023-04-18 ENCOUNTER — MYC REFILL (OUTPATIENT)
Dept: INTERNAL MEDICINE | Facility: CLINIC | Age: 73
End: 2023-04-18
Payer: MEDICARE

## 2023-04-18 DIAGNOSIS — F41.9 ANXIETY: ICD-10-CM

## 2023-04-19 RX ORDER — LORAZEPAM 0.5 MG/1
0.5 TABLET ORAL EVERY 6 HOURS PRN
Qty: 30 TABLET | Refills: 0 | Status: SHIPPED | OUTPATIENT
Start: 2023-04-19 | End: 2023-04-22

## 2023-04-22 ENCOUNTER — MYC REFILL (OUTPATIENT)
Dept: INTERNAL MEDICINE | Facility: CLINIC | Age: 73
End: 2023-04-22
Payer: MEDICARE

## 2023-04-22 DIAGNOSIS — F41.9 ANXIETY: ICD-10-CM

## 2023-04-23 RX ORDER — LORAZEPAM 0.5 MG/1
0.5 TABLET ORAL EVERY 6 HOURS PRN
Qty: 30 TABLET | Refills: 0 | Status: SHIPPED | OUTPATIENT
Start: 2023-04-23 | End: 2023-06-13

## 2023-04-24 ENCOUNTER — TELEPHONE (OUTPATIENT)
Dept: INTERNAL MEDICINE | Facility: CLINIC | Age: 73
End: 2023-04-24
Payer: MEDICARE

## 2023-04-24 NOTE — TELEPHONE ENCOUNTER
What are the alternatives covered by her insurance? Please call the pt to check with her insurance if lorazepam is not covered. We do not know what medication is covered by different insurances and why they request the PA.

## 2023-05-01 ENCOUNTER — VIRTUAL VISIT (OUTPATIENT)
Dept: FAMILY MEDICINE | Facility: CLINIC | Age: 73
End: 2023-05-01
Payer: MEDICARE

## 2023-05-01 ENCOUNTER — TELEPHONE (OUTPATIENT)
Dept: CARDIOLOGY | Facility: CLINIC | Age: 73
End: 2023-05-01

## 2023-05-01 ENCOUNTER — MYC MEDICAL ADVICE (OUTPATIENT)
Dept: CARDIOLOGY | Facility: CLINIC | Age: 73
End: 2023-05-01

## 2023-05-01 DIAGNOSIS — F51.04 CHRONIC INSOMNIA: Primary | ICD-10-CM

## 2023-05-01 PROCEDURE — 99213 OFFICE O/P EST LOW 20 MIN: CPT | Mod: VID | Performed by: NURSE PRACTITIONER

## 2023-05-01 RX ORDER — LEVOTHYROXINE SODIUM 88 UG/1
TABLET ORAL
Qty: 90 TABLET | Refills: 3 | Status: CANCELLED | OUTPATIENT
Start: 2023-05-01

## 2023-05-01 RX ORDER — AMLODIPINE BESYLATE 5 MG/1
5 TABLET ORAL AT BEDTIME
Qty: 90 TABLET | Refills: 1 | Status: CANCELLED | OUTPATIENT
Start: 2023-05-01

## 2023-05-01 NOTE — TELEPHONE ENCOUNTER
Left detailed message regarding call back request. Need to clarify what forms, when/where they were submitted-LIYAH

## 2023-05-01 NOTE — TELEPHONE ENCOUNTER
"From: Shalonda Sadler APRN CNP  Sent: 5/1/2023   8:48 AM CDT  To: Natajulienne Linares  Subject: FW: Radiofrequency nerve ablation for low ba*    Sahil Peace,  Pt of Dr. Worley saw Dr. Bonilla on 3/30.  And this the recommendation per Dr. Bonilla.  Could you please call patient and review recommendation from Dr. Bonilla.  Thank you!  CY      \"1.  If going to back and dental surgery, wait for about 6 months and hold Plavix for 5 days and Eliquis for 3 days.  2.  Follow-up with Dr. Guicho Worley primary cardiologist around May, 6 months out from most recent stent.  3.  Let us know if atrial fibrillation worsens or chest discomfort worsens in which case we will see sooner\".        ----- Message -----  From: Suma Mark \"Lo\"  Sent: 5/1/2023   8:18 AM CDT  To: ANDREA Alfonso CNP  Subject: Radiofrequency nerve ablation for low back       Shalonda,  I have recently passed two medial branch blocks on my low back for pain. Am I okay with moving forward to have the radiofrequeny nerve ablation on my low back at this time? Thank you, Lo      "

## 2023-05-01 NOTE — TELEPHONE ENCOUNTER
Pt called in, returning writer's vmm. Confirmed form was not received. Pt requested a call be placed to [employer] [phone] to resend forms. Need signed hold parameters.completed request.-LIYAH

## 2023-05-01 NOTE — TELEPHONE ENCOUNTER
[facility] Call Center    Phone Message    May a detailed message be left on voicemail: yes     Reason for Call: Other: Patient called requesting to speak with a member of her care team in regards to [employer] forms needing to be completed by . Patient states she has been waiting for a few weeks now. Please review, and call patient back to further discuss, thank you.    Action Taken: Message routed to:  Other: Cardiology    Travel Screening: Not Applicable     Thank you!  [facility]

## 2023-05-01 NOTE — PROGRESS NOTES
"Lo is a 72 year old who is being evaluated via a billable video visit.      How would you like to obtain your AVS? MyChart  If the video visit is dropped, the invitation should be resent by: Text to cell phone: [phone]  Will anyone else be joining your video visit? No          Assessment & Plan     Chronic insomnia  Patient's appointment was and request to identify if there is any other treatments that she could try for her management of insomnia.  She is currently on 150 mg of trazodone daily.  She has a sleep medicine appointment coming up in July.  She has significant polypharmacy occurring with different medications and management for chronic conditions.  I discussed with patient that it would not be appropriate for me to change her medications at this time except to suggest weaning/reducing trazodone dosage.  I did recommend looking into med therapy management and this referral was placed today for patient.  Perhaps she could benefit from mirtazapine but she would likely need to get off of her citalopram and trazodone to transition to mirtazapine for management of insomnia.  Ultimately seeing sleep medicine would be beneficial.  I encouraged her to follow-up with her primary care provider which she does have an appointment with coming up in June.  - Med Therapy Management Referral             BMI:   Estimated body mass index is 34.39 kg/m  as calculated from the following:    Height as of 3/10/23: 1.549 m (5' 1\").    Weight as of 3/30/23: 82.6 kg (182 lb).           ANDREA Monsalve CNP  [facility]    Subjective   Lo is a 72 year old, presenting for the following health issues:  Recheck Medication (Trazadone- wants something different) and Health Maintenance (Advised patient of .)        5/1/2023    11:30 AM   Additional Questions   Roomed by Sammie WAN   Accompanied by self     HPI     Medication Followup of trazadone    Taking Medication as prescribed: yes    Side " Effects:  Constant headaches, doesn't seem to be effective- up multiple time a night    Medication Helping Symptoms:  NO-  Would like a different medication.    She has been taking trazodone for a while at 150mg daily. She is wondering if there is something that can help with her insomnia. She has been noticing persistent headaches since January. She sees neurology for her headaches. She does Botox currently. She is seeing sleep medicine in a couple months. She is working on her sleep hygiene.   She does okay with sleep onset but will wake up in the middle of the night and this is the the hard time for her to fall back asleep.     She is on a lot of different medications for numerous chronic conditions.       Review of Systems   Constitutional, HEENT, cardiovascular, pulmonary, gi and gu systems are negative, except as otherwise noted.      Objective           Vitals:  No vitals were obtained today due to virtual visit.    Physical Exam   GENERAL: Healthy, alert and no distress  EYES: Eyes grossly normal to inspection.  No discharge or erythema, or obvious scleral/conjunctival abnormalities.  RESP: No audible wheeze, cough, or visible cyanosis.  No visible retractions or increased work of breathing.    SKIN: Visible skin clear. No significant rash, abnormal pigmentation or lesions.  NEURO: Cranial nerves grossly intact.  Mentation and speech appropriate for age.  PSYCH: Mentation appears normal, affect normal/bright, judgement and insight intact, normal speech and appearance well-groomed.                Video-Visit Details    Type of service:  Video Visit   Video Start Time: 4:54 PM  Video End Time:5:09 PM    Originating Location (pt. Location): Home    Distant Location (provider location):  On-site  Platform used for Video Visit: [employer]

## 2023-05-02 ENCOUNTER — RADIOLOGY INJECTION OFFICE VISIT (OUTPATIENT)
Dept: PHYSICAL MEDICINE AND REHAB | Facility: CLINIC | Age: 73
End: 2023-05-02
Attending: NURSE PRACTITIONER
Payer: MEDICARE

## 2023-05-02 VITALS
HEART RATE: 60 BPM | TEMPERATURE: 97.7 F | OXYGEN SATURATION: 98 % | DIASTOLIC BLOOD PRESSURE: 60 MMHG | RESPIRATION RATE: 18 BRPM | SYSTOLIC BLOOD PRESSURE: 116 MMHG

## 2023-05-02 DIAGNOSIS — M47.812 ARTHROPATHY OF CERVICAL FACET JOINT: ICD-10-CM

## 2023-05-02 DIAGNOSIS — G89.29 CHRONIC NECK PAIN: ICD-10-CM

## 2023-05-02 DIAGNOSIS — M54.2 CHRONIC NECK PAIN: ICD-10-CM

## 2023-05-02 PROCEDURE — 64491 INJ PARAVERT F JNT C/T 2 LEV: CPT | Mod: RT | Performed by: PAIN MEDICINE

## 2023-05-02 PROCEDURE — 64490 INJ PARAVERT F JNT C/T 1 LEV: CPT | Mod: RT | Performed by: PAIN MEDICINE

## 2023-05-02 RX ORDER — LIDOCAINE HYDROCHLORIDE 10 MG/ML
INJECTION, SOLUTION EPIDURAL; INFILTRATION; INTRACAUDAL; PERINEURAL
Status: COMPLETED | OUTPATIENT
Start: 2023-05-02 | End: 2023-05-02

## 2023-05-02 RX ADMIN — LIDOCAINE HYDROCHLORIDE 3 ML: 10 INJECTION, SOLUTION EPIDURAL; INFILTRATION; INTRACAUDAL; PERINEURAL at 11:32

## 2023-05-02 ASSESSMENT — PAIN SCALES - GENERAL
PAINLEVEL: MODERATE PAIN (5)
PAINLEVEL: NO PAIN (0)

## 2023-05-02 NOTE — PATIENT INSTRUCTIONS
DISCHARGE INSTRUCTIONS    During office hours (8:00 a.m.- 4:00 p.m.) questions or concerns may be answered  by calling [facility] Nurses at  [phone].  Messages received after hours will be returned the following business day.      In the case of an emergency, please dial 911 or seek assistance at the nearest Emergency Room/Urgent Care facility.     All Patients:  You may experience an increase in your symptoms for the first 2 days, once the numbing medication wears off.    You may resume your regular medication, no pain medication until you have completed your diary    You may shower. No swimming, tub bath or hot tub for 24 hours; remove bandage after 4 hours    Continue your activities that can cause you pain to test the blocks.                         You should not drive for the next 3 to 5 hours (have someone drive you)           POSSIBLE PROCEDURE SIDE EFFECTS  -Call Spine Center if concerned-    Increased Pain  Increased numbness/tingling     Nausea/Vomiting  Bruising/bleeding at site (hematoma)             Swelling at site (edema) Headache  Difficulty walking  Infection        Fever greater than 100.5

## 2023-05-09 ENCOUNTER — TELEPHONE (OUTPATIENT)
Dept: PHYSICAL MEDICINE AND REHAB | Facility: CLINIC | Age: 73
End: 2023-05-09
Payer: MEDICARE

## 2023-05-09 DIAGNOSIS — M47.812 CERVICAL SPONDYLOSIS WITHOUT MYELOPATHY: Primary | ICD-10-CM

## 2023-05-09 NOTE — TELEPHONE ENCOUNTER
Patient contacted to inform her that Reny Camejo CNP was recommending that she move forward with a right C5, C6, C7 medial branch radiofrequency ablation given the positive response she had to the medial branch blocks that were recently completed by Dr[facility].  The patient was in agreement with the recommendation and wished to proceed.    She is aware that she will be contacted to schedule the procedure once the prior authorization has been received from her insurance carrier.    The patient was encouraged to contact the [facility] if she had any questions or concerns in the meantime.  She verbalized understanding of the plan.

## 2023-05-14 DIAGNOSIS — E03.9 ACQUIRED HYPOTHYROIDISM: ICD-10-CM

## 2023-05-14 DIAGNOSIS — G47.00 INSOMNIA, UNSPECIFIED: ICD-10-CM

## 2023-05-14 RX ORDER — LEVOTHYROXINE SODIUM 88 UG/1
TABLET ORAL
Qty: 90 TABLET | Refills: 2 | Status: SHIPPED | OUTPATIENT
Start: 2023-05-14 | End: 2024-04-01

## 2023-05-14 NOTE — TELEPHONE ENCOUNTER
"Routing refill request to provider for review/approval because:  A break in medication    Last Written Prescription Date:  11/17/22  Last Fill Quantity: 270,  # refills: 1   Last office visit provider:   5/1/23    Requested Prescriptions   Pending Prescriptions Disp Refills     traZODone (DESYREL) 50 MG tablet [Pharmacy Med Name: TRAZODONE 50 MG TABLET] 270 tablet 1     Sig: TAKE 3 TABLETS BY MOUTH AT BEDTIME       Serotonin Modulators Passed - 5/14/2023  7:41 AM        Passed - Recent (12 mo) or future (30 days) visit within the authorizing provider's specialty     Patient has had an office visit with the authorizing provider or a provider within the authorizing providers department within the previous 12 mos or has a future within next 30 days. See \"Patient Info\" tab in inbasket, or \"Choose Columns\" in Meds & Orders section of the refill encounter.              Passed - Medication is active on med list        Passed - Patient is age 18 or older        Passed - No active pregnancy on record        Passed - No positive pregnancy test in past 12 months             Radha Durham RN 05/14/23 1:47 PM  "

## 2023-05-14 NOTE — TELEPHONE ENCOUNTER
"Last Written Prescription Date:  5/12/22  Last Fill Quantity: 90,  # refills: 3   Last office visit provider:   5/1/23    Requested Prescriptions   Pending Prescriptions Disp Refills     levothyroxine (SYNTHROID/LEVOTHROID) 88 MCG tablet [Pharmacy Med Name: LEVOTHYROXINE 88 MCG TABLET] 90 tablet 1     Sig: TAKE 1 TABLET BY MOUTH DAILY AT 6:00 AM.       Thyroid Protocol Passed - 5/14/2023  7:40 AM        Passed - Patient is 12 years or older        Passed - Recent (12 mo) or future (30 days) visit within the authorizing provider's specialty     Patient has had an office visit with the authorizing provider or a provider within the authorizing providers department within the previous 12 mos or has a future within next 30 days. See \"Patient Info\" tab in inbasket, or \"Choose Columns\" in Meds & Orders section of the refill encounter.              Passed - Medication is active on med list        Passed - Normal TSH on file in past 12 months     Recent Labs   Lab Test 12/13/22  1053   TSH 3.53              Passed - No active pregnancy on record     If patient is pregnant or has had a positive pregnancy test, please check TSH.          Passed - No positive pregnancy test in past 12 months     If patient is pregnant or has had a positive pregnancy test, please check TSH.               Radha Durham RN 05/14/23 1:44 PM  "

## 2023-05-15 RX ORDER — TRAZODONE HYDROCHLORIDE 50 MG/1
TABLET, FILM COATED ORAL
Qty: 270 TABLET | Refills: 1 | Status: SHIPPED | OUTPATIENT
Start: 2023-05-15 | End: 2023-06-13

## 2023-05-22 ENCOUNTER — TELEPHONE (OUTPATIENT)
Dept: CARDIOLOGY | Facility: CLINIC | Age: 73
End: 2023-05-22
Payer: MEDICARE

## 2023-05-22 NOTE — TELEPHONE ENCOUNTER
Per separate phone encounter from today, structural team addressing patient's request since patient is scheduled for LAAO implant 6-8-23.  mg

## 2023-05-22 NOTE — TELEPHONE ENCOUNTER
----- Message -----  From: Brenda Saldivar MD  Sent: 5/22/2023   1:23 PM CDT  To: Linda Washington RN; *  Subject: RE: Question about procedure before LAAC         Should be okay to schedule. I recommend calling her after this procedure to check on her.

## 2023-05-22 NOTE — TELEPHONE ENCOUNTER
Call placed to patient. She reports she has had this procedure prior. No aftercare, reports procedure is done with a needle and there are no restrictions after the procedure and she goes home post procedure the same day with no restrictions. Will update MD. -SC     ----- Message -----  From: Brenda Saldivar MD  Sent: 5/21/2023   7:02 AM CDT  To: Linda Washington RN; *  Subject: RE: Question about procedure before LAAC         Can we get details about the medication changes periprocedurally and post porcedure recovery please? Thanks  ----- Message -----  From: Linda Washington RN  Sent: 5/15/2023  10:18 AM CDT  To: Brenda Saldivar MD; *  Subject: Question about procedure before LAAC             Hi Dr. Saldivar-  Upon chart review, patient has a scheduled [facility] Spina Radiofrequency Ablation scheduled for 6/2/23. Will this interfere with her LAAC procedure on 6/8/23 with you?   Thank you,   ROSALBA Mckeon

## 2023-05-22 NOTE — TELEPHONE ENCOUNTER
[facility] Call Center    Phone Message    May a detailed message be left on voicemail: yes     Reason for Call: Other: Lo called requesting to speak to a member of her care team about getting an approval for a procedure done with [facility] in [place]. Lo says the fax should have alreadh been sent to her care team and her wants to follow up with them to make sure it has been received. Please reach out to Lo to discuss. Thank you!      Lo is having her procedure done with Dr. Fields their fax number is [phone]    Action Taken: Other: Cardiology    Travel Screening: Not Applicable    Thank you!  [facility]

## 2023-05-24 DIAGNOSIS — Z92.29 PERSONAL HISTORY OF OTHER DRUG THERAPY: ICD-10-CM

## 2023-05-24 DIAGNOSIS — M81.8 OTHER OSTEOPOROSIS WITHOUT CURRENT PATHOLOGICAL FRACTURE: Primary | ICD-10-CM

## 2023-06-02 ENCOUNTER — HEALTH MAINTENANCE LETTER (OUTPATIENT)
Age: 73
End: 2023-06-02

## 2023-06-02 ENCOUNTER — RADIOLOGY INJECTION OFFICE VISIT (OUTPATIENT)
Dept: PHYSICAL MEDICINE AND REHAB | Facility: CLINIC | Age: 73
End: 2023-06-02
Attending: NURSE PRACTITIONER
Payer: MEDICARE

## 2023-06-02 ENCOUNTER — TELEPHONE (OUTPATIENT)
Dept: CARDIOLOGY | Facility: CLINIC | Age: 73
End: 2023-06-02

## 2023-06-02 VITALS
OXYGEN SATURATION: 95 % | DIASTOLIC BLOOD PRESSURE: 70 MMHG | HEART RATE: 57 BPM | SYSTOLIC BLOOD PRESSURE: 126 MMHG | RESPIRATION RATE: 18 BRPM | TEMPERATURE: 97.2 F

## 2023-06-02 DIAGNOSIS — I48.0 PAROXYSMAL ATRIAL FIBRILLATION (H): Primary | Chronic | ICD-10-CM

## 2023-06-02 DIAGNOSIS — M47.812 CERVICAL SPONDYLOSIS WITHOUT MYELOPATHY: ICD-10-CM

## 2023-06-02 PROCEDURE — 64633 DESTROY CERV/THOR FACET JNT: CPT | Mod: RT | Performed by: PAIN MEDICINE

## 2023-06-02 PROCEDURE — 64634 DESTROY C/TH FACET JNT ADDL: CPT | Mod: RT | Performed by: PAIN MEDICINE

## 2023-06-02 RX ORDER — BUPIVACAINE HYDROCHLORIDE 2.5 MG/ML
INJECTION, SOLUTION EPIDURAL; INFILTRATION; INTRACAUDAL
Status: COMPLETED | OUTPATIENT
Start: 2023-06-02 | End: 2023-06-02

## 2023-06-02 RX ORDER — LIDOCAINE HYDROCHLORIDE 10 MG/ML
INJECTION, SOLUTION EPIDURAL; INFILTRATION; INTRACAUDAL; PERINEURAL
Status: COMPLETED | OUTPATIENT
Start: 2023-06-02 | End: 2023-06-02

## 2023-06-02 RX ADMIN — LIDOCAINE HYDROCHLORIDE 3 ML: 10 INJECTION, SOLUTION EPIDURAL; INFILTRATION; INTRACAUDAL; PERINEURAL at 10:56

## 2023-06-02 RX ADMIN — BUPIVACAINE HYDROCHLORIDE 3 ML: 2.5 INJECTION, SOLUTION EPIDURAL; INFILTRATION; INTRACAUDAL at 10:56

## 2023-06-02 ASSESSMENT — PAIN SCALES - GENERAL
PAINLEVEL: SEVERE PAIN (6)
PAINLEVEL: SEVERE PAIN (7)

## 2023-06-02 NOTE — PATIENT INSTRUCTIONS
Please follow up in four weeks post procedure with your ordering provider to evaluate your plan of care.      DISCHARGE INSTRUCTIONS    During office hours (8:00 a.m.-4:00 p.m.) questions or concerns may be answered  by calling [facility] Navigation Nurses at  [phone].  Messages received after hours will be returned the following business day.      In the case of an emergency,please dial 911 or seek assistance at the nearest Emergency Room/Urgent Care facility.     All Patients:  You may experience an increase in your symptoms forthe first 5-7 days. Soreness may persist during the first few weeks as it takes 4-6 weeks for the nerves to fully heal.    You may use ice on the injection site,as frequently as 20 minutes each hour if needed. It is recommended NOT to apply heat during the first 24 hours.    You may take your pain medicine.    You may continue taking your regular medication.    You may shower. No swimming, tub bath or hot tub for 48hours. This also includes no lotions, ointments or patches to the needle sites as well. You may remove your bandaid/bandage as soon as you are home.    You mayresume light activities, as tolerated.    Resume your usual diet as tolerated.    It is strongly advisedthat you do not drive for 1-3 hours post injection.    If you have had oral sedation:  Do not drive for 8 hours post injection.             POSSIBLE PROCEDURE SIDE EFFECTS    -Call the Spine Center if you are concerned    IncreasedPain           Increased numbness/tingling      Nausea/Vomiting          Bruising/bleeding at site      Redness or swelling  Difficulty walking      Weakness          Fever greater than 100.5

## 2023-06-04 LAB
ABO/RH(D): NORMAL
ANTIBODY SCREEN: NEGATIVE
SPECIMEN EXPIRATION DATE: NORMAL

## 2023-06-05 ENCOUNTER — APPOINTMENT (OUTPATIENT)
Dept: CARDIOLOGY | Facility: CLINIC | Age: 73
End: 2023-06-05
Payer: MEDICARE

## 2023-06-05 ENCOUNTER — OFFICE VISIT (OUTPATIENT)
Dept: CARDIOLOGY | Facility: CLINIC | Age: 73
End: 2023-06-05
Payer: MEDICARE

## 2023-06-05 ENCOUNTER — LAB (OUTPATIENT)
Dept: CARDIOLOGY | Facility: CLINIC | Age: 73
End: 2023-06-05
Payer: MEDICARE

## 2023-06-05 VITALS
HEIGHT: 61 IN | DIASTOLIC BLOOD PRESSURE: 62 MMHG | BODY MASS INDEX: 33.79 KG/M2 | HEART RATE: 64 BPM | WEIGHT: 179 LBS | SYSTOLIC BLOOD PRESSURE: 116 MMHG | RESPIRATION RATE: 16 BRPM

## 2023-06-05 DIAGNOSIS — E78.5 DYSLIPIDEMIA, GOAL LDL BELOW 70: Chronic | ICD-10-CM

## 2023-06-05 DIAGNOSIS — K21.9 GASTROESOPHAGEAL REFLUX DISEASE WITHOUT ESOPHAGITIS: Chronic | ICD-10-CM

## 2023-06-05 DIAGNOSIS — I25.83 CORONARY ARTERIOSCLEROSIS DUE TO LIPID RICH PLAQUE: ICD-10-CM

## 2023-06-05 DIAGNOSIS — M79.7 FIBROMYALGIA: Chronic | ICD-10-CM

## 2023-06-05 DIAGNOSIS — I48.0 PAROXYSMAL ATRIAL FIBRILLATION (H): Primary | Chronic | ICD-10-CM

## 2023-06-05 DIAGNOSIS — I10 ESSENTIAL HYPERTENSION: Chronic | ICD-10-CM

## 2023-06-05 DIAGNOSIS — F41.1 GENERALIZED ANXIETY DISORDER: Chronic | ICD-10-CM

## 2023-06-05 DIAGNOSIS — I48.0 PAROXYSMAL ATRIAL FIBRILLATION (H): ICD-10-CM

## 2023-06-05 LAB
ANION GAP SERPL CALCULATED.3IONS-SCNC: 9 MMOL/L (ref 7–15)
BUN SERPL-MCNC: 17.7 MG/DL (ref 8–23)
CALCIUM SERPL-MCNC: 9.3 MG/DL (ref 8.8–10.2)
CHLORIDE SERPL-SCNC: 102 MMOL/L (ref 98–107)
CREAT SERPL-MCNC: 0.81 MG/DL (ref 0.51–0.95)
DEPRECATED HCO3 PLAS-SCNC: 28 MMOL/L (ref 22–29)
ERYTHROCYTE [DISTWIDTH] IN BLOOD BY AUTOMATED COUNT: 14.1 % (ref 10–15)
GFR SERPL CREATININE-BSD FRML MDRD: 76 ML/MIN/1.73M2
GLUCOSE SERPL-MCNC: 92 MG/DL (ref 70–99)
HCT VFR BLD AUTO: 28.6 % (ref 35–47)
HGB BLD-MCNC: 9.1 G/DL (ref 11.7–15.7)
MCH RBC QN AUTO: 26.2 PG (ref 26.5–33)
MCHC RBC AUTO-ENTMCNC: 31.8 G/DL (ref 31.5–36.5)
MCV RBC AUTO: 82 FL (ref 78–100)
PLATELET # BLD AUTO: 336 10E3/UL (ref 150–450)
POTASSIUM SERPL-SCNC: 3.7 MMOL/L (ref 3.4–5.3)
RBC # BLD AUTO: 3.47 10E6/UL (ref 3.8–5.2)
SODIUM SERPL-SCNC: 139 MMOL/L (ref 136–145)
WBC # BLD AUTO: 4.7 10E3/UL (ref 4–11)

## 2023-06-05 PROCEDURE — 86850 RBC ANTIBODY SCREEN: CPT

## 2023-06-05 PROCEDURE — 86900 BLOOD TYPING SEROLOGIC ABO: CPT

## 2023-06-05 PROCEDURE — 86901 BLOOD TYPING SEROLOGIC RH(D): CPT

## 2023-06-05 PROCEDURE — 36415 COLL VENOUS BLD VENIPUNCTURE: CPT

## 2023-06-05 PROCEDURE — 99215 OFFICE O/P EST HI 40 MIN: CPT | Performed by: INTERNAL MEDICINE

## 2023-06-05 PROCEDURE — 80048 BASIC METABOLIC PNL TOTAL CA: CPT

## 2023-06-05 PROCEDURE — 85027 COMPLETE CBC AUTOMATED: CPT

## 2023-06-05 PROCEDURE — 93000 ELECTROCARDIOGRAM COMPLETE: CPT | Performed by: INTERNAL MEDICINE

## 2023-06-05 NOTE — LETTER
6/5/2023    Bernadette Taylor MD  [address] [place] [address]    RE: Suma WEEKS Jas       Dear Colleague,     I had the pleasure of seeing Suma Mark in the [facility].  HEART CARE ENCOUNTER NOTE       M [facility]  [phone]      Assessment/Recommendations   1.  Paroxysmal atrial fibrillation -plan was for watchman implant this Thursday.  See #3 below, but will cancel watchman implant for now and plan on checking coronaries prior.  Plan was discussed with watchman team, Dr. Navas (in Dr. Worley's absence) and patient     2.  Hypertension - BP today is at goal.   For now, patient should continue taking amlodipine 5 mg daily, irbesartan 150 mg daily, Imdur 30 mg daily and furosemide 40 mg daily     3. CAD - s/p PCI with EUGENIA to OM2 on 10/12/22 (Reyna/Michelle) and PCI with EUGENIA to dLAD on 11/8/22 (Davin).  She is on Plavix 75 mg daily.    She is having episodes of dyspnea with chest pressure, mild diaphoresis and nausea.  These episodes were present in February/March, but have continued to progress.  She has not taken any of her nitroglycerin, but normally just sits down and rests and the symptoms go away.      I will discuss with Dr. Jin, but after discussion with Dr. Navas, will likely move directly to repeat angiography.  For now she should continue beta-blocker and statin     4.  Dyslipidemia, goal LDL less than 70 -continue current dose of atorvastatin.  LDL in November was 68     5.  Chronic diastolic heart failure, NYHA class II -seems currently compensated.  Continue furosemide 40 mg daily.  Continue adequate blood pressure management.    6.  Anemia -unknown etiology.  Hemoglobin in January was 11.9.  Today hemoglobin is 9.1.     History of Present Illness/Subjective    Suma Mark is a 72 year old female who comes in today for history and physical prior to insertion of left atrial appendage occulsion device.    Suma Mark has a past  history of coronary artery disease status post EUGENIA to OM2 (extending back to Lcx) on 10/12/22 and EUGENIA to distal LAD on 11/8/22, primary hypertension, dyslipidemia, chronic congestive heart failure with preserved ejection fraction, paroxysmal atrial fibrillation, obesity.     She presented to the ED at [facility] in early October.  Apparently in the ED, she had 2 episodes of atrial fibrillation that were brief.  She was admitted and then transferred to [facility] where she underwent coronary angiogram and this showed multivessel coronary disease.  She had PCI to OM 2 on October 12.  She was sent home with a 2-week event monitor that showed episodes of atrial fibrillation with RVR and when she was seen in follow-up in the clinic, she was started on Eliquis.  She continued to have episodes of chest pressure and was brought back in for staged PCI to the distal LAD on November 8.  She was referred to EP and saw Shayan Gr on December 6 who changed her to sotalol.  She then followed up with Dr. Worley in early January and complained of continued episodes of palpitations with chest pressure.  Another 2-week event monitor showed episodes of atrial fibrillation, but because it was not a mobile telemetry, could not quantify the percentage/burden of atrial fibrillation.     She saw me in late February for consultation regarding the watchman procedure.  She then saw Dr. Best marie about a month later and continued to complain of dyspnea/chest pressure.  He recommended that if these episodes continue that she should call.  She says that the episodes have continued to progress, but she did not let anybody know.  She endorses flushing and nausea when she gets her episodes    Suma Mark denies palpitations,  paroxysmal nocturnal dyspnea, orthopnea, lightheadedness, dizziness, pre-syncope, or syncope, weight loss, changes in appetite or vomiting.     Medical, surgical, family, social history, and medications were  "reviewed and updated as necessary.    CT pulmonary vein study from 3/28/2023:  1.  Measurement of left atrial appendage at 35% cardiac phase and LCx bifurcation:  Circumferential area of 563 mm   Perimeter: 85.8 mm  Measured diameters 23 x 30 mm  Average diameter: 27 mm  Useful depth: 31 mm  Maximal depth: 42 mm     2.  No thrombus is identified in left atrial appendage.  Left atrium is mildly dilated.     3.  Coronary artery disease, status post stent placement in LCx and distal LAD.  Not well visualized stents.      EKG (personally reviewed and interpreted):  NSR with no ST changes     Physical Examination Review of Systems   Vitals: /62 (BP Location: Left arm, Patient Position: Sitting, Cuff Size: Adult Large)   Pulse 64   Resp 16   Ht 1.549 m (5' 1\")   Wt 81.2 kg (179 lb)   LMP  (LMP Unknown)   BMI 33.82 kg/m    BMI= Body mass index is 33.82 kg/m .  Wt Readings from Last 3 Encounters:   06/05/23 81.2 kg (179 lb)   03/30/23 82.6 kg (182 lb)   03/10/23 80.3 kg (177 lb)       General Appearance:   Alert, cooperative and in no acute distress   ENT/Mouth: membranes moist, no oral lesions or bleeding gums.      EYES:  no scleral icterus, normal conjunctivae   Neck: Thyroid not visualized   Chest/Lungs:   lungs are clear to auscultation, no rales or wheezing   Cardiovascular:   Regular . Normal first and second heart sounds with no murmurs, rubs or gallops; the carotid, radial and posterior tibial pulses are intact, no edema bilaterally    Abdomen:  Soft and nontender. Bowel sounds are present in all quadrants   Extremities: no cyanosis or clubbing   Skin: no xanthelasma, warm.    Neurologic: normal gait, normal  bilateral, no tremors   Psychiatric: Normal mood and affect       Please refer above for cardiac ROS details.      Medical History  Surgical History Family History Social History   Past Medical History:   Diagnosis Date    Anxiety state, unspecified     Back pain     Diverticulitis 12/07/2017 "    DJD (degenerative joint disease)     Essential hypertension, benign     Fibromyalgia     Gastro-oesophageal reflux disease     Hernia, ventral 04/27/2017    History of anesthesia complications     hypotension after surgery    History of blood transfusion     Hyperlipidemia     Hyponatremia 12/07/2017    Major depression     Migraine     Osteopenia     PONV (postoperative nausea and vomiting)     Posttraumatic stress disorder     Raynaud's disease     Restless leg syndrome     S/P total knee replacement 11/27/2017    Thrombosis of leg     with pregnancy    Unspecified hypothyroidism      Past Surgical History:   Procedure Laterality Date    ARTHROPLASTY KNEE UNICOMPARTMENT  10/31/2013    Procedure: ARTHROPLASTY KNEE UNICOMPARTMENT;  LEFT KNEE UNICOMPARTMENTAL ARTHROPLASTY (CAROLINE)^;  Surgeon: Chi Mittal MD;  Location:  OR    BREAST SURGERY      bx    COLECTOMY N/A 06/02/2017    Procedure: RESECTION, SMALL INTESTINE, OPEN ADHESIOLYSIS;  Surgeon: Keyon Qureshi MD;  Location: [facility];  Service:     CV CORONARY ANGIOGRAM N/A 10/12/2022    Procedure: CV CORONARY ANGIOGRAM;  Surgeon: You Martinez MD;  Location: [facility]    CV FRACTIONAL FLOW RATIO WIRE N/A 10/12/2022    Procedure: Fractional Flow Ratio Wire;  Surgeon: You Martinez MD;  Location: [facility]    CV PCI STENT DRUG ELUTING N/A 10/12/2022    Procedure: Percutaneous Coronary Intervention Stent;  Surgeon: You Martinez MD;  Location: [facility] CV    CV PCI STENT DRUG ELUTING N/A 11/08/2022    Procedure: Percutaneous Coronary Intervention - Stent;  Surgeon: Bret Jin MD;  Location: [facility] CV    ENDOSCOPIC RETROGRADE CHOLANGIOPANCREATOGRAPHY      ENDOSCOPIC STRIPPING VEIN(S)      BILATERLY    GENITOURINARY SURGERY      bladder sling    GI SURGERY  10/02/2015    Rectal prolaspse. s/p Abdominal rectopexy, sacral colpopexy, proctoscopy, cystoscopy    HERNIORRHAPHY INCISIONAL  (LOCATION) N/A 06/02/2017    Procedure: LAPARASCOPIC CONVERTED TO OPEN PRIMARY INCISIONAL HERNIA REPAIR;  Surgeon: Keyon Qureshi MD;  Location: [facility] Main OR;  Service:     HYSTERECTOMY  1985 1985-1986    LAMINECTOMY LUMBAR TWO LEVELS  2006    LAPAROSCOPY N/A 08/22/2019    Procedure: DIAGNOSTIC LAPAROSCOPY, LYSIS OF ADHESION;  Surgeon: Svetlana Ron MD;  Location: [facility] Main OR;  Service: General    LUMBAR HARDWARE REMOVAL[  03/22/2012    REMOVAL LUMBAR HARDWARE 03/22/2012   L3-S1; Type CD Horizon m8 instrumentation Dr. Murray    RELEASE CARPAL TUNNEL      TONSILLECTOMY  1954    ???    TOTAL KNEE ARTHROPLASTY Right 11/27/2017    Procedure:  RIGHT TOTAL KNEE ARTHROPLASTY;  Surgeon: Kevin Ryder MD;  Location: [facility];  Service: Orthopedics     THYROID BIOPSY  04/19/2019     Family History   Problem Relation Age of Onset    Dementia Mother     Arthritis Mother     Chronic Obstructive Pulmonary Disease Father     Cardiovascular Father     Hypertension Father     No Known Problems Sister     No Known Problems Daughter     No Known Problems Son     Cerebrovascular Disease Maternal Grandmother     Heart Disease Paternal Grandmother     Cerebrovascular Disease Paternal Grandmother     No Known Problems Sister     No Known Problems Sister     No Known Problems Son     No Known Problems Daughter     Breast Cancer Paternal Aunt         age in 50's    Social History     Socioeconomic History    Marital status:      Spouse name: Not on file    Number of children: Not on file    Years of education: Not on file    Highest education level: Not on file   Occupational History    Occupation: , occasionally still subs     Employer: RETIRED   Tobacco Use    Smoking status: Never     Passive exposure: Never    Smokeless tobacco: Never   Vaping Use    Vaping status: Never Used     Passive vaping exposure: Yes   Substance and Sexual Activity    Alcohol use: Yes     Comment:  Alcoholic Drinks/day: 1 cocktail daily    Drug use: No    Sexual activity: Not on file   Other Topics Concern    Not on file   Social History Narrative    Not on file     Social Determinants of Health     Financial Resource Strain: Not on file   Food Insecurity: Not on file   Transportation Needs: Not on file   Physical Activity: Not on file   Stress: Not on file   Social Connections: Not on file   Intimate Partner Violence: Not on file   Housing Stability: Not on file          Medications  Allergies   Current Outpatient Medications   Medication Sig Dispense Refill    amLODIPine (NORVASC) 5 MG tablet TAKE 1 TABLET BY MOUTH EVERYDAY AT BEDTIME 90 tablet 1    apixaban ANTICOAGULANT (ELIQUIS ANTICOAGULANT) 5 MG tablet Take 1 tablet (5 mg) by mouth every 12 hours 180 tablet 3    atorvastatin (LIPITOR) 80 MG tablet Take 1 tablet (80 mg) by mouth At Bedtime 90 tablet 3    botulinum toxin type A (BOTOX) 100 units injection Every 3 months. [facility]      busPIRone (BUSPAR) 10 MG tablet Take 1 tablet (10 mg) by mouth 2 times daily 180 tablet 3    cholecalciferol 50 MCG (2000 UT) CAPS Take 2,000 Units by mouth daily      citalopram (CELEXA) 20 MG tablet Take 2 tablets (40 mg) by mouth daily 180 tablet 2    clopidogrel (PLAVIX) 75 MG tablet Take 1 tablet (75 mg) by mouth daily 90 tablet 3    diclofenac (VOLTAREN) 1 % topical gel Apply 2-4 g topically 4 times daily 150 g 3    eletriptan (RELPAX) 40 MG tablet Take 1 tablet (40 mg) by mouth at onset of headache for migraine 10 tablet 0    furosemide (LASIX) 20 MG tablet Take 2 tablets (40 mg) by mouth daily 180 tablet 3    HYDROcodone-acetaminophen (NORCO) 5-325 MG tablet Take 1 tablet by mouth daily as needed (Severe headache) 5 tablet 0    irbesartan (AVAPRO) 150 MG tablet Take 1 tablet (150 mg) by mouth daily 90 tablet 3    isosorbide mononitrate (IMDUR) 30 MG 24 hr tablet Take 1 tablet (30 mg) by mouth daily 30 tablet 11    levothyroxine (SYNTHROID/LEVOTHROID) 88 MCG  "tablet TAKE 1 TABLET BY MOUTH DAILY AT 6:00 AM. 90 tablet 2    LORazepam (ATIVAN) 0.5 MG tablet Take 1 tablet (0.5 mg) by mouth every 6 hours as needed for anxiety 30 tablet 0    nitroGLYcerin (NITROSTAT) 0.4 MG sublingual tablet For chest pain place 1 tablet under the tongue every 5 minutes for 3 doses. If symptoms persist 5 minutes after 1st dose call 911. 30 tablet 1    pramipexole (MIRAPEX) 0.25 MG tablet TAKE 1 TABLET BY MOUTH THREE TIMES A DAY. 270 tablet 3    sotalol (BETAPACE) 80 MG tablet TAKE 1 TABLET BY MOUTH EVERY 12 HOURS 60 tablet 3    traZODone (DESYREL) 50 MG tablet TAKE 3 TABLETS BY MOUTH AT BEDTIME 270 tablet 1    ubrogepant (UBRELVY) 100 MG tablet UBRELVY 100 MG TABS      zolpidem (AMBIEN) 5 MG tablet Take tablet by mouth 15 minutes prior to sleep, for Sleep Study 1 tablet 0    Allergies   Allergen Reactions    Cephalexin Nausea and Vomiting    Ace Inhibitors Other (See Comments)     Elevates blood pressure    Amitriptyline Hcl Other (See Comments)     elevates blood pressure    Atenolol Other (See Comments)     Aggravates reynauds    Celebrex [Celecoxib] GI Disturbance    Cephalosporins Unknown     Pt doesn't remember       Clonidine Unknown     \"got very ill in ER\"    Codeine Sulfate Nausea and Vomiting    Darvocet [Propoxyphene N-Apap] Nausea and Vomiting    Dexamethasone Acetate Swelling    Erythromycin Nausea and Vomiting    Escitalopram Other (See Comments)     Headaches.      Gabapentin Unknown     \"Spotted\"    Hctz Unknown     \"depletes my sodium\"    Potassium GI Disturbance    Propoxyphene      HUT Reaction: Gastrointestinal; HUT Reaction: Nausea And Vomiting; HUT Noted: [id number]    Sodium     Tramadol Swelling     Swelling of tongue and face    Tramadol-Acetaminophen Other (See Comments)    Triamterene     Venlafaxine Nausea and Vomiting and Diarrhea    Zolpidem Other (See Comments) and Unknown     Pt doesn't rember      Zolpidem Tartrate Unknown    Bacitracin Itching and Rash    " Sulfanilamide Rash         Lab Results    Chemistry/lipid CBC Cardiac Enzymes/BNP/TSH/INR   Recent Labs   Lab Test 11/15/22  0800   CHOL 132   HDL 43*   LDL 68   TRIG 106     Recent Labs   Lab Test 11/15/22  0800 06/08/22  0905 06/10/21  1454   LDL 68 95 70     Recent Labs   Lab Test 02/24/23  0830      POTASSIUM 4.1   CHLORIDE 101   CO2 28   GLC 98   BUN 19   CR 0.83   GFRESTIMATED 74   ISSA 9.5     Recent Labs   Lab Test 02/24/23  0830 01/09/23  1403 12/13/22  1053   CR 0.83 1.00 0.85     No results for input(s): A1C in the last 40964 hours. Recent Labs   Lab Test 01/09/23  1403   WBC 6.9   HGB 11.9   HCT 36.9   MCV 90        Recent Labs   Lab Test 01/09/23  1403 11/27/22  2315 11/07/22  0959   HGB 11.9 12.1 12.2    Recent Labs   Lab Test 11/28/22  0139 11/27/22  2315 10/12/22  0354   TROPONINI 0.02 0.01 0.03     Recent Labs   Lab Test 11/27/22  2315        Recent Labs   Lab Test 12/13/22  1053   TSH 3.53     Recent Labs   Lab Test 11/27/22  2315   INR 1.07        44 minutes spent on the date of encounter doing education, chart prep/review, review of test results, interpretation with above tests, patient visit, documentation and discussion with other provider.      This note has been dictated using voice recognition software. Any grammatical or context distortions are unintentional and inherent to the software.            Thank you for allowing me to participate in the care of your patient.      Sincerely,     SIGIFREDO GAMBOA PA-C     M [facility]  cc:   No referring provider defined for this encounter.

## 2023-06-05 NOTE — H&P (VIEW-ONLY)
HEART CARE ENCOUNTER NOTE       [facility]  [phone]      Assessment/Recommendations   1.  Paroxysmal atrial fibrillation -plan was for watchman implant this Thursday.  See #3 below, but will cancel watchman implant for now and plan on checking coronaries prior.  Plan was discussed with watchman team, Dr. Navas (in Dr. Worley's absence) and patient     2.  Hypertension - BP today is at goal.   For now, patient should continue taking amlodipine 5 mg daily, irbesartan 150 mg daily, Imdur 30 mg daily and furosemide 40 mg daily     3. CAD - s/p PCI with EUGENIA to OM2 on 10/12/22 (Reyna/Michelle) and PCI with EUGENIA to dLAD on 11/8/22 (Davin).  She is on Plavix 75 mg daily.    She is having episodes of dyspnea with chest pressure, mild diaphoresis and nausea.  These episodes were present in February/March, but have continued to progress.  She has not taken any of her nitroglycerin, but normally just sits down and rests and the symptoms go away.      I will discuss with Dr. Jin, but after discussion with Dr. Navas, will likely move directly to repeat angiography.  For now she should continue beta-blocker and statin     4.  Dyslipidemia, goal LDL less than 70 -continue current dose of atorvastatin.  LDL in November was 68     5.  Chronic diastolic heart failure, NYHA class II -seems currently compensated.  Continue furosemide 40 mg daily.  Continue adequate blood pressure management.    6.  Anemia -unknown etiology.  Hemoglobin in January was 11.9.  Today hemoglobin is 9.1.     History of Present Illness/Subjective    Suma Mark is a 72 year old female who comes in today for history and physical prior to insertion of left atrial appendage occulsion device.    Suma Mark has a past history of coronary artery disease status post EUGENIA to OM2 (extending back to Lcx) on 10/12/22 and EUGENIA to distal LAD on 11/8/22, primary hypertension, dyslipidemia, chronic congestive heart failure with  preserved ejection fraction, paroxysmal atrial fibrillation, obesity.     She presented to the ED at [facility] in early October.  Apparently in the ED, she had 2 episodes of atrial fibrillation that were brief.  She was admitted and then transferred to [facility] where she underwent coronary angiogram and this showed multivessel coronary disease.  She had PCI to OM 2 on October 12.  She was sent home with a 2-week event monitor that showed episodes of atrial fibrillation with RVR and when she was seen in follow-up in the clinic, she was started on Eliquis.  She continued to have episodes of chest pressure and was brought back in for staged PCI to the distal LAD on November 8.  She was referred to EP and saw Shayan Gr on December 6 who changed her to sotalol.  She then followed up with Dr. Worley in early January and complained of continued episodes of palpitations with chest pressure.  Another 2-week event monitor showed episodes of atrial fibrillation, but because it was not a mobile telemetry, could not quantify the percentage/burden of atrial fibrillation.     She saw me in late February for consultation regarding the watchman procedure.  She then saw Dr. Best marie about a month later and continued to complain of dyspnea/chest pressure.  He recommended that if these episodes continue that she should call.  She says that the episodes have continued to progress, but she did not let anybody know.  She endorses flushing and nausea when she gets her episodes    Suma T Jas denies palpitations,  paroxysmal nocturnal dyspnea, orthopnea, lightheadedness, dizziness, pre-syncope, or syncope, weight loss, changes in appetite or vomiting.     Medical, surgical, family, social history, and medications were reviewed and updated as necessary.    CT pulmonary vein study from 3/28/2023:  1.  Measurement of left atrial appendage at 35% cardiac phase and LCx bifurcation:  Circumferential area of 563 mm   Perimeter:  "85.8 mm  Measured diameters 23 x 30 mm  Average diameter: 27 mm  Useful depth: 31 mm  Maximal depth: 42 mm     2.  No thrombus is identified in left atrial appendage.  Left atrium is mildly dilated.     3.  Coronary artery disease, status post stent placement in LCx and distal LAD.  Not well visualized stents.      EKG (personally reviewed and interpreted):  NSR with no ST changes     Physical Examination Review of Systems   Vitals: /62 (BP Location: Left arm, Patient Position: Sitting, Cuff Size: Adult Large)   Pulse 64   Resp 16   Ht 1.549 m (5' 1\")   Wt 81.2 kg (179 lb)   LMP  (LMP Unknown)   BMI 33.82 kg/m    BMI= Body mass index is 33.82 kg/m .  Wt Readings from Last 3 Encounters:   06/05/23 81.2 kg (179 lb)   03/30/23 82.6 kg (182 lb)   03/10/23 80.3 kg (177 lb)       General Appearance:   Alert, cooperative and in no acute distress   ENT/Mouth: membranes moist, no oral lesions or bleeding gums.      EYES:  no scleral icterus, normal conjunctivae   Neck: Thyroid not visualized   Chest/Lungs:   lungs are clear to auscultation, no rales or wheezing   Cardiovascular:   Regular . Normal first and second heart sounds with no murmurs, rubs or gallops; the carotid, radial and posterior tibial pulses are intact, no edema bilaterally    Abdomen:  Soft and nontender. Bowel sounds are present in all quadrants   Extremities: no cyanosis or clubbing   Skin: no xanthelasma, warm.    Neurologic: normal gait, normal  bilateral, no tremors   Psychiatric: Normal mood and affect       Please refer above for cardiac ROS details.      Medical History  Surgical History Family History Social History   Past Medical History:   Diagnosis Date     Anxiety state, unspecified      Back pain      Diverticulitis 12/07/2017     DJD (degenerative joint disease)      Essential hypertension, benign      Fibromyalgia      Gastro-oesophageal reflux disease      Hernia, ventral 04/27/2017     History of anesthesia complications     " hypotension after surgery     History of blood transfusion      Hyperlipidemia      Hyponatremia 12/07/2017     Major depression      Migraine      Osteopenia      PONV (postoperative nausea and vomiting)      Posttraumatic stress disorder      Raynaud's disease      Restless leg syndrome      S/P total knee replacement 11/27/2017     Thrombosis of leg     with pregnancy     Unspecified hypothyroidism      Past Surgical History:   Procedure Laterality Date     ARTHROPLASTY KNEE UNICOMPARTMENT  10/31/2013    Procedure: ARTHROPLASTY KNEE UNICOMPARTMENT;  LEFT KNEE UNICOMPARTMENTAL ARTHROPLASTY (CAROLINE)^;  Surgeon: Chi Mittal MD;  Location:  OR     BREAST SURGERY      bx     COLECTOMY N/A 06/02/2017    Procedure: RESECTION, SMALL INTESTINE, OPEN ADHESIOLYSIS;  Surgeon: Keyon Qureshi MD;  Location: [facility];  Service:      CV CORONARY ANGIOGRAM N/A 10/12/2022    Procedure: CV CORONARY ANGIOGRAM;  Surgeon: You Martinez MD;  Location: [facility] CV     CV FRACTIONAL FLOW RATIO WIRE N/A 10/12/2022    Procedure: Fractional Flow Ratio Wire;  Surgeon: You Martinez MD;  Location: [facility] CV     CV PCI STENT DRUG ELUTING N/A 10/12/2022    Procedure: Percutaneous Coronary Intervention Stent;  Surgeon: You Martinez MD;  Location: [facility] CV     CV PCI STENT DRUG ELUTING N/A 11/08/2022    Procedure: Percutaneous Coronary Intervention - Stent;  Surgeon: Bret Jin MD;  Location: [facility] CV     ENDOSCOPIC RETROGRADE CHOLANGIOPANCREATOGRAPHY       ENDOSCOPIC STRIPPING VEIN(S)      BILATERLY     GENITOURINARY SURGERY      bladder sling     GI SURGERY  10/02/2015    Rectal prolaspse. s/p Abdominal rectopexy, sacral colpopexy, proctoscopy, cystoscopy     HERNIORRHAPHY INCISIONAL (LOCATION) N/A 06/02/2017    Procedure: LAPARASCOPIC CONVERTED TO OPEN PRIMARY INCISIONAL HERNIA REPAIR;  Surgeon: Keyon Qureshi MD;  Location: [facility];  Service:       HYSTERECTOMY  1985 1985-1986     LAMINECTOMY LUMBAR TWO LEVELS  2006     LAPAROSCOPY N/A 08/22/2019    Procedure: DIAGNOSTIC LAPAROSCOPY, LYSIS OF ADHESION;  Surgeon: Svetlana Ron MD;  Location: [facility];  Service: General     LUMBAR HARDWARE REMOVAL[  03/22/2012    REMOVAL LUMBAR HARDWARE 03/22/2012   L3-S1; Type CD Horizon m8 instrumentation Dr. Murray     RELEASE CARPAL TUNNEL       TONSILLECTOMY  1954    ???     TOTAL KNEE ARTHROPLASTY Right 11/27/2017    Procedure:  RIGHT TOTAL KNEE ARTHROPLASTY;  Surgeon: Kevin Ryder MD;  Location: [facility];  Service: Orthopedics     US THYROID BIOPSY  04/19/2019     Family History   Problem Relation Age of Onset     Dementia Mother      Arthritis Mother      Chronic Obstructive Pulmonary Disease Father      Cardiovascular Father      Hypertension Father      No Known Problems Sister      No Known Problems Daughter      No Known Problems Son      Cerebrovascular Disease Maternal Grandmother      Heart Disease Paternal Grandmother      Cerebrovascular Disease Paternal Grandmother      No Known Problems Sister      No Known Problems Sister      No Known Problems Son      No Known Problems Daughter      Breast Cancer Paternal Aunt         age in 50's    Social History     Socioeconomic History     Marital status:      Spouse name: Not on file     Number of children: Not on file     Years of education: Not on file     Highest education level: Not on file   Occupational History     Occupation: , occasionally still subs     Employer: RETIRED   Tobacco Use     Smoking status: Never     Passive exposure: Never     Smokeless tobacco: Never   Vaping Use     Vaping status: Never Used     Passive vaping exposure: Yes   Substance and Sexual Activity     Alcohol use: Yes     Comment: Alcoholic Drinks/day: 1 cocktail daily     Drug use: No     Sexual activity: Not on file   Other Topics Concern     Not on file   Social History  Narrative     Not on file     Social Determinants of Health     Financial Resource Strain: Not on file   Food Insecurity: Not on file   Transportation Needs: Not on file   Physical Activity: Not on file   Stress: Not on file   Social Connections: Not on file   Intimate Partner Violence: Not on file   Housing Stability: Not on file          Medications  Allergies   Current Outpatient Medications   Medication Sig Dispense Refill     amLODIPine (NORVASC) 5 MG tablet TAKE 1 TABLET BY MOUTH EVERYDAY AT BEDTIME 90 tablet 1     apixaban ANTICOAGULANT (ELIQUIS ANTICOAGULANT) 5 MG tablet Take 1 tablet (5 mg) by mouth every 12 hours 180 tablet 3     atorvastatin (LIPITOR) 80 MG tablet Take 1 tablet (80 mg) by mouth At Bedtime 90 tablet 3     botulinum toxin type A (BOTOX) 100 units injection Every 3 months. [facility]       busPIRone (BUSPAR) 10 MG tablet Take 1 tablet (10 mg) by mouth 2 times daily 180 tablet 3     cholecalciferol 50 MCG (2000 UT) CAPS Take 2,000 Units by mouth daily       citalopram (CELEXA) 20 MG tablet Take 2 tablets (40 mg) by mouth daily 180 tablet 2     clopidogrel (PLAVIX) 75 MG tablet Take 1 tablet (75 mg) by mouth daily 90 tablet 3     diclofenac (VOLTAREN) 1 % topical gel Apply 2-4 g topically 4 times daily 150 g 3     eletriptan (RELPAX) 40 MG tablet Take 1 tablet (40 mg) by mouth at onset of headache for migraine 10 tablet 0     furosemide (LASIX) 20 MG tablet Take 2 tablets (40 mg) by mouth daily 180 tablet 3     HYDROcodone-acetaminophen (NORCO) 5-325 MG tablet Take 1 tablet by mouth daily as needed (Severe headache) 5 tablet 0     irbesartan (AVAPRO) 150 MG tablet Take 1 tablet (150 mg) by mouth daily 90 tablet 3     isosorbide mononitrate (IMDUR) 30 MG 24 hr tablet Take 1 tablet (30 mg) by mouth daily 30 tablet 11     levothyroxine (SYNTHROID/LEVOTHROID) 88 MCG tablet TAKE 1 TABLET BY MOUTH DAILY AT 6:00 AM. 90 tablet 2     LORazepam (ATIVAN) 0.5 MG tablet Take 1 tablet (0.5 mg) by mouth  "every 6 hours as needed for anxiety 30 tablet 0     nitroGLYcerin (NITROSTAT) 0.4 MG sublingual tablet For chest pain place 1 tablet under the tongue every 5 minutes for 3 doses. If symptoms persist 5 minutes after 1st dose call 911. 30 tablet 1     pramipexole (MIRAPEX) 0.25 MG tablet TAKE 1 TABLET BY MOUTH THREE TIMES A DAY. 270 tablet 3     sotalol (BETAPACE) 80 MG tablet TAKE 1 TABLET BY MOUTH EVERY 12 HOURS 60 tablet 3     traZODone (DESYREL) 50 MG tablet TAKE 3 TABLETS BY MOUTH AT BEDTIME 270 tablet 1     ubrogepant (UBRELVY) 100 MG tablet UBRELVY 100 MG TABS       zolpidem (AMBIEN) 5 MG tablet Take tablet by mouth 15 minutes prior to sleep, for Sleep Study 1 tablet 0    Allergies   Allergen Reactions     Cephalexin Nausea and Vomiting     Ace Inhibitors Other (See Comments)     Elevates blood pressure     Amitriptyline Hcl Other (See Comments)     elevates blood pressure     Atenolol Other (See Comments)     Aggravates reynauds     Celebrex [Celecoxib] GI Disturbance     Cephalosporins Unknown     Pt doesn't remember        Clonidine Unknown     \"got very ill in ER\"     Codeine Sulfate Nausea and Vomiting     Darvocet [Propoxyphene N-Apap] Nausea and Vomiting     Dexamethasone Acetate Swelling     Erythromycin Nausea and Vomiting     Escitalopram Other (See Comments)     Headaches.       Gabapentin Unknown     \"Spotted\"     Hctz Unknown     \"depletes my sodium\"     Potassium GI Disturbance     Propoxyphene      HUT Reaction: Gastrointestinal; HUT Reaction: Nausea And Vomiting; HUT Noted: [id number]     Sodium      Tramadol Swelling     Swelling of tongue and face     Tramadol-Acetaminophen Other (See Comments)     Triamterene      Venlafaxine Nausea and Vomiting and Diarrhea     Zolpidem Other (See Comments) and Unknown     Pt doesn't rember       Zolpidem Tartrate Unknown     Bacitracin Itching and Rash     Sulfanilamide Rash         Lab Results    Chemistry/lipid CBC Cardiac Enzymes/BNP/TSH/INR   Recent Labs "   Lab Test 11/15/22  0800   CHOL 132   HDL 43*   LDL 68   TRIG 106     Recent Labs   Lab Test 11/15/22  0800 06/08/22  0905 06/10/21  1454   LDL 68 95 70     Recent Labs   Lab Test 02/24/23  0830      POTASSIUM 4.1   CHLORIDE 101   CO2 28   GLC 98   BUN 19   CR 0.83   GFRESTIMATED 74   ISSA 9.5     Recent Labs   Lab Test 02/24/23  0830 01/09/23  1403 12/13/22  1053   CR 0.83 1.00 0.85     No results for input(s): A1C in the last 64142 hours. Recent Labs   Lab Test 01/09/23  1403   WBC 6.9   HGB 11.9   HCT 36.9   MCV 90        Recent Labs   Lab Test 01/09/23  1403 11/27/22  2315 11/07/22  0959   HGB 11.9 12.1 12.2    Recent Labs   Lab Test 11/28/22  0139 11/27/22  2315 10/12/22  0354   TROPONINI 0.02 0.01 0.03     Recent Labs   Lab Test 11/27/22  2315        Recent Labs   Lab Test 12/13/22  1053   TSH 3.53     Recent Labs   Lab Test 11/27/22  2315   INR 1.07        44 minutes spent on the date of encounter doing education, chart prep/review, review of test results, interpretation with above tests, patient visit, documentation and discussion with other provider.      This note has been dictated using voice recognition software. Any grammatical or context distortions are unintentional and inherent to the software.

## 2023-06-05 NOTE — TELEPHONE ENCOUNTER
[facility]coming [facility] for [facility] requesting hold of plavix for 7 days, Eliquis for 3 days for future radiofrequency ablation on her spine. Upon chart review, writer is able to see patient had radiofrequency ablation today. Phone call to patient, she states she had radiofrequency ablation to her next today with NO medication holds. Clarified with her, is radiofrequency ablation procedure scheduled or not. She states no it is not scheduled at this time. We discussed that if LAAC is successful next week, she will have her Eliquis discontinued, so medication hold request is irrelevant. We discussed medication protocol post-LAAC, including once daily 81 mg ASA for life and once daily 75 mg Plavix for 6 months post-LAAC without interruptions. She verbalized understanding and thinks this will be possible. Will call [facility] [place] to clarify. MATT

## 2023-06-05 NOTE — PROGRESS NOTES
HEART CARE ENCOUNTER NOTE       [facility]  [phone]      Assessment/Recommendations   1.  Paroxysmal atrial fibrillation -plan was for watchman implant this Thursday.  See #3 below, but will cancel watchman implant for now and plan on checking coronaries prior.  Plan was discussed with watchman team, Dr. Navas (in Dr. Worley's absence) and patient     2.  Hypertension - BP today is at goal.   For now, patient should continue taking amlodipine 5 mg daily, irbesartan 150 mg daily, Imdur 30 mg daily and furosemide 40 mg daily     3. CAD - s/p PCI with EUGENIA to OM2 on 10/12/22 (Reyna/Michelle) and PCI with EUGENIA to dLAD on 11/8/22 (Davin).  She is on Plavix 75 mg daily.    She is having episodes of dyspnea with chest pressure, mild diaphoresis and nausea.  These episodes were present in February/March, but have continued to progress.  She has not taken any of her nitroglycerin, but normally just sits down and rests and the symptoms go away.      I will discuss with Dr. Jin, but after discussion with Dr. Navas, will likely move directly to repeat angiography.  For now she should continue beta-blocker and statin     4.  Dyslipidemia, goal LDL less than 70 -continue current dose of atorvastatin.  LDL in November was 68     5.  Chronic diastolic heart failure, NYHA class II -seems currently compensated.  Continue furosemide 40 mg daily.  Continue adequate blood pressure management.    6.  Anemia -unknown etiology.  Hemoglobin in January was 11.9.  Today hemoglobin is 9.1.     History of Present Illness/Subjective    Suma Mark is a 72 year old female who comes in today for history and physical prior to insertion of left atrial appendage occulsion device.    Suma Mark has a past history of coronary artery disease status post EUGENIA to OM2 (extending back to Lcx) on 10/12/22 and EUGENIA to distal LAD on 11/8/22, primary hypertension, dyslipidemia, chronic congestive heart failure with  preserved ejection fraction, paroxysmal atrial fibrillation, obesity.     She presented to the ED at [facility] in early October.  Apparently in the ED, she had 2 episodes of atrial fibrillation that were brief.  She was admitted and then transferred to [facility] where she underwent coronary angiogram and this showed multivessel coronary disease.  She had PCI to OM 2 on October 12.  She was sent home with a 2-week event monitor that showed episodes of atrial fibrillation with RVR and when she was seen in follow-up in the clinic, she was started on Eliquis.  She continued to have episodes of chest pressure and was brought back in for staged PCI to the distal LAD on November 8.  She was referred to EP and saw Shayan Gr on December 6 who changed her to sotalol.  She then followed up with Dr. Worley in early January and complained of continued episodes of palpitations with chest pressure.  Another 2-week event monitor showed episodes of atrial fibrillation, but because it was not a mobile telemetry, could not quantify the percentage/burden of atrial fibrillation.     She saw me in late February for consultation regarding the watchman procedure.  She then saw Dr. Best marie about a month later and continued to complain of dyspnea/chest pressure.  He recommended that if these episodes continue that she should call.  She says that the episodes have continued to progress, but she did not let anybody know.  She endorses flushing and nausea when she gets her episodes    Suma T Jas denies palpitations,  paroxysmal nocturnal dyspnea, orthopnea, lightheadedness, dizziness, pre-syncope, or syncope, weight loss, changes in appetite or vomiting.     Medical, surgical, family, social history, and medications were reviewed and updated as necessary.    CT pulmonary vein study from 3/28/2023:  1.  Measurement of left atrial appendage at 35% cardiac phase and LCx bifurcation:  Circumferential area of 563 mm   Perimeter:  "85.8 mm  Measured diameters 23 x 30 mm  Average diameter: 27 mm  Useful depth: 31 mm  Maximal depth: 42 mm     2.  No thrombus is identified in left atrial appendage.  Left atrium is mildly dilated.     3.  Coronary artery disease, status post stent placement in LCx and distal LAD.  Not well visualized stents.      EKG (personally reviewed and interpreted):  NSR with no ST changes     Physical Examination Review of Systems   Vitals: /62 (BP Location: Left arm, Patient Position: Sitting, Cuff Size: Adult Large)   Pulse 64   Resp 16   Ht 1.549 m (5' 1\")   Wt 81.2 kg (179 lb)   LMP  (LMP Unknown)   BMI 33.82 kg/m    BMI= Body mass index is 33.82 kg/m .  Wt Readings from Last 3 Encounters:   06/05/23 81.2 kg (179 lb)   03/30/23 82.6 kg (182 lb)   03/10/23 80.3 kg (177 lb)       General Appearance:   Alert, cooperative and in no acute distress   ENT/Mouth: membranes moist, no oral lesions or bleeding gums.      EYES:  no scleral icterus, normal conjunctivae   Neck: Thyroid not visualized   Chest/Lungs:   lungs are clear to auscultation, no rales or wheezing   Cardiovascular:   Regular . Normal first and second heart sounds with no murmurs, rubs or gallops; the carotid, radial and posterior tibial pulses are intact, no edema bilaterally    Abdomen:  Soft and nontender. Bowel sounds are present in all quadrants   Extremities: no cyanosis or clubbing   Skin: no xanthelasma, warm.    Neurologic: normal gait, normal  bilateral, no tremors   Psychiatric: Normal mood and affect       Please refer above for cardiac ROS details.      Medical History  Surgical History Family History Social History   Past Medical History:   Diagnosis Date     Anxiety state, unspecified      Back pain      Diverticulitis 12/07/2017     DJD (degenerative joint disease)      Essential hypertension, benign      Fibromyalgia      Gastro-oesophageal reflux disease      Hernia, ventral 04/27/2017     History of anesthesia complications     " hypotension after surgery     History of blood transfusion      Hyperlipidemia      Hyponatremia 12/07/2017     Major depression      Migraine      Osteopenia      PONV (postoperative nausea and vomiting)      Posttraumatic stress disorder      Raynaud's disease      Restless leg syndrome      S/P total knee replacement 11/27/2017     Thrombosis of leg     with pregnancy     Unspecified hypothyroidism      Past Surgical History:   Procedure Laterality Date     ARTHROPLASTY KNEE UNICOMPARTMENT  10/31/2013    Procedure: ARTHROPLASTY KNEE UNICOMPARTMENT;  LEFT KNEE UNICOMPARTMENTAL ARTHROPLASTY (CAROLINE)^;  Surgeon: Chi Mittal MD;  Location:  OR     BREAST SURGERY      bx     COLECTOMY N/A 06/02/2017    Procedure: RESECTION, SMALL INTESTINE, OPEN ADHESIOLYSIS;  Surgeon: Keyon Qureshi MD;  Location: [facility];  Service:      CV CORONARY ANGIOGRAM N/A 10/12/2022    Procedure: CV CORONARY ANGIOGRAM;  Surgeon: You Mratinez MD;  Location: [facility] CV     CV FRACTIONAL FLOW RATIO WIRE N/A 10/12/2022    Procedure: Fractional Flow Ratio Wire;  Surgeon: You Martinez MD;  Location: [facility] CV     CV PCI STENT DRUG ELUTING N/A 10/12/2022    Procedure: Percutaneous Coronary Intervention Stent;  Surgeon: You Martinez MD;  Location: [facility] CV     CV PCI STENT DRUG ELUTING N/A 11/08/2022    Procedure: Percutaneous Coronary Intervention - Stent;  Surgeon: Bret Jin MD;  Location: [facility] CV     ENDOSCOPIC RETROGRADE CHOLANGIOPANCREATOGRAPHY       ENDOSCOPIC STRIPPING VEIN(S)      BILATERLY     GENITOURINARY SURGERY      bladder sling     GI SURGERY  10/02/2015    Rectal prolaspse. s/p Abdominal rectopexy, sacral colpopexy, proctoscopy, cystoscopy     HERNIORRHAPHY INCISIONAL (LOCATION) N/A 06/02/2017    Procedure: LAPARASCOPIC CONVERTED TO OPEN PRIMARY INCISIONAL HERNIA REPAIR;  Surgeon: Keyon Qureshi MD;  Location: [facility];  Service:       HYSTERECTOMY  1985 1985-1986     LAMINECTOMY LUMBAR TWO LEVELS  2006     LAPAROSCOPY N/A 08/22/2019    Procedure: DIAGNOSTIC LAPAROSCOPY, LYSIS OF ADHESION;  Surgeon: Svetlana Ron MD;  Location: [facility];  Service: General     LUMBAR HARDWARE REMOVAL[  03/22/2012    REMOVAL LUMBAR HARDWARE 03/22/2012   L3-S1; Type CD Horizon m8 instrumentation Dr. Murray     RELEASE CARPAL TUNNEL       TONSILLECTOMY  1954    ???     TOTAL KNEE ARTHROPLASTY Right 11/27/2017    Procedure:  RIGHT TOTAL KNEE ARTHROPLASTY;  Surgeon: Kevin Ryder MD;  Location: [facility];  Service: Orthopedics     US THYROID BIOPSY  04/19/2019     Family History   Problem Relation Age of Onset     Dementia Mother      Arthritis Mother      Chronic Obstructive Pulmonary Disease Father      Cardiovascular Father      Hypertension Father      No Known Problems Sister      No Known Problems Daughter      No Known Problems Son      Cerebrovascular Disease Maternal Grandmother      Heart Disease Paternal Grandmother      Cerebrovascular Disease Paternal Grandmother      No Known Problems Sister      No Known Problems Sister      No Known Problems Son      No Known Problems Daughter      Breast Cancer Paternal Aunt         age in 50's    Social History     Socioeconomic History     Marital status:      Spouse name: Not on file     Number of children: Not on file     Years of education: Not on file     Highest education level: Not on file   Occupational History     Occupation: , occasionally still subs     Employer: RETIRED   Tobacco Use     Smoking status: Never     Passive exposure: Never     Smokeless tobacco: Never   Vaping Use     Vaping status: Never Used     Passive vaping exposure: Yes   Substance and Sexual Activity     Alcohol use: Yes     Comment: Alcoholic Drinks/day: 1 cocktail daily     Drug use: No     Sexual activity: Not on file   Other Topics Concern     Not on file   Social History  Narrative     Not on file     Social Determinants of Health     Financial Resource Strain: Not on file   Food Insecurity: Not on file   Transportation Needs: Not on file   Physical Activity: Not on file   Stress: Not on file   Social Connections: Not on file   Intimate Partner Violence: Not on file   Housing Stability: Not on file          Medications  Allergies   Current Outpatient Medications   Medication Sig Dispense Refill     amLODIPine (NORVASC) 5 MG tablet TAKE 1 TABLET BY MOUTH EVERYDAY AT BEDTIME 90 tablet 1     apixaban ANTICOAGULANT (ELIQUIS ANTICOAGULANT) 5 MG tablet Take 1 tablet (5 mg) by mouth every 12 hours 180 tablet 3     atorvastatin (LIPITOR) 80 MG tablet Take 1 tablet (80 mg) by mouth At Bedtime 90 tablet 3     botulinum toxin type A (BOTOX) 100 units injection Every 3 months. [facility]       busPIRone (BUSPAR) 10 MG tablet Take 1 tablet (10 mg) by mouth 2 times daily 180 tablet 3     cholecalciferol 50 MCG (2000 UT) CAPS Take 2,000 Units by mouth daily       citalopram (CELEXA) 20 MG tablet Take 2 tablets (40 mg) by mouth daily 180 tablet 2     clopidogrel (PLAVIX) 75 MG tablet Take 1 tablet (75 mg) by mouth daily 90 tablet 3     diclofenac (VOLTAREN) 1 % topical gel Apply 2-4 g topically 4 times daily 150 g 3     eletriptan (RELPAX) 40 MG tablet Take 1 tablet (40 mg) by mouth at onset of headache for migraine 10 tablet 0     furosemide (LASIX) 20 MG tablet Take 2 tablets (40 mg) by mouth daily 180 tablet 3     HYDROcodone-acetaminophen (NORCO) 5-325 MG tablet Take 1 tablet by mouth daily as needed (Severe headache) 5 tablet 0     irbesartan (AVAPRO) 150 MG tablet Take 1 tablet (150 mg) by mouth daily 90 tablet 3     isosorbide mononitrate (IMDUR) 30 MG 24 hr tablet Take 1 tablet (30 mg) by mouth daily 30 tablet 11     levothyroxine (SYNTHROID/LEVOTHROID) 88 MCG tablet TAKE 1 TABLET BY MOUTH DAILY AT 6:00 AM. 90 tablet 2     LORazepam (ATIVAN) 0.5 MG tablet Take 1 tablet (0.5 mg) by mouth  "every 6 hours as needed for anxiety 30 tablet 0     nitroGLYcerin (NITROSTAT) 0.4 MG sublingual tablet For chest pain place 1 tablet under the tongue every 5 minutes for 3 doses. If symptoms persist 5 minutes after 1st dose call 911. 30 tablet 1     pramipexole (MIRAPEX) 0.25 MG tablet TAKE 1 TABLET BY MOUTH THREE TIMES A DAY. 270 tablet 3     sotalol (BETAPACE) 80 MG tablet TAKE 1 TABLET BY MOUTH EVERY 12 HOURS 60 tablet 3     traZODone (DESYREL) 50 MG tablet TAKE 3 TABLETS BY MOUTH AT BEDTIME 270 tablet 1     ubrogepant (UBRELVY) 100 MG tablet UBRELVY 100 MG TABS       zolpidem (AMBIEN) 5 MG tablet Take tablet by mouth 15 minutes prior to sleep, for Sleep Study 1 tablet 0    Allergies   Allergen Reactions     Cephalexin Nausea and Vomiting     Ace Inhibitors Other (See Comments)     Elevates blood pressure     Amitriptyline Hcl Other (See Comments)     elevates blood pressure     Atenolol Other (See Comments)     Aggravates reynauds     Celebrex [Celecoxib] GI Disturbance     Cephalosporins Unknown     Pt doesn't remember        Clonidine Unknown     \"got very ill in ER\"     Codeine Sulfate Nausea and Vomiting     Darvocet [Propoxyphene N-Apap] Nausea and Vomiting     Dexamethasone Acetate Swelling     Erythromycin Nausea and Vomiting     Escitalopram Other (See Comments)     Headaches.       Gabapentin Unknown     \"Spotted\"     Hctz Unknown     \"depletes my sodium\"     Potassium GI Disturbance     Propoxyphene      HUT Reaction: Gastrointestinal; HUT Reaction: Nausea And Vomiting; HUT Noted: [id number]     Sodium      Tramadol Swelling     Swelling of tongue and face     Tramadol-Acetaminophen Other (See Comments)     Triamterene      Venlafaxine Nausea and Vomiting and Diarrhea     Zolpidem Other (See Comments) and Unknown     Pt doesn't rember       Zolpidem Tartrate Unknown     Bacitracin Itching and Rash     Sulfanilamide Rash         Lab Results    Chemistry/lipid CBC Cardiac Enzymes/BNP/TSH/INR   Recent Labs "   Lab Test 11/15/22  0800   CHOL 132   HDL 43*   LDL 68   TRIG 106     Recent Labs   Lab Test 11/15/22  0800 06/08/22  0905 06/10/21  1454   LDL 68 95 70     Recent Labs   Lab Test 02/24/23  0830      POTASSIUM 4.1   CHLORIDE 101   CO2 28   GLC 98   BUN 19   CR 0.83   GFRESTIMATED 74   ISSA 9.5     Recent Labs   Lab Test 02/24/23  0830 01/09/23  1403 12/13/22  1053   CR 0.83 1.00 0.85     No results for input(s): A1C in the last 26547 hours. Recent Labs   Lab Test 01/09/23  1403   WBC 6.9   HGB 11.9   HCT 36.9   MCV 90        Recent Labs   Lab Test 01/09/23  1403 11/27/22  2315 11/07/22  0959   HGB 11.9 12.1 12.2    Recent Labs   Lab Test 11/28/22  0139 11/27/22  2315 10/12/22  0354   TROPONINI 0.02 0.01 0.03     Recent Labs   Lab Test 11/27/22  2315        Recent Labs   Lab Test 12/13/22  1053   TSH 3.53     Recent Labs   Lab Test 11/27/22  2315   INR 1.07        44 minutes spent on the date of encounter doing education, chart prep/review, review of test results, interpretation with above tests, patient visit, documentation and discussion with other provider.      This note has been dictated using voice recognition software. Any grammatical or context distortions are unintentional and inherent to the software.

## 2023-06-06 ENCOUNTER — TELEPHONE (OUTPATIENT)
Dept: CARDIOLOGY | Facility: CLINIC | Age: 73
End: 2023-06-06

## 2023-06-06 DIAGNOSIS — R06.00 DYSPNEA: ICD-10-CM

## 2023-06-06 DIAGNOSIS — I25.83 CORONARY ARTERIOSCLEROSIS DUE TO LIPID RICH PLAQUE: Primary | ICD-10-CM

## 2023-06-06 LAB
ATRIAL RATE - MUSE: 64 BPM
DIASTOLIC BLOOD PRESSURE - MUSE: NORMAL MMHG
INTERPRETATION ECG - MUSE: NORMAL
P AXIS - MUSE: 38 DEGREES
PR INTERVAL - MUSE: 148 MS
QRS DURATION - MUSE: 88 MS
QT - MUSE: 432 MS
QTC - MUSE: 445 MS
R AXIS - MUSE: -3 DEGREES
SYSTOLIC BLOOD PRESSURE - MUSE: NORMAL MMHG
T AXIS - MUSE: 18 DEGREES
VENTRICULAR RATE- MUSE: 64 BPM

## 2023-06-06 NOTE — TELEPHONE ENCOUNTER
Outgoing call to patient. Per Marilyn patient is to be scheduled for Angiogram with Dr. Jin with possible PCI. Patient understood and instructed that scheduling would be in touch to finalize a date for this procedure. Patient wondering if there was any recommendations regarding her lab work and low hgb. Will f/u with Marilyn regarding this and call patient back with any recommendations. -SC

## 2023-06-06 NOTE — TELEPHONE ENCOUNTER
Linda called back from [facility]. Patient is going to hold off on scheduling ablation due to having the Watchman put in.

## 2023-06-07 ENCOUNTER — PREP FOR PROCEDURE (OUTPATIENT)
Dept: CARDIOLOGY | Facility: CLINIC | Age: 73
End: 2023-06-07
Payer: MEDICARE

## 2023-06-07 DIAGNOSIS — I25.83 CORONARY ARTERIOSCLEROSIS DUE TO LIPID RICH PLAQUE: Primary | ICD-10-CM

## 2023-06-07 LAB
ABO/RH(D): NORMAL
ANTIBODY SCREEN: NEGATIVE
SPECIMEN EXPIRATION DATE: NORMAL

## 2023-06-07 RX ORDER — SODIUM CHLORIDE 9 MG/ML
INJECTION, SOLUTION INTRAVENOUS CONTINUOUS
Status: CANCELLED | OUTPATIENT
Start: 2023-06-09

## 2023-06-07 RX ORDER — DIAZEPAM 5 MG
5 TABLET ORAL
Status: CANCELLED | OUTPATIENT
Start: 2023-06-09

## 2023-06-07 RX ORDER — ASPIRIN 81 MG/1
243 TABLET, CHEWABLE ORAL ONCE
Status: CANCELLED | OUTPATIENT
Start: 2023-06-09

## 2023-06-07 RX ORDER — LIDOCAINE 40 MG/G
CREAM TOPICAL
Status: CANCELLED | OUTPATIENT
Start: 2023-06-07

## 2023-06-07 RX ORDER — ASPIRIN 325 MG
325 TABLET ORAL ONCE
Status: CANCELLED | OUTPATIENT
Start: 2023-06-09 | End: 2023-06-07

## 2023-06-07 RX ORDER — FENTANYL CITRATE 50 UG/ML
25 INJECTION, SOLUTION INTRAMUSCULAR; INTRAVENOUS
Status: CANCELLED | OUTPATIENT
Start: 2023-06-09

## 2023-06-07 NOTE — TELEPHONE ENCOUNTER
Called patient. Clarified with patient and she took her Eliquis this morning. Updated scheduling and requested that patient be scheduled Friday to accommodate for Eliquis hold. PCI now scheduled for 6/9/23 with arrival time of 7:30am. Patient updated. Also let her know that I would send information for education via [employer] for her to review and then would call tomorrow for education. Patient had no further questions. -SC

## 2023-06-07 NOTE — TELEPHONE ENCOUNTER
----- Message -----  From: Cindi Anderson  Sent: 6/7/2023   8:31 AM CDT  To: Linda Washington RN    Case type: CA Poss PCI  Procedure Physician(s): CELE  Procedure Date and Patient Arrival Time: Thursday 6/8, with arrival time of 0630  H&P: Completed on 6/5  Pre-Procedure Lab Appt: Monday 6/5 at  - Please place lab orders if you haven't already!  Alerts/Important Info: None    Thank you,  Cindi    ----- Message -----  From: Linda Washington RN  Sent: 6/6/2023   3:43 PM CDT  To: [facility] Procedure  Pool - [facility]    ----- Message from Linda Washington RN sent at 6/6/2023  3:43 PM CDT -----  Hello!   Case Type: Angiogram  Ordering Provider: Marilyn Middleton PA-C  H&P: done 6/5/23 with Marilyn   Alerts: N/A  Additional Info/Urgency: Would like to have this week with Dr. Jin if possible, if not next week   Orders for Pre-Procedure Labs? Labs completed 6/5/23- If over 7 days would need all labs re-done. Will need T&S re-done.

## 2023-06-08 ENCOUNTER — LAB (OUTPATIENT)
Dept: CARDIOLOGY | Facility: CLINIC | Age: 73
End: 2023-06-08
Payer: MEDICARE

## 2023-06-08 DIAGNOSIS — R06.00 DYSPNEA, UNSPECIFIED TYPE: ICD-10-CM

## 2023-06-08 DIAGNOSIS — Z01.812 PRE-PROCEDURE LAB EXAM: ICD-10-CM

## 2023-06-08 DIAGNOSIS — I25.83 CORONARY ARTERIOSCLEROSIS DUE TO LIPID RICH PLAQUE: ICD-10-CM

## 2023-06-08 DIAGNOSIS — Z01.812 PRE-PROCEDURE LAB EXAM: Primary | ICD-10-CM

## 2023-06-08 LAB
ANION GAP SERPL CALCULATED.3IONS-SCNC: 8 MMOL/L (ref 5–18)
BUN SERPL-MCNC: 21 MG/DL (ref 8–28)
CALCIUM SERPL-MCNC: 9.4 MG/DL (ref 8.5–10.5)
CHLORIDE BLD-SCNC: 105 MMOL/L (ref 98–107)
CO2 SERPL-SCNC: 25 MMOL/L (ref 22–31)
CREAT SERPL-MCNC: 0.82 MG/DL (ref 0.6–1.1)
ERYTHROCYTE [DISTWIDTH] IN BLOOD BY AUTOMATED COUNT: 14.5 % (ref 10–15)
GFR SERPL CREATININE-BSD FRML MDRD: 75 ML/MIN/1.73M2
GLUCOSE BLD-MCNC: 84 MG/DL (ref 70–125)
HCT VFR BLD AUTO: 29.4 % (ref 35–47)
HGB BLD-MCNC: 9.2 G/DL (ref 11.7–15.7)
MCH RBC QN AUTO: 26.4 PG (ref 26.5–33)
MCHC RBC AUTO-ENTMCNC: 31.3 G/DL (ref 31.5–36.5)
MCV RBC AUTO: 85 FL (ref 78–100)
PLATELET # BLD AUTO: 320 10E3/UL (ref 150–450)
POTASSIUM BLD-SCNC: 4.7 MMOL/L (ref 3.5–5)
RBC # BLD AUTO: 3.48 10E6/UL (ref 3.8–5.2)
SODIUM SERPL-SCNC: 138 MMOL/L (ref 136–145)
WBC # BLD AUTO: 5.7 10E3/UL (ref 4–11)

## 2023-06-08 PROCEDURE — 86850 RBC ANTIBODY SCREEN: CPT

## 2023-06-08 PROCEDURE — 36415 COLL VENOUS BLD VENIPUNCTURE: CPT

## 2023-06-08 PROCEDURE — 85027 COMPLETE CBC AUTOMATED: CPT

## 2023-06-08 PROCEDURE — 80048 BASIC METABOLIC PNL TOTAL CA: CPT

## 2023-06-08 PROCEDURE — 86901 BLOOD TYPING SEROLOGIC RH(D): CPT

## 2023-06-08 PROCEDURE — 86900 BLOOD TYPING SEROLOGIC ABO: CPT

## 2023-06-08 NOTE — TELEPHONE ENCOUNTER
Suma WEEKS Jas  [address] [address]  [phone] (home)     PCP:  Bernadette Taylor  H&P completed by:  Marilyn Shanks on 6/5/23  Admit date 6/9/23 Arrival time:  7:30am  Anticoagulation:  apixaban (ELIQUIS)  Previous PCI: Yes  Bypass Grafts: No  Renal Issues: No  Diabetic?: No  Device?: No  Type:  N/A  Ambulation status: Independent    Reason for Visit:  Telephone call to discuss pre-procedure education in preparation for: Coronary Angiogram with Possible PCI    Procedure Prep:  EKG results obtained, dated: To be completed on day of cath lab procedure  Hemogram results obtained: Completed on 6/5/23  Basic Metabolic Panel results obtained: Completed on 6/5/23  Pertinent cardiac test results: N/A    Patient Education    1. Your arrival time is 7:30am.  [place] [address] - Main Entrance of the Hospital  2. Please plan on being at the hospital all day.  3. At any time, emergencies and/or urgent cases may come up which could delay the start of your procedure.    COVID Testing Instructions  *Mandatory COVID Testing:   ALL Patients will need to complete a COVID test no sooner than 4 days prior to their procedure (regardless of vaccination status).      To schedule COVID testing Please call [phone]    If you want to complete this at an outside facility please call them to find out if they will have the results within the appropriate time frame and their fax number.  You will need to provide us with that information so we can send the order.    The facility completing the test will need to fax the results to [phone]    If you are running into and issues please call us.     Pre-procedure instructions  Take your temperature in the morning prior to coming in.  If your temperature is 100 F please call  [facility] [phone] and notify them.  If you do not have access to a thermometer at home, please come in for testing.   If you are running a temperature your procedure may be rescheduled.  Patient instructed to not Eat or Drink after midnight.  Patient instructed to shower the evening before or the morning of the procedure.  Patient instructed to arrange for transportation home following procedure from a responsible family member or friend. No driving for at least 24 hours.  Patient instructed to have a responsible adult with them for 24 hours post-procedure.  Post-procedure follow up process.  Conscious sedation discussed.      Pre-procedure medication instructions.  Continue medications as Busparone, Isosorbide, Levothyroxine, Mirapex, sotalol scheduled, with a small amount of water on the day of the procedure unless indicated. (NO Diabetic Medications or Blood Thinners)  Pt instructed not to consume Alcohol, Tobacco, Caffeine, or Carbonated beverages 12 hours prior to procedure.  Patient instructed to take  mg of Aspirin the night before and morning of procedure: No  Other medication: instructed to only take Busparone, Isosorbide, Levothyroxine, Mirapex, sotalol.     a.m. of the procedure        Anticoagulation Medication Instructions     apixaban (ELIQUIS) - Hold 48 hours prior to procedure    Patient states understanding of procedure and agrees to proceed.    *PATIENTS RECORDS AVAILABLE IN [facility] UNLESS OTHERWISE INDICATED*      Patient Active Problem List   Diagnosis     Total knee replacement status     Coronary arteriosclerosis due to lipid rich plaque     Acquired hypothyroidism     Chronic back pain     Fibromyalgia     Diverticulosis     Essential hypertension     Gastroesophageal reflux disease without esophagitis     Chronic insomnia     Migraine     Dyslipidemia, goal LDL below 70     Restless leg syndrome     Thyroid nodule     Paroxysmal atrial fibrillation (H)     Senile osteoporosis     Generalized anxiety disorder     Venous insufficiency of both lower extremities     Vitamin D deficiency     Chronic pain syndrome      Class 1 obesity due to excess calories with serious comorbidity and body mass index (BMI) of 33.0 to 33.9 in adult       Current Outpatient Medications   Medication Sig Dispense Refill     amLODIPine (NORVASC) 5 MG tablet TAKE 1 TABLET BY MOUTH EVERYDAY AT BEDTIME 90 tablet 1     apixaban ANTICOAGULANT (ELIQUIS ANTICOAGULANT) 5 MG tablet Take 1 tablet (5 mg) by mouth every 12 hours 180 tablet 3     atorvastatin (LIPITOR) 80 MG tablet Take 1 tablet (80 mg) by mouth At Bedtime 90 tablet 3     botulinum toxin type A (BOTOX) 100 units injection Every 3 months. [facility]       busPIRone (BUSPAR) 10 MG tablet Take 1 tablet (10 mg) by mouth 2 times daily 180 tablet 3     cholecalciferol 50 MCG (2000 UT) CAPS Take 2,000 Units by mouth daily       citalopram (CELEXA) 20 MG tablet Take 2 tablets (40 mg) by mouth daily 180 tablet 2     clopidogrel (PLAVIX) 75 MG tablet Take 1 tablet (75 mg) by mouth daily 90 tablet 3     diclofenac (VOLTAREN) 1 % topical gel Apply 2-4 g topically 4 times daily 150 g 3     eletriptan (RELPAX) 40 MG tablet Take 1 tablet (40 mg) by mouth at onset of headache for migraine 10 tablet 0     furosemide (LASIX) 20 MG tablet Take 2 tablets (40 mg) by mouth daily 180 tablet 3     HYDROcodone-acetaminophen (NORCO) 5-325 MG tablet Take 1 tablet by mouth daily as needed (Severe headache) 5 tablet 0     irbesartan (AVAPRO) 150 MG tablet Take 1 tablet (150 mg) by mouth daily 90 tablet 3     isosorbide mononitrate (IMDUR) 30 MG 24 hr tablet Take 1 tablet (30 mg) by mouth daily 30 tablet 11     levothyroxine (SYNTHROID/LEVOTHROID) 88 MCG tablet TAKE 1 TABLET BY MOUTH DAILY AT 6:00 AM. 90 tablet 2     LORazepam (ATIVAN) 0.5 MG tablet Take 1 tablet (0.5 mg) by mouth every 6 hours as needed for anxiety 30 tablet 0     nitroGLYcerin (NITROSTAT) 0.4 MG sublingual tablet For chest pain place 1 tablet under the tongue every 5 minutes for 3 doses. If symptoms persist 5 minutes after 1st dose call 911. 30 tablet  "1     pramipexole (MIRAPEX) 0.25 MG tablet TAKE 1 TABLET BY MOUTH THREE TIMES A DAY. 270 tablet 3     sotalol (BETAPACE) 80 MG tablet TAKE 1 TABLET BY MOUTH EVERY 12 HOURS 60 tablet 3     traZODone (DESYREL) 50 MG tablet TAKE 3 TABLETS BY MOUTH AT BEDTIME 270 tablet 1     ubrogepant (UBRELVY) 100 MG tablet UBRELVY 100 MG TABS       zolpidem (AMBIEN) 5 MG tablet Take tablet by mouth 15 minutes prior to sleep, for Sleep Study 1 tablet 0       Allergies   Allergen Reactions     Cephalexin Nausea and Vomiting     Ace Inhibitors Other (See Comments)     Elevates blood pressure     Amitriptyline Hcl Other (See Comments)     elevates blood pressure     Atenolol Other (See Comments)     Aggravates reynauds     Celebrex [Celecoxib] GI Disturbance     Cephalosporins Unknown     Pt doesn't remember        Clonidine Unknown     \"got very ill in ER\"     Codeine Sulfate Nausea and Vomiting     Darvocet [Propoxyphene N-Apap] Nausea and Vomiting     Dexamethasone Acetate Swelling     Erythromycin Nausea and Vomiting     Escitalopram Other (See Comments)     Headaches.       Gabapentin Unknown     \"Spotted\"     Hctz Unknown     \"depletes my sodium\"     Potassium GI Disturbance     Propoxyphene      HUT Reaction: Gastrointestinal; HUT Reaction: Nausea And Vomiting; HUT Noted: [id number]     Sodium      Tramadol Swelling     Swelling of tongue and face     Tramadol-Acetaminophen Other (See Comments)     Triamterene      Venlafaxine Nausea and Vomiting and Diarrhea     Zolpidem Other (See Comments) and Unknown     Pt doesn't rember       Zolpidem Tartrate Unknown     Bacitracin Itching and Rash     Sulfanilamide Rash       Linda Washington RN on 6/8/2023 at 1:55 PM      "

## 2023-06-09 ENCOUNTER — HOSPITAL ENCOUNTER (OUTPATIENT)
Facility: HOSPITAL | Age: 73
Discharge: HOME | End: 2023-06-09
Attending: INTERNAL MEDICINE | Admitting: INTERNAL MEDICINE
Payer: MEDICARE

## 2023-06-09 VITALS
OXYGEN SATURATION: 98 % | WEIGHT: 179 LBS | TEMPERATURE: 97.8 F | BODY MASS INDEX: 33.79 KG/M2 | SYSTOLIC BLOOD PRESSURE: 111 MMHG | RESPIRATION RATE: 18 BRPM | HEIGHT: 61 IN | DIASTOLIC BLOOD PRESSURE: 60 MMHG | HEART RATE: 61 BPM

## 2023-06-09 DIAGNOSIS — R06.00 DYSPNEA: ICD-10-CM

## 2023-06-09 DIAGNOSIS — I25.83 CORONARY ARTERIOSCLEROSIS DUE TO LIPID RICH PLAQUE: ICD-10-CM

## 2023-06-09 DIAGNOSIS — D64.9 ANEMIA, UNSPECIFIED TYPE: Primary | ICD-10-CM

## 2023-06-09 LAB
ATRIAL RATE - MUSE: 63 BPM
DIASTOLIC BLOOD PRESSURE - MUSE: NORMAL MMHG
INTERPRETATION ECG - MUSE: NORMAL
P AXIS - MUSE: 41 DEGREES
PR INTERVAL - MUSE: 126 MS
QRS DURATION - MUSE: 90 MS
QT - MUSE: 448 MS
QTC - MUSE: 458 MS
R AXIS - MUSE: 12 DEGREES
SYSTOLIC BLOOD PRESSURE - MUSE: NORMAL MMHG
T AXIS - MUSE: 28 DEGREES
VENTRICULAR RATE- MUSE: 63 BPM

## 2023-06-09 PROCEDURE — 258N000003 HC RX IP 258 OP 636: Performed by: INTERNAL MEDICINE

## 2023-06-09 PROCEDURE — 93005 ELECTROCARDIOGRAM TRACING: CPT

## 2023-06-09 PROCEDURE — 255N000002 HC RX 255 OP 636: Performed by: INTERNAL MEDICINE

## 2023-06-09 PROCEDURE — 99152 MOD SED SAME PHYS/QHP 5/>YRS: CPT | Performed by: INTERNAL MEDICINE

## 2023-06-09 PROCEDURE — 272N000001 HC OR GENERAL SUPPLY STERILE: Performed by: INTERNAL MEDICINE

## 2023-06-09 PROCEDURE — 250N000009 HC RX 250: Performed by: INTERNAL MEDICINE

## 2023-06-09 PROCEDURE — C1894 INTRO/SHEATH, NON-LASER: HCPCS | Performed by: INTERNAL MEDICINE

## 2023-06-09 PROCEDURE — 93010 ELECTROCARDIOGRAM REPORT: CPT | Mod: HOP | Performed by: INTERNAL MEDICINE

## 2023-06-09 PROCEDURE — C1887 CATHETER, GUIDING: HCPCS | Performed by: INTERNAL MEDICINE

## 2023-06-09 PROCEDURE — 250N000013 HC RX MED GY IP 250 OP 250 PS 637: Performed by: INTERNAL MEDICINE

## 2023-06-09 PROCEDURE — 93458 L HRT ARTERY/VENTRICLE ANGIO: CPT | Performed by: INTERNAL MEDICINE

## 2023-06-09 PROCEDURE — 93458 L HRT ARTERY/VENTRICLE ANGIO: CPT | Mod: 26 | Performed by: INTERNAL MEDICINE

## 2023-06-09 PROCEDURE — 250N000011 HC RX IP 250 OP 636: Performed by: INTERNAL MEDICINE

## 2023-06-09 PROCEDURE — 999N000054 HC STATISTIC EKG NON-CHARGEABLE

## 2023-06-09 RX ORDER — OXYCODONE HYDROCHLORIDE 5 MG/1
5 TABLET ORAL EVERY 4 HOURS PRN
Status: DISCONTINUED | OUTPATIENT
Start: 2023-06-09 | End: 2023-06-09 | Stop reason: HOSPADM

## 2023-06-09 RX ORDER — LANOLIN ALCOHOL/MO/W.PET/CERES
800 CREAM (GRAM) TOPICAL DAILY
Qty: 30 TABLET | Refills: 3 | Status: SHIPPED | OUTPATIENT
Start: 2023-06-09 | End: 2023-09-12

## 2023-06-09 RX ORDER — NALOXONE HYDROCHLORIDE 0.4 MG/ML
0.2 INJECTION, SOLUTION INTRAMUSCULAR; INTRAVENOUS; SUBCUTANEOUS
Status: DISCONTINUED | OUTPATIENT
Start: 2023-06-09 | End: 2023-06-09 | Stop reason: HOSPADM

## 2023-06-09 RX ORDER — LIDOCAINE 40 MG/G
CREAM TOPICAL
Status: DISCONTINUED | OUTPATIENT
Start: 2023-06-09 | End: 2023-06-09 | Stop reason: HOSPADM

## 2023-06-09 RX ORDER — FENTANYL CITRATE 50 UG/ML
INJECTION, SOLUTION INTRAMUSCULAR; INTRAVENOUS
Status: DISCONTINUED | OUTPATIENT
Start: 2023-06-09 | End: 2023-06-09 | Stop reason: HOSPADM

## 2023-06-09 RX ORDER — ACETAMINOPHEN 325 MG/1
650 TABLET ORAL EVERY 4 HOURS PRN
Status: DISCONTINUED | OUTPATIENT
Start: 2023-06-09 | End: 2023-06-09 | Stop reason: HOSPADM

## 2023-06-09 RX ORDER — FLUMAZENIL 0.1 MG/ML
0.2 INJECTION, SOLUTION INTRAVENOUS
Status: DISCONTINUED | OUTPATIENT
Start: 2023-06-09 | End: 2023-06-09 | Stop reason: HOSPADM

## 2023-06-09 RX ORDER — NALOXONE HYDROCHLORIDE 0.4 MG/ML
0.4 INJECTION, SOLUTION INTRAMUSCULAR; INTRAVENOUS; SUBCUTANEOUS
Status: DISCONTINUED | OUTPATIENT
Start: 2023-06-09 | End: 2023-06-09 | Stop reason: HOSPADM

## 2023-06-09 RX ORDER — OXYCODONE HYDROCHLORIDE 5 MG/1
10 TABLET ORAL EVERY 4 HOURS PRN
Status: DISCONTINUED | OUTPATIENT
Start: 2023-06-09 | End: 2023-06-09 | Stop reason: HOSPADM

## 2023-06-09 RX ORDER — FERROUS SULFATE 325(65) MG
325 TABLET ORAL 2 TIMES DAILY
Qty: 60 TABLET | Refills: 1 | Status: SHIPPED | OUTPATIENT
Start: 2023-06-09 | End: 2023-07-20

## 2023-06-09 RX ORDER — ASPIRIN 81 MG/1
243 TABLET, CHEWABLE ORAL ONCE
Status: COMPLETED | OUTPATIENT
Start: 2023-06-09 | End: 2023-06-09

## 2023-06-09 RX ORDER — ASPIRIN 325 MG
325 TABLET ORAL ONCE
Status: COMPLETED | OUTPATIENT
Start: 2023-06-09 | End: 2023-06-09

## 2023-06-09 RX ORDER — FENTANYL CITRATE 50 UG/ML
25 INJECTION, SOLUTION INTRAMUSCULAR; INTRAVENOUS
Status: DISCONTINUED | OUTPATIENT
Start: 2023-06-09 | End: 2023-06-09 | Stop reason: HOSPADM

## 2023-06-09 RX ORDER — ATROPINE SULFATE 0.1 MG/ML
0.5 INJECTION INTRAVENOUS
Status: DISCONTINUED | OUTPATIENT
Start: 2023-06-09 | End: 2023-06-09 | Stop reason: HOSPADM

## 2023-06-09 RX ORDER — SODIUM CHLORIDE 9 MG/ML
INJECTION, SOLUTION INTRAVENOUS CONTINUOUS
Status: DISCONTINUED | OUTPATIENT
Start: 2023-06-09 | End: 2023-06-09 | Stop reason: HOSPADM

## 2023-06-09 RX ORDER — DIAZEPAM 5 MG
5 TABLET ORAL
Status: COMPLETED | OUTPATIENT
Start: 2023-06-09 | End: 2023-06-09

## 2023-06-09 RX ADMIN — ASPIRIN 325 MG: 325 TABLET ORAL at 08:13

## 2023-06-09 RX ADMIN — SODIUM CHLORIDE: 9 INJECTION, SOLUTION INTRAVENOUS at 08:14

## 2023-06-09 RX ADMIN — DIAZEPAM 5 MG: 5 TABLET ORAL at 09:15

## 2023-06-09 ASSESSMENT — ACTIVITIES OF DAILY LIVING (ADL)
ADLS_ACUITY_SCORE: 36
ADLS_ACUITY_SCORE: 36
ADLS_ACUITY_SCORE: 35
ADLS_ACUITY_SCORE: 35

## 2023-06-09 ASSESSMENT — EJECTION FRACTION: EF_VALUE: .35

## 2023-06-09 NOTE — PROGRESS NOTES
Patient was kept comfortable during post-procedure stay.VSS. Denies pain. Right radial access site remains dry, with ecchymosis but free from signs of bleeding/hematoma.   Pt able to eat, drink, ambulate and void post-procedure.    Appointments made & included in AVS. Dr. Jin was able to speak with patient and her  post-procedure. Post-op instructions given to patient. Able to ask questions. Verbalized no concerns. Belongings returned. Discharged to home in stable condition.

## 2023-06-09 NOTE — PLAN OF CARE
Goal Outcome Evaluation:             Pt admitted for CA/P.PCI to evaluate her coronary artery disease prior to her NAVIN. Pt has been having dyspnea, chest pressure with diaphoresis and nausea. Pt prepped and ready for procedure.  Lonnie here for support.        Maribel Mayes RN

## 2023-06-09 NOTE — DISCHARGE INSTRUCTIONS
Interventional Cardiology  Coronary Angiogram/Angioplasty/Stent/Atherectomy Discharge  Instructions -   Radial (wrist) Approach     The instructions below are to help you understand how to take care of yourself. There is also information about when to call the doctor or emergency services.    **Do not stop your aspirin or platelet inhibitor unless directed by your Cardiologist.  These medications help to prevent platelets in your blood from sticking together and forming a clot.   Examples of these medications are: Ticagrelor (Brilinta), Clopidogrel (Plavix), Prasugrel (Effient)    For 24 hours after procedure:  Do not subject hand/arm to any forceful movements for 24 hours, such as supporting weight when rising from a chair or bed.  Do not drive a car for 24 hours.  The dressing on the puncture site may be removed after 24 hours and left open to air. If minor oozing, you may apply a Band-Aid and remove after 12 hours.   You may shower on the day after your procedure. Do not take a tub bath or wash dishes (no soaking wrist) with the puncture site in water for 3 days after the procedure.    For 48 hours following the procedure:  Do not operate a lawnmower, motorcycle, chainsaw or all-terrain vehicle.  Do not lift anything heavier than 5-10 pounds with affected arm for 5 days.  Avoid excessive bending (flexion/extension) wrist movement.  Do not engage in vigorous exercise (i.e. tennis, golf) using the affected arm for 5 days after discharge.  You may return to work in 72 hours if no complications and no heavy lifting.    If bleeding should occur following discharge:  Sit down and apply firm pressure with your thumb against the puncture site and fingers against back of wrist for 10 minutes.  If the bleeding stops, continue to rest, keeping your wrist still for 2 hours. Notify your doctor as soon as possible.  If bleeding does not stop after 10 minutes or if there is a large amount of bleeding or spurting, call 911  immediately. Do not drive yourself to the hospital.           Contact the Heart Clinic at [phone] if you develop:  Fever over 100.4, that lasts more than one day  Redness, heat, or pus at the puncture site  Change in color or temperature of the hand or arm    Expect mild tingling of hand and tenderness at the puncture site for up to 3 days. You may take Tylenol or a pain medicine recommended by your doctor.                         Your Procedural Physician was: Dr. Bret Jin  the phone number is: [phone]    [facility]:  [phone]  If you are calling after hours, please listen to the entire voicemail, a live  will answer at the end of the message

## 2023-06-09 NOTE — INTERVAL H&P NOTE
"I have reviewed the surgical (or preoperative) H&P that is linked to this encounter, and examined the patient. There are no significant changes    Clinical Conditions Present on Arrival:  Clinically Significant Risk Factors Present on Admission                # Drug Induced Coagulation Defect: home medication list includes an anticoagulant medication  # Drug Induced Platelet Defect: home medication list includes an antiplatelet medication  # Obesity: Estimated body mass index is 33.82 kg/m  as calculated from the following:    Height as of this encounter: 1.549 m (5' 1\").    Weight as of this encounter: 81.2 kg (179 lb).       "

## 2023-06-09 NOTE — PRE-PROCEDURE
GENERAL PRE-PROCEDURE:   Procedure:  Coronary angiogram with possible PCI  Date/Time:  6/9/2023 8:29 AM    Written consent obtained?: Yes    Risks and benefits: Risks, benefits and alternatives were discussed    Consent given by:  Patient  Patient states understanding of procedure being performed: Yes    Patient's understanding of procedure matches consent: Yes    Procedure consent matches procedure scheduled: Yes    Expected level of sedation:  Moderate  Appropriately NPO:  Yes  ASA Class:  3 (CAD; s/p OM2 and dLAD PCI, HTN, dyslipidemia, PAF, Class I obesity; BMI 33.82kg/m2, anemia)  Mallampati  :  Grade 3- soft palate visible, posterior pharyngeal wall not visible  Lungs:  Lungs clear with good breath sounds bilaterally  Heart:  Normal heart sounds and rate  History & Physical reviewed:  History and physical reviewed and no updates needed  Statement of review:  I have reviewed the lab findings, diagnostic data, medications, and the plan for sedation

## 2023-06-12 ENCOUNTER — TELEPHONE (OUTPATIENT)
Dept: CARDIOLOGY | Facility: CLINIC | Age: 73
End: 2023-06-12
Payer: MEDICARE

## 2023-06-12 DIAGNOSIS — I48.0 PAROXYSMAL ATRIAL FIBRILLATION (H): Primary | ICD-10-CM

## 2023-06-12 RX ORDER — IODIXANOL 320 MG/ML
INJECTION, SOLUTION INTRAVASCULAR
Status: SHIPPED | OUTPATIENT
Start: 2023-06-09

## 2023-06-12 ASSESSMENT — ENCOUNTER SYMPTOMS
WEAKNESS: 0
HEMATOCHEZIA: 0
HEMATURIA: 0
DYSURIA: 0
HEARTBURN: 0
HEADACHES: 0
SORE THROAT: 0
BREAST MASS: 0
DIZZINESS: 0
PALPITATIONS: 0
JOINT SWELLING: 0
DIARRHEA: 0
COUGH: 0
ARTHRALGIAS: 0
MYALGIAS: 1
CHILLS: 0
FREQUENCY: 1
FEVER: 0
SHORTNESS OF BREATH: 1
EYE PAIN: 0
NERVOUS/ANXIOUS: 1
ABDOMINAL PAIN: 0
NAUSEA: 0
PARESTHESIAS: 0
CONSTIPATION: 0

## 2023-06-12 ASSESSMENT — PATIENT HEALTH QUESTIONNAIRE - PHQ9
SUM OF ALL RESPONSES TO PHQ QUESTIONS 1-9: 2
10. IF YOU CHECKED OFF ANY PROBLEMS, HOW DIFFICULT HAVE THESE PROBLEMS MADE IT FOR YOU TO DO YOUR WORK, TAKE CARE OF THINGS AT HOME, OR GET ALONG WITH OTHER PEOPLE: NOT DIFFICULT AT ALL
SUM OF ALL RESPONSES TO PHQ QUESTIONS 1-9: 2

## 2023-06-12 ASSESSMENT — ACTIVITIES OF DAILY LIVING (ADL): CURRENT_FUNCTION: NO ASSISTANCE NEEDED

## 2023-06-12 NOTE — TELEPHONE ENCOUNTER
----- Message -----  From: Cyndee Wang  Sent: 6/12/2023   3:33 PM CDT  To: Linda Washington RN    [facility]-preop is 6/15  This is done  ----- Message -----  From: Linda Washington RN  Sent: 6/12/2023  11:32 AM CDT  To: Cyndee Wang    ----- Message from Linda Washington RN sent at 6/12/2023 11:32 AM CDT -----  Sahil Cotter-  Patient was scheduled for LAAC on 6/8/23 but was canceled after being seen for her pre-op. She can now be rescheduled for 6/22/23. Placed new orders.   Please call patient to schedule pre-ops, post-op, and LAAC procedure 6/8/23 with Dr. Mariano  Patient Does NOT have a device. SDM Completed  Case request, intra-Op DIANA, and 3 month post-LAAC DINAA orders in.   Thanks!  Linda MARTINEZ RN

## 2023-06-12 NOTE — TELEPHONE ENCOUNTER
Call placed to patient. Updated that per Marilyn she would be OK to schedule her procedure for 6/22/23. Patient wondering if another date is available as she has a prior commitment. Will call if another date opens but currently next date is 8/31. Patient wanted to keep this date open. And will call with any questions. -SC    ----- Message -----  From: Marilyn Middleton PA-C  Sent: 6/12/2023   8:31 AM CDT  To: Vikki Johnson RN; Linda Washington RN    Yes - thank you    ----- Message -----  From: Vikki Johnson RN  Sent: 6/9/2023   3:17 PM CDT  To: Marilyn Middleton PA-C; Linda Washington, ROSALBA    Looks like Lo's cath was done with no intervention. Ok to schedule her for LAAC with Dr. Mariano 6/22/2023?

## 2023-06-13 ENCOUNTER — ANCILLARY PROCEDURE (OUTPATIENT)
Dept: BONE DENSITY | Facility: CLINIC | Age: 73
End: 2023-06-13
Attending: INTERNAL MEDICINE
Payer: MEDICARE

## 2023-06-13 ENCOUNTER — OFFICE VISIT (OUTPATIENT)
Dept: INTERNAL MEDICINE | Facility: CLINIC | Age: 73
End: 2023-06-13
Payer: MEDICARE

## 2023-06-13 ENCOUNTER — ANCILLARY ORDERS (OUTPATIENT)
Dept: INTERNAL MEDICINE | Facility: CLINIC | Age: 73
End: 2023-06-13

## 2023-06-13 VITALS
BODY MASS INDEX: 34.27 KG/M2 | RESPIRATION RATE: 18 BRPM | WEIGHT: 181.5 LBS | OXYGEN SATURATION: 100 % | SYSTOLIC BLOOD PRESSURE: 112 MMHG | HEIGHT: 61 IN | HEART RATE: 60 BPM | TEMPERATURE: 98.2 F | DIASTOLIC BLOOD PRESSURE: 50 MMHG

## 2023-06-13 DIAGNOSIS — E78.5 DYSLIPIDEMIA, GOAL LDL BELOW 70: Chronic | ICD-10-CM

## 2023-06-13 DIAGNOSIS — M81.0 OSTEOPOROSIS, POST-MENOPAUSAL: ICD-10-CM

## 2023-06-13 DIAGNOSIS — Z00.00 ENCOUNTER FOR MEDICARE ANNUAL WELLNESS EXAM: Primary | ICD-10-CM

## 2023-06-13 DIAGNOSIS — G43.709 CHRONIC MIGRAINE WITHOUT AURA WITHOUT STATUS MIGRAINOSUS, NOT INTRACTABLE: Chronic | ICD-10-CM

## 2023-06-13 DIAGNOSIS — G25.81 RESTLESS LEG SYNDROME: ICD-10-CM

## 2023-06-13 DIAGNOSIS — I25.10 CORONARY ARTERY DISEASE INVOLVING NATIVE CORONARY ARTERY OF NATIVE HEART WITHOUT ANGINA PECTORIS: ICD-10-CM

## 2023-06-13 DIAGNOSIS — I48.0 PAROXYSMAL ATRIAL FIBRILLATION (H): Chronic | ICD-10-CM

## 2023-06-13 DIAGNOSIS — M81.0 SENILE OSTEOPOROSIS: ICD-10-CM

## 2023-06-13 DIAGNOSIS — Z12.31 VISIT FOR SCREENING MAMMOGRAM: ICD-10-CM

## 2023-06-13 DIAGNOSIS — Z78.0 POSTMENOPAUSAL STATUS: ICD-10-CM

## 2023-06-13 DIAGNOSIS — E03.9 ACQUIRED HYPOTHYROIDISM: Chronic | ICD-10-CM

## 2023-06-13 DIAGNOSIS — I48.0 PAROXYSMAL ATRIAL FIBRILLATION (H): Primary | Chronic | ICD-10-CM

## 2023-06-13 DIAGNOSIS — M81.0 SENILE OSTEOPOROSIS: Chronic | ICD-10-CM

## 2023-06-13 DIAGNOSIS — I10 ESSENTIAL HYPERTENSION: ICD-10-CM

## 2023-06-13 DIAGNOSIS — F41.9 ANXIETY: ICD-10-CM

## 2023-06-13 DIAGNOSIS — D50.8 OTHER IRON DEFICIENCY ANEMIA: ICD-10-CM

## 2023-06-13 PROBLEM — I25.83 CORONARY ARTERIOSCLEROSIS DUE TO LIPID RICH PLAQUE: Status: RESOLVED | Noted: 2019-03-29 | Resolved: 2023-06-13

## 2023-06-13 LAB
ALBUMIN UR-MCNC: NEGATIVE MG/DL
APPEARANCE UR: CLEAR
BACTERIA #/AREA URNS HPF: ABNORMAL /HPF
BILIRUB UR QL STRIP: NEGATIVE
COLOR UR AUTO: YELLOW
DEPRECATED CALCIDIOL+CALCIFEROL SERPL-MC: 38 UG/L (ref 20–75)
ERYTHROCYTE [DISTWIDTH] IN BLOOD BY AUTOMATED COUNT: 15.2 % (ref 10–15)
FERRITIN SERPL-MCNC: 24 NG/ML (ref 11–328)
GLUCOSE UR STRIP-MCNC: NEGATIVE MG/DL
HCT VFR BLD AUTO: 31.4 % (ref 35–47)
HGB BLD-MCNC: 9.7 G/DL (ref 11.7–15.7)
HGB UR QL STRIP: NEGATIVE
IRON BINDING CAPACITY (ROCHE): 448 UG/DL (ref 240–430)
IRON SATN MFR SERPL: 55 % (ref 15–46)
IRON SERPL-MCNC: 248 UG/DL (ref 37–145)
KETONES UR STRIP-MCNC: NEGATIVE MG/DL
LEUKOCYTE ESTERASE UR QL STRIP: ABNORMAL
MCH RBC QN AUTO: 26.4 PG (ref 26.5–33)
MCHC RBC AUTO-ENTMCNC: 30.9 G/DL (ref 31.5–36.5)
MCV RBC AUTO: 85 FL (ref 78–100)
NITRATE UR QL: NEGATIVE
PH UR STRIP: 7 [PH] (ref 5–8)
PLATELET # BLD AUTO: 292 10E3/UL (ref 150–450)
RBC # BLD AUTO: 3.68 10E6/UL (ref 3.8–5.2)
RBC #/AREA URNS AUTO: ABNORMAL /HPF
SP GR UR STRIP: 1.01 (ref 1–1.03)
SQUAMOUS #/AREA URNS AUTO: ABNORMAL /LPF
TSH SERPL DL<=0.005 MIU/L-ACNC: 1.76 UIU/ML (ref 0.3–4.2)
UROBILINOGEN UR STRIP-ACNC: 0.2 E.U./DL
WBC # BLD AUTO: 6.1 10E3/UL (ref 4–11)
WBC #/AREA URNS AUTO: ABNORMAL /HPF

## 2023-06-13 PROCEDURE — 77080 DXA BONE DENSITY AXIAL: CPT | Mod: TC | Performed by: PHYSICIAN ASSISTANT

## 2023-06-13 PROCEDURE — 83550 IRON BINDING TEST: CPT | Performed by: INTERNAL MEDICINE

## 2023-06-13 PROCEDURE — 36415 COLL VENOUS BLD VENIPUNCTURE: CPT | Performed by: INTERNAL MEDICINE

## 2023-06-13 PROCEDURE — 82306 VITAMIN D 25 HYDROXY: CPT | Performed by: INTERNAL MEDICINE

## 2023-06-13 PROCEDURE — 99214 OFFICE O/P EST MOD 30 MIN: CPT | Mod: 25 | Performed by: INTERNAL MEDICINE

## 2023-06-13 PROCEDURE — 77081 DXA BONE DENSITY APPENDICULR: CPT | Mod: TC | Performed by: PHYSICIAN ASSISTANT

## 2023-06-13 PROCEDURE — 96372 THER/PROPH/DIAG INJ SC/IM: CPT | Performed by: INTERNAL MEDICINE

## 2023-06-13 PROCEDURE — 85027 COMPLETE CBC AUTOMATED: CPT | Performed by: INTERNAL MEDICINE

## 2023-06-13 PROCEDURE — 84443 ASSAY THYROID STIM HORMONE: CPT | Performed by: INTERNAL MEDICINE

## 2023-06-13 PROCEDURE — 82728 ASSAY OF FERRITIN: CPT | Performed by: INTERNAL MEDICINE

## 2023-06-13 PROCEDURE — 81001 URINALYSIS AUTO W/SCOPE: CPT | Performed by: INTERNAL MEDICINE

## 2023-06-13 PROCEDURE — 83540 ASSAY OF IRON: CPT | Performed by: INTERNAL MEDICINE

## 2023-06-13 PROCEDURE — G0439 PPPS, SUBSEQ VISIT: HCPCS | Performed by: INTERNAL MEDICINE

## 2023-06-13 RX ORDER — HEPARIN SODIUM,PORCINE 10 UNIT/ML
5-20 VIAL (ML) INTRAVENOUS DAILY PRN
Status: CANCELLED | OUTPATIENT
Start: 2023-06-13

## 2023-06-13 RX ORDER — HEPARIN SODIUM (PORCINE) LOCK FLUSH IV SOLN 100 UNIT/ML 100 UNIT/ML
5 SOLUTION INTRAVENOUS
Status: CANCELLED | OUTPATIENT
Start: 2023-06-13

## 2023-06-13 RX ORDER — ALBUTEROL SULFATE 90 UG/1
1-2 AEROSOL, METERED RESPIRATORY (INHALATION)
Status: CANCELLED
Start: 2023-06-13

## 2023-06-13 RX ORDER — METHYLPREDNISOLONE SODIUM SUCCINATE 125 MG/2ML
125 INJECTION, POWDER, LYOPHILIZED, FOR SOLUTION INTRAMUSCULAR; INTRAVENOUS
Status: CANCELLED
Start: 2023-06-13

## 2023-06-13 RX ORDER — EPINEPHRINE 1 MG/ML
0.3 INJECTION, SOLUTION, CONCENTRATE INTRAVENOUS EVERY 5 MIN PRN
Status: CANCELLED | OUTPATIENT
Start: 2023-06-13

## 2023-06-13 RX ORDER — LORAZEPAM 0.5 MG/1
0.5 TABLET ORAL
Qty: 30 TABLET | Refills: 0 | Status: SHIPPED | OUTPATIENT
Start: 2023-06-13 | End: 2023-09-05

## 2023-06-13 RX ORDER — MEPERIDINE HYDROCHLORIDE 25 MG/ML
25 INJECTION INTRAMUSCULAR; INTRAVENOUS; SUBCUTANEOUS EVERY 30 MIN PRN
Status: CANCELLED | OUTPATIENT
Start: 2023-06-13

## 2023-06-13 RX ORDER — ALBUTEROL SULFATE 0.83 MG/ML
2.5 SOLUTION RESPIRATORY (INHALATION)
Status: CANCELLED | OUTPATIENT
Start: 2023-06-13

## 2023-06-13 RX ORDER — DIPHENHYDRAMINE HYDROCHLORIDE 50 MG/ML
50 INJECTION INTRAMUSCULAR; INTRAVENOUS
Status: CANCELLED
Start: 2023-06-13

## 2023-06-13 ASSESSMENT — ENCOUNTER SYMPTOMS
SORE THROAT: 0
CONSTIPATION: 0
HEADACHES: 0
EYE PAIN: 0
DIZZINESS: 0
COUGH: 0
HEARTBURN: 0
WEAKNESS: 0
SHORTNESS OF BREATH: 1
JOINT SWELLING: 0
FEVER: 0
CHILLS: 0
PALPITATIONS: 0
NAUSEA: 0
DYSURIA: 0
NERVOUS/ANXIOUS: 1
BREAST MASS: 0
FREQUENCY: 1
ABDOMINAL PAIN: 0
PARESTHESIAS: 0
ARTHRALGIAS: 0
HEMATOCHEZIA: 0
HEMATURIA: 0
DIARRHEA: 0
MYALGIAS: 1

## 2023-06-13 ASSESSMENT — ACTIVITIES OF DAILY LIVING (ADL): CURRENT_FUNCTION: NO ASSISTANCE NEEDED

## 2023-06-13 NOTE — PATIENT INSTRUCTIONS
To schedule mammogram. [phone]    Labs today.    To schedule Venofer injection x 3.    Prolia 10th today.  Prolia 11th in 6 months with me.    DXA in 2 years .   Phone number to schedule [phone].    Daily calcium need is 7217-6047 mg a day from the diet and supplements.  Calcium citrate is easier to digest.  Vitamin D 2000 IU daily recommended.    Risk of rebound vertebral fractures is higher when Prolia suddenly stopped or dose was missed.      Prolia and Covid vaccine should be  for at least a week.       Schedule lab only appointment for next week.      Patient Education   Personalized Prevention Plan  You are due for the preventive services outlined below.  Your care team is available to assist you in scheduling these services.  If you have already completed any of these items, please share that information with your care team to update in your medical record.  Health Maintenance Due   Topic Date Due    Heart Failure Action Plan  Never done    URINE DRUG SCREEN  Never done    Pneumococcal Vaccine (3 - PPSV23 if available, else PCV20) 12/01/2020    COVID-19 Vaccine (6 - Moderna series) 02/20/2023    Mammogram  03/11/2023       Understanding USDA MyPlate  The USDA has guidelines to help you make healthy food choices. These are called MyPlate. MyPlate shows the food groups that make up healthy meals using the image of a place setting. Before you eat, think about the healthiest choices for what to put on your plate or in your cup or bowl. To learn more about building a healthy plate, visit www.choosemyplate.gov.     The food groups  Fruits. Any fruit or 100% fruit juice counts as part of the Fruit Group. Fruits may be fresh, canned, frozen, or dried, and may be whole, cut-up, or pureed. Make 1/2 of your plate fruits and vegetables.  Vegetables. Any vegetable or 100% vegetable juice counts as a member of the Vegetable Group. Vegetables may be fresh, frozen, canned, or dried. They can be served raw  or cooked and may be whole, cut-up, or mashed. Make 1/2 of your plate fruits and vegetables.  Grains. All foods made from grains are part of the Grains Group. These include wheat, rice, oats, cornmeal, and barley. Grains are often used to make foods such as bread, pasta, oatmeal, cereal, tortillas, and grits. Grains should be no more than 1/4 of your plate. At least half of your grains should be whole grains.  Protein. This group includes meat, poultry, seafood, beans and peas, eggs, processed soy products (such as tofu), nuts (including nut butters), and seeds. Make protein choices no more than 1/4 of your plate. Meat and poultry choices should be lean or low fat.  Dairy. The Dairy Group includes all fluid milk products and foods made from milk that contain calcium, such as yogurt and cheese. (Foods that have little calcium, such as cream, butter, and cream cheese, are not part of this group.) Most dairy choices should be low-fat or fat-free.  Oils. Oils aren't a food group, but they do contain essential nutrients. However it's important to watch your intake of oils. These are fats that are liquid at room temperature. They include canola, corn, olive, soybean, vegetable, and sunflower oil. Foods that are mainly oil include mayonnaise, certain salad dressings, and soft margarines. You likely already get your daily oil allowance from the foods you eat.  Things to limit  Eating healthy also means limiting these things in your diet:  Salt (sodium). Many processed foods have a lot of sodium. To keep sodium intake down, eat fresh vegetables, meats, poultry, and seafood when possible. Purchase low-sodium, reduced-sodium, or no-salt-added food products at the store. And don't add salt to your meals at home. Instead, season them with herbs and spices such as dill, oregano, cumin, and paprika. Or try adding flavor with lemon or lime zest and juice.  Saturated fat. Saturated fats are most often found in animal products such  as beef, pork, and chicken. They are often solid at room temperature, such as butter. To reduce your saturated fat intake, choose leaner cuts of meat and poultry. And try healthier cooking methods such as grilling, broiling, roasting, or baking. For a simple lower-fat swap, use plain nonfat yogurt instead of mayonnaise when making potato salad or macaroni salad.  Added sugars. These are sugars added to foods. They are in foods such as ice cream, candy, soda, fruit drinks, sports drinks, energy drinks, cookies, pastries, jams, and syrups. Cut down on added sugars by sharing sweet treats with a family member or friend. You can also choose fruit for dessert, and drink water or other unsweetened beverages.  [employer] last reviewed this educational content on 6/1/2020 2000-2022 [employer]. All rights reserved. This information is not intended as a substitute for professional medical care. Always follow your healthcare professional's instructions.

## 2023-06-13 NOTE — PROGRESS NOTES
"SUBJECTIVE:   Lo is a 72 year old who presents for Preventive Visit.      6/13/2023     8:37 AM   Additional Questions   Roomed by SULEIMAN HILL     Are you in the first 12 months of your Medicare coverage?  No    Healthy Habits:     In general, how would you rate your overall health?  Good    Duration of exercise:  Less than 15 minutes    Do you usually eat at least 4 servings of fruit and vegetables a day, include whole grains    & fiber and avoid regularly eating high fat or \"junk\" foods?  No    Taking medications regularly:  Yes    Ability to successfully perform activities of daily living:  No assistance needed    Home Safety:  No safety concerns identified    Hearing Impairment:  No hearing concerns    In the past 6 months, have you been bothered by leaking of urine?  No    In general, how would you rate your overall mental or emotional health?  Excellent      PHQ-2 Total Score: 0    Additional concerns today:  Yes        Have you ever done Advance Care Planning? (For example, a Health Directive, POLST, or a discussion with a medical provider or your loved ones about your wishes): Yes, patient states has an Advance Care Planning document and will bring a copy to the clinic.       Fall risk  Fallen 2 or more times in the past year?: No  Any fall with injury in the past year?: Yes    Cognitive Screening   1) Repeat 3 items (Leader, Season, Table)    2) Clock draw: NORMAL  3) 3 item recall: Recalls 3 objects  Results: 3 items recalled: COGNITIVE IMPAIRMENT LESS LIKELY    Mini-CogTM Copyright FAUSTINO Langston. Licensed by the author for use in [facility]; reprinted with permission (alex@.[facility]). All rights reserved.      Do you have sleep apnea, excessive snoring or daytime drowsiness?: doing the sleep study     Reviewed and updated as needed this visit by clinical staff   Tobacco  Allergies  Meds              Reviewed and updated as needed this visit by Provider                 Social History     Tobacco Use "     Smoking status: Never     Passive exposure: Never     Smokeless tobacco: Never   Vaping Use     Vaping status: Never Used     Passive vaping exposure: Yes   Substance Use Topics     Alcohol use: Yes     Comment: Alcoholic Drinks/day: 1 cocktail daily             6/9/2023     5:35 AM   Alcohol Use   Prescreen: >3 drinks/day or >7 drinks/week? No     Do you have a current opioid prescription? No  Do you use any other controlled substances or medications that are not prescribed by a provider? None              Current providers sharing in care for this patient include:   Patient Care Team:  Bernadette Taylor MD as PCP - General (Internal Medicine)  Bernadette Taylor MD as Assigned PCP  Ayleen Ladd MD as Assigned Allergy Provider  Shalonda Sadler APRN CNP as Assigned Heart and Vascular Provider  Harjinder Bonilla MD as MD (Cardiovascular Disease)  Suresh Ferguson MD as Assigned Sleep Provider  Justine Elizabeth NP as Assigned Neuroscience Provider  Jacek Worley MD as MD (Cardiovascular Disease)    The following health maintenance items are reviewed in Epic and correct as of today:  Health Maintenance   Topic Date Due     HF ACTION PLAN  Never done     URINE DRUG SCREEN  Never done     Pneumococcal Vaccine: 65+ Years (3 - PPSV23 if available, else PCV20) 12/01/2020     COVID-19 Vaccine (6 - Moderna series) 02/20/2023     MAMMO SCREENING  03/11/2023     LIPID  11/15/2023     ALT  11/27/2023     BMP  12/08/2023     TSH W/FREE T4 REFLEX  12/13/2023     PHQ-9  12/13/2023     ANNUAL REVIEW OF HM ORDERS  05/01/2024     MEDICARE ANNUAL WELLNESS VISIT  06/13/2024     FALL RISK ASSESSMENT  06/13/2024     CBC  06/13/2024     COLORECTAL CANCER SCREENING  12/01/2027     ADVANCE CARE PLANNING  06/13/2028     DTAP/TDAP/TD IMMUNIZATION (5 - Td or Tdap) 07/22/2028     DEXA  05/18/2036     HEPATITIS C SCREENING  Completed     INFLUENZA VACCINE  Completed     ZOSTER IMMUNIZATION  Completed     IPV IMMUNIZATION  Aged Out     MENINGITIS  "IMMUNIZATION  Aged Out               Review of Systems   Constitutional: Negative for chills and fever.   HENT: Positive for congestion. Negative for ear pain, hearing loss and sore throat.    Eyes: Negative for pain and visual disturbance.   Respiratory: Positive for shortness of breath. Negative for cough.    Cardiovascular: Negative for chest pain, palpitations and peripheral edema.   Gastrointestinal: Negative for abdominal pain, constipation, diarrhea, heartburn, hematochezia and nausea.   Breasts:  Negative for tenderness, breast mass and discharge.   Genitourinary: Positive for frequency. Negative for dysuria, genital sores, hematuria, pelvic pain, urgency, vaginal bleeding and vaginal discharge.   Musculoskeletal: Positive for myalgias. Negative for arthralgias and joint swelling.   Skin: Negative for rash.   Neurological: Negative for dizziness, weakness, headaches and paresthesias.   Psychiatric/Behavioral: Negative for mood changes. The patient is nervous/anxious.          OBJECTIVE:   /50 (BP Location: Right arm, Patient Position: Sitting, Cuff Size: Adult Large)   Pulse 60   Temp 98.2  F (36.8  C) (Oral)   Resp 18   Ht 1.549 m (5' 1\")   Wt 82.3 kg (181 lb 8 oz)   LMP  (LMP Unknown)   SpO2 100%   BMI 34.29 kg/m   Estimated body mass index is 34.29 kg/m  as calculated from the following:    Height as of this encounter: 1.549 m (5' 1\").    Weight as of this encounter: 82.3 kg (181 lb 8 oz).  Physical Exam  General Appearance: Alert, cooperative, no distress, appears stated age.  Head: Normocephalic, without obvious abnormality, atraumatic  Eyes: PERRL, conjunctiva/corneas clear, EOM's intact  Ears: Normal TM's and external ear canals, both ears  Nose: Nares normal, septum midline,mucosa normal, no drainage  Throat: Lips, mucosa, and tongue normal; teeth and gums normal  Neck: Supple, symmetrical, trachea midline, no adenopathy;  thyroid: not enlarged, symmetric, no tenderness/mass/nodules; no " carotid bruit or JVD  Back: Symmetric, no curvature, ROM normal, no CVA tenderness.  Lungs: Clear to auscultation bilaterally, respirations unlabored.  Breasts: No breast masses, tenderness, asymmetry, or nipple discharge.  Heart: Regular rate and rhythm, S1 and S2 normal, no murmur, rub, or gallop.  Abdomen: Soft, non-tender, bowel sounds active all four quadrants,  no masses, no organomegaly.  Extremities: Extremities normal, atraumatic, no cyanosis or edema.  Skin: Skin color, texture, turgor normal, no rashes or lesions.  Lymph nodes: Cervical, supraclavicular, and axillary nodes normal.  Neurologic: No focal neurological findings.          ASSESSMENT / PLAN:   (Z00.00) Encounter for Medicare annual wellness exam  (primary encounter diagnosis)      (Z12.31) Visit for screening mammogram  Comment:   Plan: MA SCREENING DIGITAL BILAT - Future  (s+30)            (I48.0) Paroxysmal atrial fibrillation (H)   Symptomatic, for the most part controlled on sotalol, and given advanced [id number] score on chronic Eliquis therapy.  Scheduled for Watchman procedure in 2 weeks.    (I25.10) Coronary artery disease involving native coronary artery of native heart without angina pectoris  Comment:  Due to abnormal nuclear stress test she subsequently had intervention on the distal LAD in November 2022 with a resolute Mele 2.5 x 22 mm stent.  Still has stable angina, discomfort when she walks up a flight of steps or in the evening while sitting and goes away with rest or 5 to 10 minutes relaxation.  For the most part controlled on nitrates.  She had angiogram done on 6/6/23 as pre Watchman procedure workup, and did not show any abnormalities.      (E78.5) Dyslipidemia, goal LDL below 70  Comment: on statin      (I10) Essential hypertension  Comment: stable  Plan: UA with Microscopic reflex to Culture - Clinic         Collect, UA Microscopic with Reflex to Culture            (M81.0) Senile osteoporosis  Comment: Prolia #10  today.  DXA scan was done today and discussed and reviewed with the patient.  The following steps were completed to comply with the REMS program for Prolia:    Reviewed information in the Medication Guide and Patient Counseling Chart, including the serious risks of Prolia  and the symptoms of each risk.    Advised patient to seek prompt medical attention if they have signs or symptoms of any of the serious risks.    Provided each patient a copy of the Medication Guide and Patient Brochure      (G25.81) Restless leg syndrome  Comment: On Mirapex for years. Also started oral iron pills few weeks ago. She did have Venofer x 2 in June 2022 ordered by Neurologist which helped a lot. She will schedule Venofer again.      (E03.9) Acquired hypothyroidism  Comment: on levothyroxine  Plan: TSH with free T4 reflex            (G43.709) Chronic migraine without aura without status migrainosus, not intractable  Comment: seeing Neurologist, stable on Ubrelvy  Plan:     (F41.9) Anxiety  Comment: stable on Buspar and Citalopram  Plan: LORazepam (ATIVAN) 0.5 MG tablet              (D50.8) Other iron deficiency anemia  Comment: Unexplained drop of hemoglobin, on Plavix and Eliquis. Patient denies any symptoms of GI bleed, hematuria, vaginal bleeding. She will schedule Venofer x 3. She will bring stool sample.  She will check Hgb weekly until Watchman procedure.  Plan: CBC with platelets, Iron & Iron Binding         Capacity, Ferritin, TSH with free T4 reflex,         Vitamin D Deficiency, Occult blood stool, CBC         with platelets            (M81.0) Osteoporosis, post-menopausal  Comment:   Plan: Vitamin D Deficiency              Patient has been advised of split billing requirements and indicates understanding: Yes      COUNSELING:  Reviewed preventive health counseling, as reflected in patient instructions       Regular exercise       Healthy diet/nutrition      BMI:   Estimated body mass index is 34.29 kg/m  as calculated from  "the following:    Height as of this encounter: 1.549 m (5' 1\").    Weight as of this encounter: 82.3 kg (181 lb 8 oz).         She reports that she has never smoked. She has never been exposed to tobacco smoke. She has never used smokeless tobacco.      Appropriate preventive services were discussed with this patient, including applicable screening as appropriate for cardiovascular disease, diabetes, osteopenia/osteoporosis, and glaucoma.  As appropriate for age/gender, discussed screening for colorectal cancer, prostate cancer, breast cancer, and cervical cancer. Checklist reviewing preventive services available has been given to the patient.    Reviewed patients plan of care and provided an AVS. The Basic Care Plan (routine screening as documented in Health Maintenance) for Suma meets the Care Plan requirement. This Care Plan has been established and reviewed with the Patient.      Bernadette Taylor MD  [facility]    Identified Health Risks:    I have reviewed Opioid Use Disorder and Substance Use Disorder risk factors and made any needed referrals.     Answers for HPI/ROS submitted by the patient on 6/12/2023  If you checked off any problems, how difficult have these problems made it for you to do your work, take care of things at home, or get along with other people?: Not difficult at all  PHQ9 TOTAL SCORE: 2      "

## 2023-06-14 ENCOUNTER — LAB (OUTPATIENT)
Dept: LAB | Facility: CLINIC | Age: 73
End: 2023-06-14
Payer: MEDICARE

## 2023-06-14 DIAGNOSIS — D50.8 OTHER IRON DEFICIENCY ANEMIA: ICD-10-CM

## 2023-06-14 DIAGNOSIS — R79.0 ABNORMAL RESULT OF IRON PROFILE TESTING: Primary | ICD-10-CM

## 2023-06-14 LAB — HEMOCCULT STL QL: POSITIVE

## 2023-06-14 PROCEDURE — 82272 OCCULT BLD FECES 1-3 TESTS: CPT

## 2023-06-15 ENCOUNTER — HOSPITAL ENCOUNTER (OUTPATIENT)
Dept: MAMMOGRAPHY | Facility: CLINIC | Age: 73
Discharge: HOME | End: 2023-06-15
Attending: INTERNAL MEDICINE | Admitting: INTERNAL MEDICINE
Payer: MEDICARE

## 2023-06-15 DIAGNOSIS — R19.5 POSITIVE OCCULT STOOL BLOOD TEST: ICD-10-CM

## 2023-06-15 DIAGNOSIS — D64.9 ANEMIA, UNSPECIFIED TYPE: Primary | ICD-10-CM

## 2023-06-15 DIAGNOSIS — Z12.31 VISIT FOR SCREENING MAMMOGRAM: ICD-10-CM

## 2023-06-15 PROCEDURE — 77067 SCR MAMMO BI INCL CAD: CPT

## 2023-06-16 DIAGNOSIS — D64.9 ANEMIA: ICD-10-CM

## 2023-06-16 PROBLEM — I50.30 HEART FAILURE WITH PRESERVED EJECTION FRACTION, NYHA CLASS II (H): Status: ACTIVE | Noted: 2023-06-16

## 2023-06-16 NOTE — PATIENT INSTRUCTIONS
Suma Mark,    It was a pleasure to see you today at the [facility].     Before your procedure:   -Continue your Eliquis as ordered.    After your procedure:   -You should arrange for someone to stay with you for the first 24 hours after discharge due to receiving sedating medications during the procedure.  -Make sure you are taking all of your medications as directed in your discharge summary.  Do not stop taking these medications (especially your Plavix/clopidogrel and aspirin) without speaking to your provider.   -No driving for 3 days after your procedure.   -No lifting more than 10 pounds or aggressive exercise for 5 days after your procedure.   -You may shower tomorrow, no bath for 5 days  -Call the office if you start bleeding from the site in your groin, if the site is swollen or tender, if you have a fever, or if you have a growing hard lump at the site.   -Dial 911 if you have any NEW signs or symptoms of a stroke, including but not limited to injuries in vision, problems talking, numbness on one side of your face or body, sudden headache, confusion, or problems with walking.  -Do not hesitate to call the office with additional questions or concerns.     Marta Kennedy, CNP  Clinical Cardiac Electrophysiology  [facility] and Scheduling [phone]  Left atrial appendage closure nurse coordinators: [phone] for Vikki Johnson or [phone] for Linda Washington

## 2023-06-16 NOTE — PROGRESS NOTES
[facility] [address], [place] [address]   [email or web address]   OFFICE: [phone]        Primary Care: Bernadette Taylor MD    This visit will serve as history and physical for left atrial appendage occlusion via Watchman device.  See assessment and plan for further details.      Assessment/Plan     Paroxysmal atrial fibrillation: Diagnosed 2022. [id number] score of 5 for 65-74, female gender, hypertension, cardiomyopathy and coronary artery disease; HAS-BLED score of 3 for age, bleeding disposition and current use antiplatelet therapy. She is not a good candidate for long-term anticoagulation due to anemia of unknown etiology and long-term use antiplatelet medication. She received CT imaging which has indicated suitable anatomy for LAAC procedure and negative for thrombus.    She understands that the risks of the procedure are <2% and include but are not limited to device embolization, air embolism, myocardial perforation, device thrombosis, ASD, stroke, or death. We reviewed the medication changes that will place subsequent to left atrial appendage occlusion, including stopping oral anticoagulation and initiation of aspirin 81 mg daily and Plavix 75 mg daily. She will continue DAPT for 6 months, then stop Plavix and remain on aspirin 81 mg daily indefinitely. She will have a DIANA approximately 3 months post-implant. We discussed expected recovery and follow-up. All questions were answered to her satisfaction and she is ready to proceed.     Medical, past medical, surgical and social history reviewed and updated. Current medications and allergies reviewed. No personal or family history of adverse reactions to anesthesia or abnormal bleeding with surgery. Labs today including CBC and BMP pending.     Consents were signed and witnessed by me.  Suma WEEKS Mark will report the morning of 6/22/2023 for the procedure. All instructions regarding times and  medications were given by LAAO coordinator RN.     -Continue sotalol 80 mg twice a day.  QTc within safety parameters and no history of renal insufficiency    Coronary artery disease: Status post PCI to OM2 and distal LAD in October November 2022.  Intermittent episodes of shortness of breath, chest pressure and mild diaphoresis, occurring with activity including stairs and relieved with rest  -Continue Imdur, as needed SL NTG    HFpEF: Unremarkable angio with patent stents and moderate/nonobstructive disease earlier this month.  Compensated and euvolemic today  -Continue furosemide  -Arrhythmia management as above    Essential hypertension: At goal on current regimen  -Continue amlodipine, irbesartan    Anemia secondary to probable GI bleed: Hemoglobin previously 12, down to 9.7 most recently.  PMD aware and pending further work-up including iron studies, positive fecal occult blood and planning colonoscopy. No active bleeding currently.  Hemoglobin today pending.   -Continue iron supplementation    Follow up: 6 week post LAAO implant in clinic      History of Present Illness/Subjective    Suma Mark is a 72 year old female who is seen today for history and physical prior to left atrial appendage closure via Watchman device. She has a past medical history significant for paroxysmal atrial fibrillation, coronary artery disease with prior PCI in October and November 2022, HFpEF, essential hypertension, venous insufficiency, anemia, fibromyalgia, hypothyroidism, obesity.     She was hospitalized in October 2022 for unstable angina and underwent coronary angiogram, significant for multivessel disease PCI to OM 2.  There was a question of brief episode of AF detected in the ED and she underwent 2-week MCOT, significant for paroxysmal AF with RVR.  She was started on Eliquis.  She continued to have angina and had PCI to distal LAD in November.  She was started on sotalol in December.  She continued to have angina,  occurring primarily at rest and not associated with exertion, after which another event monitor was ordered showing paroxysmal AF but not quantifiable. She requires long-term antiplatelet therapy secondary to coronary artery disease and Eliquis is costly for her. She was planned to undergo atrial appendage closure via Watchman device earlier this month.  This was postponed due to worsening angina symptoms, with subsequent angiogram demonstrating patent stents and nonobstructive disease.     She reports intermittent awareness of arrhythmia.  At times with AF she notes chest tightness, decreased activity tolerance, and shortness of breath with exertion.  Her hemoglobin has been slowly declining and she did have a positive fecal occult blood test and has colonoscopy pending.  She denies gross blood in urine or stool, hematemesis.  She denies palpitations, pedal edema, orthopnea, lightheadedness/dizziness.     Data Review     EK2023: sinus bradycardia at 57 bpm, QRS 86 ms, QT/QTc 430/418 ms  2023: Sinus rhythm at 63 bpm, QRS 90 ms, QT/QTc 448/458 ms  Personally reviewed.     Complete TTE: 2023  The left ventricle is normal in size with mild concentric left ventricular  hypertrophy.  Left ventricular function is normal.The ejection fraction is 60-65%.  Normal right ventricle size and systolic function.  Both atria are of normal size.  No significant valve disease identified.  There is no comparison study available.    MCOT, DERIC:     DERIC x6 days beginning 2023.  Baseline sinus average 73 bpm.  Paroxysmal AF.    CT PULMONARY VEINS: 3/28/2023  No thrombus to NAVIN.  Mild left atrial dilation.  Coronary artery disease.    ADDITIONAL STUDIES:    Left heart cath: 2023  1.  Left circumflex stent remains widely patent  2.  Mid/distal LAD stent remains widely patent  3.  Moderate proximal and apical disease in the LAD, proximal RCA, and distal RCA appear unchanged from prior study 2022.  No new  or significant progression of underlying CAD.    30 minutes spent reviewing prior records (including documentation, laboratory studies, cardiac testing/imaging), history and physical exam, planning, and subsequent documentation.     I have reviewed and updated the patient's past medical history, social history, family history, allergies and medication list.                    Physical Examination Review of Systems   /52 (BP Location: Right arm, Patient Position: Sitting, Cuff Size: Adult Large)   Pulse 60   Resp 16   Wt 81.6 kg (180 lb)   LMP  (LMP Unknown)   BMI 34.01 kg/m      Body mass index is 34.01 kg/m .    Wt Readings from Last 3 Encounters:   06/19/23 81.6 kg (180 lb)   06/13/23 82.3 kg (181 lb 8 oz)   06/09/23 81.2 kg (179 lb)       General   Appearance:   Alert and oriented, in no acute distress.    HEENT:  Normocephalic and atraumatic. Conjunctiva and sclera are clear. Moist oral mucosa.    Neck: No JVP, carotid bruit or obvious thyromegaly.   Lungs:   Respirations unlabored. Clear bilaterally with no rales, rhonchi, or wheezes.     Cardiovascular:   Rhythm is regular. S1 and S2 are normal. No significant murmur is present. Lower extremities demonstrate no significant edema. Posterior tibial pulses are intact bilaterally.   Extremities: No cyanosis or clubbing   Skin: Skin is warm, dry, and otherwise intact.   Neurologic: Gait not assessed. Mood and affect appropriate.                                                Medical History  Surgical History Family History Social History   Past Medical History:   Diagnosis Date     Anxiety state, unspecified      Back pain      Diverticulitis 12/07/2017     DJD (degenerative joint disease)      Essential hypertension, benign      Fibromyalgia      Gastro-oesophageal reflux disease      Hernia, ventral 04/27/2017     History of anesthesia complications     hypotension after surgery     History of blood transfusion      Hyperlipidemia      Hyponatremia  12/07/2017     Major depression      Migraine      Osteopenia      PONV (postoperative nausea and vomiting)      Posttraumatic stress disorder      Raynaud's disease      Restless leg syndrome      S/P total knee replacement 11/27/2017     Thrombosis of leg     with pregnancy     Unspecified hypothyroidism     Past Surgical History:   Procedure Laterality Date     ARTHROPLASTY KNEE UNICOMPARTMENT  10/31/2013    Procedure: ARTHROPLASTY KNEE UNICOMPARTMENT;  LEFT KNEE UNICOMPARTMENTAL ARTHROPLASTY (CAROLINE)^;  Surgeon: Chi Mittal MD;  Location:  OR     BREAST SURGERY      bx     COLECTOMY N/A 06/02/2017    Procedure: RESECTION, SMALL INTESTINE, OPEN ADHESIOLYSIS;  Surgeon: Keyon Qureshi MD;  Location: [facility];  Service:      CV CORONARY ANGIOGRAM N/A 10/12/2022    Procedure: CV CORONARY ANGIOGRAM;  Surgeon: You Martinez MD;  Location: [facility] CV     CV CORONARY ANGIOGRAM N/A 6/9/2023    Procedure: Coronary Angiogram;  Surgeon: Bret Jin MD;  Location: [facility]     CV FRACTIONAL FLOW RATIO WIRE N/A 10/12/2022    Procedure: Fractional Flow Ratio Wire;  Surgeon: You Martinez MD;  Location: [facility] CV     CV LEFT HEART CATH N/A 6/9/2023    Procedure: Left Heart Catheterization;  Surgeon: Bret Jin MD;  Location: [facility] CV     CV PCI STENT DRUG ELUTING N/A 10/12/2022    Procedure: Percutaneous Coronary Intervention Stent;  Surgeon: You Martinez MD;  Location: [facility] CV     CV PCI STENT DRUG ELUTING N/A 11/08/2022    Procedure: Percutaneous Coronary Intervention - Stent;  Surgeon: Bret Jin MD;  Location: [facility] CV     ENDOSCOPIC RETROGRADE CHOLANGIOPANCREATOGRAPHY       ENDOSCOPIC STRIPPING VEIN(S)      BILATERLY     GENITOURINARY SURGERY      bladder sling     GI SURGERY  10/02/2015    Rectal prolaspse. s/p Abdominal rectopexy, sacral colpopexy, proctoscopy, cystoscopy     HERNIORRHAPHY INCISIONAL  (LOCATION) N/A 06/02/2017    Procedure: LAPARASCOPIC CONVERTED TO OPEN PRIMARY INCISIONAL HERNIA REPAIR;  Surgeon: Keyon Qureshi MD;  Location: [facility] Main OR;  Service:      HYSTERECTOMY  1985 1985-1986     LAMINECTOMY LUMBAR TWO LEVELS  2006     LAPAROSCOPY N/A 08/22/2019    Procedure: DIAGNOSTIC LAPAROSCOPY, LYSIS OF ADHESION;  Surgeon: Svetlana Ron MD;  Location: [facility] Main OR;  Service: General     LUMBAR HARDWARE REMOVAL[  03/22/2012    REMOVAL LUMBAR HARDWARE 03/22/2012   L3-S1; Type CD Horizon m8 instrumentation Dr. Murray     RELEASE CARPAL TUNNEL       TONSILLECTOMY  1954    ???     TOTAL KNEE ARTHROPLASTY Right 11/27/2017    Procedure:  RIGHT TOTAL KNEE ARTHROPLASTY;  Surgeon: Kevin Ryder MD;  Location: [facility];  Service: Orthopedics      THYROID BIOPSY  04/19/2019    Family History   Problem Relation Age of Onset     Dementia Mother      Arthritis Mother      Chronic Obstructive Pulmonary Disease Father      Cardiovascular Father      Hypertension Father      No Known Problems Sister      No Known Problems Daughter      No Known Problems Son      Cerebrovascular Disease Maternal Grandmother      Heart Disease Paternal Grandmother      Cerebrovascular Disease Paternal Grandmother      No Known Problems Sister      No Known Problems Sister      No Known Problems Son      No Known Problems Daughter      Breast Cancer Paternal Aunt         age in 50's    Social History     Tobacco Use     Smoking status: Never     Passive exposure: Never     Smokeless tobacco: Never   Vaping Use     Vaping status: Never Used     Passive vaping exposure: Yes   Substance Use Topics     Alcohol use: Yes     Comment: Alcoholic Drinks/day: 1 cocktail daily     Drug use: No          Medications  Allergies   Current Outpatient Medications   Medication Sig Dispense Refill     amLODIPine (NORVASC) 5 MG tablet TAKE 1 TABLET BY MOUTH EVERYDAY AT BEDTIME 90 tablet 1     apixaban ANTICOAGULANT (ELIQUIS  ANTICOAGULANT) 5 MG tablet Take 1 tablet (5 mg) by mouth every 12 hours 180 tablet 3     atorvastatin (LIPITOR) 80 MG tablet Take 1 tablet (80 mg) by mouth At Bedtime 90 tablet 3     botulinum toxin type A (BOTOX) 100 units injection Every 3 months. [facility]       busPIRone (BUSPAR) 10 MG tablet Take 1 tablet (10 mg) by mouth 2 times daily 180 tablet 3     cholecalciferol 50 MCG (2000 UT) CAPS Take 2,000 Units by mouth daily       citalopram (CELEXA) 20 MG tablet Take 2 tablets (40 mg) by mouth daily 180 tablet 2     clopidogrel (PLAVIX) 75 MG tablet Take 1 tablet (75 mg) by mouth daily 90 tablet 3     diclofenac (VOLTAREN) 1 % topical gel Apply 2-4 g topically 4 times daily 150 g 3     eletriptan (RELPAX) 40 MG tablet Take 1 tablet (40 mg) by mouth at onset of headache for migraine 10 tablet 0     ferrous sulfate (FEROSUL) 325 (65 Fe) MG tablet Take 1 tablet (325 mg) by mouth 2 times daily 60 tablet 1     folic acid (FOLVITE) 400 MCG tablet Take 2 tablets (800 mcg) by mouth daily 30 tablet 3     furosemide (LASIX) 20 MG tablet Take 2 tablets (40 mg) by mouth daily 180 tablet 3     HYDROcodone-acetaminophen (NORCO) 5-325 MG tablet Take 1 tablet by mouth daily as needed (Severe headache) 5 tablet 0     irbesartan (AVAPRO) 150 MG tablet Take 1 tablet (150 mg) by mouth daily 90 tablet 3     isosorbide mononitrate (IMDUR) 30 MG 24 hr tablet Take 1 tablet (30 mg) by mouth daily 30 tablet 11     levothyroxine (SYNTHROID/LEVOTHROID) 88 MCG tablet TAKE 1 TABLET BY MOUTH DAILY AT 6:00 AM. 90 tablet 2     LORazepam (ATIVAN) 0.5 MG tablet Take 1 tablet (0.5 mg) by mouth nightly as needed for anxiety or sleep 30 tablet 0     nitroGLYcerin (NITROSTAT) 0.4 MG sublingual tablet For chest pain place 1 tablet under the tongue every 5 minutes for 3 doses. If symptoms persist 5 minutes after 1st dose call 911. 30 tablet 1     pramipexole (MIRAPEX) 0.25 MG tablet TAKE 1 TABLET BY MOUTH THREE TIMES A DAY. 270 tablet 3     sotalol  "(BETAPACE) 80 MG tablet TAKE 1 TABLET BY MOUTH EVERY 12 HOURS 60 tablet 3     ubrogepant (UBRELVY) 100 MG tablet UBRELVY 100 MG TABS       zolpidem (AMBIEN) 5 MG tablet Take tablet by mouth 15 minutes prior to sleep, for Sleep Study 1 tablet 0    Allergies   Allergen Reactions     Cephalexin Nausea and Vomiting     Ace Inhibitors Other (See Comments)     Elevates blood pressure     Amitriptyline Hcl Other (See Comments)     elevates blood pressure     Atenolol Other (See Comments)     Aggravates reynauds     Celebrex [Celecoxib] GI Disturbance     Cephalosporins Unknown     Pt doesn't remember        Clonidine Unknown     \"got very ill in ER\"     Codeine Sulfate Nausea and Vomiting     Darvocet [Propoxyphene N-Apap] Nausea and Vomiting     Dexamethasone Acetate Swelling     Erythromycin Nausea and Vomiting     Escitalopram Other (See Comments)     Headaches.       Gabapentin Unknown     \"Spotted\"     Hctz Unknown     \"depletes my sodium\"     Potassium GI Disturbance     Propoxyphene      HUT Reaction: Gastrointestinal; HUT Reaction: Nausea And Vomiting; HUT Noted: [id number]     Sodium      Tramadol Swelling     Swelling of tongue and face     Tramadol-Acetaminophen Other (See Comments)     Triamterene      Venlafaxine Nausea and Vomiting and Diarrhea     Zolpidem Other (See Comments) and Unknown     Pt doesn't rember       Zolpidem Tartrate Unknown     Bacitracin Itching and Rash     Sulfanilamide Rash     No complications with prior use GA.       Lab Results    Chemistry/lipid CBC Cardiac Enzymes/BNP/TSH/INR   Lab Results   Component Value Date    BUN 21 06/08/2023     06/08/2023    CO2 25 06/08/2023     No results found for: CREATININE    Lab Results   Component Value Date    CHOL 132 11/15/2022    HDL 43 (L) 11/15/2022    LDL 68 11/15/2022      Lab Results   Component Value Date    WBC 6.1 06/13/2023    HGB 9.7 (L) 06/13/2023    HCT 31.4 (L) 06/13/2023    MCV 85 06/13/2023     06/13/2023    Lab " Results   Component Value Date    TROPONINI 0.02 11/28/2022     11/27/2022    TSH 1.76 06/13/2023    INR 1.07 11/27/2022        This note has been dictated using voice recognition software. Any grammatical, typographical, or context distortions are unintentional and inherent to the software.    Marta Kennedy CNP  Clinical Cardiac Electrophysiology  [address]  [place] [address]   Office: [phone]  Fax: [phone]

## 2023-06-16 NOTE — H&P (VIEW-ONLY)
[facility] [address], [place] [address]   [email or web address]   OFFICE: [phone]        Primary Care: Bernadette Taylor MD    This visit will serve as history and physical for left atrial appendage occlusion via Watchman device.  See assessment and plan for further details.      Assessment/Plan     Paroxysmal atrial fibrillation: Diagnosed 2022. [id number] score of 5 for 65-74, female gender, hypertension, cardiomyopathy and coronary artery disease; HAS-BLED score of 3 for age, bleeding disposition and current use antiplatelet therapy. She is not a good candidate for long-term anticoagulation due to anemia of unknown etiology and long-term use antiplatelet medication. She received CT imaging which has indicated suitable anatomy for LAAC procedure and negative for thrombus.    She understands that the risks of the procedure are <2% and include but are not limited to device embolization, air embolism, myocardial perforation, device thrombosis, ASD, stroke, or death. We reviewed the medication changes that will place subsequent to left atrial appendage occlusion, including stopping oral anticoagulation and initiation of aspirin 81 mg daily and Plavix 75 mg daily. She will continue DAPT for 6 months, then stop Plavix and remain on aspirin 81 mg daily indefinitely. She will have a DIANA approximately 3 months post-implant. We discussed expected recovery and follow-up. All questions were answered to her satisfaction and she is ready to proceed.     Medical, past medical, surgical and social history reviewed and updated. Current medications and allergies reviewed. No personal or family history of adverse reactions to anesthesia or abnormal bleeding with surgery. Labs today including CBC and BMP pending.     Consents were signed and witnessed by me.  Suma WEEKS Mark will report the morning of 6/22/2023 for the procedure. All instructions regarding times and  medications were given by LAAO coordinator RN.     -Continue sotalol 80 mg twice a day.  QTc within safety parameters and no history of renal insufficiency    Coronary artery disease: Status post PCI to OM2 and distal LAD in October November 2022.  Intermittent episodes of shortness of breath, chest pressure and mild diaphoresis, occurring with activity including stairs and relieved with rest  -Continue Imdur, as needed SL NTG    HFpEF: Unremarkable angio with patent stents and moderate/nonobstructive disease earlier this month.  Compensated and euvolemic today  -Continue furosemide  -Arrhythmia management as above    Essential hypertension: At goal on current regimen  -Continue amlodipine, irbesartan    Anemia secondary to probable GI bleed: Hemoglobin previously 12, down to 9.7 most recently.  PMD aware and pending further work-up including iron studies, positive fecal occult blood and planning colonoscopy. No active bleeding currently.  Hemoglobin today pending.   -Continue iron supplementation    Follow up: 6 week post LAAO implant in clinic      History of Present Illness/Subjective    Suma Mark is a 72 year old female who is seen today for history and physical prior to left atrial appendage closure via Watchman device. She has a past medical history significant for paroxysmal atrial fibrillation, coronary artery disease with prior PCI in October and November 2022, HFpEF, essential hypertension, venous insufficiency, anemia, fibromyalgia, hypothyroidism, obesity.     She was hospitalized in October 2022 for unstable angina and underwent coronary angiogram, significant for multivessel disease PCI to OM 2.  There was a question of brief episode of AF detected in the ED and she underwent 2-week MCOT, significant for paroxysmal AF with RVR.  She was started on Eliquis.  She continued to have angina and had PCI to distal LAD in November.  She was started on sotalol in December.  She continued to have angina,  occurring primarily at rest and not associated with exertion, after which another event monitor was ordered showing paroxysmal AF but not quantifiable. She requires long-term antiplatelet therapy secondary to coronary artery disease and Eliquis is costly for her. She was planned to undergo atrial appendage closure via Watchman device earlier this month.  This was postponed due to worsening angina symptoms, with subsequent angiogram demonstrating patent stents and nonobstructive disease.     She reports intermittent awareness of arrhythmia.  At times with AF she notes chest tightness, decreased activity tolerance, and shortness of breath with exertion.  Her hemoglobin has been slowly declining and she did have a positive fecal occult blood test and has colonoscopy pending.  She denies gross blood in urine or stool, hematemesis.  She denies palpitations, pedal edema, orthopnea, lightheadedness/dizziness.     Data Review     EK2023: sinus bradycardia at 57 bpm, QRS 86 ms, QT/QTc 430/418 ms  2023: Sinus rhythm at 63 bpm, QRS 90 ms, QT/QTc 448/458 ms  Personally reviewed.     Complete TTE: 2023  The left ventricle is normal in size with mild concentric left ventricular  hypertrophy.  Left ventricular function is normal.The ejection fraction is 60-65%.  Normal right ventricle size and systolic function.  Both atria are of normal size.  No significant valve disease identified.  There is no comparison study available.    MCOT, DERIC:     DERIC x6 days beginning 2023.  Baseline sinus average 73 bpm.  Paroxysmal AF.    CT PULMONARY VEINS: 3/28/2023  No thrombus to NAVIN.  Mild left atrial dilation.  Coronary artery disease.    ADDITIONAL STUDIES:    Left heart cath: 2023  1.  Left circumflex stent remains widely patent  2.  Mid/distal LAD stent remains widely patent  3.  Moderate proximal and apical disease in the LAD, proximal RCA, and distal RCA appear unchanged from prior study 2022.  No new  or significant progression of underlying CAD.    30 minutes spent reviewing prior records (including documentation, laboratory studies, cardiac testing/imaging), history and physical exam, planning, and subsequent documentation.     I have reviewed and updated the patient's past medical history, social history, family history, allergies and medication list.                    Physical Examination Review of Systems   /52 (BP Location: Right arm, Patient Position: Sitting, Cuff Size: Adult Large)   Pulse 60   Resp 16   Wt 81.6 kg (180 lb)   LMP  (LMP Unknown)   BMI 34.01 kg/m      Body mass index is 34.01 kg/m .    Wt Readings from Last 3 Encounters:   06/19/23 81.6 kg (180 lb)   06/13/23 82.3 kg (181 lb 8 oz)   06/09/23 81.2 kg (179 lb)       General   Appearance:   Alert and oriented, in no acute distress.    HEENT:  Normocephalic and atraumatic. Conjunctiva and sclera are clear. Moist oral mucosa.    Neck: No JVP, carotid bruit or obvious thyromegaly.   Lungs:   Respirations unlabored. Clear bilaterally with no rales, rhonchi, or wheezes.     Cardiovascular:   Rhythm is regular. S1 and S2 are normal. No significant murmur is present. Lower extremities demonstrate no significant edema. Posterior tibial pulses are intact bilaterally.   Extremities: No cyanosis or clubbing   Skin: Skin is warm, dry, and otherwise intact.   Neurologic: Gait not assessed. Mood and affect appropriate.                                                Medical History  Surgical History Family History Social History   Past Medical History:   Diagnosis Date     Anxiety state, unspecified      Back pain      Diverticulitis 12/07/2017     DJD (degenerative joint disease)      Essential hypertension, benign      Fibromyalgia      Gastro-oesophageal reflux disease      Hernia, ventral 04/27/2017     History of anesthesia complications     hypotension after surgery     History of blood transfusion      Hyperlipidemia      Hyponatremia  12/07/2017     Major depression      Migraine      Osteopenia      PONV (postoperative nausea and vomiting)      Posttraumatic stress disorder      Raynaud's disease      Restless leg syndrome      S/P total knee replacement 11/27/2017     Thrombosis of leg     with pregnancy     Unspecified hypothyroidism     Past Surgical History:   Procedure Laterality Date     ARTHROPLASTY KNEE UNICOMPARTMENT  10/31/2013    Procedure: ARTHROPLASTY KNEE UNICOMPARTMENT;  LEFT KNEE UNICOMPARTMENTAL ARTHROPLASTY (CAROLINE)^;  Surgeon: Chi Mittal MD;  Location:  OR     BREAST SURGERY      bx     COLECTOMY N/A 06/02/2017    Procedure: RESECTION, SMALL INTESTINE, OPEN ADHESIOLYSIS;  Surgeon: Keyon Qureshi MD;  Location: [facility];  Service:      CV CORONARY ANGIOGRAM N/A 10/12/2022    Procedure: CV CORONARY ANGIOGRAM;  Surgeon: You Martinez MD;  Location: [facility] CV     CV CORONARY ANGIOGRAM N/A 6/9/2023    Procedure: Coronary Angiogram;  Surgeon: Bret Jin MD;  Location: [facility]     CV FRACTIONAL FLOW RATIO WIRE N/A 10/12/2022    Procedure: Fractional Flow Ratio Wire;  Surgeon: You Martinez MD;  Location: [facility] CV     CV LEFT HEART CATH N/A 6/9/2023    Procedure: Left Heart Catheterization;  Surgeon: Bret Jin MD;  Location: [facility] CV     CV PCI STENT DRUG ELUTING N/A 10/12/2022    Procedure: Percutaneous Coronary Intervention Stent;  Surgeon: You Martinez MD;  Location: [facility] CV     CV PCI STENT DRUG ELUTING N/A 11/08/2022    Procedure: Percutaneous Coronary Intervention - Stent;  Surgeon: Bret Jin MD;  Location: [facility] CV     ENDOSCOPIC RETROGRADE CHOLANGIOPANCREATOGRAPHY       ENDOSCOPIC STRIPPING VEIN(S)      BILATERLY     GENITOURINARY SURGERY      bladder sling     GI SURGERY  10/02/2015    Rectal prolaspse. s/p Abdominal rectopexy, sacral colpopexy, proctoscopy, cystoscopy     HERNIORRHAPHY INCISIONAL  (LOCATION) N/A 06/02/2017    Procedure: LAPARASCOPIC CONVERTED TO OPEN PRIMARY INCISIONAL HERNIA REPAIR;  Surgeon: Keyon Qureshi MD;  Location: [facility] Main OR;  Service:      HYSTERECTOMY  1985 1985-1986     LAMINECTOMY LUMBAR TWO LEVELS  2006     LAPAROSCOPY N/A 08/22/2019    Procedure: DIAGNOSTIC LAPAROSCOPY, LYSIS OF ADHESION;  Surgeon: Svetlana Ron MD;  Location: [facility] Main OR;  Service: General     LUMBAR HARDWARE REMOVAL[  03/22/2012    REMOVAL LUMBAR HARDWARE 03/22/2012   L3-S1; Type CD Horizon m8 instrumentation Dr. Murray     RELEASE CARPAL TUNNEL       TONSILLECTOMY  1954    ???     TOTAL KNEE ARTHROPLASTY Right 11/27/2017    Procedure:  RIGHT TOTAL KNEE ARTHROPLASTY;  Surgeon: Kevin Ryder MD;  Location: [facility];  Service: Orthopedics      THYROID BIOPSY  04/19/2019    Family History   Problem Relation Age of Onset     Dementia Mother      Arthritis Mother      Chronic Obstructive Pulmonary Disease Father      Cardiovascular Father      Hypertension Father      No Known Problems Sister      No Known Problems Daughter      No Known Problems Son      Cerebrovascular Disease Maternal Grandmother      Heart Disease Paternal Grandmother      Cerebrovascular Disease Paternal Grandmother      No Known Problems Sister      No Known Problems Sister      No Known Problems Son      No Known Problems Daughter      Breast Cancer Paternal Aunt         age in 50's    Social History     Tobacco Use     Smoking status: Never     Passive exposure: Never     Smokeless tobacco: Never   Vaping Use     Vaping status: Never Used     Passive vaping exposure: Yes   Substance Use Topics     Alcohol use: Yes     Comment: Alcoholic Drinks/day: 1 cocktail daily     Drug use: No          Medications  Allergies   Current Outpatient Medications   Medication Sig Dispense Refill     amLODIPine (NORVASC) 5 MG tablet TAKE 1 TABLET BY MOUTH EVERYDAY AT BEDTIME 90 tablet 1     apixaban ANTICOAGULANT (ELIQUIS  ANTICOAGULANT) 5 MG tablet Take 1 tablet (5 mg) by mouth every 12 hours 180 tablet 3     atorvastatin (LIPITOR) 80 MG tablet Take 1 tablet (80 mg) by mouth At Bedtime 90 tablet 3     botulinum toxin type A (BOTOX) 100 units injection Every 3 months. [facility]       busPIRone (BUSPAR) 10 MG tablet Take 1 tablet (10 mg) by mouth 2 times daily 180 tablet 3     cholecalciferol 50 MCG (2000 UT) CAPS Take 2,000 Units by mouth daily       citalopram (CELEXA) 20 MG tablet Take 2 tablets (40 mg) by mouth daily 180 tablet 2     clopidogrel (PLAVIX) 75 MG tablet Take 1 tablet (75 mg) by mouth daily 90 tablet 3     diclofenac (VOLTAREN) 1 % topical gel Apply 2-4 g topically 4 times daily 150 g 3     eletriptan (RELPAX) 40 MG tablet Take 1 tablet (40 mg) by mouth at onset of headache for migraine 10 tablet 0     ferrous sulfate (FEROSUL) 325 (65 Fe) MG tablet Take 1 tablet (325 mg) by mouth 2 times daily 60 tablet 1     folic acid (FOLVITE) 400 MCG tablet Take 2 tablets (800 mcg) by mouth daily 30 tablet 3     furosemide (LASIX) 20 MG tablet Take 2 tablets (40 mg) by mouth daily 180 tablet 3     HYDROcodone-acetaminophen (NORCO) 5-325 MG tablet Take 1 tablet by mouth daily as needed (Severe headache) 5 tablet 0     irbesartan (AVAPRO) 150 MG tablet Take 1 tablet (150 mg) by mouth daily 90 tablet 3     isosorbide mononitrate (IMDUR) 30 MG 24 hr tablet Take 1 tablet (30 mg) by mouth daily 30 tablet 11     levothyroxine (SYNTHROID/LEVOTHROID) 88 MCG tablet TAKE 1 TABLET BY MOUTH DAILY AT 6:00 AM. 90 tablet 2     LORazepam (ATIVAN) 0.5 MG tablet Take 1 tablet (0.5 mg) by mouth nightly as needed for anxiety or sleep 30 tablet 0     nitroGLYcerin (NITROSTAT) 0.4 MG sublingual tablet For chest pain place 1 tablet under the tongue every 5 minutes for 3 doses. If symptoms persist 5 minutes after 1st dose call 911. 30 tablet 1     pramipexole (MIRAPEX) 0.25 MG tablet TAKE 1 TABLET BY MOUTH THREE TIMES A DAY. 270 tablet 3     sotalol  "(BETAPACE) 80 MG tablet TAKE 1 TABLET BY MOUTH EVERY 12 HOURS 60 tablet 3     ubrogepant (UBRELVY) 100 MG tablet UBRELVY 100 MG TABS       zolpidem (AMBIEN) 5 MG tablet Take tablet by mouth 15 minutes prior to sleep, for Sleep Study 1 tablet 0    Allergies   Allergen Reactions     Cephalexin Nausea and Vomiting     Ace Inhibitors Other (See Comments)     Elevates blood pressure     Amitriptyline Hcl Other (See Comments)     elevates blood pressure     Atenolol Other (See Comments)     Aggravates reynauds     Celebrex [Celecoxib] GI Disturbance     Cephalosporins Unknown     Pt doesn't remember        Clonidine Unknown     \"got very ill in ER\"     Codeine Sulfate Nausea and Vomiting     Darvocet [Propoxyphene N-Apap] Nausea and Vomiting     Dexamethasone Acetate Swelling     Erythromycin Nausea and Vomiting     Escitalopram Other (See Comments)     Headaches.       Gabapentin Unknown     \"Spotted\"     Hctz Unknown     \"depletes my sodium\"     Potassium GI Disturbance     Propoxyphene      HUT Reaction: Gastrointestinal; HUT Reaction: Nausea And Vomiting; HUT Noted: [id number]     Sodium      Tramadol Swelling     Swelling of tongue and face     Tramadol-Acetaminophen Other (See Comments)     Triamterene      Venlafaxine Nausea and Vomiting and Diarrhea     Zolpidem Other (See Comments) and Unknown     Pt doesn't rember       Zolpidem Tartrate Unknown     Bacitracin Itching and Rash     Sulfanilamide Rash     No complications with prior use GA.       Lab Results    Chemistry/lipid CBC Cardiac Enzymes/BNP/TSH/INR   Lab Results   Component Value Date    BUN 21 06/08/2023     06/08/2023    CO2 25 06/08/2023     No results found for: CREATININE    Lab Results   Component Value Date    CHOL 132 11/15/2022    HDL 43 (L) 11/15/2022    LDL 68 11/15/2022      Lab Results   Component Value Date    WBC 6.1 06/13/2023    HGB 9.7 (L) 06/13/2023    HCT 31.4 (L) 06/13/2023    MCV 85 06/13/2023     06/13/2023    Lab " Results   Component Value Date    TROPONINI 0.02 11/28/2022     11/27/2022    TSH 1.76 06/13/2023    INR 1.07 11/27/2022        This note has been dictated using voice recognition software. Any grammatical, typographical, or context distortions are unintentional and inherent to the software.    Marta Kennedy CNP  Clinical Cardiac Electrophysiology  [address]  [place] [address]   Office: [phone]  Fax: [phone]

## 2023-06-18 LAB
ABO/RH(D): NORMAL
ANTIBODY SCREEN: NEGATIVE
SPECIMEN EXPIRATION DATE: NORMAL

## 2023-06-19 ENCOUNTER — LAB (OUTPATIENT)
Dept: CARDIOLOGY | Facility: CLINIC | Age: 73
End: 2023-06-19
Payer: MEDICARE

## 2023-06-19 ENCOUNTER — DOCUMENTATION ONLY (OUTPATIENT)
Dept: CARDIOLOGY | Facility: CLINIC | Age: 73
End: 2023-06-19

## 2023-06-19 ENCOUNTER — PREP FOR PROCEDURE (OUTPATIENT)
Dept: CARDIOLOGY | Facility: CLINIC | Age: 73
End: 2023-06-19

## 2023-06-19 ENCOUNTER — ALLIED HEALTH/NURSE VISIT (OUTPATIENT)
Dept: CARDIOLOGY | Facility: CLINIC | Age: 73
End: 2023-06-19
Payer: MEDICARE

## 2023-06-19 ENCOUNTER — OFFICE VISIT (OUTPATIENT)
Dept: CARDIOLOGY | Facility: CLINIC | Age: 73
End: 2023-06-19
Payer: MEDICARE

## 2023-06-19 VITALS
BODY MASS INDEX: 34.01 KG/M2 | WEIGHT: 180 LBS | SYSTOLIC BLOOD PRESSURE: 120 MMHG | DIASTOLIC BLOOD PRESSURE: 52 MMHG | HEART RATE: 60 BPM | RESPIRATION RATE: 16 BRPM

## 2023-06-19 DIAGNOSIS — I10 ESSENTIAL HYPERTENSION: Chronic | ICD-10-CM

## 2023-06-19 DIAGNOSIS — I48.0 PAROXYSMAL ATRIAL FIBRILLATION (H): Primary | Chronic | ICD-10-CM

## 2023-06-19 DIAGNOSIS — I25.10 CORONARY ARTERY DISEASE INVOLVING NATIVE CORONARY ARTERY OF NATIVE HEART WITHOUT ANGINA PECTORIS: ICD-10-CM

## 2023-06-19 DIAGNOSIS — I50.30 HEART FAILURE WITH PRESERVED EJECTION FRACTION, NYHA CLASS II (H): ICD-10-CM

## 2023-06-19 DIAGNOSIS — D62 ANEMIA DUE TO BLOOD LOSS, ACUTE: ICD-10-CM

## 2023-06-19 DIAGNOSIS — I48.0 PAROXYSMAL ATRIAL FIBRILLATION (H): ICD-10-CM

## 2023-06-19 LAB
ANION GAP SERPL CALCULATED.3IONS-SCNC: 8 MMOL/L (ref 7–15)
BUN SERPL-MCNC: 19.4 MG/DL (ref 8–23)
CALCIUM SERPL-MCNC: 9 MG/DL (ref 8.8–10.2)
CHLORIDE SERPL-SCNC: 103 MMOL/L (ref 98–107)
CREAT SERPL-MCNC: 0.75 MG/DL (ref 0.51–0.95)
DEPRECATED HCO3 PLAS-SCNC: 26 MMOL/L (ref 22–29)
ERYTHROCYTE [DISTWIDTH] IN BLOOD BY AUTOMATED COUNT: 15.8 % (ref 10–15)
GFR SERPL CREATININE-BSD FRML MDRD: 83 ML/MIN/1.73M2
GLUCOSE SERPL-MCNC: 96 MG/DL (ref 70–99)
HCT VFR BLD AUTO: 30.9 % (ref 35–47)
HGB BLD-MCNC: 9.4 G/DL (ref 11.7–15.7)
MCH RBC QN AUTO: 25.9 PG (ref 26.5–33)
MCHC RBC AUTO-ENTMCNC: 30.4 G/DL (ref 31.5–36.5)
MCV RBC AUTO: 85 FL (ref 78–100)
PLATELET # BLD AUTO: 310 10E3/UL (ref 150–450)
POTASSIUM SERPL-SCNC: 4.3 MMOL/L (ref 3.4–5.3)
RBC # BLD AUTO: 3.63 10E6/UL (ref 3.8–5.2)
SODIUM SERPL-SCNC: 137 MMOL/L (ref 136–145)
WBC # BLD AUTO: 4.4 10E3/UL (ref 4–11)

## 2023-06-19 PROCEDURE — 86901 BLOOD TYPING SEROLOGIC RH(D): CPT

## 2023-06-19 PROCEDURE — 85027 COMPLETE CBC AUTOMATED: CPT

## 2023-06-19 PROCEDURE — 99214 OFFICE O/P EST MOD 30 MIN: CPT | Performed by: NURSE PRACTITIONER

## 2023-06-19 PROCEDURE — 99207 PR NO CHARGE NURSE ONLY: CPT

## 2023-06-19 PROCEDURE — 80048 BASIC METABOLIC PNL TOTAL CA: CPT

## 2023-06-19 PROCEDURE — 86900 BLOOD TYPING SEROLOGIC ABO: CPT

## 2023-06-19 PROCEDURE — 93000 ELECTROCARDIOGRAM COMPLETE: CPT | Performed by: INTERNAL MEDICINE

## 2023-06-19 PROCEDURE — 36415 COLL VENOUS BLD VENIPUNCTURE: CPT

## 2023-06-19 PROCEDURE — 86850 RBC ANTIBODY SCREEN: CPT

## 2023-06-19 RX ORDER — ASPIRIN 81 MG/1
81 TABLET ORAL ONCE
Status: CANCELLED | OUTPATIENT
Start: 2023-06-19 | End: 2023-06-19

## 2023-06-19 RX ORDER — SODIUM CHLORIDE 9 MG/ML
1000 INJECTION, SOLUTION INTRAVENOUS CONTINUOUS
Status: CANCELLED | OUTPATIENT
Start: 2023-06-22

## 2023-06-19 RX ORDER — LIDOCAINE 40 MG/G
CREAM TOPICAL
Status: CANCELLED | OUTPATIENT
Start: 2023-06-19

## 2023-06-19 NOTE — LETTER
6/19/2023    Bernadette Taylor MD  [address] [facility] [address]    RE: Suma Mark       Dear Colleague,     I had the pleasure of seeing Suma Mark in the [facility].      [facility] [address], [place] [address]   [email or web address]   OFFICE: [phone]        Primary Care: Bernadette Taylor MD    This visit will serve as history and physical for left atrial appendage occlusion via Watchman device.  See assessment and plan for further details.      Assessment/Plan     Paroxysmal atrial fibrillation: Diagnosed 2022. [id number] score of 5 for 65-74, female gender, hypertension, cardiomyopathy and coronary artery disease; HAS-BLED score of 3 for age, bleeding disposition and current use antiplatelet therapy. She is not a good candidate for long-term anticoagulation due to anemia of unknown etiology and long-term use antiplatelet medication. She received CT imaging which has indicated suitable anatomy for LAAC procedure and negative for thrombus.    She understands that the risks of the procedure are <2% and include but are not limited to device embolization, air embolism, myocardial perforation, device thrombosis, ASD, stroke, or death. We reviewed the medication changes that will place subsequent to left atrial appendage occlusion, including stopping oral anticoagulation and initiation of aspirin 81 mg daily and Plavix 75 mg daily. She will continue DAPT for 6 months, then stop Plavix and remain on aspirin 81 mg daily indefinitely. She will have a DIANA approximately 3 months post-implant. We discussed expected recovery and follow-up. All questions were answered to her satisfaction and she is ready to proceed.     Medical, past medical, surgical and social history reviewed and updated. Current medications and allergies reviewed. No personal or family history of adverse reactions to anesthesia or abnormal bleeding with  surgery. Labs today including CBC and BMP pending.     Consents were signed and witnessed by me.  Suma Mark will report the morning of 6/22/2023 for the procedure. All instructions regarding times and medications were given by LAAO coordinator RN.     -Continue sotalol 80 mg twice a day.  QTc within safety parameters and no history of renal insufficiency    Coronary artery disease: Status post PCI to OM2 and distal LAD in October November 2022.  Intermittent episodes of shortness of breath, chest pressure and mild diaphoresis, occurring with activity including stairs and relieved with rest  -Continue Imdur, as needed SL NTG    HFpEF: Unremarkable angio with patent stents and moderate/nonobstructive disease earlier this month.  Compensated and euvolemic today  -Continue furosemide  -Arrhythmia management as above    Essential hypertension: At goal on current regimen  -Continue amlodipine, irbesartan    Anemia secondary to probable GI bleed: Hemoglobin previously 12, down to 9.7 most recently.  PMD aware and pending further work-up including iron studies, positive fecal occult blood and planning colonoscopy. No active bleeding currently.  Hemoglobin today pending.   -Continue iron supplementation    Follow up: 6 week post LAAO implant in clinic      History of Present Illness/Subjective    Suma Mark is a 72 year old female who is seen today for history and physical prior to left atrial appendage closure via Watchman device. She has a past medical history significant for paroxysmal atrial fibrillation, coronary artery disease with prior PCI in October and November 2022, HFpEF, essential hypertension, venous insufficiency, anemia, fibromyalgia, hypothyroidism, obesity.     She was hospitalized in October 2022 for unstable angina and underwent coronary angiogram, significant for multivessel disease PCI to OM 2.  There was a question of brief episode of AF detected in the ED and she underwent 2-week MCOT,  significant for paroxysmal AF with RVR.  She was started on Eliquis.  She continued to have angina and had PCI to distal LAD in November.  She was started on sotalol in December.  She continued to have angina, occurring primarily at rest and not associated with exertion, after which another event monitor was ordered showing paroxysmal AF but not quantifiable. She requires long-term antiplatelet therapy secondary to coronary artery disease and Eliquis is costly for her. She was planned to undergo atrial appendage closure via Watchman device earlier this month.  This was postponed due to worsening angina symptoms, with subsequent angiogram demonstrating patent stents and nonobstructive disease.     She reports intermittent awareness of arrhythmia.  At times with AF she notes chest tightness, decreased activity tolerance, and shortness of breath with exertion.  Her hemoglobin has been slowly declining and she did have a positive fecal occult blood test and has colonoscopy pending.  She denies gross blood in urine or stool, hematemesis.  She denies palpitations, pedal edema, orthopnea, lightheadedness/dizziness.     Data Review     EK2023: sinus bradycardia at 57 bpm, QRS 86 ms, QT/QTc 430/418 ms  2023: Sinus rhythm at 63 bpm, QRS 90 ms, QT/QTc 448/458 ms  Personally reviewed.     Complete TTE: 2023  The left ventricle is normal in size with mild concentric left ventricular  hypertrophy.  Left ventricular function is normal.The ejection fraction is 60-65%.  Normal right ventricle size and systolic function.  Both atria are of normal size.  No significant valve disease identified.  There is no comparison study available.    MCOT, DERIC:     DERIC x6 days beginning 2023.  Baseline sinus average 73 bpm.  Paroxysmal AF.    CT PULMONARY VEINS: 3/28/2023  No thrombus to NAVIN.  Mild left atrial dilation.  Coronary artery disease.    ADDITIONAL STUDIES:    Left heart cath: 2023  1.  Left circumflex stent  remains widely patent  2.  Mid/distal LAD stent remains widely patent  3.  Moderate proximal and apical disease in the LAD, proximal RCA, and distal RCA appear unchanged from prior study October 2022.  No new or significant progression of underlying CAD.    30 minutes spent reviewing prior records (including documentation, laboratory studies, cardiac testing/imaging), history and physical exam, planning, and subsequent documentation.     I have reviewed and updated the patient's past medical history, social history, family history, allergies and medication list.                    Physical Examination Review of Systems   /52 (BP Location: Right arm, Patient Position: Sitting, Cuff Size: Adult Large)   Pulse 60   Resp 16   Wt 81.6 kg (180 lb)   LMP  (LMP Unknown)   BMI 34.01 kg/m      Body mass index is 34.01 kg/m .    Wt Readings from Last 3 Encounters:   06/19/23 81.6 kg (180 lb)   06/13/23 82.3 kg (181 lb 8 oz)   06/09/23 81.2 kg (179 lb)       General   Appearance:   Alert and oriented, in no acute distress.    HEENT:  Normocephalic and atraumatic. Conjunctiva and sclera are clear. Moist oral mucosa.    Neck: No JVP, carotid bruit or obvious thyromegaly.   Lungs:   Respirations unlabored. Clear bilaterally with no rales, rhonchi, or wheezes.     Cardiovascular:   Rhythm is regular. S1 and S2 are normal. No significant murmur is present. Lower extremities demonstrate no significant edema. Posterior tibial pulses are intact bilaterally.   Extremities: No cyanosis or clubbing   Skin: Skin is warm, dry, and otherwise intact.   Neurologic: Gait not assessed. Mood and affect appropriate.                                                Medical History  Surgical History Family History Social History   Past Medical History:   Diagnosis Date    Anxiety state, unspecified     Back pain     Diverticulitis 12/07/2017    DJD (degenerative joint disease)     Essential hypertension, benign     Fibromyalgia      Gastro-oesophageal reflux disease     Hernia, ventral 04/27/2017    History of anesthesia complications     hypotension after surgery    History of blood transfusion     Hyperlipidemia     Hyponatremia 12/07/2017    Major depression     Migraine     Osteopenia     PONV (postoperative nausea and vomiting)     Posttraumatic stress disorder     Raynaud's disease     Restless leg syndrome     S/P total knee replacement 11/27/2017    Thrombosis of leg     with pregnancy    Unspecified hypothyroidism     Past Surgical History:   Procedure Laterality Date    ARTHROPLASTY KNEE UNICOMPARTMENT  10/31/2013    Procedure: ARTHROPLASTY KNEE UNICOMPARTMENT;  LEFT KNEE UNICOMPARTMENTAL ARTHROPLASTY (CAROLINE)^;  Surgeon: Chi Mittal MD;  Location:  OR    BREAST SURGERY      bx    COLECTOMY N/A 06/02/2017    Procedure: RESECTION, SMALL INTESTINE, OPEN ADHESIOLYSIS;  Surgeon: Keyon Qureshi MD;  Location: [facility];  Service:     CV CORONARY ANGIOGRAM N/A 10/12/2022    Procedure: CV CORONARY ANGIOGRAM;  Surgeon: You Martinez MD;  Location: [facility]    CV CORONARY ANGIOGRAM N/A 6/9/2023    Procedure: Coronary Angiogram;  Surgeon: Bret Jin MD;  Location: [facility]    CV FRACTIONAL FLOW RATIO WIRE N/A 10/12/2022    Procedure: Fractional Flow Ratio Wire;  Surgeon: You Martinez MD;  Location: [facility]    CV LEFT HEART CATH N/A 6/9/2023    Procedure: Left Heart Catheterization;  Surgeon: Bret Jin MD;  Location: [facility] CV    CV PCI STENT DRUG ELUTING N/A 10/12/2022    Procedure: Percutaneous Coronary Intervention Stent;  Surgeon: You Martinez MD;  Location: [facility] CV    CV PCI STENT DRUG ELUTING N/A 11/08/2022    Procedure: Percutaneous Coronary Intervention - Stent;  Surgeon: Bret Jin MD;  Location: [facility] CV    ENDOSCOPIC RETROGRADE CHOLANGIOPANCREATOGRAPHY      ENDOSCOPIC STRIPPING VEIN(S)      BILATERLY     GENITOURINARY SURGERY      bladder sling    GI SURGERY  10/02/2015    Rectal prolaspse. s/p Abdominal rectopexy, sacral colpopexy, proctoscopy, cystoscopy    HERNIORRHAPHY INCISIONAL (LOCATION) N/A 06/02/2017    Procedure: LAPARASCOPIC CONVERTED TO OPEN PRIMARY INCISIONAL HERNIA REPAIR;  Surgeon: Keyon Qureshi MD;  Location: [facility];  Service:     HYSTERECTOMY  1985    [id number]    LAMINECTOMY LUMBAR TWO LEVELS  2006    LAPAROSCOPY N/A 08/22/2019    Procedure: DIAGNOSTIC LAPAROSCOPY, LYSIS OF ADHESION;  Surgeon: Svetlana Ron MD;  Location: [facility] OR;  Service: General    LUMBAR HARDWARE REMOVAL[  03/22/2012    REMOVAL LUMBAR HARDWARE 03/22/2012   L3-S1; Type CD Horizon m8 instrumentation Dr. Murray    RELEASE CARPAL TUNNEL      TONSILLECTOMY  1954    ???    TOTAL KNEE ARTHROPLASTY Right 11/27/2017    Procedure:  RIGHT TOTAL KNEE ARTHROPLASTY;  Surgeon: Kevin Ryder MD;  Location: [facility];  Service: Orthopedics     THYROID BIOPSY  04/19/2019    Family History   Problem Relation Age of Onset    Dementia Mother     Arthritis Mother     Chronic Obstructive Pulmonary Disease Father     Cardiovascular Father     Hypertension Father     No Known Problems Sister     No Known Problems Daughter     No Known Problems Son     Cerebrovascular Disease Maternal Grandmother     Heart Disease Paternal Grandmother     Cerebrovascular Disease Paternal Grandmother     No Known Problems Sister     No Known Problems Sister     No Known Problems Son     No Known Problems Daughter     Breast Cancer Paternal Aunt         age in 50's    Social History     Tobacco Use    Smoking status: Never     Passive exposure: Never    Smokeless tobacco: Never   Vaping Use    Vaping status: Never Used     Passive vaping exposure: Yes   Substance Use Topics    Alcohol use: Yes     Comment: Alcoholic Drinks/day: 1 cocktail daily    Drug use: No          Medications  Allergies   Current Outpatient Medications    Medication Sig Dispense Refill    amLODIPine (NORVASC) 5 MG tablet TAKE 1 TABLET BY MOUTH EVERYDAY AT BEDTIME 90 tablet 1    apixaban ANTICOAGULANT (ELIQUIS ANTICOAGULANT) 5 MG tablet Take 1 tablet (5 mg) by mouth every 12 hours 180 tablet 3    atorvastatin (LIPITOR) 80 MG tablet Take 1 tablet (80 mg) by mouth At Bedtime 90 tablet 3    botulinum toxin type A (BOTOX) 100 units injection Every 3 months. [facility]      busPIRone (BUSPAR) 10 MG tablet Take 1 tablet (10 mg) by mouth 2 times daily 180 tablet 3    cholecalciferol 50 MCG (2000 UT) CAPS Take 2,000 Units by mouth daily      citalopram (CELEXA) 20 MG tablet Take 2 tablets (40 mg) by mouth daily 180 tablet 2    clopidogrel (PLAVIX) 75 MG tablet Take 1 tablet (75 mg) by mouth daily 90 tablet 3    diclofenac (VOLTAREN) 1 % topical gel Apply 2-4 g topically 4 times daily 150 g 3    eletriptan (RELPAX) 40 MG tablet Take 1 tablet (40 mg) by mouth at onset of headache for migraine 10 tablet 0    ferrous sulfate (FEROSUL) 325 (65 Fe) MG tablet Take 1 tablet (325 mg) by mouth 2 times daily 60 tablet 1    folic acid (FOLVITE) 400 MCG tablet Take 2 tablets (800 mcg) by mouth daily 30 tablet 3    furosemide (LASIX) 20 MG tablet Take 2 tablets (40 mg) by mouth daily 180 tablet 3    HYDROcodone-acetaminophen (NORCO) 5-325 MG tablet Take 1 tablet by mouth daily as needed (Severe headache) 5 tablet 0    irbesartan (AVAPRO) 150 MG tablet Take 1 tablet (150 mg) by mouth daily 90 tablet 3    isosorbide mononitrate (IMDUR) 30 MG 24 hr tablet Take 1 tablet (30 mg) by mouth daily 30 tablet 11    levothyroxine (SYNTHROID/LEVOTHROID) 88 MCG tablet TAKE 1 TABLET BY MOUTH DAILY AT 6:00 AM. 90 tablet 2    LORazepam (ATIVAN) 0.5 MG tablet Take 1 tablet (0.5 mg) by mouth nightly as needed for anxiety or sleep 30 tablet 0    nitroGLYcerin (NITROSTAT) 0.4 MG sublingual tablet For chest pain place 1 tablet under the tongue every 5 minutes for 3 doses. If symptoms persist 5 minutes after  "1st dose call 911. 30 tablet 1    pramipexole (MIRAPEX) 0.25 MG tablet TAKE 1 TABLET BY MOUTH THREE TIMES A DAY. 270 tablet 3    sotalol (BETAPACE) 80 MG tablet TAKE 1 TABLET BY MOUTH EVERY 12 HOURS 60 tablet 3    ubrogepant (UBRELVY) 100 MG tablet UBRELVY 100 MG TABS      zolpidem (AMBIEN) 5 MG tablet Take tablet by mouth 15 minutes prior to sleep, for Sleep Study 1 tablet 0    Allergies   Allergen Reactions    Cephalexin Nausea and Vomiting    Ace Inhibitors Other (See Comments)     Elevates blood pressure    Amitriptyline Hcl Other (See Comments)     elevates blood pressure    Atenolol Other (See Comments)     Aggravates reynauds    Celebrex [Celecoxib] GI Disturbance    Cephalosporins Unknown     Pt doesn't remember       Clonidine Unknown     \"got very ill in ER\"    Codeine Sulfate Nausea and Vomiting    Darvocet [Propoxyphene N-Apap] Nausea and Vomiting    Dexamethasone Acetate Swelling    Erythromycin Nausea and Vomiting    Escitalopram Other (See Comments)     Headaches.      Gabapentin Unknown     \"Spotted\"    Hctz Unknown     \"depletes my sodium\"    Potassium GI Disturbance    Propoxyphene      HUT Reaction: Gastrointestinal; HUT Reaction: Nausea And Vomiting; HUT Noted: 20150911    Sodium     Tramadol Swelling     Swelling of tongue and face    Tramadol-Acetaminophen Other (See Comments)    Triamterene     Venlafaxine Nausea and Vomiting and Diarrhea    Zolpidem Other (See Comments) and Unknown     Pt doesn't rember      Zolpidem Tartrate Unknown    Bacitracin Itching and Rash    Sulfanilamide Rash     No complications with prior use GA.       Lab Results    Chemistry/lipid CBC Cardiac Enzymes/BNP/TSH/INR   Lab Results   Component Value Date    BUN 21 06/08/2023     06/08/2023    CO2 25 06/08/2023     No results found for: CREATININE    Lab Results   Component Value Date    CHOL 132 11/15/2022    HDL 43 (L) 11/15/2022    LDL 68 11/15/2022      Lab Results   Component Value Date    WBC 6.1 06/13/2023 "    HGB 9.7 (L) 06/13/2023    HCT 31.4 (L) 06/13/2023    MCV 85 06/13/2023     06/13/2023    Lab Results   Component Value Date    TROPONINI 0.02 11/28/2022     11/27/2022    TSH 1.76 06/13/2023    INR 1.07 11/27/2022        This note has been dictated using voice recognition software. Any grammatical, typographical, or context distortions are unintentional and inherent to the software.    Marta Kennedy CNP  Clinical Cardiac Electrophysiology  [facility]  [address]  [place] [address]   Office: [phone]  Fax: [phone]     Thank you for allowing me to participate in the care of your patient.      Sincerely,     ANDREA FITZGERALD CNP     [facility]  cc:   No referring provider defined for this encounter.

## 2023-06-19 NOTE — PROGRESS NOTES
Suma Mark  [address] [address]  [phone] (home)     Patient in to see RN for Pre-LAAC visit on 6/19/2023    All pre-procedure labs drawn: Collected, In process  EKG obtained: Yes   Labs reviewed: In Process  Renal Issues: No  Diabetic?: No  Device?: No      Pre-procedure instructions  Patient instructed to be NPO after midnight the evening prior to procedure.  Patient instructed to shower the evening before or the morning of the procedure.  Leave all valuables at home (jewlery, rings, watches, large amounts of money).  Patient understands there is one visitor allowed during patients stay. Visitor will need to wear a mask their entire stay and remain in the restricted area per guidelines.   Patient instructed to arrange for transportation home following procedure from a responsible family member of friend. No driving for at least 72 hours post-procedure.  Patient instructed to have a responsible adult with them for 24 hours post-procedure.  Post-procedure follow up process.    Patient instructed on medications:   Take Eliquis, Plavix, Imdur, Synthroid, Sotalol, Mirapex    Aspirin 81mg morning of procedure: To be given in pre-procedure area    Education was given to patient regarding what to expect pre-procedure.     Patient was informed procedure will be done at [facility], [address] [place] [address]. They have been instructed to check in at the  at the Hospital and their arrival time is at 10:00 am    LAAC procedure, DIANA  and blood consent signed at the time of the appt: Yes    All questions were answered to family and patient by RN.    Patient present at the time of appointment. Patient  Lonnie ([phone]) will be present day of procedure.    Vikki Johnson RN BSN  Structural Heart Coordinator   [facility]  [phone]    Patient Active Problem List   Diagnosis     Total knee replacement status      Acquired hypothyroidism     Chronic back pain     Fibromyalgia     Diverticulosis     Essential hypertension     Gastroesophageal reflux disease without esophagitis     Chronic insomnia     Migraine     Dyslipidemia, goal LDL below 70     Restless leg syndrome     Thyroid nodule     Coronary artery disease involving native coronary artery of native heart without angina pectoris     Paroxysmal atrial fibrillation (H)     Senile osteoporosis     Generalized anxiety disorder     Venous insufficiency of both lower extremities     Vitamin D deficiency     Chronic pain syndrome     Class 1 obesity due to excess calories with serious comorbidity and body mass index (BMI) of 33.0 to 33.9 in adult     Anemia     Heart failure with preserved ejection fraction, NYHA class II (H)     Current Outpatient Medications   Medication Sig     amLODIPine (NORVASC) 5 MG tablet TAKE 1 TABLET BY MOUTH EVERYDAY AT BEDTIME     apixaban ANTICOAGULANT (ELIQUIS ANTICOAGULANT) 5 MG tablet Take 1 tablet (5 mg) by mouth every 12 hours     atorvastatin (LIPITOR) 80 MG tablet Take 1 tablet (80 mg) by mouth At Bedtime     botulinum toxin type A (BOTOX) 100 units injection Every 3 months. [facility]     busPIRone (BUSPAR) 10 MG tablet Take 1 tablet (10 mg) by mouth 2 times daily     cholecalciferol 50 MCG (2000 UT) CAPS Take 2,000 Units by mouth daily     citalopram (CELEXA) 20 MG tablet Take 2 tablets (40 mg) by mouth daily     clopidogrel (PLAVIX) 75 MG tablet Take 1 tablet (75 mg) by mouth daily     diclofenac (VOLTAREN) 1 % topical gel Apply 2-4 g topically 4 times daily     eletriptan (RELPAX) 40 MG tablet Take 1 tablet (40 mg) by mouth at onset of headache for migraine     ferrous sulfate (FEROSUL) 325 (65 Fe) MG tablet Take 1 tablet (325 mg) by mouth 2 times daily     folic acid (FOLVITE) 400 MCG tablet Take 2 tablets (800 mcg) by mouth daily     furosemide (LASIX) 20 MG tablet Take 2 tablets (40 mg) by mouth daily      "HYDROcodone-acetaminophen (NORCO) 5-325 MG tablet Take 1 tablet by mouth daily as needed (Severe headache)     irbesartan (AVAPRO) 150 MG tablet Take 1 tablet (150 mg) by mouth daily     isosorbide mononitrate (IMDUR) 30 MG 24 hr tablet Take 1 tablet (30 mg) by mouth daily     levothyroxine (SYNTHROID/LEVOTHROID) 88 MCG tablet TAKE 1 TABLET BY MOUTH DAILY AT 6:00 AM.     LORazepam (ATIVAN) 0.5 MG tablet Take 1 tablet (0.5 mg) by mouth nightly as needed for anxiety or sleep     nitroGLYcerin (NITROSTAT) 0.4 MG sublingual tablet For chest pain place 1 tablet under the tongue every 5 minutes for 3 doses. If symptoms persist 5 minutes after 1st dose call 911.     pramipexole (MIRAPEX) 0.25 MG tablet TAKE 1 TABLET BY MOUTH THREE TIMES A DAY.     sotalol (BETAPACE) 80 MG tablet TAKE 1 TABLET BY MOUTH EVERY 12 HOURS     ubrogepant (UBRELVY) 100 MG tablet UBRELVY 100 MG TABS     zolpidem (AMBIEN) 5 MG tablet Take tablet by mouth 15 minutes prior to sleep, for Sleep Study     Current Facility-Administered Medications   Medication     denosumab (PROLIA) injection 60 mg     Facility-Administered Medications Ordered in Other Visits   Medication     iodixanol (VISIPAQUE 320) injection      Allergies   Allergen Reactions     Cephalexin Nausea and Vomiting     Ace Inhibitors Other (See Comments)     Elevates blood pressure     Amitriptyline Hcl Other (See Comments)     elevates blood pressure     Atenolol Other (See Comments)     Aggravates reynauds     Celebrex [Celecoxib] GI Disturbance     Cephalosporins Unknown     Pt doesn't remember        Clonidine Unknown     \"got very ill in ER\"     Codeine Sulfate Nausea and Vomiting     Darvocet [Propoxyphene N-Apap] Nausea and Vomiting     Dexamethasone Acetate Swelling     Erythromycin Nausea and Vomiting     Escitalopram Other (See Comments)     Headaches.       Gabapentin Unknown     \"Spotted\"     Hctz Unknown     \"depletes my sodium\"     Potassium GI Disturbance     Propoxyphene  "     HUT Reaction: Gastrointestinal; HUT Reaction: Nausea And Vomiting; HUT Noted: 20150911     Sodium      Tramadol Swelling     Swelling of tongue and face     Tramadol-Acetaminophen Other (See Comments)     Triamterene      Venlafaxine Nausea and Vomiting and Diarrhea     Zolpidem Other (See Comments) and Unknown     Pt doesn't rember       Zolpidem Tartrate Unknown     Bacitracin Itching and Rash     Sulfanilamide Rash

## 2023-06-19 NOTE — PROGRESS NOTES
MODIFIED JUAN CARLOS SCALE   Timepoint: Pre-LAAC    Previous score: initial JUAN CARLOS    Score Description   0 No symptoms at all   1 No significant disability despite symptoms; able to carry out all usual duties and activities   2 Slight disability; unable to carry out all previous activities, but able to look after own affairs without assistance   3 Moderate disability; requiring some help, but able to walk without assistance   4 Moderately severe disability; unable to walk without assistance and unable to attend to own bodily needs without assistance   5 Severe disability; bedridden, incontinent and requiring constant nursing care and attention   6 Dead    Total score (0 - 6):  0, No reported history of stroke/TIA    Change in score if s/p LAAC? No  If yes, notify implanting cardiologist.    Vikki Johnson RN BSN  Structural Heart Coordinator   [facility]  [phone]

## 2023-06-20 ENCOUNTER — MYC MEDICAL ADVICE (OUTPATIENT)
Dept: INTERNAL MEDICINE | Facility: CLINIC | Age: 73
End: 2023-06-20

## 2023-06-20 ENCOUNTER — LAB (OUTPATIENT)
Dept: LAB | Facility: CLINIC | Age: 73
End: 2023-06-20
Payer: MEDICARE

## 2023-06-20 DIAGNOSIS — R79.0 ABNORMAL RESULT OF IRON PROFILE TESTING: ICD-10-CM

## 2023-06-20 DIAGNOSIS — D50.8 OTHER IRON DEFICIENCY ANEMIA: ICD-10-CM

## 2023-06-20 LAB
ATRIAL RATE - MUSE: 57 BPM
DIASTOLIC BLOOD PRESSURE - MUSE: NORMAL MMHG
ERYTHROCYTE [DISTWIDTH] IN BLOOD BY AUTOMATED COUNT: 15.6 % (ref 10–15)
FERRITIN SERPL-MCNC: 19 NG/ML (ref 11–328)
HCT VFR BLD AUTO: 29 % (ref 35–47)
HGB BLD-MCNC: 9 G/DL (ref 11.7–15.7)
INTERPRETATION ECG - MUSE: NORMAL
IRON BINDING CAPACITY (ROCHE): 383 UG/DL (ref 240–430)
IRON SATN MFR SERPL: 4 % (ref 15–46)
IRON SERPL-MCNC: 14 UG/DL (ref 37–145)
MCH RBC QN AUTO: 26.7 PG (ref 26.5–33)
MCHC RBC AUTO-ENTMCNC: 31 G/DL (ref 31.5–36.5)
MCV RBC AUTO: 86 FL (ref 78–100)
P AXIS - MUSE: 71 DEGREES
PLATELET # BLD AUTO: 281 10E3/UL (ref 150–450)
PR INTERVAL - MUSE: 106 MS
QRS DURATION - MUSE: 86 MS
QT - MUSE: 430 MS
QTC - MUSE: 418 MS
R AXIS - MUSE: 9 DEGREES
RBC # BLD AUTO: 3.37 10E6/UL (ref 3.8–5.2)
SYSTOLIC BLOOD PRESSURE - MUSE: NORMAL MMHG
T AXIS - MUSE: 16 DEGREES
VENTRICULAR RATE- MUSE: 57 BPM
WBC # BLD AUTO: 4.4 10E3/UL (ref 4–11)

## 2023-06-20 PROCEDURE — 83540 ASSAY OF IRON: CPT

## 2023-06-20 PROCEDURE — 36415 COLL VENOUS BLD VENIPUNCTURE: CPT

## 2023-06-20 PROCEDURE — 82728 ASSAY OF FERRITIN: CPT

## 2023-06-20 PROCEDURE — 85027 COMPLETE CBC AUTOMATED: CPT

## 2023-06-20 PROCEDURE — 83550 IRON BINDING TEST: CPT

## 2023-06-21 DIAGNOSIS — D50.8 OTHER IRON DEFICIENCY ANEMIA: Primary | ICD-10-CM

## 2023-06-21 DIAGNOSIS — G25.81 RESTLESS LEG SYNDROME: ICD-10-CM

## 2023-06-21 RX ORDER — DIPHENHYDRAMINE HYDROCHLORIDE 50 MG/ML
50 INJECTION INTRAMUSCULAR; INTRAVENOUS
Status: CANCELLED
Start: 2023-06-21

## 2023-06-21 RX ORDER — ALBUTEROL SULFATE 90 UG/1
1-2 AEROSOL, METERED RESPIRATORY (INHALATION)
Status: CANCELLED
Start: 2023-06-21

## 2023-06-21 RX ORDER — HEPARIN SODIUM,PORCINE 10 UNIT/ML
5-20 VIAL (ML) INTRAVENOUS DAILY PRN
Status: CANCELLED | OUTPATIENT
Start: 2023-06-21

## 2023-06-21 RX ORDER — ALBUTEROL SULFATE 0.83 MG/ML
2.5 SOLUTION RESPIRATORY (INHALATION)
Status: CANCELLED | OUTPATIENT
Start: 2023-06-21

## 2023-06-21 RX ORDER — METHYLPREDNISOLONE SODIUM SUCCINATE 125 MG/2ML
125 INJECTION, POWDER, LYOPHILIZED, FOR SOLUTION INTRAMUSCULAR; INTRAVENOUS
Status: CANCELLED
Start: 2023-06-21

## 2023-06-21 RX ORDER — HEPARIN SODIUM (PORCINE) LOCK FLUSH IV SOLN 100 UNIT/ML 100 UNIT/ML
5 SOLUTION INTRAVENOUS
Status: CANCELLED | OUTPATIENT
Start: 2023-06-21

## 2023-06-21 RX ORDER — MEPERIDINE HYDROCHLORIDE 25 MG/ML
25 INJECTION INTRAMUSCULAR; INTRAVENOUS; SUBCUTANEOUS EVERY 30 MIN PRN
Status: CANCELLED | OUTPATIENT
Start: 2023-06-21

## 2023-06-21 RX ORDER — EPINEPHRINE 1 MG/ML
0.3 INJECTION, SOLUTION, CONCENTRATE INTRAVENOUS EVERY 5 MIN PRN
Status: CANCELLED | OUTPATIENT
Start: 2023-06-21

## 2023-06-21 NOTE — DISCHARGE INSTRUCTIONS
Going home after Left Atrial Appendage Occlusion Device Implant    Once Home:  You should arrange for someone to stay with you for the first 24 hours after discharge  Make sure you are taking all of your medications as directed in your discharge summary.  Do not stop taking these medications (especially your blood thinner and baby aspirin) without speaking to your provider.   Increase fluid intake for two days    No lifting more than 10 pounds for 5 days  No aggressive exercise for 5 days  No driving x 3 day  You may shower tomorrow; no bath x 5 days  Return to work in 3 days if you have a sedentary job or 5 days if you do manual labor      Care of groin site  It is normal to have a small bruise or lump at the site.  Do not scrub the site.  For the first 2 days: Do not stoop or squat. When you cough, sneeze or move your bowels, hold your hand over the puncture site and press gently.  Do not use lotion or powder near the puncture site for 3 days.  Ok to use ice packs at groin sites 20 minutes at a time for groin discomfort    If you start bleeding from the site in your groin, lie down flat and press firmly on the site. Call your doctor as soon as you can.    Call your doctor if:  You have a large or growing hard lump around the site.  The site is red, swollen, hot or tender.  Blood or fluid is draining from the site.  You have chills or a fever greater than 101 F (38 C).  Your leg or arm feels numb or cool.  You have hives, a rash or unusual itching.    To reduce the risk of infection, avoid dental procedures (including cleanings) for the first 6 weeks.  Contact your cardiology clinic for an antibiotic should you need to see the dentist in the first 6 weeks post left atrial appendage implant.    Dial 911 if you have bleeding that is heavy or does not stop OR for any NEW signs/symptoms of a stroke:  Visual disturbance  Problems with talking  Smile only occurs on half your face  Numbness on one side of your face or  body  Sudden headache  Confusion  Problems with walking or balance    Follow up appointments:   6 Week Post Implant Visit Date: August 14 at 2:50 pm with Marilyn Middleton PA-C  At [facility]  [address], [address]  [place] [address]    Post-procedure Transesophageal Echocardiogram: Please arrange for a responsible adult to drive you to and from this appointment. There will be nothing to eat or drink for at least 6 hours prior to your arrival time for the DIANA.  Date: September 27 at 7:30 am -  Location: [facility]      Your Procedural Physician was: Dr. Mariano     To reach the [facility] nurse coordinators please call:   [phone] for Vikki Johnson    Or  [phone] for Linda Washington        If you have issues tonight when you get home:      Call the [facility] at [phone] and you will be connected to the on call doctor                                                                    If you are calling after hours, please listen to the entire voicemail, a live  will answer at the end of the message

## 2023-06-22 ENCOUNTER — HOSPITAL ENCOUNTER (OUTPATIENT)
Dept: CARDIOLOGY | Facility: HOSPITAL | Age: 73
Discharge: HOME | DRG: 274 | End: 2023-06-22
Attending: INTERNAL MEDICINE | Admitting: INTERNAL MEDICINE
Payer: MEDICARE

## 2023-06-22 ENCOUNTER — ANESTHESIA EVENT (OUTPATIENT)
Dept: CARDIOLOGY | Facility: HOSPITAL | Age: 73
DRG: 274 | End: 2023-06-22
Payer: MEDICARE

## 2023-06-22 ENCOUNTER — APPOINTMENT (OUTPATIENT)
Dept: RADIOLOGY | Facility: HOSPITAL | Age: 73
DRG: 274 | End: 2023-06-22
Attending: INTERNAL MEDICINE
Payer: MEDICARE

## 2023-06-22 ENCOUNTER — ANESTHESIA (OUTPATIENT)
Dept: CARDIOLOGY | Facility: HOSPITAL | Age: 73
DRG: 274 | End: 2023-06-22
Payer: MEDICARE

## 2023-06-22 ENCOUNTER — HOSPITAL ENCOUNTER (INPATIENT)
Facility: HOSPITAL | Age: 73
LOS: 1 days | Discharge: HOME | DRG: 274 | End: 2023-06-22
Attending: INTERNAL MEDICINE | Admitting: INTERNAL MEDICINE
Payer: MEDICARE

## 2023-06-22 VITALS
RESPIRATION RATE: 15 BRPM | BODY MASS INDEX: 33.99 KG/M2 | HEIGHT: 61 IN | WEIGHT: 180 LBS | SYSTOLIC BLOOD PRESSURE: 112 MMHG | HEART RATE: 50 BPM | DIASTOLIC BLOOD PRESSURE: 57 MMHG | OXYGEN SATURATION: 100 % | TEMPERATURE: 98.4 F

## 2023-06-22 DIAGNOSIS — Z95.818 PRESENCE OF WATCHMAN LEFT ATRIAL APPENDAGE CLOSURE DEVICE: Primary | ICD-10-CM

## 2023-06-22 DIAGNOSIS — I48.0 PAROXYSMAL ATRIAL FIBRILLATION (H): ICD-10-CM

## 2023-06-22 LAB
ACT BLD: 417 SECONDS (ref 74–150)
BLD PROD TYP BPU: NORMAL
BLD PROD TYP BPU: NORMAL
BLOOD COMPONENT TYPE: NORMAL
BLOOD COMPONENT TYPE: NORMAL
CODING SYSTEM: NORMAL
CODING SYSTEM: NORMAL
CREAT SERPL-MCNC: 0.71 MG/DL (ref 0.51–0.95)
CROSSMATCH: NORMAL
CROSSMATCH: NORMAL
GFR SERPL CREATININE-BSD FRML MDRD: 89 ML/MIN/1.73M2
HGB BLD-MCNC: 8.4 G/DL (ref 11.7–15.7)
UNIT ABO/RH: NORMAL
UNIT ABO/RH: NORMAL
UNIT NUMBER: NORMAL
UNIT NUMBER: NORMAL
UNIT STATUS: NORMAL
UNIT STATUS: NORMAL
UNIT TYPE ISBT: 6200
UNIT TYPE ISBT: 6200

## 2023-06-22 PROCEDURE — 85347 COAGULATION TIME ACTIVATED: CPT

## 2023-06-22 PROCEDURE — 86923 COMPATIBILITY TEST ELECTRIC: CPT | Performed by: INTERNAL MEDICINE

## 2023-06-22 PROCEDURE — 250N000011 HC RX IP 250 OP 636: Performed by: INTERNAL MEDICINE

## 2023-06-22 PROCEDURE — 82565 ASSAY OF CREATININE: CPT | Performed by: INTERNAL MEDICINE

## 2023-06-22 PROCEDURE — 250N000009 HC RX 250: Performed by: ANESTHESIOLOGY

## 2023-06-22 PROCEDURE — C1894 INTRO/SHEATH, NON-LASER: HCPCS | Performed by: INTERNAL MEDICINE

## 2023-06-22 PROCEDURE — 999N000065 XR CHEST PORT 1 VIEW

## 2023-06-22 PROCEDURE — 33340 PERQ CLSR TCAT L ATR APNDGE: CPT | Mod: Q0 | Performed by: INTERNAL MEDICINE

## 2023-06-22 PROCEDURE — 250N000013 HC RX MED GY IP 250 OP 250 PS 637: Performed by: INTERNAL MEDICINE

## 2023-06-22 PROCEDURE — 120N000001 HC R&B MED SURG/OB

## 2023-06-22 PROCEDURE — 93355 ECHO TRANSESOPHAGEAL (TEE): CPT

## 2023-06-22 PROCEDURE — 272N000001 HC OR GENERAL SUPPLY STERILE: Performed by: INTERNAL MEDICINE

## 2023-06-22 PROCEDURE — 02L73DK OCCLUSION OF LEFT ATRIAL APPENDAGE WITH INTRALUMINAL DEVICE, PERCUTANEOUS APPROACH: ICD-10-PCS | Performed by: INTERNAL MEDICINE

## 2023-06-22 PROCEDURE — 93355 ECHO TRANSESOPHAGEAL (TEE): CPT | Performed by: INTERNAL MEDICINE

## 2023-06-22 PROCEDURE — 258N000003 HC RX IP 258 OP 636: Performed by: NURSE ANESTHETIST, CERTIFIED REGISTERED

## 2023-06-22 PROCEDURE — 258N000003 HC RX IP 258 OP 636: Performed by: INTERNAL MEDICINE

## 2023-06-22 PROCEDURE — 255N000002 HC RX 255 OP 636: Performed by: INTERNAL MEDICINE

## 2023-06-22 PROCEDURE — 33340 PERQ CLSR TCAT L ATR APNDGE: CPT | Performed by: INTERNAL MEDICINE

## 2023-06-22 PROCEDURE — 370N000017 HC ANESTHESIA TECHNICAL FEE, PER MIN: Performed by: INTERNAL MEDICINE

## 2023-06-22 PROCEDURE — 710N000010 HC RECOVERY PHASE 1, LEVEL 2, PER MIN

## 2023-06-22 PROCEDURE — 85018 HEMOGLOBIN: CPT | Performed by: INTERNAL MEDICINE

## 2023-06-22 PROCEDURE — 36415 COLL VENOUS BLD VENIPUNCTURE: CPT | Performed by: INTERNAL MEDICINE

## 2023-06-22 PROCEDURE — 250N000011 HC RX IP 250 OP 636: Mod: JZ | Performed by: NURSE ANESTHETIST, CERTIFIED REGISTERED

## 2023-06-22 PROCEDURE — 250N000011 HC RX IP 250 OP 636: Performed by: ANESTHESIOLOGY

## 2023-06-22 PROCEDURE — 250N000009 HC RX 250: Performed by: NURSE ANESTHETIST, CERTIFIED REGISTERED

## 2023-06-22 DEVICE — OCCLUDER CV WATCHMAN FLX OD31 MM M635WU50310: Type: IMPLANTABLE DEVICE | Site: HEART | Status: FUNCTIONAL

## 2023-06-22 RX ORDER — LIDOCAINE 40 MG/G
CREAM TOPICAL
Status: DISCONTINUED | OUTPATIENT
Start: 2023-06-22 | End: 2023-06-22 | Stop reason: HOSPADM

## 2023-06-22 RX ORDER — ONDANSETRON 2 MG/ML
4 INJECTION INTRAMUSCULAR; INTRAVENOUS EVERY 6 HOURS PRN
Status: DISCONTINUED | OUTPATIENT
Start: 2023-06-22 | End: 2023-06-22 | Stop reason: HOSPADM

## 2023-06-22 RX ORDER — NALOXONE HYDROCHLORIDE 0.4 MG/ML
0.2 INJECTION, SOLUTION INTRAMUSCULAR; INTRAVENOUS; SUBCUTANEOUS
Status: DISCONTINUED | OUTPATIENT
Start: 2023-06-22 | End: 2023-06-22 | Stop reason: HOSPADM

## 2023-06-22 RX ORDER — OXYCODONE HYDROCHLORIDE 5 MG/1
10 TABLET ORAL EVERY 4 HOURS PRN
Status: DISCONTINUED | OUTPATIENT
Start: 2023-06-22 | End: 2023-06-22 | Stop reason: HOSPADM

## 2023-06-22 RX ORDER — PROPOFOL 10 MG/ML
INJECTION, EMULSION INTRAVENOUS PRN
Status: DISCONTINUED | OUTPATIENT
Start: 2023-06-22 | End: 2023-06-22

## 2023-06-22 RX ORDER — HEPARIN SODIUM 1000 [USP'U]/ML
INJECTION, SOLUTION INTRAVENOUS; SUBCUTANEOUS
Status: DISCONTINUED | OUTPATIENT
Start: 2023-06-22 | End: 2023-06-22 | Stop reason: HOSPADM

## 2023-06-22 RX ORDER — IODIXANOL 320 MG/ML
INJECTION, SOLUTION INTRAVASCULAR
Status: DISCONTINUED | OUTPATIENT
Start: 2023-06-22 | End: 2023-06-22 | Stop reason: HOSPADM

## 2023-06-22 RX ORDER — ASPIRIN 81 MG/1
81 TABLET ORAL DAILY
Status: DISCONTINUED | OUTPATIENT
Start: 2023-06-23 | End: 2023-06-22 | Stop reason: HOSPADM

## 2023-06-22 RX ORDER — NALOXONE HYDROCHLORIDE 0.4 MG/ML
0.4 INJECTION, SOLUTION INTRAMUSCULAR; INTRAVENOUS; SUBCUTANEOUS
Status: DISCONTINUED | OUTPATIENT
Start: 2023-06-22 | End: 2023-06-22 | Stop reason: HOSPADM

## 2023-06-22 RX ORDER — SODIUM CHLORIDE 9 MG/ML
INJECTION, SOLUTION INTRAVENOUS CONTINUOUS
Status: ACTIVE | OUTPATIENT
Start: 2023-06-22 | End: 2023-06-22

## 2023-06-22 RX ORDER — KETAMINE HYDROCHLORIDE 10 MG/ML
INJECTION INTRAMUSCULAR; INTRAVENOUS PRN
Status: DISCONTINUED | OUTPATIENT
Start: 2023-06-22 | End: 2023-06-22

## 2023-06-22 RX ORDER — LIDOCAINE HYDROCHLORIDE 10 MG/ML
INJECTION, SOLUTION INFILTRATION; PERINEURAL PRN
Status: DISCONTINUED | OUTPATIENT
Start: 2023-06-22 | End: 2023-06-22

## 2023-06-22 RX ORDER — ASPIRIN 81 MG/1
81 TABLET ORAL DAILY
Status: ON HOLD | COMMUNITY
Start: 2023-06-23 | End: 2024-08-23

## 2023-06-22 RX ORDER — ONDANSETRON 4 MG/1
4 TABLET, ORALLY DISINTEGRATING ORAL EVERY 6 HOURS PRN
Status: DISCONTINUED | OUTPATIENT
Start: 2023-06-22 | End: 2023-06-22 | Stop reason: HOSPADM

## 2023-06-22 RX ORDER — SODIUM CHLORIDE 9 MG/ML
1000 INJECTION, SOLUTION INTRAVENOUS CONTINUOUS
Status: DISCONTINUED | OUTPATIENT
Start: 2023-06-22 | End: 2023-06-22 | Stop reason: HOSPADM

## 2023-06-22 RX ORDER — ONDANSETRON 2 MG/ML
INJECTION INTRAMUSCULAR; INTRAVENOUS PRN
Status: DISCONTINUED | OUTPATIENT
Start: 2023-06-22 | End: 2023-06-22

## 2023-06-22 RX ORDER — ACETAMINOPHEN 325 MG/1
650 TABLET ORAL EVERY 4 HOURS PRN
Status: DISCONTINUED | OUTPATIENT
Start: 2023-06-22 | End: 2023-06-22 | Stop reason: HOSPADM

## 2023-06-22 RX ORDER — OXYCODONE HYDROCHLORIDE 5 MG/1
5 TABLET ORAL EVERY 4 HOURS PRN
Status: DISCONTINUED | OUTPATIENT
Start: 2023-06-22 | End: 2023-06-22 | Stop reason: HOSPADM

## 2023-06-22 RX ORDER — GLYCOPYRROLATE 0.2 MG/ML
INJECTION, SOLUTION INTRAMUSCULAR; INTRAVENOUS PRN
Status: DISCONTINUED | OUTPATIENT
Start: 2023-06-22 | End: 2023-06-22

## 2023-06-22 RX ORDER — ASPIRIN 81 MG/1
81 TABLET ORAL ONCE
Status: COMPLETED | OUTPATIENT
Start: 2023-06-22 | End: 2023-06-22

## 2023-06-22 RX ORDER — SODIUM CHLORIDE 9 MG/ML
INJECTION, SOLUTION INTRAVENOUS CONTINUOUS PRN
Status: DISCONTINUED | OUTPATIENT
Start: 2023-06-22 | End: 2023-06-22

## 2023-06-22 RX ADMIN — GLYCOPYRROLATE 0.1 MG: 0.2 INJECTION INTRAMUSCULAR; INTRAVENOUS at 12:18

## 2023-06-22 RX ADMIN — ONDANSETRON 4 MG: 4 TABLET, ORALLY DISINTEGRATING ORAL at 17:09

## 2023-06-22 RX ADMIN — SODIUM CHLORIDE: 0.9 INJECTION, SOLUTION INTRAVENOUS at 11:41

## 2023-06-22 RX ADMIN — ASPIRIN 81 MG 81 MG: 81 TABLET ORAL at 10:55

## 2023-06-22 RX ADMIN — SODIUM CHLORIDE: 0.9 INJECTION, SOLUTION INTRAVENOUS at 12:30

## 2023-06-22 RX ADMIN — SODIUM CHLORIDE 500 ML: 9 INJECTION, SOLUTION INTRAVENOUS at 10:42

## 2023-06-22 RX ADMIN — ROCURONIUM BROMIDE 40 MG: 50 INJECTION, SOLUTION INTRAVENOUS at 11:56

## 2023-06-22 RX ADMIN — SUGAMMADEX 200 MG: 100 INJECTION, SOLUTION INTRAVENOUS at 12:45

## 2023-06-22 RX ADMIN — PHENYLEPHRINE HYDROCHLORIDE 100 MCG: 10 INJECTION INTRAVENOUS at 12:40

## 2023-06-22 RX ADMIN — VANCOMYCIN HYDROCHLORIDE 1500 MG: 10 INJECTION, POWDER, LYOPHILIZED, FOR SOLUTION INTRAVENOUS at 10:56

## 2023-06-22 RX ADMIN — PROPOFOL 200 MG: 10 INJECTION, EMULSION INTRAVENOUS at 11:54

## 2023-06-22 RX ADMIN — LIDOCAINE HYDROCHLORIDE 5 ML: 10 INJECTION, SOLUTION INFILTRATION; PERINEURAL at 11:54

## 2023-06-22 RX ADMIN — PHENYLEPHRINE HYDROCHLORIDE 100 MCG: 10 INJECTION INTRAVENOUS at 12:27

## 2023-06-22 RX ADMIN — ONDANSETRON 4 MG: 2 INJECTION INTRAMUSCULAR; INTRAVENOUS at 12:44

## 2023-06-22 RX ADMIN — GLYCOPYRROLATE 0.1 MG: 0.2 INJECTION INTRAMUSCULAR; INTRAVENOUS at 12:06

## 2023-06-22 RX ADMIN — KETAMINE HYDROCHLORIDE 40 MG: 10 INJECTION, SOLUTION INTRAMUSCULAR; INTRAVENOUS at 11:54

## 2023-06-22 ASSESSMENT — ACTIVITIES OF DAILY LIVING (ADL)
ADLS_ACUITY_SCORE: 35

## 2023-06-22 ASSESSMENT — ENCOUNTER SYMPTOMS: DYSRHYTHMIAS: 1

## 2023-06-22 NOTE — ANESTHESIA CARE TRANSFER NOTE
Patient: Suma Mark    Procedure: Procedure(s):  Left Atrial Appendage Closure       Diagnosis: Paroxysmal atrial fibrillation  Diagnosis Additional Information: No value filed.    Anesthesia Type:   General     Note:    Oropharynx: oropharynx clear of all foreign objects and spontaneously breathing  Level of Consciousness: awake  Oxygen Supplementation: face mask  Level of Supplemental Oxygen (L/min / FiO2): 6  Independent Airway: airway patency satisfactory and stable  Dentition: dentition unchanged  Vital Signs Stable: post-procedure vital signs reviewed and stable  Report to RN Given: handoff report given  Patient transferred to: Cardiac Special Care          Vitals:  Vitals Value Taken Time   /79 1306   Temp 36.5 1306   Pulse 72 06/22/23 1306   Resp 13 06/22/23 1306   SpO2 100 % 06/22/23 1306   Vitals shown include unvalidated device data.    Electronically Signed By: ANDREA Lundy CRNA  June 22, 2023  1:08 PM

## 2023-06-22 NOTE — ANESTHESIA PROCEDURE NOTES
Airway       Patient location during procedure: OR       Procedure Start/Stop Times: 6/22/2023 11:59 AM  Staff -        Anesthesiologist:  Karen Kern MD       CRNA: Ryan Ron APRN CRNA       Other Anesthesia Staff: Joni Treviño       Performed By: MARBIN  Consent for Airway        Urgency: elective  Indications and Patient Condition       Indications for airway management: farnaz-procedural       Induction type:intravenous       Mask difficulty assessment: 1 - vent by mask    Final Airway Details       Final airway type: endotracheal airway       Successful airway: ETT - single and Oral  Endotracheal Airway Details        ETT size (mm): 7.0       Cuffed: yes       Successful intubation technique: direct laryngoscopy       DL Blade Type: Rhodes 2       Grade View of Cords: 1       Adjucts: stylet       Position: Right       Measured from: gums/teeth       Secured at (cm): 21       Bite block used: None    Post intubation assessment        Placement verified by: capnometry, equal breath sounds and chest rise        Number of attempts at approach: 1       Number of other approaches attempted: 0       Secured with: silk tape       Ease of procedure: easy       Dentition: Intact and Unchanged       Dental guard used and removed. Dental Guard Type: Proguard Red.    Medication(s) Administered   Medication Administration Time: 6/22/2023 11:59 AM    Additional Comments       Vocal cords open and clear, atraumatic

## 2023-06-22 NOTE — PLAN OF CARE
Goal Outcome Evaluation:             Pt admitted for NAVIN. Pt prepped and ready for procedure.  Lonnie here for support.      Maribel Mayes RN

## 2023-06-22 NOTE — Clinical Note
0 : 25.1. 45 : 15.6. 90 : 19.7. 135 : 26. 0 : 22.6. 45 : 23.4. 90 : 23.7. 135 : 18.4. 0 : 25 . 45 : 24 . 90 : 26.6 . 135 : 26.9 . NAVIN type used: Wind SockPosition: Passed. [place]: Passed. Size: Accepted. Seal: Complete. Jet: 0.Mtivity FLX 31  mm: GTIN : [id number], LOT: [id number], expiration date: 2026-02-28.

## 2023-06-22 NOTE — ANESTHESIA PROCEDURE NOTES
Perioperative DIANA Procedure Note    Staff -        Anesthesiologist:  Karen Kern MD       Performed By: anesthesiologist  Preanesthesia Checklist:  Patient identified, IV assessed, risks and benefits discussed, monitors and equipment assessed, procedure being performed at surgeon's request and anesthesia consent obtained.    DIANA Probe Insertion    Probe Status PRE Insertion: NO obvious damage  Probe type:  Adult 3D  Bite block used:   Soft  Insertion Technique: Easy, no oropharyngeal manipulation  Insertion complications: None obvious  Billing Report:A DIANA report is NOT being generated.  Probe Status POST Removal: NO obvious damage

## 2023-06-22 NOTE — ANESTHESIA POSTPROCEDURE EVALUATION
Patient: Suma Mark    Procedure: Procedure(s):  Left Atrial Appendage Closure       Anesthesia Type:  General    Note:  Disposition: Outpatient   Postop Pain Control: Uneventful            Sign Out: Well controlled pain   PONV: No   Neuro/Psych: Uneventful            Sign Out: Acceptable/Baseline neuro status   Airway/Respiratory: Uneventful            Sign Out: Acceptable/Baseline resp. status   CV/Hemodynamics: Uneventful            Sign Out: Acceptable CV status; No obvious hypovolemia; No obvious fluid overload   Other NRE: NONE   DID A NON-ROUTINE EVENT OCCUR? No           Last vitals:  Vitals Value Taken Time   /57 06/22/23 1500   Temp     Pulse 52 06/22/23 1512   Resp 11 06/22/23 1417   SpO2 100 % 06/22/23 1512   Vitals shown include unvalidated device data.    Electronically Signed By: Karen Kern MD  June 22, 2023  3:14 PM

## 2023-06-22 NOTE — INTERVAL H&P NOTE
"I have reviewed the surgical (or preoperative) H&P that is linked to this encounter, and examined the patient. There are no significant changes    Clinical Conditions Present on Arrival:  Clinically Significant Risk Factors Present on Admission                # Drug Induced Coagulation Defect: home medication list includes an anticoagulant medication  # Drug Induced Platelet Defect: home medication list includes an antiplatelet medication  # Obesity: Estimated body mass index is 34.01 kg/m  as calculated from the following:    Height as of this encounter: 1.549 m (5' 1\").    Weight as of this encounter: 81.6 kg (180 lb).       "

## 2023-06-22 NOTE — ANESTHESIA PREPROCEDURE EVALUATION
Anesthesia Pre-Procedure Evaluation    Patient: Suma Mark   MRN: [id number] : 1950        Procedure : Procedure(s):  Left Atrial Appendage Closure          Past Medical History:   Diagnosis Date     Anxiety state, unspecified      Back pain      Diverticulitis 2017     DJD (degenerative joint disease)      Essential hypertension, benign      Fibromyalgia      Gastro-oesophageal reflux disease      Hernia, ventral 2017     History of anesthesia complications     hypotension after surgery     History of blood transfusion      Hyperlipidemia      Hyponatremia 2017     Major depression      Migraine      Osteopenia      PONV (postoperative nausea and vomiting)      Posttraumatic stress disorder      Raynaud's disease      Restless leg syndrome      S/P total knee replacement 2017     Thrombosis of leg     with pregnancy     Unspecified hypothyroidism       Past Surgical History:   Procedure Laterality Date     ARTHROPLASTY KNEE UNICOMPARTMENT  10/31/2013    Procedure: ARTHROPLASTY KNEE UNICOMPARTMENT;  LEFT KNEE UNICOMPARTMENTAL ARTHROPLASTY (CAROLINE)^;  Surgeon: Chi Mittal MD;  Location:  OR     BREAST SURGERY      bx     COLECTOMY N/A 2017    Procedure: RESECTION, SMALL INTESTINE, OPEN ADHESIOLYSIS;  Surgeon: Keyon Qureshi MD;  Location: [facility];  Service:      CV CORONARY ANGIOGRAM N/A 10/12/2022    Procedure: CV CORONARY ANGIOGRAM;  Surgeon: You Martinez MD;  Location: [facility] CV     CV CORONARY ANGIOGRAM N/A 2023    Procedure: Coronary Angiogram;  Surgeon: Bret Jin MD;  Location: [facility]     CV FRACTIONAL FLOW RATIO WIRE N/A 10/12/2022    Procedure: Fractional Flow Ratio Wire;  Surgeon: You Martinez MD;  Location: [facility]     CV LEFT HEART CATH N/A 2023    Procedure: Left Heart Catheterization;  Surgeon: Bret Jin MD;  Location: [facility] CV     CV PCI STENT DRUG ELUTING  "N/A 10/12/2022    Procedure: Percutaneous Coronary Intervention Stent;  Surgeon: You Martinez MD;  Location: [facility] CV     CV PCI STENT DRUG ELUTING N/A 11/08/2022    Procedure: Percutaneous Coronary Intervention - Stent;  Surgeon: Bret Jin MD;  Location: [facility] CV     ENDOSCOPIC RETROGRADE CHOLANGIOPANCREATOGRAPHY       ENDOSCOPIC STRIPPING VEIN(S)      BILATERLY     GENITOURINARY SURGERY      bladder sling     GI SURGERY  10/02/2015    Rectal prolaspse. s/p Abdominal rectopexy, sacral colpopexy, proctoscopy, cystoscopy     HERNIORRHAPHY INCISIONAL (LOCATION) N/A 06/02/2017    Procedure: LAPARASCOPIC CONVERTED TO OPEN PRIMARY INCISIONAL HERNIA REPAIR;  Surgeon: Keyon Qureshi MD;  Location: [facility];  Service:      HYSTERECTOMY  1985    [id number]     LAMINECTOMY LUMBAR TWO LEVELS  2006     LAPAROSCOPY N/A 08/22/2019    Procedure: DIAGNOSTIC LAPAROSCOPY, LYSIS OF ADHESION;  Surgeon: Svetlana Ron MD;  Location: [facility];  Service: General     LUMBAR HARDWARE REMOVAL[  03/22/2012    REMOVAL LUMBAR HARDWARE 03/22/2012   L3-S1; Type CD Horizon m8 instrumentation Dr. Murray     RELEASE CARPAL TUNNEL       TONSILLECTOMY  1954    ???     TOTAL KNEE ARTHROPLASTY Right 11/27/2017    Procedure:  RIGHT TOTAL KNEE ARTHROPLASTY;  Surgeon: Kevin Ryder MD;  Location: [facility];  Service: Orthopedics      THYROID BIOPSY  04/19/2019      Allergies   Allergen Reactions     Cephalexin Nausea and Vomiting     Ace Inhibitors Other (See Comments)     Elevates blood pressure     Amitriptyline Hcl Other (See Comments)     elevates blood pressure     Atenolol Other (See Comments)     Aggravates reynauds     Celebrex [Celecoxib] GI Disturbance     Cephalosporins Unknown     Pt doesn't remember        Clonidine Unknown     \"got very ill in ER\"     Codeine Sulfate Nausea and Vomiting     Darvocet [Propoxyphene N-Apap] Nausea and Vomiting     Dexamethasone Acetate " "Swelling     Erythromycin Nausea and Vomiting     Escitalopram Other (See Comments)     Headaches.       Gabapentin Unknown     \"Spotted\"     Hctz Unknown     \"depletes my sodium\"     Potassium GI Disturbance     Propoxyphene      HUT Reaction: Gastrointestinal; HUT Reaction: Nausea And Vomiting; HUT Noted: [id number]     Sodium      Tramadol Swelling     Swelling of tongue and face     Tramadol-Acetaminophen Other (See Comments)     Triamterene      Venlafaxine Nausea and Vomiting and Diarrhea     Zolpidem Other (See Comments) and Unknown     Pt doesn't rember       Zolpidem Tartrate Unknown     Bacitracin Itching and Rash     Sulfanilamide Rash      Social History     Tobacco Use     Smoking status: Never     Passive exposure: Never     Smokeless tobacco: Never   Substance Use Topics     Alcohol use: Yes     Comment: Alcoholic Drinks/day: 1 cocktail daily      Wt Readings from Last 1 Encounters:   06/22/23 81.6 kg (180 lb)        Anesthesia Evaluation   Pt has had prior anesthetic.     No history of anesthetic complications       ROS/MED HX  ENT/Pulmonary:       Neurologic:       Cardiovascular:     (+) hypertension--CAD ---dysrhythmias,     METS/Exercise Tolerance:     Hematologic:     (+) History of blood clots,     Musculoskeletal:       GI/Hepatic:     (+) GERD,     Renal/Genitourinary:       Endo:     (+) thyroid problem, Obesity,     Psychiatric/Substance Use:       Infectious Disease:       Malignancy:       Other:            Physical Exam    Airway        Mallampati: II    Neck ROM: full     Respiratory Devices and Support         Dental       (+) Minor Abnormalities - some fillings, tiny chips      Cardiovascular   cardiovascular exam normal          Pulmonary   pulmonary exam normal                OUTSIDE LABS:  CBC:   Lab Results   Component Value Date    WBC 4.4 06/20/2023    WBC 4.4 06/19/2023    HGB 9.0 (L) 06/20/2023    HGB 9.4 (L) 06/19/2023    HCT 29.0 (L) 06/20/2023    HCT 30.9 (L) 06/19/2023    "  06/20/2023     06/19/2023     BMP:   Lab Results   Component Value Date     06/19/2023     06/08/2023    POTASSIUM 4.3 06/19/2023    POTASSIUM 4.7 06/08/2023    CHLORIDE 103 06/19/2023    CHLORIDE 105 06/08/2023    CO2 26 06/19/2023    CO2 25 06/08/2023    BUN 19.4 06/19/2023    BUN 21 06/08/2023    CR 0.75 06/19/2023    CR 0.82 06/08/2023    GLC 96 06/19/2023    GLC 84 06/08/2023     COAGS:   Lab Results   Component Value Date    PTT 27 11/27/2022    INR 1.07 11/27/2022     POC:   Lab Results   Component Value Date     (H) 11/01/2013     HEPATIC:   Lab Results   Component Value Date    ALBUMIN 4.2 11/27/2022    PROTTOTAL 7.4 11/27/2022    ALT 21 11/27/2022    AST 18 11/27/2022    ALKPHOS 77 11/27/2022    BILITOTAL 0.5 11/27/2022     OTHER:   Lab Results   Component Value Date    ISSA 9.0 06/19/2023    PHOS 3.4 06/10/2021    MAG 1.9 11/27/2022    LIPASE 19 10/18/2019    AMYLASE 30 10/18/2019    TSH 1.76 06/13/2023    CRP 0.4 11/07/2019    SED 19 11/07/2019       Anesthesia Plan    ASA Status:  2      Anesthesia Type: General.     - Airway: ETT         Techniques and Equipment:     - Lines/Monitors: DIANA            DIANA Absolute Contra-indication: NONE            DIANA Relative Contra-indication: NONE     Consents    Anesthesia Plan(s) and associated risks, benefits, and realistic alternatives discussed. Questions answered and patient/representative(s) expressed understanding.    - Discussed: Risks, Benefits and Alternatives for the PROCEDURE were discussed     - Discussed with:       - Extended Intubation/Ventilatory Support Discussed: Yes.      - Patient is DNR/DNI Status: No    Use of blood products discussed: No .     Postoperative Care    Pain management: Multi-modal analgesia.   PONV prophylaxis: Ondansetron (or other 5HT-3), Dexamethasone or Solumedrol     Comments:                Karen Kern MD

## 2023-06-23 ENCOUNTER — PATIENT OUTREACH (OUTPATIENT)
Dept: CARE COORDINATION | Facility: CLINIC | Age: 73
End: 2023-06-23
Payer: MEDICARE

## 2023-06-23 ENCOUNTER — TELEPHONE (OUTPATIENT)
Dept: CARDIOLOGY | Facility: CLINIC | Age: 73
End: 2023-06-23
Payer: MEDICARE

## 2023-06-23 ENCOUNTER — PREP FOR PROCEDURE (OUTPATIENT)
Dept: CARDIOLOGY | Facility: CLINIC | Age: 73
End: 2023-06-23
Payer: MEDICARE

## 2023-06-23 DIAGNOSIS — I48.0 PAROXYSMAL ATRIAL FIBRILLATION (H): Primary | Chronic | ICD-10-CM

## 2023-06-23 DIAGNOSIS — Z95.818 PRESENCE OF WATCHMAN LEFT ATRIAL APPENDAGE CLOSURE DEVICE: Primary | ICD-10-CM

## 2023-06-23 RX ORDER — LIDOCAINE 40 MG/G
CREAM TOPICAL
Status: CANCELLED | OUTPATIENT
Start: 2023-06-23

## 2023-06-23 RX ORDER — SODIUM CHLORIDE 9 MG/ML
INJECTION, SOLUTION INTRAVENOUS CONTINUOUS
Status: CANCELLED | OUTPATIENT
Start: 2023-06-23

## 2023-06-23 NOTE — TELEPHONE ENCOUNTER
Phone call to patient for post op 31mm Watchman Flx device placement with Dr. Mariano on 6/23/2023.    Per report VSS, LSC, denies SOB, and palpable pedal pulses.    Does endorse some cough. Encouraged salt water gargle and lozenge, monitor and call back Monday if persistent.  No peripheral edema noted, no abdominal bloating or discomfort.     Pt denies any neurological changes at this time. Both hands are a little tingly but she has experienced this before. Movement helps the sensation go away.  Pt denies generalized or localized pain at this time.   Pt is having bowel movements and is voiding without difficulty.      Patient did have episode of bleeding from groin site prior to discharge last night. Had her assess bandage while on the phone with writer. The bandage is dry and intact with no visible blood from the outside. Bruising outside the bandage due to application of pressure. She was advised to keep bandage in place until tomorrow morning. Cannot hear out right ear. Spoke to Dr. Mariano regarding this and beSUCCESSt message sent with follow-up.    Per phone report right groin has 1 puncture site, is C/D/I, no drainage, slight bruising noted around puncture site, groin is soft, and pt denies pain at the site.    Patient kept bandage in place per instructions from yesterday and she has been advised to remove bandage tomorrow morning and apply pressure for at least ten minutes if bleeding happens. If bleeding persists after application of pressure, call 911.     Pt continues antiplatelet medications: Daily 75 mg Plavix (Clopidogrel) and daily 81 mg Aspirin.    Reviewed post op LAAC healing process, f/u appts, physical restrictions, nutritional requirements, when to contact the heart clinic, and contact information was given to the pt for further concerns or questions.  Pt verbalized understanding.     Vikki Johnson RN BSN  Structural Heart Coordinator   [facility]  [facility]  [phone]    The following information was relayed to the patient over the phone    Your antiplatelet medication (Aspirin and Plavix):      It is important to remain on your antiplatelet medication uninterrupted after your left atrial occlusion device implant to reduce your risk of a stroke or heart attack, DO NOT STOP THIS MEDICATION.    You will remain on once daily 81 mg Aspirin for the remainder of your life    You will remain on once daily 75 mg Plavix (Clopidogrel) until you are six months from your Watchman Procedure date, approximately 12/22/2023    Healing from your left atrial closure device implant:      Stay well hydrated, it is important to increase your fluid intake during your recovery period.    Eat foods high in protein to help the healing process.    Gradually increase your activity over the several days, back to baseline;  No aggressive exercise for 5 days post-procedure.    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    No lifting more that 10 lb for 5 days after procedure.    No driving for 3 days post-procedure.     Keep your incision site clean, dry and open to air.    Ok to use ice packs at groin sites for 20 minutes at a time for groin discomfort.     Please delay any dental procedures until 6 weeks post implant. You will not require anti-biotic prophylaxis treatment after this time frame.    If you need urgent dental care prior to the 6 week post-procedure restriction, please contact your cardiologist for prophylactic antibiotic.     Please call me if any of the following occur:      Shortness of breath, dizziness, or chest pain.    Changes in your groin sites including:  Swelling, hardening, drainage, increase in bruising or an increase in pain.          Dial 911 for signs/symptoms of a stroke:      New onset visual disturbance.    New problems with talking/speech.    Smile only occurs on half your face.    Numbness on one side of  your body or face.    Sudden headache.    New onset of confusion.    New problems with walking or balance.     Important information regarding your future follow up appointments:      45 Day post-implant follow-up with our Advance Practice Practitioner (DAYANARA)    Pre-Procedure History and Physical (H&P) to be completed within 30 days of your DIANA by your Primary Care Provider.    3 month post-implant DIANA to assess your Watchman Device.    You will be contacted after your DIANA is complete to discuss results within 2-3 days by a Structural RN Coordinator.    Thank you,    Vikki Johnson, Structural Heart Coordinator -[phone]    After hours please contact the on call service at [phone]

## 2023-06-23 NOTE — PROGRESS NOTES
Clinic Care Coordination Contact  [facility]: Post-Discharge Note  SITUATION                                                      Admission:    Admission Date: 06/22/23   Reason for Admission: Paroxysmal atrial fibrillation  Discharge:   Discharge Date: 06/22/23  Discharge Diagnosis: Paroxysmal atrial fibrillation    BACKGROUND                                                      Per hospital discharge summary and inpatient provider notes:    Suma Mark is a 72 year old female who is seen today for history and physical prior to left atrial appendage closure via Watchman device. She has a past medical history significant for paroxysmal atrial fibrillation, coronary artery disease with prior PCI in October and November 2022, HFpEF, essential hypertension, venous insufficiency, anemia, fibromyalgia, hypothyroidism, obesity.      She was hospitalized in October 2022 for unstable angina and underwent coronary angiogram, significant for multivessel disease PCI to OM 2.  There was a question of brief episode of AF detected in the ED and she underwent 2-week MCOT, significant for paroxysmal AF with RVR.  She was started on Eliquis.  She continued to have angina and had PCI to distal LAD in November.  She was started on sotalol in December.  She continued to have angina, occurring primarily at rest and not associated with exertion, after which another event monitor was ordered showing paroxysmal AF but not quantifiable. She requires long-term antiplatelet therapy secondary to coronary artery disease and Eliquis is costly for her. She was planned to undergo atrial appendage closure via Watchman device earlier this month.  This was postponed due to worsening angina symptoms, with subsequent angiogram demonstrating patent stents and nonobstructive disease.      She reports intermittent awareness of arrhythmia.  At times with AF she notes chest tightness, decreased activity tolerance, and shortness of breath with  "exertion.  Her hemoglobin has been slowly declining and she did have a positive fecal occult blood test and has colonoscopy pending.  She denies gross blood in urine or stool, hematemesis.  She denies palpitations, pedal edema, orthopnea, lightheadedness/dizziness.  ASSESSMENT           Discharge Assessment  How are you doing now that you are home?: \" Okay I guess\"  How are your symptoms? (Red Flag symptoms escalate to triage hotline per guidelines): New;Improved  Do you feel your condition is stable enough to be safe at home until your provider visit?: Yes  Does the patient have their discharge instructions? : Yes  Does the patient have questions regarding their discharge instructions? : No  Were you started on any new medications or were there changes to any of your previous medications? : Yes  Does the patient have all of their medications?: Yes  Do you have questions regarding any of your medications? : No  Do you have all of your needed medical supplies or equipment (DME)?  (i.e. oxygen tank, CPAP, cane, etc.): Yes  Discharge follow-up appointment scheduled within 14 calendar days? : Yes  Discharge Follow Up Appointment Date: 06/29/23  Discharge Follow Up Appointment Scheduled with?: Specialty Care Provider    Post-op ([facility] CTA Only)  If the patient had a surgery or procedure, do they have any questions for a nurse?: No             PLAN                                                      Outpatient Plan:   Your activity upon discharge: no lifting, driving, or strenuous  exercise for 5 days  Follow this diet upon discharge: Orders Placed This Encounter  Regular Diet Adult  Discharge Follow - up  Follow-up with: see AVS for details  Discharge Instructions - Do not drive  May resume driving in 3 days  Discharge Instructions - IF on Metformin (Glucophage or  Glucovance)  IF on metformin (GLUCOPHAGE, GLUMETZA, FORTAMET,  RIOMET) and metformin containing medications: (KAZANO,  METAGLIP, GLUCOVANCE, AVANDAMET, XIGDUO " XR,  JANUMET, JANUMET XR, JENTADUETO, PRANDIMET,  KOMBIGLYZE XR, INVOKAMET, ACTOPLUS MET, ACTOPLUS  MET XR), schedule a Basic Metabolic Panel at [facility] or  Primary Clinic in 48 - 72 hours post procedure and PRIOR TO  resuming the Metformin and metformin containing  medications. Hold Metformin and metformin containing  medications until after the Basic Metabolic Panel on the 2nd  or 3rd day following the procedure. May resume after blood  draw is complete.    Future Appointments   Date Time Provider Department Center   6/29/2023  7:30 AM [facility] INFUSION CHAIR 1 [facility]INFT [facility]   7/6/2023  8:00 PM  BED 3 [facility] BK SLEEP   7/10/2023 11:00 AM Reny Camejo CNP SNSPCR [facility] MPLW   7/10/2023  1:00 PM [facility] INFUSION CHAIR 2 [facility]FT [facility]   7/13/2023  8:30 AM [facility] INFUSION CHAIR 1 [facility]FT [facility]   7/20/2023 10:00 AM Suresh Ferguson MD [facility] BK SLEEP   8/14/2023  2:50 PM Marilyn Middleton, PA-C HRSJN [facility]   9/27/2023  7:30 AM [facility] ECHO STAFF JNCVMATTHEW [facility]         For any urgent concerns, please contact our 24 hour nurse triage line: [phone] ([phone])         Alyson Williamson MA

## 2023-06-23 NOTE — TELEPHONE ENCOUNTER
Spoke directly to INES Middleton. She states patient should have repeat Hgb next week to ensure level is not falling. Order placed and called patient to notify. ANA CRISTINA on secure voicemail and will send [employer] message. MATT

## 2023-06-23 NOTE — PROGRESS NOTES
Pt has had about an hour and fifteen minutes of bed rest after bleeding was stopped around 1740. Will get Pt up for walk.

## 2023-06-23 NOTE — PROGRESS NOTES
D/I/A: Patient is tolerating liquids and foods, ambulating, urinating, puncture sites are stable (no bleeding or hematoma) and patient has a .   A+O x 4 and making needs known.  CCL access sites C/D/I with no bleeding or hematoma.  CMS +.  VSSA.   IV access removed.    Education completed and outlined in AVS.  Medication changes reviewed with patient. Questions answered prior to discharge. Belongings returned to patient at discharge  P: Discharged to self care.  Patient to follow up as per discharge instruction

## 2023-06-23 NOTE — DISCHARGE SUMMARY
HEART CARE INPATIENT ENCOUNTER NOTE      Structural Discharge Summary    Primary Care Physician:  Bernadette Taylor    Discharge Provider: SIGIFREDO GAMBOA PA-C     Admission Date: 6/22/2023. Admission Diagnoses: Paroxysmal atrial fibrillation (H) [I48.0]   Discharge Date: June 23, 2023   Disposition: Home   Condition at Discharge: Stable  Code Status: Prior     Principal Diagnosis:  Paroxysmal atrial fibrillation    Discharge Diagnoses:  Principal Problem:    Paroxysmal atrial fibrillation (H)  Active Problems:    Chronic pain syndrome    Fibromyalgia    Essential hypertension    Gastroesophageal reflux disease without esophagitis    Dyslipidemia, goal LDL below 70    Restless leg syndrome    Coronary artery disease involving native coronary artery of native heart without angina pectoris    Heart failure with preserved ejection fraction, NYHA class II (H)    Presence of Watchman left atrial appendage closure device      Consult/s: none  Significant Diagnostic Studies:   Procedural DIANA - Interpretation Summary     Structural DIANA for Left Atrial Occlusion Device     Pre-Device:  1. Normal left ventricular size andsystolic function. LVEF: 60-65%  2. No left atrial thrombus or spontaneous contrast. Left atrium is of normal  size.  3. No ASD or PFO by color flow.  4. No pericardial effusion.     Post Device:  1. Well-seated PINNACLE FLXÂ  Device in left atrial appendage by 2D and 3D  imaging. No color doppler evidence of flow around device at ostium insertion  before or after device release. No change in mitral valve function. Nothrombus  noted on device, LA or NAVIN.     NAVIN device measurements:  Device compression diameter:  Pre deployment.  0Â : 17.6 mm  45Â : 17.8 mm  90Â : 18.6 mm  135 Â : 19.6 mm     Post deployment  0Â : 23.9 mm  45Â : 23.8 mm  90Â : 23.6 mm  135 Â : 23.1 mm     2. Normal left ventricular size and systolic function. LVEF: 60-65%  3. Small ASD with unidirectional flow (left to right) bycolor flow  doppler.  4. No post procedural pericardial effusion.     CXR:  EXAM: XR CHEST PORT 1 VIEW  LOCATION: [facility]  DATE: 6/22/2023     INDICATION: Please overpenetrate Xray so device can be seen, s p Watchman  COMPARISON: 11/28/2022                                                                      IMPRESSION: Interval watchman device placement. No hydropneumothorax. Heart is of normal size. There is mild pulmonary vascular redistribution without evidence of failure.    **I have personally reviewed the film and visualize the device in correct placement**    Treatments: procedures: LAAO implant (Watchman FLX)    Discharge Medications:   Discharge Medication List as of 6/22/2023  4:31 PM      START taking these medications    Details   aspirin 81 MG EC tablet Take 1 tablet (81 mg) by mouth daily, OTC         CONTINUE these medications which have NOT CHANGED    Details   amLODIPine (NORVASC) 5 MG tablet TAKE 1 TABLET BY MOUTH EVERYDAY AT BEDTIME, Disp-90 tablet, R-1, E-Prescribe      atorvastatin (LIPITOR) 80 MG tablet Take 1 tablet (80 mg) by mouth At Bedtime, Disp-90 tablet, R-3, E-Prescribe      botulinum toxin type A (BOTOX) 100 units injection Every 3 months. [facility], Historical      busPIRone (BUSPAR) 10 MG tablet Take 1 tablet (10 mg) by mouth 2 times daily, Disp-180 tablet, R-3, E-Prescribe      cholecalciferol 50 MCG (2000 UT) CAPS Take 2,000 Units by mouth daily, Historical      citalopram (CELEXA) 20 MG tablet Take 2 tablets (40 mg) by mouth daily, Disp-180 tablet, R-2, E-Prescribe      clopidogrel (PLAVIX) 75 MG tablet Take 1 tablet (75 mg) by mouth daily, Disp-90 tablet, R-3, E-Prescribe      diclofenac (VOLTAREN) 1 % topical gel Apply 2-4 g topically 4 times daily, Disp-150 g, R-3, E-Prescribe      eletriptan (RELPAX) 40 MG tablet Take 1 tablet (40 mg) by mouth at onset of headache for migraine, Disp-10 tablet, R-0, E-Prescribe      ferrous sulfate (FEROSUL) 325 (65 Fe) MG  tablet Take 1 tablet (325 mg) by mouth 2 times daily, Disp-60 tablet, R-1, E-Prescribe      furosemide (LASIX) 20 MG tablet Take 2 tablets (40 mg) by mouth daily, Disp-180 tablet, R-3, E-Prescribe      HYDROcodone-acetaminophen (NORCO) 5-325 MG tablet Take 1 tablet by mouth daily as needed (Severe headache), Disp-5 tablet, R-0, E-Prescribe      irbesartan (AVAPRO) 150 MG tablet Take 1 tablet (150 mg) by mouth daily, Disp-90 tablet, R-3, E-Prescribe      isosorbide mononitrate (IMDUR) 30 MG 24 hr tablet Take 1 tablet (30 mg) by mouth daily, Disp-30 tablet, R-11, E-Prescribe      levothyroxine (SYNTHROID/LEVOTHROID) 88 MCG tablet TAKE 1 TABLET BY MOUTH DAILY AT 6:00 AM., Disp-90 tablet, R-2, E-Prescribe      LORazepam (ATIVAN) 0.5 MG tablet Take 1 tablet (0.5 mg) by mouth nightly as needed for anxiety or sleep, Disp-30 tablet, R-0, E-Prescribe      nitroGLYcerin (NITROSTAT) 0.4 MG sublingual tablet For chest pain place 1 tablet under the tongue every 5 minutes for 3 doses. If symptoms persist 5 minutes after 1st dose call 911., Disp-30 tablet, R-1, E-Prescribe      pramipexole (MIRAPEX) 0.25 MG tablet TAKE 1 TABLET BY MOUTH THREE TIMES A DAY., Disp-270 tablet, R-3, E-Prescribe      sotalol (BETAPACE) 80 MG tablet TAKE 1 TABLET BY MOUTH EVERY 12 HOURS, Disp-60 tablet, R-3, E-Prescribe      ubrogepant (UBRELVY) 100 MG tablet UBRELVY 100 MG TABS, Historical      folic acid (FOLVITE) 400 MCG tablet Take 2 tablets (800 mcg) by mouth daily, Disp-30 tablet, R-3, E-Prescribe      zolpidem (AMBIEN) 5 MG tablet Take tablet by mouth 15 minutes prior to sleep, for Sleep Study, Disp-1 tablet, R-0, E-Prescribe         STOP taking these medications       apixaban ANTICOAGULANT (ELIQUIS ANTICOAGULANT) 5 MG tablet Comments:   Reason for Stopping:               Discharge Instructions:  Follow up appointment with Marilyn Middleton PA-C in 45 days (August 14)    Follow up Echocardiogram in 3-4 months (September 27)    Diet: Regular diet.  "Increase fluids over the next 2 days.   Activity: Activity as tolerated   Restrictions: No lifting >10 lbs or vigorous exercise for 5 days. No driving for 3 days. May return to work in 5 days  Wound / drain care: OK to shower next day. Keep wound clean and dry. Ok to use Ice packs PRN for discomfort. No baths, hot tubs or swimming pools for 5 days.    Hospital Summary:   Ms. Suma Mark is a 72 year old female who underwent successful LAAO implant with 31 mm Watchman FLX device. The patient was recovered in [facility], on bedrest for 4 hours.  The patient then dangled at the edge of bed and ambulated; She voided without difficulty. She had some minor bleeding from her puncture site and she was put back on bedrest for an additional 1.5 hours.  After that, vascular access site remained stable.  Post procedure the patient denied chest pain/pressure, palpitations, or shortness of breath.      Repeat labs were reviewed and were stable.  Activity restrictions and reportable signs and symptoms were discussed with the patient who verbalizes understanding.  She will stop taking Eliquis.  Tomorrow she will start DAPT with ASA 81 mg daily and Plavix 75 mg daily and she will continue this for 6 months, at which time she will stop Plavix and continue ASA indefinitely.     Follow up is arranged as above.        Physical Examination   /57   Pulse 50   Temp 98.4  F (36.9  C) (Oral)   Resp 15   Ht 1.549 m (5' 1\")   Wt 81.6 kg (180 lb)   LMP  (LMP Unknown)   SpO2 100%   BMI 34.01 kg/m    Body mass index is 34.01 kg/m .  Wt Readings from Last 3 Encounters:   06/22/23 81.6 kg (180 lb)   06/19/23 81.6 kg (180 lb)   06/13/23 82.3 kg (181 lb 8 oz)       Intake/Output Summary (Last 24 hours) at 6/23/2023 1208  Last data filed at 6/22/2023 1925  Gross per 24 hour   Intake 1250 ml   Output --   Net 1250 ml     General Appearance:   no distress, normal body habitus   Chest/Lungs:   Normal respiratory effort. Respirations are even " and unlabored. Lungs are clear to auscultation, no rales or wheezing. No chest wall tenderness.    Cardiovascular:   Bradycardic, but reguar. Normal S1, S2 with no murmurs, rubs, or gallops; the carotid, radial, dorsalis pedis and posterior tibial pulses are intact   Abdomen:  obese, soft, non-tender to palpation, non-distended. + bowel sounds.   Extremities: Minimal edema    Skin: Right groin site WNL; Stitch in place   Neurologic: Alert and oriented x3, calm and able to move all 4 extremities appropriately            Lab Results    Chemistry/lipid CBC Cardiac Enzymes/BNP/TSH/INR   Creat 6/22/2023 - 0.71 Hgb 6/22/2023 - 8.4 Lab Results   Component Value Date    TROPONINI 0.02 11/28/2022     11/27/2022    TSH 1.76 06/13/2023    INR 1.07 11/27/2022

## 2023-06-23 NOTE — PROGRESS NOTES
At round 1700 Pts  called writer to the door saying that Pt was bleeding. Pt stated that she started bleeding after she got dressed. Got Pt into bed, applied pressure, and called for additional help. Dr Mariano notified and made plan to have Pt do additional hour of bed rest after bleeding stopped.

## 2023-06-26 ENCOUNTER — TELEPHONE (OUTPATIENT)
Dept: CARDIOLOGY | Facility: CLINIC | Age: 73
End: 2023-06-26
Payer: MEDICARE

## 2023-06-26 ENCOUNTER — NURSE TRIAGE (OUTPATIENT)
Dept: NURSING | Facility: CLINIC | Age: 73
End: 2023-06-26
Payer: MEDICARE

## 2023-06-26 ENCOUNTER — OFFICE VISIT (OUTPATIENT)
Dept: FAMILY MEDICINE | Facility: CLINIC | Age: 73
End: 2023-06-26
Payer: MEDICARE

## 2023-06-26 VITALS
HEIGHT: 61 IN | HEART RATE: 60 BPM | TEMPERATURE: 97.9 F | OXYGEN SATURATION: 99 % | BODY MASS INDEX: 33.99 KG/M2 | RESPIRATION RATE: 16 BRPM | WEIGHT: 180 LBS | SYSTOLIC BLOOD PRESSURE: 119 MMHG | DIASTOLIC BLOOD PRESSURE: 75 MMHG

## 2023-06-26 DIAGNOSIS — Z95.818 PRESENCE OF WATCHMAN LEFT ATRIAL APPENDAGE CLOSURE DEVICE: ICD-10-CM

## 2023-06-26 DIAGNOSIS — I50.30 HEART FAILURE WITH PRESERVED EJECTION FRACTION, NYHA CLASS II (H): ICD-10-CM

## 2023-06-26 DIAGNOSIS — F41.9 ANXIETY: ICD-10-CM

## 2023-06-26 DIAGNOSIS — I48.0 PAROXYSMAL ATRIAL FIBRILLATION (H): Primary | Chronic | ICD-10-CM

## 2023-06-26 DIAGNOSIS — I10 ESSENTIAL HYPERTENSION: Chronic | ICD-10-CM

## 2023-06-26 DIAGNOSIS — H74.8X1 HEMOTYMPANUM, RIGHT: ICD-10-CM

## 2023-06-26 PROCEDURE — 99214 OFFICE O/P EST MOD 30 MIN: CPT | Performed by: FAMILY MEDICINE

## 2023-06-26 RX ORDER — CITALOPRAM HYDROBROMIDE 20 MG/1
40 TABLET ORAL DAILY
Qty: 180 TABLET | Refills: 0 | Status: SHIPPED | OUTPATIENT
Start: 2023-06-26 | End: 2023-07-03

## 2023-06-26 RX ORDER — CITALOPRAM HYDROBROMIDE 20 MG/1
40 TABLET ORAL DAILY
Qty: 180 TABLET | Refills: 2 | Status: CANCELLED | OUTPATIENT
Start: 2023-06-26

## 2023-06-26 ASSESSMENT — PAIN SCALES - GENERAL: PAINLEVEL: NO PAIN (0)

## 2023-06-26 NOTE — TELEPHONE ENCOUNTER
Likewise not sure what to make of that. I saw Marilyn ordered follow up hemoglobin as she was down to 8.4.  I wonder if the probing/ear manipulation was enough to trigger bleeding.  I would have her follow-up with her PMD this week.  Thank you    ----- Message -----  From: Vikki Johnson RN  Sent: 6/26/2023   8:21 AM CDT  To: ANDREA Cline CNP    ----- Message from Vikki Johnson RN sent at 6/26/2023  8:21 AM CDT -----  Not sure what to make of this. When I called her Friday for follow-up, her right ear was plugged. Dr. Mariano said this could be due to pressure from intubation. I asked her to continue to monitor and call me back today if persistent. Now she is having small amount of bleeding from ear, ear still plugged. Thoughts? Should I have her go to PCP or Urgent Care?

## 2023-06-26 NOTE — ASSESSMENT & PLAN NOTE
on ASA 81 mg daily and Plavix 75 mg daily to continue this for 6 months, at which time she will stop Plavix and continue ASA indefinitely

## 2023-06-26 NOTE — TELEPHONE ENCOUNTER
Phone call to patient, provided recommendations below. She verbalized she will schedule appt to see PCP this week. She is scheduled for Hgb Wednesday. Offered she may call back if she would like to discuss further. Patient appreciative, no further questions at this time. MATT

## 2023-06-26 NOTE — TELEPHONE ENCOUNTER
Incoming call from patient, she has also left  and sent [employer] message. She stated that her right ear is still plugged and today she noticed a feeling of trickling in her right ear. She dabbed at her ear and noticed a small amount of blood on the kleenex. Shortly later, she felt the same sensation of trickling in her ear and inserted a qtip in her ear and when she removed it, the qtip end was bloody. She feels it still may be bleeding a little bit and the ear is still plugged. Informed her will follow-up with [facility] team regarding this and will return response as soon as able. She is appreciative. No further questions at this time. MATT

## 2023-06-26 NOTE — TELEPHONE ENCOUNTER
Patient states that she had a Watchman procedure done implant and after it was done her was plugged which she was told could be due to the pressure.  Implant was put in left atrial appendage in her heart.  Patient states that she noticed that her ear was  bleeding.  FNA advised to phone Heart Clinic where procedure was done and to speak with her Cardiologist Patient states that the procedure was done at [facility].    Reason for Disposition    Nursing judgment    Additional Information    Negative: Nursing judgment    Negative: Nursing judgment    Negative: Nursing judgment    Protocols used: INFORMATION ONLY CALL - NO TRIAGE-A-OH

## 2023-06-26 NOTE — PROGRESS NOTES
"  Assessment & Plan   Problem List Items Addressed This Visit        Circulatory    Essential hypertension (Chronic)     normotensive   Continue with current cardiovascular meds         Paroxysmal atrial fibrillation (H) - Primary (Chronic)    Heart failure with preserved ejection fraction, NYHA class II (H)    Presence of Watchman left atrial appendage closure device     on ASA 81 mg daily and Plavix 75 mg daily to continue this for 6 months, at which time she will stop Plavix and continue ASA indefinitely        Other Visit Diagnoses     Hemotympanum, right        exam findings were discussed and reassurance given.   monitor symptoms and follow up with any significant recurrence (or bleeding else where, ie. skin, urine,BM    Anxiety                      MED REC REQUIRED  Post Medication Reconciliation Status: discharge medications reconciled and changed, per note/orders      Tonya Ochoa MD  [facility]         6/13/2023     8:37 AM   Additional Questions   Roomed by SULEIMAN HILL     History of Present Illness       Reason for visit:  Bleeding from right ear    She eats 0-1 servings of fruits and vegetables daily.She consumes 1 sweetened beverage(s) daily.She exercises with enough effort to increase her heart rate 10 to 19 minutes per day.  She exercises with enough effort to increase her heart rate 3 or less days per week.   She is taking medications regularly.           6/23/2023    10:14 AM   Post Discharge Outreach   Admission Date 6/22/2023   Reason for Admission Paroxysmal atrial fibrillation   Discharge Date 6/22/2023   Discharge Diagnosis Paroxysmal atrial fibrillation   How are you doing now that you are home? \" Okay I guess\"   How are your symptoms? (Red Flag symptoms escalate to triage hotline per guidelines) New;Improved   Do you feel your condition is stable enough to be safe at home until your provider visit? Yes   Does the patient have their discharge " instructions?  Yes   Does the patient have questions regarding their discharge instructions?  No   Were you started on any new medications or were there changes to any of your previous medications?  Yes   Does the patient have all of their medications? Yes   Do you have questions regarding any of your medications?  No   Do you have all of your needed medical supplies or equipment (DME)?  (i.e. oxygen tank, CPAP, cane, etc.) Yes   Discharge follow-up appointment scheduled within 14 calendar days?  Yes   Discharge Follow Up Appointment Date 6/29/2023   Discharge Follow Up Appointment Scheduled with? Specialty Care Provider        Hospital Follow-up Visit:  Lo is a 72 year old, presenting for posthospital visit with the admission diagnosis of paroxysmal atrial fibrillation, and underwent LAAO implant with 31 mm Watchman FLX device,  with LVEF of 60-65% with no left atrial thrombus, ASD or PFO.    Eliquis was discontinued , and is on ASA 81 mg daily and Plavix 75 mg daily to continue this for 6 months, at which time she will stop Plavix and continue ASA indefinitely  She will continue on amlodipine, furosemide, irbesartan, sotalol, Imdur, Nitrostat as needed, and atorvastatin for cardiovascular meds    She notes that had pressure in her right ear, and was told was from congestion, but this morning felt it release with some fluid mixed in with blood.     Hospital/Nursing Home/IP Rehab Facility: [facility]  Date of Admission: 06/22/2023  Date of Discharge: 06/22/2023  Reason(s) for Admission: Paroxysmal atrial fibrillation     Was your hospitalization related to COVID-19? No   Problems taking medications regularly:  None  Medication changes since discharge: START TAKING aspirin 81 MG EC tablet; STOP TAKING apixaban ANTICOAGULANT (ELIQUIS ANTICOAGULANT) 5 MG tablet  Problems adhering to non-medication therapy:  None    Summary of hospitalization:  [facility] discharge summary  "reviewed  Diagnostic Tests/Treatments reviewed.  Follow up needed: Cardiology     Other Healthcare Providers Involved in Patient s Care:         None  Update since discharge: improved.         Plan of care communicated with patient           Concern - Bleeding from the right ear  Onset: Pt states that it started today   Description: Pt states that she has a difficult time hearing out of her right ear, there was blood a little but earlier, ringing in her ear. Pt states no pain.  Intensity: moderate  Progression of Symptoms:  same  Accompanying Signs & Symptoms: Blood in ear, ringing, hard time hearing.   Previous history of similar problem: No  Precipitating factors:        Worsened by: Pt states watchman implant may have something to do with it.   Alleviating factors:        Improved by: Nothing yet.   Therapies tried and outcome: None        Review of Systems         Objective    /75 (BP Location: Right arm, Patient Position: Sitting, Cuff Size: Adult Regular)   Pulse 60   Temp 97.9  F (36.6  C) (Oral)   Resp 16   Ht 1.549 m (5' 1\")   Wt 81.6 kg (180 lb)   LMP  (LMP Unknown)   SpO2 99%   BMI 34.01 kg/m    Body mass index is 34.01 kg/m .  Physical Exam   GENERAL: alert and no distress  HENT: right ear: crusted/ clotted blood in the ear canal. Hemotympany.   left ear: + FELICIANO, normal canal.   NECK: no adenopathy, no asymmetry, masses, or scars and thyroid normal to palpation  RESP: lungs clear to auscultation - no rales, rhonchi or wheezes  CV: regular rate and rhythm, normal S1 S2, no S3 or S4, no murmur, click or rub, no peripheral edema and peripheral pulses strong  MS: no gross musculoskeletal defects noted, no edema                    "

## 2023-06-28 ENCOUNTER — LAB (OUTPATIENT)
Dept: CARDIOLOGY | Facility: CLINIC | Age: 73
End: 2023-06-28
Payer: MEDICARE

## 2023-06-28 DIAGNOSIS — I48.0 PAROXYSMAL ATRIAL FIBRILLATION (H): Chronic | ICD-10-CM

## 2023-06-28 LAB — HGB BLD-MCNC: 8.9 G/DL (ref 11.7–15.7)

## 2023-06-28 PROCEDURE — 85018 HEMOGLOBIN: CPT

## 2023-06-28 PROCEDURE — 36415 COLL VENOUS BLD VENIPUNCTURE: CPT

## 2023-06-29 ENCOUNTER — INFUSION THERAPY VISIT (OUTPATIENT)
Dept: INFUSION THERAPY | Facility: CLINIC | Age: 73
End: 2023-06-29
Attending: INTERNAL MEDICINE
Payer: MEDICARE

## 2023-06-29 VITALS
DIASTOLIC BLOOD PRESSURE: 68 MMHG | OXYGEN SATURATION: 99 % | HEART RATE: 64 BPM | RESPIRATION RATE: 16 BRPM | SYSTOLIC BLOOD PRESSURE: 123 MMHG | TEMPERATURE: 97.9 F

## 2023-06-29 DIAGNOSIS — D50.8 OTHER IRON DEFICIENCY ANEMIA: ICD-10-CM

## 2023-06-29 DIAGNOSIS — G25.81 RESTLESS LEG SYNDROME: Primary | ICD-10-CM

## 2023-06-29 PROCEDURE — 250N000011 HC RX IP 250 OP 636: Performed by: INTERNAL MEDICINE

## 2023-06-29 PROCEDURE — 96365 THER/PROPH/DIAG IV INF INIT: CPT

## 2023-06-29 PROCEDURE — 258N000003 HC RX IP 258 OP 636: Performed by: INTERNAL MEDICINE

## 2023-06-29 PROCEDURE — 96366 THER/PROPH/DIAG IV INF ADDON: CPT

## 2023-06-29 RX ORDER — ALBUTEROL SULFATE 90 UG/1
1-2 AEROSOL, METERED RESPIRATORY (INHALATION)
Status: DISCONTINUED | OUTPATIENT
Start: 2023-06-29 | End: 2023-06-29 | Stop reason: HOSPADM

## 2023-06-29 RX ORDER — ALBUTEROL SULFATE 0.83 MG/ML
2.5 SOLUTION RESPIRATORY (INHALATION)
Status: DISCONTINUED | OUTPATIENT
Start: 2023-06-29 | End: 2023-06-29 | Stop reason: HOSPADM

## 2023-06-29 RX ORDER — HEPARIN SODIUM (PORCINE) LOCK FLUSH IV SOLN 100 UNIT/ML 100 UNIT/ML
5 SOLUTION INTRAVENOUS
Status: CANCELLED | OUTPATIENT
Start: 2023-07-01

## 2023-06-29 RX ORDER — MEPERIDINE HYDROCHLORIDE 50 MG/ML
25 INJECTION INTRAMUSCULAR; INTRAVENOUS; SUBCUTANEOUS EVERY 30 MIN PRN
Status: CANCELLED | OUTPATIENT
Start: 2023-07-01

## 2023-06-29 RX ORDER — EPINEPHRINE 1 MG/ML
0.3 INJECTION, SOLUTION INTRAMUSCULAR; SUBCUTANEOUS EVERY 5 MIN PRN
Status: DISCONTINUED | OUTPATIENT
Start: 2023-06-29 | End: 2023-06-29 | Stop reason: HOSPADM

## 2023-06-29 RX ORDER — ALBUTEROL SULFATE 0.83 MG/ML
2.5 SOLUTION RESPIRATORY (INHALATION)
Status: CANCELLED | OUTPATIENT
Start: 2023-07-01

## 2023-06-29 RX ORDER — ALBUTEROL SULFATE 90 UG/1
1-2 AEROSOL, METERED RESPIRATORY (INHALATION)
Status: CANCELLED
Start: 2023-07-01

## 2023-06-29 RX ORDER — HEPARIN SODIUM,PORCINE 10 UNIT/ML
5-20 VIAL (ML) INTRAVENOUS DAILY PRN
Status: CANCELLED | OUTPATIENT
Start: 2023-07-01

## 2023-06-29 RX ORDER — EPINEPHRINE 1 MG/ML
0.3 INJECTION, SOLUTION INTRAMUSCULAR; SUBCUTANEOUS EVERY 5 MIN PRN
Status: CANCELLED | OUTPATIENT
Start: 2023-07-01

## 2023-06-29 RX ORDER — METHYLPREDNISOLONE SODIUM SUCCINATE 125 MG/2ML
125 INJECTION, POWDER, LYOPHILIZED, FOR SOLUTION INTRAMUSCULAR; INTRAVENOUS
Status: DISCONTINUED | OUTPATIENT
Start: 2023-06-29 | End: 2023-06-29 | Stop reason: HOSPADM

## 2023-06-29 RX ORDER — DIPHENHYDRAMINE HYDROCHLORIDE 50 MG/ML
50 INJECTION INTRAMUSCULAR; INTRAVENOUS
Status: CANCELLED
Start: 2023-07-01

## 2023-06-29 RX ORDER — METHYLPREDNISOLONE SODIUM SUCCINATE 125 MG/2ML
125 INJECTION, POWDER, LYOPHILIZED, FOR SOLUTION INTRAMUSCULAR; INTRAVENOUS
Status: CANCELLED
Start: 2023-07-01

## 2023-06-29 RX ORDER — DIPHENHYDRAMINE HYDROCHLORIDE 50 MG/ML
50 INJECTION INTRAMUSCULAR; INTRAVENOUS
Status: DISCONTINUED | OUTPATIENT
Start: 2023-06-29 | End: 2023-06-29 | Stop reason: HOSPADM

## 2023-06-29 RX ORDER — MEPERIDINE HYDROCHLORIDE 50 MG/ML
25 INJECTION INTRAMUSCULAR; INTRAVENOUS; SUBCUTANEOUS EVERY 30 MIN PRN
Status: DISCONTINUED | OUTPATIENT
Start: 2023-06-29 | End: 2023-06-29 | Stop reason: HOSPADM

## 2023-06-29 RX ADMIN — SODIUM CHLORIDE 250 ML: 9 INJECTION, SOLUTION INTRAVENOUS at 08:12

## 2023-06-29 RX ADMIN — IRON SUCROSE 300 MG: 20 INJECTION, SOLUTION INTRAVENOUS at 08:12

## 2023-06-29 NOTE — PROGRESS NOTES
Infusion Nursing Note:  Suma Mark presents today for Venofer.    Patient seen by provider today: No   present during visit today: Not Applicable.    Note: Reviewed side effects and indications of venofer. Patient stated that she has had venofer before and tolerated well..      Intravenous Access:  Peripheral IV placed.    Treatment Conditions:  Not Applicable.      Post Infusion Assessment:  Patient tolerated infusion without incident.  Site patent and intact, free from redness, edema or discomfort.  No evidence of extravasations.  Access discontinued per protocol.       Discharge Plan:   Patient and/or family verbalized understanding of discharge instructions and all questions answered.      Deisy Noe RN

## 2023-07-01 ENCOUNTER — TELEPHONE (OUTPATIENT)
Dept: FAMILY MEDICINE | Facility: CLINIC | Age: 73
End: 2023-07-01
Payer: MEDICARE

## 2023-07-01 DIAGNOSIS — F41.9 ANXIETY: ICD-10-CM

## 2023-07-01 NOTE — TELEPHONE ENCOUNTER
Incoming FAX [facility] - A;termatove Requested      CITALOPRAM HBR 20 MG TABLET    Message:  Alternative requested: insurance will not cover 2 tabs per day

## 2023-07-03 RX ORDER — CITALOPRAM HYDROBROMIDE 40 MG/1
40 TABLET ORAL DAILY
Qty: 90 TABLET | Refills: 1 | Status: SHIPPED | OUTPATIENT
Start: 2023-07-03 | End: 2024-01-10

## 2023-07-04 ASSESSMENT — SLEEP AND FATIGUE QUESTIONNAIRES
HOW LIKELY ARE YOU TO NOD OFF OR FALL ASLEEP WHILE SITTING INACTIVE IN A PUBLIC PLACE: SLIGHT CHANCE OF DOZING
HOW LIKELY ARE YOU TO NOD OFF OR FALL ASLEEP WHILE WATCHING TV: MODERATE CHANCE OF DOZING
HOW LIKELY ARE YOU TO NOD OFF OR FALL ASLEEP WHILE SITTING AND TALKING TO SOMEONE: WOULD NEVER DOZE
HOW LIKELY ARE YOU TO NOD OFF OR FALL ASLEEP IN A CAR, WHILE STOPPED FOR A FEW MINUTES IN TRAFFIC: WOULD NEVER DOZE
HOW LIKELY ARE YOU TO NOD OFF OR FALL ASLEEP WHEN YOU ARE A PASSENGER IN A CAR FOR AN HOUR WITHOUT A BREAK: MODERATE CHANCE OF DOZING
HOW LIKELY ARE YOU TO NOD OFF OR FALL ASLEEP WHILE LYING DOWN TO REST IN THE AFTERNOON WHEN CIRCUMSTANCES PERMIT: SLIGHT CHANCE OF DOZING
HOW LIKELY ARE YOU TO NOD OFF OR FALL ASLEEP WHILE SITTING AND READING: MODERATE CHANCE OF DOZING
HOW LIKELY ARE YOU TO NOD OFF OR FALL ASLEEP WHILE SITTING QUIETLY AFTER LUNCH WITHOUT ALCOHOL: WOULD NEVER DOZE

## 2023-07-06 ENCOUNTER — THERAPY VISIT (OUTPATIENT)
Dept: SLEEP MEDICINE | Facility: CLINIC | Age: 73
End: 2023-07-06
Payer: MEDICARE

## 2023-07-06 DIAGNOSIS — I48.0 PAROXYSMAL ATRIAL FIBRILLATION (H): ICD-10-CM

## 2023-07-06 DIAGNOSIS — R06.89 DYSPNEA AND RESPIRATORY ABNORMALITY: ICD-10-CM

## 2023-07-06 DIAGNOSIS — E66.09 CLASS 1 OBESITY DUE TO EXCESS CALORIES WITH SERIOUS COMORBIDITY AND BODY MASS INDEX (BMI) OF 33.0 TO 33.9 IN ADULT: Chronic | ICD-10-CM

## 2023-07-06 DIAGNOSIS — R53.83 MALAISE AND FATIGUE: ICD-10-CM

## 2023-07-06 DIAGNOSIS — R06.00 DYSPNEA AND RESPIRATORY ABNORMALITY: ICD-10-CM

## 2023-07-06 DIAGNOSIS — G25.81 RESTLESS LEG SYNDROME: ICD-10-CM

## 2023-07-06 DIAGNOSIS — R53.81 MALAISE AND FATIGUE: ICD-10-CM

## 2023-07-06 DIAGNOSIS — I10 ESSENTIAL HYPERTENSION: Chronic | ICD-10-CM

## 2023-07-06 DIAGNOSIS — G89.4 CHRONIC PAIN SYNDROME: Chronic | ICD-10-CM

## 2023-07-06 DIAGNOSIS — E66.811 CLASS 1 OBESITY DUE TO EXCESS CALORIES WITH SERIOUS COMORBIDITY AND BODY MASS INDEX (BMI) OF 33.0 TO 33.9 IN ADULT: Chronic | ICD-10-CM

## 2023-07-06 DIAGNOSIS — G47.9 DISTURBANCE IN SLEEP BEHAVIOR: ICD-10-CM

## 2023-07-06 DIAGNOSIS — I25.110 CORONARY ARTERY DISEASE INVOLVING NATIVE CORONARY ARTERY OF NATIVE HEART WITH UNSTABLE ANGINA PECTORIS (H): Chronic | ICD-10-CM

## 2023-07-06 PROCEDURE — 95810 POLYSOM 6/> YRS 4/> PARAM: CPT | Performed by: INTERNAL MEDICINE

## 2023-07-06 NOTE — PROGRESS NOTES
Assessment:     Diagnoses and all orders for this visit:  Chronic neck pain  Syrinx of spinal cord (H)  Arthropathy of cervical facet joint     Suma Mark is a 72 year old y.o. female with past medical history significant for hypothyroidism, hypertension, dyslipidemia, congestive heart failure, CAD-history of coronary angioplasty with stent placement on Eliquis anticoagulation therapy, atrial fibrillation, diverticulosis, GERD, fibromyalgia, migraine, chronic LBP, osteoporosis, depression, insomnia, total knee replacement who presents today for follow-up regarding:     -Chronic neck pain right greater than left with over 80% relief with right neck pain with recent RFA.  Some lingering mild left neck pain.  Patient denies arm pain, denies any new symptoms.    Plan:     A shared decision making plan was used.  The patient's values and choices were respected. Prior medical records were reviewed today. The following represents what was discussed and decided upon by the provider and the patient.     -DIAGNOSTIC TESTS: Images were personally reviewed and interpreted.    --Cervical spine MRI [facility] open lying 2/16/2023 with degenerative changes throughout with facet arthropathy.  No high-grade central stenosis.  Varying degrees of foraminal stenosis with severe right foraminal stenosis at C5-6 and benign-appearing right-sided perineural cyst at C7-T1 with right foraminal stenosis.  Facet arthropathy greatest on the right at C2-3, left greater than right at C3-4, bilateral at C4-5, severe right at C5-6 and bilateral at C6-7.  Cervical syrinx from C4-C7.  Right-sided thyroid mass noted on imaging, radiology recommended thyroid ultrasound.             -INTERVENTIONS: Discussed with patient that we could do a left C5, C6, C7 MBB at any point.  Currently she feels her symptoms on the left are more tolerable and wants to hold off but will call us if she decides to move forward with the left MBB for possible  ablation since she did really well with the right-sided injection.    -MEDICATIONS: Advised patient continue with diclofenac gel 3 times daily as tolerated for cervical facet arthropathy, refill sent to the pharmacy.  Discussed side effects of medications and proper use. Patient verbalized understanding.    -PHYSICAL THERAPY: Encourage patient continue with home exercises from prior physical therapy sessions which she is doing on a regular basis at this time.  Discussed the importance of core strengthening, ROM, stretching exercises with the patient and how each of these entities is important in decreasing pain.  Explained to the patient that the purpose of physical therapy is to teach the patient a home exercise program.  These exercises need to be performed every day in order to decrease pain and prevent future occurrences of pain.        -PATIENT EDUCATION: Total time of 22 minutes, on the day of service, spent with the patient, reviewing the chart, placing orders, and documenting.   -Today we also discussed the issues related to the current COVID-19 pandemic, the pros and cons of the current treatment plan, the CDC guidelines such as social distancing, washing the hands, and covering the cough.    -FOLLOW UP: Follow-up as needed  Advised to contact clinic if symptoms worsen or change.    Subjective:     Suma Mark is a 72 year old female who presents today for follow-up regarding ongoing generalized neck pain right greater than left in which she did receive 85% or more relief with recent radiofrequency ablation and is overall very happy with the results.  She is still having pain 5/10 more on the left than the right at this time and some mild right-sided pain with turning her head but it is again overall more tolerable.  She otherwise denies any radiating pain into her arms.  Denies numbness or tingling sensations.  Denies upper extremity weakness.  Denies any new symptoms.  Denies any recent trips or  falls or balance changes, denies bowel or bladder loss control.    No myelopathic symptoms.     *Patient evaluated by Justine Elizabeth, neurosurgery NP on 3/10/2023 for cervical syrinx.  No symptoms of cervical myelopathy, offered repeat imaging to ensure stability, patient declined.  History of claustrophobia.  Follows [facility] for headaches.    Treatment to Date: History lumbar fusion 2003  Physical therapy x3 sessions [facility] January 2023 neck pain     RIGHT C5, C6, C7 MBB 4/6/2023 and 5/2/2023 with positive response.  RIGHT C5, C6, C7 RFA 6/2/2023 with 85% relief.    -Medications:  Tizanidine 2 mg  Diclofenac gel 3 times daily with benefit    Patient Active Problem List   Diagnosis     Total knee replacement status     Acquired hypothyroidism     Chronic back pain     Fibromyalgia     Diverticulosis     Essential hypertension     Gastroesophageal reflux disease without esophagitis     Chronic insomnia     Migraine     Dyslipidemia, goal LDL below 70     Restless leg syndrome     Thyroid nodule     Coronary artery disease involving native coronary artery of native heart without angina pectoris     Paroxysmal atrial fibrillation (H)     Senile osteoporosis     Generalized anxiety disorder     Venous insufficiency of both lower extremities     Vitamin D deficiency     Chronic pain syndrome     Class 1 obesity due to excess calories with serious comorbidity and body mass index (BMI) of 33.0 to 33.9 in adult     Anemia     Heart failure with preserved ejection fraction, NYHA class II (H)     Presence of Watchman left atrial appendage closure device       Current Outpatient Medications   Medication     amLODIPine (NORVASC) 5 MG tablet     aspirin 81 MG EC tablet     atorvastatin (LIPITOR) 80 MG tablet     botulinum toxin type A (BOTOX) 100 units injection     busPIRone (BUSPAR) 10 MG tablet     cholecalciferol 50 MCG (2000 UT) CAPS     citalopram (CELEXA) 40 MG tablet     clopidogrel (PLAVIX) 75 MG tablet      "diclofenac (VOLTAREN) 1 % topical gel     eletriptan (RELPAX) 40 MG tablet     ferrous sulfate (FEROSUL) 325 (65 Fe) MG tablet     folic acid (FOLVITE) 400 MCG tablet     furosemide (LASIX) 20 MG tablet     HYDROcodone-acetaminophen (NORCO) 5-325 MG tablet     irbesartan (AVAPRO) 150 MG tablet     isosorbide mononitrate (IMDUR) 30 MG 24 hr tablet     levothyroxine (SYNTHROID/LEVOTHROID) 88 MCG tablet     LORazepam (ATIVAN) 0.5 MG tablet     nitroGLYcerin (NITROSTAT) 0.4 MG sublingual tablet     pramipexole (MIRAPEX) 0.25 MG tablet     sotalol (BETAPACE) 80 MG tablet     ubrogepant (UBRELVY) 100 MG tablet     zolpidem (AMBIEN) 5 MG tablet     Current Facility-Administered Medications   Medication     denosumab (PROLIA) injection 60 mg     Facility-Administered Medications Ordered in Other Visits   Medication     iodixanol (VISIPAQUE 320) injection       Allergies   Allergen Reactions     Cephalexin Nausea and Vomiting     Ace Inhibitors Other (See Comments)     Elevates blood pressure     Amitriptyline Hcl Other (See Comments)     elevates blood pressure     Atenolol Other (See Comments)     Aggravates reynauds     Celebrex [Celecoxib] GI Disturbance     Cephalosporins Unknown     Pt doesn't remember        Clonidine Unknown     \"got very ill in ER\"     Codeine Sulfate Nausea and Vomiting     Darvocet [Propoxyphene N-Apap] Nausea and Vomiting     Dexamethasone Acetate Swelling     Erythromycin Nausea and Vomiting     Escitalopram Other (See Comments)     Headaches.       Gabapentin Unknown     \"Spotted\"     Hctz Unknown     \"depletes my sodium\"     Potassium GI Disturbance     Propoxyphene      HUT Reaction: Gastrointestinal; HUT Reaction: Nausea And Vomiting; HUT Noted: [id number]     Sodium      Tramadol Swelling     Swelling of tongue and face     Tramadol-Acetaminophen Other (See Comments)     Triamterene      Venlafaxine Nausea and Vomiting and Diarrhea     Zolpidem Other (See Comments) and Unknown     Pt doesn't " rember       Zolpidem Tartrate Unknown     Bacitracin Itching and Rash     Sulfanilamide Rash       Past Medical History:   Diagnosis Date     Anxiety state, unspecified      Back pain      Diverticulitis 12/07/2017     DJD (degenerative joint disease)      Essential hypertension, benign      Fibromyalgia      Gastro-oesophageal reflux disease      Hernia, ventral 04/27/2017     History of anesthesia complications     hypotension after surgery     History of blood transfusion      Hyperlipidemia      Hyponatremia 12/07/2017     Major depression      Migraine      Osteopenia      PONV (postoperative nausea and vomiting)      Posttraumatic stress disorder      Raynaud's disease      Restless leg syndrome      S/P total knee replacement 11/27/2017     Thrombosis of leg     with pregnancy     Unspecified hypothyroidism         Review of Systems  ROS: Specifically negative for bowel/bladder dysfunction, balance changes, headache, dizziness, foot drop, fevers, chills, appetite changes, nausea/vomiting, unexplained weight loss. Otherwise 13 systems reviewed are negative. Please see the patient's intake questionnaire from today for details.    Reviewed Social, Family, Past Medical and Past Surgical history with patient, no significant changes noted since prior visit.     Objective:     LMP  (LMP Unknown)     PHYSICAL EXAMINATION:   --CONSTITUTIONAL: Vital signs as above. No acute distress. The patient is well nourished and well groomed.  --PSYCHIATRIC:  The patient is awake, alert, oriented to person, place, time and answering questions appropriately with clear speech. Appropriate mood and affect   --HEENT: Sclera are non-injected. Extraocular muscles are intact.   --SKIN: Skin over the face, bilateral upper extremities, and posterior torso is clean, dry, intact without rashes.  --RESPIRATORY: Normal rhythm and effort. No abnormal accessory muscle breathing patterns noted.   --GROSS MOTOR: Easily arises from a seated  position.   --CERVICAL SPINE: Inspection reveals no evidence of deformity.      Imaging: Spine imaging was reviewed today. The images were shown to the patient and the findings were explained using a spine model.    XR Chest Port 1 View    Result Date: 2023  EXAM: XR CHEST PORT 1 VIEW LOCATION: [facility] DATE: 2023 INDICATION: Please overpenetrate Xray so device can be seen, s p Watchman COMPARISON: 2022     IMPRESSION: Interval watchman device placement. No hydropneumothorax. Heart is of normal size. There is mild pulmonary vascular redistribution without evidence of failure.    Cardiac Catheterization    Result Date: 2023  Table formatting from the original result was not included. Images from the original result were not included.  [facility] Cardiac Electrophysiology [address]  [place] [address] Office: [phone]  Fax: [phone] [facility] Cardiac Electrophysiology Procedure note: Percutaneous left atrial appendage occlusion Patient: Suma Mark : 1950 : Lenin Mariano MD Date: 2023 Procedures performed: 1. Implantation of a LAAO (31mm Watchman FLX) device Recommendations: 1. The patient will go to [facility] cardiac telemetry unit post procedure.   2. Portable CXR prior to discharge. 3. Medications: unchanged except discontinuation of apixaban, plan for therapy with clopidogrel 75mg daily and aspirin 81mg daily 4. Anticipate discharge later today if the patient is clinically stable and the chest x-ray shows normal device position.  5. Follow up in the [facility] clinic per protocol in 4-6 weeks.  6. Repeat CT or DIANA at 3 months to assess NAVIN occlusion. Indications: Atrial fibrillation with [id number] risk score 5 HAS-BLED score 3 Contraindication to long term anticoagulation: anemia, possible GI bleeding Patient profile: Mrs. Mark is a 72year old female with a history of atrial  fibrillation and elevated [id number] risk score presenting for implantation of a NAVIN occlusion device. The risks benefits and expected outcomes have been explained in detail, and the patient agrees to proceed.  Pre-procedure CT showed windsock NAVIN configuration with small posterior lobe and ovoid orifice. Procedure note: Mrs. Mark was taken to the cardiac electrophysiology laboratory in the fasting nonsedated state.  Timeout was called and appropriateness of procedure was documented.  The patient received general anesthesia with endotracheal intubation.  DIANA imaging was performed per the noninvasive cardiology service - see details of the DIANA findings separately documented.  Continuous monitoring of the procedure including the pericardial space was performed.  The patient's right groin was prepped and draped in the usual sterile fashion and ultrasound guided vascular access was obtained as follows: 16Fr RFV.  Anticoagulation was initiated with heparin bolus and titrated to ACT goal 350s. A double curve introducer sheath with Versacross dilator was introduced to the SVC and under DIANA and fluoroscopic guidance the sheath was withdrawn to the foramen ovale.  Transseptal puncture was performed and the guidewire was directed into the left upper pulmonary vein.  A pigtail was placed through the sheath and directed into the left atrial appendage.  Contrast injection was performed to demonstrate orifice location and left atrial appendage anatomy - windsock NAVIN configuration with small posterior lobe and ovoid orifice was noted.  The pigtail was withdrawn and a 31mm Watchman NAVIN closure device was advanced into the left atrial appendage under fluoroscopic and DIANA guidance.  The device was deployed on two occasions.  Once the position was considered acceptable the device was assessed for position, occlusion, stability, and compression. The device was released using fluoroscopic imaging to confirm appropriate cable  release and device stability.  Repeat assessment of the device with DIANA and fluoroscopy was performed.  The procedure was terminated at this point.  DIANA showed no change in baseline trivial pericardial fluid.  All catheters were removed and the long sheaths were withdrawn to the IVC.  Heparin was discontinued, and heparin-based anticoagulation reversed with protamine.  All sheaths were removed and hemostasis achieved via figure of 8 suture.  Mrs. Mark was liberated from anesthesia and extubated.  She tolerated the procedure well and was transported to the recovery area in good condition.   LA Pressure:  9mmHg (250ml fluid bolus given)  NAVIN Maximal Dimension Pre-Deployment: 26.0mm at 135   Device Implanted:             [facility] Watchman FLX LAAO device: 31mm, serial number [id number]  Post Deployment:             DIANA compression measurements:                         0 degrees: 25.0mm                         45 degrees: 24.0mm                         90 degrees: 26.6mm                         135 degrees: 26.9mm Compression: 15% Korina-device jet: No evidence of korina-device leak or jet.  Stable device position following tug test.              Summary: 1. Percutaneous left atrial appendage occlusion with 31mm Watchman FLX device Lenin Mariano MD 6/22/2023  12:06 PM    DIANA with Report    Result Date: 6/22/2023  Karen Kern MD     6/22/2023 12:09 PM Perioperative DIANA Procedure Note Staff -      Anesthesiologist:  Karen Kern MD      Performed By: anesthesiologist Preanesthesia Checklist:  Patient identified, IV assessed, risks and benefits discussed, monitors and equipment assessed, procedure being performed at surgeon's request and anesthesia consent obtained. DIANA Probe Insertion Probe Status PRE Insertion: NO obvious damage Probe type:  Adult 3D Bite block used:   Soft Insertion Technique: Easy, no oropharyngeal manipulation Insertion complications: None obvious Billing Report:A DIANA report is NOT  being generated. Probe Status POST Removal: NO obvious damage     Transesophageal Echocardiogram    Result Date: 2023  [id number]^ERNESTO^[place] [address]  Name: AMOS OMALLEY MRN: [id number] : 1950 Study Date: 2023 11:43 AM Age: 72 yrs Gender: Female Patient Location: [facility] Reason For Study: Paroxysmal atrial fibrillation (H) Ordering Physician: BRENDON OROZCO Referring Physician: BRENDON OROZCO Performed By: JOSE LUIS/DEYSI  BSA: 1.8 m2 Height: 61 in Weight: 180 lb HR: 60 BP: 120/52 mmHg ______________________________________________________________________________ Procedure Complete DIANA Adult. Structural DIANA-LAAO. Good quality two-dimensional was performed and interpreted. ______________________________________________________________________________ Interpretation Summary  Structural DIANA for Left Atrial Occlusion Device  Pre-Device: 1. Normal left ventricular size andsystolic function. LVEF: 60-65% 2. No left atrial thrombus or spontaneous contrast. Left atrium is of normal size. 3. No ASD or PFO by color flow. 4. No pericardial effusion.  Post Device: 1. Well-seated PINNACLE FLXÂ  Device in left atrial appendage by 2D and 3D imaging. No color doppler evidence of flow around device at ostium insertion before or after device release. No change in mitral valve function. Nothrombus noted on device, LA or NAVIN.  NAVIN device measurements: Device compression diameter: Pre deployment. 0Â : 17.6 mm 45Â : 17.8 mm 90Â : 18.6 mm 135 Â : 19.6 mm  Post deployment 0Â : 23.9 mm 45Â : 23.8 mm 90Â : 23.6 mm 135 Â : 23.1 mm  2. Normal left ventricular size and systolic function. LVEF: 60-65% 3. Small ASD with unidirectional flow (left to right) bycolor flow doppler. 4. No post procedural pericardial effusion.  Results communicated directly to Dr. Mariano. ______________________________________________________________________________ DIANA 3D image  acquisition, reconstruction, and real-time interpretation was performed. The procedure was performed in the Heart Catherization Lab. The DIANA probe was inserted prior to our arrival by anesthesia. Sedation was administered and monitored by anesthesia.  Rhythm Sinus rhythm was noted. ______________________________________________________________________________ Report approved by: Eladia Velasquez 06/22/2023 02:45 PM  ______________________________________________________________________________      MA SCREENING DIGITAL BILAT - Future  (s+30)    Result Date: 6/15/2023  BILATERAL FULL FIELD DIGITAL SCREENING MAMMOGRAM Performed on: 6/15/23 Compared to: 03/11/2022, 01/04/2021, 11/05/2019, and 08/23/2018 Technique: This study was evaluated with the assistance of Computer-Aided Detection. Findings: The breasts have scattered areas of fibroglandular density. There are benign appearing calcifications in both breasts. There is no radiographic evidence of malignancy.     IMPRESSION: ACR BI-RADS Category 2: Benign RECOMMENDED FOLLOW-UP: Annual routine screening mammogram The results and recommendations of this examination will be communicated to the patient. Nicole Hooks MD     DX Hip/Pelvis/Spine    Result Date: 6/13/2023  EXAM: DX WRIST/HEEL/RADIUS, DX HIP/PELVIS/SPINE LOCATION: [facility] DATE/TIME: 6/13/2023 8:39 AM CDT INDICATION: BMD screening. Follow-up. Bone mineralization medication use (Prolia). DEMOGRAPHICS: Age- 72 years. Gender- Female. Menopausal status- Postmenopausal. COMPARISON: 05/18/2021. TECHNIQUE: Dual-energy x-ray absorptiometry (DXA) performed with routine technique. Forearm DXA performed since the spine could not be measured or interpreted. FINDINGS: DXA RESULTS -RIGHT Hip Total: BMD: 0.885 g/cm2. T-score: -1.0. Z-score: 0.4. -RIGHT Hip Femoral neck: BMD: 0.742 g/cm2. T-score: -2.1. Z-score: -0.6. -LEFT Hip Total: BMD: 0.907 g/cm2. T-score: -0.8. Z-score: 0.5.  -LEFT Hip Femoral neck: BMD: 0.822 g/cm2. T-score: -1.6. Z-score: -0.0. -LEFT Radius 33%: BMD: 0.681 g/cm2. T-score: -2.2. Z-score: 0.0. WHO T-SCORE CRITERIA -Normal: T score at or above -1 SD -Osteopenia: T score between -1 and -2.5 SD -Osteoporosis: T score at or below -2.5 SD The World Health Organization (WHO) criteria is applicable to perimenopausal females, postmenopausal females, and men aged 50 years or older. INTERVAL CHANGE -There has been a 0.8% increase in the right hip BMD. -There has been a 3.2% increase in the left radius 33% BMD. FRACTURE RISK -The FRAX risk calculator is not applicable due to current or recent treatment (defined by the NOF/ISCD FRAX implementation guide) for low bone mineral density.     IMPRESSION: Low bone density (OSTEOPENIA). T score meets the WHO criteria for low bone density (osteopenia) at one or more measured sites. The risk of osteoporotic fracture increases approximately two-fold for each standard deviation decrease in T-score.    DX Wrist Heel Radius    Result Date: 6/13/2023  EXAM: DX WRIST/HEEL/RADIUS, DX HIP/PELVIS/SPINE LOCATION: [facility] DATE/TIME: 6/13/2023 8:39 AM CDT INDICATION: BMD screening. Follow-up. Bone mineralization medication use (Prolia). DEMOGRAPHICS: Age- 72 years. Gender- Female. Menopausal status- Postmenopausal. COMPARISON: 05/18/2021. TECHNIQUE: Dual-energy x-ray absorptiometry (DXA) performed with routine technique. Forearm DXA performed since the spine could not be measured or interpreted. FINDINGS: DXA RESULTS -RIGHT Hip Total: BMD: 0.885 g/cm2. T-score: -1.0. Z-score: 0.4. -RIGHT Hip Femoral neck: BMD: 0.742 g/cm2. T-score: -2.1. Z-score: -0.6. -LEFT Hip Total: BMD: 0.907 g/cm2. T-score: -0.8. Z-score: 0.5. -LEFT Hip Femoral neck: BMD: 0.822 g/cm2. T-score: -1.6. Z-score: -0.0. -LEFT Radius 33%: BMD: 0.681 g/cm2. T-score: -2.2. Z-score: 0.0. WHO T-SCORE CRITERIA -Normal: T score at or above -1 SD -Osteopenia: T score  between -1 and -2.5 SD -Osteoporosis: T score at or below -2.5 SD The World Health Organization (WHO) criteria is applicable to perimenopausal females, postmenopausal females, and men aged 50 years or older. INTERVAL CHANGE -There has been a 0.8% increase in the right hip BMD. -There has been a 3.2% increase in the left radius 33% BMD. FRACTURE RISK -The FRAX risk calculator is not applicable due to current or recent treatment (defined by the NOF/ISCD FRAX implementation guide) for low bone mineral density.     IMPRESSION: Low bone density (OSTEOPENIA). T score meets the WHO criteria for low bone density (osteopenia) at one or more measured sites. The risk of osteoporotic fracture increases approximately two-fold for each standard deviation decrease in T-score.    Cardiac Catheterization    Result Date: 6/9/2023  1.  Left circumflex stent remains widely patent 2.  Mid/distal LAD stent remains widely patent 3.  Moderate proximal and apical disease in the LAD, proximal RCA, and distal RCA appear unchanged from prior study October 2022.  No new or significant progression of underlying CAD.

## 2023-07-07 PROBLEM — I25.10 CORONARY ARTERY DISEASE INVOLVING NATIVE CORONARY ARTERY OF NATIVE HEART WITHOUT ANGINA PECTORIS: Chronic | Status: ACTIVE | Noted: 2022-10-18

## 2023-07-07 PROBLEM — A41.9 SEPSIS (H): Status: RESOLVED | Noted: 2019-03-25 | Resolved: 2023-07-07

## 2023-07-07 PROBLEM — A41.9 SEPSIS (H): Status: ACTIVE | Noted: 2019-03-25

## 2023-07-07 PROBLEM — G47.33 OSA (OBSTRUCTIVE SLEEP APNEA): Chronic | Status: ACTIVE | Noted: 2023-07-07

## 2023-07-07 NOTE — PROCEDURES
" SLEEP STUDY INTERPRETATION  DIAGNOSTIC POLYSOMNOGRAPHY REPORT      Patient: AMOS OMALLEY  YOB: 1950  Study Date: 7/6/2023  MRN: [id number]  Referring Provider: Tahira MENDEZ CNP  Ordering Provider: Suresh Ferguson MD    Indications for Polysomnography: The patient is a 72 year old Female who is 5' 1\" and weighs 180.0 lbs. Her BMI is 34.4, Pawlet sleepiness scale 7/24 and neck circumference is 42 cm. A diagnostic polysomnogram was performed to evaluate for loud snoring, sleep maintenance difficulties, fatigue (ESS 7), morning headaches, nocturia, heartburn at night, night sweats  Comorbid coronary artery disease, hypertension, paroxysmal atrial fibrillation      Polysomnogram Data: A full night polysomnogram recorded the standard physiologic parameters including EEG, EOG, EMG, ECG, nasal and oral airflow. Respiratory parameters of chest and abdominal movements were recorded with respiratory inductance plethysmography. Oxygen saturation was recorded by pulse oximetry. Hypopnea scoring rule used: 1B 4%.    Sleep Architecture:   The total recording time of the polysomnogram was 432.3 minutes. The total sleep time was 392.5 minutes. Sleep latency was decreased at 3.3 minutes without the use of a sleep aid. REM latency was 90.0 minutes. Arousal index was normal at 12.8 arousals per hour. Sleep efficiency was normal at 90.8%. Wake after sleep onset was 36.0 minutes. The patient spent 5.2% of total sleep time in Stage N1, 21.9% in Stage N2, 46.2% in Stage N3, and 26.6% in REM. Time in REM supine was 87.0 minutes.    Respiration:     Events ? The polysomnogram revealed a presence of 17 obstructive, 5 central, and 1 mixed apneas resulting in an apnea index of 3.5 events per hour. There were 52 obstructive hypopneas and 0 central hypopneas resulting in an obstructive hypopnea index of 7.9 and central hypopnea index of 0 events per hour. The combined apnea/hypopnea index was 11.5 events per hour (central " apnea/hypopnea index was 0.8 events per hour). The REM AHI was 30.4 events per hour. The supine AHI was 16.9 events per hour. The RERA index was 2.4 events per hour.  The RDI was 13.9 events per hour.    Snoring - was reported as mild to loud and intermittent.    Respiratory rate and pattern - was notable for normal respiratory rate and pattern.    Sustained Sleep Associated Hypoventilation - Transcutaneous carbon dioxide monitoring was not used, however significant hypoventilation was not suggested by oximetry    Sleep Associated Hypoxemia - (Greater than 5 minutes O2 sat at or below 88%) Baseline oxygen saturation was 92.7%. Lowest oxygen saturation was 82.0%. Time spent less than or equal to 88% was 3.0 minutes. Time spent less than or equal to 89% was 6.0 minutes.    Movement Activity:     Periodic Limb Activity - There were 0 PLMs during the entire study.     REM EMG Activity - Excessive transient/sustained muscle activity was not present.    Nocturnal Behavior - Abnormal sleep related behaviors were not noted     Bruxism - None apparent.    Cardiac Summary:   The average pulse rate was 66.4 bpm. The minimum pulse rate was 60.0 bpm while the maximum pulse rate was 95.0 bpm.  Arrhythmias were not noted.      Assessment:     Mild obstructive sleep apnea, predominantly REM-related    Mild associated hypoxemia     Recommendations:    Consider repeat polysomnography with full night titration of positive airway pressure therapy for the control of sleep disordered breathing.    Based on the presence of symptoms or comorbidities, treatment could be empirically initiated with Auto?titrating PAP therapy with a range of 5 to 15 cmH2O. Recommend clinical follow up with sleep management team.    Patient may be a candidate for dental appliance through referral to Sleep Dentistry for the treatment of obstructive sleep apnea and/or socially disruptive snoring.    If devices are not acceptable or effective, patient may benefit  from evaluation of possible surgical options. If she is interested, would recommend referral to specialized ENT-Sleep provider.    Suggest optimizing sleep schedule    Weight management (if BMI > 30).    Diagnostic Codes:   Obstructive Sleep Apnea G47.33  Sleep Hypoxemia/Hypoventilation G47.36         _____________________________________   Electronically Signed By: Suresh Ferguson MD 7/7/23           Range(%) Time in range (min)   0.0 - 89.0 6.0   0.0 - 88.0 3.0         Stage Min(mm Hg) Max(mm Hg)   Wake - -   NREM(1+2+3) - -   REM - -       Range(mmHg) Time in range (min)   55.0 - 100.0 -   Excluded data <20.0 & >65.0 432.5

## 2023-07-10 ENCOUNTER — OFFICE VISIT (OUTPATIENT)
Dept: FAMILY MEDICINE | Facility: CLINIC | Age: 73
End: 2023-07-10
Payer: MEDICARE

## 2023-07-10 ENCOUNTER — INFUSION THERAPY VISIT (OUTPATIENT)
Dept: INFUSION THERAPY | Facility: CLINIC | Age: 73
End: 2023-07-10
Attending: INTERNAL MEDICINE
Payer: MEDICARE

## 2023-07-10 ENCOUNTER — OFFICE VISIT (OUTPATIENT)
Dept: PHYSICAL MEDICINE AND REHAB | Facility: CLINIC | Age: 73
End: 2023-07-10
Payer: MEDICARE

## 2023-07-10 VITALS — OXYGEN SATURATION: 97 % | DIASTOLIC BLOOD PRESSURE: 67 MMHG | SYSTOLIC BLOOD PRESSURE: 127 MMHG | HEART RATE: 65 BPM

## 2023-07-10 VITALS
WEIGHT: 181.13 LBS | SYSTOLIC BLOOD PRESSURE: 112 MMHG | BODY MASS INDEX: 34.22 KG/M2 | RESPIRATION RATE: 20 BRPM | OXYGEN SATURATION: 97 % | DIASTOLIC BLOOD PRESSURE: 58 MMHG | HEART RATE: 64 BPM

## 2023-07-10 VITALS — SYSTOLIC BLOOD PRESSURE: 120 MMHG | HEART RATE: 76 BPM | OXYGEN SATURATION: 97 % | DIASTOLIC BLOOD PRESSURE: 57 MMHG

## 2023-07-10 DIAGNOSIS — M47.812 ARTHROPATHY OF CERVICAL FACET JOINT: ICD-10-CM

## 2023-07-10 DIAGNOSIS — G89.29 CHRONIC NECK PAIN: Primary | ICD-10-CM

## 2023-07-10 DIAGNOSIS — D50.8 OTHER IRON DEFICIENCY ANEMIA: Primary | ICD-10-CM

## 2023-07-10 DIAGNOSIS — G25.81 RESTLESS LEG SYNDROME: ICD-10-CM

## 2023-07-10 DIAGNOSIS — G95.0 SYRINX OF SPINAL CORD (H): ICD-10-CM

## 2023-07-10 DIAGNOSIS — R20.2 NUMBNESS AND TINGLING OF BOTH UPPER EXTREMITIES: ICD-10-CM

## 2023-07-10 DIAGNOSIS — R20.0 NUMBNESS AND TINGLING OF BOTH UPPER EXTREMITIES: ICD-10-CM

## 2023-07-10 DIAGNOSIS — R21 RASH: Primary | ICD-10-CM

## 2023-07-10 DIAGNOSIS — T14.8XXA BRUISING: ICD-10-CM

## 2023-07-10 DIAGNOSIS — M54.2 CHRONIC NECK PAIN: Primary | ICD-10-CM

## 2023-07-10 PROCEDURE — 250N000011 HC RX IP 250 OP 636: Performed by: INTERNAL MEDICINE

## 2023-07-10 PROCEDURE — 99214 OFFICE O/P EST MOD 30 MIN: CPT | Performed by: FAMILY MEDICINE

## 2023-07-10 PROCEDURE — 258N000003 HC RX IP 258 OP 636: Performed by: INTERNAL MEDICINE

## 2023-07-10 PROCEDURE — 96365 THER/PROPH/DIAG IV INF INIT: CPT

## 2023-07-10 PROCEDURE — 99213 OFFICE O/P EST LOW 20 MIN: CPT | Performed by: NURSE PRACTITIONER

## 2023-07-10 PROCEDURE — 96366 THER/PROPH/DIAG IV INF ADDON: CPT

## 2023-07-10 RX ORDER — ALBUTEROL SULFATE 0.83 MG/ML
2.5 SOLUTION RESPIRATORY (INHALATION)
Status: DISCONTINUED | OUTPATIENT
Start: 2023-07-10 | End: 2023-07-10 | Stop reason: HOSPADM

## 2023-07-10 RX ORDER — DIPHENHYDRAMINE HYDROCHLORIDE 50 MG/ML
50 INJECTION INTRAMUSCULAR; INTRAVENOUS
Status: CANCELLED
Start: 2023-07-11

## 2023-07-10 RX ORDER — ALBUTEROL SULFATE 0.83 MG/ML
2.5 SOLUTION RESPIRATORY (INHALATION)
Status: CANCELLED | OUTPATIENT
Start: 2023-07-11

## 2023-07-10 RX ORDER — HEPARIN SODIUM (PORCINE) LOCK FLUSH IV SOLN 100 UNIT/ML 100 UNIT/ML
5 SOLUTION INTRAVENOUS
Status: DISCONTINUED | OUTPATIENT
Start: 2023-07-10 | End: 2023-07-10 | Stop reason: HOSPADM

## 2023-07-10 RX ORDER — ALBUTEROL SULFATE 90 UG/1
1-2 AEROSOL, METERED RESPIRATORY (INHALATION)
Status: CANCELLED
Start: 2023-07-11

## 2023-07-10 RX ORDER — ALBUTEROL SULFATE 90 UG/1
1-2 AEROSOL, METERED RESPIRATORY (INHALATION)
Status: DISCONTINUED | OUTPATIENT
Start: 2023-07-10 | End: 2023-07-10 | Stop reason: HOSPADM

## 2023-07-10 RX ORDER — METHYLPREDNISOLONE SODIUM SUCCINATE 125 MG/2ML
125 INJECTION, POWDER, LYOPHILIZED, FOR SOLUTION INTRAMUSCULAR; INTRAVENOUS
Status: DISCONTINUED | OUTPATIENT
Start: 2023-07-10 | End: 2023-07-10 | Stop reason: HOSPADM

## 2023-07-10 RX ORDER — EPINEPHRINE 1 MG/ML
0.3 INJECTION, SOLUTION INTRAMUSCULAR; SUBCUTANEOUS EVERY 5 MIN PRN
Status: CANCELLED | OUTPATIENT
Start: 2023-07-11

## 2023-07-10 RX ORDER — HEPARIN SODIUM,PORCINE 10 UNIT/ML
5-20 VIAL (ML) INTRAVENOUS DAILY PRN
Status: CANCELLED | OUTPATIENT
Start: 2023-07-11

## 2023-07-10 RX ORDER — METHYLPREDNISOLONE SODIUM SUCCINATE 125 MG/2ML
125 INJECTION, POWDER, LYOPHILIZED, FOR SOLUTION INTRAMUSCULAR; INTRAVENOUS
Status: CANCELLED
Start: 2023-07-11

## 2023-07-10 RX ORDER — HEPARIN SODIUM,PORCINE 10 UNIT/ML
5-20 VIAL (ML) INTRAVENOUS DAILY PRN
Status: DISCONTINUED | OUTPATIENT
Start: 2023-07-10 | End: 2023-07-10 | Stop reason: HOSPADM

## 2023-07-10 RX ORDER — MEPERIDINE HYDROCHLORIDE 50 MG/ML
25 INJECTION INTRAMUSCULAR; INTRAVENOUS; SUBCUTANEOUS EVERY 30 MIN PRN
Status: DISCONTINUED | OUTPATIENT
Start: 2023-07-10 | End: 2023-07-10 | Stop reason: HOSPADM

## 2023-07-10 RX ORDER — DIPHENHYDRAMINE HYDROCHLORIDE 50 MG/ML
50 INJECTION INTRAMUSCULAR; INTRAVENOUS
Status: DISCONTINUED | OUTPATIENT
Start: 2023-07-10 | End: 2023-07-10 | Stop reason: HOSPADM

## 2023-07-10 RX ORDER — MEPERIDINE HYDROCHLORIDE 50 MG/ML
25 INJECTION INTRAMUSCULAR; INTRAVENOUS; SUBCUTANEOUS EVERY 30 MIN PRN
Status: CANCELLED | OUTPATIENT
Start: 2023-07-11

## 2023-07-10 RX ORDER — EPINEPHRINE 1 MG/ML
0.3 INJECTION, SOLUTION INTRAMUSCULAR; SUBCUTANEOUS EVERY 5 MIN PRN
Status: DISCONTINUED | OUTPATIENT
Start: 2023-07-10 | End: 2023-07-10 | Stop reason: HOSPADM

## 2023-07-10 RX ORDER — HEPARIN SODIUM (PORCINE) LOCK FLUSH IV SOLN 100 UNIT/ML 100 UNIT/ML
5 SOLUTION INTRAVENOUS
Status: CANCELLED | OUTPATIENT
Start: 2023-07-11

## 2023-07-10 RX ADMIN — IRON SUCROSE 300 MG: 20 INJECTION, SOLUTION INTRAVENOUS at 13:38

## 2023-07-10 RX ADMIN — SODIUM CHLORIDE 250 ML: 9 INJECTION, SOLUTION INTRAVENOUS at 13:32

## 2023-07-10 ASSESSMENT — PAIN SCALES - GENERAL: PAINLEVEL: MODERATE PAIN (5)

## 2023-07-10 NOTE — PROGRESS NOTES
Assessment & Plan     (R21) Rash  (primary encounter diagnosis)  Comment: Left thumb area, I do not see any evidence of infection this looks more like irritated dry skin  Plan:      (T14.8XXA) Bruising  Comment: Left arm area of bruising secondary to being on an anticoagulant  Plan:      PLAN:  1.  In terms of the left thumb rash I do not want the patient to use hydrogen peroxide rather simply use hydrocortisone I think this will resolve on its own  2.  Reassurance about the bruising  3.  Follow-up as needed     Jasmeet Stephen MD  [facility] ROSELIA Blanchard is a 72 year old, presenting for the following health issues:  Patient comes in with 2 distinct concerns  There is a slight rash just proximal to the nailbed on the left thumb, she noticed that 2 weeks ago, she is concerned that that could be an infection she is actually been soaking it and hydrochlorothiazide she has not noticed any drainage or discharge, I told the patient that I do not see evidence of infection I actually do not want her to use hydrogen peroxide, for the itching I would like her to simply use over-the-counter hydrocortisone.    Patient has noticed for maybe 2 weeks or so a bruise in the left arm just above the elbow, its about the size of a silver dollar, she does not remember any injury to the area, the patient is on Plavix she also is getting regular iron infusions but because of that she is having her hemoglobin and platelets checked regularly, she is anemic but this has been stable and platelets normal so I told the patient that I do think this is bruising because of probably very mild trauma to the area with a combination of being on a blood thinner, but I told the patient I am not concerned about this    Patient has a host of underlying medical issues she just got a Watchman device she has had cervical neck ablation.      No chief complaint on file.        7/10/2023     3:56 PM   Additional  Questions   Roomed by Jm   Accompanied by self     History of Present Illness       Reason for visit:  Thumb infection  Symptom onset:  1-3 days ago                Review of Systems         Objective    LMP  (LMP Unknown)   There is no height or weight on file to calculate BMI.  Physical Exam   Skin examination.  There is a ridge of red dry almost scaly rash left thumb just proximal to the nailbed there is no drainage or oozing is not warm to the touch.  Does feel dry  In the left arm above the elbow there is an area of bruising about the size of a silver dollar.

## 2023-07-10 NOTE — LETTER
7/10/2023         RE: Suma Mark  [address] [address]        Dear Colleague,    Thank you for referring your patient, Suma Mark, to the [facility]. Please see a copy of my visit note below.      Assessment:     Diagnoses and all orders for this visit:  Chronic neck pain  Syrinx of spinal cord (H)  Arthropathy of cervical facet joint     Suma Mark is a 72 year old y.o. female with past medical history significant for hypothyroidism, hypertension, dyslipidemia, congestive heart failure, CAD-history of coronary angioplasty with stent placement on Eliquis anticoagulation therapy, atrial fibrillation, diverticulosis, GERD, fibromyalgia, migraine, chronic LBP, osteoporosis, depression, insomnia, total knee replacement who presents today for follow-up regarding:     -Chronic neck pain right greater than left with over 80% relief with right neck pain with recent RFA.  Some lingering mild left neck pain.  Patient denies arm pain, denies any new symptoms.    Plan:     A shared decision making plan was used.  The patient's values and choices were respected. Prior medical records were reviewed today. The following represents what was discussed and decided upon by the provider and the patient.     -DIAGNOSTIC TESTS: Images were personally reviewed and interpreted.    --Cervical spine MRI [place] radiology open lying 2/16/2023 with degenerative changes throughout with facet arthropathy.  No high-grade central stenosis.  Varying degrees of foraminal stenosis with severe right foraminal stenosis at C5-6 and benign-appearing right-sided perineural cyst at C7-T1 with right foraminal stenosis.  Facet arthropathy greatest on the right at C2-3, left greater than right at C3-4, bilateral at C4-5, severe right at C5-6 and bilateral at C6-7.  Cervical syrinx from C4-C7.  Right-sided thyroid mass noted on imaging, radiology recommended thyroid ultrasound.              -INTERVENTIONS: Discussed with patient that we could do a left C5, C6, C7 MBB at any point.  Currently she feels her symptoms on the left are more tolerable and wants to hold off but will call us if she decides to move forward with the left MBB for possible ablation since she did really well with the right-sided injection.    -MEDICATIONS: Advised patient continue with diclofenac gel 3 times daily as tolerated for cervical facet arthropathy, refill sent to the pharmacy.  Discussed side effects of medications and proper use. Patient verbalized understanding.    -PHYSICAL THERAPY: Encourage patient continue with home exercises from prior physical therapy sessions which she is doing on a regular basis at this time.  Discussed the importance of core strengthening, ROM, stretching exercises with the patient and how each of these entities is important in decreasing pain.  Explained to the patient that the purpose of physical therapy is to teach the patient a home exercise program.  These exercises need to be performed every day in order to decrease pain and prevent future occurrences of pain.        -PATIENT EDUCATION: Total time of 22 minutes, on the day of service, spent with the patient, reviewing the chart, placing orders, and documenting.   -Today we also discussed the issues related to the current COVID-19 pandemic, the pros and cons of the current treatment plan, the CDC guidelines such as social distancing, washing the hands, and covering the cough.    -FOLLOW UP: Follow-up as needed  Advised to contact clinic if symptoms worsen or change.    Subjective:     Suma Mark is a 72 year old female who presents today for follow-up regarding ongoing generalized neck pain right greater than left in which she did receive 85% or more relief with recent radiofrequency ablation and is overall very happy with the results.  She is still having pain 5/10 more on the left than the right at this time and some mild  right-sided pain with turning her head but it is again overall more tolerable.  She otherwise denies any radiating pain into her arms.  Denies numbness or tingling sensations.  Denies upper extremity weakness.  Denies any new symptoms.  Denies any recent trips or falls or balance changes, denies bowel or bladder loss control.    No myelopathic symptoms.     *Patient evaluated by Justine Elizabeth, neurosurgery NP on 3/10/2023 for cervical syrinx.  No symptoms of cervical myelopathy, offered repeat imaging to ensure stability, patient declined.  History of claustrophobia.  Follows [facility] for headaches.    Treatment to Date: History lumbar fusion 2003  Physical therapy x3 sessions [facility] January 2023 neck pain     RIGHT C5, C6, C7 MBB 4/6/2023 and 5/2/2023 with positive response.  RIGHT C5, C6, C7 RFA 6/2/2023 with 85% relief.    -Medications:  Tizanidine 2 mg  Diclofenac gel 3 times daily with benefit    Patient Active Problem List   Diagnosis     Total knee replacement status     Acquired hypothyroidism     Chronic back pain     Fibromyalgia     Diverticulosis     Essential hypertension     Gastroesophageal reflux disease without esophagitis     Chronic insomnia     Migraine     Dyslipidemia, goal LDL below 70     Restless leg syndrome     Thyroid nodule     Coronary artery disease involving native coronary artery of native heart without angina pectoris     Paroxysmal atrial fibrillation (H)     Senile osteoporosis     Generalized anxiety disorder     Venous insufficiency of both lower extremities     Vitamin D deficiency     Chronic pain syndrome     Class 1 obesity due to excess calories with serious comorbidity and body mass index (BMI) of 33.0 to 33.9 in adult     Anemia     Heart failure with preserved ejection fraction, NYHA class II (H)     Presence of Watchman left atrial appendage closure device       Current Outpatient Medications   Medication     amLODIPine (NORVASC) 5 MG tablet     aspirin 81  "MG EC tablet     atorvastatin (LIPITOR) 80 MG tablet     botulinum toxin type A (BOTOX) 100 units injection     busPIRone (BUSPAR) 10 MG tablet     cholecalciferol 50 MCG (2000 UT) CAPS     citalopram (CELEXA) 40 MG tablet     clopidogrel (PLAVIX) 75 MG tablet     diclofenac (VOLTAREN) 1 % topical gel     eletriptan (RELPAX) 40 MG tablet     ferrous sulfate (FEROSUL) 325 (65 Fe) MG tablet     folic acid (FOLVITE) 400 MCG tablet     furosemide (LASIX) 20 MG tablet     HYDROcodone-acetaminophen (NORCO) 5-325 MG tablet     irbesartan (AVAPRO) 150 MG tablet     isosorbide mononitrate (IMDUR) 30 MG 24 hr tablet     levothyroxine (SYNTHROID/LEVOTHROID) 88 MCG tablet     LORazepam (ATIVAN) 0.5 MG tablet     nitroGLYcerin (NITROSTAT) 0.4 MG sublingual tablet     pramipexole (MIRAPEX) 0.25 MG tablet     sotalol (BETAPACE) 80 MG tablet     ubrogepant (UBRELVY) 100 MG tablet     zolpidem (AMBIEN) 5 MG tablet     Current Facility-Administered Medications   Medication     denosumab (PROLIA) injection 60 mg     Facility-Administered Medications Ordered in Other Visits   Medication     iodixanol (VISIPAQUE 320) injection       Allergies   Allergen Reactions     Cephalexin Nausea and Vomiting     Ace Inhibitors Other (See Comments)     Elevates blood pressure     Amitriptyline Hcl Other (See Comments)     elevates blood pressure     Atenolol Other (See Comments)     Aggravates reynauds     Celebrex [Celecoxib] GI Disturbance     Cephalosporins Unknown     Pt doesn't remember        Clonidine Unknown     \"got very ill in ER\"     Codeine Sulfate Nausea and Vomiting     Darvocet [Propoxyphene N-Apap] Nausea and Vomiting     Dexamethasone Acetate Swelling     Erythromycin Nausea and Vomiting     Escitalopram Other (See Comments)     Headaches.       Gabapentin Unknown     \"Spotted\"     Hctz Unknown     \"depletes my sodium\"     Potassium GI Disturbance     Propoxyphene      HUT Reaction: Gastrointestinal; HUT Reaction: Nausea And " Vomiting; [facility] Noted: [id number]     Sodium      Tramadol Swelling     Swelling of tongue and face     Tramadol-Acetaminophen Other (See Comments)     Triamterene      Venlafaxine Nausea and Vomiting and Diarrhea     Zolpidem Other (See Comments) and Unknown     Pt doesn't rember       Zolpidem Tartrate Unknown     Bacitracin Itching and Rash     Sulfanilamide Rash       Past Medical History:   Diagnosis Date     Anxiety state, unspecified      Back pain      Diverticulitis 12/07/2017     DJD (degenerative joint disease)      Essential hypertension, benign      Fibromyalgia      Gastro-oesophageal reflux disease      Hernia, ventral 04/27/2017     History of anesthesia complications     hypotension after surgery     History of blood transfusion      Hyperlipidemia      Hyponatremia 12/07/2017     Major depression      Migraine      Osteopenia      PONV (postoperative nausea and vomiting)      Posttraumatic stress disorder      Raynaud's disease      Restless leg syndrome      S/P total knee replacement 11/27/2017     Thrombosis of leg     with pregnancy     Unspecified hypothyroidism         Review of Systems  ROS: Specifically negative for bowel/bladder dysfunction, balance changes, headache, dizziness, foot drop, fevers, chills, appetite changes, nausea/vomiting, unexplained weight loss. Otherwise 13 systems reviewed are negative. Please see the patient's intake questionnaire from today for details.    Reviewed Social, Family, Past Medical and Past Surgical history with patient, no significant changes noted since prior visit.     Objective:     LMP  (LMP Unknown)     PHYSICAL EXAMINATION:   --CONSTITUTIONAL: Vital signs as above. No acute distress. The patient is well nourished and well groomed.  --PSYCHIATRIC:  The patient is awake, alert, oriented to person, place, time and answering questions appropriately with clear speech. Appropriate mood and affect   --HEENT: Sclera are non-injected. Extraocular muscles are  intact.   --SKIN: Skin over the face, bilateral upper extremities, and posterior torso is clean, dry, intact without rashes.  --RESPIRATORY: Normal rhythm and effort. No abnormal accessory muscle breathing patterns noted.   --GROSS MOTOR: Easily arises from a seated position.   --CERVICAL SPINE: Inspection reveals no evidence of deformity.      Imaging: Spine imaging was reviewed today. The images were shown to the patient and the findings were explained using a spine model.    XR Chest Port 1 View    Result Date: 2023  EXAM: XR CHEST PORT 1 VIEW LOCATION: [facility] DATE: 2023 INDICATION: Please overpenetrate Xray so device can be seen, s p Watchman COMPARISON: 2022     IMPRESSION: Interval watchman device placement. No hydropneumothorax. Heart is of normal size. There is mild pulmonary vascular redistribution without evidence of failure.    Cardiac Catheterization    Result Date: 2023  Table formatting from the original result was not included. Images from the original result were not included.  [facility] Cardiac Electrophysiology [address]  [place] [address] Office: [phone]  Fax: [phone] [facility] Cardiac Electrophysiology Procedure note: Percutaneous left atrial appendage occlusion Patient: Suma Mark : 1950 : Lenin Mariano MD Date: 2023 Procedures performed: 1. Implantation of a LAAO (31mm Watchman FLX) device Recommendations: 1. The patient will go to [facility] cardiac telemetry unit post procedure.   2. Portable CXR prior to discharge. 3. Medications: unchanged except discontinuation of apixaban, plan for therapy with clopidogrel 75mg daily and aspirin 81mg daily 4. Anticipate discharge later today if the patient is clinically stable and the chest x-ray shows normal device position.  5. Follow up in the [facility] clinic per protocol in 4-6 weeks.  6. Repeat CT or DIANA at 3 months  to assess NAVIN occlusion. Indications: Atrial fibrillation with [id number] risk score 5 HAS-BLED score 3 Contraindication to long term anticoagulation: anemia, possible GI bleeding Patient profile: Mrs. Mark is a 72year old female with a history of atrial fibrillation and elevated [id number] risk score presenting for implantation of a NAVIN occlusion device. The risks benefits and expected outcomes have been explained in detail, and the patient agrees to proceed.  Pre-procedure CT showed windsock NAVIN configuration with small posterior lobe and ovoid orifice. Procedure note: Mrs. Mark was taken to the cardiac electrophysiology laboratory in the fasting nonsedated state.  Timeout was called and appropriateness of procedure was documented.  The patient received general anesthesia with endotracheal intubation.  DIANA imaging was performed per the noninvasive cardiology service - see details of the DIANA findings separately documented.  Continuous monitoring of the procedure including the pericardial space was performed.  The patient's right groin was prepped and draped in the usual sterile fashion and ultrasound guided vascular access was obtained as follows: 16Fr RFV.  Anticoagulation was initiated with heparin bolus and titrated to ACT goal 350s. A double curve introducer sheath with Versacross dilator was introduced to the SVC and under DIANA and fluoroscopic guidance the sheath was withdrawn to the foramen ovale.  Transseptal puncture was performed and the guidewire was directed into the left upper pulmonary vein.  A pigtail was placed through the sheath and directed into the left atrial appendage.  Contrast injection was performed to demonstrate orifice location and left atrial appendage anatomy - windsock NAVIN configuration with small posterior lobe and ovoid orifice was noted.  The pigtail was withdrawn and a 31mm Watchman NAVIN closure device was advanced into the left atrial appendage under fluoroscopic and DIANA  guidance.  The device was deployed on two occasions.  Once the position was considered acceptable the device was assessed for position, occlusion, stability, and compression. The device was released using fluoroscopic imaging to confirm appropriate cable release and device stability.  Repeat assessment of the device with DIANA and fluoroscopy was performed.  The procedure was terminated at this point.  DIANA showed no change in baseline trivial pericardial fluid.  All catheters were removed and the long sheaths were withdrawn to the IVC.  Heparin was discontinued, and heparin-based anticoagulation reversed with protamine.  All sheaths were removed and hemostasis achieved via figure of 8 suture.  Mrs. Mark was liberated from anesthesia and extubated.  She tolerated the procedure well and was transported to the recovery area in good condition.   LA Pressure:  9mmHg (250ml fluid bolus given)  NAVIN Maximal Dimension Pre-Deployment: 26.0mm at 135   Device Implanted:             [facility] Watchman FLX LAAO device: 31mm, serial number [id number]  Post Deployment:             DIANA compression measurements:                         0 degrees: 25.0mm                         45 degrees: 24.0mm                         90 degrees: 26.6mm                         135 degrees: 26.9mm Compression: 15% Korina-device jet: No evidence of korina-device leak or jet.  Stable device position following tug test.              Summary: 1. Percutaneous left atrial appendage occlusion with 31mm Watchman FLX device Lenin Mariano MD 6/22/2023  12:06 PM    DIANA with Report    Result Date: 6/22/2023  Karen Kern MD     6/22/2023 12:09 PM Perioperative DIANA Procedure Note Staff -      Anesthesiologist:  Karen Kern MD      Performed By: anesthesiologist Preanesthesia Checklist:  Patient identified, IV assessed, risks and benefits discussed, monitors and equipment assessed, procedure being performed at surgeon's request and anesthesia consent  obtained. DIANA Probe Insertion Probe Status PRE Insertion: NO obvious damage Probe type:  Adult 3D Bite block used:   Soft Insertion Technique: Easy, no oropharyngeal manipulation Insertion complications: None obvious Billing Report:A DIANA report is NOT being generated. Probe Status POST Removal: NO obvious damage     Transesophageal Echocardiogram    Result Date: 2023  [id number]^ERNESTO^[place] [address]  Name: AMOS OMALLEY MRN: [id number] : 1950 Study Date: 2023 11:43 AM Age: 72 yrs Gender: Female Patient Location: [facility] Reason For Study: Paroxysmal atrial fibrillation (H) Ordering Physician: BRENDON OROZCO Referring Physician: BRENDON OROZCO Performed By: JOSE LUIS/DEYSI  BSA: 1.8 m2 Height: 61 in Weight: 180 lb HR: 60 BP: 120/52 mmHg ______________________________________________________________________________ Procedure Complete DIANA Adult. Structural DIANA-LAAO. Good quality two-dimensional was performed and interpreted. ______________________________________________________________________________ Interpretation Summary  Structural DIANA for Left Atrial Occlusion Device  Pre-Device: 1. Normal left ventricular size andsystolic function. LVEF: 60-65% 2. No left atrial thrombus or spontaneous contrast. Left atrium is of normal size. 3. No ASD or PFO by color flow. 4. No pericardial effusion.  Post Device: 1. Well-seated PINNACLE FLXÂ  Device in left atrial appendage by 2D and 3D imaging. No color doppler evidence of flow around device at ostium insertion before or after device release. No change in mitral valve function. Nothrombus noted on device, LA or NAVIN.  NAVIN device measurements: Device compression diameter: Pre deployment. 0Â : 17.6 mm 45Â : 17.8 mm 90Â : 18.6 mm 135 Â : 19.6 mm  Post deployment 0Â : 23.9 mm 45Â : 23.8 mm 90Â : 23.6 mm 135 Â : 23.1 mm  2. Normal left ventricular size and systolic function. LVEF: 60-65% 3. Small  ASD with unidirectional flow (left to right) bycolor flow doppler. 4. No post procedural pericardial effusion.  Results communicated directly to Dr. Mariano. ______________________________________________________________________________ DIANA 3D image acquisition, reconstruction, and real-time interpretation was performed. The procedure was performed in the Heart Catherization Lab. The DIANA probe was inserted prior to our arrival by anesthesia. Sedation was administered and monitored by anesthesia.  Rhythm Sinus rhythm was noted. ______________________________________________________________________________ Report approved by: Eladia Velasquez 06/22/2023 02:45 PM  ______________________________________________________________________________      MA SCREENING DIGITAL BILAT - Future  (s+30)    Result Date: 6/15/2023  BILATERAL FULL FIELD DIGITAL SCREENING MAMMOGRAM Performed on: 6/15/23 Compared to: 03/11/2022, 01/04/2021, 11/05/2019, and 08/23/2018 Technique: This study was evaluated with the assistance of Computer-Aided Detection. Findings: The breasts have scattered areas of fibroglandular density. There are benign appearing calcifications in both breasts. There is no radiographic evidence of malignancy.     IMPRESSION: ACR BI-RADS Category 2: Benign RECOMMENDED FOLLOW-UP: Annual routine screening mammogram The results and recommendations of this examination will be communicated to the patient. Nicole Hooks MD     DX Hip/Pelvis/Spine    Result Date: 6/13/2023  EXAM: DX WRIST/HEEL/RADIUS, DX HIP/PELVIS/SPINE LOCATION: [facility] DATE/TIME: 6/13/2023 8:39 AM CDT INDICATION: BMD screening. Follow-up. Bone mineralization medication use (Prolia). DEMOGRAPHICS: Age- 72 years. Gender- Female. Menopausal status- Postmenopausal. COMPARISON: 05/18/2021. TECHNIQUE: Dual-energy x-ray absorptiometry (DXA) performed with routine technique. Forearm DXA performed since the spine could not be  measured or interpreted. FINDINGS: DXA RESULTS -RIGHT Hip Total: BMD: 0.885 g/cm2. T-score: -1.0. Z-score: 0.4. -RIGHT Hip Femoral neck: BMD: 0.742 g/cm2. T-score: -2.1. Z-score: -0.6. -LEFT Hip Total: BMD: 0.907 g/cm2. T-score: -0.8. Z-score: 0.5. -LEFT Hip Femoral neck: BMD: 0.822 g/cm2. T-score: -1.6. Z-score: -0.0. -LEFT Radius 33%: BMD: 0.681 g/cm2. T-score: -2.2. Z-score: 0.0. WHO T-SCORE CRITERIA -Normal: T score at or above -1 SD -Osteopenia: T score between -1 and -2.5 SD -Osteoporosis: T score at or below -2.5 SD The World Health Organization (WHO) criteria is applicable to perimenopausal females, postmenopausal females, and men aged 50 years or older. INTERVAL CHANGE -There has been a 0.8% increase in the right hip BMD. -There has been a 3.2% increase in the left radius 33% BMD. FRACTURE RISK -The FRAX risk calculator is not applicable due to current or recent treatment (defined by the NOF/ISCD FRAX implementation guide) for low bone mineral density.     IMPRESSION: Low bone density (OSTEOPENIA). T score meets the WHO criteria for low bone density (osteopenia) at one or more measured sites. The risk of osteoporotic fracture increases approximately two-fold for each standard deviation decrease in T-score.    DX Wrist Heel Radius    Result Date: 6/13/2023  EXAM: DX WRIST/HEEL/RADIUS, DX HIP/PELVIS/SPINE LOCATION: [facility] DATE/TIME: 6/13/2023 8:39 AM CDT INDICATION: BMD screening. Follow-up. Bone mineralization medication use (Prolia). DEMOGRAPHICS: Age- 72 years. Gender- Female. Menopausal status- Postmenopausal. COMPARISON: 05/18/2021. TECHNIQUE: Dual-energy x-ray absorptiometry (DXA) performed with routine technique. Forearm DXA performed since the spine could not be measured or interpreted. FINDINGS: DXA RESULTS -RIGHT Hip Total: BMD: 0.885 g/cm2. T-score: -1.0. Z-score: 0.4. -RIGHT Hip Femoral neck: BMD: 0.742 g/cm2. T-score: -2.1. Z-score: -0.6. -LEFT Hip Total: BMD: 0.907  g/cm2. T-score: -0.8. Z-score: 0.5. -LEFT Hip Femoral neck: BMD: 0.822 g/cm2. T-score: -1.6. Z-score: -0.0. -LEFT Radius 33%: BMD: 0.681 g/cm2. T-score: -2.2. Z-score: 0.0. WHO T-SCORE CRITERIA -Normal: T score at or above -1 SD -Osteopenia: T score between -1 and -2.5 SD -Osteoporosis: T score at or below -2.5 SD The World Health Organization (WHO) criteria is applicable to perimenopausal females, postmenopausal females, and men aged 50 years or older. INTERVAL CHANGE -There has been a 0.8% increase in the right hip BMD. -There has been a 3.2% increase in the left radius 33% BMD. FRACTURE RISK -The FRAX risk calculator is not applicable due to current or recent treatment (defined by the NOF/ISCD FRAX implementation guide) for low bone mineral density.     IMPRESSION: Low bone density (OSTEOPENIA). T score meets the WHO criteria for low bone density (osteopenia) at one or more measured sites. The risk of osteoporotic fracture increases approximately two-fold for each standard deviation decrease in T-score.    Cardiac Catheterization    Result Date: 6/9/2023  1.  Left circumflex stent remains widely patent 2.  Mid/distal LAD stent remains widely patent 3.  Moderate proximal and apical disease in the LAD, proximal RCA, and distal RCA appear unchanged from prior study October 2022.  No new or significant progression of underlying CAD.                     Again, thank you for allowing me to participate in the care of your patient.        Sincerely,        Reny Camejo, CNP

## 2023-07-10 NOTE — PATIENT INSTRUCTIONS
~Spine Center Scheduling #[phone].  ~Please call our [facility] Spine Nurse Navigation #[phone] with any questions or concerns about your treatment plan, if symptoms worsen and you would like to be seen urgently, or if you have problems controlling bladder and bowel function.  ~Please note that any My Chart messages may take multiple days for a response due to the high volume of patients seen in clinic.  Anything sent Thursday night or after will be answered the following week when able, as Reny Camejo CNP does not work in clinic on Fridays.   ~Reny Camejo CNP is at the [facility] on the first and third Tuesdays of the month only, otherwise primarily at the [facility].        Importance of specialized Physical Therapy: Discussed the importance of core strengthening, ROM, stretching exercises with the patient and how each of these entities is important in decreasing pain.  Explained to the patient that the purpose of physical therapy is to teach the patient a home exercise program individualized to them and their specific health concerns.  These exercises need to be performed every day in order to decrease pain and prevent future occurrences of pain.

## 2023-07-10 NOTE — PROGRESS NOTES
Infusion Nursing Note:  Suma Mark presents today for iron infusion.      Note: Pt has tolerated iron infusions in the past.      Intravenous Access:  Peripheral IV placed.    Treatment Conditions:  Results reviewed, labs MET treatment parameters, ok to proceed with treatment.      Post Infusion Assessment:  Patient tolerated infusion without incident.       Discharge Plan:   Patient discharged in stable condition accompanied by: self.      Inessa JUSTIN RN

## 2023-07-11 ENCOUNTER — TELEPHONE (OUTPATIENT)
Dept: INTERNAL MEDICINE | Facility: CLINIC | Age: 73
End: 2023-07-11
Payer: MEDICARE

## 2023-07-11 NOTE — TELEPHONE ENCOUNTER
General Call    Contacts       Type Contact Phone/Fax    07/11/2023 09:00 AM CDT Phone (Incoming) Nicolas [facility] [phone]        Reason for Call:     Nicolas, from [facility], requesting the referral for a colonoscopy be faxed to [phone] as soon as possible.  PCP referred 6/15/23.    Questions please call Nicolas at  [phone].

## 2023-07-13 ENCOUNTER — INFUSION THERAPY VISIT (OUTPATIENT)
Dept: INFUSION THERAPY | Facility: CLINIC | Age: 73
End: 2023-07-13
Attending: INTERNAL MEDICINE
Payer: MEDICARE

## 2023-07-13 VITALS
HEART RATE: 65 BPM | SYSTOLIC BLOOD PRESSURE: 130 MMHG | TEMPERATURE: 97.8 F | DIASTOLIC BLOOD PRESSURE: 70 MMHG | OXYGEN SATURATION: 98 % | RESPIRATION RATE: 18 BRPM

## 2023-07-13 DIAGNOSIS — D50.8 OTHER IRON DEFICIENCY ANEMIA: ICD-10-CM

## 2023-07-13 DIAGNOSIS — G25.81 RESTLESS LEG SYNDROME: Primary | ICD-10-CM

## 2023-07-13 PROCEDURE — 96365 THER/PROPH/DIAG IV INF INIT: CPT

## 2023-07-13 PROCEDURE — 96366 THER/PROPH/DIAG IV INF ADDON: CPT

## 2023-07-13 PROCEDURE — 250N000011 HC RX IP 250 OP 636: Mod: JZ | Performed by: INTERNAL MEDICINE

## 2023-07-13 PROCEDURE — 258N000003 HC RX IP 258 OP 636: Performed by: INTERNAL MEDICINE

## 2023-07-13 RX ORDER — ALBUTEROL SULFATE 0.83 MG/ML
2.5 SOLUTION RESPIRATORY (INHALATION)
Status: DISCONTINUED | OUTPATIENT
Start: 2023-07-13 | End: 2023-07-13

## 2023-07-13 RX ORDER — MEPERIDINE HYDROCHLORIDE 50 MG/ML
25 INJECTION INTRAMUSCULAR; INTRAVENOUS; SUBCUTANEOUS EVERY 30 MIN PRN
Status: DISCONTINUED | OUTPATIENT
Start: 2023-07-13 | End: 2023-07-13

## 2023-07-13 RX ORDER — HEPARIN SODIUM,PORCINE 10 UNIT/ML
5-20 VIAL (ML) INTRAVENOUS DAILY PRN
Status: CANCELLED | OUTPATIENT
Start: 2023-07-14

## 2023-07-13 RX ORDER — EPINEPHRINE 1 MG/ML
0.3 INJECTION, SOLUTION INTRAMUSCULAR; SUBCUTANEOUS EVERY 5 MIN PRN
Status: CANCELLED | OUTPATIENT
Start: 2023-07-14

## 2023-07-13 RX ORDER — METHYLPREDNISOLONE SODIUM SUCCINATE 125 MG/2ML
125 INJECTION, POWDER, LYOPHILIZED, FOR SOLUTION INTRAMUSCULAR; INTRAVENOUS
Status: DISCONTINUED | OUTPATIENT
Start: 2023-07-13 | End: 2023-07-13

## 2023-07-13 RX ORDER — ALBUTEROL SULFATE 90 UG/1
1-2 AEROSOL, METERED RESPIRATORY (INHALATION)
Status: CANCELLED
Start: 2023-07-14

## 2023-07-13 RX ORDER — METHYLPREDNISOLONE SODIUM SUCCINATE 125 MG/2ML
125 INJECTION, POWDER, LYOPHILIZED, FOR SOLUTION INTRAMUSCULAR; INTRAVENOUS
Status: CANCELLED
Start: 2023-07-14

## 2023-07-13 RX ORDER — ALBUTEROL SULFATE 0.83 MG/ML
2.5 SOLUTION RESPIRATORY (INHALATION)
Status: CANCELLED | OUTPATIENT
Start: 2023-07-14

## 2023-07-13 RX ORDER — EPINEPHRINE 1 MG/ML
0.3 INJECTION, SOLUTION INTRAMUSCULAR; SUBCUTANEOUS EVERY 5 MIN PRN
Status: DISCONTINUED | OUTPATIENT
Start: 2023-07-13 | End: 2023-07-13

## 2023-07-13 RX ORDER — HEPARIN SODIUM,PORCINE 10 UNIT/ML
5-20 VIAL (ML) INTRAVENOUS DAILY PRN
Status: DISCONTINUED | OUTPATIENT
Start: 2023-07-13 | End: 2023-07-13

## 2023-07-13 RX ORDER — HEPARIN SODIUM (PORCINE) LOCK FLUSH IV SOLN 100 UNIT/ML 100 UNIT/ML
5 SOLUTION INTRAVENOUS
Status: CANCELLED | OUTPATIENT
Start: 2023-07-14

## 2023-07-13 RX ORDER — HEPARIN SODIUM (PORCINE) LOCK FLUSH IV SOLN 100 UNIT/ML 100 UNIT/ML
5 SOLUTION INTRAVENOUS
Status: DISCONTINUED | OUTPATIENT
Start: 2023-07-13 | End: 2023-07-13

## 2023-07-13 RX ORDER — DIPHENHYDRAMINE HYDROCHLORIDE 50 MG/ML
50 INJECTION INTRAMUSCULAR; INTRAVENOUS
Status: CANCELLED
Start: 2023-07-14

## 2023-07-13 RX ORDER — DIPHENHYDRAMINE HYDROCHLORIDE 50 MG/ML
50 INJECTION INTRAMUSCULAR; INTRAVENOUS
Status: DISCONTINUED | OUTPATIENT
Start: 2023-07-13 | End: 2023-07-13

## 2023-07-13 RX ORDER — MEPERIDINE HYDROCHLORIDE 50 MG/ML
25 INJECTION INTRAMUSCULAR; INTRAVENOUS; SUBCUTANEOUS EVERY 30 MIN PRN
Status: CANCELLED | OUTPATIENT
Start: 2023-07-14

## 2023-07-13 RX ORDER — ALBUTEROL SULFATE 90 UG/1
1-2 AEROSOL, METERED RESPIRATORY (INHALATION)
Status: DISCONTINUED | OUTPATIENT
Start: 2023-07-13 | End: 2023-07-13

## 2023-07-13 RX ADMIN — SODIUM CHLORIDE 250 ML: 9 INJECTION, SOLUTION INTRAVENOUS at 08:56

## 2023-07-13 RX ADMIN — IRON SUCROSE 300 MG: 20 INJECTION, SOLUTION INTRAVENOUS at 08:57

## 2023-07-13 ASSESSMENT — SLEEP AND FATIGUE QUESTIONNAIRES
HOW LIKELY ARE YOU TO NOD OFF OR FALL ASLEEP WHILE LYING DOWN TO REST IN THE AFTERNOON WHEN CIRCUMSTANCES PERMIT: SLIGHT CHANCE OF DOZING
HOW LIKELY ARE YOU TO NOD OFF OR FALL ASLEEP WHILE SITTING AND READING: SLIGHT CHANCE OF DOZING
HOW LIKELY ARE YOU TO NOD OFF OR FALL ASLEEP WHILE SITTING QUIETLY AFTER LUNCH WITHOUT ALCOHOL: SLIGHT CHANCE OF DOZING
HOW LIKELY ARE YOU TO NOD OFF OR FALL ASLEEP WHILE SITTING AND TALKING TO SOMEONE: WOULD NEVER DOZE
HOW LIKELY ARE YOU TO NOD OFF OR FALL ASLEEP WHILE WATCHING TV: MODERATE CHANCE OF DOZING
HOW LIKELY ARE YOU TO NOD OFF OR FALL ASLEEP WHILE SITTING INACTIVE IN A PUBLIC PLACE: SLIGHT CHANCE OF DOZING
HOW LIKELY ARE YOU TO NOD OFF OR FALL ASLEEP WHEN YOU ARE A PASSENGER IN A CAR FOR AN HOUR WITHOUT A BREAK: MODERATE CHANCE OF DOZING
HOW LIKELY ARE YOU TO NOD OFF OR FALL ASLEEP IN A CAR, WHILE STOPPED FOR A FEW MINUTES IN TRAFFIC: WOULD NEVER DOZE

## 2023-07-13 NOTE — PROGRESS NOTES
Infusion Nursing Note:  Suma Mark presents today for #3 of 3 Iron Sucrose 300mg.    Patient seen by provider today: No    Note: Pt arrives ambulatory to [facility]. Pt reports severe fatigue and tiredness yesterday; pt attributes to iron infusion side effects. Pt reports improvement in energy from yesterday.    /70 (Patient Position: Sitting)   Pulse 65   Temp 97.8  F (36.6  C)   Resp 18   LMP  (LMP Unknown)   SpO2 98%     Intravenous Access:  Peripheral IV placed in right AC.    Treatment Conditions:  Lab Results   Component Value Date    HGB 8.9 (L) 06/28/2023    WBC 4.4 06/20/2023    ANEUTAUTO 3.4 11/27/2022     06/20/2023      Post Infusion Assessment:  Patient tolerated infusion without incident. NS 250ml infused concurrently.    Site patent and intact, free from redness, edema or discomfort.  No evidence of extravasations.  Access discontinued per protocol.     Discharge Plan:   Pt is going to reach out to Provider and ask about follow-up labs.  Patient discharged in stable condition accompanied by: self.  Departure Mode: Ambulatory.      Kareen Centeno RN

## 2023-07-17 ENCOUNTER — TRANSFERRED RECORDS (OUTPATIENT)
Dept: HEALTH INFORMATION MANAGEMENT | Facility: CLINIC | Age: 73
End: 2023-07-17
Payer: MEDICARE

## 2023-07-18 ENCOUNTER — MYC MEDICAL ADVICE (OUTPATIENT)
Dept: INTERNAL MEDICINE | Facility: CLINIC | Age: 73
End: 2023-07-18
Payer: MEDICARE

## 2023-07-18 DIAGNOSIS — D64.9 ANEMIA, UNSPECIFIED TYPE: Primary | ICD-10-CM

## 2023-07-19 PROBLEM — Z95.818 PRESENCE OF WATCHMAN LEFT ATRIAL APPENDAGE CLOSURE DEVICE: Chronic | Status: ACTIVE | Noted: 2023-06-22

## 2023-07-20 ENCOUNTER — OFFICE VISIT (OUTPATIENT)
Dept: SLEEP MEDICINE | Facility: CLINIC | Age: 73
End: 2023-07-20
Payer: MEDICARE

## 2023-07-20 ENCOUNTER — TELEPHONE (OUTPATIENT)
Dept: CARDIOLOGY | Facility: CLINIC | Age: 73
End: 2023-07-20

## 2023-07-20 VITALS
OXYGEN SATURATION: 99 % | WEIGHT: 177.6 LBS | HEIGHT: 61 IN | BODY MASS INDEX: 33.53 KG/M2 | HEART RATE: 61 BPM | SYSTOLIC BLOOD PRESSURE: 123 MMHG | DIASTOLIC BLOOD PRESSURE: 71 MMHG

## 2023-07-20 DIAGNOSIS — G25.81 RESTLESS LEG SYNDROME: ICD-10-CM

## 2023-07-20 DIAGNOSIS — G47.33 OSA (OBSTRUCTIVE SLEEP APNEA): Primary | Chronic | ICD-10-CM

## 2023-07-20 DIAGNOSIS — E66.09 CLASS 1 OBESITY DUE TO EXCESS CALORIES WITH SERIOUS COMORBIDITY AND BODY MASS INDEX (BMI) OF 33.0 TO 33.9 IN ADULT: Chronic | ICD-10-CM

## 2023-07-20 DIAGNOSIS — F51.04 CHRONIC INSOMNIA: ICD-10-CM

## 2023-07-20 DIAGNOSIS — E66.811 CLASS 1 OBESITY DUE TO EXCESS CALORIES WITH SERIOUS COMORBIDITY AND BODY MASS INDEX (BMI) OF 33.0 TO 33.9 IN ADULT: Chronic | ICD-10-CM

## 2023-07-20 PROCEDURE — 99214 OFFICE O/P EST MOD 30 MIN: CPT | Performed by: INTERNAL MEDICINE

## 2023-07-20 NOTE — NURSING NOTE
"Chief Complaint   Patient presents with     Study Results       Initial /71   Pulse 61   Ht 1.549 m (5' 1\")   Wt 80.6 kg (177 lb 9.6 oz)   LMP  (LMP Unknown)   SpO2 99%   BMI 33.56 kg/m   Estimated body mass index is 33.56 kg/m  as calculated from the following:    Height as of this encounter: 1.549 m (5' 1\").    Weight as of this encounter: 80.6 kg (177 lb 9.6 oz).    Medication Reconciliation: complete    Neck circumference: 16.5 inches / 42 centimeters.    Virginia Carr CMA on 7/20/2023 at 9:56 AM   "

## 2023-07-20 NOTE — PROGRESS NOTES
Sleep Study Follow-Up Visit:      Impression/Plan:    Mild obstructive sleep apnea with mild hypoxemia, predominantly REM-related  Treatment options reviewed  She is interested in seeing if treatment help fatigue and sleep maintenance difficulties   -  She is interested in a trial of autoCPAP 5-15 cmh20      Sleep onset, maintenance difficulties (ASHLEY 21)  We discussed stimulus control, sleep restriction and regular wake times.   She has read the book Say Good Night To Insomnia    - See patient instructions   - Consider referral for cognitive behavioral training      Obesity  We discussed the link between obesity, sleep apnea, and health outcomes.   - See patient instructions        She will follow up with me in about 2 month(s).     I spent 25 minutes with patient including counseling, and 10 minutes with chart review, and documentation         Date on this visit: 7/20/2023    Suma Mark comes in today for follow-up of her sleep study done at the [facility] for loud snoring, sleep maintenance difficulties, fatigue (ESS 7), morning headaches, nocturia, heartburn at night, night sweats  Comorbid coronary artery disease, hypertension, paroxysmal atrial fibrillation   - Sleep onset, maintenance difficulties (ASHLEY 16). Suspect psychophysiologic insomnia comorbid to anxiety and complicated by restless leg syndrome, chronic pain, and possible sleep disordered breathing.    - Restless leg syndrome with evidence of augmentation. Managed by PCP Claudia with help from [facility] with mirapex 0.25 TID. S/p recent iron infusions. On SSRIS (celexa), antihistamines/ tricyclic antidepressants (trazadone) which can cause or worsen restless leg syndrome symptoms.      Study Date: 7/6/2023 (180 lbs)   Sleep Architecture:   The total recording time of the polysomnogram was 432.3 minutes. The total sleep time was 392.5 minutes. Sleep latency was decreased at 3.3 minutes without the use of a sleep aid. REM  latency was 90.0 minutes. Arousal index was normal at 12.8 arousals per hour. Sleep efficiency was normal at 90.8%. Wake after sleep onset was 36.0 minutes. The patient spent 5.2% of total sleep time in Stage N1, 21.9% in Stage N2, 46.2% in Stage N3, and 26.6% in REM. Time in REM supine was 87.0 minutes.  Respiration:     Events ? The polysomnogram revealed a presence of 17 obstructive, 5 central, and 1 mixed apneas resulting in an apnea index of 3.5 events per hour. There were 52 obstructive hypopneas and 0 central hypopneas resulting in an obstructive hypopnea index of 7.9 and central hypopnea index of 0 events per hour. The combined apnea/hypopnea index was 11.5 events per hour (central apnea/hypopnea index was 0.8 events per hour). The REM AHI was 30.4 events per hour. The supine AHI was 16.9 events per hour. The RERA index was 2.4 events per hour.  The RDI was 13.9 events per hour.    Snoring - was reported as mild to loud and intermittent.    Respiratory rate and pattern - was notable for normal respiratory rate and pattern.    Sustained Sleep Associated Hypoventilation - Transcutaneous carbon dioxide monitoring was not used, however significant hypoventilation was not suggested by oximetry    Sleep Associated Hypoxemia - (Greater than 5 minutes O2 sat at or below 88%) Baseline oxygen saturation was 92.7%. Lowest oxygen saturation was 82%. Time spent less than or equal to 88% was 3.0 minutes. Time spent less than or equal to 89% was 6.0 minutes.  Movement Activity:     Periodic Limb Activity - There were 0 PLMs during the entire study.     REM EMG Activity - Excessive transient/sustained muscle activity was not present.    Nocturnal Behavior - Abnormal sleep related behaviors were not noted     Bruxism - None apparent.   Cardiac Summary:   The average pulse rate was 66.4 bpm. The minimum pulse rate was 60.0 bpm while the maximum pulse rate was 95.0 bpm.  Arrhythmias were not noted.     These findings were  reviewed with patient.     Bedtime 1030-11  Up at 445  Has sleep onset difficulties 4-5 times a week, sleep maintenance difficulties 3-4 time a week.   May doze short times during       Insomnia Severity Index    Difficulty falling asleep 3    Difficulty staying asleep 3    Problems waking up too early 3    How SATISFIED/DISSATISFIED are you with your CURRENT sleep pattern? 4    How NOTICEABLE to others do you think your sleep problem is in terms of impairing the quality of your life? 2    How WORRIED/DISTRESSED are you about your current sleep problem? 3    To what extent do you consider your sleep problem to INTERFERE with your daily functioning (e.g. daytime fatigue, mood, ability to function at work/daily chores, concentration, memory, mood, etc.) CURRENTLY? 3    Total Score 21            7/13/2023     9:08 AM   Sullivan Total Score   Total score - Sullivan 8         Past medical/surgical history, family history, social history, medications and allergies were reviewed.      Problem List:  Patient Active Problem List    Diagnosis Date Noted     Chronic pain syndrome 03/08/2023     Priority: High     Coronary artery disease involving native coronary artery of native heart without angina pectoris 10/18/2022     Priority: High      s/p PCI with EUGENIA to OM2 on 10/12/22 and PCI with EUGENIA to dLAD on 11/8/22.        DONN (obstructive sleep apnea) - mild (AHI 11) 07/07/2023     Priority: Medium     7/6/2023 [facility] (180.0 lbs) - AHI 11.5, RDI 13.9, Supine AHI 16.9, REM AHI 30.4, Low O2 82.0%, Time Spent ?88% 3.0 minutes / Time Spent ?89% 6.0 minutes.       Presence of Watchman left atrial appendage closure device 06/22/2023     Priority: Medium     6/22/2023, 31 mmm Watchman FLX Device       Anemia 06/16/2023     Priority: Medium     Paroxysmal atrial fibrillation (H) 12/05/2022     Priority: Medium     Dyslipidemia, goal LDL below 70 04/08/2019     Priority: Medium     Fibromyalgia 04/24/2018      Priority: Medium     Generalized anxiety disorder 12/07/2017     Priority: Medium     Migraine 04/04/2017     Priority: Medium     Managed by darrell.       Essential hypertension 04/21/2015     Priority: Medium     Class 1 obesity due to excess calories with serious comorbidity and body mass index (BMI) of 33.0 to 33.9 in adult 03/09/2023     Priority: Low     Senile osteoporosis 12/13/2022     Priority: Low     Chronic insomnia 08/19/2021     Priority: Low     Thyroid nodule 08/19/2021     Priority: Low     Venous insufficiency of both lower extremities 11/04/2019     Priority: Low     Chronic back pain 12/07/2017     Priority: Low     Vitamin D deficiency 12/07/2017     Priority: Low     Diverticulosis 10/04/2017     Priority: Low     Incidental finding on colonoscopy 10/2017       Gastroesophageal reflux disease without esophagitis 04/21/2015     Priority: Low     Restless leg syndrome 04/21/2015     Priority: Low     S/p iron infusion [facility] 5/2022       Total knee replacement status 10/31/2013     Priority: Low     Acquired hypothyroidism 03/12/2009     Priority: Low

## 2023-07-20 NOTE — TELEPHONE ENCOUNTER
[facility] Call Center    Phone Message    May a detailed message be left on voicemail: yes     Reason for Call: Order(s): Other:   Reason for requested: Hold and bridge orders for Eliquis for colonoscopy on 8/22  2 day hold prior to procedure  If unable to do 2 day hold then request Creatinine or creatinine clearance within 90 of the procedure   Please call the nurse to let them know if pt is even on this medication  Date needed: 8/15  Provider name: Dr Worley      Action Taken: Other: Cardiology    Travel Screening: Not Applicable     Thank you!  [facility]

## 2023-07-20 NOTE — NURSING NOTE
Patient is scheduled with provider in December for cpap compliance follow up.AVS handed to patient.

## 2023-07-20 NOTE — PATIENT INSTRUCTIONS
CBT-I:  Frequently Asked Questions    What is CBT-I?    Cognitive Behavioral Therapy for Insomnia, also known as CBT-I, is a highly effective non-drug treatment for insomnia. The American College of Physicians recommends CBT-I as the first treatment for chronic insomnia.  Research has shown CBT-I to be safer and more effective long term than sleeping pills.    What does CBT-I involve?     CBT-I targets behaviors that lead to chronic insomnia:  Habits that weaken the bed as a cue for sleep  Habits that weaken your body's sleep drive and sleep/wake clock   Unhelpful sleep thoughts that increase sleep-related worry and arousal.    The process involves 3-6 telehealth visits that guide you to implement proven strategies to get a better night's sleep.    People often see improvement in their sleep within a few weeks. Research shows if you keep practicing the skills you learn your sleep is likely to continue to improve 6-12 months after treatment.    Does this program prescribe or manage sleep medication?    No.  Your prescribing provider is responsible to assist you in managing your sleep medications.  Some people choose to stop using sleep medication prior to or during CBT-I.  Our program can work with your prescribing provider to help reduce or eliminate use of sleep medications.     Getting Started Today!    If you haven't already done so, we recommend you consider making the following changes to your sleep habits prior to your sleep consultation:     Reduce your consumption of caffeine and alcohol.  Both can disrupt sleep and make strengthening your sleep more difficult.  Specifically:    - Avoid caffeine within 6 hours of bedtime   - No more than 3 caffeinated beverages per day (e.g. 8 oz. cup coffee or 12 oz. cup soda)            - No alcohol within 3 hours of bedtime    Make sure your bedroom is quiet, comfortable and dark.  Noise, light and an uncomfortable sleep space can harm your sleep.      Keep the same  sleep schedule 7 days a week.unless you do shift work.      Online CBT-I     If you want to get started today, research indicates that online CBT-I can be effective for some individuals. These programs requires comfort with myles-based or online learning.  However, digital CBT-I programs are not for everyone.  Contraindications include:    Seizure disorders,   Bipolar disorder,   Unstable medical or mental health conditions,   Frailty or risk of falling  Pregnancy    You should consult a sleep specialist before using these resources if you have:    Sleep Apnea  Restless Leg Syndrome  Sleep Walking  REM behavior disorder  Night Terrors  Excessive Daytime Sleepiness  Are engaged in shift work  Use prescription sleep medication    Our Online CBT-I program    If your sleep provider recommends online CBT-I for you , the cost for an entire 6-week program is $40.    To get started, copy and paste the link below which will take you to the landing page to register:                           [email or web address]               If you wish to complete the online CBT-I program but do not plan to follow-up with a sleep provider, you are set to begin the program.    If you are planning to work with an [facility] sleep provider, there are a couple of extra steps you can take to share your sleep data with your sleep provider.  To share sleep log data, go to the left side navigation and click on the  share sleep log  button:         You will be taken to the page below where you will enter  the provider code [employer] into the box.          Once you press the locate button, the information for  [employer] will pop up as below.  By pressing the Submit button your data will be sent to our  secure [facility] [employer] sleep program portal for review by your sleep provider. You will only need to do this step once.                                  Self-help Workbooks for Insomnia    If you have found self-help books useful in the  past, you may want to consider reading one of the following books prior to your consultation:    Say Charles to Insomnia: The Six-Week, Drug-Free Program Developed at [employer].  Linden Mancia MD. Available in paperback, Amy, and audiobook.    Overcoming Insomnia: A Cognitive-Behavioral Therapy Approach, Workbook.  Shant Edward, PhD  and Aminah Baugh, PhD.  Available in paperback and Amy.    Quiet Your Mind and Get to Sleep: Solutions to Insomnia for Those with Depression, Anxiety, or Chronic Pain.  Melanie Warner, PhD and Aminah Baugh, PhD.  Available in paperback and Amy               Your BMI is Body mass index is 33.56 kg/m .    What is BMI?  Body mass index (BMI) is one way to tell whether you are at a healthy weight, overweight, or obese. It measures your weight in relation to your height.  A BMI of 18.5 to 24.9 is in the healthy range. A person with a BMI of 25 to 29.9 is considered overweight, and someone with a BMI of 30 or greater is considered obese.  Another way to find out if you are at risk for health problems caused by overweight and obesity is to measure your waist. If you are a woman and your waist is more than 35 inches, or if you are a man and your waist is more than 40 inches, your risk of disease may be higher.  More than two-thirds of American adults are considered overweight or obese. Being overweight or obese increases the risk for further weight gain.  Excess weight may lead to heart disease and diabetes. Creating and following plans for healthy eating and physical activity may help you improve your health.    Methods for maintaining or losing weight.  Weight control is part of healthy lifestyle and includes exercise, emotional health, and healthy eating habits.  Careful eating habits lifelong is the mainstay of weight control.  Though there are significant health benefits from weight loss, long-term weight loss with diet alone may be very difficult to achieve-  studies show long-term success with dietary management in less than 10% of people. Attaining a healthy weight may be especially difficult to achieve in those with severe obesity. In some cases, medications, devices and surgical management might be considered.    What can you do?  If you are overweight or obese and are interested in methods for weight loss, you should discuss this with your provider. In addition, we recommend that you review healthy life styles and methods for weight loss available through the National Institutes of Health patient information sites:   http://win.niddk.nih.gov/publications/index.htm

## 2023-07-21 ENCOUNTER — MYC MEDICAL ADVICE (OUTPATIENT)
Dept: CARDIOLOGY | Facility: CLINIC | Age: 73
End: 2023-07-21
Payer: MEDICARE

## 2023-07-21 DIAGNOSIS — I48.0 PAROXYSMAL ATRIAL FIBRILLATION (H): ICD-10-CM

## 2023-07-21 RX ORDER — SOTALOL HYDROCHLORIDE 80 MG/1
80 TABLET ORAL EVERY 12 HOURS
Qty: 180 TABLET | Refills: 3 | Status: SHIPPED | OUTPATIENT
Start: 2023-07-21 | End: 2024-07-16

## 2023-07-21 NOTE — TELEPHONE ENCOUNTER
From: Tahira Mukherjee APRN CNP  Sent: 7/21/2023   1:10 PM CDT  To: Valeria Juares RN  Subject: FW: Refill                                       Reviewed in Shayan's absence.  Ok to refill, she recently saw Marta who said to continue sotalol.  Thanks,  Tahira

## 2023-07-21 NOTE — TELEPHONE ENCOUNTER
"Spoke with [facility], regarding call back request. Confirmed no longer on eliquis. No hold parameters needed for plavix.-LIYAH        \"Recommendations:  1. The patient will go to [facility] cardiac telemetry unit post procedure.     2. Portable CXR prior to discharge.  3. Medications: unchanged except discontinuation of apixaban, plan for therapy with clopidogrel 75mg daily and aspirin 81mg daily\"    "

## 2023-07-25 ENCOUNTER — LAB (OUTPATIENT)
Dept: LAB | Facility: CLINIC | Age: 73
End: 2023-07-25
Payer: MEDICARE

## 2023-07-25 DIAGNOSIS — D64.9 ANEMIA, UNSPECIFIED TYPE: ICD-10-CM

## 2023-07-25 LAB
ERYTHROCYTE [DISTWIDTH] IN BLOOD BY AUTOMATED COUNT: 18.9 % (ref 10–15)
FERRITIN SERPL-MCNC: 170 NG/ML (ref 11–328)
HCT VFR BLD AUTO: 33.6 % (ref 35–47)
HGB BLD-MCNC: 10.4 G/DL (ref 11.7–15.7)
IRON BINDING CAPACITY (ROCHE): 321 UG/DL (ref 240–430)
IRON SATN MFR SERPL: 14 % (ref 15–46)
IRON SERPL-MCNC: 46 UG/DL (ref 37–145)
MCH RBC QN AUTO: 26.8 PG (ref 26.5–33)
MCHC RBC AUTO-ENTMCNC: 31 G/DL (ref 31.5–36.5)
MCV RBC AUTO: 87 FL (ref 78–100)
PLATELET # BLD AUTO: 257 10E3/UL (ref 150–450)
RBC # BLD AUTO: 3.88 10E6/UL (ref 3.8–5.2)
WBC # BLD AUTO: 6.8 10E3/UL (ref 4–11)

## 2023-07-25 PROCEDURE — 36415 COLL VENOUS BLD VENIPUNCTURE: CPT

## 2023-07-25 PROCEDURE — 82728 ASSAY OF FERRITIN: CPT

## 2023-07-25 PROCEDURE — 85027 COMPLETE CBC AUTOMATED: CPT

## 2023-07-25 PROCEDURE — 83550 IRON BINDING TEST: CPT

## 2023-07-25 PROCEDURE — 83540 ASSAY OF IRON: CPT

## 2023-07-28 DIAGNOSIS — G25.81 RESTLESS LEGS SYNDROME: ICD-10-CM

## 2023-07-28 RX ORDER — PRAMIPEXOLE DIHYDROCHLORIDE 0.25 MG/1
TABLET ORAL
Qty: 270 TABLET | Refills: 1 | Status: SHIPPED | OUTPATIENT
Start: 2023-07-28 | End: 2024-01-25

## 2023-07-28 NOTE — TELEPHONE ENCOUNTER
"    Last Written Prescription Date:  7/28/2022  Last Fill Quantity: 270,  # refills: 3   Last office visit provider:  7/10/2023     Requested Prescriptions   Pending Prescriptions Disp Refills    pramipexole (MIRAPEX) 0.25 MG tablet [Pharmacy Med Name: PRAMIPEXOLE 0.25 MG TABLET] 270 tablet 2     Sig: TAKE 1 TABLET BY MOUTH THREE TIMES A DAY       Antiparkinson's Agents Protocol Passed - 7/28/2023 12:33 AM        Passed - Blood pressure under 140/90 in past 12 months     BP Readings from Last 3 Encounters:   07/20/23 123/71   07/13/23 130/70   07/10/23 112/58                 Passed - CBC on record in past 12 months     Recent Labs   Lab Test 07/25/23  1352   WBC 6.8   RBC 3.88   HGB 10.4*   HCT 33.6*                    Passed - ALT on record in past 12 months         Recent Labs   Lab Test 11/27/22  2315   ALT 21             Passed - Serum Creatinine on file in past 12 months     Recent Labs   Lab Test 06/22/23  1324   CR 0.71       Ok to refill medication if creatinine is low          Passed - Medication is active on med list        Passed - Patient is age 18 or older        Passed - No active pregnancy on record        Passed - No positive pregnancy test in the past 12 months        Passed - Recent (6 mo) or future (30 days) visit within the authorizing provider's specialty     Patient had office visit in the last 6 months or has a visit in the next 30 days with authorizing provider or within the authorizing provider's specialty.  See \"Patient Info\" tab in inbasket, or \"Choose Columns\" in Meds & Orders section of the refill encounter.                 Dunia Goff RN 07/28/23 12:05 PM  "

## 2023-08-07 ENCOUNTER — DOCUMENTATION ONLY (OUTPATIENT)
Dept: SLEEP MEDICINE | Facility: CLINIC | Age: 73
End: 2023-08-07
Payer: MEDICARE

## 2023-08-07 DIAGNOSIS — G47.33 OBSTRUCTIVE SLEEP APNEA (ADULT) (PEDIATRIC): ICD-10-CM

## 2023-08-07 DIAGNOSIS — G25.81 RESTLESS LEG SYNDROME: ICD-10-CM

## 2023-08-07 DIAGNOSIS — E66.09 CLASS 1 OBESITY DUE TO EXCESS CALORIES WITH SERIOUS COMORBIDITY AND BODY MASS INDEX (BMI) OF 33.0 TO 33.9 IN ADULT: Primary | Chronic | ICD-10-CM

## 2023-08-07 DIAGNOSIS — F51.04 CHRONIC INSOMNIA: ICD-10-CM

## 2023-08-07 DIAGNOSIS — E66.811 CLASS 1 OBESITY DUE TO EXCESS CALORIES WITH SERIOUS COMORBIDITY AND BODY MASS INDEX (BMI) OF 33.0 TO 33.9 IN ADULT: Primary | Chronic | ICD-10-CM

## 2023-08-07 DIAGNOSIS — G47.33 OSA (OBSTRUCTIVE SLEEP APNEA): Chronic | ICD-10-CM

## 2023-08-07 NOTE — PROGRESS NOTES
Patient was offered choice of vendor and chose [facility].  Patient Suma Mark was set up at [place]  on August 7, 2023. Patient received a Resmed Airsense 10 Pressures were set at  5-15 cm H2O.   Patient s ramp is 5 cm H2O for Auto and FLEX/EPR is EPR, 2.  Patient received a Resmed Mask name: Airfit [id number] sm frame  Nasal mask size Small, heated tubing and heated humidifier.  Patient has the following compliance requirements: using and visit requirements  Patient has a follow up on 12/12/23 with Dr. Doris Betancourt Her

## 2023-08-08 ENCOUNTER — MYC MEDICAL ADVICE (OUTPATIENT)
Dept: INTERNAL MEDICINE | Facility: CLINIC | Age: 73
End: 2023-08-08
Payer: MEDICARE

## 2023-08-08 DIAGNOSIS — F41.9 ANXIETY: Primary | ICD-10-CM

## 2023-08-09 RX ORDER — LORAZEPAM 0.5 MG/1
0.5 TABLET ORAL EVERY 6 HOURS PRN
Qty: 30 TABLET | Refills: 0 | Status: SHIPPED | OUTPATIENT
Start: 2023-08-09 | End: 2023-10-26

## 2023-08-10 ENCOUNTER — DOCUMENTATION ONLY (OUTPATIENT)
Dept: SLEEP MEDICINE | Facility: CLINIC | Age: 73
End: 2023-08-10
Payer: MEDICARE

## 2023-08-10 DIAGNOSIS — G25.81 RESTLESS LEG SYNDROME: ICD-10-CM

## 2023-08-10 DIAGNOSIS — G47.33 OSA (OBSTRUCTIVE SLEEP APNEA): Chronic | ICD-10-CM

## 2023-08-10 DIAGNOSIS — F51.04 CHRONIC INSOMNIA: ICD-10-CM

## 2023-08-10 DIAGNOSIS — E66.811 CLASS 1 OBESITY DUE TO EXCESS CALORIES WITH SERIOUS COMORBIDITY AND BODY MASS INDEX (BMI) OF 33.0 TO 33.9 IN ADULT: Primary | Chronic | ICD-10-CM

## 2023-08-10 DIAGNOSIS — E66.09 CLASS 1 OBESITY DUE TO EXCESS CALORIES WITH SERIOUS COMORBIDITY AND BODY MASS INDEX (BMI) OF 33.0 TO 33.9 IN ADULT: Primary | Chronic | ICD-10-CM

## 2023-08-10 NOTE — PROGRESS NOTES
3 day Sleep therapy management telephone visit    Diagnostic AHI: 11.5  HST    Confirmed with patient at time of call- Yes Patient is still interested in STM service.       Subjective measures:  Patient has used CPAP one night she stated it went well.           Objective data     Order Settings for PAP  CPAP min     CPAP max     CPAP fixed         Device settings from machine CPAP min 5.0     CPAP max 15.0      CPAP fixed      EPR Setting TWO    RESMED soft response  OFF     Assessment: Minimal usage      Action plan: Patient to have 14 day STM visit. Patient has a follow up visit scheduled:   no    Replacement device: No  STM ordered by provider: Yes     Total time spent on accessing and  interpreting remote patient PAP therapy data  10 minutes    Total time spent counseling, coaching  and reviewing PAP therapy data with patient  3 minutes    [id number] no

## 2023-08-11 NOTE — TELEPHONE ENCOUNTER
[id number] - [place], [place] - [address] AT [facility]. [facility] [address] Take 1 tablet (0.5 mg) by mouth every 6 hours as needed for anxiety

## 2023-08-22 ENCOUNTER — DOCUMENTATION ONLY (OUTPATIENT)
Dept: SLEEP MEDICINE | Facility: CLINIC | Age: 73
End: 2023-08-22
Payer: MEDICARE

## 2023-08-22 ENCOUNTER — TRANSFERRED RECORDS (OUTPATIENT)
Dept: HEALTH INFORMATION MANAGEMENT | Facility: CLINIC | Age: 73
End: 2023-08-22

## 2023-08-22 DIAGNOSIS — E66.09 CLASS 1 OBESITY DUE TO EXCESS CALORIES WITH SERIOUS COMORBIDITY AND BODY MASS INDEX (BMI) OF 33.0 TO 33.9 IN ADULT: Primary | Chronic | ICD-10-CM

## 2023-08-22 DIAGNOSIS — E66.811 CLASS 1 OBESITY DUE TO EXCESS CALORIES WITH SERIOUS COMORBIDITY AND BODY MASS INDEX (BMI) OF 33.0 TO 33.9 IN ADULT: Primary | Chronic | ICD-10-CM

## 2023-08-22 DIAGNOSIS — F51.04 CHRONIC INSOMNIA: ICD-10-CM

## 2023-08-22 DIAGNOSIS — G47.33 OSA (OBSTRUCTIVE SLEEP APNEA): Chronic | ICD-10-CM

## 2023-08-22 DIAGNOSIS — G25.81 RESTLESS LEG SYNDROME: ICD-10-CM

## 2023-08-22 NOTE — PROGRESS NOTES
14  DAY STM VISIT    Diagnostic AHI: 11.5 PSG    Subjective measures:   Patient states her mask leaks.  Patient mouth is opening she has an appointment to do a mask exchange with [facility].  Patient has missed the last two nights of CPAP use because she has a medical procedure tomorrow.     Assessment: Pt meeting objective benchmarks.  Patient meeting subjective benchmarks.     Action plan: pt to have 30 day STM visit.      Device type: Auto-CPAP    PAP settings: CPAP min 5.0 cm  H20       CPAP max 15.0 cm  H20      95th% pressure 9.9 cm  H20        RESMED EPR level Setting: TWO    RESMED Soft response setting:  OFF    Mask type:  Nasal Mask    Objective measures: 14 day rolling measures      Compliance  71 %      Leak  16.6  lpm  last  upload      AHI 1.13   last  upload      Average number of minutes 265      Objective measure goal  Compliance   Goal >70%  Leak   Goal < 24 lpm  AHI  Goal < 5  Usage  Goal >240        Total time spent on accessing and interpreting remote patient PAP therapy data  10 minutes    Total time spent counseling, coaching  and reviewing PAP therapy data with patient  3 minutes    [id number]  [id number]  no (3 day STM)

## 2023-08-23 ENCOUNTER — OFFICE VISIT (OUTPATIENT)
Dept: CARDIOLOGY | Facility: CLINIC | Age: 73
End: 2023-08-23
Payer: MEDICARE

## 2023-08-23 VITALS
DIASTOLIC BLOOD PRESSURE: 68 MMHG | RESPIRATION RATE: 16 BRPM | BODY MASS INDEX: 34.01 KG/M2 | SYSTOLIC BLOOD PRESSURE: 124 MMHG | WEIGHT: 180 LBS | HEART RATE: 64 BPM

## 2023-08-23 DIAGNOSIS — Z95.818 PRESENCE OF WATCHMAN LEFT ATRIAL APPENDAGE CLOSURE DEVICE: Chronic | ICD-10-CM

## 2023-08-23 DIAGNOSIS — I48.0 PAROXYSMAL ATRIAL FIBRILLATION (H): Primary | Chronic | ICD-10-CM

## 2023-08-23 DIAGNOSIS — E78.5 DYSLIPIDEMIA, GOAL LDL BELOW 70: Chronic | ICD-10-CM

## 2023-08-23 DIAGNOSIS — I10 ESSENTIAL HYPERTENSION: Chronic | ICD-10-CM

## 2023-08-23 DIAGNOSIS — I25.10 CORONARY ARTERY DISEASE INVOLVING NATIVE CORONARY ARTERY OF NATIVE HEART WITHOUT ANGINA PECTORIS: Chronic | ICD-10-CM

## 2023-08-23 PROCEDURE — 99214 OFFICE O/P EST MOD 30 MIN: CPT | Performed by: INTERNAL MEDICINE

## 2023-08-23 NOTE — PATIENT INSTRUCTIONS
Suma Mark,    It was a pleasure to see you today in the clinic regarding your recent Watchman implant.     My recommendations after this visit include:     - continue Plavix through December 22, then stop  - continue aspirin 81 mg daily, indefinitely    - let us know if you want a referral back to cardiac rehab    You should followup with Dr. Worley in January      If you have questions or concerns, please call using the numbers below:                Vikki Johnson RN  [phone]    Linda Washington RN  [phone]

## 2023-08-23 NOTE — PROGRESS NOTES
HEART CARE ENCOUNTER NOTE       [facility]  [phone]      Assessment/Recommendations   1.  Paroxysmal atrial fibrillation - she underwent watchman implant on June 22 with a 31 mm watchman FLX device.   She is doing well and has had no strokelike symptoms.  She is currently taking aspirin 81 mg daily and Plavix 75 mg daily, although she is currently on a 3-day hold of her Plavix since she had colonoscopy done yesterday and had minor bleeding with procedure.      She will stay on Plavix until December 22, then stop it and continue ASA 81 mg daily indefinitely. She will have a DIANA on September 27 to check the device.  We will do a 6-month follow-up phone call with her and then see her again in clinic at 1 year post implant.     MODIFIED JUAN CARLOS SCALE   Timepoint: 4-6 wk Post-LAAC    Previous score: 0    Score Description   0 No symptoms at all   1 No significant disability despite symptoms; able to carry out all usual duties and activities   2 Slight disability; unable to carry out all previous activities, but able to look after own affairs without assistance   3 Moderate disability; requiring some help, but able to walk without assistance   4 Moderately severe disability; unable to walk without assistance and unable to attend to own bodily needs without assistance   5 Severe disability; bedridden, incontinent and requiring constant nursing care and attention   6 Dead    Total score (0 - 6):  0    Change in score if s/p LAAC? No    2.  Hypertension - BP today is at goal.   For now, patient should continue taking amlodipine 5 mg daily, irbesartan 150 mg daily, Imdur 30 mg daily and furosemide 40 mg daily     3. CAD - s/p PCI with EUGENIA to OM2 on 10/12/22 (Reyna/Michelle) and PCI with EUGENIA to dLAD on 11/8/22 (Davin).  She is on Plavix 75 mg daily.   She is currently taking isosorbide 30 mg daily.    Coronary angiogram on 6/9/2023 showed patent stents with no new or significant progression of underlying  CAD.  She says she has had less episodes of angina recently.     4.  Dyslipidemia, goal LDL less than 70 -continue current dose of atorvastatin.  LDL in November was 68     5.  Chronic diastolic heart failure, NYHA class II -currently compensated.  Continue furosemide 40 mg daily.  Continue adequate blood pressure management.    6.  Anemia -multifactorial.  Iron deficiency.  She has now received 3 iron infusions.  Also colonoscopy showed a flat polyp which was biopsied and treated with Hemoclip.  She is likely going to be getting an EGD and maybe PillCam.     History of Present Illness/Subjective    Suma Mark is a 72 year old female who comes in today for 6-week follow-up visit after watchman implant    Suma Mark has a past history of coronary artery disease status post EUGENIA to OM2 (extending back to Lcx) on 10/12/22 and EUGENIA to distal LAD on 11/8/22, primary hypertension, dyslipidemia, chronic congestive heart failure with preserved ejection fraction, paroxysmal atrial fibrillation, obesity.     She underwent watchman implant on June 22.  Since then she has been feeling mostly okay.  She has gotten 3 iron infusions.  She had a colonoscopy yesterday.  She had a biopsy and then started bleeding, which required Hemoclip.  She is now holding her Plavix x3 days per GI recommendations.      Suma Mark denies palpitations, paroxysmal nocturnal dyspnea, orthopnea, lightheadedness, dizziness, pre-syncope, or syncope, weight loss, changes in appetite or vomiting.     Medical, surgical, family, social history, and medications were reviewed and updated as necessary.    Procedural DIANA 6/22/23  Interpretation Summary     Structural DIANA for Left Atrial Occlusion Device     Pre-Device:  1. Normal left ventricular size andsystolic function. LVEF: 60-65%  2. No left atrial thrombus or spontaneous contrast. Left atrium is of normal  size.  3. No ASD or PFO by color flow.  4. No pericardial effusion.     Post  Device:  1. Well-seated PINNACLE FLXÂ  Device in left atrial appendage by 2D and 3D  imaging. No color doppler evidence of flow around device at ostium insertion  before or after device release. No change in mitral valve function. Nothrombus  noted on device, LA or NAVIN.     NAVIN device measurements:  Device compression diameter:  Pre deployment.  0Â : 17.6 mm  45Â : 17.8 mm  90Â : 18.6 mm  135 Â : 19.6 mm     Post deployment  0Â : 23.9 mm  45Â : 23.8 mm  90Â : 23.6 mm  135 Â : 23.1 mm     2. Normal left ventricular size and systolic function. LVEF: 60-65%  3. Small ASD with unidirectional flow (left to right) bycolor flow doppler.  4. No post procedural pericardial effusion.     Physical Examination Review of Systems   Vitals: /68 (BP Location: Left arm, Patient Position: Sitting, Cuff Size: Adult Large)   Pulse 64   Resp 16   Wt 81.6 kg (180 lb)   LMP  (LMP Unknown)   BMI 34.01 kg/m    BMI= Body mass index is 34.01 kg/m .  Wt Readings from Last 3 Encounters:   08/23/23 81.6 kg (180 lb)   07/20/23 80.6 kg (177 lb 9.6 oz)   07/10/23 82.2 kg (181 lb 2 oz)       General Appearance:   Alert, cooperative and in no acute distress   ENT/Mouth: membranes moist, no oral lesions or bleeding gums.      EYES:  no scleral icterus, normal conjunctivae   Neck: Thyroid not visualized   Chest/Lungs:   lungs are clear to auscultation, no rales or wheezing   Cardiovascular:   Regular . Normal first and second heart sounds with no murmurs, rubs or gallops; the carotid, radial and posterior tibial pulses are intact, no edema bilaterally    Abdomen:  Soft and nontender. Bowel sounds are present in all quadrants   Extremities: no cyanosis or clubbing   Skin: no xanthelasma, warm.    Neurologic: normal gait, normal  bilateral, no tremors   Psychiatric: Normal mood and affect       Please refer above for cardiac ROS details.      Medical History  Surgical History Family History Social History   Past Medical History:    Diagnosis Date    Anxiety state, unspecified     Back pain     Diverticulitis 12/07/2017    DJD (degenerative joint disease)     Essential hypertension, benign     Fibromyalgia     Gastro-oesophageal reflux disease     Hernia, ventral 04/27/2017    History of anesthesia complications     hypotension after surgery    History of blood transfusion     Hyperlipidemia     Hyponatremia 12/07/2017    Major depression     Migraine     Osteopenia     PONV (postoperative nausea and vomiting)     Posttraumatic stress disorder     Raynaud's disease     Restless leg syndrome     S/P total knee replacement 11/27/2017    Sepsis (H) 3/25/2019    Thrombosis of leg     with pregnancy    Unspecified hypothyroidism      Past Surgical History:   Procedure Laterality Date    ARTHROPLASTY KNEE UNICOMPARTMENT  10/31/2013    Procedure: ARTHROPLASTY KNEE UNICOMPARTMENT;  LEFT KNEE UNICOMPARTMENTAL ARTHROPLASTY (CAROLINE)^;  Surgeon: Chi Mittal MD;  Location:  OR    BREAST SURGERY      bx    COLECTOMY N/A 06/02/2017    Procedure: RESECTION, SMALL INTESTINE, OPEN ADHESIOLYSIS;  Surgeon: Keyon Qureshi MD;  Location: [facility];  Service:     CV CORONARY ANGIOGRAM N/A 10/12/2022    Procedure: CV CORONARY ANGIOGRAM;  Surgeon: You Martinez MD;  Location: [facility] CV    CV CORONARY ANGIOGRAM N/A 6/9/2023    Procedure: Coronary Angiogram;  Surgeon: Bret Jin MD;  Location: [facility] CV    CV FRACTIONAL FLOW RATIO WIRE N/A 10/12/2022    Procedure: Fractional Flow Ratio Wire;  Surgeon: You Martinez MD;  Location: [facility] CV    CV LEFT ATRIAL APPENDAGE CLOSURE Right 6/22/2023    Procedure: Left Atrial Appendage Closure;  Surgeon: Lenin Mariano MD;  Location: [facility] CV    CV LEFT HEART CATH N/A 6/9/2023    Procedure: Left Heart Catheterization;  Surgeon: Bret Jin MD;  Location: [facility] CV    CV PCI STENT DRUG ELUTING N/A 10/12/2022    Procedure:  Percutaneous Coronary Intervention Stent;  Surgeon: You Martinez MD;  Location: [facility] CV    CV PCI STENT DRUG ELUTING N/A 11/08/2022    Procedure: Percutaneous Coronary Intervention - Stent;  Surgeon: Bret Jin MD;  Location: [facility] CV    ENDOSCOPIC RETROGRADE CHOLANGIOPANCREATOGRAPHY      ENDOSCOPIC STRIPPING VEIN(S)      BILATERLY    GENITOURINARY SURGERY      bladder sling    GI SURGERY  10/02/2015    Rectal prolaspse. s/p Abdominal rectopexy, sacral colpopexy, proctoscopy, cystoscopy    HERNIORRHAPHY INCISIONAL (LOCATION) N/A 06/02/2017    Procedure: LAPARASCOPIC CONVERTED TO OPEN PRIMARY INCISIONAL HERNIA REPAIR;  Surgeon: Keyon Qureshi MD;  Location: [facility];  Service:     HYSTERECTOMY  1985 1985-1986    LAMINECTOMY LUMBAR TWO LEVELS  2006    LAPAROSCOPY N/A 08/22/2019    Procedure: DIAGNOSTIC LAPAROSCOPY, LYSIS OF ADHESION;  Surgeon: Svetlana Ron MD;  Location: [facility];  Service: General    LUMBAR HARDWARE REMOVAL[  03/22/2012    REMOVAL LUMBAR HARDWARE 03/22/2012   L3-S1; Type CD Horizon m8 instrumentation Dr. Murray    RELEASE CARPAL TUNNEL      TONSILLECTOMY  1954    ???    TOTAL KNEE ARTHROPLASTY Right 11/27/2017    Procedure:  RIGHT TOTAL KNEE ARTHROPLASTY;  Surgeon: Kevin Ryder MD;  Location: [facility];  Service: Orthopedics    US THYROID BIOPSY  04/19/2019     Family History   Problem Relation Age of Onset    Dementia Mother     Arthritis Mother     Chronic Obstructive Pulmonary Disease Father     Cardiovascular Father     Hypertension Father     No Known Problems Sister     No Known Problems Daughter     No Known Problems Son     Cerebrovascular Disease Maternal Grandmother     Heart Disease Paternal Grandmother     Cerebrovascular Disease Paternal Grandmother     No Known Problems Sister     No Known Problems Sister     No Known Problems Son     No Known Problems Daughter     Breast Cancer Paternal Aunt         age in 50's     Social History     Socioeconomic History    Marital status:      Spouse name: Not on file    Number of children: Not on file    Years of education: Not on file    Highest education level: Not on file   Occupational History    Occupation: , occasionally still subs     Employer: RETIRED   Tobacco Use    Smoking status: Never     Passive exposure: Never    Smokeless tobacco: Never   Vaping Use    Vaping Use: Never used   Substance and Sexual Activity    Alcohol use: Yes     Comment: Alcoholic Drinks/day: 1 cocktail daily    Drug use: No    Sexual activity: Not on file   Other Topics Concern    Not on file   Social History Narrative    Not on file     Social Determinants of Health     Financial Resource Strain: Not on file   Food Insecurity: Not on file   Transportation Needs: Not on file   Physical Activity: Not on file   Stress: Not on file   Social Connections: Not on file   Intimate Partner Violence: Not on file   Housing Stability: Not on file          Medications  Allergies   Current Outpatient Medications   Medication Sig Dispense Refill    amLODIPine (NORVASC) 5 MG tablet TAKE 1 TABLET BY MOUTH EVERYDAY AT BEDTIME 90 tablet 1    aspirin 81 MG EC tablet Take 1 tablet (81 mg) by mouth daily      atorvastatin (LIPITOR) 80 MG tablet Take 1 tablet (80 mg) by mouth At Bedtime 90 tablet 3    botulinum toxin type A (BOTOX) 100 units injection Every 3 months. [facility]      busPIRone (BUSPAR) 10 MG tablet Take 1 tablet (10 mg) by mouth 2 times daily 180 tablet 3    cholecalciferol 50 MCG (2000 UT) CAPS Take 2,000 Units by mouth daily      citalopram (CELEXA) 40 MG tablet Take 1 tablet (40 mg) by mouth daily 90 tablet 1    clopidogrel (PLAVIX) 75 MG tablet Take 1 tablet (75 mg) by mouth daily 90 tablet 3    diclofenac (VOLTAREN) 1 % topical gel Apply 2-4 g topically 4 times daily 150 g 3    eletriptan (RELPAX) 40 MG tablet Take 1 tablet (40 mg) by mouth at onset of headache for migraine 10  "tablet 0    folic acid (FOLVITE) 400 MCG tablet Take 2 tablets (800 mcg) by mouth daily 30 tablet 3    furosemide (LASIX) 20 MG tablet Take 2 tablets (40 mg) by mouth daily 180 tablet 3    HYDROcodone-acetaminophen (NORCO) 5-325 MG tablet Take 1 tablet by mouth daily as needed (Severe headache) 5 tablet 0    irbesartan (AVAPRO) 150 MG tablet Take 1 tablet (150 mg) by mouth daily 90 tablet 3    isosorbide mononitrate (IMDUR) 30 MG 24 hr tablet Take 1 tablet (30 mg) by mouth daily 30 tablet 11    levothyroxine (SYNTHROID/LEVOTHROID) 88 MCG tablet TAKE 1 TABLET BY MOUTH DAILY AT 6:00 AM. 90 tablet 2    LORazepam (ATIVAN) 0.5 MG tablet Take 1 tablet (0.5 mg) by mouth every 6 hours as needed for anxiety 30 tablet 0    LORazepam (ATIVAN) 0.5 MG tablet Take 1 tablet (0.5 mg) by mouth nightly as needed for anxiety or sleep 30 tablet 0    nitroGLYcerin (NITROSTAT) 0.4 MG sublingual tablet For chest pain place 1 tablet under the tongue every 5 minutes for 3 doses. If symptoms persist 5 minutes after 1st dose call 911. 30 tablet 1    pramipexole (MIRAPEX) 0.25 MG tablet TAKE 1 TABLET BY MOUTH THREE TIMES A  tablet 1    sotalol (BETAPACE) 80 MG tablet Take 1 tablet (80 mg) by mouth every 12 hours 180 tablet 3    ubrogepant (UBRELVY) 100 MG tablet UBRELVY 100 MG TABS      Allergies   Allergen Reactions    Cephalexin Nausea and Vomiting    Ace Inhibitors Other (See Comments)     Elevates blood pressure    Amitriptyline Hcl Other (See Comments)     elevates blood pressure    Atenolol Other (See Comments)     Aggravates reynauds    Celebrex [Celecoxib] GI Disturbance    Cephalosporins Unknown     Pt doesn't remember       Clonidine Unknown     \"got very ill in ER\"    Codeine Sulfate Nausea and Vomiting    Darvocet [Propoxyphene N-Apap] Nausea and Vomiting    Dexamethasone Acetate Swelling    Erythromycin Nausea and Vomiting    Escitalopram Other (See Comments)     Headaches.      Gabapentin Unknown     \"Spotted\"    Hctz " "Unknown     \"depletes my sodium\"    Potassium GI Disturbance    Propoxyphene      HUT Reaction: Gastrointestinal; HUT Reaction: Nausea And Vomiting; HUT Noted: [id number]    Sodium     Tramadol Swelling     Swelling of tongue and face    Tramadol-Acetaminophen Other (See Comments)    Triamterene     Venlafaxine Nausea and Vomiting and Diarrhea    Zolpidem Other (See Comments) and Unknown     Pt doesn't rember      Zolpidem Tartrate Unknown    Bacitracin Itching and Rash    Sulfanilamide Rash         Lab Results    Chemistry/lipid CBC Cardiac Enzymes/BNP/TSH/INR   Recent Labs   Lab Test 11/15/22  0800   CHOL 132   HDL 43*   LDL 68   TRIG 106     Recent Labs   Lab Test 11/15/22  0800 06/08/22  0905 06/10/21  1454   LDL 68 95 70     Recent Labs   Lab Test 02/24/23  0830      POTASSIUM 4.1   CHLORIDE 101   CO2 28   GLC 98   BUN 19   CR 0.83   GFRESTIMATED 74   ISSA 9.5     Recent Labs   Lab Test 02/24/23  0830 01/09/23  1403 12/13/22  1053   CR 0.83 1.00 0.85     No results for input(s): A1C in the last 86313 hours. Recent Labs   Lab Test 01/09/23  1403   WBC 6.9   HGB 11.9   HCT 36.9   MCV 90        Recent Labs   Lab Test 01/09/23  1403 11/27/22  2315 11/07/22  0959   HGB 11.9 12.1 12.2    Recent Labs   Lab Test 11/28/22  0139 11/27/22  2315 10/12/22  0354   TROPONINI 0.02 0.01 0.03     Recent Labs   Lab Test 11/27/22  2315        Recent Labs   Lab Test 12/13/22  1053   TSH 3.53     Recent Labs   Lab Test 11/27/22  2315   INR 1.07          This note has been dictated using voice recognition software. Any grammatical or context distortions are unintentional and inherent to the software.      "

## 2023-08-23 NOTE — LETTER
8/23/2023    Bernadette Taylor MD  [address] [facility] [address]    RE: Suma Mark       Dear Colleague,     I had the pleasure of seeing Suma Mark in the [facility].  HEART CARE ENCOUNTER NOTE       [facility]  [phone]      Assessment/Recommendations   1.  Paroxysmal atrial fibrillation - she underwent watchman implant on June 22 with a 31 mm watchman FLX device.   She is doing well and has had no strokelike symptoms.  She is currently taking aspirin 81 mg daily and Plavix 75 mg daily, although she is currently on a 3-day hold of her Plavix since she had colonoscopy done yesterday and had minor bleeding with procedure.      She will stay on Plavix until December 22, then stop it and continue ASA 81 mg daily indefinitely. She will have a DIANA on September 27 to check the device.  We will do a 6-month follow-up phone call with her and then see her again in clinic at 1 year post implant.     MODIFIED JUAN CARLOS SCALE   Timepoint: 4-6 wk Post-LAAC    Previous score: 0    Score Description   0 No symptoms at all   1 No significant disability despite symptoms; able to carry out all usual duties and activities   2 Slight disability; unable to carry out all previous activities, but able to look after own affairs without assistance   3 Moderate disability; requiring some help, but able to walk without assistance   4 Moderately severe disability; unable to walk without assistance and unable to attend to own bodily needs without assistance   5 Severe disability; bedridden, incontinent and requiring constant nursing care and attention   6 Dead    Total score (0 - 6):  0    Change in score if s/p LAAC? No    2.  Hypertension - BP today is at goal.   For now, patient should continue taking amlodipine 5 mg daily, irbesartan 150 mg daily, Imdur 30 mg daily and furosemide 40 mg daily     3. CAD - s/p PCI with EUGENIA to OM2 on 10/12/22 (Reyna/Michelle) and PCI with EUGENIA to dLAD  on 11/8/22 (Davin).  She is on Plavix 75 mg daily.   She is currently taking isosorbide 30 mg daily.    Coronary angiogram on 6/9/2023 showed patent stents with no new or significant progression of underlying CAD.  She says she has had less episodes of angina recently.     4.  Dyslipidemia, goal LDL less than 70 -continue current dose of atorvastatin.  LDL in November was 68     5.  Chronic diastolic heart failure, NYHA class II -currently compensated.  Continue furosemide 40 mg daily.  Continue adequate blood pressure management.    6.  Anemia -multifactorial.  Iron deficiency.  She has now received 3 iron infusions.  Also colonoscopy showed a flat polyp which was biopsied and treated with Hemoclip.  She is likely going to be getting an EGD and maybe PillCam.     History of Present Illness/Subjective    Suma Mark is a 72 year old female who comes in today for 6-week follow-up visit after watchman implant    Suma Mark has a past history of coronary artery disease status post EUGENIA to OM2 (extending back to Lcx) on 10/12/22 and EUGENIA to distal LAD on 11/8/22, primary hypertension, dyslipidemia, chronic congestive heart failure with preserved ejection fraction, paroxysmal atrial fibrillation, obesity.     She underwent watchman implant on June 22.  Since then she has been feeling mostly okay.  She has gotten 3 iron infusions.  She had a colonoscopy yesterday.  She had a biopsy and then started bleeding, which required Hemoclip.  She is now holding her Plavix x3 days per GI recommendations.      Suma Mark denies palpitations, paroxysmal nocturnal dyspnea, orthopnea, lightheadedness, dizziness, pre-syncope, or syncope, weight loss, changes in appetite or vomiting.     Medical, surgical, family, social history, and medications were reviewed and updated as necessary.    Procedural DIANA 6/22/23  Interpretation Summary     Structural DIANA for Left Atrial Occlusion Device     Pre-Device:  1. Normal left  ventricular size andsystolic function. LVEF: 60-65%  2. No left atrial thrombus or spontaneous contrast. Left atrium is of normal  size.  3. No ASD or PFO by color flow.  4. No pericardial effusion.     Post Device:  1. Well-seated PINNACLE FLXÂ  Device in left atrial appendage by 2D and 3D  imaging. No color doppler evidence of flow around device at ostium insertion  before or after device release. No change in mitral valve function. Nothrombus  noted on device, LA or NAVIN.     NAVIN device measurements:  Device compression diameter:  Pre deployment.  0Â : 17.6 mm  45Â : 17.8 mm  90Â : 18.6 mm  135 Â : 19.6 mm     Post deployment  0Â : 23.9 mm  45Â : 23.8 mm  90Â : 23.6 mm  135 Â : 23.1 mm     2. Normal left ventricular size and systolic function. LVEF: 60-65%  3. Small ASD with unidirectional flow (left to right) bycolor flow doppler.  4. No post procedural pericardial effusion.     Physical Examination Review of Systems   Vitals: /68 (BP Location: Left arm, Patient Position: Sitting, Cuff Size: Adult Large)   Pulse 64   Resp 16   Wt 81.6 kg (180 lb)   LMP  (LMP Unknown)   BMI 34.01 kg/m    BMI= Body mass index is 34.01 kg/m .  Wt Readings from Last 3 Encounters:   08/23/23 81.6 kg (180 lb)   07/20/23 80.6 kg (177 lb 9.6 oz)   07/10/23 82.2 kg (181 lb 2 oz)       General Appearance:   Alert, cooperative and in no acute distress   ENT/Mouth: membranes moist, no oral lesions or bleeding gums.      EYES:  no scleral icterus, normal conjunctivae   Neck: Thyroid not visualized   Chest/Lungs:   lungs are clear to auscultation, no rales or wheezing   Cardiovascular:   Regular . Normal first and second heart sounds with no murmurs, rubs or gallops; the carotid, radial and posterior tibial pulses are intact, no edema bilaterally    Abdomen:  Soft and nontender. Bowel sounds are present in all quadrants   Extremities: no cyanosis or clubbing   Skin: no xanthelasma, warm.    Neurologic: normal gait, normal   bilateral, no tremors   Psychiatric: Normal mood and affect       Please refer above for cardiac ROS details.      Medical History  Surgical History Family History Social History   Past Medical History:   Diagnosis Date    Anxiety state, unspecified     Back pain     Diverticulitis 12/07/2017    DJD (degenerative joint disease)     Essential hypertension, benign     Fibromyalgia     Gastro-oesophageal reflux disease     Hernia, ventral 04/27/2017    History of anesthesia complications     hypotension after surgery    History of blood transfusion     Hyperlipidemia     Hyponatremia 12/07/2017    Major depression     Migraine     Osteopenia     PONV (postoperative nausea and vomiting)     Posttraumatic stress disorder     Raynaud's disease     Restless leg syndrome     S/P total knee replacement 11/27/2017    Sepsis (H) 3/25/2019    Thrombosis of leg     with pregnancy    Unspecified hypothyroidism      Past Surgical History:   Procedure Laterality Date    ARTHROPLASTY KNEE UNICOMPARTMENT  10/31/2013    Procedure: ARTHROPLASTY KNEE UNICOMPARTMENT;  LEFT KNEE UNICOMPARTMENTAL ARTHROPLASTY (CAROLINE)^;  Surgeon: Chi Mittal MD;  Location: [facility]    BREAST SURGERY      bx    COLECTOMY N/A 06/02/2017    Procedure: RESECTION, SMALL INTESTINE, OPEN ADHESIOLYSIS;  Surgeon: Keyon Qureshi MD;  Location: [facility];  Service:     CV CORONARY ANGIOGRAM N/A 10/12/2022    Procedure: CV CORONARY ANGIOGRAM;  Surgeon: You Martinez MD;  Location: [facility] CV    CV CORONARY ANGIOGRAM N/A 6/9/2023    Procedure: Coronary Angiogram;  Surgeon: Bret Jin MD;  Location: [facility] CV    CV FRACTIONAL FLOW RATIO WIRE N/A 10/12/2022    Procedure: Fractional Flow Ratio Wire;  Surgeon: You Martinez MD;  Location: [facility] CV    CV LEFT ATRIAL APPENDAGE CLOSURE Right 6/22/2023    Procedure: Left Atrial Appendage Closure;  Surgeon: Lenin Mariano MD;  Location: [facility]     CV LEFT HEART CATH N/A 6/9/2023    Procedure: Left Heart Catheterization;  Surgeon: Bret Jin MD;  Location: [facility] CV    CV PCI STENT DRUG ELUTING N/A 10/12/2022    Procedure: Percutaneous Coronary Intervention Stent;  Surgeon: You Martinez MD;  Location: [facility] CV    CV PCI STENT DRUG ELUTING N/A 11/08/2022    Procedure: Percutaneous Coronary Intervention - Stent;  Surgeon: Bret Jin MD;  Location: [facility] CV    ENDOSCOPIC RETROGRADE CHOLANGIOPANCREATOGRAPHY      ENDOSCOPIC STRIPPING VEIN(S)      BILATERLY    GENITOURINARY SURGERY      bladder sling    GI SURGERY  10/02/2015    Rectal prolaspse. s/p Abdominal rectopexy, sacral colpopexy, proctoscopy, cystoscopy    HERNIORRHAPHY INCISIONAL (LOCATION) N/A 06/02/2017    Procedure: LAPARASCOPIC CONVERTED TO OPEN PRIMARY INCISIONAL HERNIA REPAIR;  Surgeon: Keyon Qureshi MD;  Location: [facility];  Service:     HYSTERECTOMY  1985    [id number]    LAMINECTOMY LUMBAR TWO LEVELS  2006    LAPAROSCOPY N/A 08/22/2019    Procedure: DIAGNOSTIC LAPAROSCOPY, LYSIS OF ADHESION;  Surgeon: Svetlana Ron MD;  Location: [facility];  Service: General    LUMBAR HARDWARE REMOVAL[  03/22/2012    REMOVAL LUMBAR HARDWARE 03/22/2012   L3-S1; Type CD Horizon m8 instrumentation Dr. Murray    RELEASE CARPAL TUNNEL      TONSILLECTOMY  1954    ???    TOTAL KNEE ARTHROPLASTY Right 11/27/2017    Procedure:  RIGHT TOTAL KNEE ARTHROPLASTY;  Surgeon: Kevin Ryder MD;  Location: [facility];  Service: Orthopedics    US THYROID BIOPSY  04/19/2019     Family History   Problem Relation Age of Onset    Dementia Mother     Arthritis Mother     Chronic Obstructive Pulmonary Disease Father     Cardiovascular Father     Hypertension Father     No Known Problems Sister     No Known Problems Daughter     No Known Problems Son     Cerebrovascular Disease Maternal Grandmother     Heart Disease Paternal Grandmother      Cerebrovascular Disease Paternal Grandmother     No Known Problems Sister     No Known Problems Sister     No Known Problems Son     No Known Problems Daughter     Breast Cancer Paternal Aunt         age in 50's    Social History     Socioeconomic History    Marital status:      Spouse name: Not on file    Number of children: Not on file    Years of education: Not on file    Highest education level: Not on file   Occupational History    Occupation: , occasionally still subs     Employer: RETIRED   Tobacco Use    Smoking status: Never     Passive exposure: Never    Smokeless tobacco: Never   Vaping Use    Vaping Use: Never used   Substance and Sexual Activity    Alcohol use: Yes     Comment: Alcoholic Drinks/day: 1 cocktail daily    Drug use: No    Sexual activity: Not on file   Other Topics Concern    Not on file   Social History Narrative    Not on file     Social Determinants of Health     Financial Resource Strain: Not on file   Food Insecurity: Not on file   Transportation Needs: Not on file   Physical Activity: Not on file   Stress: Not on file   Social Connections: Not on file   Intimate Partner Violence: Not on file   Housing Stability: Not on file          Medications  Allergies   Current Outpatient Medications   Medication Sig Dispense Refill    amLODIPine (NORVASC) 5 MG tablet TAKE 1 TABLET BY MOUTH EVERYDAY AT BEDTIME 90 tablet 1    aspirin 81 MG EC tablet Take 1 tablet (81 mg) by mouth daily      atorvastatin (LIPITOR) 80 MG tablet Take 1 tablet (80 mg) by mouth At Bedtime 90 tablet 3    botulinum toxin type A (BOTOX) 100 units injection Every 3 months. [facility]      busPIRone (BUSPAR) 10 MG tablet Take 1 tablet (10 mg) by mouth 2 times daily 180 tablet 3    cholecalciferol 50 MCG (2000 UT) CAPS Take 2,000 Units by mouth daily      citalopram (CELEXA) 40 MG tablet Take 1 tablet (40 mg) by mouth daily 90 tablet 1    clopidogrel (PLAVIX) 75 MG tablet Take 1 tablet (75 mg) by  "mouth daily 90 tablet 3    diclofenac (VOLTAREN) 1 % topical gel Apply 2-4 g topically 4 times daily 150 g 3    eletriptan (RELPAX) 40 MG tablet Take 1 tablet (40 mg) by mouth at onset of headache for migraine 10 tablet 0    folic acid (FOLVITE) 400 MCG tablet Take 2 tablets (800 mcg) by mouth daily 30 tablet 3    furosemide (LASIX) 20 MG tablet Take 2 tablets (40 mg) by mouth daily 180 tablet 3    HYDROcodone-acetaminophen (NORCO) 5-325 MG tablet Take 1 tablet by mouth daily as needed (Severe headache) 5 tablet 0    irbesartan (AVAPRO) 150 MG tablet Take 1 tablet (150 mg) by mouth daily 90 tablet 3    isosorbide mononitrate (IMDUR) 30 MG 24 hr tablet Take 1 tablet (30 mg) by mouth daily 30 tablet 11    levothyroxine (SYNTHROID/LEVOTHROID) 88 MCG tablet TAKE 1 TABLET BY MOUTH DAILY AT 6:00 AM. 90 tablet 2    LORazepam (ATIVAN) 0.5 MG tablet Take 1 tablet (0.5 mg) by mouth every 6 hours as needed for anxiety 30 tablet 0    LORazepam (ATIVAN) 0.5 MG tablet Take 1 tablet (0.5 mg) by mouth nightly as needed for anxiety or sleep 30 tablet 0    nitroGLYcerin (NITROSTAT) 0.4 MG sublingual tablet For chest pain place 1 tablet under the tongue every 5 minutes for 3 doses. If symptoms persist 5 minutes after 1st dose call 911. 30 tablet 1    pramipexole (MIRAPEX) 0.25 MG tablet TAKE 1 TABLET BY MOUTH THREE TIMES A  tablet 1    sotalol (BETAPACE) 80 MG tablet Take 1 tablet (80 mg) by mouth every 12 hours 180 tablet 3    ubrogepant (UBRELVY) 100 MG tablet UBRELVY 100 MG TABS      Allergies   Allergen Reactions    Cephalexin Nausea and Vomiting    Ace Inhibitors Other (See Comments)     Elevates blood pressure    Amitriptyline Hcl Other (See Comments)     elevates blood pressure    Atenolol Other (See Comments)     Aggravates reynauds    Celebrex [Celecoxib] GI Disturbance    Cephalosporins Unknown     Pt doesn't remember       Clonidine Unknown     \"got very ill in ER\"    Codeine Sulfate Nausea and Vomiting    Darvocet " "[Propoxyphene N-Apap] Nausea and Vomiting    Dexamethasone Acetate Swelling    Erythromycin Nausea and Vomiting    Escitalopram Other (See Comments)     Headaches.      Gabapentin Unknown     \"Spotted\"    Hctz Unknown     \"depletes my sodium\"    Potassium GI Disturbance    Propoxyphene      HUT Reaction: Gastrointestinal; HUT Reaction: Nausea And Vomiting; HUT Noted: [id number]    Sodium     Tramadol Swelling     Swelling of tongue and face    Tramadol-Acetaminophen Other (See Comments)    Triamterene     Venlafaxine Nausea and Vomiting and Diarrhea    Zolpidem Other (See Comments) and Unknown     Pt doesn't rember      Zolpidem Tartrate Unknown    Bacitracin Itching and Rash    Sulfanilamide Rash         Lab Results    Chemistry/lipid CBC Cardiac Enzymes/BNP/TSH/INR   Recent Labs   Lab Test 11/15/22  0800   CHOL 132   HDL 43*   LDL 68   TRIG 106     Recent Labs   Lab Test 11/15/22  0800 06/08/22  0905 06/10/21  1454   LDL 68 95 70     Recent Labs   Lab Test 02/24/23  0830      POTASSIUM 4.1   CHLORIDE 101   CO2 28   GLC 98   BUN 19   CR 0.83   GFRESTIMATED 74   ISSA 9.5     Recent Labs   Lab Test 02/24/23  0830 01/09/23  1403 12/13/22  1053   CR 0.83 1.00 0.85     No results for input(s): A1C in the last 34682 hours. Recent Labs   Lab Test 01/09/23  1403   WBC 6.9   HGB 11.9   HCT 36.9   MCV 90        Recent Labs   Lab Test 01/09/23  1403 11/27/22  2315 11/07/22  0959   HGB 11.9 12.1 12.2    Recent Labs   Lab Test 11/28/22  0139 11/27/22  2315 10/12/22  0354   TROPONINI 0.02 0.01 0.03     Recent Labs   Lab Test 11/27/22  2315        Recent Labs   Lab Test 12/13/22  1053   TSH 3.53     Recent Labs   Lab Test 11/27/22  2315   INR 1.07          This note has been dictated using voice recognition software. Any grammatical or context distortions are unintentional and inherent to the software.          Thank you for allowing me to participate in the care of your patient.      Sincerely,     SIGIFREDO" QUE GAMBOA     [facility]  cc:   No referring provider defined for this encounter.

## 2023-08-24 ENCOUNTER — MYC MEDICAL ADVICE (OUTPATIENT)
Dept: CARDIOLOGY | Facility: CLINIC | Age: 73
End: 2023-08-24
Payer: MEDICARE

## 2023-08-24 NOTE — TELEPHONE ENCOUNTER
Letter sent to patient via [employer] and will be printed and sent to home address. [facility]    ----- Message from Marilyn Middleton PA-C sent at 8/24/2023  3:59 PM CDT -----  Please provide Lo with a note from me stating that she is okay to move forward with the ablation on her back at this time.

## 2023-08-24 NOTE — LETTER
To Whom It May Concern,        It is my opinion that once Lo has finished her Plavix (Clopidogrel), she may have a radiofrequency ablation to her spine approximately 7-10 days later. Her last dose of Plavix will be 12/22/2023.    If there are any questions or concerns regarding her care, please contact my team at the number below. Thank you for your care in this patient.        Marilyn Middleton PA-C  Physician Assistant  [facility]    Vikki MARTINEZ RN  Structural Heart Nurse Coordinator    Phone: [phone]  Fax: [phone]

## 2023-08-25 ENCOUNTER — TELEPHONE (OUTPATIENT)
Dept: CARDIOLOGY | Facility: CLINIC | Age: 73
End: 2023-08-25
Payer: MEDICARE

## 2023-08-31 ENCOUNTER — LAB (OUTPATIENT)
Dept: LAB | Facility: CLINIC | Age: 73
End: 2023-08-31
Payer: MEDICARE

## 2023-08-31 ENCOUNTER — TRANSFERRED RECORDS (OUTPATIENT)
Dept: HEALTH INFORMATION MANAGEMENT | Facility: CLINIC | Age: 73
End: 2023-08-31

## 2023-08-31 DIAGNOSIS — D50.8 OTHER IRON DEFICIENCY ANEMIA: ICD-10-CM

## 2023-08-31 LAB
ERYTHROCYTE [DISTWIDTH] IN BLOOD BY AUTOMATED COUNT: 17.7 % (ref 10–15)
HCT VFR BLD AUTO: 34.4 % (ref 35–47)
HGB BLD-MCNC: 11 G/DL (ref 11.7–15.7)
MCH RBC QN AUTO: 27.6 PG (ref 26.5–33)
MCHC RBC AUTO-ENTMCNC: 32 G/DL (ref 31.5–36.5)
MCV RBC AUTO: 86 FL (ref 78–100)
PLATELET # BLD AUTO: 269 10E3/UL (ref 150–450)
RBC # BLD AUTO: 3.99 10E6/UL (ref 3.8–5.2)
WBC # BLD AUTO: 6.1 10E3/UL (ref 4–11)

## 2023-08-31 PROCEDURE — 36415 COLL VENOUS BLD VENIPUNCTURE: CPT

## 2023-08-31 PROCEDURE — 85027 COMPLETE CBC AUTOMATED: CPT

## 2023-09-05 DIAGNOSIS — F41.9 ANXIETY: ICD-10-CM

## 2023-09-05 RX ORDER — LORAZEPAM 0.5 MG/1
0.5 TABLET ORAL
Qty: 30 TABLET | Refills: 0 | Status: SHIPPED | OUTPATIENT
Start: 2023-09-05 | End: 2023-10-09

## 2023-09-08 NOTE — TELEPHONE ENCOUNTER
Patient scheduled for 4/15/21.  Vandana Jackson CMA ............... 4:35 PM, 04/08/21       <-- Click to add NO significant Past Surgical History

## 2023-09-11 ENCOUNTER — DOCUMENTATION ONLY (OUTPATIENT)
Dept: SLEEP MEDICINE | Facility: CLINIC | Age: 73
End: 2023-09-11
Payer: MEDICARE

## 2023-09-11 NOTE — PROGRESS NOTES
Patient seen at [facility] for a full face mask exchange. Patient states she's waking up in the middle of night due to mouth breathing. Reviewed the patient's download, she has minimal leaks and a stable AHI. I suggested a chinstrap or a full face mask. Let her know I do not see any concerns with her mask leaks to do a full face mask but it is her choice. She declined both options and will continue to work with current mask.

## 2023-09-12 ENCOUNTER — TRANSFERRED RECORDS (OUTPATIENT)
Dept: HEALTH INFORMATION MANAGEMENT | Facility: CLINIC | Age: 73
End: 2023-09-12

## 2023-09-12 ENCOUNTER — OFFICE VISIT (OUTPATIENT)
Dept: INTERNAL MEDICINE | Facility: CLINIC | Age: 73
End: 2023-09-12
Payer: MEDICARE

## 2023-09-12 VITALS
RESPIRATION RATE: 16 BRPM | OXYGEN SATURATION: 97 % | HEIGHT: 61 IN | TEMPERATURE: 97.8 F | WEIGHT: 180 LBS | HEART RATE: 63 BPM | SYSTOLIC BLOOD PRESSURE: 120 MMHG | BODY MASS INDEX: 33.99 KG/M2 | DIASTOLIC BLOOD PRESSURE: 61 MMHG

## 2023-09-12 DIAGNOSIS — I25.10 CORONARY ARTERY DISEASE INVOLVING NATIVE CORONARY ARTERY OF NATIVE HEART WITHOUT ANGINA PECTORIS: Chronic | ICD-10-CM

## 2023-09-12 DIAGNOSIS — I10 ESSENTIAL HYPERTENSION: Chronic | ICD-10-CM

## 2023-09-12 DIAGNOSIS — D50.8 OTHER IRON DEFICIENCY ANEMIA: ICD-10-CM

## 2023-09-12 DIAGNOSIS — I20.0 UNSTABLE ANGINA (H): ICD-10-CM

## 2023-09-12 DIAGNOSIS — I48.0 PAROXYSMAL ATRIAL FIBRILLATION (H): Chronic | ICD-10-CM

## 2023-09-12 DIAGNOSIS — Z95.818 PRESENCE OF WATCHMAN LEFT ATRIAL APPENDAGE CLOSURE DEVICE: Chronic | ICD-10-CM

## 2023-09-12 DIAGNOSIS — Z01.818 PREOPERATIVE EXAMINATION: Primary | ICD-10-CM

## 2023-09-12 LAB
ERYTHROCYTE [DISTWIDTH] IN BLOOD BY AUTOMATED COUNT: 17.3 % (ref 10–15)
HCT VFR BLD AUTO: 34.4 % (ref 35–47)
HGB BLD-MCNC: 11 G/DL (ref 11.7–15.7)
MCH RBC QN AUTO: 27.4 PG (ref 26.5–33)
MCHC RBC AUTO-ENTMCNC: 32 G/DL (ref 31.5–36.5)
MCV RBC AUTO: 86 FL (ref 78–100)
PLATELET # BLD AUTO: 291 10E3/UL (ref 150–450)
RBC # BLD AUTO: 4.01 10E6/UL (ref 3.8–5.2)
WBC # BLD AUTO: 6 10E3/UL (ref 4–11)

## 2023-09-12 PROCEDURE — 99214 OFFICE O/P EST MOD 30 MIN: CPT | Mod: 25 | Performed by: INTERNAL MEDICINE

## 2023-09-12 PROCEDURE — 90662 IIV NO PRSV INCREASED AG IM: CPT | Performed by: INTERNAL MEDICINE

## 2023-09-12 PROCEDURE — 83540 ASSAY OF IRON: CPT | Performed by: INTERNAL MEDICINE

## 2023-09-12 PROCEDURE — G0008 ADMIN INFLUENZA VIRUS VAC: HCPCS | Performed by: INTERNAL MEDICINE

## 2023-09-12 PROCEDURE — 83550 IRON BINDING TEST: CPT | Performed by: INTERNAL MEDICINE

## 2023-09-12 PROCEDURE — 80053 COMPREHEN METABOLIC PANEL: CPT | Performed by: INTERNAL MEDICINE

## 2023-09-12 PROCEDURE — 82728 ASSAY OF FERRITIN: CPT | Performed by: INTERNAL MEDICINE

## 2023-09-12 PROCEDURE — 85027 COMPLETE CBC AUTOMATED: CPT | Performed by: INTERNAL MEDICINE

## 2023-09-12 PROCEDURE — 36415 COLL VENOUS BLD VENIPUNCTURE: CPT | Performed by: INTERNAL MEDICINE

## 2023-09-12 RX ORDER — CLOPIDOGREL BISULFATE 75 MG/1
75 TABLET ORAL DAILY
Qty: 90 TABLET | Refills: 1 | Status: SHIPPED | OUTPATIENT
Start: 2023-09-12 | End: 2023-12-20

## 2023-09-12 NOTE — PROGRESS NOTES
[facility]  [address] [address]  Phone: [phone]  Fax: [phone]  Primary Provider: Gloria Taylor  Pre-op Performing Provider: GLORIA TAYLOR      PREOPERATIVE EVALUATION:  Today's date: 9/12/2023    Suma Mark is a 73 year old female who presents for a preoperative evaluation.      9/12/2023     1:44 PM   Additional Questions   Roomed by Lizbeth       Surgical Information:  Surgery/Procedure: DIANA, s/ p watchman  Surgery Location: [facility]  Surgeon: Unknown  Surgery Date: 9/27/2023  Time of Surgery: Unknown  Where patient plans to recover: At home with family  Fax number for surgical facility: Note does not need to be faxed, will be available electronically in Epic.    Assessment & Plan     The proposed surgical procedure is considered LOW risk.    Preoperative examination    - CBC with platelets; Future  - Comprehensive metabolic panel; Future  - Ferritin; Future  - Iron & Iron Binding Capacity; Future    Presence of Watchman left atrial appendage closure device  Paroxysmal atrial fibrillation (H)  she underwent watchman implant on June 22 with a 31 mm watchman FLX device.   She is doing well and has had no strokelike symptoms.  She is currently taking aspirin 81 mg daily and Plavix 75 mg daily.  She will stay on Plavix until December 22 ( 1 year post stent), then stop it and continue ASA 81 mg daily indefinitely. She will have a DIANA on September 27 to check the device.   On sotalol now.    Other iron deficiency anemia  Iron deficiency. She has now received 3 iron infusions. Also colonoscopy showed a flat polyp which was biopsied and treated with Hemoclip. She is likely going to be getting an EGD and maybe PillCam.   Oral iron was causing severe nausea.  - CBC with platelets; Future  - Comprehensive metabolic panel; Future  - Ferritin; Future  - Iron & Iron Binding Capacity; Future    Essential hypertension  Stable on Lasix, irbesartan,  amlodipine.  - CBC with platelets; Future  - Comprehensive metabolic panel; Future  - Ferritin; Future  - Iron & Iron Binding Capacity; Future    Coronary artery disease involving native coronary artery of native heart without angina pectoris  status post EUGENIA to OM2 (extending back to Lcx) on 10/12/22 and EUGENIA to distal LAD on 11/8/22   - CBC with platelets; Future  - Comprehensive metabolic panel; Future  - Ferritin; Future  - Iron & Iron Binding Capacity; Future      Patient Instructions   Labs today. Flu vaccine today.    Check with your Cardiology if need to stop asa and Plavix and if they have any specific recommendations about your medications for the morning of the procedure.     Do not take any medications on the morning of your procedure.     Follow your surgeon's direction on when to stop eating and drinking prior to surgery.     Your surgeon will be managing your pain after your surgery.                  RECOMMENDATION:  APPROVAL GIVEN to proceed with proposed procedure, without further diagnostic evaluation.            Subjective       HPI related to upcoming procedure: see above        9/5/2023     9:31 AM   Preop Questions   1. Have you ever had a heart attack or stroke? YES -    2. Have you ever had surgery on your heart or blood vessels, such as a stent placement, a coronary artery bypass, or surgery on an artery in your head, neck, heart, or legs? YES -    3. Do you have chest pain with activity? No   4. Do you have a history of  heart failure? No   5. Do you currently have a cold, bronchitis or symptoms of other infection? No   6. Do you have a cough, shortness of breath, or wheezing? No   7. Do you or anyone in your family have previous history of blood clots? YES -    8. Do you or does anyone in your family have a serious bleeding problem such as prolonged bleeding following surgeries or cuts? No   9. Have you ever had problems with anemia or been told to take iron pills? YES -    10. Have you had  any abnormal blood loss such as black, tarry or bloody stools, or abnormal vaginal bleeding? No   11. Have you ever had a blood transfusion? YES -    11a. Have you ever had a transfusion reaction? No   12. Are you willing to have a blood transfusion if it is medically needed before, during, or after your surgery? Yes   13. Have you or any of your relatives ever had problems with anesthesia? YES -    14. Do you have sleep apnea, excessive snoring or daytime drowsiness? YES -    14a. Do you have a CPAP machine? Yes   15. Do you have any artifical heart valves or other implanted medical devices like a pacemaker, defibrillator, or continuous glucose monitor? YES -    15a. What type of device do you have? Watchman   15b. Name of the clinic that manages your device:  [facility]   16. Do you have artificial joints? YES -    17. Are you allergic to latex? No       Health Care Directive:  Patient has a Health Care Directive on file      Preoperative Review of :            Review of Systems  Constitutional, neuro, ENT, endocrine, pulmonary, cardiac, gastrointestinal, genitourinary, musculoskeletal, integument and psychiatric systems are negative, except as otherwise noted.    Patient Active Problem List    Diagnosis Date Noted    Chronic pain syndrome 03/08/2023     Priority: High    DONN (obstructive sleep apnea) - mild (AHI 11) 07/07/2023     Priority: Medium     7/6/2023 [place] [facility] Study (180.0 lbs) - AHI 11.5, RDI 13.9, Supine AHI 16.9, REM AHI 30.4, Low O2 82.0%, Time Spent ?88% 3.0 minutes / Time Spent ?89% 6.0 minutes.      Presence of Watchman left atrial appendage closure device 06/22/2023     Priority: Medium     6/22/2023, 31 mmm Watchman FLX Device      Anemia 06/16/2023     Priority: Medium    Senile osteoporosis 12/13/2022     Priority: Medium    Paroxysmal atrial fibrillation (H) 12/05/2022     Priority: Medium    Coronary artery disease involving native coronary artery of  native heart without angina pectoris 10/18/2022     Priority: Medium      s/p PCI with EUGENIA to OM2 on 10/12/22 and PCI with EUGENIA to dLAD on 11/8/22.       Chronic insomnia 08/19/2021     Priority: Medium    Thyroid nodule 08/19/2021     Priority: Medium    Venous insufficiency of both lower extremities 11/04/2019     Priority: Medium    Dyslipidemia, goal LDL below 70 04/08/2019     Priority: Medium    Fibromyalgia 04/24/2018     Priority: Medium    Chronic back pain 12/07/2017     Priority: Medium    Generalized anxiety disorder 12/07/2017     Priority: Medium    Vitamin D deficiency 12/07/2017     Priority: Medium    Diverticulosis 10/04/2017     Priority: Medium     Incidental finding on colonoscopy 10/2017      Migraine 04/04/2017     Priority: Medium     [facility]      Essential hypertension 04/21/2015     Priority: Medium    Gastroesophageal reflux disease without esophagitis 04/21/2015     Priority: Medium    Restless leg syndrome 04/21/2015     Priority: Medium     S/p iron infusion [facility] 5/2022      Total knee replacement status 10/31/2013     Priority: Medium    Acquired hypothyroidism 03/12/2009     Priority: Medium    Class 1 obesity due to excess calories with serious comorbidity and body mass index (BMI) of 33.0 to 33.9 in adult 03/09/2023     Priority: Low      Past Medical History:   Diagnosis Date    Anxiety state, unspecified     Back pain     Diverticulitis 12/07/2017    DJD (degenerative joint disease)     Essential hypertension, benign     Fibromyalgia     Gastro-oesophageal reflux disease     Hernia, ventral 04/27/2017    History of anesthesia complications     hypotension after surgery    History of blood transfusion     Hyperlipidemia     Hyponatremia 12/07/2017    Major depression     Migraine     Osteopenia     PONV (postoperative nausea and vomiting)     Posttraumatic stress disorder     Raynaud's disease     Restless leg syndrome     S/P total knee replacement 11/27/2017     Sepsis (H) 3/25/2019    Thrombosis of leg     with pregnancy    Unspecified hypothyroidism      Past Surgical History:   Procedure Laterality Date    ARTHROPLASTY KNEE UNICOMPARTMENT  10/31/2013    Procedure: ARTHROPLASTY KNEE UNICOMPARTMENT;  LEFT KNEE UNICOMPARTMENTAL ARTHROPLASTY (CAROLINE)^;  Surgeon: Chi Mittal MD;  Location:  OR    BREAST SURGERY      bx    COLECTOMY N/A 06/02/2017    Procedure: RESECTION, SMALL INTESTINE, OPEN ADHESIOLYSIS;  Surgeon: Keyon Qureshi MD;  Location: [facility];  Service:     CV CORONARY ANGIOGRAM N/A 10/12/2022    Procedure: CV CORONARY ANGIOGRAM;  Surgeon: You Martinez MD;  Location: [facility] CV    CV CORONARY ANGIOGRAM N/A 6/9/2023    Procedure: Coronary Angiogram;  Surgeon: Bret Jin MD;  Location: [facility] CV    CV FRACTIONAL FLOW RATIO WIRE N/A 10/12/2022    Procedure: Fractional Flow Ratio Wire;  Surgeon: You Martinez MD;  Location: [facility] CV    CV LEFT ATRIAL APPENDAGE CLOSURE Right 6/22/2023    Procedure: Left Atrial Appendage Closure;  Surgeon: Lenin Mariano MD;  Location: [facility] CV    CV LEFT HEART CATH N/A 6/9/2023    Procedure: Left Heart Catheterization;  Surgeon: Bret Jin MD;  Location: [facility] CV    CV PCI STENT DRUG ELUTING N/A 10/12/2022    Procedure: Percutaneous Coronary Intervention Stent;  Surgeon: You Martinez MD;  Location: [facility]    CV PCI STENT DRUG ELUTING N/A 11/08/2022    Procedure: Percutaneous Coronary Intervention - Stent;  Surgeon: Bret Jin MD;  Location: [facility] CV    ENDOSCOPIC RETROGRADE CHOLANGIOPANCREATOGRAPHY      ENDOSCOPIC STRIPPING VEIN(S)      BILATERLY    GENITOURINARY SURGERY      bladder sling    GI SURGERY  10/02/2015    Rectal prolaspse. s/p Abdominal rectopexy, sacral colpopexy, proctoscopy, cystoscopy    HERNIORRHAPHY INCISIONAL (LOCATION) N/A 06/02/2017    Procedure: LAPARASCOPIC CONVERTED  TO OPEN PRIMARY INCISIONAL HERNIA REPAIR;  Surgeon: Keyon Qureshi MD;  Location: [facility] Main OR;  Service:     HYSTERECTOMY  1985 1985-1986    LAMINECTOMY LUMBAR TWO LEVELS  2006    LAPAROSCOPY N/A 08/22/2019    Procedure: DIAGNOSTIC LAPAROSCOPY, LYSIS OF ADHESION;  Surgeon: Svetlana Ron MD;  Location: [facility] Main OR;  Service: General    LUMBAR HARDWARE REMOVAL[  03/22/2012    REMOVAL LUMBAR HARDWARE 03/22/2012   L3-S1; Type CD Horizon m8 instrumentation Dr. Murray    RELEASE CARPAL TUNNEL      TONSILLECTOMY  1954    ???    TOTAL KNEE ARTHROPLASTY Right 11/27/2017    Procedure:  RIGHT TOTAL KNEE ARTHROPLASTY;  Surgeon: Kevin Ryder MD;  Location: [facility] OR;  Service: Orthopedics     THYROID BIOPSY  04/19/2019     Current Outpatient Medications   Medication Sig Dispense Refill    amLODIPine (NORVASC) 5 MG tablet TAKE 1 TABLET BY MOUTH EVERYDAY AT BEDTIME 90 tablet 1    aspirin 81 MG EC tablet Take 1 tablet (81 mg) by mouth daily      atorvastatin (LIPITOR) 80 MG tablet Take 1 tablet (80 mg) by mouth At Bedtime 90 tablet 3    botulinum toxin type A (BOTOX) 100 units injection Every 3 months. [facility]      busPIRone (BUSPAR) 10 MG tablet Take 1 tablet (10 mg) by mouth 2 times daily 180 tablet 3    cholecalciferol 50 MCG (2000 UT) CAPS Take 2,000 Units by mouth daily      citalopram (CELEXA) 40 MG tablet Take 1 tablet (40 mg) by mouth daily 90 tablet 1    clopidogrel (PLAVIX) 75 MG tablet Take 1 tablet (75 mg) by mouth daily 90 tablet 3    eletriptan (RELPAX) 40 MG tablet Take 1 tablet (40 mg) by mouth at onset of headache for migraine 10 tablet 0    furosemide (LASIX) 20 MG tablet Take 2 tablets (40 mg) by mouth daily 180 tablet 3    HYDROcodone-acetaminophen (NORCO) 5-325 MG tablet Take 1 tablet by mouth daily as needed (Severe headache) 5 tablet 0    irbesartan (AVAPRO) 150 MG tablet Take 1 tablet (150 mg) by mouth daily 90 tablet 3    isosorbide mononitrate (IMDUR) 30 MG 24 hr  "tablet Take 1 tablet (30 mg) by mouth daily 30 tablet 11    levothyroxine (SYNTHROID/LEVOTHROID) 88 MCG tablet TAKE 1 TABLET BY MOUTH DAILY AT 6:00 AM. 90 tablet 2    LORazepam (ATIVAN) 0.5 MG tablet TAKE 1 TABLET (0.5 MG) BY MOUTH NIGHTLY AS NEEDED FOR ANXIETY OR SLEEP 30 tablet 0    LORazepam (ATIVAN) 0.5 MG tablet Take 1 tablet (0.5 mg) by mouth every 6 hours as needed for anxiety 30 tablet 0    nitroGLYcerin (NITROSTAT) 0.4 MG sublingual tablet For chest pain place 1 tablet under the tongue every 5 minutes for 3 doses. If symptoms persist 5 minutes after 1st dose call 911. 30 tablet 1    pramipexole (MIRAPEX) 0.25 MG tablet TAKE 1 TABLET BY MOUTH THREE TIMES A  tablet 1    sotalol (BETAPACE) 80 MG tablet Take 1 tablet (80 mg) by mouth every 12 hours 180 tablet 3    ubrogepant (UBRELVY) 100 MG tablet UBRELVY 100 MG TABS      folic acid (FOLVITE) 400 MCG tablet Take 2 tablets (800 mcg) by mouth daily (Patient not taking: Reported on 9/12/2023) 30 tablet 3       Allergies   Allergen Reactions    Cephalexin Nausea and Vomiting    Ace Inhibitors Other (See Comments)     Elevates blood pressure    Amitriptyline Hcl Other (See Comments)     elevates blood pressure    Atenolol Other (See Comments)     Aggravates reynauds    Celebrex [Celecoxib] GI Disturbance    Cephalosporins Unknown     Pt doesn't remember       Clonidine Unknown     \"got very ill in ER\"    Codeine Sulfate Nausea and Vomiting    Darvocet [Propoxyphene N-Apap] Nausea and Vomiting    Dexamethasone Acetate Swelling    Erythromycin Nausea and Vomiting    Escitalopram Other (See Comments)     Headaches.      Gabapentin Unknown     \"Spotted\"    Hctz Unknown     \"depletes my sodium\"    Potassium GI Disturbance    Propoxyphene      HUT Reaction: Gastrointestinal; HUT Reaction: Nausea And Vomiting; HUT Noted: [id number]    Sodium     Tramadol Swelling     Swelling of tongue and face    Tramadol-Acetaminophen Other (See Comments)    Triamterene     " "Venlafaxine Nausea and Vomiting and Diarrhea    Zolpidem Other (See Comments) and Unknown     Pt doesn't rember      Zolpidem Tartrate Unknown    Bacitracin Itching and Rash    Sulfanilamide Rash        Social History     Tobacco Use    Smoking status: Never     Passive exposure: Never    Smokeless tobacco: Never   Substance Use Topics    Alcohol use: Yes     Comment: Alcoholic Drinks/day: 1 cocktail daily       History   Drug Use No         Objective     /61   Pulse 63   Temp 97.8  F (36.6  C)   Resp 16   Ht 1.549 m (5' 1\")   Wt 81.6 kg (180 lb)   LMP  (LMP Unknown)   SpO2 97%   BMI 34.01 kg/m      Physical Exam  Constitutional:  oriented to person, place, and time, appears well-nourished. No distress.   HENT:   Head: Normocephalic.   Mouth/Throat: Oropharynx is clear and moist.   Eyes: Conjunctivae are normal. Pupils are equal, round, and reactive to light.   Neck: Normal range of motion. Neck supple.   Cardiovascular: Normal rate, regular rhythm and normal heart sounds.    Pulmonary/Chest: Effort normal and breath sounds normal.   Abdominal: Soft. Bowel sounds are normal.   Musculoskeletal: Normal range of motion.   Neurological: alert and oriented to person, place, and time. Skin: Skin is warm.   Psychiatric: normal mood and affect.     Recent Labs   Lab Test 08/31/23  1130 07/25/23  1352 06/28/23  0853 06/22/23  1324 06/20/23  0724 06/19/23  1055 06/13/23  1003 06/08/23  0819 11/30/22  1534 11/27/22  2315   HGB 11.0* 10.4*   < > 8.4*   < > 9.4*   < > 9.2*   < > 12.1    257  --   --    < > 310   < > 320   < > 298   INR  --   --   --   --   --   --   --   --   --  1.07   NA  --   --   --   --   --  137  --  138   < > 141   POTASSIUM  --   --   --   --   --  4.3  --  4.7   < > 3.2*   CR  --   --   --  0.71  --  0.75  --  0.82   < > 1.08    < > = values in this interval not displayed.        Diagnostics:  Labs pending at this time.  Results will be reviewed when available.   EKG was done in " June 2023.             Signed Electronically by: Bernadette Taylor MD  Copy of this evaluation report is provided to requesting physician.

## 2023-09-12 NOTE — PATIENT INSTRUCTIONS
Labs today. Flu vaccine today.    Check with your Cardiology if need to stop asa and Plavix and if they have any specific recommendations about your medications for the morning of the procedure.     Do not take any medications on the morning of your procedure.     Follow your surgeon's direction on when to stop eating and drinking prior to surgery.     Your surgeon will be managing your pain after your surgery.

## 2023-09-13 LAB
ALBUMIN SERPL BCG-MCNC: 4.4 G/DL (ref 3.5–5.2)
ALP SERPL-CCNC: 92 U/L (ref 35–104)
ALT SERPL W P-5'-P-CCNC: 23 U/L (ref 0–50)
ANION GAP SERPL CALCULATED.3IONS-SCNC: 9 MMOL/L (ref 7–15)
AST SERPL W P-5'-P-CCNC: 27 U/L (ref 0–45)
BILIRUB SERPL-MCNC: 0.4 MG/DL
BUN SERPL-MCNC: 16.1 MG/DL (ref 8–23)
CALCIUM SERPL-MCNC: 9.4 MG/DL (ref 8.8–10.2)
CHLORIDE SERPL-SCNC: 102 MMOL/L (ref 98–107)
CREAT SERPL-MCNC: 0.77 MG/DL (ref 0.51–0.95)
DEPRECATED HCO3 PLAS-SCNC: 28 MMOL/L (ref 22–29)
EGFRCR SERPLBLD CKD-EPI 2021: 80 ML/MIN/1.73M2
FERRITIN SERPL-MCNC: 53 NG/ML (ref 11–328)
GLUCOSE SERPL-MCNC: 101 MG/DL (ref 70–99)
IRON BINDING CAPACITY (ROCHE): 376 UG/DL (ref 240–430)
IRON SATN MFR SERPL: 11 % (ref 15–46)
IRON SERPL-MCNC: 43 UG/DL (ref 37–145)
POTASSIUM SERPL-SCNC: 3.6 MMOL/L (ref 3.4–5.3)
PROT SERPL-MCNC: 6.9 G/DL (ref 6.4–8.3)
SODIUM SERPL-SCNC: 139 MMOL/L (ref 136–145)

## 2023-09-27 ENCOUNTER — HOSPITAL ENCOUNTER (OUTPATIENT)
Dept: CARDIOLOGY | Facility: HOSPITAL | Age: 73
Discharge: HOME | End: 2023-09-27
Attending: INTERNAL MEDICINE | Admitting: INTERNAL MEDICINE
Payer: MEDICARE

## 2023-09-27 VITALS
SYSTOLIC BLOOD PRESSURE: 115 MMHG | DIASTOLIC BLOOD PRESSURE: 60 MMHG | RESPIRATION RATE: 15 BRPM | HEART RATE: 56 BPM | OXYGEN SATURATION: 98 % | TEMPERATURE: 98.5 F

## 2023-09-27 DIAGNOSIS — Z95.818 PRESENCE OF WATCHMAN LEFT ATRIAL APPENDAGE CLOSURE DEVICE: ICD-10-CM

## 2023-09-27 DIAGNOSIS — I48.0 PAROXYSMAL ATRIAL FIBRILLATION (H): ICD-10-CM

## 2023-09-27 LAB — LVEF ECHO: NORMAL

## 2023-09-27 PROCEDURE — 93312 ECHO TRANSESOPHAGEAL: CPT | Mod: 26 | Performed by: INTERNAL MEDICINE

## 2023-09-27 PROCEDURE — 93325 DOPPLER ECHO COLOR FLOW MAPG: CPT | Mod: 26 | Performed by: INTERNAL MEDICINE

## 2023-09-27 PROCEDURE — 250N000011 HC RX IP 250 OP 636: Performed by: INTERNAL MEDICINE

## 2023-09-27 PROCEDURE — 93320 DOPPLER ECHO COMPLETE: CPT | Mod: 26 | Performed by: INTERNAL MEDICINE

## 2023-09-27 PROCEDURE — 93325 DOPPLER ECHO COLOR FLOW MAPG: CPT

## 2023-09-27 PROCEDURE — 250N000009 HC RX 250: Performed by: INTERNAL MEDICINE

## 2023-09-27 PROCEDURE — 99152 MOD SED SAME PHYS/QHP 5/>YRS: CPT | Performed by: INTERNAL MEDICINE

## 2023-09-27 PROCEDURE — 258N000003 HC RX IP 258 OP 636: Performed by: INTERNAL MEDICINE

## 2023-09-27 RX ORDER — LIDOCAINE 40 MG/G
CREAM TOPICAL
Status: DISCONTINUED | OUTPATIENT
Start: 2023-09-27 | End: 2023-09-27 | Stop reason: HOSPADM

## 2023-09-27 RX ORDER — SODIUM CHLORIDE 9 MG/ML
INJECTION, SOLUTION INTRAVENOUS CONTINUOUS
Status: DISCONTINUED | OUTPATIENT
Start: 2023-09-27 | End: 2023-09-27 | Stop reason: HOSPADM

## 2023-09-27 RX ORDER — FENTANYL CITRATE 50 UG/ML
INJECTION, SOLUTION INTRAMUSCULAR; INTRAVENOUS
Status: COMPLETED | OUTPATIENT
Start: 2023-09-27 | End: 2023-09-27

## 2023-09-27 RX ADMIN — MIDAZOLAM 2 MG: 1 INJECTION INTRAMUSCULAR; INTRAVENOUS at 08:18

## 2023-09-27 RX ADMIN — SODIUM CHLORIDE: 9 INJECTION, SOLUTION INTRAVENOUS at 07:51

## 2023-09-27 RX ADMIN — TOPICAL ANESTHETIC 0.5 ML: 200 SPRAY DENTAL; PERIODONTAL at 08:19

## 2023-09-27 RX ADMIN — FENTANYL CITRATE 100 MCG: 50 INJECTION, SOLUTION INTRAMUSCULAR; INTRAVENOUS at 08:19

## 2023-09-27 ASSESSMENT — ACTIVITIES OF DAILY LIVING (ADL): ADLS_ACUITY_SCORE: 35

## 2023-09-27 NOTE — DISCHARGE INSTRUCTIONS
1. You are required to have someone accompany you home.    2. Rest today. Do not drive or operate machinery today. Over-activity may produce nausea and dizziness.    3. You should follow your normal diet. Drink plenty of fluids. Do not drink any alcoholic beverages for 24 hours. *(Alcohol may interact with the medications you received today).    4. NO HOT FOODS or LIQUIDS FOR 6 HOURS after the procedure until 2:30pm this afternoon    5. You may have a sore throat or cough. This is normal. These symptoms should resolve in 24 hours.     6. If you have further questions call your doctor:     Dr. Fischer and/or Structural Watchman Team  [phone]

## 2023-09-29 ENCOUNTER — TELEPHONE (OUTPATIENT)
Dept: CARDIOLOGY | Facility: CLINIC | Age: 73
End: 2023-09-29
Payer: MEDICARE

## 2023-09-29 NOTE — TELEPHONE ENCOUNTER
"Phone call to patient. Patient s/p LAAC 6/22/2023. Per post-LAAC medication protocol, patient to remain on once daily 81 mg ASA for life and once daily 75 mg Plavix until 6 months post-LAAC, which will be approximately 12/22/2023. Post-LAAC interpretation summary shows:    \"Left ventricular size, wall motion and function are normal. The ejection  fraction is 60-65%.  Normal right ventricle size and systolic function.  Doppler suggests left to right interatrial shunt.  Watchman device noted in left atrial appendage. The device is well seated with  no leakage by color flow Doppler imaging.  Thrombus absent on left atrial appendage occlusion device.\"    Patient states she is being requested to have follow-up colonoscopy for polyp seen. Requested she clarify with GI if/how long they want Plavix held and return call to our office to discuss. She verbalized understanding. NO further questions at this time. [facility]  "

## 2023-10-01 DIAGNOSIS — I87.2 VENOUS INSUFFICIENCY OF BOTH LOWER EXTREMITIES: ICD-10-CM

## 2023-10-01 DIAGNOSIS — I10 ESSENTIAL (PRIMARY) HYPERTENSION: ICD-10-CM

## 2023-10-03 ENCOUNTER — TELEPHONE (OUTPATIENT)
Dept: CARDIOLOGY | Facility: CLINIC | Age: 73
End: 2023-10-03
Payer: MEDICARE

## 2023-10-03 RX ORDER — FUROSEMIDE 20 MG
40 TABLET ORAL DAILY
Qty: 180 TABLET | Refills: 3 | Status: SHIPPED | OUTPATIENT
Start: 2023-10-03 | End: 2024-09-20

## 2023-10-03 RX ORDER — AMLODIPINE BESYLATE 5 MG/1
TABLET ORAL
Qty: 90 TABLET | Refills: 1 | Status: SHIPPED | OUTPATIENT
Start: 2023-10-03 | End: 2024-05-10

## 2023-10-03 NOTE — TELEPHONE ENCOUNTER
Health Call Center    Phone Message    May a detailed message be left on voicemail: yes     Reason for Call: Order(s): Other:   Reason for requested: Hold Plavix for EGD w/Colonoscopy procedure on 10.30.23.  Need the med held 3 days prior to 10.30.23.  In not able to hold for 3 days will need a creatine lab w/in 90 days of 10.30.23  Date needed: ASAP  Provider name: Dr. ARTHUR Worley    Action Taken: Message routed to:  [facility] cardio    Travel Screening: Not Applicable    Thank you!  [facility]

## 2023-10-04 NOTE — TELEPHONE ENCOUNTER
Phone call to patient to discuss upcoming Colonoscopy. She states that her scheduled colonscopy is following up on colonoscopy 3 weeks ago and found concerning polyp. They are planning for combination colonoscopy/endoscopy d/t concern for internal bleeding. Discussed that per below, Dr. Mariano has given ok for 3 day hold of Plavix if situation is time sensitive. Will call [facility] to give verbal orders as well. Informed Lo she is to resume Plavix as soon as possible, through 12/22/2023, and stay on 81 mg ASA uninterrupted. Lo is appreciative and verbalized understanding. No further questions.    Phone call to Stefan at [facility]. LM on confidential line with ok to hold Plavix for time sensitive colonoscopy. [facility]    ----- Message -----  From: Lenin Mraiano MD  Sent: 10/4/2023   8:19 AM CDT  To: Linda Washington RN  Subject: RE: Plavix hold for Post LAAC                    Hi Linda,    Thanks for the note.  It depends on how time sensitive the colonoscopy is - if felt to be time sensitive, can hold plavix 3 days prior and proceed as planned.  If not time sensitive, would defer until after planned cessation of plavix 6 months after LAAO.    Sharath Maldonado    ----- Message -----  From: Linda Washington RN  Sent: 10/3/2023   2:43 PM CDT  To: Lenin Mariano MD  Subject: Plavix hold for Post LAAC                        Patient s/p LAAC 6/22/2023-MD is looking for plavix hold for 3 days prior to colonoscopy 10/30/23. If not able to hold for 3 days will need a creatine lab w/in 90 days of 10.30.23. 3 month DIANA was completed with no concerns with device healing and no DRT.   Please advise.   Thank you!  Linda

## 2023-10-08 DIAGNOSIS — F41.9 ANXIETY: ICD-10-CM

## 2023-10-09 DIAGNOSIS — F41.9 ANXIETY: ICD-10-CM

## 2023-10-09 RX ORDER — LORAZEPAM 0.5 MG/1
0.5 TABLET ORAL
Qty: 30 TABLET | Refills: 0 | Status: SHIPPED | OUTPATIENT
Start: 2023-10-09 | End: 2023-11-09

## 2023-10-10 RX ORDER — BUSPIRONE HYDROCHLORIDE 10 MG/1
10 TABLET ORAL 2 TIMES DAILY
Qty: 180 TABLET | Refills: 3 | Status: SHIPPED | OUTPATIENT
Start: 2023-10-10 | End: 2024-01-23

## 2023-10-13 ASSESSMENT — SLEEP AND FATIGUE QUESTIONNAIRES
HOW LIKELY ARE YOU TO NOD OFF OR FALL ASLEEP WHILE SITTING QUIETLY AFTER LUNCH WITHOUT ALCOHOL: WOULD NEVER DOZE
HOW LIKELY ARE YOU TO NOD OFF OR FALL ASLEEP IN A CAR, WHILE STOPPED FOR A FEW MINUTES IN TRAFFIC: WOULD NEVER DOZE
HOW LIKELY ARE YOU TO NOD OFF OR FALL ASLEEP WHEN YOU ARE A PASSENGER IN A CAR FOR AN HOUR WITHOUT A BREAK: MODERATE CHANCE OF DOZING
HOW LIKELY ARE YOU TO NOD OFF OR FALL ASLEEP WHILE SITTING INACTIVE IN A PUBLIC PLACE: SLIGHT CHANCE OF DOZING
HOW LIKELY ARE YOU TO NOD OFF OR FALL ASLEEP WHILE WATCHING TV: MODERATE CHANCE OF DOZING
HOW LIKELY ARE YOU TO NOD OFF OR FALL ASLEEP WHILE SITTING AND READING: MODERATE CHANCE OF DOZING
HOW LIKELY ARE YOU TO NOD OFF OR FALL ASLEEP WHILE SITTING AND TALKING TO SOMEONE: WOULD NEVER DOZE
HOW LIKELY ARE YOU TO NOD OFF OR FALL ASLEEP WHILE LYING DOWN TO REST IN THE AFTERNOON WHEN CIRCUMSTANCES PERMIT: SLIGHT CHANCE OF DOZING

## 2023-10-20 ENCOUNTER — OFFICE VISIT (OUTPATIENT)
Dept: SLEEP MEDICINE | Facility: CLINIC | Age: 73
End: 2023-10-20
Payer: MEDICARE

## 2023-10-20 VITALS
HEART RATE: 64 BPM | BODY MASS INDEX: 34.81 KG/M2 | WEIGHT: 184.4 LBS | SYSTOLIC BLOOD PRESSURE: 117 MMHG | HEIGHT: 61 IN | OXYGEN SATURATION: 98 % | DIASTOLIC BLOOD PRESSURE: 70 MMHG

## 2023-10-20 DIAGNOSIS — G47.33 OSA (OBSTRUCTIVE SLEEP APNEA): ICD-10-CM

## 2023-10-20 DIAGNOSIS — G25.81 RESTLESS LEG SYNDROME: ICD-10-CM

## 2023-10-20 DIAGNOSIS — F51.04 CHRONIC INSOMNIA: ICD-10-CM

## 2023-10-20 DIAGNOSIS — G47.33 OSA (OBSTRUCTIVE SLEEP APNEA): Chronic | ICD-10-CM

## 2023-10-20 DIAGNOSIS — E66.811 CLASS 1 OBESITY DUE TO EXCESS CALORIES WITH SERIOUS COMORBIDITY AND BODY MASS INDEX (BMI) OF 33.0 TO 33.9 IN ADULT: Primary | Chronic | ICD-10-CM

## 2023-10-20 DIAGNOSIS — E66.09 CLASS 1 OBESITY DUE TO EXCESS CALORIES WITH SERIOUS COMORBIDITY AND BODY MASS INDEX (BMI) OF 33.0 TO 33.9 IN ADULT: Primary | Chronic | ICD-10-CM

## 2023-10-20 PROCEDURE — 99214 OFFICE O/P EST MOD 30 MIN: CPT | Performed by: NURSE PRACTITIONER

## 2023-10-20 RX ORDER — KETOCONAZOLE 20 MG/G
CREAM TOPICAL
COMMUNITY
Start: 2023-07-17 | End: 2024-01-23

## 2023-10-20 NOTE — NURSING NOTE
"Chief Complaint   Patient presents with    CPAP Follow Up       Initial /70   Pulse 64   Ht 1.549 m (5' 1\")   Wt 83.6 kg (184 lb 6.4 oz)   LMP  (LMP Unknown)   SpO2 98%   BMI 34.84 kg/m   Estimated body mass index is 34.84 kg/m  as calculated from the following:    Height as of this encounter: 1.549 m (5' 1\").    Weight as of this encounter: 83.6 kg (184 lb 6.4 oz).    Medication Reconciliation: complete    Neck circumference:     DME: [facility]     Future MyChart message sent reminding patient of 1 year follow up appointment.     Sherrie Holly MA    "

## 2023-10-20 NOTE — PROGRESS NOTES
Obstr  Obstructive Sleep Apnea - PAP Follow-Up Visit:    Chief Complaint   Patient presents with    CPAP Follow Up     Suma Mark comes in today for follow-up of their mild sleep apnea, managed with CPAP.     Patient's past medical history notable for hypertension, coronary artery disease, paroxysmal atrial fibrillation, venous insufficiency of both lower extremities, chronic pain syndrome, chronic back pain, fibromyalgia, migraines, GERD, class I obesity, vitamin D deficiency, osteoporosis, restless leg syndrome, insomnia, generalized anxiety disorder, anemia.    Do you use a CPAP Machine at home: Yes  Overall, on a scale of 0-10 how would you rate your CPAP (0 poor, 10 great): 6  Is your mask comfortable: Yes  If not, why:    Is you mask leaking: Yes  If yes, where do you feel it: Below and anove nose mask  How many night per week does the mask leak (0-7): 3-4  Do you notice snoring with mask on: No  Do you notice gasping arousals with mask on: Yes  Are you having significant oral or nasal dryness: No  Is the pressure setting comfortable: Yes  In not, why:    What type of mask do you use: Nasal Pillow  What is your typical bedtime: 10:30-11:00PM  How long does it take you to go to sleep on PAP therapy: 10-15min  What time do you typically get out of bed for the day: 4:45AM  How many hours on average per night are you using PAP therapy: 4-5  How many hours are you sleeping per night: 4-5  Do you feel well rested in the morning: No    EPWORTH SLEEPINESS SCALE         10/13/2023    11:58 AM    [place] Sleepiness Scale ( CHANTE Carballo  [id number]<br>ESS - [place]/English - Final version - 21 Nov 07 - [facility] [place].)   Sitting and reading Moderate chance of dozing   Watching TV Moderate chance of dozing   Sitting, inactive in a public place (e.g. a theatre or a meeting) Slight chance of dozing   As a passenger in a car for an hour without a break Moderate chance of dozing   Lying down to rest in the afternoon  when circumstances permit Slight chance of dozing   Sitting and talking to someone Would never doze   Sitting quietly after a lunch without alcohol Would never doze   In a car, while stopped for a few minutes in traffic Would never doze   [place] Score (MC) 8   [place] Score (Sleep) 8       INSOMNIA SEVERITY INDEX (ASHLEY)          10/13/2023    11:54 AM   Insomnia Severity Index (ASHLEY)   Difficulty falling asleep 2   Difficulty staying asleep 2   Problems waking up too early 2   How SATISFIED/DISSATISFIED are you with your CURRENT sleep pattern? 3   How NOTICEABLE to others do you think your sleep problem is in terms of impairing the quality of your life? 2   How WORRIED/DISTRESSED are you about your current sleep problem? 2   To what extent do you consider your sleep problem to INTERFERE with your daily functioning (e.g. daytime fatigue, mood, ability to function at work/daily chores, concentration, memory, mood, etc.) CURRENTLY? 2   ASHLEY Total Score 15       Guidelines for Scoring/Interpretation:  Total score categories:  0-7 = No clinically significant insomnia   8-14 = Subthreshold insomnia   15-21 = Clinical insomnia (moderate severity)  22-28 = Clinical insomnia (severe)  Used via courtesy of www.TrumpIT.va.gov with permission from Thang Yanes PhD., [facility]    ResMed   Auto-PAP 5.0 - 15.0 cmH2O 30 day usage data:    76% of days with > 4 hours of use. 2/30 days with no use.   Average use 271 minutes per day.   95%ile Leak 23.83 L/min.   CPAP 95% pressure 11.7 cm.   AHI 1.11 events per hour.     Previous sleep studies:  Patient underwent a diagnostic polysomnogram with indications for loud snoring, sleep maintenance difficulties, fatigue, morning headaches, nocturia, heartburn at night, night sweats.  Comorbidity coronary artery disease hypertension, paroxysmal atrial fibrillation.  Weight: 189 pounds  AHI, combined: 11.5 events/hour  AHI, REM: 30.4 events/hour  AHI, supine: 16.9 events/hour  RDI:  13.9 events/hour  SaO2, baseline: 92.7%  SaO2, amanda: 82.0%  Time spent less than/equal to 88%: 3.0 minutes      Reviewed by team:  Tobacco  Allergies  Meds            Reviewed by provider:   Allergies  Meds  Problems             Problem List:  Patient Active Problem List    Diagnosis Date Noted    Chronic pain syndrome 03/08/2023     Priority: High    DONN (obstructive sleep apnea) - mild (AHI 11) 07/07/2023     Priority: Medium     7/6/2023 [facility] Study (180.0 lbs) - AHI 11.5, RDI 13.9, Supine AHI 16.9, REM AHI 30.4, Low O2 82.0%, Time Spent ?88% 3.0 minutes / Time Spent ?89% 6.0 minutes.      Presence of Watchman left atrial appendage closure device 06/22/2023     Priority: Medium     6/22/2023, 31 mmm Watchman FLX Device      Anemia 06/16/2023     Priority: Medium    Senile osteoporosis 12/13/2022     Priority: Medium    Paroxysmal atrial fibrillation (H) 12/05/2022     Priority: Medium    Coronary artery disease involving native coronary artery of native heart without angina pectoris 10/18/2022     Priority: Medium      s/p PCI with EUGENIA to OM2 on 10/12/22 and PCI with EUGENIA to dLAD on 11/8/22.       Chronic insomnia 08/19/2021     Priority: Medium    Thyroid nodule 08/19/2021     Priority: Medium    Venous insufficiency of both lower extremities 11/04/2019     Priority: Medium    Dyslipidemia, goal LDL below 70 04/08/2019     Priority: Medium    Fibromyalgia 04/24/2018     Priority: Medium    Chronic back pain 12/07/2017     Priority: Medium    Generalized anxiety disorder 12/07/2017     Priority: Medium    Vitamin D deficiency 12/07/2017     Priority: Medium    Diverticulosis 10/04/2017     Priority: Medium     Incidental finding on colonoscopy 10/2017      Migraine 04/04/2017     Priority: Medium     Managed by darrell.      Essential hypertension 04/21/2015     Priority: Medium    Gastroesophageal reflux disease without esophagitis 04/21/2015     Priority: Medium    Restless leg syndrome  "04/21/2015     Priority: Medium     S/p iron infusion [facility] 5/2022      Total knee replacement status 10/31/2013     Priority: Medium    Acquired hypothyroidism 03/12/2009     Priority: Medium    Class 1 obesity due to excess calories with serious comorbidity and body mass index (BMI) of 33.0 to 33.9 in adult 03/09/2023     Priority: Low          /70   Pulse 64   Ht 1.549 m (5' 1\")   Wt 83.6 kg (184 lb 6.4 oz)   LMP  (LMP Unknown)   SpO2 98%   BMI 34.84 kg/m      Impression/Plan:     Mild sleep apnea.  She is tolerating auto titrate CPAP very well.  Her daytime symptoms are much improved.  She appreciates the positive health benefits that CPAP therapy brings to her.     We discussed the need for her to use her machine 4 hours or more, 70% of the time.  Optimally, patient should use 100% of her sleep time.  We reviewed consequences of untreated sleep apnea pertinent to her medical diagnoses.  Recommend patient optimize her sleep schedule as well as her sleep hygiene practices in order to mitigate any further sleep disruption recommend  Weight management to BMI of 30.0  Recommend patient employ safe driving practices including not driving a motor vehicle should she become drowsy.    Suma Mark will follow up in about 1 year sooner if needed.     30 minutes spent with patient, all of which were spent face-to-face counseling, consulting, coordinating plan of care.      ANDREA Gayle CNP  Sleep Medicine    This note was written with the assistance of the Dragon voice-dictation technology software. The final document, although reviewed, may contain errors. For corrections, please contact the office.      CC:  Bernadette Taylor,   "

## 2023-10-20 NOTE — PATIENT INSTRUCTIONS
Drive Safe... Drive Alive     Sleep health profoundly affects your health, mood, and your safety. 33% of the population (one in three of us) is not getting enough sleep and many have a sleep disorder. Not getting enough sleep or having an untreated / undertreated sleep condition may make us sleepy without even knowing it. In fact, our driving could be dramatically impaired due to our sleep health. As your provider, here are some things I would like you to know about driving:     Here are some warning signs for impairment and dangerous drowsy driving:              -Having been awake more than 16 hours               -Looking tired               -Eyelid drooping              -Head nodding (it could be too late at this point)              -Driving for more than 30 minutes     Some things you could do to make the driving safer if you are experiencing some drowsiness:              -Stop driving and rest              -Call for transportation              -Make sure your sleep disorder is adequately treated     Some things that have been shown NOT to work when experiencing drowsiness while driving:              -Turning on the radio              -Opening windows              -Eating any  distracting  /  entertaining  foods (e.g., sunflower seeds, candy, or any other)              -Talking on the phone      Your decision may not only impact your life, but also the life of others. Please, remember to drive safe for yourself and all of us.   Your Body mass index is 34.84 kg/m .  Weight management is a personal decision.  If you are interested in exploring weight loss strategies, the following discussion covers the approaches that may be successful. Body mass index (BMI) is one way to tell whether you are at a healthy weight, overweight, or obese. It measures your weight in relation to your height.  A BMI of 18.5 to 24.9 is in the healthy range. A person with a BMI of 25 to 29.9 is considered overweight, and someone with a BMI  of 30 or greater is considered obese. More than two-thirds of American adults are considered overweight or obese.  Being overweight or obese increases the risk for further weight gain. Excess weight may lead to heart disease and diabetes.  Creating and following plans for healthy eating and physical activity may help you improve your health.  Weight control is part of healthy lifestyle and includes exercise, emotional health, and healthy eating habits. Careful eating habits lifelong are the mainstay of weight control. Though there are significant health benefits from weight loss, long-term weight loss with diet alone may be very difficult to achieve- studies show long-term success with dietary management in less than 10% of people. Attaining a healthy weight may be especially difficult to achieve in those with severe obesity. In some cases, medications, devices and surgical management might be considered.  What can you do?  If you are overweight or obese and are interested in methods for weight loss, you should discuss this with your provider.   Consider reducing daily calorie intake by 500 calories.   Keep a food journal.   Avoiding skipping meals, consider cutting portions instead.    Diet combined with exercise helps maintain muscle while optimizing fat loss. Strength training is particularly important for building and maintaining muscle mass. Exercise helps reduce stress, increase energy, and improves fitness. Increasing exercise without diet control, however, may not burn enough calories to loose weight.     Start walking three days a week 10-20 minutes at a time  Work towards walking thirty minutes five days a week   Eventually, increase the speed of your walking for 1-2 minutes at time    In addition, we recommend that you review healthy lifestyles and methods for weight loss available through the National Institutes of Health patient information sites:  http://win.niddk.nih.gov/publications/index.htm    And  look into health and wellness programs that may be available through your health insurance provider, employer, local community center, or dawit club.            Your Body mass index is 34.84 kg/m .  Weight management is a personal decision.  If you are interested in exploring weight loss strategies, the following discussion covers the approaches that may be successful. Body mass index (BMI) is one way to tell whether you are at a healthy weight, overweight, or obese. It measures your weight in relation to your height.  A BMI of 18.5 to 24.9 is in the healthy range. A person with a BMI of 25 to 29.9 is considered overweight, and someone with a BMI of 30 or greater is considered obese. More than two-thirds of American adults are considered overweight or obese.  Being overweight or obese increases the risk for further weight gain. Excess weight may lead to heart disease and diabetes.  Creating and following plans for healthy eating and physical activity may help you improve your health.  Weight control is part of healthy lifestyle and includes exercise, emotional health, and healthy eating habits. Careful eating habits lifelong are the mainstay of weight control. Though there are significant health benefits from weight loss, long-term weight loss with diet alone may be very difficult to achieve- studies show long-term success with dietary management in less than 10% of people. Attaining a healthy weight may be especially difficult to achieve in those with severe obesity. In some cases, medications, devices and surgical management might be considered.  What can you do?  If you are overweight or obese and are interested in methods for weight loss, you should discuss this with your provider.   Consider reducing daily calorie intake by 500 calories.   Keep a food journal.   Avoiding skipping meals, consider cutting portions instead.    Diet combined with exercise helps maintain muscle while optimizing fat loss. Strength  training is particularly important for building and maintaining muscle mass. Exercise helps reduce stress, increase energy, and improves fitness. Increasing exercise without diet control, however, may not burn enough calories to loose weight.     Start walking three days a week 10-20 minutes at a time  Work towards walking thirty minutes five days a week   Eventually, increase the speed of your walking for 1-2 minutes at time    In addition, we recommend that you review healthy lifestyles and methods for weight loss available through the National Institutes of Health patient information sites:  http://win.niddk.nih.gov/publications/index.htm    And look into health and wellness programs that may be available through your health insurance provider, employer, local community center, or dawit club.            Your Body mass index is 34.84 kg/m .  Weight management is a personal decision.  If you are interested in exploring weight loss strategies, the following discussion covers the approaches that may be successful. Body mass index (BMI) is one way to tell whether you are at a healthy weight, overweight, or obese. It measures your weight in relation to your height.  A BMI of 18.5 to 24.9 is in the healthy range. A person with a BMI of 25 to 29.9 is considered overweight, and someone with a BMI of 30 or greater is considered obese. More than two-thirds of American adults are considered overweight or obese.  Being overweight or obese increases the risk for further weight gain. Excess weight may lead to heart disease and diabetes.  Creating and following plans for healthy eating and physical activity may help you improve your health.  Weight control is part of healthy lifestyle and includes exercise, emotional health, and healthy eating habits. Careful eating habits lifelong are the mainstay of weight control. Though there are significant health benefits from weight loss, long-term weight loss with diet alone may be very  difficult to achieve- studies show long-term success with dietary management in less than 10% of people. Attaining a healthy weight may be especially difficult to achieve in those with severe obesity. In some cases, medications, devices and surgical management might be considered.  What can you do?  If you are overweight or obese and are interested in methods for weight loss, you should discuss this with your provider.   Consider reducing daily calorie intake by 500 calories.   Keep a food journal.   Avoiding skipping meals, consider cutting portions instead.    Diet combined with exercise helps maintain muscle while optimizing fat loss. Strength training is particularly important for building and maintaining muscle mass. Exercise helps reduce stress, increase energy, and improves fitness. Increasing exercise without diet control, however, may not burn enough calories to loose weight.     Start walking three days a week 10-20 minutes at a time  Work towards walking thirty minutes five days a week   Eventually, increase the speed of your walking for 1-2 minutes at time    In addition, we recommend that you review healthy lifestyles and methods for weight loss available through the National Institutes of Health patient information sites:  http://win.niddk.nih.gov/publications/index.htm    And look into health and wellness programs that may be available through your health insurance provider, employer, local community center, or dawit club.

## 2023-10-26 ENCOUNTER — OFFICE VISIT (OUTPATIENT)
Dept: INTERNAL MEDICINE | Facility: CLINIC | Age: 73
End: 2023-10-26
Payer: MEDICARE

## 2023-10-26 VITALS
HEART RATE: 57 BPM | HEIGHT: 61 IN | SYSTOLIC BLOOD PRESSURE: 108 MMHG | BODY MASS INDEX: 34.4 KG/M2 | WEIGHT: 182.2 LBS | RESPIRATION RATE: 14 BRPM | OXYGEN SATURATION: 98 % | TEMPERATURE: 98.1 F | DIASTOLIC BLOOD PRESSURE: 72 MMHG

## 2023-10-26 DIAGNOSIS — I48.0 PAROXYSMAL ATRIAL FIBRILLATION (H): Chronic | ICD-10-CM

## 2023-10-26 DIAGNOSIS — I25.10 CORONARY ARTERY DISEASE INVOLVING NATIVE CORONARY ARTERY OF NATIVE HEART WITHOUT ANGINA PECTORIS: Primary | ICD-10-CM

## 2023-10-26 DIAGNOSIS — M81.0 SENILE OSTEOPOROSIS: Chronic | ICD-10-CM

## 2023-10-26 DIAGNOSIS — E03.9 ACQUIRED HYPOTHYROIDISM: Chronic | ICD-10-CM

## 2023-10-26 DIAGNOSIS — G43.709 CHRONIC MIGRAINE WITHOUT AURA WITHOUT STATUS MIGRAINOSUS, NOT INTRACTABLE: Chronic | ICD-10-CM

## 2023-10-26 DIAGNOSIS — Z95.818 PRESENCE OF WATCHMAN LEFT ATRIAL APPENDAGE CLOSURE DEVICE: Chronic | ICD-10-CM

## 2023-10-26 DIAGNOSIS — I10 ESSENTIAL HYPERTENSION: Chronic | ICD-10-CM

## 2023-10-26 DIAGNOSIS — D50.8 OTHER IRON DEFICIENCY ANEMIA: ICD-10-CM

## 2023-10-26 DIAGNOSIS — G25.81 RESTLESS LEG SYNDROME: ICD-10-CM

## 2023-10-26 DIAGNOSIS — G47.33 OSA (OBSTRUCTIVE SLEEP APNEA): Chronic | ICD-10-CM

## 2023-10-26 LAB
ERYTHROCYTE [DISTWIDTH] IN BLOOD BY AUTOMATED COUNT: 15 % (ref 10–15)
HCT VFR BLD AUTO: 33 % (ref 35–47)
HGB BLD-MCNC: 10.9 G/DL (ref 11.7–15.7)
MCH RBC QN AUTO: 28.5 PG (ref 26.5–33)
MCHC RBC AUTO-ENTMCNC: 33 G/DL (ref 31.5–36.5)
MCV RBC AUTO: 86 FL (ref 78–100)
PLATELET # BLD AUTO: 288 10E3/UL (ref 150–450)
RBC # BLD AUTO: 3.83 10E6/UL (ref 3.8–5.2)
WBC # BLD AUTO: 7.1 10E3/UL (ref 4–11)

## 2023-10-26 PROCEDURE — 80048 BASIC METABOLIC PNL TOTAL CA: CPT | Performed by: INTERNAL MEDICINE

## 2023-10-26 PROCEDURE — 90480 ADMN SARSCOV2 VAC 1/ONLY CMP: CPT | Performed by: INTERNAL MEDICINE

## 2023-10-26 PROCEDURE — 36415 COLL VENOUS BLD VENIPUNCTURE: CPT | Performed by: INTERNAL MEDICINE

## 2023-10-26 PROCEDURE — 91320 SARSCV2 VAC 30MCG TRS-SUC IM: CPT | Performed by: INTERNAL MEDICINE

## 2023-10-26 PROCEDURE — 85027 COMPLETE CBC AUTOMATED: CPT | Performed by: INTERNAL MEDICINE

## 2023-10-26 PROCEDURE — 99214 OFFICE O/P EST MOD 30 MIN: CPT | Performed by: INTERNAL MEDICINE

## 2023-10-26 RX ORDER — RESPIRATORY SYNCYTIAL VIRUS VACCINE 120MCG/0.5
0.5 KIT INTRAMUSCULAR ONCE
Qty: 1 EACH | Refills: 0 | Status: CANCELLED | OUTPATIENT
Start: 2023-10-26 | End: 2023-10-26

## 2023-10-26 NOTE — PROGRESS NOTES
Assessment & Plan     Coronary artery disease involving native coronary artery of native heart without angina pectoris  status post EUGENIA to OM2 (extending back to Lcx) on 10/12/22 and EUGENIA to distal LAD on 11/8/22    On Imdur 30 mg daily  Paroxysmal atrial fibrillation (H)  Presence of Watchman left atrial appendage closure device  she underwent watchman implant on June 22 with a 31 mm watchman FLX device.   She is doing well and has had no strokelike symptoms.  She is currently taking aspirin 81 mg daily and Plavix 75 mg daily.  She will stay on Plavix until December 22 ( 1 year post stent), then stop it and continue ASA 81 mg daily indefinitely. She did have a DIANA on September 27 to check the device and everything looked good.On sotalol now.     Last visit with her Cardiologist, Dr Worley, was in Jan 2023.    Other iron deficiency anemia  She has now received 3 iron infusions, last one was few months ago. Also colonoscopy showed a flat polyp which was biopsied and treated with Hemoclip. She is going to be getting an EGD and colonoscopy next week. Cardiology approved Plavix hold for 5 days.  Oral iron was causing severe nausea.  - CBC with platelets; Future  - Basic metabolic panel; Future  - CBC with platelets  - Basic metabolic panel    DONN (obstructive sleep apnea) - mild (AHI 11)  Stable on CPAP.    Chronic migraine without aura without status migrainosus, not intractable  Stablen on Ubrelvy    Essential hypertension  Stable on Lasix, irbesartan, amlodipine.     Acquired hypothyroidism  on levothyroxine     Senile osteoporosis  Prolia #10 was on 6/13/23.    Restless leg syndrome  On Mirapex for years.      Anxiety  Comment: stable on Buspar and Citalopram             Return in about 2 months (around 12/26/2023) for Follow up, osteoporosis.     Bernadette Taylor MD  [facility]    Halina Blanchard is a 73 year old, presenting for the following health issues:  Follow Up (Follow from last  "visit and vaccine RSV AND COVID)      10/26/2023    12:07 PM   Additional Questions   Roomed by LC-LPN   Accompanied by N/A       History of Present Illness       Reason for visit:  Check blood for anemia    She eats 0-1 servings of fruits and vegetables daily.She consumes 1 sweetened beverage(s) daily.   She is taking medications regularly.                 Review of Systems         Objective    /72 (BP Location: Right arm, Patient Position: Sitting, Cuff Size: Adult Regular)   Pulse 57   Temp 98.1  F (36.7  C) (Oral)   Resp 14   Ht 1.549 m (5' 1\")   Wt 82.6 kg (182 lb 3.2 oz)   LMP  (LMP Unknown)   SpO2 98%   BMI 34.43 kg/m    Body mass index is 34.43 kg/m .  Physical Exam                         "

## 2023-10-27 LAB
ANION GAP SERPL CALCULATED.3IONS-SCNC: 13 MMOL/L (ref 7–15)
BUN SERPL-MCNC: 22.6 MG/DL (ref 8–23)
CALCIUM SERPL-MCNC: 9.2 MG/DL (ref 8.8–10.2)
CHLORIDE SERPL-SCNC: 101 MMOL/L (ref 98–107)
CREAT SERPL-MCNC: 0.82 MG/DL (ref 0.51–0.95)
DEPRECATED HCO3 PLAS-SCNC: 23 MMOL/L (ref 22–29)
EGFRCR SERPLBLD CKD-EPI 2021: 74 ML/MIN/1.73M2
GLUCOSE SERPL-MCNC: 92 MG/DL (ref 70–99)
POTASSIUM SERPL-SCNC: 4.2 MMOL/L (ref 3.4–5.3)
SODIUM SERPL-SCNC: 137 MMOL/L (ref 135–145)

## 2023-10-30 ENCOUNTER — TRANSFERRED RECORDS (OUTPATIENT)
Dept: HEALTH INFORMATION MANAGEMENT | Facility: CLINIC | Age: 73
End: 2023-10-30
Payer: MEDICARE

## 2023-11-09 DIAGNOSIS — E78.5 HYPERLIPIDEMIA, UNSPECIFIED: ICD-10-CM

## 2023-11-09 DIAGNOSIS — F41.9 ANXIETY: ICD-10-CM

## 2023-11-09 RX ORDER — LORAZEPAM 0.5 MG/1
TABLET ORAL
Qty: 30 TABLET | Refills: 0 | Status: SHIPPED | OUTPATIENT
Start: 2023-11-09 | End: 2023-12-07

## 2023-11-14 RX ORDER — ATORVASTATIN CALCIUM 80 MG/1
80 TABLET, FILM COATED ORAL AT BEDTIME
Qty: 30 TABLET | Refills: 11 | Status: SHIPPED | OUTPATIENT
Start: 2023-11-14

## 2023-12-07 DIAGNOSIS — F41.9 ANXIETY: ICD-10-CM

## 2023-12-07 DIAGNOSIS — I25.119 CORONARY ARTERY DISEASE INVOLVING NATIVE CORONARY ARTERY OF NATIVE HEART WITH ANGINA PECTORIS (H): ICD-10-CM

## 2023-12-07 RX ORDER — LORAZEPAM 0.5 MG/1
TABLET ORAL
Qty: 30 TABLET | Refills: 0 | Status: SHIPPED | OUTPATIENT
Start: 2023-12-07 | End: 2024-01-10

## 2023-12-07 RX ORDER — ISOSORBIDE MONONITRATE 30 MG/1
30 TABLET, EXTENDED RELEASE ORAL DAILY
Qty: 30 TABLET | Refills: 0 | Status: SHIPPED | OUTPATIENT
Start: 2023-12-07 | End: 2024-01-29

## 2023-12-18 ENCOUNTER — PREP FOR PROCEDURE (OUTPATIENT)
Dept: CARDIOLOGY | Facility: CLINIC | Age: 73
End: 2023-12-18

## 2023-12-18 ENCOUNTER — OFFICE VISIT (OUTPATIENT)
Dept: CARDIOLOGY | Facility: CLINIC | Age: 73
End: 2023-12-18
Attending: INTERNAL MEDICINE
Payer: MEDICARE

## 2023-12-18 VITALS
SYSTOLIC BLOOD PRESSURE: 122 MMHG | HEIGHT: 61 IN | RESPIRATION RATE: 16 BRPM | HEART RATE: 60 BPM | WEIGHT: 184 LBS | BODY MASS INDEX: 34.74 KG/M2 | DIASTOLIC BLOOD PRESSURE: 62 MMHG

## 2023-12-18 DIAGNOSIS — I10 ESSENTIAL HYPERTENSION: Chronic | ICD-10-CM

## 2023-12-18 DIAGNOSIS — Z95.818 PRESENCE OF WATCHMAN LEFT ATRIAL APPENDAGE CLOSURE DEVICE: Chronic | ICD-10-CM

## 2023-12-18 DIAGNOSIS — I48.0 PAROXYSMAL ATRIAL FIBRILLATION (H): Chronic | ICD-10-CM

## 2023-12-18 DIAGNOSIS — I25.10 CORONARY ARTERY DISEASE INVOLVING NATIVE CORONARY ARTERY OF NATIVE HEART WITHOUT ANGINA PECTORIS: Primary | Chronic | ICD-10-CM

## 2023-12-18 DIAGNOSIS — D50.8 OTHER IRON DEFICIENCY ANEMIA: ICD-10-CM

## 2023-12-18 DIAGNOSIS — E78.5 DYSLIPIDEMIA, GOAL LDL BELOW 70: Chronic | ICD-10-CM

## 2023-12-18 DIAGNOSIS — G47.33 OSA (OBSTRUCTIVE SLEEP APNEA): Chronic | ICD-10-CM

## 2023-12-18 PROCEDURE — 99214 OFFICE O/P EST MOD 30 MIN: CPT

## 2023-12-18 NOTE — PATIENT INSTRUCTIONS
It was a pleasure taking part in your care today:    - Return for fasting cholesterol blood draw  - Continue current medications    Please call the [facility] clinic with any questions or concerns at [phone].     Mariam Fair PA-C

## 2023-12-18 NOTE — LETTER
12/18/2023    Bernadette Taylor MD  [address] [facility] [address]    RE: Suma Puenteell       Dear Colleague,     I had the pleasure of seeing Suma Mark in the [facility].    HEART CARE ENCOUNTER CONSULTATON NOTE      [facility]  [phone]      Assessment/Recommendations   Assessment:   Coronary artery disease: Multiple PCI Lcx 10/2022, LAD 11/2022, patent stents angiogram 6/2023  - aspirin and Imdur 30 mg  Paroxysmal atrial fibrillation: s/p watchman 6/2023 well seated on DIANA 9/2023 - on aspirin and Plavix   - sotalol 80 mg BID.  Patient has some shortness of breath with climbing stairs.  Upon chart review heart rate seem to run in 50s-60s.  Some potential HR blunting with activity could be contributing to shortness of breath symptoms.  After discussion of Holter monitor versus monitoring, potential alteration in sotalol pending HR findings, possible breakthrough atrial fibrillation with reduced sotalol dose, patient elects to forego Holter monitor.  Chronic diastolic heart failure: Compensated.  Stable lower extremity edema on lasix 40 mg daily  Hyperlipidemia: Last LDL 68 1 year ago, atorvastatin 80 mg  Hypertension: Controlled - amlodipine, irbesartan  Anemia: Stable, monitored by PCP   DONN: CPAP    Plan:   Repeat lipid panel when fasting  Continue current medications without change  Continue to monitor shortness of breath and chest pressure symptoms for worsening severity/frequency  Message sent to watchman team regarding Plavix discontinuation      Follow up in 1 year      History of Present Illness/Subjective    HPI: Suma Mark is a 73 year old female with PMHx of CAD, PAF, CHF, HLD, HTN, anemia, DONN presents for follow up.    Suma reports no new concerns.  She does have some shortness of breath with climbing stairs, feels she has to stop and rest on the least once.  This is longstanding and has not worsened for over a year.  She denies chest  "pain with climbing stairs or other exertion.  She has moments with sensation of chest pressure in fact pressure like a band.  The symptoms have never progressed, have 1-2 times weekly and are consistent with prior atrial fibrillation symptoms.  Are short lasting for minutes, no palpitations, not similar to symptoms prior to PCI.  Her lower extremity edema is stable on furosemide 40 mg daily.  Sometimes struggles with CPAP but uses it nightly.      She denies lightheadedness, orthopnea, PND, palpitations, and abdominal fullness/bloating.      DIANA 9/27/2023 Results:  Patient was sedated using Versed 2 mg. The heart rate, respiratory rate,  oxygen saturations, blood pressure, and response to care were monitored  throughout the procedure with the assistance of the nurse.  Left ventricular size, wall motion and function are normal. The ejection  fraction is 60-65%.  Normal right ventricle size and systolic function.  Doppler suggests left to right interatrial shunt.  Watchman device noted in left atrial appendage. The device is well seated with  no leakage by color flow Doppler imaging.  Thrombus absent on left atrial appendage occlusion device.  Moderate atherosclerotic plaque(s) in the descending aorta.     Physical Examination  Review of Systems   Vitals: /62 (BP Location: Left arm, Patient Position: Sitting, Cuff Size: Adult Regular)   Pulse 60   Resp 16   Ht 1.549 m (5' 1\")   Wt 83.5 kg (184 lb)   LMP  (LMP Unknown)   BMI 34.77 kg/m    BMI= Body mass index is 34.77 kg/m .  Wt Readings from Last 3 Encounters:   12/18/23 83.5 kg (184 lb)   10/26/23 82.6 kg (182 lb 3.2 oz)   10/20/23 83.6 kg (184 lb 6.4 oz)           ENT/Mouth: membranes moist, no oral lesions or bleeding gums.      EYES:  no scleral icterus, normal conjunctivae                    Neck: No carotid bruit or thyromegaly   Chest/Lungs:   lungs are clear to auscultation, no rales or wheezing, equal chest wall expansion    Cardiovascular:   " Regular. Normal first and second heart sounds with no murmurs, rubs, or gallops; the carotid, radial and posterior tibial pulses are intact,  trace edema bilaterally        Extremities: no cyanosis or clubbing   Skin: no xanthelasma, warm.    Neurologic: no tremors     Psychiatric: alert and oriented x3, calm        Please refer above for cardiac ROS details.        Medical History  Surgical History Family History Social History   Past Medical History:   Diagnosis Date    Anxiety state, unspecified     Back pain     Diverticulitis 12/07/2017    DJD (degenerative joint disease)     Essential hypertension, benign     Fibromyalgia     Gastro-oesophageal reflux disease     Hernia, ventral 04/27/2017    History of anesthesia complications     hypotension after surgery    History of blood transfusion     Hyperlipidemia     Hyponatremia 12/07/2017    Major depression     Migraine     Osteopenia     PONV (postoperative nausea and vomiting)     Posttraumatic stress disorder     Raynaud's disease     Restless leg syndrome     S/P total knee replacement 11/27/2017    Sepsis (H) 3/25/2019    Thrombosis of leg     with pregnancy    Unspecified hypothyroidism      Past Surgical History:   Procedure Laterality Date    ARTHROPLASTY KNEE UNICOMPARTMENT  10/31/2013    Procedure: ARTHROPLASTY KNEE UNICOMPARTMENT;  LEFT KNEE UNICOMPARTMENTAL ARTHROPLASTY (CAROLINE)^;  Surgeon: Chi Mittal MD;  Location: [facility]    BREAST SURGERY      bx    COLECTOMY N/A 06/02/2017    Procedure: RESECTION, SMALL INTESTINE, OPEN ADHESIOLYSIS;  Surgeon: Keyon Qureshi MD;  Location: [facility];  Service:     CV CORONARY ANGIOGRAM N/A 10/12/2022    Procedure: CV CORONARY ANGIOGRAM;  Surgeon: You Martinez MD;  Location[facility] CATH [facility] CV    CV CORONARY ANGIOGRAM N/A 6/9/2023    Procedure: Coronary Angiogram;  Surgeon: Bret Jin MD;  Location: [facility] CATH [facility] CV    CV FRACTIONAL FLOW RATIO WIRE N/A 10/12/2022    Procedure:  Fractional Flow Ratio Wire;  Surgeon: You Martinez MD;  Location: [facility] CV    CV LEFT ATRIAL APPENDAGE CLOSURE Right 6/22/2023    Procedure: Left Atrial Appendage Closure;  Surgeon: Lenin Mariano MD;  Location: [facility] CV    CV LEFT HEART CATH N/A 6/9/2023    Procedure: Left Heart Catheterization;  Surgeon: Bret Jin MD;  Location: [facility] CV    CV PCI STENT DRUG ELUTING N/A 10/12/2022    Procedure: Percutaneous Coronary Intervention Stent;  Surgeon: You Martinez MD;  Location: [facility] CV    CV PCI STENT DRUG ELUTING N/A 11/08/2022    Procedure: Percutaneous Coronary Intervention - Stent;  Surgeon: Bret Jin MD;  Location: [facility] CV    ENDOSCOPIC RETROGRADE CHOLANGIOPANCREATOGRAPHY      ENDOSCOPIC STRIPPING VEIN(S)      BILATERLY    GENITOURINARY SURGERY      bladder sling    GI SURGERY  10/02/2015    Rectal prolaspse. s/p Abdominal rectopexy, sacral colpopexy, proctoscopy, cystoscopy    HERNIORRHAPHY INCISIONAL (LOCATION) N/A 06/02/2017    Procedure: LAPARASCOPIC CONVERTED TO OPEN PRIMARY INCISIONAL HERNIA REPAIR;  Surgeon: Keyon Qureshi MD;  Location: [facility];  Service:     HYSTERECTOMY  1985 1985-1986    LAMINECTOMY LUMBAR TWO LEVELS  2006    LAPAROSCOPY N/A 08/22/2019    Procedure: DIAGNOSTIC LAPAROSCOPY, LYSIS OF ADHESION;  Surgeon: Svetlana Ron MD;  Location: [facility] OR;  Service: General    LUMBAR HARDWARE REMOVAL[  03/22/2012    REMOVAL LUMBAR HARDWARE 03/22/2012   L3-S1; Type CD Horizon m8 instrumentation Dr. Murray    RELEASE CARPAL TUNNEL      TONSILLECTOMY  1954    ???    TOTAL KNEE ARTHROPLASTY Right 11/27/2017    Procedure:  RIGHT TOTAL KNEE ARTHROPLASTY;  Surgeon: Kevin Ryder MD;  Location: [facility] OR;  Service: Orthopedics     THYROID BIOPSY  04/19/2019     Family History   Problem Relation Age of Onset    Dementia Mother     Arthritis Mother     Chronic Obstructive  Pulmonary Disease Father     Cardiovascular Father     Hypertension Father     No Known Problems Sister     No Known Problems Daughter     No Known Problems Son     Cerebrovascular Disease Maternal Grandmother     Heart Disease Paternal Grandmother     Cerebrovascular Disease Paternal Grandmother     No Known Problems Sister     No Known Problems Sister     No Known Problems Son     No Known Problems Daughter     Breast Cancer Paternal Aunt         age in 50's        Social History     Socioeconomic History    Marital status:      Spouse name: Not on file    Number of children: Not on file    Years of education: Not on file    Highest education level: Not on file   Occupational History    Occupation: , occasionally still subs     Employer: RETIRED   Tobacco Use    Smoking status: Never     Passive exposure: Never    Smokeless tobacco: Never   Vaping Use    Vaping Use: Never used   Substance and Sexual Activity    Alcohol use: Yes     Comment: Alcoholic Drinks/day: 1 cocktail daily    Drug use: No    Sexual activity: Not on file   Other Topics Concern    Not on file   Social History Narrative    Not on file     Social Determinants of Health     Financial Resource Strain: Low Risk  (12/15/2023)    Financial Resource Strain     Within the past 12 months, have you or your family members you live with been unable to get utilities (heat, electricity) when it was really needed?: No   Food Insecurity: Low Risk  (12/15/2023)    Food Insecurity     Within the past 12 months, did you worry that your food would run out before you got money to buy more?: No     Within the past 12 months, did the food you bought just not last and you didn t have money to get more?: No   Transportation Needs: Low Risk  (12/15/2023)    Transportation Needs     Within the past 12 months, has lack of transportation kept you from medical appointments, getting your medicines, non-medical meetings or appointments, work, or from  getting things that you need?: No   Physical Activity: Not on file   Stress: Not on file   Social Connections: Not on file   Interpersonal Safety: Low Risk  (10/26/2023)    Interpersonal Safety     Do you feel physically and emotionally safe where you currently live?: Yes     Within the past 12 months, have you been hit, slapped, kicked or otherwise physically hurt by someone?: No     Within the past 12 months, have you been humiliated or emotionally abused in other ways by your partner or ex-partner?: No   Housing Stability: Low Risk  (12/15/2023)    Housing Stability     Do you have housing? : Yes     Are you worried about losing your housing?: No           Medications  Allergies   Current Outpatient Medications   Medication Sig Dispense Refill    amLODIPine (NORVASC) 5 MG tablet TAKE 1 TABLET BY MOUTH EVERYDAY AT BEDTIME 90 tablet 1    aspirin 81 MG EC tablet Take 1 tablet (81 mg) by mouth daily      atorvastatin (LIPITOR) 80 MG tablet TAKE 1 TABLET BY MOUTH AT BEDTIME 30 tablet 11    botulinum toxin type A (BOTOX) 100 units injection Every 3 months. [facility]      busPIRone (BUSPAR) 10 MG tablet TAKE 1 TABLET BY MOUTH TWICE A  tablet 3    cholecalciferol 50 MCG (2000 UT) CAPS Take 2,000 Units by mouth daily      citalopram (CELEXA) 40 MG tablet Take 1 tablet (40 mg) by mouth daily 90 tablet 1    clopidogrel (PLAVIX) 75 MG tablet TAKE 1 TABLET BY MOUTH EVERY DAY 90 tablet 1    eletriptan (RELPAX) 40 MG tablet Take 1 tablet (40 mg) by mouth at onset of headache for migraine 10 tablet 0    furosemide (LASIX) 20 MG tablet TAKE 2 TABLETS BY MOUTH EVERY  tablet 3    HYDROcodone-acetaminophen (NORCO) 5-325 MG tablet Take 1 tablet by mouth daily as needed (Severe headache) 5 tablet 0    irbesartan (AVAPRO) 150 MG tablet Take 1 tablet (150 mg) by mouth daily 90 tablet 3    isosorbide mononitrate (IMDUR) 30 MG 24 hr tablet TAKE 1 TABLET BY MOUTH EVERY DAY 30 tablet 0    ketoconazole (NIZORAL) 2 % external  "cream 1 APPLICATION TWICE A DAY TOPICALLY TO RIGHT FOOT FOR 3 WEEKS      levothyroxine (SYNTHROID/LEVOTHROID) 88 MCG tablet TAKE 1 TABLET BY MOUTH DAILY AT 6:00 AM. 90 tablet 2    LORazepam (ATIVAN) 0.5 MG tablet TAKE 1 TAB BY MOUTH NIGHTLY AS NEEDED FOR ANXIETY OR SLEEP 30 tablet 0    nitroGLYcerin (NITROSTAT) 0.4 MG sublingual tablet For chest pain place 1 tablet under the tongue every 5 minutes for 3 doses. If symptoms persist 5 minutes after 1st dose call 911. 30 tablet 1    pramipexole (MIRAPEX) 0.25 MG tablet TAKE 1 TABLET BY MOUTH THREE TIMES A  tablet 1    sotalol (BETAPACE) 80 MG tablet Take 1 tablet (80 mg) by mouth every 12 hours 180 tablet 3       Allergies   Allergen Reactions    Cephalexin Nausea and Vomiting    Ace Inhibitors Other (See Comments)     Elevates blood pressure    Amitriptyline Hcl Other (See Comments)     elevates blood pressure    Atenolol Other (See Comments)     Aggravates reynauds    Celebrex [Celecoxib] GI Disturbance    Cephalosporins Unknown     Pt doesn't remember       Clonidine Unknown     \"got very ill in ER\"    Codeine Sulfate Nausea and Vomiting    Darvocet [Propoxyphene N-Apap] Nausea and Vomiting    Dexamethasone Acetate Swelling    Erythromycin Nausea and Vomiting    Escitalopram Other (See Comments)     Headaches.      Gabapentin Unknown     \"Spotted\"    Hctz Unknown     \"depletes my sodium\"    Potassium GI Disturbance    Propoxyphene      HUT Reaction: Gastrointestinal; HUT Reaction: Nausea And Vomiting; HUT Noted: [id number]    Sodium     Tramadol Swelling     Swelling of tongue and face    Tramadol-Acetaminophen Other (See Comments)    Triamterene     Venlafaxine Nausea and Vomiting and Diarrhea    Zolpidem Other (See Comments) and Unknown     Pt doesn't rember      Zolpidem Tartrate Unknown    Bacitracin Itching and Rash    Sulfanilamide Rash          Lab Results    Chemistry/lipid CBC Cardiac Enzymes/BNP/TSH/INR   Recent Labs   Lab Test 11/15/22  0800   CHOL " "132   HDL 43*   LDL 68   TRIG 106     Recent Labs   Lab Test 11/15/22  0800 06/08/22  0905 06/10/21  1454   LDL 68 95 70     Recent Labs   Lab Test 10/26/23  1242      POTASSIUM 4.2   CHLORIDE 101   CO2 23   GLC 92   BUN 22.6   CR 0.82   GFRESTIMATED 74   ISSA 9.2     Recent Labs   Lab Test 10/26/23  1242 09/12/23  1439 06/22/23  1324   CR 0.82 0.77 0.71     No results for input(s): \"A1C\" in the last 26204 hours.       Recent Labs   Lab Test 10/26/23  1242   WBC 7.1   HGB 10.9*   HCT 33.0*   MCV 86        Recent Labs   Lab Test 10/26/23  1242 09/12/23  1439 08/31/23  1130   HGB 10.9* 11.0* 11.0*    Recent Labs   Lab Test 11/28/22  0139 11/27/22  2315 10/12/22  0354   TROPONINI 0.02 0.01 0.03     Recent Labs   Lab Test 11/27/22  2315        Recent Labs   Lab Test 06/13/23  1022   TSH 1.76     Recent Labs   Lab Test 11/27/22  2315   INR 1.07        Mariam Fair PA-C      Thank you for allowing me to participate in the care of your patient.      Sincerely,     Mariam Bruce PA-C     [facility]  cc:   Marilyn Middleton PA-C  [address] [facility] [address]  [place] [address]      "

## 2023-12-18 NOTE — PROGRESS NOTES
HEART CARE ENCOUNTER CONSULTATON NOTE      [facility]  [phone]      Assessment/Recommendations   Assessment:   Coronary artery disease: Multiple PCI Lcx 10/2022, LAD 11/2022, patent stents angiogram 6/2023  - aspirin and Imdur 30 mg  Paroxysmal atrial fibrillation: s/p watchman 6/2023 well seated on DIANA 9/2023 - on aspirin and Plavix   - sotalol 80 mg BID.  Patient has some shortness of breath with climbing stairs.  Upon chart review heart rate seem to run in 50s-60s.  Some potential HR blunting with activity could be contributing to shortness of breath symptoms.  After discussion of Holter monitor versus monitoring, potential alteration in sotalol pending HR findings, possible breakthrough atrial fibrillation with reduced sotalol dose, patient elects to forego Holter monitor.  Chronic diastolic heart failure: Compensated.  Stable lower extremity edema on lasix 40 mg daily  Hyperlipidemia: Last LDL 68 1 year ago, atorvastatin 80 mg  Hypertension: Controlled - amlodipine, irbesartan  Anemia: Stable, monitored by PCP   DONN: CPAP    Plan:   Repeat lipid panel when fasting  Continue current medications without change  Continue to monitor shortness of breath and chest pressure symptoms for worsening severity/frequency  Message sent to watchman team regarding Plavix discontinuation      Follow up in 1 year      History of Present Illness/Subjective    HPI: Suma Mark is a 73 year old female with PMHx of CAD, PAF, CHF, HLD, HTN, anemia, DONN presents for follow up.    Suma reports no new concerns.  She does have some shortness of breath with climbing stairs, feels she has to stop and rest on the least once.  This is longstanding and has not worsened for over a year.  She denies chest pain with climbing stairs or other exertion.  She has moments with sensation of chest pressure in fact pressure like a band.  The symptoms have never progressed, have 1-2 times weekly and are consistent with  "prior atrial fibrillation symptoms.  Are short lasting for minutes, no palpitations, not similar to symptoms prior to PCI.  Her lower extremity edema is stable on furosemide 40 mg daily.  Sometimes struggles with CPAP but uses it nightly.      She denies lightheadedness, orthopnea, PND, palpitations, and abdominal fullness/bloating.      DIANA 9/27/2023 Results:  Patient was sedated using Versed 2 mg. The heart rate, respiratory rate,  oxygen saturations, blood pressure, and response to care were monitored  throughout the procedure with the assistance of the nurse.  Left ventricular size, wall motion and function are normal. The ejection  fraction is 60-65%.  Normal right ventricle size and systolic function.  Doppler suggests left to right interatrial shunt.  Watchman device noted in left atrial appendage. The device is well seated with  no leakage by color flow Doppler imaging.  Thrombus absent on left atrial appendage occlusion device.  Moderate atherosclerotic plaque(s) in the descending aorta.     Physical Examination  Review of Systems   Vitals: /62 (BP Location: Left arm, Patient Position: Sitting, Cuff Size: Adult Regular)   Pulse 60   Resp 16   Ht 1.549 m (5' 1\")   Wt 83.5 kg (184 lb)   LMP  (LMP Unknown)   BMI 34.77 kg/m    BMI= Body mass index is 34.77 kg/m .  Wt Readings from Last 3 Encounters:   12/18/23 83.5 kg (184 lb)   10/26/23 82.6 kg (182 lb 3.2 oz)   10/20/23 83.6 kg (184 lb 6.4 oz)           ENT/Mouth: membranes moist, no oral lesions or bleeding gums.      EYES:  no scleral icterus, normal conjunctivae                    Neck: No carotid bruit or thyromegaly   Chest/Lungs:   lungs are clear to auscultation, no rales or wheezing, equal chest wall expansion    Cardiovascular:   Regular. Normal first and second heart sounds with no murmurs, rubs, or gallops; the carotid, radial and posterior tibial pulses are intact,  trace edema bilaterally        Extremities: no cyanosis or clubbing "   Skin: no xanthelasma, warm.    Neurologic: no tremors     Psychiatric: alert and oriented x3, calm        Please refer above for cardiac ROS details.        Medical History  Surgical History Family History Social History   Past Medical History:   Diagnosis Date    Anxiety state, unspecified     Back pain     Diverticulitis 12/07/2017    DJD (degenerative joint disease)     Essential hypertension, benign     Fibromyalgia     Gastro-oesophageal reflux disease     Hernia, ventral 04/27/2017    History of anesthesia complications     hypotension after surgery    History of blood transfusion     Hyperlipidemia     Hyponatremia 12/07/2017    Major depression     Migraine     Osteopenia     PONV (postoperative nausea and vomiting)     Posttraumatic stress disorder     Raynaud's disease     Restless leg syndrome     S/P total knee replacement 11/27/2017    Sepsis (H) 3/25/2019    Thrombosis of leg     with pregnancy    Unspecified hypothyroidism      Past Surgical History:   Procedure Laterality Date    ARTHROPLASTY KNEE UNICOMPARTMENT  10/31/2013    Procedure: ARTHROPLASTY KNEE UNICOMPARTMENT;  LEFT KNEE UNICOMPARTMENTAL ARTHROPLASTY (CAROLINE)^;  Surgeon: Chi Mittal MD;  Location:  OR    BREAST SURGERY      bx    COLECTOMY N/A 06/02/2017    Procedure: RESECTION, SMALL INTESTINE, OPEN ADHESIOLYSIS;  Surgeon: Keyon Qureshi MD;  Location: [facility];  Service:     CV CORONARY ANGIOGRAM N/A 10/12/2022    Procedure: CV CORONARY ANGIOGRAM;  Surgeon: You Martinez MD;  Location: [facility] CV    CV CORONARY ANGIOGRAM N/A 6/9/2023    Procedure: Coronary Angiogram;  Surgeon: Bret Jin MD;  Location: [facility] CV    CV FRACTIONAL FLOW RATIO WIRE N/A 10/12/2022    Procedure: Fractional Flow Ratio Wire;  Surgeon: You Martinez MD;  Location: [facility] CV    CV LEFT ATRIAL APPENDAGE CLOSURE Right 6/22/2023    Procedure: Left Atrial Appendage Closure;  Surgeon: Lenin Mariano  MD Sharath;  Location: [facility] CV    CV LEFT HEART CATH N/A 6/9/2023    Procedure: Left Heart Catheterization;  Surgeon: Bret Jin MD;  Location: [facility] CV    CV PCI STENT DRUG ELUTING N/A 10/12/2022    Procedure: Percutaneous Coronary Intervention Stent;  Surgeon: You Martinez MD;  Location: [facility] CV    CV PCI STENT DRUG ELUTING N/A 11/08/2022    Procedure: Percutaneous Coronary Intervention - Stent;  Surgeon: Bret Jin MD;  Location: [facility] CV    ENDOSCOPIC RETROGRADE CHOLANGIOPANCREATOGRAPHY      ENDOSCOPIC STRIPPING VEIN(S)      BILATERLY    GENITOURINARY SURGERY      bladder sling    GI SURGERY  10/02/2015    Rectal prolaspse. s/p Abdominal rectopexy, sacral colpopexy, proctoscopy, cystoscopy    HERNIORRHAPHY INCISIONAL (LOCATION) N/A 06/02/2017    Procedure: LAPARASCOPIC CONVERTED TO OPEN PRIMARY INCISIONAL HERNIA REPAIR;  Surgeon: Keyon Qureshi MD;  Location: [facility];  Service:     HYSTERECTOMY  1985    [id number]    LAMINECTOMY LUMBAR TWO LEVELS  2006    LAPAROSCOPY N/A 08/22/2019    Procedure: DIAGNOSTIC LAPAROSCOPY, LYSIS OF ADHESION;  Surgeon: Svetlana Ron MD;  Location: [facility];  Service: General    LUMBAR HARDWARE REMOVAL[  03/22/2012    REMOVAL LUMBAR HARDWARE 03/22/2012   L3-S1; Type CD Horizon m8 instrumentation Dr. Murray    RELEASE CARPAL TUNNEL      TONSILLECTOMY  1954    ???    TOTAL KNEE ARTHROPLASTY Right 11/27/2017    Procedure:  RIGHT TOTAL KNEE ARTHROPLASTY;  Surgeon: Kevin Ryder MD;  Location: [facility];  Service: Orthopedics    US THYROID BIOPSY  04/19/2019     Family History   Problem Relation Age of Onset    Dementia Mother     Arthritis Mother     Chronic Obstructive Pulmonary Disease Father     Cardiovascular Father     Hypertension Father     No Known Problems Sister     No Known Problems Daughter     No Known Problems Son     Cerebrovascular Disease Maternal Grandmother     Heart  Disease Paternal Grandmother     Cerebrovascular Disease Paternal Grandmother     No Known Problems Sister     No Known Problems Sister     No Known Problems Son     No Known Problems Daughter     Breast Cancer Paternal Aunt         age in 50's        Social History     Socioeconomic History    Marital status:      Spouse name: Not on file    Number of children: Not on file    Years of education: Not on file    Highest education level: Not on file   Occupational History    Occupation: , occasionally still subs     Employer: RETIRED   Tobacco Use    Smoking status: Never     Passive exposure: Never    Smokeless tobacco: Never   Vaping Use    Vaping Use: Never used   Substance and Sexual Activity    Alcohol use: Yes     Comment: Alcoholic Drinks/day: 1 cocktail daily    Drug use: No    Sexual activity: Not on file   Other Topics Concern    Not on file   Social History Narrative    Not on file     Social Determinants of Health     Financial Resource Strain: Low Risk  (12/15/2023)    Financial Resource Strain     Within the past 12 months, have you or your family members you live with been unable to get utilities (heat, electricity) when it was really needed?: No   Food Insecurity: Low Risk  (12/15/2023)    Food Insecurity     Within the past 12 months, did you worry that your food would run out before you got money to buy more?: No     Within the past 12 months, did the food you bought just not last and you didn t have money to get more?: No   Transportation Needs: Low Risk  (12/15/2023)    Transportation Needs     Within the past 12 months, has lack of transportation kept you from medical appointments, getting your medicines, non-medical meetings or appointments, work, or from getting things that you need?: No   Physical Activity: Not on file   Stress: Not on file   Social Connections: Not on file   Interpersonal Safety: Low Risk  (10/26/2023)    Interpersonal Safety     Do you feel physically  and emotionally safe where you currently live?: Yes     Within the past 12 months, have you been hit, slapped, kicked or otherwise physically hurt by someone?: No     Within the past 12 months, have you been humiliated or emotionally abused in other ways by your partner or ex-partner?: No   Housing Stability: Low Risk  (12/15/2023)    Housing Stability     Do you have housing? : Yes     Are you worried about losing your housing?: No           Medications  Allergies   Current Outpatient Medications   Medication Sig Dispense Refill    amLODIPine (NORVASC) 5 MG tablet TAKE 1 TABLET BY MOUTH EVERYDAY AT BEDTIME 90 tablet 1    aspirin 81 MG EC tablet Take 1 tablet (81 mg) by mouth daily      atorvastatin (LIPITOR) 80 MG tablet TAKE 1 TABLET BY MOUTH AT BEDTIME 30 tablet 11    botulinum toxin type A (BOTOX) 100 units injection Every 3 months. [facility]      busPIRone (BUSPAR) 10 MG tablet TAKE 1 TABLET BY MOUTH TWICE A  tablet 3    cholecalciferol 50 MCG (2000 UT) CAPS Take 2,000 Units by mouth daily      citalopram (CELEXA) 40 MG tablet Take 1 tablet (40 mg) by mouth daily 90 tablet 1    clopidogrel (PLAVIX) 75 MG tablet TAKE 1 TABLET BY MOUTH EVERY DAY 90 tablet 1    eletriptan (RELPAX) 40 MG tablet Take 1 tablet (40 mg) by mouth at onset of headache for migraine 10 tablet 0    furosemide (LASIX) 20 MG tablet TAKE 2 TABLETS BY MOUTH EVERY  tablet 3    HYDROcodone-acetaminophen (NORCO) 5-325 MG tablet Take 1 tablet by mouth daily as needed (Severe headache) 5 tablet 0    irbesartan (AVAPRO) 150 MG tablet Take 1 tablet (150 mg) by mouth daily 90 tablet 3    isosorbide mononitrate (IMDUR) 30 MG 24 hr tablet TAKE 1 TABLET BY MOUTH EVERY DAY 30 tablet 0    ketoconazole (NIZORAL) 2 % external cream 1 APPLICATION TWICE A DAY TOPICALLY TO RIGHT FOOT FOR 3 WEEKS      levothyroxine (SYNTHROID/LEVOTHROID) 88 MCG tablet TAKE 1 TABLET BY MOUTH DAILY AT 6:00 AM. 90 tablet 2    LORazepam (ATIVAN) 0.5 MG tablet TAKE 1  "TAB BY MOUTH NIGHTLY AS NEEDED FOR ANXIETY OR SLEEP 30 tablet 0    nitroGLYcerin (NITROSTAT) 0.4 MG sublingual tablet For chest pain place 1 tablet under the tongue every 5 minutes for 3 doses. If symptoms persist 5 minutes after 1st dose call 911. 30 tablet 1    pramipexole (MIRAPEX) 0.25 MG tablet TAKE 1 TABLET BY MOUTH THREE TIMES A  tablet 1    sotalol (BETAPACE) 80 MG tablet Take 1 tablet (80 mg) by mouth every 12 hours 180 tablet 3       Allergies   Allergen Reactions    Cephalexin Nausea and Vomiting    Ace Inhibitors Other (See Comments)     Elevates blood pressure    Amitriptyline Hcl Other (See Comments)     elevates blood pressure    Atenolol Other (See Comments)     Aggravates reynauds    Celebrex [Celecoxib] GI Disturbance    Cephalosporins Unknown     Pt doesn't remember       Clonidine Unknown     \"got very ill in ER\"    Codeine Sulfate Nausea and Vomiting    Darvocet [Propoxyphene N-Apap] Nausea and Vomiting    Dexamethasone Acetate Swelling    Erythromycin Nausea and Vomiting    Escitalopram Other (See Comments)     Headaches.      Gabapentin Unknown     \"Spotted\"    Hctz Unknown     \"depletes my sodium\"    Potassium GI Disturbance    Propoxyphene      HUT Reaction: Gastrointestinal; HUT Reaction: Nausea And Vomiting; HUT Noted: [id number]    Sodium     Tramadol Swelling     Swelling of tongue and face    Tramadol-Acetaminophen Other (See Comments)    Triamterene     Venlafaxine Nausea and Vomiting and Diarrhea    Zolpidem Other (See Comments) and Unknown     Pt doesn't rember      Zolpidem Tartrate Unknown    Bacitracin Itching and Rash    Sulfanilamide Rash          Lab Results    Chemistry/lipid CBC Cardiac Enzymes/BNP/TSH/INR   Recent Labs   Lab Test 11/15/22  0800   CHOL 132   HDL 43*   LDL 68   TRIG 106     Recent Labs   Lab Test 11/15/22  0800 06/08/22  0905 06/10/21  1454   LDL 68 95 70     Recent Labs   Lab Test 10/26/23  1242      POTASSIUM 4.2   CHLORIDE 101   CO2 23   GLC 92 " "  BUN 22.6   CR 0.82   GFRESTIMATED 74   ISSA 9.2     Recent Labs   Lab Test 10/26/23  1242 09/12/23  1439 06/22/23  1324   CR 0.82 0.77 0.71     No results for input(s): \"A1C\" in the last 88458 hours.       Recent Labs   Lab Test 10/26/23  1242   WBC 7.1   HGB 10.9*   HCT 33.0*   MCV 86        Recent Labs   Lab Test 10/26/23  1242 09/12/23  1439 08/31/23  1130   HGB 10.9* 11.0* 11.0*    Recent Labs   Lab Test 11/28/22  0139 11/27/22  2315 10/12/22  0354   TROPONINI 0.02 0.01 0.03     Recent Labs   Lab Test 11/27/22  2315        Recent Labs   Lab Test 06/13/23  1022   TSH 1.76     Recent Labs   Lab Test 11/27/22  2315   INR 1.07        Mariam Fair PA-C                                       "

## 2023-12-19 ENCOUNTER — DOCUMENTATION ONLY (OUTPATIENT)
Dept: SLEEP MEDICINE | Facility: CLINIC | Age: 73
End: 2023-12-19
Payer: MEDICARE

## 2023-12-19 DIAGNOSIS — E66.09 CLASS 1 OBESITY DUE TO EXCESS CALORIES WITH SERIOUS COMORBIDITY AND BODY MASS INDEX (BMI) OF 33.0 TO 33.9 IN ADULT: Primary | Chronic | ICD-10-CM

## 2023-12-19 DIAGNOSIS — G47.33 OSA (OBSTRUCTIVE SLEEP APNEA): Chronic | ICD-10-CM

## 2023-12-19 DIAGNOSIS — E66.811 CLASS 1 OBESITY DUE TO EXCESS CALORIES WITH SERIOUS COMORBIDITY AND BODY MASS INDEX (BMI) OF 33.0 TO 33.9 IN ADULT: Primary | Chronic | ICD-10-CM

## 2023-12-19 DIAGNOSIS — F51.04 CHRONIC INSOMNIA: ICD-10-CM

## 2023-12-19 DIAGNOSIS — G25.81 RESTLESS LEG SYNDROME: ICD-10-CM

## 2023-12-19 NOTE — PROGRESS NOTES
STM Recheck: Patient stated she hasn't used any water in her humidifier the last 3 days. Patient will bring her CPAP machine into [facility] to be looked at.

## 2023-12-20 ENCOUNTER — TELEPHONE (OUTPATIENT)
Dept: CARDIOLOGY | Facility: CLINIC | Age: 73
End: 2023-12-20

## 2023-12-20 ENCOUNTER — LAB (OUTPATIENT)
Dept: CARDIOLOGY | Facility: CLINIC | Age: 73
End: 2023-12-20
Payer: MEDICARE

## 2023-12-20 DIAGNOSIS — E78.5 DYSLIPIDEMIA, GOAL LDL BELOW 70: Chronic | ICD-10-CM

## 2023-12-20 LAB
CHOLEST SERPL-MCNC: 131 MG/DL
FASTING STATUS PATIENT QL REPORTED: YES
HDLC SERPL-MCNC: 60 MG/DL
LDLC SERPL CALC-MCNC: 51 MG/DL
NONHDLC SERPL-MCNC: 71 MG/DL
TRIGL SERPL-MCNC: 99 MG/DL

## 2023-12-20 PROCEDURE — 80061 LIPID PANEL: CPT

## 2023-12-20 PROCEDURE — 36415 COLL VENOUS BLD VENIPUNCTURE: CPT

## 2023-12-20 NOTE — TELEPHONE ENCOUNTER
Phone call to patient. Patient s/p LAAC 6/22/2023. Per post-LAAC medication protocol, patient to remain on once daily 81 mg ASA for life. Last dose of Plavix to be Friday 12/22/2023.     MODIFIED JUAN CARLOS SCALE   Timepoint: 6mo Post-LAAC    Previous score: 0    Score Description   0 No symptoms at all   1 No significant disability despite symptoms; able to carry out all usual duties and activities   2 Slight disability; unable to carry out all previous activities, but able to look after own affairs without assistance   3 Moderate disability; requiring some help, but able to walk without assistance   4 Moderately severe disability; unable to walk without assistance and unable to attend to own bodily needs without assistance   5 Severe disability; bedridden, incontinent and requiring constant nursing care and attention   6 Dead    Total score (0 - 6):  0    Change in score if s/p LAAC? No  If yes, notify implanting cardiologist.    Vikki Johnson RN BSN  Structural Heart Coordinator   [facility]  [phone]      ----- Message from Vikki Johnson RN sent at 12/18/2023 12:47 PM CST -----    ----- Message -----  From: Marilyn Middleton PA-C  Sent: 12/18/2023  10:05 AM CST  To: [facility] Left Atrial Appendage Closure [facility]    Mariam just saw this lady today.  She's s/p Watchman in June and still taking Plavix.  I'm sure she's on your list to call this week.     Thanks :)  Marilyn

## 2023-12-21 ASSESSMENT — PATIENT HEALTH QUESTIONNAIRE - PHQ9
SUM OF ALL RESPONSES TO PHQ QUESTIONS 1-9: 2
SUM OF ALL RESPONSES TO PHQ QUESTIONS 1-9: 2
10. IF YOU CHECKED OFF ANY PROBLEMS, HOW DIFFICULT HAVE THESE PROBLEMS MADE IT FOR YOU TO DO YOUR WORK, TAKE CARE OF THINGS AT HOME, OR GET ALONG WITH OTHER PEOPLE: NOT DIFFICULT AT ALL

## 2023-12-22 ENCOUNTER — OFFICE VISIT (OUTPATIENT)
Dept: INTERNAL MEDICINE | Facility: CLINIC | Age: 73
End: 2023-12-22
Payer: MEDICARE

## 2023-12-22 VITALS
SYSTOLIC BLOOD PRESSURE: 118 MMHG | TEMPERATURE: 97.6 F | OXYGEN SATURATION: 99 % | HEART RATE: 60 BPM | DIASTOLIC BLOOD PRESSURE: 58 MMHG | RESPIRATION RATE: 16 BRPM | HEIGHT: 61 IN | WEIGHT: 184.25 LBS | BODY MASS INDEX: 34.78 KG/M2

## 2023-12-22 DIAGNOSIS — M81.0 SENILE OSTEOPOROSIS: Primary | Chronic | ICD-10-CM

## 2023-12-22 DIAGNOSIS — D50.8 OTHER IRON DEFICIENCY ANEMIA: ICD-10-CM

## 2023-12-22 DIAGNOSIS — R09.02 HYPOXIA: ICD-10-CM

## 2023-12-22 DIAGNOSIS — I25.10 CORONARY ARTERY DISEASE INVOLVING NATIVE CORONARY ARTERY OF NATIVE HEART WITHOUT ANGINA PECTORIS: Chronic | ICD-10-CM

## 2023-12-22 DIAGNOSIS — G47.33 OSA (OBSTRUCTIVE SLEEP APNEA): Chronic | ICD-10-CM

## 2023-12-22 DIAGNOSIS — E03.9 ACQUIRED HYPOTHYROIDISM: Chronic | ICD-10-CM

## 2023-12-22 DIAGNOSIS — I48.0 PAROXYSMAL ATRIAL FIBRILLATION (H): Chronic | ICD-10-CM

## 2023-12-22 LAB
ALBUMIN SERPL BCG-MCNC: 4.2 G/DL (ref 3.5–5.2)
ALP SERPL-CCNC: 101 U/L (ref 40–150)
ALT SERPL W P-5'-P-CCNC: 25 U/L (ref 0–50)
ANION GAP SERPL CALCULATED.3IONS-SCNC: 9 MMOL/L (ref 7–15)
AST SERPL W P-5'-P-CCNC: 36 U/L (ref 0–45)
BILIRUB SERPL-MCNC: 0.5 MG/DL
BUN SERPL-MCNC: 26.8 MG/DL (ref 8–23)
CALCIUM SERPL-MCNC: 9.8 MG/DL (ref 8.8–10.2)
CHLORIDE SERPL-SCNC: 99 MMOL/L (ref 98–107)
CREAT SERPL-MCNC: 0.85 MG/DL (ref 0.51–0.95)
DEPRECATED HCO3 PLAS-SCNC: 26 MMOL/L (ref 22–29)
EGFRCR SERPLBLD CKD-EPI 2021: 71 ML/MIN/1.73M2
ERYTHROCYTE [DISTWIDTH] IN BLOOD BY AUTOMATED COUNT: 15.1 % (ref 10–15)
FERRITIN SERPL-MCNC: 21 NG/ML (ref 11–328)
GLUCOSE SERPL-MCNC: 93 MG/DL (ref 70–99)
HCT VFR BLD AUTO: 33.8 % (ref 35–47)
HGB BLD-MCNC: 10.9 G/DL (ref 11.7–15.7)
IRON BINDING CAPACITY (ROCHE): 408 UG/DL (ref 240–430)
IRON SATN MFR SERPL: 8 % (ref 15–46)
IRON SERPL-MCNC: 34 UG/DL (ref 37–145)
MCH RBC QN AUTO: 27.9 PG (ref 26.5–33)
MCHC RBC AUTO-ENTMCNC: 32.2 G/DL (ref 31.5–36.5)
MCV RBC AUTO: 87 FL (ref 78–100)
PLATELET # BLD AUTO: 300 10E3/UL (ref 150–450)
POTASSIUM SERPL-SCNC: 4.3 MMOL/L (ref 3.4–5.3)
PROT SERPL-MCNC: 7.1 G/DL (ref 6.4–8.3)
RBC # BLD AUTO: 3.9 10E6/UL (ref 3.8–5.2)
SODIUM SERPL-SCNC: 134 MMOL/L (ref 135–145)
TSH SERPL DL<=0.005 MIU/L-ACNC: 1.82 UIU/ML (ref 0.3–4.2)
WBC # BLD AUTO: 5.8 10E3/UL (ref 4–11)

## 2023-12-22 PROCEDURE — 99214 OFFICE O/P EST MOD 30 MIN: CPT | Mod: 25 | Performed by: INTERNAL MEDICINE

## 2023-12-22 PROCEDURE — 80053 COMPREHEN METABOLIC PANEL: CPT | Performed by: INTERNAL MEDICINE

## 2023-12-22 PROCEDURE — 82728 ASSAY OF FERRITIN: CPT | Performed by: INTERNAL MEDICINE

## 2023-12-22 PROCEDURE — 96372 THER/PROPH/DIAG INJ SC/IM: CPT | Performed by: INTERNAL MEDICINE

## 2023-12-22 PROCEDURE — 84443 ASSAY THYROID STIM HORMONE: CPT | Performed by: INTERNAL MEDICINE

## 2023-12-22 PROCEDURE — 36415 COLL VENOUS BLD VENIPUNCTURE: CPT | Performed by: INTERNAL MEDICINE

## 2023-12-22 PROCEDURE — 85027 COMPLETE CBC AUTOMATED: CPT | Performed by: INTERNAL MEDICINE

## 2023-12-22 PROCEDURE — 83550 IRON BINDING TEST: CPT | Performed by: INTERNAL MEDICINE

## 2023-12-22 PROCEDURE — 83540 ASSAY OF IRON: CPT | Performed by: INTERNAL MEDICINE

## 2023-12-22 RX ORDER — RESPIRATORY SYNCYTIAL VIRUS VACCINE 120MCG/0.5
0.5 KIT INTRAMUSCULAR ONCE
Qty: 1 EACH | Refills: 0 | Status: CANCELLED | OUTPATIENT
Start: 2023-12-22 | End: 2023-12-22

## 2023-12-22 NOTE — PATIENT INSTRUCTIONS
Prolia 11th today. Labs today.  Prolia 12th in 6 months with AWV visit..    DXA in 6/2025.   Phone number to schedule [phone].    Daily calcium need is 9411-2717 mg a day from the diet and supplements.  Calcium citrate is easier to digest.  Vitamin D 2000 IU daily recommended.    Risk of rebound vertebral fractures is higher when Prolia suddenly stopped or dose was missed.      Prolia and Covid vaccine should be  for at least a week.     You will schedule pulmonary function test and colonoscopy.

## 2023-12-22 NOTE — PROGRESS NOTES
(M81.0) Senile osteoporosis  (primary encounter diagnosis)  Comment: Prolia # 11 today.  DXA was done in 6/2023.      (D50.8) Other iron deficiency anemia  Comment: She has now received 3 iron infusions, last one was fin July.  She had EGD on 10/30/23, but they were not able to complete the colonoscopy because she had constant retching and transient desaturation in low 90s. They suggested colonoscopy when off the Plavix. EGD result reviewed and showed esophagitis and gastritis, and she is on omeprazole now. She denies black or bloody stool.  We will recheck labs today.  Plan: CBC with platelets, Ferritin, Iron & Iron         Binding Capacity, Comprehensive metabolic panel            (I25.10) Coronary artery disease involving native coronary artery of native heart without angina pectoris  Comment: status post EUGENIA to OM2 (extending back to Lcx) on 10/12/22 and EUGENIA to distal LAD on 11/8/22    She will stay on Plavix until December 22 ( 1 year post stent), then stop it and continue ASA 81 mg daily indefinitely.   On Imdur 30 mg daily      (I48.0) Paroxysmal atrial fibrillation (H)  Comment: she underwent watchman implant on June 22 with a 31 mm watchman FLX device.   She is doing well and has had no strokelike symptoms.  She is currently taking aspirin 81 mg daily and Plavix 75 mg daily.  She will stay on Plavix until December 22 ( 1 year post stent), then stop it and continue ASA 81 mg daily indefinitely. She did have a DIANA on September 27 to check the device and everything looked good.On sotalol now.      Chronic diastolic heart failure: Compensated.  Stable lower extremity edema on lasix 40 mg daily    Hyperlipidemia: Last LDL 51 few days ago, atorvastatin 80 mg    (G47.33) DONN (obstructive sleep apnea) - mild (AHI 11)  Comment: Stable on CPAP.       (E03.9) Acquired hypothyroidism  Comment: on levothyroxine  Plan: TSH with free T4 reflex            (R09.02) Hypoxia  Comment: She had EGD on 10/30/23, but they were  "not able to complete the colonoscopy because she had constant retching and transient desaturation in low 90s.  Plan: General PFT Lab (Please always keep checked),         Pulmonary Function Test            Patient was educated on safety of Prolia utilizing Patient Counseling Chart for Healthcare Providers, as outlined by the Prolia REMS progam.     Return in about 6 months (around 6/22/2024) for AWV.    Patient Instructions   Prolia 11th today. Labs today.  Prolia 12th in 6 months with AWV visit..    DXA in 6/2025.   Phone number to schedule [phone].    Daily calcium need is 1214-5889 mg a day from the diet and supplements.  Calcium citrate is easier to digest.  Vitamin D 2000 IU daily recommended.    Risk of rebound vertebral fractures is higher when Prolia suddenly stopped or dose was missed.      Prolia and Covid vaccine should be  for at least a week.     You will schedule pulmonary function test and colonoscopy.          /58 (BP Location: Right arm, Patient Position: Sitting, Cuff Size: Adult Regular)   Pulse 60   Temp 97.6  F (36.4  C) (Oral)   Resp 16   Ht 1.549 m (5' 1\")   Wt 83.6 kg (184 lb 4 oz)   LMP  (LMP Unknown)   SpO2 99%   BMI 34.81 kg/m        Did you experience any problems with previous Prolia injection? no  Any medication change in the last 6 months? no  Did you take prednisone or other immunosupressant drugs in the last 6 months   (chemo, transplant, rheum, dermatology conditions)? no  Did you have any serious infection in the last 6 months?no  Any recent hospitalizations?no  Do you plan any dental work in the next 2-3 months?no  How much calcium do you take daily from the diet and supplements?1200 mg  How much vit D do you take daily? 2000 IU  Last DXA? 6/2023 Reviewed and discussed      Patient is here today for the Prolia injection. Patient tolerated previous injections well.   We discussed calcium and vit D daily needs today.       We discussed high risk of " rebound vertebral fractures when Prolia suddenly stopped.    Next Prolia injection will be in 6 months.           This note has been dictated using voice recognition software. Any grammatical or context distortions are unintentional and inherent to the software      Patient Active Problem List   Diagnosis    Total knee replacement status    Acquired hypothyroidism    Chronic back pain    Fibromyalgia    Diverticulosis    Essential hypertension    Gastroesophageal reflux disease without esophagitis    Chronic insomnia    Migraine    Dyslipidemia, goal LDL below 70    Restless leg syndrome    Thyroid nodule    Coronary artery disease involving native coronary artery of native heart without angina pectoris    Paroxysmal atrial fibrillation (H)    Senile osteoporosis    Generalized anxiety disorder    Venous insufficiency of both lower extremities    Vitamin D deficiency    Chronic pain syndrome    Class 1 obesity due to excess calories with serious comorbidity and body mass index (BMI) of 33.0 to 33.9 in adult    Anemia    Presence of Watchman left atrial appendage closure device    DONN (obstructive sleep apnea) - mild (AHI 11)       Current Outpatient Medications   Medication    amLODIPine (NORVASC) 5 MG tablet    aspirin 81 MG EC tablet    atorvastatin (LIPITOR) 80 MG tablet    botulinum toxin type A (BOTOX) 100 units injection    busPIRone (BUSPAR) 10 MG tablet    cholecalciferol 50 MCG (2000 UT) CAPS    citalopram (CELEXA) 40 MG tablet    eletriptan (RELPAX) 40 MG tablet    furosemide (LASIX) 20 MG tablet    HYDROcodone-acetaminophen (NORCO) 5-325 MG tablet    irbesartan (AVAPRO) 150 MG tablet    isosorbide mononitrate (IMDUR) 30 MG 24 hr tablet    ketoconazole (NIZORAL) 2 % external cream    levothyroxine (SYNTHROID/LEVOTHROID) 88 MCG tablet    LORazepam (ATIVAN) 0.5 MG tablet    nitroGLYcerin (NITROSTAT) 0.4 MG sublingual tablet    pramipexole (MIRAPEX) 0.25 MG tablet    sotalol (BETAPACE) 80 MG tablet     No  current facility-administered medications for this visit.     Facility-Administered Medications Ordered in Other Visits   Medication    iodixanol (VISIPAQUE 320) injection

## 2024-01-04 DIAGNOSIS — I10 ESSENTIAL HYPERTENSION: ICD-10-CM

## 2024-01-04 RX ORDER — IRBESARTAN 150 MG/1
150 TABLET ORAL DAILY
Qty: 90 TABLET | Refills: 3 | Status: SHIPPED | OUTPATIENT
Start: 2024-01-04

## 2024-01-10 ENCOUNTER — MYC REFILL (OUTPATIENT)
Dept: INTERNAL MEDICINE | Facility: CLINIC | Age: 74
End: 2024-01-10
Payer: MEDICARE

## 2024-01-10 ENCOUNTER — MYC REFILL (OUTPATIENT)
Dept: FAMILY MEDICINE | Facility: CLINIC | Age: 74
End: 2024-01-10
Payer: MEDICARE

## 2024-01-10 DIAGNOSIS — F41.9 ANXIETY: ICD-10-CM

## 2024-01-10 RX ORDER — LORAZEPAM 0.5 MG/1
TABLET ORAL
Qty: 30 TABLET | Refills: 0 | Status: SHIPPED | OUTPATIENT
Start: 2024-01-10 | End: 2024-01-23

## 2024-01-10 RX ORDER — CITALOPRAM HYDROBROMIDE 40 MG/1
40 TABLET ORAL DAILY
Qty: 90 TABLET | Refills: 2 | Status: SHIPPED | OUTPATIENT
Start: 2024-01-10 | End: 2024-07-22

## 2024-01-11 ENCOUNTER — OFFICE VISIT (OUTPATIENT)
Dept: PULMONOLOGY | Facility: CLINIC | Age: 74
End: 2024-01-11
Payer: MEDICARE

## 2024-01-11 DIAGNOSIS — R09.02 HYPOXIA: ICD-10-CM

## 2024-01-11 LAB — HGB BLD-MCNC: 10.1 G/DL

## 2024-01-11 PROCEDURE — 94726 PLETHYSMOGRAPHY LUNG VOLUMES: CPT | Performed by: INTERNAL MEDICINE

## 2024-01-11 PROCEDURE — 94729 DIFFUSING CAPACITY: CPT | Performed by: INTERNAL MEDICINE

## 2024-01-11 PROCEDURE — 94060 EVALUATION OF WHEEZING: CPT | Performed by: INTERNAL MEDICINE

## 2024-01-11 PROCEDURE — 85018 HEMOGLOBIN: CPT | Mod: QW | Performed by: INTERNAL MEDICINE

## 2024-01-12 LAB
DLCOCOR-%PRED-PRE: 108 %
DLCOCOR-PRE: 18.77 ML/MIN/MMHG
DLCOUNC-%PRED-PRE: 96 %
DLCOUNC-PRE: 16.54 ML/MIN/MMHG
DLCOUNC-PRED: 17.22 ML/MIN/MMHG
ERV-%PRED-PRE: 35 %
ERV-PRE: 0.29 L
ERV-PRED: 0.8 L
EXPTIME-PRE: 6.05 SEC
FEF2575-%PRED-POST: 108 %
FEF2575-%PRED-PRE: 118 %
FEF2575-POST: 1.62 L/SEC
FEF2575-PRE: 1.78 L/SEC
FEF2575-PRED: 1.5 L/SEC
FEFMAX-%PRED-PRE: 82 %
FEFMAX-PRE: 3.93 L/SEC
FEFMAX-PRED: 4.76 L/SEC
FEV1-%PRED-PRE: 108 %
FEV1-PRE: 1.88 L
FEV1FEV6-PRE: 80 %
FEV1FEV6-PRED: 78 %
FEV1FVC-PRE: 80 %
FEV1FVC-PRED: 79 %
FEV1SVC-PRE: 75 %
FEV1SVC-PRED: 64 %
FIFMAX-PRE: 3.28 L/SEC
FRCPLETH-%PRED-PRE: 100 %
FRCPLETH-PRE: 2.56 L
FRCPLETH-PRED: 2.55 L
FVC-%PRED-PRE: 105 %
FVC-PRE: 2.34 L
FVC-PRED: 2.21 L
IC-%PRED-PRE: 138 %
IC-PRE: 2.24 L
IC-PRED: 1.61 L
RVPLETH-%PRED-PRE: 113 %
RVPLETH-PRE: 2.28 L
RVPLETH-PRED: 2.01 L
TLCPLETH-%PRED-PRE: 108 %
TLCPLETH-PRE: 4.8 L
TLCPLETH-PRED: 4.44 L
VA-%PRED-PRE: 94 %
VA-PRE: 3.83 L
VC-%PRED-PRE: 93 %
VC-PRE: 2.52 L
VC-PRED: 2.71 L

## 2024-01-15 ENCOUNTER — DOCUMENTATION ONLY (OUTPATIENT)
Dept: SLEEP MEDICINE | Facility: CLINIC | Age: 74
End: 2024-01-15
Payer: MEDICARE

## 2024-01-15 DIAGNOSIS — G25.81 RESTLESS LEG SYNDROME: ICD-10-CM

## 2024-01-15 DIAGNOSIS — E66.09 CLASS 1 OBESITY DUE TO EXCESS CALORIES WITH SERIOUS COMORBIDITY AND BODY MASS INDEX (BMI) OF 33.0 TO 33.9 IN ADULT: Chronic | ICD-10-CM

## 2024-01-15 DIAGNOSIS — F51.04 CHRONIC INSOMNIA: ICD-10-CM

## 2024-01-15 DIAGNOSIS — G47.33 OSA (OBSTRUCTIVE SLEEP APNEA): Primary | Chronic | ICD-10-CM

## 2024-01-15 DIAGNOSIS — E66.811 CLASS 1 OBESITY DUE TO EXCESS CALORIES WITH SERIOUS COMORBIDITY AND BODY MASS INDEX (BMI) OF 33.0 TO 33.9 IN ADULT: Chronic | ICD-10-CM

## 2024-01-17 ENCOUNTER — MYC MEDICAL ADVICE (OUTPATIENT)
Dept: INTERNAL MEDICINE | Facility: CLINIC | Age: 74
End: 2024-01-17
Payer: MEDICARE

## 2024-01-23 ENCOUNTER — VIRTUAL VISIT (OUTPATIENT)
Dept: INTERNAL MEDICINE | Facility: CLINIC | Age: 74
End: 2024-01-23
Payer: MEDICARE

## 2024-01-23 DIAGNOSIS — F51.04 CHRONIC INSOMNIA: Primary | ICD-10-CM

## 2024-01-23 DIAGNOSIS — M54.16 LEFT LUMBAR RADICULOPATHY: ICD-10-CM

## 2024-01-23 DIAGNOSIS — F41.9 ANXIETY: ICD-10-CM

## 2024-01-23 PROCEDURE — 99443 PR PHYSICIAN TELEPHONE EVALUATION 21-30 MIN: CPT | Mod: 93 | Performed by: INTERNAL MEDICINE

## 2024-01-23 RX ORDER — LORAZEPAM 0.5 MG/1
TABLET ORAL
Qty: 30 TABLET | Refills: 0 | Status: SHIPPED | OUTPATIENT
Start: 2024-01-23 | End: 2024-03-21

## 2024-01-23 RX ORDER — HYDROCORTISONE 2.5 %
CREAM (GRAM) TOPICAL
COMMUNITY
Start: 2024-01-04 | End: 2024-06-04

## 2024-01-23 RX ORDER — GABAPENTIN 100 MG/1
100 CAPSULE ORAL 3 TIMES DAILY
Qty: 90 CAPSULE | Refills: 1 | Status: SHIPPED | OUTPATIENT
Start: 2024-01-23 | End: 2024-02-13

## 2024-01-23 RX ORDER — BUSPIRONE HYDROCHLORIDE 15 MG/1
15 TABLET ORAL 3 TIMES DAILY
Qty: 90 TABLET | Refills: 3 | Status: SHIPPED | OUTPATIENT
Start: 2024-01-23 | End: 2024-05-15

## 2024-01-23 NOTE — PROGRESS NOTES
"Lo is a 73 year old who is being evaluated via a billable telephone visit.      What phone number would you like to be contacted at? [phone]  How would you like to obtain your AVS? Karla    Distant Location (provider location):  On-site    Assessment & Plan     Chronic insomnia  Patient is having insomnia, with difficulty falling and staying asleep. She was on trazodone for many years in the past, but even the dose of 150 mg at bedtime was not providing good sleep. She is taking lorazepam 0.5 mg at bedtime daily for the more than 6 months now. It works well, but sometimes needs extra lorazepam pills for anxiety and panic attacks episodes during the day.  We discussed melatonin, Tylenol PM, Unisom, Sleep aid OTC. She will try them instead of lorazepam every night.   Since she has sciatica in the left leg, we will start gabapentin at bedtime and she can increase the dose to 300 mg at bedtime in the next 2 weeks.    Anxiety  We will increase Buspar to 15 mg bid and then tid. She will continue Celexa 40 mg. She will establish care with psychotherapist. Lorazepam prn only for panic attacks.  - busPIRone (BUSPAR) 15 MG tablet; Take 1 tablet (15 mg) by mouth 3 times daily  - LORazepam (ATIVAN) 0.5 MG tablet; TAKE 1 TAB BY MOUTH NIGHTLY AS NEEDED FOR ANXIETY OR SLEEP    Left lumbar radiculopathy  Over the last few weeks, pain in the left upper thigh, sometimes worse during the night. She does have lumbar disc disease. No weakness or paresthesia and no symptoms in the foot, no back pain, or injury. We will try gabapentin. If not better in 1-2 weeks, needs to be seen. She is on Prolia and will need the exam and the xray should be done to rule out AFF.   - gabapentin (NEURONTIN) 100 MG capsule; Take 1 capsule (100 mg) by mouth 3 times daily            BMI  Estimated body mass index is 34.81 kg/m  as calculated from the following:    Height as of 12/22/23: 1.549 m (5' 1\").    Weight as of 12/22/23: 83.6 kg (184 lb 4 " oz).       Return in about 2 weeks (around 2/6/2024).     Subjective   Lo is a 73 year old, presenting for the following health issues:  Recheck Medication (Medication Review )    HPI                 Objective           Vitals:  No vitals were obtained today due to virtual visit.    Physical Exam   General: Alert and no distress //Respiratory: No audible wheeze, cough, or shortness of breath // Psychiatric:  Appropriate affect, tone, and pace of words            Phone call duration: 28 minutes  Signed Electronically by: Bernadette Taylor MD

## 2024-01-25 DIAGNOSIS — G25.81 RESTLESS LEGS SYNDROME: ICD-10-CM

## 2024-01-25 RX ORDER — PRAMIPEXOLE DIHYDROCHLORIDE 0.25 MG/1
TABLET ORAL
Qty: 270 TABLET | Refills: 0 | Status: SHIPPED | OUTPATIENT
Start: 2024-01-25 | End: 2024-04-29

## 2024-01-28 DIAGNOSIS — I25.119 CORONARY ARTERY DISEASE INVOLVING NATIVE CORONARY ARTERY OF NATIVE HEART WITH ANGINA PECTORIS (H): ICD-10-CM

## 2024-01-29 RX ORDER — ISOSORBIDE MONONITRATE 30 MG/1
30 TABLET, EXTENDED RELEASE ORAL DAILY
Qty: 30 TABLET | Refills: 7 | Status: SHIPPED | OUTPATIENT
Start: 2024-01-29 | End: 2024-10-07

## 2024-01-31 ENCOUNTER — DOCUMENTATION ONLY (OUTPATIENT)
Dept: SLEEP MEDICINE | Facility: CLINIC | Age: 74
End: 2024-01-31
Payer: MEDICARE

## 2024-01-31 DIAGNOSIS — G47.33 OSA (OBSTRUCTIVE SLEEP APNEA): Chronic | ICD-10-CM

## 2024-01-31 DIAGNOSIS — E66.09 CLASS 1 OBESITY DUE TO EXCESS CALORIES WITH SERIOUS COMORBIDITY AND BODY MASS INDEX (BMI) OF 33.0 TO 33.9 IN ADULT: Primary | Chronic | ICD-10-CM

## 2024-01-31 DIAGNOSIS — F51.04 CHRONIC INSOMNIA: ICD-10-CM

## 2024-01-31 DIAGNOSIS — E66.811 CLASS 1 OBESITY DUE TO EXCESS CALORIES WITH SERIOUS COMORBIDITY AND BODY MASS INDEX (BMI) OF 33.0 TO 33.9 IN ADULT: Primary | Chronic | ICD-10-CM

## 2024-01-31 DIAGNOSIS — G25.81 RESTLESS LEG SYNDROME: ICD-10-CM

## 2024-01-31 NOTE — PROGRESS NOTES
Pt returned her cpap machine to the [place] showroom.  She stated she really tried to use it but she just couldn't do so comfortably.

## 2024-02-08 ENCOUNTER — MYC MEDICAL ADVICE (OUTPATIENT)
Dept: INTERNAL MEDICINE | Facility: CLINIC | Age: 74
End: 2024-02-08
Payer: MEDICARE

## 2024-02-08 NOTE — TELEPHONE ENCOUNTER
Called pt to assist in setting up appointment. No answer. TCB. Will send mychart advising pt to call to set up appointment.

## 2024-02-08 NOTE — TELEPHONE ENCOUNTER
1/23 OV  Chronic insomnia  Patient is having insomnia, with difficulty falling and staying asleep. She was on trazodone for many years in the past, but even the dose of 150 mg at bedtime was not providing good sleep. She is taking lorazepam 0.5 mg at bedtime daily for the more than 6 months now. It works well, but sometimes needs extra lorazepam pills for anxiety and panic attacks episodes during the day.  We discussed melatonin, Tylenol PM, Unisom, Sleep aid OTC. She will try them instead of lorazepam every night.   Since she has sciatica in the left leg, we will start gabapentin at bedtime and she can increase the dose to 300 mg at bedtime in the next 2 weeks.

## 2024-02-08 NOTE — TELEPHONE ENCOUNTER
Pt reports pain in moderate. Pt would like to schedule appt with PCP. Offered openings on 02/09/2024, pt unable to make appts. Pt scheduled for 02/13/2024 with PCP.    Pt denied further questions or concerns.    Deidra Baires RN

## 2024-02-10 DIAGNOSIS — G89.29 CHRONIC NECK PAIN: ICD-10-CM

## 2024-02-10 DIAGNOSIS — R20.2 NUMBNESS AND TINGLING OF BOTH UPPER EXTREMITIES: ICD-10-CM

## 2024-02-10 DIAGNOSIS — R20.0 NUMBNESS AND TINGLING OF BOTH UPPER EXTREMITIES: ICD-10-CM

## 2024-02-10 DIAGNOSIS — M54.2 CHRONIC NECK PAIN: ICD-10-CM

## 2024-02-12 RX ORDER — DICLOFENAC SODIUM 10 MG/G
2-4 GEL TOPICAL 4 TIMES DAILY
Qty: 150 G | Refills: 3 | Status: SHIPPED | OUTPATIENT
Start: 2024-02-12 | End: 2024-06-26

## 2024-02-12 NOTE — TELEPHONE ENCOUNTER
Pharmacy sent refill request for diclofenac gel   --Med last Rx 7/10/23 #150 g, 3 refills   --Last OV 7/10/23   --Future appt: none  --Please advise

## 2024-02-12 NOTE — TELEPHONE ENCOUNTER
I called and spoke with pt to verify if she is still using diclofenac gel as it was discontinued in the chart. Per pt, yes she is still using this medication and would like refills. Will route refill to the provider.

## 2024-02-13 ENCOUNTER — ANCILLARY PROCEDURE (OUTPATIENT)
Dept: GENERAL RADIOLOGY | Facility: CLINIC | Age: 74
End: 2024-02-13
Attending: INTERNAL MEDICINE
Payer: MEDICARE

## 2024-02-13 ENCOUNTER — OFFICE VISIT (OUTPATIENT)
Dept: INTERNAL MEDICINE | Facility: CLINIC | Age: 74
End: 2024-02-13
Payer: MEDICARE

## 2024-02-13 VITALS
BODY MASS INDEX: 34.91 KG/M2 | WEIGHT: 184.9 LBS | HEIGHT: 61 IN | HEART RATE: 70 BPM | DIASTOLIC BLOOD PRESSURE: 60 MMHG | OXYGEN SATURATION: 97 % | RESPIRATION RATE: 16 BRPM | SYSTOLIC BLOOD PRESSURE: 128 MMHG

## 2024-02-13 DIAGNOSIS — M79.652 PAIN OF LEFT THIGH: ICD-10-CM

## 2024-02-13 DIAGNOSIS — F41.1 GENERALIZED ANXIETY DISORDER: Chronic | ICD-10-CM

## 2024-02-13 DIAGNOSIS — M54.16 LEFT LUMBAR RADICULOPATHY: Primary | ICD-10-CM

## 2024-02-13 DIAGNOSIS — F51.04 CHRONIC INSOMNIA: ICD-10-CM

## 2024-02-13 PROCEDURE — 73502 X-RAY EXAM HIP UNI 2-3 VIEWS: CPT | Mod: TC | Performed by: RADIOLOGY

## 2024-02-13 PROCEDURE — 99214 OFFICE O/P EST MOD 30 MIN: CPT | Performed by: INTERNAL MEDICINE

## 2024-02-13 PROCEDURE — 73552 X-RAY EXAM OF FEMUR 2/>: CPT | Mod: TC | Performed by: RADIOLOGY

## 2024-02-13 RX ORDER — METHYLPREDNISOLONE 4 MG
TABLET, DOSE PACK ORAL
Qty: 21 TABLET | Refills: 0 | Status: SHIPPED | OUTPATIENT
Start: 2024-02-13 | End: 2024-03-21

## 2024-02-13 ASSESSMENT — ENCOUNTER SYMPTOMS: LEG PAIN: 1

## 2024-02-13 NOTE — PROGRESS NOTES
Assessment & Plan     Left lumbar radiculopathy    - MR Lumbar Spine w/o & w Contrast; Future  - Spine  Referral; Future    Pain of left thigh    - XR Femur Left 2 Views; Future  - XR Hip Left 2-3 Views; Future  - tiZANidine (ZANAFLEX) 4 MG tablet; Take 1 tablet (4 mg) by mouth 3 times daily  - methylPREDNISolone (MEDROL DOSEPAK) 4 MG tablet therapy pack; Follow Package Directions    Patient has chronic low back with acute left anterior thigh pain for 6 weeks, radiation to the left groin and down to the left calf. Pain is worse during the night and wakes her up. She is able to go for her daily walk. No weakness in the leg or incontinence. She tried gabapentin at bedtime and had a rash and itching, she is taking 2 Tylenol at bedtime but does not help.  On the exam, gait is normal, negative straight leg test. Has decreased range of motion in lower back and left hip.    She is on Prolia for osteoporosis for 6 years and I am concerned about AFF. She will have xrays today.  If negative for femoral fracture, she will schedule MRI lumbar spine and will see spine clinic.  Pain management for radiculopathy with Medrolpak and muscle relaxant and she has Norco at home.    Chronic insomnia  Patient is having insomnia, with difficulty falling and staying asleep. She was on trazodone for many years in the past, but even the dose of 150 mg at bedtime was not providing good sleep. She is taking lorazepam 0.5 mg at bedtime daily for the more than 6 months now. It works well, but sometimes needs extra lorazepam pills for anxiety and panic attacks episodes during the day.  We discussed melatonin, Tylenol PM, Unisom, Sleep aid OTC. She will try them instead of lorazepam every night.   Melatonin helps to fall asleep but is waking up with the pain. Gabapentin is not the option.  We will have to work on the pain management first.    Generalized anxiety disorder  We increased Buspar to 15 mg bid and she is on  "Celexa.            BMI  Estimated body mass index is 34.94 kg/m  as calculated from the following:    Height as of this encounter: 1.549 m (5' 1\").    Weight as of this encounter: 83.9 kg (184 lb 14.4 oz).         Return in about 4 months (around 6/13/2024) for Follow up, BRANDON VanessaHugo Blanchard is a 73 year old, presenting for the following health issues:  Leg Pain (L Leg Pain x1 Month Getting Worse Waking Her Up At Night Steady, Stinging Pain Feels Deep Inside Travels from thigh to lower calf )      2/13/2024     3:17 PM   Additional Questions   Roomed by Lizbeth     Leg Pain    History of Present Illness       Reason for visit:  Followup and leg pain    She eats 0-1 servings of fruits and vegetables daily.She consumes 1 sweetened beverage(s) daily.She exercises with enough effort to increase her heart rate 9 or less minutes per day.  She exercises with enough effort to increase her heart rate 3 or less days per week.   She is taking medications regularly.                     Objective    /60   Pulse 70   Resp 16   Ht 1.549 m (5' 1\")   Wt 83.9 kg (184 lb 14.4 oz)   LMP  (LMP Unknown)   SpO2 97%   BMI 34.94 kg/m    Body mass index is 34.94 kg/m .  Physical Exam               Signed Electronically by: Bernadette Taylor MD    "

## 2024-02-13 NOTE — PATIENT INSTRUCTIONS
We can add muscle relaxant tizanidine at bedtime and Norco as needed for severe leg pain.    Medrolpak  per instructions    Xrays today.    Mri lumbar spine to schedule.

## 2024-02-14 DIAGNOSIS — M97.9XXA PERIPROSTHETIC FRACTURE OF KNEE: Primary | ICD-10-CM

## 2024-02-15 ENCOUNTER — TRANSFERRED RECORDS (OUTPATIENT)
Dept: HEALTH INFORMATION MANAGEMENT | Facility: CLINIC | Age: 74
End: 2024-02-15
Payer: MEDICARE

## 2024-02-16 ENCOUNTER — HOSPITAL ENCOUNTER (OUTPATIENT)
Dept: ULTRASOUND IMAGING | Facility: CLINIC | Age: 74
Discharge: HOME | End: 2024-02-16
Attending: ORTHOPAEDIC SURGERY | Admitting: ORTHOPAEDIC SURGERY
Payer: MEDICARE

## 2024-02-16 DIAGNOSIS — M79.606 LEG PAIN: ICD-10-CM

## 2024-02-16 DIAGNOSIS — M79.89 LEFT LEG SWELLING: ICD-10-CM

## 2024-02-16 PROCEDURE — 93971 EXTREMITY STUDY: CPT | Mod: LT

## 2024-02-19 ENCOUNTER — OFFICE VISIT (OUTPATIENT)
Dept: FAMILY MEDICINE | Facility: CLINIC | Age: 74
End: 2024-02-19
Payer: MEDICARE

## 2024-02-19 ENCOUNTER — ANCILLARY PROCEDURE (OUTPATIENT)
Dept: GENERAL RADIOLOGY | Facility: CLINIC | Age: 74
End: 2024-02-19
Attending: PHYSICIAN ASSISTANT
Payer: MEDICARE

## 2024-02-19 ENCOUNTER — NURSE TRIAGE (OUTPATIENT)
Dept: NURSING | Facility: CLINIC | Age: 74
End: 2024-02-19
Payer: MEDICARE

## 2024-02-19 VITALS
RESPIRATION RATE: 20 BRPM | SYSTOLIC BLOOD PRESSURE: 121 MMHG | OXYGEN SATURATION: 96 % | HEART RATE: 65 BPM | TEMPERATURE: 97.7 F | DIASTOLIC BLOOD PRESSURE: 75 MMHG

## 2024-02-19 DIAGNOSIS — M79.675 PAIN OF TOE OF LEFT FOOT: Primary | ICD-10-CM

## 2024-02-19 LAB
CRP SERPL-MCNC: 9.26 MG/L
ERYTHROCYTE [SEDIMENTATION RATE] IN BLOOD BY WESTERGREN METHOD: 19 MM/HR (ref 0–30)
URATE SERPL-MCNC: 5.4 MG/DL (ref 2.4–5.7)

## 2024-02-19 PROCEDURE — 85652 RBC SED RATE AUTOMATED: CPT | Performed by: PHYSICIAN ASSISTANT

## 2024-02-19 PROCEDURE — 36415 COLL VENOUS BLD VENIPUNCTURE: CPT | Performed by: PHYSICIAN ASSISTANT

## 2024-02-19 PROCEDURE — 73660 X-RAY EXAM OF TOE(S): CPT | Mod: TC | Performed by: RADIOLOGY

## 2024-02-19 PROCEDURE — 99214 OFFICE O/P EST MOD 30 MIN: CPT | Performed by: PHYSICIAN ASSISTANT

## 2024-02-19 PROCEDURE — 84550 ASSAY OF BLOOD/URIC ACID: CPT | Performed by: PHYSICIAN ASSISTANT

## 2024-02-19 PROCEDURE — 86140 C-REACTIVE PROTEIN: CPT | Performed by: PHYSICIAN ASSISTANT

## 2024-02-19 RX ORDER — NAPROXEN 500 MG/1
500 TABLET ORAL 2 TIMES DAILY WITH MEALS
Qty: 20 TABLET | Refills: 0 | Status: SHIPPED | OUTPATIENT
Start: 2024-02-19 | End: 2024-05-02

## 2024-02-19 NOTE — PROGRESS NOTES
"  Assessment & Plan:      Problem List Items Addressed This Visit    None  Visit Diagnoses       Pain of toe of left foot    -  Primary    Relevant Medications    naproxen (NAPROSYN) 500 MG tablet    Other Relevant Orders    XR Toe Left G/E 2 Views (Completed)    Uric acid    ESR: Erythrocyte sedimentation rate (Completed)    CRP, inflammation          Medical Decision Making  Patient presents with swelling, redness, and pain in the left foot first toe joint for 24 hours.  Symptoms most concerning for gout flare.  Toe x-ray is negative for signs of acute fracture but does show advanced degenerative changes of the joint.  Initial ESR is normal to rule out signs of worse infection.  Recommend trial of naproxen, cold compresses, and avoidance of aggravating activities.  Discussed treatment and symptomatic care.  Allergies and medication interactions reviewed.  Discussed signs of worsening symptoms and when to follow-up with orthopedics if no symptom improvement.     Subjective:      Suma Mark is a 73 year old female here for evaluation of swelling, redness, and pain to the left foot first toe joint.  Onset of symptoms was yesterday.  No known injury or trauma.  Patient has not had the symptoms before.  No history of gout.     The following portions of the patient's history were reviewed and updated as appropriate: allergies, current medications, and problem list.     Review of Systems  Pertinent items are noted in HPI.    Allergies  Allergies   Allergen Reactions    Ace Inhibitors Other (See Comments)     Elevates blood pressure    Amitriptyline Hcl Other (See Comments)     elevates blood pressure    Atenolol Other (See Comments)     Aggravates reynauds    Celebrex [Celecoxib] GI Disturbance    Cephalexin Nausea and Vomiting    Clonidine Unknown     \"got very ill in ER\"    Codeine Sulfate Nausea and Vomiting    Darvocet [Propoxyphene N-Apap] Nausea and Vomiting    Dexamethasone Acetate Swelling    Erythromycin " "Nausea and Vomiting    Escitalopram Other (See Comments)     Headaches.      Propoxyphene      HUT Reaction: Gastrointestinal; HUT Reaction: Nausea And Vomiting; HUT Noted: [id number]    Sodium     Tramadol Swelling     Swelling of tongue and face    Tramadol-Acetaminophen Other (See Comments)    Triamterene     Venlafaxine Nausea and Vomiting and Diarrhea    Zolpidem Other (See Comments) and Unknown     Pt doesn't rember      Bacitracin Itching and Rash    Cephalosporins Unknown     Pt doesn't remember       Gabapentin Itching and Rash     \"Spotted\"    Hctz Unknown     \"depletes my sodium\"    Potassium GI Disturbance    Sulfanilamide Rash    Zolpidem Tartrate Unknown       Family History   Problem Relation Age of Onset    Dementia Mother     Arthritis Mother     Chronic Obstructive Pulmonary Disease Father     Cardiovascular Father     Hypertension Father     No Known Problems Sister     No Known Problems Daughter     No Known Problems Son     Cerebrovascular Disease Maternal Grandmother     Heart Disease Paternal Grandmother     Cerebrovascular Disease Paternal Grandmother     No Known Problems Sister     No Known Problems Sister     No Known Problems Son     No Known Problems Daughter     Breast Cancer Paternal Aunt         age in 50's       Social History     Tobacco Use    Smoking status: Never     Passive exposure: Never    Smokeless tobacco: Never   Substance Use Topics    Alcohol use: Yes     Comment: Alcoholic Drinks/day: 1 cocktail daily        Objective:      /75   Pulse 65   Temp 97.7  F (36.5  C)   Resp 20   LMP  (LMP Unknown)   SpO2 96%   General appearance - alert, well appearing, and in no distress and non-toxic  Extremities - left foot: Tenderness to palpation at the first MTP, otherwise no obvious deformity  Skin - left foot: Swelling, erythema, and increased warmth to touch over the first MTP joint     Lab & Imaging Results    Results for orders placed or performed in visit on 02/19/24 "   XR Toe Left G/E 2 Views     Status: None    Narrative    EXAM: XR TOE LEFT G/E 2 VIEWS  LOCATION: [facility]  DATE: 2/19/2024    INDICATION:  Pain of toe of left foot  COMPARISON: None.      Impression    IMPRESSION: Advanced degenerative change at the first MTP joint. Osteophytic spurring and joint space narrowing. No evidence for fracture or dislocation.   ESR: Erythrocyte sedimentation rate     Status: Normal   Result Value Ref Range    Erythrocyte Sedimentation Rate 19 0 - 30 mm/hr       I personally reviewed these results and discussed findings with the patient.    The use of Dragon/[employer] dictation services was used to construct the content of this note; any grammatical errors are non-intentional. Please contact the author directly if you are in need of any clarification.

## 2024-02-19 NOTE — TELEPHONE ENCOUNTER
"Outgoing call, relayed provider message/recommendation.     Pt agree to UC. \"On my way\" direction provided.     No further questions.     Pay P. RN   "

## 2024-02-19 NOTE — TELEPHONE ENCOUNTER
Nurse Triage SBAR    Is this a 2nd Level Triage? YES, LICENSED PRACTITIONER REVIEW IS REQUIRED    Situation: Left toe and foot pain    Background: patient states that last night the first 3 toes on her left foot started to hurt.  She states the pain extends into the foot but not the whole foot. She states the area is pink in color and the first three toes are swollen.  She states she cannot walk on it.  She has been using ice and took a vicodin with no relief. She denies a history of gout.      Assessment: left foot and toe pain    Protocol Recommended Disposition:   See in Office Today    Recommendation: Please contact this patient with any further recommendations.      Routed to provider    KARLEE PEREZ RN    Does the patient meet one of the following criteria for ADS visit consideration? 16+ years old, with an [facility] PCP     TIP  Providers, please consider if this condition is appropriate for management at one of our Acute and Diagnostic Services sites.     If patient is a good candidate, please use dotphrase <dot>triageresponse and select Refer to ADS to document.    Reason for Disposition   SEVERE pain (e.g., excruciating, unable to do any normal activities)    Additional Information   Negative: Followed an ankle or foot injury   Negative: Toe pain is main symptom   Negative: Ankle pain is main symptom   Negative: Entire foot is cool or blue in comparison to other foot   Negative: Purple or black skin on foot or toe   Negative: Red area or streak and fever   Negative: Swollen foot and fever   Negative: Patient sounds very sick or weak to the triager    Protocols used: Foot Pain-A-[place]

## 2024-02-22 ENCOUNTER — MEDICAL CORRESPONDENCE (OUTPATIENT)
Dept: SCHEDULING | Facility: CLINIC | Age: 74
End: 2024-02-22
Payer: MEDICARE

## 2024-02-26 DIAGNOSIS — M79.652 PAIN OF LEFT THIGH: ICD-10-CM

## 2024-02-27 ENCOUNTER — TELEPHONE (OUTPATIENT)
Dept: CARDIOLOGY | Facility: CLINIC | Age: 74
End: 2024-02-27
Payer: MEDICARE

## 2024-02-27 NOTE — TELEPHONE ENCOUNTER
[facility] Call Center    Phone Message    May a detailed message be left on voicemail: yes     Reason for Call: Other: Lizz is wondering if the patient is on any blood thinners any more. Please call her back to clarify.      Action Taken: Other: cardiology    Travel Screening: Not Applicable    Thank you!  [facility]

## 2024-02-28 NOTE — TELEPHONE ENCOUNTER
Phone number provided is not a working number. Of note patient is not on Plavix. Last dose, per chart review 12/22/23. -kraig

## 2024-03-05 ENCOUNTER — TRANSFERRED RECORDS (OUTPATIENT)
Dept: HEALTH INFORMATION MANAGEMENT | Facility: CLINIC | Age: 74
End: 2024-03-05
Payer: MEDICARE

## 2024-03-06 ENCOUNTER — HOSPITAL ENCOUNTER (OUTPATIENT)
Dept: MRI IMAGING | Facility: CLINIC | Age: 74
Discharge: HOME | End: 2024-03-06
Attending: COUNSELOR | Admitting: COUNSELOR
Payer: MEDICARE

## 2024-03-06 DIAGNOSIS — M54.17 RADICULITIS, LUMBOSACRAL: ICD-10-CM

## 2024-03-06 DIAGNOSIS — M47.816 SPONDYLOSIS WITHOUT MYELOPATHY OR RADICULOPATHY, LUMBAR REGION: ICD-10-CM

## 2024-03-06 DIAGNOSIS — G89.29 OTHER CHRONIC PAIN: ICD-10-CM

## 2024-03-06 DIAGNOSIS — M54.16 LUMBAR RADICULITIS: ICD-10-CM

## 2024-03-06 PROCEDURE — 72148 MRI LUMBAR SPINE W/O DYE: CPT

## 2024-03-14 ENCOUNTER — TRANSFERRED RECORDS (OUTPATIENT)
Dept: HEALTH INFORMATION MANAGEMENT | Facility: CLINIC | Age: 74
End: 2024-03-14
Payer: MEDICARE

## 2024-03-21 ENCOUNTER — OFFICE VISIT (OUTPATIENT)
Dept: INTERNAL MEDICINE | Facility: CLINIC | Age: 74
End: 2024-03-21
Payer: MEDICARE

## 2024-03-21 VITALS
TEMPERATURE: 98 F | DIASTOLIC BLOOD PRESSURE: 72 MMHG | OXYGEN SATURATION: 96 % | HEIGHT: 61 IN | BODY MASS INDEX: 35.68 KG/M2 | SYSTOLIC BLOOD PRESSURE: 120 MMHG | RESPIRATION RATE: 18 BRPM | WEIGHT: 189 LBS | HEART RATE: 59 BPM

## 2024-03-21 DIAGNOSIS — Z86.0100 HISTORY OF COLONIC POLYPS: ICD-10-CM

## 2024-03-21 DIAGNOSIS — F41.9 ANXIETY: ICD-10-CM

## 2024-03-21 DIAGNOSIS — Z95.818 PRESENCE OF WATCHMAN LEFT ATRIAL APPENDAGE CLOSURE DEVICE: Chronic | ICD-10-CM

## 2024-03-21 DIAGNOSIS — I25.10 CORONARY ARTERY DISEASE INVOLVING NATIVE CORONARY ARTERY OF NATIVE HEART WITHOUT ANGINA PECTORIS: Chronic | ICD-10-CM

## 2024-03-21 DIAGNOSIS — Z01.818 PREOP GENERAL PHYSICAL EXAM: Primary | ICD-10-CM

## 2024-03-21 DIAGNOSIS — D50.8 OTHER IRON DEFICIENCY ANEMIA: ICD-10-CM

## 2024-03-21 DIAGNOSIS — F51.04 CHRONIC INSOMNIA: ICD-10-CM

## 2024-03-21 DIAGNOSIS — M79.652 PAIN OF LEFT THIGH: ICD-10-CM

## 2024-03-21 DIAGNOSIS — G47.33 OSA (OBSTRUCTIVE SLEEP APNEA): Chronic | ICD-10-CM

## 2024-03-21 LAB
ANION GAP SERPL CALCULATED.3IONS-SCNC: 8 MMOL/L (ref 7–15)
BUN SERPL-MCNC: 21.3 MG/DL (ref 8–23)
CALCIUM SERPL-MCNC: 9 MG/DL (ref 8.8–10.2)
CHLORIDE SERPL-SCNC: 102 MMOL/L (ref 98–107)
CREAT SERPL-MCNC: 0.78 MG/DL (ref 0.51–0.95)
DEPRECATED HCO3 PLAS-SCNC: 26 MMOL/L (ref 22–29)
EGFRCR SERPLBLD CKD-EPI 2021: 79 ML/MIN/1.73M2
ERYTHROCYTE [DISTWIDTH] IN BLOOD BY AUTOMATED COUNT: 15.1 % (ref 10–15)
GLUCOSE SERPL-MCNC: 94 MG/DL (ref 70–99)
HCT VFR BLD AUTO: 36.1 % (ref 35–47)
HGB BLD-MCNC: 11.3 G/DL (ref 11.7–15.7)
MCH RBC QN AUTO: 26.9 PG (ref 26.5–33)
MCHC RBC AUTO-ENTMCNC: 31.3 G/DL (ref 31.5–36.5)
MCV RBC AUTO: 86 FL (ref 78–100)
PLATELET # BLD AUTO: 307 10E3/UL (ref 150–450)
POTASSIUM SERPL-SCNC: 4.2 MMOL/L (ref 3.4–5.3)
RBC # BLD AUTO: 4.2 10E6/UL (ref 3.8–5.2)
SODIUM SERPL-SCNC: 136 MMOL/L (ref 135–145)
WBC # BLD AUTO: 8.1 10E3/UL (ref 4–11)

## 2024-03-21 PROCEDURE — 80048 BASIC METABOLIC PNL TOTAL CA: CPT | Performed by: INTERNAL MEDICINE

## 2024-03-21 PROCEDURE — 85027 COMPLETE CBC AUTOMATED: CPT | Performed by: INTERNAL MEDICINE

## 2024-03-21 PROCEDURE — 36415 COLL VENOUS BLD VENIPUNCTURE: CPT | Performed by: INTERNAL MEDICINE

## 2024-03-21 PROCEDURE — 93005 ELECTROCARDIOGRAM TRACING: CPT | Performed by: INTERNAL MEDICINE

## 2024-03-21 PROCEDURE — 99214 OFFICE O/P EST MOD 30 MIN: CPT | Mod: 25 | Performed by: INTERNAL MEDICINE

## 2024-03-21 PROCEDURE — 99207 PR NO CHARGE LOS: CPT | Performed by: INTERNAL MEDICINE

## 2024-03-21 RX ORDER — LORAZEPAM 0.5 MG/1
TABLET ORAL
Qty: 30 TABLET | Refills: 0 | Status: SHIPPED | OUTPATIENT
Start: 2024-03-21 | End: 2024-04-29

## 2024-03-21 NOTE — H&P (VIEW-ONLY)
Preoperative Evaluation  [facility]  [address] [address]  Phone: [phone]  Fax: [phone]  Primary Provider: Gloria Taylor  Pre-op Performing Provider: GLORIA TAYLOR  Mar 21, 2024       Lo is a 73 year old, presenting for the following:  Pre-Op Exam (Colonoscopy, 4/4/24, @ [facility], Dr Edmonds)      Surgical Information  Surgery/Procedure: colonoscopy  Surgery Location: [facility]  Surgeon: Dr Edmonds  Surgery Date: 4/4/24  Time of Surgery: na  Where patient plans to recover: At home with family  Fax number for surgical facility: Note does not need to be faxed, will be available electronically in Epic.    Assessment & Plan     The proposed surgical procedure is considered LOW risk.    Preop general physical exam    - EKG 12-lead, tracing only per my review shoed sinus bradycardia, no acute ST/T changes, no change compared to June 2023.  - CBC with platelets; Future  - Basic metabolic panel; Future  -    History of colonic polyps  Colonoscopy in 8/2023 showed a flat polyp which was biopsied and treated with Hemoclip.   She had EGD on 10/30/23, but they were not able to complete the colonoscopy because she had constant retching and transient desaturation in low 90s. They suggested colonoscopy when off the Plavix.    Anxiety  stable on Buspar and Citalopram     - LORazepam (ATIVAN) 0.5 MG tablet; TAKE 1 TAB BY MOUTH NIGHTLY AS NEEDED FOR ANXIETY OR SLEEP    Other iron deficiency anemia  She has now received 3 iron infusions, last one was in July.  She had EGD on 10/30/23, but they were not able to complete the colonoscopy because she had constant retching and transient desaturation in low 90s. They suggested colonoscopy when off the Plavix. EGD result reviewed and showed esophagitis and gastritis, and she is on omeprazole now. She denies black or bloody stool.  - CBC with platelets; Future  - CBC with platelets    Coronary artery disease involving native coronary  artery of native heart without angina pectoris  status post EUGENIA to OM2 (extending back to Lcx) on 10/12/22 and EUGENIA to distal LAD on 11/8/22    She will stay on Plavix until December 2023 ( 1 year post stent), then stop it and continue ASA 81 mg daily indefinitely.   On Imdur 30 mg daily  - Basic metabolic panel; Future  - Basic metabolic panel    Atrial fibrillation / Presence of Watchman left atrial appendage closure device  she underwent watchman implant on June 22, 2023 with a 31 mm watchman FLX device.   She is doing well and has had no strokelike symptoms.  She is currently taking aspirin 81 mg daily.  She was on Plavix until December 2023 ( 1 year post stent). She did have a DIANA on September 27 to check the device and everything looked good.On sotalol now.     Chronic diastolic heart failure: Compensated.  Stable lower extremity edema on lasix 40 mg daily     DONN (obstructive sleep apnea) - mild (AHI 11)  Stable on CPAP.     Pain of left thigh  Left lumbar radiculopathy.Pain is worse during the night and wakes her up. She tried gabapentin at bedtime and had a rash and itching, she is taking 2 Tylenol at bedtime but does not help. Taking Norco prn, I sent the Rx for muscle relaxant. Medrolpak did not help. She is scheduled for spinal injection in April.  - tiZANidine (ZANAFLEX) 4 MG tablet; Take 1 tablet (4 mg) by mouth 3 times daily    Chronic insomnia  Patient is having insomnia, with difficulty falling and staying asleep. She was on trazodone for many years in the past, but even the dose of 150 mg at bedtime was not providing good sleep. She is taking lorazepam 0.5 mg at bedtime daily for the more than 6 months now. We discussed melatonin, Tylenol PM, Unisom, Sleep aid OTC. She will try them instead of lorazepam every night.        The longitudinal plan of care for the diagnosis(es)/condition(s) as documented were addressed during this visit. Due to the added complexity in care, I will continue to support  Lo in the subsequent management and with ongoing continuity of care.     Patient Instructions   Hold all supplements, aspirin and NSAIDs for 5 days prior to surgery.     Do not take furosemide on the prep day and day of the procedure.     Do not take any medications on the morning of your procedure.     Follow your surgeon's direction on when to stop eating and drinking prior to surgery.     Your surgeon will be managing your pain after your surgery.           Recommendation  APPROVAL GIVEN to proceed with proposed procedure, without further diagnostic evaluation.          Subjective       HPI related to upcoming procedure:         3/14/2024     9:06 AM   Preop Questions   1. Have you ever had a heart attack or stroke? YES -    2. Have you ever had surgery on your heart or blood vessels, such as a stent placement, a coronary artery bypass, or surgery on an artery in your head, neck, heart, or legs? YES -    3. Do you have chest pain with activity? No   4. Do you have a history of  heart failure? No   5. Do you currently have a cold, bronchitis or symptoms of other infection? No   6. Do you have a cough, shortness of breath, or wheezing? No   7. Do you or anyone in your family have previous history of blood clots? YES -    8. Do you or does anyone in your family have a serious bleeding problem such as prolonged bleeding following surgeries or cuts? No   9. Have you ever had problems with anemia or been told to take iron pills? YES -    10. Have you had any abnormal blood loss such as black, tarry or bloody stools, or abnormal vaginal bleeding? No   11. Have you ever had a blood transfusion? YES -    11a. Have you ever had a transfusion reaction? No   12. Are you willing to have a blood transfusion if it is medically needed before, during, or after your surgery? Yes   13. Have you or any of your relatives ever had problems with anesthesia? YES -    14. Do you have sleep apnea, excessive snoring or daytime drowsiness?  YES -    14a. Do you have a CPAP machine? No   15. Do you have any artifical heart valves or other implanted medical devices like a pacemaker, defibrillator, or continuous glucose monitor? YES -    15a. What type of device do you have? Watchman   15b. Name of the clinic that manages your device:  [facility]   16. Do you have artificial joints? YES -    17. Are you allergic to latex? No       Health Care Directive  Patient has a Health Care Directive on file      Preoperative Review of             Patient Active Problem List    Diagnosis Date Noted    Chronic pain syndrome 03/08/2023     Priority: High    DONN (obstructive sleep apnea) - mild (AHI 11) 07/07/2023     Priority: Medium     7/6/2023 [facility] (180.0 lbs) - AHI 11.5, RDI 13.9, Supine AHI 16.9, REM AHI 30.4, Low O2 82.0%, Time Spent ?88% 3.0 minutes / Time Spent ?89% 6.0 minutes.      Presence of Watchman left atrial appendage closure device 06/22/2023     Priority: Medium     6/22/2023, 31 mmm Watchman FLX Device      Anemia 06/16/2023     Priority: Medium    Senile osteoporosis 12/13/2022     Priority: Medium    Paroxysmal atrial fibrillation (H) 12/05/2022     Priority: Medium    Coronary artery disease involving native coronary artery of native heart without angina pectoris 10/18/2022     Priority: Medium      s/p PCI with EUGENIA to OM2 on 10/12/22 and PCI with EUGENIA to dLAD on 11/8/22.       Chronic insomnia 08/19/2021     Priority: Medium    Thyroid nodule 08/19/2021     Priority: Medium    Venous insufficiency of both lower extremities 11/04/2019     Priority: Medium    Dyslipidemia, goal LDL below 70 04/08/2019     Priority: Medium    Fibromyalgia 04/24/2018     Priority: Medium    Chronic back pain 12/07/2017     Priority: Medium    Generalized anxiety disorder 12/07/2017     Priority: Medium    Vitamin D deficiency 12/07/2017     Priority: Medium    Diverticulosis 10/04/2017     Priority: Medium     Incidental finding on  colonoscopy 10/2017      Migraine 04/04/2017     Priority: Medium     Managed by [facility].      Essential hypertension 04/21/2015     Priority: Medium    Gastroesophageal reflux disease without esophagitis 04/21/2015     Priority: Medium    Restless leg syndrome 04/21/2015     Priority: Medium     S/p iron infusion [facility] 5/2022      Total knee replacement status 10/31/2013     Priority: Medium    Acquired hypothyroidism 03/12/2009     Priority: Medium    Class 1 obesity due to excess calories with serious comorbidity and body mass index (BMI) of 33.0 to 33.9 in adult 03/09/2023     Priority: Low      Past Medical History:   Diagnosis Date    Anxiety state, unspecified     Back pain     Diverticulitis 12/07/2017    DJD (degenerative joint disease)     Essential hypertension, benign     Fibromyalgia     Gastro-oesophageal reflux disease     Heart disease     Hernia, ventral 04/27/2017    History of anesthesia complications     hypotension after surgery    History of blood transfusion     Hyperlipidemia     Hyponatremia 12/07/2017    Major depression     Migraine     Osteopenia     PONV (postoperative nausea and vomiting)     Posttraumatic stress disorder     Raynaud's disease     Restless leg syndrome     S/P total knee replacement 11/27/2017    Sepsis (H) 03/25/2019    Sleep apnea     Thrombosis of leg     with pregnancy    Unspecified hypothyroidism      Past Surgical History:   Procedure Laterality Date    ARTHROPLASTY KNEE UNICOMPARTMENT  10/31/2013    Procedure: ARTHROPLASTY KNEE UNICOMPARTMENT;  LEFT KNEE UNICOMPARTMENTAL ARTHROPLASTY (CAROLINE)^;  Surgeon: Chi Mittal MD;  Location: [facility]    BREAST SURGERY      bx    COLECTOMY N/A 06/02/2017    Procedure: RESECTION, SMALL INTESTINE, OPEN ADHESIOLYSIS;  Surgeon: Keyon Qureshi MD;  Location: [facility];  Service:     CV CORONARY ANGIOGRAM N/A 10/12/2022    Procedure: CV CORONARY ANGIOGRAM;  Surgeon: You Martinez MD;  Location: [facility]  CATH LAB CV    CV CORONARY ANGIOGRAM N/A 6/9/2023    Procedure: Coronary Angiogram;  Surgeon: Bret Jin MD;  Location: [facility]    CV FRACTIONAL FLOW RATIO WIRE N/A 10/12/2022    Procedure: Fractional Flow Ratio Wire;  Surgeon: You Martinez MD;  Location: [facility] LAB CV    CV LEFT ATRIAL APPENDAGE CLOSURE Right 6/22/2023    Procedure: Left Atrial Appendage Closure;  Surgeon: Lenin Mariano MD;  Location: [facility] LAB CV    CV LEFT HEART CATH N/A 6/9/2023    Procedure: Left Heart Catheterization;  Surgeon: Bret Jin MD;  Location: [facility] LAB CV    CV PCI STENT DRUG ELUTING N/A 10/12/2022    Procedure: Percutaneous Coronary Intervention Stent;  Surgeon: You Martinez MD;  Location: [facility]    CV PCI STENT DRUG ELUTING N/A 11/08/2022    Procedure: Percutaneous Coronary Intervention - Stent;  Surgeon: Bret Jin MD;  Location: [facility] CV    ENDOSCOPIC RETROGRADE CHOLANGIOPANCREATOGRAPHY      ENDOSCOPIC STRIPPING VEIN(S)      BILATERLY    GENITOURINARY SURGERY      bladder sling    GI SURGERY  10/02/2015    Rectal prolaspse. s/p Abdominal rectopexy, sacral colpopexy, proctoscopy, cystoscopy    HERNIORRHAPHY INCISIONAL (LOCATION) N/A 06/02/2017    Procedure: LAPARASCOPIC CONVERTED TO OPEN PRIMARY INCISIONAL HERNIA REPAIR;  Surgeon: Keyon Qureshi MD;  Location: [facility];  Service:     HYSTERECTOMY  1985 1985-1986    LAMINECTOMY LUMBAR TWO LEVELS  2006    LAPAROSCOPY N/A 08/22/2019    Procedure: DIAGNOSTIC LAPAROSCOPY, LYSIS OF ADHESION;  Surgeon: Svetlana Ron MD;  [place] [facility]: [place]    LUMBAR HARDWARE REMOVAL[  03/22/2012    REMOVAL LUMBAR HARDWARE 03/22/2012   L3-S1; Type CD Horizon m8 instrumentation Dr. Murray    RELEASE CARPAL TUNNEL      TONSILLECTOMY  1954    ???    TOTAL KNEE ARTHROPLASTY Right 11/27/2017    Procedure:  RIGHT TOTAL KNEE ARTHROPLASTY;  Surgeon: Kevin Ryder,  MD;  Location: [facility] OR;  Service: Orthopedics    US THYROID BIOPSY  04/19/2019     Current Outpatient Medications   Medication Sig Dispense Refill    amLODIPine (NORVASC) 5 MG tablet TAKE 1 TABLET BY MOUTH EVERYDAY AT BEDTIME 90 tablet 1    aspirin 81 MG EC tablet Take 1 tablet (81 mg) by mouth daily      atorvastatin (LIPITOR) 80 MG tablet TAKE 1 TABLET BY MOUTH AT BEDTIME 30 tablet 11    botulinum toxin type A (BOTOX) 100 units injection Every 3 months. [facility]      busPIRone (BUSPAR) 15 MG tablet Take 1 tablet (15 mg) by mouth 3 times daily 90 tablet 3    cholecalciferol 50 MCG (2000 UT) CAPS Take 2,000 Units by mouth daily      citalopram (CELEXA) 40 MG tablet Take 1 tablet (40 mg) by mouth daily 90 tablet 2    [employer] DICLOFENAC SODIUM 1 % topical gel APPLY 2-4 G TOPICALLY 4 TIMES DAILY 150 g 3    eletriptan (RELPAX) 40 MG tablet Take 1 tablet (40 mg) by mouth at onset of headache for migraine 10 tablet 0    furosemide (LASIX) 20 MG tablet TAKE 2 TABLETS BY MOUTH EVERY  tablet 3    HYDROcodone-acetaminophen (NORCO) 5-325 MG tablet Take 1 tablet by mouth daily as needed (Severe headache) 5 tablet 0    hydrocortisone 2.5 % cream APPLY TO CHEEK RASH TWICE A DAY FOR 1 WEEK      irbesartan (AVAPRO) 150 MG tablet TAKE 1 TABLET BY MOUTH EVERY DAY 90 tablet 3    isosorbide mononitrate (IMDUR) 30 MG 24 hr tablet TAKE 1 TABLET BY MOUTH EVERY DAY 30 tablet 7    levothyroxine (SYNTHROID/LEVOTHROID) 88 MCG tablet TAKE 1 TABLET BY MOUTH DAILY AT 6:00 AM. 90 tablet 2    LORazepam (ATIVAN) 0.5 MG tablet TAKE 1 TAB BY MOUTH NIGHTLY AS NEEDED FOR ANXIETY OR SLEEP 30 tablet 0    nitroGLYcerin (NITROSTAT) 0.4 MG sublingual tablet For chest pain place 1 tablet under the tongue every 5 minutes for 3 doses. If symptoms persist 5 minutes after 1st dose call 911. 30 tablet 1    pramipexole (MIRAPEX) 0.25 MG tablet TAKE 1 TABLET BY MOUTH THREE TIMES A  tablet 0    sotalol (BETAPACE) 80 MG tablet Take 1 tablet  "(80 mg) by mouth every 12 hours 180 tablet 3    tiZANidine (ZANAFLEX) 4 MG tablet Take 1 tablet (4 mg) by mouth 3 times daily 60 tablet 0       Allergies   Allergen Reactions    Ace Inhibitors Other (See Comments)     Elevates blood pressure    Amitriptyline Hcl Other (See Comments)     elevates blood pressure    Atenolol Other (See Comments)     Aggravates reynauds    Celebrex [Celecoxib] GI Disturbance    Cephalexin Nausea and Vomiting    Clonidine Unknown     \"got very ill in ER\"    Codeine Sulfate Nausea and Vomiting    Darvocet [Propoxyphene N-Apap] Nausea and Vomiting    Dexamethasone Acetate Swelling    Erythromycin Nausea and Vomiting    Escitalopram Other (See Comments)     Headaches.      Propoxyphene      HUT Reaction: Gastrointestinal; HUT Reaction: Nausea And Vomiting; HUT Noted: [id number]    Sodium     Tramadol Swelling     Swelling of tongue and face    Tramadol-Acetaminophen Other (See Comments)    Triamterene     Venlafaxine Nausea and Vomiting and Diarrhea    Zolpidem Other (See Comments) and Unknown     Pt doesn't rember      Bacitracin Itching and Rash    Cephalosporins Unknown     Pt doesn't remember       Gabapentin Itching and Rash     \"Spotted\"    Hctz Unknown     \"depletes my sodium\"    Potassium GI Disturbance    Sulfanilamide Rash    Zolpidem Tartrate Unknown        Social History     Tobacco Use    Smoking status: Never     Passive exposure: Never    Smokeless tobacco: Never   Substance Use Topics    Alcohol use: Yes     Comment: Alcoholic Drinks/day: 1 cocktail daily       History   Drug Use No         Review of Systems    Review of Systems  CONSTITUTIONAL: NEGATIVE for fever, chills, change in weight  INTEGUMENTARY/SKIN: NEGATIVE for worrisome rashes, moles or lesions  EYES: NEGATIVE for vision changes or irritation  ENT/MOUTH: NEGATIVE for ear, mouth and throat problems  RESP: NEGATIVE for significant cough or SOB  BREAST: NEGATIVE for masses, tenderness or discharge  CV: NEGATIVE for " "chest pain, palpitations or peripheral edema  GI: NEGATIVE for nausea, abdominal pain, heartburn, or change in bowel habits  : NEGATIVE for frequency, dysuria, or hematuria  MUSCULOSKELETAL: NEGATIVE for significant arthralgias or myalgia  NEURO: NEGATIVE for weakness, dizziness or paresthesias  ENDOCRINE: NEGATIVE for temperature intolerance, skin/hair changes  HEME: NEGATIVE for bleeding problems  PSYCHIATRIC: NEGATIVE for changes in mood or affect    Objective    /72   Pulse 59   Temp 98  F (36.7  C)   Resp 18   Ht 1.549 m (5' 1\")   Wt 85.7 kg (189 lb)   LMP  (LMP Unknown)   SpO2 96%   BMI 35.71 kg/m     Estimated body mass index is 35.71 kg/m  as calculated from the following:    Height as of this encounter: 1.549 m (5' 1\").    Weight as of this encounter: 85.7 kg (189 lb).  Physical Exam  Constitutional:  oriented to person, place, and time, appears well-nourished. No distress.   HENT:   Head: Normocephalic.   Mouth/Throat: Oropharynx is clear and moist.   Eyes: Conjunctivae are normal. Pupils are equal, round, and reactive to light.   Neck: Normal range of motion. Neck supple.   Cardiovascular: Normal rate, regular rhythm and normal heart sounds.    Pulmonary/Chest: Effort normal and breath sounds normal.   Abdominal: Soft. Bowel sounds are normal.   Musculoskeletal: Normal range of motion.   Neurological: alert and oriented to person, place, and time. Skin: Skin is warm.   Psychiatric: normal mood and affect.     Recent Labs   Lab Test 01/11/24  0825 12/22/23  0925 10/26/23  1242 11/30/22  1534 11/27/22  2315   HGB 10.1 10.9* 10.9*   < > 12.1   PLT  --  300 288   < > 298   INR  --   --   --   --  1.07   NA  --  134* 137   < > 141   POTASSIUM  --  4.3 4.2   < > 3.2*   CR  --  0.85 0.82   < > 1.08    < > = values in this interval not displayed.        Diagnostics  Labs pending at this time.  Results will be reviewed when available.                Signed Electronically by: Bernadette Taylor, " MD  Copy of this evaluation report is provided to requesting physician.

## 2024-03-21 NOTE — PATIENT INSTRUCTIONS
Hold all supplements, aspirin and NSAIDs for 5 days prior to surgery.     Do not take furosemide on the prep day and day of the procedure.     Do not take any medications on the morning of your procedure.     Follow your surgeon's direction on when to stop eating and drinking prior to surgery.     Your surgeon will be managing your pain after your surgery.

## 2024-03-21 NOTE — PROGRESS NOTES
Preoperative Evaluation  [facility]  [address] [address]  Phone: [phone]  Fax: [phone]  Primary Provider: Gloria Taylor  Pre-op Performing Provider: GLORIA TAYLOR  Mar 21, 2024       Lo is a 73 year old, presenting for the following:  Pre-Op Exam (Colonoscopy, 4/4/24, @ [facility], Dr Edmonds)      Surgical Information  Surgery/Procedure: colonoscopy  Surgery Location: [facility]  Surgeon: Dr Edmonds  Surgery Date: 4/4/24  Time of Surgery: na  Where patient plans to recover: At home with family  Fax number for surgical facility: Note does not need to be faxed, will be available electronically in Epic.    Assessment & Plan     The proposed surgical procedure is considered LOW risk.    Preop general physical exam    - EKG 12-lead, tracing only per my review shoed sinus bradycardia, no acute ST/T changes, no change compared to June 2023.  - CBC with platelets; Future  - Basic metabolic panel; Future  -    History of colonic polyps  Colonoscopy in 8/2023 showed a flat polyp which was biopsied and treated with Hemoclip.   She had EGD on 10/30/23, but they were not able to complete the colonoscopy because she had constant retching and transient desaturation in low 90s. They suggested colonoscopy when off the Plavix.    Anxiety  stable on Buspar and Citalopram     - LORazepam (ATIVAN) 0.5 MG tablet; TAKE 1 TAB BY MOUTH NIGHTLY AS NEEDED FOR ANXIETY OR SLEEP    Other iron deficiency anemia  She has now received 3 iron infusions, last one was in July.  She had EGD on 10/30/23, but they were not able to complete the colonoscopy because she had constant retching and transient desaturation in low 90s. They suggested colonoscopy when off the Plavix. EGD result reviewed and showed esophagitis and gastritis, and she is on omeprazole now. She denies black or bloody stool.  - CBC with platelets; Future  - CBC with platelets    Coronary artery disease involving native coronary  artery of native heart without angina pectoris  status post EUGENIA to OM2 (extending back to Lcx) on 10/12/22 and EUGENIA to distal LAD on 11/8/22    She will stay on Plavix until December 2023 ( 1 year post stent), then stop it and continue ASA 81 mg daily indefinitely.   On Imdur 30 mg daily  - Basic metabolic panel; Future  - Basic metabolic panel    Atrial fibrillation / Presence of Watchman left atrial appendage closure device  she underwent watchman implant on June 22, 2023 with a 31 mm watchman FLX device.   She is doing well and has had no strokelike symptoms.  She is currently taking aspirin 81 mg daily.  She was on Plavix until December 2023 ( 1 year post stent). She did have a DIANA on September 27 to check the device and everything looked good.On sotalol now.     Chronic diastolic heart failure: Compensated.  Stable lower extremity edema on lasix 40 mg daily     DONN (obstructive sleep apnea) - mild (AHI 11)  Stable on CPAP.     Pain of left thigh  Left lumbar radiculopathy.Pain is worse during the night and wakes her up. She tried gabapentin at bedtime and had a rash and itching, she is taking 2 Tylenol at bedtime but does not help. Taking Norco prn, I sent the Rx for muscle relaxant. Medrolpak did not help. She is scheduled for spinal injection in April.  - tiZANidine (ZANAFLEX) 4 MG tablet; Take 1 tablet (4 mg) by mouth 3 times daily    Chronic insomnia  Patient is having insomnia, with difficulty falling and staying asleep. She was on trazodone for many years in the past, but even the dose of 150 mg at bedtime was not providing good sleep. She is taking lorazepam 0.5 mg at bedtime daily for the more than 6 months now. We discussed melatonin, Tylenol PM, Unisom, Sleep aid OTC. She will try them instead of lorazepam every night.        The longitudinal plan of care for the diagnosis(es)/condition(s) as documented were addressed during this visit. Due to the added complexity in care, I will continue to support  Lo in the subsequent management and with ongoing continuity of care.     Patient Instructions   Hold all supplements, aspirin and NSAIDs for 5 days prior to surgery.     Do not take furosemide on the prep day and day of the procedure.     Do not take any medications on the morning of your procedure.     Follow your surgeon's direction on when to stop eating and drinking prior to surgery.     Your surgeon will be managing your pain after your surgery.           Recommendation  APPROVAL GIVEN to proceed with proposed procedure, without further diagnostic evaluation.          Subjective       HPI related to upcoming procedure:         3/14/2024     9:06 AM   Preop Questions   1. Have you ever had a heart attack or stroke? YES -    2. Have you ever had surgery on your heart or blood vessels, such as a stent placement, a coronary artery bypass, or surgery on an artery in your head, neck, heart, or legs? YES -    3. Do you have chest pain with activity? No   4. Do you have a history of  heart failure? No   5. Do you currently have a cold, bronchitis or symptoms of other infection? No   6. Do you have a cough, shortness of breath, or wheezing? No   7. Do you or anyone in your family have previous history of blood clots? YES -    8. Do you or does anyone in your family have a serious bleeding problem such as prolonged bleeding following surgeries or cuts? No   9. Have you ever had problems with anemia or been told to take iron pills? YES -    10. Have you had any abnormal blood loss such as black, tarry or bloody stools, or abnormal vaginal bleeding? No   11. Have you ever had a blood transfusion? YES -    11a. Have you ever had a transfusion reaction? No   12. Are you willing to have a blood transfusion if it is medically needed before, during, or after your surgery? Yes   13. Have you or any of your relatives ever had problems with anesthesia? YES -    14. Do you have sleep apnea, excessive snoring or daytime drowsiness?  YES -    14a. Do you have a CPAP machine? No   15. Do you have any artifical heart valves or other implanted medical devices like a pacemaker, defibrillator, or continuous glucose monitor? YES -    15a. What type of device do you have? Watchman   15b. Name of the clinic that manages your device:  [facility]   16. Do you have artificial joints? YES -    17. Are you allergic to latex? No       Health Care Directive  Patient has a Health Care Directive on file      Preoperative Review of             Patient Active Problem List    Diagnosis Date Noted    Chronic pain syndrome 03/08/2023     Priority: High    DONN (obstructive sleep apnea) - mild (AHI 11) 07/07/2023     Priority: Medium     7/6/2023 [facility] Study (180.0 lbs) - AHI 11.5, RDI 13.9, Supine AHI 16.9, REM AHI 30.4, Low O2 82.0%, Time Spent ?88% 3.0 minutes / Time Spent ?89% 6.0 minutes.      Presence of Watchman left atrial appendage closure device 06/22/2023     Priority: Medium     6/22/2023, 31 mmm Watchman FLX Device      Anemia 06/16/2023     Priority: Medium    Senile osteoporosis 12/13/2022     Priority: Medium    Paroxysmal atrial fibrillation (H) 12/05/2022     Priority: Medium    Coronary artery disease involving native coronary artery of native heart without angina pectoris 10/18/2022     Priority: Medium      s/p PCI with EUGENIA to OM2 on 10/12/22 and PCI with EUGENIA to dLAD on 11/8/22.       Chronic insomnia 08/19/2021     Priority: Medium    Thyroid nodule 08/19/2021     Priority: Medium    Venous insufficiency of both lower extremities 11/04/2019     Priority: Medium    Dyslipidemia, goal LDL below 70 04/08/2019     Priority: Medium    Fibromyalgia 04/24/2018     Priority: Medium    Chronic back pain 12/07/2017     Priority: Medium    Generalized anxiety disorder 12/07/2017     Priority: Medium    Vitamin D deficiency 12/07/2017     Priority: Medium    Diverticulosis 10/04/2017     Priority: Medium     Incidental finding on  colonoscopy 10/2017      Migraine 04/04/2017     Priority: Medium     Managed by [facility].      Essential hypertension 04/21/2015     Priority: Medium    Gastroesophageal reflux disease without esophagitis 04/21/2015     Priority: Medium    Restless leg syndrome 04/21/2015     Priority: Medium     S/p iron infusion [facility] 5/2022      Total knee replacement status 10/31/2013     Priority: Medium    Acquired hypothyroidism 03/12/2009     Priority: Medium    Class 1 obesity due to excess calories with serious comorbidity and body mass index (BMI) of 33.0 to 33.9 in adult 03/09/2023     Priority: Low      Past Medical History:   Diagnosis Date    Anxiety state, unspecified     Back pain     Diverticulitis 12/07/2017    DJD (degenerative joint disease)     Essential hypertension, benign     Fibromyalgia     Gastro-oesophageal reflux disease     Heart disease     Hernia, ventral 04/27/2017    History of anesthesia complications     hypotension after surgery    History of blood transfusion     Hyperlipidemia     Hyponatremia 12/07/2017    Major depression     Migraine     Osteopenia     PONV (postoperative nausea and vomiting)     Posttraumatic stress disorder     Raynaud's disease     Restless leg syndrome     S/P total knee replacement 11/27/2017    Sepsis (H) 03/25/2019    Sleep apnea     Thrombosis of leg     with pregnancy    Unspecified hypothyroidism      Past Surgical History:   Procedure Laterality Date    ARTHROPLASTY KNEE UNICOMPARTMENT  10/31/2013    Procedure: ARTHROPLASTY KNEE UNICOMPARTMENT;  LEFT KNEE UNICOMPARTMENTAL ARTHROPLASTY (CAROLINE)^;  Surgeon: Chi Mittal MD;  Location: [facility]    BREAST SURGERY      bx    COLECTOMY N/A 06/02/2017    Procedure: RESECTION, SMALL INTESTINE, OPEN ADHESIOLYSIS;  Surgeon: Keyon Qureshi MD;  Location: [facility];  Service:     CV CORONARY ANGIOGRAM N/A 10/12/2022    Procedure: CV CORONARY ANGIOGRAM;  Surgeon: You Martinez MD;  Location: [facility]  CATH LAB CV    CV CORONARY ANGIOGRAM N/A 6/9/2023    Procedure: Coronary Angiogram;  Surgeon: Bret Jin MD;  Location: [facility]    CV FRACTIONAL FLOW RATIO WIRE N/A 10/12/2022    Procedure: Fractional Flow Ratio Wire;  Surgeon: You Martinez MD;  Location: [facility] LAB CV    CV LEFT ATRIAL APPENDAGE CLOSURE Right 6/22/2023    Procedure: Left Atrial Appendage Closure;  Surgeon: Lenin Mariano MD;  Location: [facility] LAB CV    CV LEFT HEART CATH N/A 6/9/2023    Procedure: Left Heart Catheterization;  Surgeon: Bret Jin MD;  Location: [facility] LAB CV    CV PCI STENT DRUG ELUTING N/A 10/12/2022    Procedure: Percutaneous Coronary Intervention Stent;  Surgeon: You Martinez MD;  Location: [facility]    CV PCI STENT DRUG ELUTING N/A 11/08/2022    Procedure: Percutaneous Coronary Intervention - Stent;  Surgeon: Bret Jin MD;  Location: [place] [facility] CV    ENDOSCOPIC RETROGRADE CHOLANGIOPANCREATOGRAPHY      ENDOSCOPIC STRIPPING VEIN(S)      BILATERLY    GENITOURINARY SURGERY      bladder sling    GI SURGERY  10/02/2015    Rectal prolaspse. s/p Abdominal rectopexy, sacral colpopexy, proctoscopy, cystoscopy    HERNIORRHAPHY INCISIONAL (LOCATION) N/A 06/02/2017    Procedure: LAPARASCOPIC CONVERTED TO OPEN PRIMARY INCISIONAL HERNIA REPAIR;  Surgeon: Keyon Qureshi MD;  Location: [facility];  Service:     HYSTERECTOMY  1985 1985-1986    LAMINECTOMY LUMBAR TWO LEVELS  2006    LAPAROSCOPY N/A 08/22/2019    Procedure: DIAGNOSTIC LAPAROSCOPY, LYSIS OF ADHESION;  Surgeon: Svetlana Ron MD;  Location: [facility];  Service: General    LUMBAR HARDWARE REMOVAL[  03/22/2012    REMOVAL LUMBAR HARDWARE 03/22/2012   L3-S1; Type CD Horizon m8 instrumentation Dr. Murray    RELEASE CARPAL TUNNEL      TONSILLECTOMY  1954    ???    TOTAL KNEE ARTHROPLASTY Right 11/27/2017    Procedure:  RIGHT TOTAL KNEE ARTHROPLASTY;  Surgeon: Kevin Ryder,  MD;  Location: [facility] OR;  Service: Orthopedics    US THYROID BIOPSY  04/19/2019     Current Outpatient Medications   Medication Sig Dispense Refill    amLODIPine (NORVASC) 5 MG tablet TAKE 1 TABLET BY MOUTH EVERYDAY AT BEDTIME 90 tablet 1    aspirin 81 MG EC tablet Take 1 tablet (81 mg) by mouth daily      atorvastatin (LIPITOR) 80 MG tablet TAKE 1 TABLET BY MOUTH AT BEDTIME 30 tablet 11    botulinum toxin type A (BOTOX) 100 units injection Every 3 months. [facility]      busPIRone (BUSPAR) 15 MG tablet Take 1 tablet (15 mg) by mouth 3 times daily 90 tablet 3    cholecalciferol 50 MCG (2000 UT) CAPS Take 2,000 Units by mouth daily      citalopram (CELEXA) 40 MG tablet Take 1 tablet (40 mg) by mouth daily 90 tablet 2    [employer] DICLOFENAC SODIUM 1 % topical gel APPLY 2-4 G TOPICALLY 4 TIMES DAILY 150 g 3    eletriptan (RELPAX) 40 MG tablet Take 1 tablet (40 mg) by mouth at onset of headache for migraine 10 tablet 0    furosemide (LASIX) 20 MG tablet TAKE 2 TABLETS BY MOUTH EVERY  tablet 3    HYDROcodone-acetaminophen (NORCO) 5-325 MG tablet Take 1 tablet by mouth daily as needed (Severe headache) 5 tablet 0    hydrocortisone 2.5 % cream APPLY TO CHEEK RASH TWICE A DAY FOR 1 WEEK      irbesartan (AVAPRO) 150 MG tablet TAKE 1 TABLET BY MOUTH EVERY DAY 90 tablet 3    isosorbide mononitrate (IMDUR) 30 MG 24 hr tablet TAKE 1 TABLET BY MOUTH EVERY DAY 30 tablet 7    levothyroxine (SYNTHROID/LEVOTHROID) 88 MCG tablet TAKE 1 TABLET BY MOUTH DAILY AT 6:00 AM. 90 tablet 2    LORazepam (ATIVAN) 0.5 MG tablet TAKE 1 TAB BY MOUTH NIGHTLY AS NEEDED FOR ANXIETY OR SLEEP 30 tablet 0    nitroGLYcerin (NITROSTAT) 0.4 MG sublingual tablet For chest pain place 1 tablet under the tongue every 5 minutes for 3 doses. If symptoms persist 5 minutes after 1st dose call 911. 30 tablet 1    pramipexole (MIRAPEX) 0.25 MG tablet TAKE 1 TABLET BY MOUTH THREE TIMES A  tablet 0    sotalol (BETAPACE) 80 MG tablet Take 1 tablet  "(80 mg) by mouth every 12 hours 180 tablet 3    tiZANidine (ZANAFLEX) 4 MG tablet Take 1 tablet (4 mg) by mouth 3 times daily 60 tablet 0       Allergies   Allergen Reactions    Ace Inhibitors Other (See Comments)     Elevates blood pressure    Amitriptyline Hcl Other (See Comments)     elevates blood pressure    Atenolol Other (See Comments)     Aggravates reynauds    Celebrex [Celecoxib] GI Disturbance    Cephalexin Nausea and Vomiting    Clonidine Unknown     \"got very ill in ER\"    Codeine Sulfate Nausea and Vomiting    Darvocet [Propoxyphene N-Apap] Nausea and Vomiting    Dexamethasone Acetate Swelling    Erythromycin Nausea and Vomiting    Escitalopram Other (See Comments)     Headaches.      Propoxyphene      HUT Reaction: Gastrointestinal; HUT Reaction: Nausea And Vomiting; HUT Noted: [id number]    Sodium     Tramadol Swelling     Swelling of tongue and face    Tramadol-Acetaminophen Other (See Comments)    Triamterene     Venlafaxine Nausea and Vomiting and Diarrhea    Zolpidem Other (See Comments) and Unknown     Pt doesn't rember      Bacitracin Itching and Rash    Cephalosporins Unknown     Pt doesn't remember       Gabapentin Itching and Rash     \"Spotted\"    Hctz Unknown     \"depletes my sodium\"    Potassium GI Disturbance    Sulfanilamide Rash    Zolpidem Tartrate Unknown        Social History     Tobacco Use    Smoking status: Never     Passive exposure: Never    Smokeless tobacco: Never   Substance Use Topics    Alcohol use: Yes     Comment: Alcoholic Drinks/day: 1 cocktail daily       History   Drug Use No         Review of Systems    Review of Systems  CONSTITUTIONAL: NEGATIVE for fever, chills, change in weight  INTEGUMENTARY/SKIN: NEGATIVE for worrisome rashes, moles or lesions  EYES: NEGATIVE for vision changes or irritation  ENT/MOUTH: NEGATIVE for ear, mouth and throat problems  RESP: NEGATIVE for significant cough or SOB  BREAST: NEGATIVE for masses, tenderness or discharge  CV: NEGATIVE for " "chest pain, palpitations or peripheral edema  GI: NEGATIVE for nausea, abdominal pain, heartburn, or change in bowel habits  : NEGATIVE for frequency, dysuria, or hematuria  MUSCULOSKELETAL: NEGATIVE for significant arthralgias or myalgia  NEURO: NEGATIVE for weakness, dizziness or paresthesias  ENDOCRINE: NEGATIVE for temperature intolerance, skin/hair changes  HEME: NEGATIVE for bleeding problems  PSYCHIATRIC: NEGATIVE for changes in mood or affect    Objective    /72   Pulse 59   Temp 98  F (36.7  C)   Resp 18   Ht 1.549 m (5' 1\")   Wt 85.7 kg (189 lb)   LMP  (LMP Unknown)   SpO2 96%   BMI 35.71 kg/m     Estimated body mass index is 35.71 kg/m  as calculated from the following:    Height as of this encounter: 1.549 m (5' 1\").    Weight as of this encounter: 85.7 kg (189 lb).  Physical Exam  Constitutional:  oriented to person, place, and time, appears well-nourished. No distress.   HENT:   Head: Normocephalic.   Mouth/Throat: Oropharynx is clear and moist.   Eyes: Conjunctivae are normal. Pupils are equal, round, and reactive to light.   Neck: Normal range of motion. Neck supple.   Cardiovascular: Normal rate, regular rhythm and normal heart sounds.    Pulmonary/Chest: Effort normal and breath sounds normal.   Abdominal: Soft. Bowel sounds are normal.   Musculoskeletal: Normal range of motion.   Neurological: alert and oriented to person, place, and time. Skin: Skin is warm.   Psychiatric: normal mood and affect.     Recent Labs   Lab Test 01/11/24  0825 12/22/23  0925 10/26/23  1242 11/30/22  1534 11/27/22  2315   HGB 10.1 10.9* 10.9*   < > 12.1   PLT  --  300 288   < > 298   INR  --   --   --   --  1.07   NA  --  134* 137   < > 141   POTASSIUM  --  4.3 4.2   < > 3.2*   CR  --  0.85 0.82   < > 1.08    < > = values in this interval not displayed.        Diagnostics  Labs pending at this time.  Results will be reviewed when available.                Signed Electronically by: Bernadette Taylor, " MD  Copy of this evaluation report is provided to requesting physician.

## 2024-03-22 ENCOUNTER — PATIENT OUTREACH (OUTPATIENT)
Dept: GASTROENTEROLOGY | Facility: CLINIC | Age: 74
End: 2024-03-22
Payer: MEDICARE

## 2024-03-22 LAB
ATRIAL RATE - MUSE: 58 BPM
DIASTOLIC BLOOD PRESSURE - MUSE: NORMAL MMHG
INTERPRETATION ECG - MUSE: NORMAL
P AXIS - MUSE: 50 DEGREES
PR INTERVAL - MUSE: 158 MS
QRS DURATION - MUSE: 98 MS
QT - MUSE: 458 MS
QTC - MUSE: 449 MS
R AXIS - MUSE: 17 DEGREES
SYSTOLIC BLOOD PRESSURE - MUSE: NORMAL MMHG
T AXIS - MUSE: 33 DEGREES
VENTRICULAR RATE- MUSE: 58 BPM

## 2024-03-22 PROCEDURE — 93010 ELECTROCARDIOGRAM REPORT: CPT | Mod: OFF | Performed by: STUDENT IN AN ORGANIZED HEALTH CARE EDUCATION/TRAINING PROGRAM

## 2024-03-31 DIAGNOSIS — E03.9 ACQUIRED HYPOTHYROIDISM: ICD-10-CM

## 2024-04-01 RX ORDER — LEVOTHYROXINE SODIUM 88 UG/1
TABLET ORAL
Qty: 90 TABLET | Refills: 2 | Status: SHIPPED | OUTPATIENT
Start: 2024-04-01

## 2024-04-03 ENCOUNTER — ANESTHESIA EVENT (OUTPATIENT)
Dept: SURGERY | Facility: CLINIC | Age: 74
End: 2024-04-03
Payer: MEDICARE

## 2024-04-04 ENCOUNTER — ANESTHESIA (OUTPATIENT)
Dept: SURGERY | Facility: CLINIC | Age: 74
End: 2024-04-04
Payer: MEDICARE

## 2024-04-04 ENCOUNTER — HOSPITAL ENCOUNTER (OUTPATIENT)
Facility: CLINIC | Age: 74
Discharge: HOME | End: 2024-04-04
Attending: INTERNAL MEDICINE | Admitting: INTERNAL MEDICINE
Payer: MEDICARE

## 2024-04-04 VITALS
BODY MASS INDEX: 34.85 KG/M2 | RESPIRATION RATE: 12 BRPM | HEIGHT: 61 IN | SYSTOLIC BLOOD PRESSURE: 147 MMHG | DIASTOLIC BLOOD PRESSURE: 67 MMHG | WEIGHT: 184.6 LBS | HEART RATE: 64 BPM | OXYGEN SATURATION: 97 % | TEMPERATURE: 97 F

## 2024-04-04 LAB — COLONOSCOPY: NORMAL

## 2024-04-04 PROCEDURE — 272N000001 HC OR GENERAL SUPPLY STERILE: Performed by: INTERNAL MEDICINE

## 2024-04-04 PROCEDURE — 88305 TISSUE EXAM BY PATHOLOGIST: CPT | Mod: TC | Performed by: INTERNAL MEDICINE

## 2024-04-04 PROCEDURE — 370N000017 HC ANESTHESIA TECHNICAL FEE, PER MIN: Performed by: INTERNAL MEDICINE

## 2024-04-04 PROCEDURE — 360N000075 HC SURGERY LEVEL 2, PER MIN: Performed by: INTERNAL MEDICINE

## 2024-04-04 PROCEDURE — 710N000012 HC RECOVERY PHASE 2, PER MINUTE: Performed by: INTERNAL MEDICINE

## 2024-04-04 PROCEDURE — 258N000003 HC RX IP 258 OP 636: Performed by: ANESTHESIOLOGY

## 2024-04-04 PROCEDURE — 250N000011 HC RX IP 250 OP 636: Performed by: NURSE ANESTHETIST, CERTIFIED REGISTERED

## 2024-04-04 PROCEDURE — 250N000009 HC RX 250: Performed by: NURSE ANESTHETIST, CERTIFIED REGISTERED

## 2024-04-04 PROCEDURE — 999N000141 HC STATISTIC PRE-PROCEDURE NURSING ASSESSMENT: Performed by: INTERNAL MEDICINE

## 2024-04-04 RX ORDER — OXYCODONE HYDROCHLORIDE 5 MG/1
5 TABLET ORAL
Status: DISCONTINUED | OUTPATIENT
Start: 2024-04-04 | End: 2024-04-04 | Stop reason: HOSPADM

## 2024-04-04 RX ORDER — ONDANSETRON 4 MG/1
4 TABLET, ORALLY DISINTEGRATING ORAL EVERY 30 MIN PRN
Status: DISCONTINUED | OUTPATIENT
Start: 2024-04-04 | End: 2024-04-04 | Stop reason: HOSPADM

## 2024-04-04 RX ORDER — NALOXONE HYDROCHLORIDE 0.4 MG/ML
0.4 INJECTION, SOLUTION INTRAMUSCULAR; INTRAVENOUS; SUBCUTANEOUS
Status: DISCONTINUED | OUTPATIENT
Start: 2024-04-04 | End: 2024-04-04 | Stop reason: HOSPADM

## 2024-04-04 RX ORDER — LIDOCAINE 40 MG/G
CREAM TOPICAL
Status: DISCONTINUED | OUTPATIENT
Start: 2024-04-04 | End: 2024-04-04 | Stop reason: HOSPADM

## 2024-04-04 RX ORDER — NALOXONE HYDROCHLORIDE 0.4 MG/ML
0.1 INJECTION, SOLUTION INTRAMUSCULAR; INTRAVENOUS; SUBCUTANEOUS
Status: DISCONTINUED | OUTPATIENT
Start: 2024-04-04 | End: 2024-04-04 | Stop reason: HOSPADM

## 2024-04-04 RX ORDER — EPINEPHRINE 1 MG/ML
0.1 INJECTION, SOLUTION INTRAMUSCULAR; SUBCUTANEOUS
Status: DISCONTINUED | OUTPATIENT
Start: 2024-04-04 | End: 2024-04-04 | Stop reason: HOSPADM

## 2024-04-04 RX ORDER — OXYCODONE HYDROCHLORIDE 5 MG/1
10 TABLET ORAL
Status: DISCONTINUED | OUTPATIENT
Start: 2024-04-04 | End: 2024-04-04 | Stop reason: HOSPADM

## 2024-04-04 RX ORDER — PROPOFOL 10 MG/ML
INJECTION, EMULSION INTRAVENOUS CONTINUOUS PRN
Status: DISCONTINUED | OUTPATIENT
Start: 2024-04-04 | End: 2024-04-04

## 2024-04-04 RX ORDER — ONDANSETRON 2 MG/ML
4 INJECTION INTRAMUSCULAR; INTRAVENOUS
Status: DISCONTINUED | OUTPATIENT
Start: 2024-04-04 | End: 2024-04-04 | Stop reason: HOSPADM

## 2024-04-04 RX ORDER — NALOXONE HYDROCHLORIDE 0.4 MG/ML
0.2 INJECTION, SOLUTION INTRAMUSCULAR; INTRAVENOUS; SUBCUTANEOUS
Status: DISCONTINUED | OUTPATIENT
Start: 2024-04-04 | End: 2024-04-04 | Stop reason: HOSPADM

## 2024-04-04 RX ORDER — PROCHLORPERAZINE MALEATE 5 MG
5 TABLET ORAL EVERY 6 HOURS PRN
Status: DISCONTINUED | OUTPATIENT
Start: 2024-04-04 | End: 2024-04-04 | Stop reason: HOSPADM

## 2024-04-04 RX ORDER — LIDOCAINE HYDROCHLORIDE 10 MG/ML
INJECTION, SOLUTION INFILTRATION; PERINEURAL PRN
Status: DISCONTINUED | OUTPATIENT
Start: 2024-04-04 | End: 2024-04-04

## 2024-04-04 RX ORDER — ONDANSETRON 2 MG/ML
4 INJECTION INTRAMUSCULAR; INTRAVENOUS EVERY 6 HOURS PRN
Status: DISCONTINUED | OUTPATIENT
Start: 2024-04-04 | End: 2024-04-04 | Stop reason: HOSPADM

## 2024-04-04 RX ORDER — SODIUM CHLORIDE, SODIUM LACTATE, POTASSIUM CHLORIDE, CALCIUM CHLORIDE 600; 310; 30; 20 MG/100ML; MG/100ML; MG/100ML; MG/100ML
INJECTION, SOLUTION INTRAVENOUS CONTINUOUS
Status: DISCONTINUED | OUTPATIENT
Start: 2024-04-04 | End: 2024-04-04 | Stop reason: HOSPADM

## 2024-04-04 RX ORDER — PROPOFOL 10 MG/ML
INJECTION, EMULSION INTRAVENOUS PRN
Status: DISCONTINUED | OUTPATIENT
Start: 2024-04-04 | End: 2024-04-04

## 2024-04-04 RX ORDER — ONDANSETRON 4 MG/1
4 TABLET, ORALLY DISINTEGRATING ORAL EVERY 6 HOURS PRN
Status: DISCONTINUED | OUTPATIENT
Start: 2024-04-04 | End: 2024-04-04 | Stop reason: HOSPADM

## 2024-04-04 RX ORDER — SIMETHICONE 40MG/0.6ML
133 SUSPENSION, DROPS(FINAL DOSAGE FORM)(ML) ORAL
Status: DISCONTINUED | OUTPATIENT
Start: 2024-04-04 | End: 2024-04-04 | Stop reason: HOSPADM

## 2024-04-04 RX ORDER — FLUMAZENIL 0.1 MG/ML
0.2 INJECTION, SOLUTION INTRAVENOUS
Status: DISCONTINUED | OUTPATIENT
Start: 2024-04-04 | End: 2024-04-04 | Stop reason: HOSPADM

## 2024-04-04 RX ORDER — ONDANSETRON 2 MG/ML
4 INJECTION INTRAMUSCULAR; INTRAVENOUS EVERY 30 MIN PRN
Status: DISCONTINUED | OUTPATIENT
Start: 2024-04-04 | End: 2024-04-04 | Stop reason: HOSPADM

## 2024-04-04 RX ORDER — ATROPINE SULFATE 0.1 MG/ML
1 INJECTION INTRAVENOUS
Status: DISCONTINUED | OUTPATIENT
Start: 2024-04-04 | End: 2024-04-04 | Stop reason: HOSPADM

## 2024-04-04 RX ORDER — FENTANYL CITRATE 50 UG/ML
50-100 INJECTION, SOLUTION INTRAMUSCULAR; INTRAVENOUS EVERY 5 MIN PRN
Status: DISCONTINUED | OUTPATIENT
Start: 2024-04-04 | End: 2024-04-04 | Stop reason: HOSPADM

## 2024-04-04 RX ORDER — DIPHENHYDRAMINE HYDROCHLORIDE 50 MG/ML
25-50 INJECTION INTRAMUSCULAR; INTRAVENOUS
Status: DISCONTINUED | OUTPATIENT
Start: 2024-04-04 | End: 2024-04-04 | Stop reason: HOSPADM

## 2024-04-04 RX ADMIN — SODIUM CHLORIDE, POTASSIUM CHLORIDE, SODIUM LACTATE AND CALCIUM CHLORIDE: 600; 310; 30; 20 INJECTION, SOLUTION INTRAVENOUS at 08:57

## 2024-04-04 RX ADMIN — PROPOFOL 150 MCG/KG/MIN: 10 INJECTION, EMULSION INTRAVENOUS at 09:07

## 2024-04-04 RX ADMIN — LIDOCAINE HYDROCHLORIDE 2.5 ML: 10 INJECTION, SOLUTION INFILTRATION; PERINEURAL at 09:07

## 2024-04-04 RX ADMIN — PROPOFOL 40 MG: 10 INJECTION, EMULSION INTRAVENOUS at 09:07

## 2024-04-04 ASSESSMENT — ACTIVITIES OF DAILY LIVING (ADL)
ADLS_ACUITY_SCORE: 36

## 2024-04-04 NOTE — INTERVAL H&P NOTE
"I have reviewed the surgical (or preoperative) H&P that is linked to this encounter, and examined the patient. There are no significant changes    Clinical Conditions Present on Arrival:  Clinically Significant Risk Factors Present on Admission                 # Drug Induced Platelet Defect: home medication list includes an antiplatelet medication  # Obesity: Estimated body mass index is 34.88 kg/m  as calculated from the following:    Height as of this encounter: 1.549 m (5' 1\").    Weight as of this encounter: 83.7 kg (184 lb 9.6 oz).       "

## 2024-04-04 NOTE — ANESTHESIA CARE TRANSFER NOTE
Patient: Suma Mark    Procedure: Procedure(s):  COLONOSCOPY WITH POLYPECTOMY       Diagnosis: Anemia [D64.9]  Diagnosis Additional Information: No value filed.    Anesthesia Type:   MAC     Note:    Oropharynx: oropharynx clear of all foreign objects  Level of Consciousness: awake  Oxygen Supplementation: room air    Independent Airway: airway patency satisfactory and stable  Dentition: dentition unchanged  Vital Signs Stable: post-procedure vital signs reviewed and stable  Report to RN Given: handoff report given  Patient transferred to: Phase II    Handoff Report: Identifed the Patient, Identified the Reponsible Provider, Reviewed the pertinent medical history, Discussed the surgical course, Reviewed Intra-OP anesthesia mangement and issues during anesthesia, Set expectations for post-procedure period and Allowed opportunity for questions and acknowledgement of understanding      Vitals:  Vitals Value Taken Time   /85    Temp 98.0    Pulse 74    Resp 14    SpO2 98        Electronically Signed By: ANDREA Eugene CRNA  April 4, 2024  9:37 AM

## 2024-04-04 NOTE — ANESTHESIA PREPROCEDURE EVALUATION
Anesthesia Pre-Procedure Evaluation    Patient: Suma Mark   MRN: [id number] : 1950        Procedure : Procedure(s):  COLONOSCOPY          Past Medical History:   Diagnosis Date    Anxiety state, unspecified     Back pain     Diverticulitis 2017    DJD (degenerative joint disease)     Essential hypertension, benign     Fibromyalgia     Gastro-oesophageal reflux disease     Heart disease     Hernia, ventral 2017    History of anesthesia complications     hypotension after surgery    History of blood transfusion     Hyperlipidemia     Hyponatremia 2017    Major depression     Migraine     Osteopenia     PONV (postoperative nausea and vomiting)     Posttraumatic stress disorder     Raynaud's disease     Restless leg syndrome     S/P total knee replacement 2017    Sepsis (H) 2019    Sleep apnea     Thrombosis of leg     with pregnancy    Unspecified hypothyroidism       Past Surgical History:   Procedure Laterality Date    ARTHROPLASTY KNEE UNICOMPARTMENT  10/31/2013    Procedure: ARTHROPLASTY KNEE UNICOMPARTMENT;  LEFT KNEE UNICOMPARTMENTAL ARTHROPLASTY (CAROLINE)^;  Surgeon: Chi Mittal MD;  Location:  OR    BREAST SURGERY      bx    COLECTOMY N/A 2017    Procedure: RESECTION, SMALL INTESTINE, OPEN ADHESIOLYSIS;  Surgeon: Keyon Qureshi MD;  Location: [facility];  Service:     CV CORONARY ANGIOGRAM N/A 10/12/2022    Procedure: CV CORONARY ANGIOGRAM;  Surgeon: You Martinez MD;  Location: [facility] CV    CV CORONARY ANGIOGRAM N/A 2023    Procedure: Coronary Angiogram;  Surgeon: Bret Jin MD;  Location: [facility] CV    CV FRACTIONAL FLOW RATIO WIRE N/A 10/12/2022    Procedure: Fractional Flow Ratio Wire;  Surgeon: You Martinez MD;  Location: [facility] CV    CV LEFT ATRIAL APPENDAGE CLOSURE Right 2023    Procedure: Left Atrial Appendage Closure;  Surgeon: Lenin Mariano MD;  Location: [facility]  "CATH LAB CV    CV LEFT HEART CATH N/A 6/9/2023    Procedure: Left Heart Catheterization;  Surgeon: Bret Jin MD;  Location: [facility] CV    CV PCI STENT DRUG ELUTING N/A 10/12/2022    Procedure: Percutaneous Coronary Intervention Stent;  Surgeon: You Martinez MD;  Location: [facility] LAB CV    CV PCI STENT DRUG ELUTING N/A 11/08/2022    Procedure: Percutaneous Coronary Intervention - Stent;  Surgeon: Bret Jin MD;  Location: [facility] CV    ENDOSCOPIC RETROGRADE CHOLANGIOPANCREATOGRAPHY      ENDOSCOPIC STRIPPING VEIN(S)      BILATERLY    GENITOURINARY SURGERY      bladder sling    GI SURGERY  10/02/2015    Rectal prolaspse. s/p Abdominal rectopexy, sacral colpopexy, proctoscopy, cystoscopy    HERNIORRHAPHY INCISIONAL (LOCATION) N/A 06/02/2017    Procedure: LAPARASCOPIC CONVERTED TO OPEN PRIMARY INCISIONAL HERNIA REPAIR;  Surgeon: Keyon Qureshi MD;  Location: [facility];  Service:     HYSTERECTOMY  1985    [id number]    LAMINECTOMY LUMBAR TWO LEVELS  2006    LAPAROSCOPY N/A 08/22/2019    Procedure: DIAGNOSTIC LAPAROSCOPY, LYSIS OF ADHESION;  Surgeon: Svtelana Ron MD;  Location: [facility];  Service: General    LUMBAR HARDWARE REMOVAL[  03/22/2012    REMOVAL LUMBAR HARDWARE 03/22/2012   L3-S1; Type CD Horizon m8 instrumentation Dr. Murray    RELEASE CARPAL TUNNEL      TONSILLECTOMY  1954    ???    TOTAL KNEE ARTHROPLASTY Right 11/27/2017    Procedure:  RIGHT TOTAL KNEE ARTHROPLASTY;  Surgeon: Kevin Ryder MD;  Location: [facility];  Service: Orthopedics    US THYROID BIOPSY  04/19/2019      Allergies   Allergen Reactions    Ace Inhibitors Other (See Comments)     Elevates blood pressure    Amitriptyline Hcl Other (See Comments)     elevates blood pressure    Atenolol Other (See Comments)     Aggravates reynauds    Celebrex [Celecoxib] GI Disturbance    Cephalexin Nausea and Vomiting    Clonidine Unknown     \"got very ill in ER\"    Codeine Sulfate " "Nausea and Vomiting    Darvocet [Propoxyphene N-Apap] Nausea and Vomiting    Dexamethasone Acetate Swelling    Erythromycin Nausea and Vomiting    Escitalopram Other (See Comments)     Headaches.      Propoxyphene      HUT Reaction: Gastrointestinal; HUT Reaction: Nausea And Vomiting; HUT Noted: [id number]    Sodium     Tramadol Swelling     Swelling of tongue and face    Tramadol-Acetaminophen Other (See Comments)    Triamterene     Venlafaxine Nausea and Vomiting and Diarrhea    Zolpidem Other (See Comments) and Unknown     Pt doesn't rember      Bacitracin Itching and Rash    Cephalosporins Unknown     Pt doesn't remember       Gabapentin Itching and Rash     \"Spotted\"    Hctz Unknown     \"depletes my sodium\"    Potassium GI Disturbance    Sulfanilamide Rash    Zolpidem Tartrate Unknown      Social History     Tobacco Use    Smoking status: Never     Passive exposure: Never    Smokeless tobacco: Never   Substance Use Topics    Alcohol use: Yes     Comment: Alcoholic Drinks/day: 1 cocktail daily      Wt Readings from Last 1 Encounters:   04/04/24 83.7 kg (184 lb 9.6 oz)        Anesthesia Evaluation   Pt has had prior anesthetic.     History of anesthetic complications  - PONV.      ROS/MED HX  ENT/Pulmonary:     (+) sleep apnea,                                       Neurologic: Comment: fibromyalgia      Cardiovascular:     (+)  hypertension- -  CAD -  - -                                      METS/Exercise Tolerance:     Hematologic:     (+) History of blood clots,               Musculoskeletal:       GI/Hepatic:     (+) GERD (no symptoms currently),                   Renal/Genitourinary:  - neg Renal ROS     Endo:     (+)          thyroid problem,     Obesity,       Psychiatric/Substance Use:       Infectious Disease:       Malignancy:       Other:            Physical Exam    Airway        Mallampati: II   TM distance: > 3 FB   Neck ROM: full   Mouth opening: > 3 cm    Respiratory Devices and Support         Dental "       (+) Minor Abnormalities - some fillings, tiny chips      Cardiovascular   cardiovascular exam normal          Pulmonary   pulmonary exam normal                OUTSIDE LABS:  CBC:   Lab Results   Component Value Date    WBC 8.1 03/21/2024    WBC 5.8 12/22/2023    HGB 11.3 (L) 03/21/2024    HGB 10.1 01/11/2024    HCT 36.1 03/21/2024    HCT 33.8 (L) 12/22/2023     03/21/2024     12/22/2023     BMP:   Lab Results   Component Value Date     03/21/2024     (L) 12/22/2023    POTASSIUM 4.2 03/21/2024    POTASSIUM 4.3 12/22/2023    CHLORIDE 102 03/21/2024    CHLORIDE 99 12/22/2023    CO2 26 03/21/2024    CO2 26 12/22/2023    BUN 21.3 03/21/2024    BUN 26.8 (H) 12/22/2023    CR 0.78 03/21/2024    CR 0.85 12/22/2023    GLC 94 03/21/2024    GLC 93 12/22/2023     COAGS:   Lab Results   Component Value Date    PTT 27 11/27/2022    INR 1.07 11/27/2022     POC:   Lab Results   Component Value Date     (H) 11/01/2013     HEPATIC:   Lab Results   Component Value Date    ALBUMIN 4.2 12/22/2023    PROTTOTAL 7.1 12/22/2023    ALT 25 12/22/2023    AST 36 12/22/2023    ALKPHOS 101 12/22/2023    BILITOTAL 0.5 12/22/2023     OTHER:   Lab Results   Component Value Date    ISSA 9.0 03/21/2024    PHOS 3.4 06/10/2021    MAG 1.9 11/27/2022    LIPASE 19 10/18/2019    AMYLASE 30 10/18/2019    TSH 1.82 12/22/2023    CRP 0.4 11/07/2019    SED 19 02/19/2024       Anesthesia Plan    ASA Status:  3    NPO Status:  NPO Appropriate    Anesthesia Type: MAC.              Consents    Anesthesia Plan(s) and associated risks, benefits, and realistic alternatives discussed. Questions answered and patient/representative(s) expressed understanding.     - Discussed: Risks, Benefits and Alternatives for the PROCEDURE were discussed     - Discussed with:  Patient            Postoperative Care    Pain management: IV analgesics, Oral pain medications.   PONV prophylaxis: Ondansetron (or other 5HT-3), Dexamethasone or Solumedrol  "    Comments:               Bill Daniel MD    I have reviewed the pertinent notes and labs in the chart from the past 30 days and (re)examined the patient.  Any updates or changes from those notes are reflected in this note.             # Drug Induced Platelet Defect: home medication list includes an antiplatelet medication  # Obesity: Estimated body mass index is 34.88 kg/m  as calculated from the following:    Height as of this encounter: 1.549 m (5' 1\").    Weight as of this encounter: 83.7 kg (184 lb 9.6 oz).      "

## 2024-04-04 NOTE — ANESTHESIA POSTPROCEDURE EVALUATION
Patient: Suma Mark    Procedure: Procedure(s):  COLONOSCOPY WITH POLYPECTOMY       Anesthesia Type:  MAC    Note:     Postop Pain Control: Uneventful            Sign Out: Well controlled pain   PONV: No   Neuro/Psych: Uneventful            Sign Out: Acceptable/Baseline neuro status   Airway/Respiratory: Uneventful            Sign Out: Acceptable/Baseline resp. status   CV/Hemodynamics: Uneventful            Sign Out: Acceptable CV status; No obvious hypovolemia; No obvious fluid overload   Other NRE: NONE   DID A NON-ROUTINE EVENT OCCUR? No           Last vitals:  Vitals Value Taken Time   /67 04/04/24 0950   Temp 36.1  C (97  F) 04/04/24 1000   Pulse 66 04/04/24 0954   Resp 12 04/04/24 0950   SpO2 95 % 04/04/24 0954   Vitals shown include unfiled device data.    Electronically Signed By: Bill Daniel MD

## 2024-04-05 LAB
PATH REPORT.COMMENTS IMP SPEC: NORMAL
PATH REPORT.COMMENTS IMP SPEC: NORMAL
PATH REPORT.FINAL DX SPEC: NORMAL
PATH REPORT.GROSS SPEC: NORMAL
PATH REPORT.MICROSCOPIC SPEC OTHER STN: NORMAL
PATH REPORT.RELEVANT HX SPEC: NORMAL
PHOTO IMAGE: NORMAL

## 2024-04-05 PROCEDURE — 88305 TISSUE EXAM BY PATHOLOGIST: CPT | Mod: 26 | Performed by: PATHOLOGY

## 2024-04-08 ENCOUNTER — TELEPHONE (OUTPATIENT)
Dept: INTERNAL MEDICINE | Facility: CLINIC | Age: 74
End: 2024-04-08
Payer: MEDICARE

## 2024-04-08 NOTE — TELEPHONE ENCOUNTER
FYI: for provider.    RN received call from [facility] to confirm that patient is NOT taking or blood thinners.  Per current Med List: Pre-Op on 3/21/24.    Current Outpatient Medications   Medication Sig Dispense Refill    amLODIPine (NORVASC) 5 MG tablet TAKE 1 TABLET BY MOUTH EVERYDAY AT BEDTIME 90 tablet 1    aspirin 81 MG EC tablet Take 1 tablet (81 mg) by mouth daily      atorvastatin (LIPITOR) 80 MG tablet TAKE 1 TABLET BY MOUTH AT BEDTIME 30 tablet 11    botulinum toxin type A (BOTOX) 100 units injection Every 3 months. [facility]      busPIRone (BUSPAR) 15 MG tablet Take 1 tablet (15 mg) by mouth 3 times daily 90 tablet 3    cholecalciferol 50 MCG (2000 UT) CAPS Take 2,000 Units by mouth daily      citalopram (CELEXA) 40 MG tablet Take 1 tablet (40 mg) by mouth daily 90 tablet 2    [employer] DICLOFENAC SODIUM 1 % topical gel APPLY 2-4 G TOPICALLY 4 TIMES DAILY 150 g 3    eletriptan (RELPAX) 40 MG tablet Take 1 tablet (40 mg) by mouth at onset of headache for migraine 10 tablet 0    furosemide (LASIX) 20 MG tablet TAKE 2 TABLETS BY MOUTH EVERY  tablet 3    HYDROcodone-acetaminophen (NORCO) 5-325 MG tablet Take 1 tablet by mouth daily as needed (Severe headache) 5 tablet 0    hydrocortisone 2.5 % cream APPLY TO CHEEK RASH TWICE A DAY FOR 1 WEEK      irbesartan (AVAPRO) 150 MG tablet TAKE 1 TABLET BY MOUTH EVERY DAY 90 tablet 3    isosorbide mononitrate (IMDUR) 30 MG 24 hr tablet TAKE 1 TABLET BY MOUTH EVERY DAY 30 tablet 7    levothyroxine (SYNTHROID/LEVOTHROID) 88 MCG tablet TAKE 1 TABLET BY MOUTH DAILY AT 6:00 AM. 90 tablet 2    LORazepam (ATIVAN) 0.5 MG tablet TAKE 1 TAB BY MOUTH NIGHTLY AS NEEDED FOR ANXIETY OR SLEEP 30 tablet 0    nitroGLYcerin (NITROSTAT) 0.4 MG sublingual tablet For chest pain place 1 tablet under the tongue every 5 minutes for 3 doses. If symptoms persist 5 minutes after 1st dose call 911. 30 tablet 1    pramipexole (MIRAPEX) 0.25 MG tablet TAKE 1 TABLET BY MOUTH THREE TIMES A   tablet 0    sotalol (BETAPACE) 80 MG tablet Take 1 tablet (80 mg) by mouth every 12 hours 180 tablet 3    tiZANidine (ZANAFLEX) 4 MG tablet Take 1 tablet (4 mg) by mouth 3 times daily 60 tablet 0     No current facility-administered medications for this visit.     Facility-Administered Medications Ordered in Other Visits   Medication Dose Route Frequency Provider Last Rate Last Admin    iodixanol (VISIPAQUE 320) injection    Once PRN Bret Jin MD   65 mL at 06/09/23 0933

## 2024-04-18 ENCOUNTER — PATIENT OUTREACH (OUTPATIENT)
Dept: GASTROENTEROLOGY | Facility: CLINIC | Age: 74
End: 2024-04-18
Payer: MEDICARE

## 2024-04-27 DIAGNOSIS — G25.81 RESTLESS LEGS SYNDROME: ICD-10-CM

## 2024-04-29 DIAGNOSIS — F41.9 ANXIETY: ICD-10-CM

## 2024-04-29 RX ORDER — LORAZEPAM 0.5 MG/1
TABLET ORAL
Qty: 30 TABLET | Refills: 0 | Status: SHIPPED | OUTPATIENT
Start: 2024-04-29 | End: 2024-07-08

## 2024-04-29 RX ORDER — PRAMIPEXOLE DIHYDROCHLORIDE 0.25 MG/1
TABLET ORAL
Qty: 270 TABLET | Refills: 0 | Status: SHIPPED | OUTPATIENT
Start: 2024-04-29 | End: 2024-07-16

## 2024-05-01 DIAGNOSIS — M79.675 PAIN OF TOE OF LEFT FOOT: ICD-10-CM

## 2024-05-02 RX ORDER — NAPROXEN 500 MG/1
TABLET ORAL
Qty: 20 TABLET | Refills: 0 | Status: SHIPPED | OUTPATIENT
Start: 2024-05-02 | End: 2024-06-04

## 2024-05-10 DIAGNOSIS — I10 ESSENTIAL (PRIMARY) HYPERTENSION: ICD-10-CM

## 2024-05-10 RX ORDER — AMLODIPINE BESYLATE 5 MG/1
TABLET ORAL
Qty: 90 TABLET | Refills: 3 | Status: SHIPPED | OUTPATIENT
Start: 2024-05-10 | End: 2024-08-15

## 2024-05-14 ENCOUNTER — OFFICE VISIT (OUTPATIENT)
Dept: CARDIOLOGY | Facility: CLINIC | Age: 74
End: 2024-05-14
Payer: MEDICARE

## 2024-05-14 VITALS
BODY MASS INDEX: 34.2 KG/M2 | RESPIRATION RATE: 16 BRPM | OXYGEN SATURATION: 97 % | DIASTOLIC BLOOD PRESSURE: 69 MMHG | SYSTOLIC BLOOD PRESSURE: 119 MMHG | WEIGHT: 181 LBS | HEART RATE: 70 BPM

## 2024-05-14 DIAGNOSIS — F41.9 ANXIETY: ICD-10-CM

## 2024-05-14 DIAGNOSIS — Z95.818 PRESENCE OF WATCHMAN LEFT ATRIAL APPENDAGE CLOSURE DEVICE: Chronic | ICD-10-CM

## 2024-05-14 DIAGNOSIS — I48.0 PAROXYSMAL ATRIAL FIBRILLATION (H): Primary | Chronic | ICD-10-CM

## 2024-05-14 PROCEDURE — 99215 OFFICE O/P EST HI 40 MIN: CPT | Performed by: NURSE PRACTITIONER

## 2024-05-14 NOTE — PATIENT INSTRUCTIONS
Information on Atrial Fibrillation Ablations    What is Catheter Ablation?  A Catheter Ablation is a procedure that treats certain types of abnormal heart rhythms (arrhythmia). There are several components to the procedure, but the final purpose is target and destroy (ablate) small areas of your heart muscle that are causing the arrhythmia.     Why is an Ablations Done?  A catheter ablation is an effective way to treat some types of abnormal heart rhythms. An ablation is a relatively low risk procedure that may permanently cure your abnormal heart rhythm.  The ablation process damages the heart cells which results in scarring of that area. The scar is electrically inactive and can produce a permanent cure for the abnormal rhythm.  Ablation procedures can help avoid the need for rhythm medications and give patients the ability to return to their normal activity and live an active life. In patients that do not have symptoms, ablations are not typically done as there may still be an increased risk of stroke.    Why is Catheter Ablation Done?  Sometimes, the heart s electrical system does not work properly.  This can cause abnormal heart rhythms, called arrhythmias.  During an arrhythmia, the heart may beat too fast, too slowly, or irregularly.  Your doctor has recommended catheter ablation to treat a rapid (fast) heart rhythm, or tachycardia.      How Catheter Ablation Is Done  Catheter ablation uses thin, flexible wires called electrode catheters to find and destroy (ablate) problem cells.     Here is how the procedure is done:  The pulmonary veins will be treated first. There are currently two tools used to ablate around the pulmonary veins.  Radiofrequency catheter will heat the tissue.  Cryo-balloon catheter will freeze the tissue.   Testing will be done to confirm that effective treatment has been delivered.   Further testing may be performed to see if a fib is still present or if some other rhythm problem  such as atrial flutter is present. If an ongoing rhythm problem is discovered then further ablation can be done to isolate and destroy those areas responsible for the arrhythmia.     Your  Experience during Catheter Ablation    In most cases, catheter ablations are done in an electrophysiology (EP) lab.  The procedural area: You will be transferred back to the procedure room once you have been appropriately prepped by the nursing staff and you are ready for your ablation.   Sedation: You to be put completely asleep for your ablation using general anesthesia.  Inserting the catheters: You will have 3-4 catheters inserted into the veins. Catheter locations can include the shoulder, neck, and groins. Catheters are guided to the heart with the help of ultrasound and x-ray monitors.  Finishing up: When the procedure is finished, the catheters are taken out of your body. A special closure device or suture may be used to help seal these puncture sites. You re then taken to your room to rest, and will be cared for by a nurse during your recovery.    Risks and Complications  The risks of catheter ablation are fairly low compared to the benefits you receive. Possible risks and complications include:  Common (up to 10%)  Bleeding or bruising  Shortness of breath  Heartburn  Uncommon (< 1%)  Blood clots  A slow heart rhythm (requiring a permanent pacemaker)  Perforation of the heart muscle, blood vessel, or lung (may require an emergency procedure)  Stroke  Damage to a heart valve   Heart attack, also known as acute myocardial infarction, or AMI   Death (extremely rare)    Before your Catheter Ablation  Before your catheter ablation, you will meet with the Electrophysiologist (specially trained heart doctor) who will do the procedure. The provider or a registered nurse will provide you with detailed instructions on how to prepare for this procedure, some of these instructions are listed below.  You will likely be told to stop  or change your heart rhythm medications for a period of time before your ablation.   You may have testing done several days prior to your ablation or the morning of your ablation, such as an ECG, x-ray, blood tests, or echocardiogram.   You will not be allowed to eat or drink 8 hours before your ablation. You will be given further instructions by your physician or a registered nurse regarding the medication you will take the morning of your procedure.  You will need to arrange to have a  home from the hospital; you will not be permitted to drive after your procedure due to the sedation that you receive.   You are allowed to bring personal items and clothing to the hospital, but please refrain from bringing any valuables as the hospital is not responsible for any lost or stolen items.  You will need to bring a list of the names and dosages of the medication you are taking to the hospital.  It is important to mention to your doctor or registered nurse if you have any allergies, reactions to anesthesia, or have had history of bleeding problems.    Arriving at the Hospital the morning of your Catheter Ablation  Please check in at the time that was given to you by the , who scheduled your procedure. You do not need to arrive any earlier than the time that you were given.    When you arrive, you will be directed to the area where they will be performing your procedure. The doctor or registered nurse will meet with you prior to your ablation, this is a good time to ask questions and address any concerns you may have. You will then be asked to sign the consent form for your ablation, if this has not already been done.    The nursing staff will begin to prepare you for your procedure:  A nurse will shave and cleanse the area where the ablation catheters will be placed. These areas are most commonly the left and right groin sites (the fold between your thigh and abdomen), and in some cases the chest, arm, and  neck. This is done to reduce the risk of infection.    The nursing staff will start an intravenous (IV) line into a vein in your arm, which allows the staff to give you medication and sedatives to help you relax prior and during your ablation.  In some cases, the nursing staff will need to place a catheter that will drain urine from your bladder (Mcclain Catheter), which is required due to the length and complexity of the ablation you are having.    After Catheter Ablation  Recovery immediately after your ablation in the hospital  After your catheter ablation procedure, you will be taken to a recovery room. After your ablation, you may be required to lay flat or be on bed rest. During this time, a nurse will monitor you, and you will be given medication to make you comfortable.     Going Home  When it is time to go home, your will need to have an adult family member or friend drive you. Most people can walk, climb stairs, and perform light activity soon after catheter ablation. You can most likely return to your full routine within a few days. However, you may be told to avoid running, heavy lifting, and other strenuous activities for a short time. Please make sure to follow any specific activity restrictions provided by the medical staff at the time of your discharge from the hospital.  Doctor's typically advise that you not drive until your post procedure assessment visit.   Avoid heavy physical activity and heavy lifting for several days after the procedure to allow your body to heal.  Ask your doctor when you can expect to return to work.  Take your temperature and check your incision for signs of infection (redness, swelling, drainage, or warmth) every day for a week. It is normal to have a small bruise or lump where the catheter was inserted.  Take your medications exactly as directed. Do not skip doses or stop medication without consulting your physician prior.  Learn to take your own pulse and keep a record of  your results.    Follow-Up  After your ablations you will have a follow up visit to see how you are doing, to assess your rhythm after your ablation, and to address any medication changes if necessary. In many cases, one ablation is enough to treat an arrhythmia. However, sometimes the problem returns or another is found. If this happens, you may need a second catheter ablation. Tell your healthcare provider if you have any new or returning symptoms.    Common Symptoms after Catheter Ablation  In the first few weeks after catheter ablation, you may feel mild chest fullness or aching. You may also feel as if your heart is skipping beats or your heartbeat may feel faster than normal. You may think that your heart rhythm problem is about to return. These sensations are normal and usually go away with time. Talk to your healthcare provider if you are concerned.      When to Call Your Doctor  Increased bleeding, bruising, or pain at the insertion site  Episodes of atrial fibrillation are common post procedure, call the clinic if episodes are lasting longer than 4-6 hours  Difficulty with your speech or walking, or any visual disturbance  Lightheaded, dizziness, or feeling faint  Shortness of breath or chest pain  Coldness, swelling, or numbness of the arm or leg near the insertion site  A bruise or lump at the insertion site that is larger than a walnut  A fever over 100 F

## 2024-05-14 NOTE — PROGRESS NOTES
[facility]  Cardiac Electrophysiology  [address]  [place] [address]   Office: [phone]  Fax: [phone]     HEART CARE ELECTROPHYSIOLOGY FOLLOW UP    Primary Care: Bernadette Taylor MD      Assessment/Recommendations     Paroxysmal atrial fibrillation: Symptomatic with chest discomfort, palpitations, dyspnea on exertion.  We discussed progression of atrial fibrillation, management options including continuation of antiarrhythmic drug therapy and catheter ablation indications, risk and benefits, recovery and expected prognosis, particularly with limited options for medical management secondary to sinus bradycardia and CAD/PCI.  She would like to consider ablation further    Presence of Watchman percutaneous left atrial appendage closure: [id number] score of 6 for age >75, gender, hypertension, cardiomyopathy and coronary artery disease; HAS-BLED score of 3 for age, bleeding disposition and current use antiplatelet therapy  Sp LAAC 6/22/2023 with DIANA 9/27/2023 unremarkable for peridevice flow or thrombus.  Follows with GI for reflux esophagitis, gastritis with hx STEPHANI both on OAC and DAPT post-LAAC.  We discussed resuming OAC temporarily before and after ablation    MODIFIED JUAN CARLOS SCALE   1 year post-LAAC implant  Previous score: 0    Score Description     0 No symptoms at all   1 No significant disability despite symptoms; able to carry out all usual duties and activities   2 Slight disability; unable to carry out all previous activities, but able to look after own affairs without assistance   3 Moderate disability; requiring some help, but able to walk without assistance   4 Moderately severe disability; unable to walk without assistance and unable to attend to own bodily needs without assistance   5 Severe disability; bedridden, incontinent and requiring constant nursing care and attention   6 Dead    Total score (0 - 6):  0  Change in score? No   [If yes, notify  implanting cardiologist]    Plan:   -Continue aspirin 81 mg daily for stroke prophylaxis  -Continue sotalol 80 mg twice a day  -Follow-up with EP MD for further discussion of ablation     History of Present Illness/Subjective    Suma Mark is a 73 year old female with past medical history significant for paroxysmal atrial fibrillation, status post percutaneous left atrial appendage closure 6/22/2023, coronary artery disease status post PCI 2022, HFpEF, hypertension, venous insufficiency, esophagitis and gastritis with STEPHANI, fibromyalgia, hypothyroidism, obesity, anxiety.  She was diagnosed with paroxysmal AF by DERIC 10/2022 and started on sotalol following recurrent and symptomatic paroxysmal AF 11/27/2022. Since our last visit she underwent percutaneous left atrial appendage closure 6/22/2023. She had to hold her Plavix 2 months after procedure following biopsy, multiple iron infusions and Hemoclip.    She notes racing heart, pounding in her chest and dyspnea occurring a few times a week, primarily at rest or going to sleep, lasting up to 15 minutes.  She denies lightheadedness/dizziness, pedal edema, or syncope.  [place]     Data Review     Arrhythmia hx  Dx/date: Paroxysmal AF by ED telemetry 2022, further documented by DERIC 10/28/2022  Sx: Chest discomfort, palpitations, decreased activity tolerance, dyspnea on exertion  [id number]:  6 for age >75, gender, hypertension, cardiomyopathy and coronary artery disease; HAS-BLED score of 3 for age, bleeding disposition and current use antiplatelet therapy. Sp LAAC 6/22/2023  Rate control: Metoprolol  AAD: Sotalol (2022-present)  DCCV: None  Ablation: None    EKG 3/21/2024: SB 58 bpm, QT/QTc 458/449 ms  11/27/2022:  bpm    DIANA 9/27/2023  Patient was sedated using Versed 2 mg. The heart rate, respiratory rate,  oxygen saturations, blood pressure, and response to care were  monitored  throughout the procedure with the assistance of the nurse.  Left ventricular size, wall motion and function are normal. The ejection  fraction is 60-65%.  Normal right ventricle size and systolic function.  Doppler suggests left to right interatrial shunt.  Watchman device noted in left atrial appendage. The device is well seated with  no leakage by color flow Doppler imaging.  Thrombus absent on left atrial appendage occlusion device.  Moderate atherosclerotic plaque(s) in the descending aorta.    ==    EK2023: sinus bradycardia at 57 bpm, QRS 86 ms, QT/QTc 430/418 ms  2023: Sinus rhythm at 63 bpm, QRS 90 ms, QT/QTc 448/458 ms  Personally reviewed.      Complete TTE: 2023  The left ventricle is normal in size with mild concentric left ventricular  hypertrophy.  Left ventricular function is normal.The ejection fraction is 60-65%.  Normal right ventricle size and systolic function.  Both atria are of normal size.  No significant valve disease identified.  There is no comparison study available.    Cardiac event monitoring from 2023 to 2023 (monitored duration 6d 8h 44m).  Baseline rhythm was sinus rhythm 73bpm.    Average heart rate 64bpm, maximum 144bpm..  No symptoms recorded.  Paroxysmal atrial fibrillation eg. 2023 23:17:50, 2023 22:48:27.    There were no pauses noted.  Atrial fibrillation episodes are incompletely characterized on this monitoring modality.  Supraventricular and ventricular ectopic beat frequency are not reported on this monitoring modality.       Left heart cath: 2023  1.  Left circumflex stent remains widely patent  2.  Mid/distal LAD stent remains widely patent  3.  Moderate proximal and apical disease in the LAD, proximal RCA, and distal RCA appear unchanged from prior study 2022.  No new or significant progression of underlying CAD.    I have reviewed and updated the patient's past medical history, allergy list and medication  list.          Physical Examination   Vitals: LMP  (LMP Unknown)     BMI= There is no height or weight on file to calculate BMI.    Wt Readings from Last 3 Encounters:   04/04/24 83.7 kg (184 lb 9.6 oz)   03/21/24 85.7 kg (189 lb)   02/13/24 83.9 kg (184 lb 14.4 oz)       General   Appearance:   Alert and oriented, in no acute distress.    HEENT:  Normocephalic and atraumatic. Conjunctiva and sclera are clear. Moist oral mucosa.    Neck: No JVP, carotid bruit or obvious thyromegaly.   Lungs:   Respirations unlabored. Clear bilaterally with no rales, rhonchi, or wheezes.     Cardiovascular:   Rhythm is regular. S1 and S2 are normal. No significant murmur is present. Lower extremities demonstrate no significant edema. Posterior tibial pulses are intact bilaterally.   Extremities: No cyanosis or clubbing   Skin: Skin is warm, dry, and otherwise intact.   Neurologic: Gait not assessed. Mood and affect appropriate.           Medical History  Surgical History Family History Social History   Past Medical History:   Diagnosis Date    Anxiety state, unspecified     Back pain     Diverticulitis 12/07/2017    DJD (degenerative joint disease)     Essential hypertension, benign     Fibromyalgia     Gastro-oesophageal reflux disease     Heart disease     Hernia, ventral 04/27/2017    History of anesthesia complications     hypotension after surgery    History of blood transfusion     Hyperlipidemia     Hyponatremia 12/07/2017    Major depression     Migraine     Osteopenia     PONV (postoperative nausea and vomiting)     Posttraumatic stress disorder     Raynaud's disease     Restless leg syndrome     S/P total knee replacement 11/27/2017    Sepsis (H) 03/25/2019    Sleep apnea     Thrombosis of leg     with pregnancy    Unspecified hypothyroidism     Past Surgical History:   Procedure Laterality Date    ARTHROPLASTY KNEE UNICOMPARTMENT  10/31/2013    Procedure: ARTHROPLASTY KNEE UNICOMPARTMENT;  LEFT KNEE UNICOMPARTMENTAL  ARTHROPLASTY (CAROLINE)^;  Surgeon: Chi Mittal MD;  Location:  OR    BREAST SURGERY      bx    COLECTOMY N/A 06/02/2017    Procedure: RESECTION, SMALL INTESTINE, OPEN ADHESIOLYSIS;  Surgeon: Keyon Qureshi MD;  Location: [facility] OR;  Service:     COLONOSCOPY N/A 4/4/2024    Procedure: COLONOSCOPY WITH POLYPECTOMY;  Surgeon: Chris Edmonds MD;  Location: [facility] OR    CV CORONARY ANGIOGRAM N/A 10/12/2022    Procedure: CV CORONARY ANGIOGRAM;  Surgeon: You Martinez MD;  Location: [facility] CV    CV CORONARY ANGIOGRAM N/A 6/9/2023    Procedure: Coronary Angiogram;  Surgeon: Bret Jin MD;  Location: [facility]    CV FRACTIONAL FLOW RATIO WIRE N/A 10/12/2022    Procedure: Fractional Flow Ratio Wire;  Surgeon: You Martinez MD;  Location: [facility]    CV LEFT ATRIAL APPENDAGE CLOSURE Right 6/22/2023    Procedure: Left Atrial Appendage Closure;  Surgeon: Lenin Mariano MD;  Location: [facility]    CV LEFT HEART CATH N/A 6/9/2023    Procedure: Left Heart Catheterization;  Surgeon: Bret Jin MD;  Location: [facility] CV    CV PCI STENT DRUG ELUTING N/A 10/12/2022    Procedure: Percutaneous Coronary Intervention Stent;  Surgeon: You Martinez MD;  Location: [facility] CV    CV PCI STENT DRUG ELUTING N/A 11/08/2022    Procedure: Percutaneous Coronary Intervention - Stent;  Surgeon: Bret Jin MD;  Location: [facility] CV    ENDOSCOPIC RETROGRADE CHOLANGIOPANCREATOGRAPHY      ENDOSCOPIC STRIPPING VEIN(S)      BILATERLY    GENITOURINARY SURGERY      bladder sling    GI SURGERY  10/02/2015    Rectal prolaspse. s/p Abdominal rectopexy, sacral colpopexy, proctoscopy, cystoscopy    HERNIORRHAPHY INCISIONAL (LOCATION) N/A 06/02/2017    Procedure: LAPARASCOPIC CONVERTED TO OPEN PRIMARY INCISIONAL HERNIA REPAIR;  Surgeon: Keyon Qureshi MD;  Location: [facility];  Service:     HYSTERECTOMY  1985     [id number]    LAMINECTOMY LUMBAR TWO LEVELS  2006    LAPAROSCOPY N/A 08/22/2019    Procedure: DIAGNOSTIC LAPAROSCOPY, LYSIS OF ADHESION;  Surgeon: Svetlana Ron MD;  Location: [facility] OR;  Service: General    LUMBAR HARDWARE REMOVAL[  03/22/2012    REMOVAL LUMBAR HARDWARE 03/22/2012   L3-S1; Type CD Horizon m8 instrumentation Dr. Murray    RELEASE CARPAL TUNNEL      TONSILLECTOMY  1954    ???    TOTAL KNEE ARTHROPLASTY Right 11/27/2017    Procedure:  RIGHT TOTAL KNEE ARTHROPLASTY;  Surgeon: Kevin Ryder MD;  Location: [facility] OR;  Service: Orthopedics    US THYROID BIOPSY  04/19/2019    Family History   Problem Relation Age of Onset    Dementia Mother     Arthritis Mother     Chronic Obstructive Pulmonary Disease Father     Cardiovascular Father     Hypertension Father     No Known Problems Sister     No Known Problems Daughter     No Known Problems Son     Cerebrovascular Disease Maternal Grandmother     Heart Disease Paternal Grandmother     Cerebrovascular Disease Paternal Grandmother     No Known Problems Sister     No Known Problems Sister     No Known Problems Son     No Known Problems Daughter     Breast Cancer Paternal Aunt         age in 50's    Social History     Socioeconomic History    Marital status:      Spouse name: Not on file    Number of children: Not on file    Years of education: Not on file    Highest education level: Not on file   Occupational History    Occupation: , occasionally still subs     Employer: RETIRED   Tobacco Use    Smoking status: Never     Passive exposure: Never    Smokeless tobacco: Never   Vaping Use    Vaping status: Never Used   Substance and Sexual Activity    Alcohol use: Yes     Comment: Alcoholic Drinks/day: 1 cocktail daily    Drug use: No    Sexual activity: Not on file   Other Topics Concern    Not on file   Social History Narrative    Not on file     Social Determinants of Health     Financial Resource Strain: Low Risk   (1/20/2024)    Financial Resource Strain     Within the past 12 months, have you or your family members you live with been unable to get utilities (heat, electricity) when it was really needed?: No   Food Insecurity: Low Risk  (1/20/2024)    Food Insecurity     Within the past 12 months, did you worry that your food would run out before you got money to buy more?: No     Within the past 12 months, did the food you bought just not last and you didn t have money to get more?: No   Transportation Needs: Low Risk  (1/20/2024)    Transportation Needs     Within the past 12 months, has lack of transportation kept you from medical appointments, getting your medicines, non-medical meetings or appointments, work, or from getting things that you need?: No   Physical Activity: Not on file   Stress: Not on file   Social Connections: Not on file   Interpersonal Safety: Low Risk  (10/26/2023)    Interpersonal Safety     Do you feel physically and emotionally safe where you currently live?: Yes     Within the past 12 months, have you been hit, slapped, kicked or otherwise physically hurt by someone?: No     Within the past 12 months, have you been humiliated or emotionally abused in other ways by your partner or ex-partner?: No   Housing Stability: Low Risk  (1/20/2024)    Housing Stability     Do you have housing? : Yes     Are you worried about losing your housing?: No          Medications  Allergies   Scheduled Meds:  Current Outpatient Medications   Medication Sig Dispense Refill    amLODIPine (NORVASC) 5 MG tablet TAKE 1 TABLET BY MOUTH EVERYDAY AT BEDTIME 90 tablet 3    aspirin 81 MG EC tablet Take 1 tablet (81 mg) by mouth daily      atorvastatin (LIPITOR) 80 MG tablet TAKE 1 TABLET BY MOUTH AT BEDTIME 30 tablet 11    botulinum toxin type A (BOTOX) 100 units injection Every 3 months. [facility]      busPIRone (BUSPAR) 15 MG tablet Take 1 tablet (15 mg) by mouth 3 times daily 90 tablet 3    cholecalciferol 50 MCG (2000 UT)  "CAPS Take 2,000 Units by mouth daily      citalopram (CELEXA) 40 MG tablet Take 1 tablet (40 mg) by mouth daily 90 tablet 2    [employer] DICLOFENAC SODIUM 1 % topical gel APPLY 2-4 G TOPICALLY 4 TIMES DAILY 150 g 3    eletriptan (RELPAX) 40 MG tablet Take 1 tablet (40 mg) by mouth at onset of headache for migraine 10 tablet 0    furosemide (LASIX) 20 MG tablet TAKE 2 TABLETS BY MOUTH EVERY  tablet 3    HYDROcodone-acetaminophen (NORCO) 5-325 MG tablet Take 1 tablet by mouth daily as needed (Severe headache) 5 tablet 0    hydrocortisone 2.5 % cream APPLY TO CHEEK RASH TWICE A DAY FOR 1 WEEK      irbesartan (AVAPRO) 150 MG tablet TAKE 1 TABLET BY MOUTH EVERY DAY 90 tablet 3    isosorbide mononitrate (IMDUR) 30 MG 24 hr tablet TAKE 1 TABLET BY MOUTH EVERY DAY 30 tablet 7    levothyroxine (SYNTHROID/LEVOTHROID) 88 MCG tablet TAKE 1 TABLET BY MOUTH DAILY AT 6:00 AM. 90 tablet 2    LORazepam (ATIVAN) 0.5 MG tablet TAKE 1 TAB BY MOUTH NIGHTLY AS NEEDED FOR ANXIETY OR SLEEP 30 tablet 0    naproxen (NAPROSYN) 500 MG tablet TAKE 1 TABLET BY MOUTH TWICE A DAY WITH MEALS FOR 10 DAYS 20 tablet 0    nitroGLYcerin (NITROSTAT) 0.4 MG sublingual tablet For chest pain place 1 tablet under the tongue every 5 minutes for 3 doses. If symptoms persist 5 minutes after 1st dose call 911. 30 tablet 1    pramipexole (MIRAPEX) 0.25 MG tablet TAKE 1 TABLET BY MOUTH THREE TIMES A  tablet 0    sotalol (BETAPACE) 80 MG tablet Take 1 tablet (80 mg) by mouth every 12 hours 180 tablet 3    tiZANidine (ZANAFLEX) 4 MG tablet Take 1 tablet (4 mg) by mouth 3 times daily 60 tablet 0    Allergies   Allergen Reactions    Ace Inhibitors Other (See Comments)     Elevates blood pressure    Amitriptyline Hcl Other (See Comments)     elevates blood pressure    Atenolol Other (See Comments)     Aggravates reynauds    Celebrex [Celecoxib] GI Disturbance    Cephalexin Nausea and Vomiting    Clonidine Unknown     \"got very ill in ER\"    Codeine Sulfate " "Nausea and Vomiting    Darvocet [Propoxyphene N-Apap] Nausea and Vomiting    Dexamethasone Acetate Swelling    Erythromycin Nausea and Vomiting    Escitalopram Other (See Comments)     Headaches.      Propoxyphene      HUT Reaction: Gastrointestinal; HUT Reaction: Nausea And Vomiting; HUT Noted: [id number]    Sodium     Tramadol Swelling     Swelling of tongue and face    Tramadol-Acetaminophen Other (See Comments)    Triamterene     Venlafaxine Nausea and Vomiting and Diarrhea    Zolpidem Other (See Comments) and Unknown     Pt doesn't rember      Bacitracin Itching and Rash    Cephalosporins Unknown     Pt doesn't remember       Gabapentin Itching and Rash     \"Spotted\"    Hctz Unknown     \"depletes my sodium\"    Potassium GI Disturbance    Sulfanilamide Rash    Zolpidem Tartrate Unknown         Lab Results    Chemistry/lipid CBC Cardiac Enzymes/BNP/TSH/INR   Lab Results   Component Value Date    CHOL 131 12/20/2023    HDL 60 12/20/2023    TRIG 99 12/20/2023    BUN 21.3 03/21/2024     03/21/2024    CO2 26 03/21/2024    Lab Results   Component Value Date    WBC 8.1 03/21/2024    HGB 11.3 (L) 03/21/2024    HCT 36.1 03/21/2024    MCV 86 03/21/2024     03/21/2024    @RESUFAST(BMP,CBC,BNP,TSH,  INR)@      48 minutes spent reviewing prior records (including documentation, laboratory studies, cardiac testing/imaging), history and physical exam, planning, and subsequent documentation.     The longitudinal plan of care for the diagnosis(es)/condition(s) as documented were addressed during this visit. Due to the added complexity in care, I will continue to support Lo in the subsequent management and with ongoing continuity of care.      This note has been dictated using voice recognition software. Any grammatical, typographical, or context distortions are unintentional and inherent to the software.    Marta Neil, CNP  Clinical Cardiac Electrophysiology  [facility]  Clinic and scheduling: " [phone]  Fax: [phone]  Electrophysiology Nurses: [phone]

## 2024-05-14 NOTE — LETTER
5/14/2024    Bernadette Taylor MD  [address] [place] [address]    RE: Suma Mark       Dear Colleague,     I had the pleasure of seeing Suma Mark in the [facility].     [facility]  Cardiac Electrophysiology  [address]  [place] [address]   Office: [phone]  Fax: [phone]     HEART CARE ELECTROPHYSIOLOGY FOLLOW UP    Primary Care: Bernadette Taylor MD      Assessment/Recommendations     Paroxysmal atrial fibrillation: Symptomatic with chest discomfort, palpitations, dyspnea on exertion.  We discussed progression of atrial fibrillation, management options including continuation of antiarrhythmic drug therapy and catheter ablation indications, risk and benefits, recovery and expected prognosis, particularly with limited options for medical management secondary to sinus bradycardia and CAD/PCI.  She would like to consider ablation further    Presence of Watchman percutaneous left atrial appendage closure: [id number] score of 6 for age >75, gender, hypertension, cardiomyopathy and coronary artery disease; HAS-BLED score of 3 for age, bleeding disposition and current use antiplatelet therapy  Sp LAAC 6/22/2023 with DIANA 9/27/2023 unremarkable for peridevice flow or thrombus.  Follows with GI for reflux esophagitis, gastritis with hx STEPHANI both on OAC and DAPT post-LAAC.  We discussed resuming OAC temporarily before and after ablation    MODIFIED JUAN CARLOS SCALE   1 year post-LAAC implant  Previous score: 0    Score Description     0 No symptoms at all   1 No significant disability despite symptoms; able to carry out all usual duties and activities   2 Slight disability; unable to carry out all previous activities, but able to look after own affairs without assistance   3 Moderate disability; requiring some help, but able to walk without assistance   4 Moderately severe disability; unable to walk without assistance and unable to attend  to own bodily needs without assistance   5 Severe disability; bedridden, incontinent and requiring constant nursing care and attention   6 Dead    Total score (0 - 6):  0  Change in score? No   [If yes, notify implanting cardiologist]    Plan:   -Continue aspirin 81 mg daily for stroke prophylaxis  -Continue sotalol 80 mg twice a day  -Follow-up with EP MD for further discussion of ablation     History of Present Illness/Subjective    Suma Mark is a 73 year old female with past medical history significant for paroxysmal atrial fibrillation, status post percutaneous left atrial appendage closure 6/22/2023, coronary artery disease status post PCI 2022, HFpEF, hypertension, venous insufficiency, esophagitis and gastritis with STEPHANI, fibromyalgia, hypothyroidism, obesity, anxiety.  She was diagnosed with paroxysmal AF by DERIC 10/2022 and started on sotalol following recurrent and symptomatic paroxysmal AF 11/27/2022. Since our last visit she underwent percutaneous left atrial appendage closure 6/22/2023. She had to hold her Plavix 2 months after procedure following biopsy, multiple iron infusions and Hemoclip.    She notes racing heart, pounding in her chest and dyspnea occurring a few times a week, primarily at rest or going to sleep, lasting up to 15 minutes.  She denies lightheadedness/dizziness, pedal edema, or syncope.  She is [place].     Data Review     Arrhythmia hx  Dx/date: Paroxysmal AF by ED telemetry 2022, further documented by DERIC 10/28/2022  Sx: Chest discomfort, palpitations, decreased activity tolerance, dyspnea on exertion  [id number]/OAC:  6 for age >75, gender, hypertension, cardiomyopathy and coronary artery disease; HAS-BLED score of 3 for age, bleeding disposition and current use antiplatelet therapy. Sp LAAC 6/22/2023  Rate control: Metoprolol  AAD: Sotalol (2022-present)  DCCV: None  Ablation: None    EKG 3/21/2024: SB 58 bpm,  QT/QTc 458/449 ms  2022:  bpm    DIANA 2023  Patient was sedated using Versed 2 mg. The heart rate, respiratory rate,  oxygen saturations, blood pressure, and response to care were monitored  throughout the procedure with the assistance of the nurse.  Left ventricular size, wall motion and function are normal. The ejection  fraction is 60-65%.  Normal right ventricle size and systolic function.  Doppler suggests left to right interatrial shunt.  Watchman device noted in left atrial appendage. The device is well seated with  no leakage by color flow Doppler imaging.  Thrombus absent on left atrial appendage occlusion device.  Moderate atherosclerotic plaque(s) in the descending aorta.    ==    EK2023: sinus bradycardia at 57 bpm, QRS 86 ms, QT/QTc 430/418 ms  2023: Sinus rhythm at 63 bpm, QRS 90 ms, QT/QTc 448/458 ms  Personally reviewed.      Complete TTE: 2023  The left ventricle is normal in size with mild concentric left ventricular  hypertrophy.  Left ventricular function is normal.The ejection fraction is 60-65%.  Normal right ventricle size and systolic function.  Both atria are of normal size.  No significant valve disease identified.  There is no comparison study available.    Cardiac event monitoring from 2023 to 2023 (monitored duration 6d 8h 44m).  Baseline rhythm was sinus rhythm 73bpm.    Average heart rate 64bpm, maximum 144bpm..  No symptoms recorded.  Paroxysmal atrial fibrillation eg. 2023 23:17:50, 2023 22:48:27.    There were no pauses noted.  Atrial fibrillation episodes are incompletely characterized on this monitoring modality.  Supraventricular and ventricular ectopic beat frequency are not reported on this monitoring modality.       Left heart cath: 2023  1.  Left circumflex stent remains widely patent  2.  Mid/distal LAD stent remains widely patent  3.  Moderate proximal and apical disease in the LAD, proximal RCA, and distal RCA  appear unchanged from prior study October 2022.  No new or significant progression of underlying CAD.    I have reviewed and updated the patient's past medical history, allergy list and medication list.          Physical Examination   Vitals: LMP  (LMP Unknown)     BMI= There is no height or weight on file to calculate BMI.    Wt Readings from Last 3 Encounters:   04/04/24 83.7 kg (184 lb 9.6 oz)   03/21/24 85.7 kg (189 lb)   02/13/24 83.9 kg (184 lb 14.4 oz)       General   Appearance:   Alert and oriented, in no acute distress.    HEENT:  Normocephalic and atraumatic. Conjunctiva and sclera are clear. Moist oral mucosa.    Neck: No JVP, carotid bruit or obvious thyromegaly.   Lungs:   Respirations unlabored. Clear bilaterally with no rales, rhonchi, or wheezes.     Cardiovascular:   Rhythm is regular. S1 and S2 are normal. No significant murmur is present. Lower extremities demonstrate no significant edema. Posterior tibial pulses are intact bilaterally.   Extremities: No cyanosis or clubbing   Skin: Skin is warm, dry, and otherwise intact.   Neurologic: Gait not assessed. Mood and affect appropriate.           Medical History  Surgical History Family History Social History   Past Medical History:   Diagnosis Date    Anxiety state, unspecified     Back pain     Diverticulitis 12/07/2017    DJD (degenerative joint disease)     Essential hypertension, benign     Fibromyalgia     Gastro-oesophageal reflux disease     Heart disease     Hernia, ventral 04/27/2017    History of anesthesia complications     hypotension after surgery    History of blood transfusion     Hyperlipidemia     Hyponatremia 12/07/2017    Major depression     Migraine     Osteopenia     PONV (postoperative nausea and vomiting)     Posttraumatic stress disorder     Raynaud's disease     Restless leg syndrome     S/P total knee replacement 11/27/2017    Sepsis (H) 03/25/2019    Sleep apnea     Thrombosis of leg     with pregnancy    Unspecified  hypothyroidism     Past Surgical History:   Procedure Laterality Date    ARTHROPLASTY KNEE UNICOMPARTMENT  10/31/2013    Procedure: ARTHROPLASTY KNEE UNICOMPARTMENT;  LEFT KNEE UNICOMPARTMENTAL ARTHROPLASTY (CAROLINE)^;  Surgeon: Chi Mittal MD;  Location:  OR    BREAST SURGERY      bx    COLECTOMY N/A 06/02/2017    Procedure: RESECTION, SMALL INTESTINE, OPEN ADHESIOLYSIS;  Surgeon: Keyon Qureshi MD;  Location: [facility];  Service:     COLONOSCOPY N/A 4/4/2024    Procedure: COLONOSCOPY WITH POLYPECTOMY;  Surgeon: Chris Edmonds MD;  Location: [facility] OR    CV CORONARY ANGIOGRAM N/A 10/12/2022    Procedure: CV CORONARY ANGIOGRAM;  Surgeon: You Martinez MD;  Location: [facility] CV    CV CORONARY ANGIOGRAM N/A 6/9/2023    Procedure: Coronary Angiogram;  Surgeon: Bret Jin MD;  Location: [facility]    CV FRACTIONAL FLOW RATIO WIRE N/A 10/12/2022    Procedure: Fractional Flow Ratio Wire;  Surgeon: You Martinez MD;  Location: [facility]    CV LEFT ATRIAL APPENDAGE CLOSURE Right 6/22/2023    Procedure: Left Atrial Appendage Closure;  Surgeon: Lenin Mariano MD;  Location: [facility] CV    CV LEFT HEART CATH N/A 6/9/2023    Procedure: Left Heart Catheterization;  Surgeon: Bret Jin MD;  Location: [facility] CV    CV PCI STENT DRUG ELUTING N/A 10/12/2022    Procedure: Percutaneous Coronary Intervention Stent;  Surgeon: You Martinez MD;  Location: [facility]    CV PCI STENT DRUG ELUTING N/A 11/08/2022    Procedure: Percutaneous Coronary Intervention - Stent;  Surgeon: Bret Jin MD;  Location: [facility] CV    ENDOSCOPIC RETROGRADE CHOLANGIOPANCREATOGRAPHY      ENDOSCOPIC STRIPPING VEIN(S)      BILATERLY    GENITOURINARY SURGERY      bladder sling    GI SURGERY  10/02/2015    Rectal prolaspse. s/p Abdominal rectopexy, sacral colpopexy, proctoscopy, cystoscopy    HERNIORRHAPHY INCISIONAL (LOCATION)  N/A 06/02/2017    Procedure: LAPARASCOPIC CONVERTED TO OPEN PRIMARY INCISIONAL HERNIA REPAIR;  Surgeon: Keyon Qureshi MD;  Location: [facility];  Service:     HYSTERECTOMY  1985 1985-1986    LAMINECTOMY LUMBAR TWO LEVELS  2006    LAPAROSCOPY N/A 08/22/2019    Procedure: DIAGNOSTIC LAPAROSCOPY, LYSIS OF ADHESION;  Surgeon: Svetlana Ron MD;  Location: [facility] OR;  Service: General    LUMBAR HARDWARE REMOVAL[  03/22/2012    REMOVAL LUMBAR HARDWARE 03/22/2012   L3-S1; Type CD Horizon m8 instrumentation Dr. Murray    RELEASE CARPAL TUNNEL      TONSILLECTOMY  1954    ???    TOTAL KNEE ARTHROPLASTY Right 11/27/2017    Procedure:  RIGHT TOTAL KNEE ARTHROPLASTY;  Surgeon: Kevin Ryder MD;  Location: [facility];  Service: Orthopedics     THYROID BIOPSY  04/19/2019    Family History   Problem Relation Age of Onset    Dementia Mother     Arthritis Mother     Chronic Obstructive Pulmonary Disease Father     Cardiovascular Father     Hypertension Father     No Known Problems Sister     No Known Problems Daughter     No Known Problems Son     Cerebrovascular Disease Maternal Grandmother     Heart Disease Paternal Grandmother     Cerebrovascular Disease Paternal Grandmother     No Known Problems Sister     No Known Problems Sister     No Known Problems Son     No Known Problems Daughter     Breast Cancer Paternal Aunt         age in 50's    Social History     Socioeconomic History    Marital status:      Spouse name: Not on file    Number of children: Not on file    Years of education: Not on file    Highest education level: Not on file   Occupational History    Occupation: , occasionally still subs     Employer: RETIRED   Tobacco Use    Smoking status: Never     Passive exposure: Never    Smokeless tobacco: Never   Vaping Use    Vaping status: Never Used   Substance and Sexual Activity    Alcohol use: Yes     Comment: Alcoholic Drinks/day: 1 cocktail daily    Drug use:  No    Sexual activity: Not on file   Other Topics Concern    Not on file   Social History Narrative    Not on file     Social Determinants of Health     Financial Resource Strain: Low Risk  (1/20/2024)    Financial Resource Strain     Within the past 12 months, have you or your family members you live with been unable to get utilities (heat, electricity) when it was really needed?: No   Food Insecurity: Low Risk  (1/20/2024)    Food Insecurity     Within the past 12 months, did you worry that your food would run out before you got money to buy more?: No     Within the past 12 months, did the food you bought just not last and you didn t have money to get more?: No   Transportation Needs: Low Risk  (1/20/2024)    Transportation Needs     Within the past 12 months, has lack of transportation kept you from medical appointments, getting your medicines, non-medical meetings or appointments, work, or from getting things that you need?: No   Physical Activity: Not on file   Stress: Not on file   Social Connections: Not on file   Interpersonal Safety: Low Risk  (10/26/2023)    Interpersonal Safety     Do you feel physically and emotionally safe where you currently live?: Yes     Within the past 12 months, have you been hit, slapped, kicked or otherwise physically hurt by someone?: No     Within the past 12 months, have you been humiliated or emotionally abused in other ways by your partner or ex-partner?: No   Housing Stability: Low Risk  (1/20/2024)    Housing Stability     Do you have housing? : Yes     Are you worried about losing your housing?: No          Medications  Allergies   Scheduled Meds:  Current Outpatient Medications   Medication Sig Dispense Refill    amLODIPine (NORVASC) 5 MG tablet TAKE 1 TABLET BY MOUTH EVERYDAY AT BEDTIME 90 tablet 3    aspirin 81 MG EC tablet Take 1 tablet (81 mg) by mouth daily      atorvastatin (LIPITOR) 80 MG tablet TAKE 1 TABLET BY MOUTH AT BEDTIME 30 tablet 11    botulinum toxin  type A (BOTOX) 100 units injection Every 3 months. [facility]      busPIRone (BUSPAR) 15 MG tablet Take 1 tablet (15 mg) by mouth 3 times daily 90 tablet 3    cholecalciferol 50 MCG (2000 UT) CAPS Take 2,000 Units by mouth daily      citalopram (CELEXA) 40 MG tablet Take 1 tablet (40 mg) by mouth daily 90 tablet 2    [employer] DICLOFENAC SODIUM 1 % topical gel APPLY 2-4 G TOPICALLY 4 TIMES DAILY 150 g 3    eletriptan (RELPAX) 40 MG tablet Take 1 tablet (40 mg) by mouth at onset of headache for migraine 10 tablet 0    furosemide (LASIX) 20 MG tablet TAKE 2 TABLETS BY MOUTH EVERY  tablet 3    HYDROcodone-acetaminophen (NORCO) 5-325 MG tablet Take 1 tablet by mouth daily as needed (Severe headache) 5 tablet 0    hydrocortisone 2.5 % cream APPLY TO CHEEK RASH TWICE A DAY FOR 1 WEEK      irbesartan (AVAPRO) 150 MG tablet TAKE 1 TABLET BY MOUTH EVERY DAY 90 tablet 3    isosorbide mononitrate (IMDUR) 30 MG 24 hr tablet TAKE 1 TABLET BY MOUTH EVERY DAY 30 tablet 7    levothyroxine (SYNTHROID/LEVOTHROID) 88 MCG tablet TAKE 1 TABLET BY MOUTH DAILY AT 6:00 AM. 90 tablet 2    LORazepam (ATIVAN) 0.5 MG tablet TAKE 1 TAB BY MOUTH NIGHTLY AS NEEDED FOR ANXIETY OR SLEEP 30 tablet 0    naproxen (NAPROSYN) 500 MG tablet TAKE 1 TABLET BY MOUTH TWICE A DAY WITH MEALS FOR 10 DAYS 20 tablet 0    nitroGLYcerin (NITROSTAT) 0.4 MG sublingual tablet For chest pain place 1 tablet under the tongue every 5 minutes for 3 doses. If symptoms persist 5 minutes after 1st dose call 911. 30 tablet 1    pramipexole (MIRAPEX) 0.25 MG tablet TAKE 1 TABLET BY MOUTH THREE TIMES A  tablet 0    sotalol (BETAPACE) 80 MG tablet Take 1 tablet (80 mg) by mouth every 12 hours 180 tablet 3    tiZANidine (ZANAFLEX) 4 MG tablet Take 1 tablet (4 mg) by mouth 3 times daily 60 tablet 0    Allergies   Allergen Reactions    Ace Inhibitors Other (See Comments)     Elevates blood pressure    Amitriptyline Hcl Other (See Comments)     elevates blood pressure     "Atenolol Other (See Comments)     Aggravates reynauds    Celebrex [Celecoxib] GI Disturbance    Cephalexin Nausea and Vomiting    Clonidine Unknown     \"got very ill in ER\"    Codeine Sulfate Nausea and Vomiting    Darvocet [Propoxyphene N-Apap] Nausea and Vomiting    Dexamethasone Acetate Swelling    Erythromycin Nausea and Vomiting    Escitalopram Other (See Comments)     Headaches.      Propoxyphene      HUT Reaction: Gastrointestinal; HUT Reaction: Nausea And Vomiting; HUT Noted: [id number]    Sodium     Tramadol Swelling     Swelling of tongue and face    Tramadol-Acetaminophen Other (See Comments)    Triamterene     Venlafaxine Nausea and Vomiting and Diarrhea    Zolpidem Other (See Comments) and Unknown     Pt doesn't rember      Bacitracin Itching and Rash    Cephalosporins Unknown     Pt doesn't remember       Gabapentin Itching and Rash     \"Spotted\"    Hctz Unknown     \"depletes my sodium\"    Potassium GI Disturbance    Sulfanilamide Rash    Zolpidem Tartrate Unknown         Lab Results    Chemistry/lipid CBC Cardiac Enzymes/BNP/TSH/INR   Lab Results   Component Value Date    CHOL 131 12/20/2023    HDL 60 12/20/2023    TRIG 99 12/20/2023    BUN 21.3 03/21/2024     03/21/2024    CO2 26 03/21/2024    Lab Results   Component Value Date    WBC 8.1 03/21/2024    HGB 11.3 (L) 03/21/2024    HCT 36.1 03/21/2024    MCV 86 03/21/2024     03/21/2024    @RESUFAST(BMP,CBC,BNP,TSH,  INR)@      48 minutes spent reviewing prior records (including documentation, laboratory studies, cardiac testing/imaging), history and physical exam, planning, and subsequent documentation.     The longitudinal plan of care for the diagnosis(es)/condition(s) as documented were addressed during this visit. Due to the added complexity in care, I will continue to support Lo in the subsequent management and with ongoing continuity of care.      This note has been dictated using voice recognition software. Any grammatical, " typographical, or context distortions are unintentional and inherent to the software.    Marta Neil CNP  Clinical Cardiac Electrophysiology  [facility]  Clinic and [phone]  Fax: [phone]  Electrophysiology Nurses: [phone]          Thank you for allowing me to participate in the care of your patient.      Sincerely,     ANDREA FITZGERALD CNP     [facility]  cc:   Bernadette Taylor MD  [address] [place] [address]

## 2024-05-15 RX ORDER — BUSPIRONE HYDROCHLORIDE 15 MG/1
15 TABLET ORAL 3 TIMES DAILY
Qty: 270 TABLET | Refills: 2 | Status: SHIPPED | OUTPATIENT
Start: 2024-05-15

## 2024-05-28 ENCOUNTER — OFFICE VISIT (OUTPATIENT)
Dept: INTERNAL MEDICINE | Facility: CLINIC | Age: 74
End: 2024-05-28
Payer: MEDICARE

## 2024-05-28 VITALS
RESPIRATION RATE: 20 BRPM | BODY MASS INDEX: 34.53 KG/M2 | SYSTOLIC BLOOD PRESSURE: 120 MMHG | HEART RATE: 76 BPM | WEIGHT: 182.9 LBS | OXYGEN SATURATION: 96 % | DIASTOLIC BLOOD PRESSURE: 70 MMHG | TEMPERATURE: 98.4 F | HEIGHT: 61 IN

## 2024-05-28 DIAGNOSIS — D50.8 OTHER IRON DEFICIENCY ANEMIA: ICD-10-CM

## 2024-05-28 DIAGNOSIS — M79.652 PAIN IN BOTH THIGHS: ICD-10-CM

## 2024-05-28 DIAGNOSIS — R39.9 UTI SYMPTOMS: Primary | ICD-10-CM

## 2024-05-28 DIAGNOSIS — R53.83 OTHER FATIGUE: ICD-10-CM

## 2024-05-28 DIAGNOSIS — M79.651 PAIN IN BOTH THIGHS: ICD-10-CM

## 2024-05-28 LAB
ALBUMIN UR-MCNC: NEGATIVE MG/DL
APPEARANCE UR: CLEAR
BACTERIA #/AREA URNS HPF: ABNORMAL /HPF
BILIRUB UR QL STRIP: NEGATIVE
COLOR UR AUTO: YELLOW
ERYTHROCYTE [DISTWIDTH] IN BLOOD BY AUTOMATED COUNT: 18.5 % (ref 10–15)
ERYTHROCYTE [SEDIMENTATION RATE] IN BLOOD BY WESTERGREN METHOD: 32 MM/HR (ref 0–30)
GLUCOSE UR STRIP-MCNC: NEGATIVE MG/DL
HCT VFR BLD AUTO: 34.4 % (ref 35–47)
HGB BLD-MCNC: 11.4 G/DL (ref 11.7–15.7)
HGB UR QL STRIP: NEGATIVE
KETONES UR STRIP-MCNC: ABNORMAL MG/DL
LEUKOCYTE ESTERASE UR QL STRIP: ABNORMAL
MCH RBC QN AUTO: 27 PG (ref 26.5–33)
MCHC RBC AUTO-ENTMCNC: 33.1 G/DL (ref 31.5–36.5)
MCV RBC AUTO: 82 FL (ref 78–100)
NITRATE UR QL: NEGATIVE
PH UR STRIP: 5.5 [PH] (ref 5–8)
PLATELET # BLD AUTO: 215 10E3/UL (ref 150–450)
RBC # BLD AUTO: 4.22 10E6/UL (ref 3.8–5.2)
RBC #/AREA URNS AUTO: ABNORMAL /HPF
SP GR UR STRIP: >=1.03 (ref 1–1.03)
SQUAMOUS #/AREA URNS AUTO: ABNORMAL /LPF
UROBILINOGEN UR STRIP-ACNC: 0.2 E.U./DL
WBC # BLD AUTO: 5.4 10E3/UL (ref 4–11)
WBC #/AREA URNS AUTO: ABNORMAL /HPF

## 2024-05-28 PROCEDURE — 36415 COLL VENOUS BLD VENIPUNCTURE: CPT | Performed by: INTERNAL MEDICINE

## 2024-05-28 PROCEDURE — 86140 C-REACTIVE PROTEIN: CPT | Performed by: INTERNAL MEDICINE

## 2024-05-28 PROCEDURE — 82550 ASSAY OF CK (CPK): CPT | Performed by: INTERNAL MEDICINE

## 2024-05-28 PROCEDURE — 81001 URINALYSIS AUTO W/SCOPE: CPT | Performed by: INTERNAL MEDICINE

## 2024-05-28 PROCEDURE — 85652 RBC SED RATE AUTOMATED: CPT | Performed by: INTERNAL MEDICINE

## 2024-05-28 PROCEDURE — 83550 IRON BINDING TEST: CPT | Performed by: INTERNAL MEDICINE

## 2024-05-28 PROCEDURE — 82728 ASSAY OF FERRITIN: CPT | Performed by: INTERNAL MEDICINE

## 2024-05-28 PROCEDURE — 99213 OFFICE O/P EST LOW 20 MIN: CPT | Performed by: INTERNAL MEDICINE

## 2024-05-28 PROCEDURE — 84443 ASSAY THYROID STIM HORMONE: CPT | Performed by: INTERNAL MEDICINE

## 2024-05-28 PROCEDURE — 85027 COMPLETE CBC AUTOMATED: CPT | Performed by: INTERNAL MEDICINE

## 2024-05-28 PROCEDURE — 80053 COMPREHEN METABOLIC PANEL: CPT | Performed by: INTERNAL MEDICINE

## 2024-05-28 PROCEDURE — 83540 ASSAY OF IRON: CPT | Performed by: INTERNAL MEDICINE

## 2024-05-28 RX ORDER — VALSARTAN 160 MG/1
160 TABLET ORAL DAILY
COMMUNITY
End: 2024-06-26

## 2024-05-28 RX ORDER — CIPROFLOXACIN 500 MG/1
500 TABLET, FILM COATED ORAL 2 TIMES DAILY
COMMUNITY
Start: 2024-05-26 | End: 2024-06-02

## 2024-05-28 ASSESSMENT — PAIN SCALES - GENERAL: PAINLEVEL: EXTREME PAIN (8)

## 2024-05-28 NOTE — PROGRESS NOTES
Assessment & Plan   Problem List Items Addressed This Visit          Nervous and Auditory    Pain in both thighs     New in last few weeks. Work-up as per fatigue was largely unrevealing.   - Did provide safe amounts of tylenol and ibuprofen for patient to take in AVS            Urinary    UTI symptoms - Primary     E coli UTI found at  5/26, susceptible to ciprofloxacin. Patient still having some symptoms.   - Repeat UA today improved, no change in abx needed, she will finish full 7 day course         Relevant Orders    UA Macroscopic with reflex to Microscopic and Culture - Clinic Collect (Completed)    UA Microscopic with Reflex to Culture (Completed)       Hematologic    Anemia     Has had iron deficiency anemia over last year and received infusions x3, last in 2023. Has had EGD and colonoscopy as part of work-up that were unrevealing.   - Repeat CBC and iron studies today show ongoing, mild STEPHANI  - Would have her try oral iron again vs repeat infusion to see if that helps with fatigue          Relevant Orders    Iron and iron binding capacity (Completed)    Ferritin (Completed)    CBC with platelets (Completed)       Other    Other fatigue     Presents with worsening fatigue, lethargy and pain and feeling of weakness in her upper thighs. No demonstrable weakness on exam today. Certainly could be in s/o active UTI that is being appropriately treated right now. Will also check basic labs along with CK to see if there is any inflammatory cause of weakness/pain as well. She does have f/up with PCP in June as well.   - UA today shows adequate treatment with ciprofloxacin, which she will complete course of  - TSH WNL  - CK WNL  - CMP with mild hyponatremia, not likely cause, recommended adequate hydration  - Inflammatory markers elevated, however admittedly difficult to interpret in s/o UTI  - Ongoing mild iron deficiency anemia, message sent to patient to consider oral iron vs repeat iron infusion (can also  discuss with Dr. Tyalor at next visit)          Relevant Orders    Comprehensive metabolic panel (Completed)    ESR: Erythrocyte sedimentation rate (Completed)    CRP, inflammation (Completed)    CK total (Completed)    TSH with free T4 reflex (Completed)        FUTURE APPOINTMENTS:       - Follow-up visit in June with PCP as scheduled       Halina Blanchard is a 73 year old, presenting for the following health issues:  UTI and Musculoskeletal Problem (Patient states both legs, upper and sometimes in the calves. 2-3 weeks now. Tough to move, has not seen anyone for it. Constant pain. 8/10 pain increases with movement of walking or going up the stairs)        5/28/2024     3:56 PM   Additional Questions   Roomed by Terri EASLEY CMA     History of Present Illness     Reason for visit:  Check for UTI and also continuous leg pain and weakness  Symptom onset:  3-7 days ago  Symptoms include:  Pain lower left abdomen, feeling lethary, back left side bruno, frequurination, very uncomfortable, moderate leg pain while walking  Symptom intensity:  Moderate  Symptom progression:  Staying the same  Had these symptoms before:  Yes  Has tried/received treatment for these symptoms:  Yes  Previous treatment was successful:  Yes  What makes it worse:  For legs-walking  What makes it better:  Rest    Genitourinary - Female  Onset/Duration: Saturday morning  Description:   Painful urination (Dysuria): YES little stinging           Frequency: YES  Blood in urine (Hematuria): No  Delay in urine (Hesitency): No  Intensity: moderate  Progression of Symptoms:  same  Accompanying Signs & Symptoms:  Fever/chills: YES low fever  Flank pain: No but lower back and front abdominal area  Nausea and vomiting: YES- nausea  Vaginal symptoms: none  Abdominal/Pelvic Pain: YES  History:   History of frequent UTI s: YES- 5 years ago and ended up going into sepsis  History of kidney stones: No  Therapies tried and outcome: Cipro (was seen in [place] in  "[place] for it)     UTI: Was seen at  5/26 for 1 day of UTI symptoms. Urine culture did grow E coli on 5/28. Started on Cipro on Sunday. Since then, says it still seems the same. Plan for 7 days of treatment.     Leg issues: Known L lumbar radiculopathy, however this is different. Started two weeks ago. Noticed pain in b/l upper thighs with walking, going up stairs and hills. About the same amount of pain, not necessarily worse. Associated with feeling of lethargy and fatigue. Had fatigue with iron deficiency in the past but not this bad and never associated with leg pain. Of note, iron deficiency required infusions, didn't tolerate oral iron.     She eats 2-3 servings of fruits and vegetables daily.She consumes 1 sweetened beverage(s) daily.She exercises with enough effort to increase her heart rate 9 or less minutes per day.  She exercises with enough effort to increase her heart rate 3 or less days per week.   She is taking medications regularly.        Review of Systems  Constitutional, neuro, ENT, endocrine, pulmonary, cardiac, gastrointestinal, genitourinary, musculoskeletal, integument and psychiatric systems are negative, except as otherwise noted.      Objective    /70 (BP Location: Right arm, Patient Position: Sitting, Cuff Size: Adult Regular)   Pulse 76   Temp 98.4  F (36.9  C) (Oral)   Resp 20   Ht 1.549 m (5' 1\")   Wt 83 kg (182 lb 14.4 oz)   LMP  (LMP Unknown)   SpO2 96%   BMI 34.56 kg/m    Body mass index is 34.56 kg/m .  Physical Exam   GENERAL: alert and no distress  EYES: Eyes grossly normal to inspection and conjunctivae and sclerae normal  HENT: nose and mouth without ulcers or lesions  RESP: lungs clear to auscultation - no rales, rhonchi or wheezes  CV: regular rate and rhythm, normal S1 S2, no S3 or S4, no murmur, click or rub, no peripheral edema  ABDOMEN: soft, nontender, no hepatosplenomegaly, no masses and bowel sounds normal  MS: no gross musculoskeletal defects noted, " no edema  SKIN: no suspicious lesions or rashes  NEURO: Normal strength and tone throughout all four extremities, mentation intact and speech normal  PSYCH: mentation appears normal, affect normal/bright    Results for orders placed or performed in visit on 05/28/24   UA Macroscopic with reflex to Microscopic and Culture - Clinic Collect     Status: Abnormal    Specimen: Urine, Midstream   Result Value Ref Range    Color Urine Yellow Colorless, Straw, Light Yellow, Yellow    Appearance Urine Clear Clear    Glucose Urine Negative Negative mg/dL    Bilirubin Urine Negative Negative    Ketones Urine Trace (A) Negative mg/dL    Specific Gravity Urine >=1.030 1.005 - 1.030    Blood Urine Negative Negative    pH Urine 5.5 5.0 - 8.0    Protein Albumin Urine Negative Negative mg/dL    Urobilinogen Urine 0.2 0.2, 1.0 E.U./dL    Nitrite Urine Negative Negative    Leukocyte Esterase Urine Trace (A) Negative   UA Microscopic with Reflex to Culture     Status: Abnormal   Result Value Ref Range    Bacteria Urine None Seen None Seen /HPF    RBC Urine None Seen 0-2 /HPF /HPF    WBC Urine 0-5 0-5 /HPF /HPF    Squamous Epithelials Urine Few (A) None Seen /LPF    Narrative    Urine Culture not indicated   Comprehensive metabolic panel     Status: Abnormal   Result Value Ref Range    Sodium 132 (L) 135 - 145 mmol/L    Potassium 3.8 3.4 - 5.3 mmol/L    Carbon Dioxide (CO2) 22 22 - 29 mmol/L    Anion Gap 11 7 - 15 mmol/L    Urea Nitrogen 18.0 8.0 - 23.0 mg/dL    Creatinine 0.94 0.51 - 0.95 mg/dL    GFR Estimate 63 >60 mL/min/1.73m2    Calcium 8.8 8.8 - 10.2 mg/dL    Chloride 99 98 - 107 mmol/L    Glucose 117 (H) 70 - 99 mg/dL    Alkaline Phosphatase 78 40 - 150 U/L    AST 26 0 - 45 U/L    ALT 36 0 - 50 U/L    Protein Total 6.0 (L) 6.4 - 8.3 g/dL    Albumin 3.7 3.5 - 5.2 g/dL    Bilirubin Total 0.4 <=1.2 mg/dL   ESR: Erythrocyte sedimentation rate     Status: Abnormal   Result Value Ref Range    Erythrocyte Sedimentation Rate 32 (H) 0 -  30 mm/hr   CRP, inflammation     Status: Abnormal   Result Value Ref Range    CRP Inflammation 23.20 (H) <5.00 mg/L   CK total     Status: Normal   Result Value Ref Range    CK 96 26 - 192 U/L   TSH with free T4 reflex     Status: Normal   Result Value Ref Range    TSH 1.95 0.30 - 4.20 uIU/mL   Iron and iron binding capacity     Status: Abnormal   Result Value Ref Range    Iron 30 (L) 37 - 145 ug/dL    Iron Binding Capacity 340 240 - 430 ug/dL    Iron Sat Index 9 (L) 15 - 46 %   Ferritin     Status: Normal   Result Value Ref Range    Ferritin 40 11 - 328 ng/mL   CBC with platelets     Status: Abnormal   Result Value Ref Range    WBC Count 5.4 4.0 - 11.0 10e3/uL    RBC Count 4.22 3.80 - 5.20 10e6/uL    Hemoglobin 11.4 (L) 11.7 - 15.7 g/dL    Hematocrit 34.4 (L) 35.0 - 47.0 %    MCV 82 78 - 100 fL    MCH 27.0 26.5 - 33.0 pg    MCHC 33.1 31.5 - 36.5 g/dL    RDW 18.5 (H) 10.0 - 15.0 %    Platelet Count 215 150 - 450 10e3/uL           Signed Electronically by: Ryanne Rapp MD

## 2024-05-28 NOTE — PATIENT INSTRUCTIONS
Tylenol 1000 mg every 6-8 hours, max 4000 mg a day.     Ibuprofen 600 mg every 6-8 hours, max 3200 mg a day.

## 2024-05-29 LAB
ALBUMIN SERPL BCG-MCNC: 3.7 G/DL (ref 3.5–5.2)
ALP SERPL-CCNC: 78 U/L (ref 40–150)
ALT SERPL W P-5'-P-CCNC: 36 U/L (ref 0–50)
ANION GAP SERPL CALCULATED.3IONS-SCNC: 11 MMOL/L (ref 7–15)
AST SERPL W P-5'-P-CCNC: 26 U/L (ref 0–45)
BILIRUB SERPL-MCNC: 0.4 MG/DL
BUN SERPL-MCNC: 18 MG/DL (ref 8–23)
CALCIUM SERPL-MCNC: 8.8 MG/DL (ref 8.8–10.2)
CHLORIDE SERPL-SCNC: 99 MMOL/L (ref 98–107)
CK SERPL-CCNC: 96 U/L (ref 26–192)
CREAT SERPL-MCNC: 0.94 MG/DL (ref 0.51–0.95)
CRP SERPL-MCNC: 23.2 MG/L
DEPRECATED HCO3 PLAS-SCNC: 22 MMOL/L (ref 22–29)
EGFRCR SERPLBLD CKD-EPI 2021: 63 ML/MIN/1.73M2
FERRITIN SERPL-MCNC: 40 NG/ML (ref 11–328)
GLUCOSE SERPL-MCNC: 117 MG/DL (ref 70–99)
IRON BINDING CAPACITY (ROCHE): 340 UG/DL (ref 240–430)
IRON SATN MFR SERPL: 9 % (ref 15–46)
IRON SERPL-MCNC: 30 UG/DL (ref 37–145)
POTASSIUM SERPL-SCNC: 3.8 MMOL/L (ref 3.4–5.3)
PROT SERPL-MCNC: 6 G/DL (ref 6.4–8.3)
SODIUM SERPL-SCNC: 132 MMOL/L (ref 135–145)
TSH SERPL DL<=0.005 MIU/L-ACNC: 1.95 UIU/ML (ref 0.3–4.2)

## 2024-05-30 DIAGNOSIS — D50.8 OTHER IRON DEFICIENCY ANEMIA: Primary | ICD-10-CM

## 2024-05-30 DIAGNOSIS — G25.81 RESTLESS LEG SYNDROME: ICD-10-CM

## 2024-05-30 PROBLEM — R53.83 OTHER FATIGUE: Status: ACTIVE | Noted: 2024-05-30

## 2024-05-30 PROBLEM — M79.651 PAIN IN BOTH THIGHS: Status: ACTIVE | Noted: 2024-05-30

## 2024-05-30 PROBLEM — R39.9 UTI SYMPTOMS: Status: ACTIVE | Noted: 2024-05-30

## 2024-05-30 PROBLEM — M79.652 PAIN IN BOTH THIGHS: Status: ACTIVE | Noted: 2024-05-30

## 2024-05-30 RX ORDER — HEPARIN SODIUM,PORCINE 10 UNIT/ML
5-20 VIAL (ML) INTRAVENOUS DAILY PRN
OUTPATIENT
Start: 2024-06-06

## 2024-05-30 RX ORDER — ALBUTEROL SULFATE 90 UG/1
1-2 AEROSOL, METERED RESPIRATORY (INHALATION)
Start: 2024-06-06

## 2024-05-30 RX ORDER — MEPERIDINE HYDROCHLORIDE 25 MG/ML
25 INJECTION INTRAMUSCULAR; INTRAVENOUS; SUBCUTANEOUS EVERY 30 MIN PRN
OUTPATIENT
Start: 2024-06-06

## 2024-05-30 RX ORDER — HEPARIN SODIUM (PORCINE) LOCK FLUSH IV SOLN 100 UNIT/ML 100 UNIT/ML
5 SOLUTION INTRAVENOUS
OUTPATIENT
Start: 2024-06-06

## 2024-05-30 RX ORDER — ALBUTEROL SULFATE 0.83 MG/ML
2.5 SOLUTION RESPIRATORY (INHALATION)
OUTPATIENT
Start: 2024-06-06

## 2024-05-30 RX ORDER — EPINEPHRINE 1 MG/ML
0.3 INJECTION, SOLUTION, CONCENTRATE INTRAVENOUS EVERY 5 MIN PRN
OUTPATIENT
Start: 2024-06-06

## 2024-05-30 RX ORDER — DIPHENHYDRAMINE HYDROCHLORIDE 50 MG/ML
50 INJECTION INTRAMUSCULAR; INTRAVENOUS
Start: 2024-06-06

## 2024-05-30 RX ORDER — METHYLPREDNISOLONE SODIUM SUCCINATE 125 MG/2ML
125 INJECTION, POWDER, LYOPHILIZED, FOR SOLUTION INTRAMUSCULAR; INTRAVENOUS
Start: 2024-06-06

## 2024-05-30 NOTE — ASSESSMENT & PLAN NOTE
New in last few weeks. Work-up as per fatigue was largely unrevealing.   - Did provide safe amounts of tylenol and ibuprofen for patient to take in AVS

## 2024-05-30 NOTE — ASSESSMENT & PLAN NOTE
Has had iron deficiency anemia over last year and received infusions x3, last in 2023. Has had EGD and colonoscopy as part of work-up that were unrevealing.   - Repeat CBC and iron studies today show ongoing, mild STEPHANI  - Would have her try oral iron again vs repeat infusion to see if that helps with fatigue

## 2024-05-30 NOTE — ASSESSMENT & PLAN NOTE
E coli UTI found at  5/26, susceptible to ciprofloxacin. Patient still having some symptoms.   - Repeat UA today improved, no change in abx needed, she will finish full 7 day course

## 2024-05-30 NOTE — ASSESSMENT & PLAN NOTE
Presents with worsening fatigue, lethargy and pain and feeling of weakness in her upper thighs. No demonstrable weakness on exam today. Certainly could be in s/o active UTI that is being appropriately treated right now. Will also check basic labs along with CK to see if there is any inflammatory cause of weakness/pain as well. She does have f/up with PCP in June as well.   - UA today shows adequate treatment with ciprofloxacin, which she will complete course of  - TSH WNL  - CK WNL  - CMP with mild hyponatremia, not likely cause, recommended adequate hydration  - Inflammatory markers elevated, however admittedly difficult to interpret in s/o UTI  - Ongoing mild iron deficiency anemia, message sent to patient to consider oral iron vs repeat iron infusion (can also discuss with Dr. Taylor at next visit)

## 2024-06-04 ENCOUNTER — DOCUMENTATION ONLY (OUTPATIENT)
Dept: CARDIOLOGY | Facility: CLINIC | Age: 74
End: 2024-06-04

## 2024-06-04 ENCOUNTER — OFFICE VISIT (OUTPATIENT)
Dept: CARDIOLOGY | Facility: CLINIC | Age: 74
End: 2024-06-04
Payer: MEDICARE

## 2024-06-04 VITALS
DIASTOLIC BLOOD PRESSURE: 62 MMHG | WEIGHT: 182.2 LBS | SYSTOLIC BLOOD PRESSURE: 120 MMHG | OXYGEN SATURATION: 89 % | HEART RATE: 66 BPM | RESPIRATION RATE: 20 BRPM | BODY MASS INDEX: 34.43 KG/M2

## 2024-06-04 DIAGNOSIS — I48.0 PAROXYSMAL ATRIAL FIBRILLATION (H): Primary | ICD-10-CM

## 2024-06-04 DIAGNOSIS — I48.0 PAROXYSMAL ATRIAL FIBRILLATION (H): Primary | Chronic | ICD-10-CM

## 2024-06-04 PROCEDURE — 99205 OFFICE O/P NEW HI 60 MIN: CPT | Performed by: INTERNAL MEDICINE

## 2024-06-04 RX ORDER — SODIUM CHLORIDE 9 MG/ML
100 INJECTION, SOLUTION INTRAVENOUS CONTINUOUS
Status: CANCELLED | OUTPATIENT
Start: 2024-08-23

## 2024-06-04 RX ORDER — FENTANYL CITRATE 50 UG/ML
25 INJECTION, SOLUTION INTRAMUSCULAR; INTRAVENOUS
Status: CANCELLED | OUTPATIENT
Start: 2024-08-23

## 2024-06-04 RX ORDER — LIDOCAINE 40 MG/G
CREAM TOPICAL
Status: CANCELLED | OUTPATIENT
Start: 2024-06-04

## 2024-06-04 NOTE — PATIENT INSTRUCTIONS
[facility]  Cardiac Electrophysiology  [address]  [place] [address]   Office: [phone]  Fax: [phone]     Thank you for seeing us in clinic today - it is a pleasure to be a part of your care team.  Below is a summary of our plan from today's visit.       You have atrial fibrillation.  We reviewed atrial fibrillation, treatment options (ablation vs antiarrhythmic drug therapy), and long term stroke risk prevention (blood thinner vs Watchman device).    We will plan for the following:  - our office will work with you to coordinate an atrial fibrillation ablation  - continue sotalol 80mg twice daily  - continue aspirin 81mg daily     Please do not hesitate to be in touch with our office at [phone] with any questions that may arise.       Thank you for trusting us with your care,    Lenin Mariano MD  Clinical Cardiac Electrophysiology  [facility]  [address]  [place] [address]   Office: [phone]  Fax: [phone]        ATRIAL FIBRILLATION: Patient Information    What is atrial fibrillation?  Atrial fibrillation (AF, A-fib) is a common heart rhythm problem (arrhythmia) occurring within the upper chambers of the heart (the atria).  In normal rhythm, the upper and lower chambers of the heart are electrically driven to contract in a coordinated sequence.  In atrial fibrillation, the atria lose their ability to contract because of rapid and chaotic electrical activity.  The lower chambers of the heart (the ventricles) continue to pump blood throughout the body, though with irregular and often faster rate due to the chaotic activity within the atria.        How do I know if I have atrial fibrillation?   Some people may feel their heart beating faster, harder, or irregularly while in atrial fibrillation.  Others may be lightheaded, fatigued, feel weak or tired or become more short of breath especially with  activities.  Some patients have no symptoms at all.  Atrial fibrillation may be found due to an irregular pulse or on an electrocardiogram (ECG). Atrial fibrillation can start and stop on its own, and episodes can last from seconds to several months.      How common is atrial fibrillation?   An estimated 3-6 million people in the [place] have atrial fibrillation.  Atrial fibrillation is a common heart rhythm problem for older persons, affecting as estimated 12-15% of people over the age of 65 years of age.    What causes atrial fibrillation?   Age is the most important risk factor for atrial fibrillation.  Atrial fibrillation is more common in people with other heart disease, high blood pressure, diabetes, obesity, sleep apnea and in people who regularly consume alcohol.  Surgery, lung disease, or thyroid problems can lead to atrial fibrillation.  Atrial fibrillation has multiple possible causes, and in most cases a single cause cannot be found.  Atrial fibrillation is a progressive condition, usually starting with at an early stage with short and infrequent episodes.  In later stages of disease, more frequent and longer lasting episodes of atrial fibrillation occur, ultimately culminating in episodes which do not spontaneously terminate.  Generally, more enlargement and scarring within the upper chambers of the heart is observed as atrial fibrillation progresses from early to late-stage disease.    How is atrial fibrillation diagnosed and evaluated?    Because of its start-stop nature, atrial fibrillation can be challenging to diagnose.  Atrial fibrillation is most commonly diagnosed via cardiac rhythm recordings - either an ECG or wearable cardiac rhythm monitor.  For patients with pacemakers, defibrillators or implantable loop recorders, atrial fibrillation may be recorded via these devices.  Recently, commercially available devices (eg. Apple Watch, PetCoach device, certain FitBit devices, others) can allow  patients to take 30 second cardiac rhythm recordings which may document atrial fibrillation.  Once atrial fibrillation is diagnosed, additional tests include blood tests and an echocardiogram.  The echocardiogram uses ultrasound to look at your heart to assess your cardiac function and evaluate for other heart disease.  Additional evaluation may include CT or MRI studies.    Is atrial fibrillation dangerous?   Atrial fibrillation is not usually a life-threatening arrhythmia.  The most serious consequences of atrial fibrillation including stroke and worsening of overall cardiac function.  While in atrial fibrillation, the upper cardiac chambers do not contract normally, resulting in slower blood flow and increased risk of clot formation.  If this blood clot becomes detached from the heart a stroke can occur.  Unfortunately, stroke can be the first sign of atrial fibrillation for some people.  With a stroke, you may notice abnormal sensation, weakness on one side of the body or face, changes in your vision or speech.  If you have any of these signs, you should contact EMS and be evaluated in an emergency room as soon as possible.      How is atrial fibrillation treated?     Several treatment options exist for suppressing atrial fibrillation - however, it is not an easily curable arrhythmia.  The first goal in managing atrial fibrillation is to minimize stroke risk.  The second goal is to improve symptoms associated with atrial fibrillation.  Finally, in patients with reduced cardiac function, maintaining normal rhythm can help improve cardiac function.      Blood thinners are used to reduce the risk of stroke in patients with high estimated stroke risk related to atrial fibrillation.  For patients at higher risk of bleeding related to blood thinner use, implantable devices may be an option to reduce stroke risk without the need for long term blood thinner use.      Atrial fibrillation can be managed via two  strategies: rate control and rhythm control.  In a rate control strategy, continued atrial fibrillation is accepted and medications (eg. beta-blockers or calcium channel blockers) are used to control the lower chamber rate.  In a rhythm control strategy, anti-arrhythmic medications or catheter ablation are used to maintain normal cardiac rhythm and slow disease progression by suppressing atrial fibrillation.  A procedure called a cardioversion, in which an electric shock is delivered through patches placed on the chest wall while under deep sedation, can be performed to temporarily restore normal cardiac rhythm, though does not address the chance of atrial fibrillation recurrence.  Treatments are more effective for earlier rather than later stage atrial fibrillation.  Lifestyle modifications (maintaining a healthy weight, aerobic exercise, diagnosing and treating sleep apnea, and minimizing alcohol intake) are important elements of atrial fibrillation rhythm control.     What is catheter ablation for atrial fibrillation?  Cardiac catheter ablation is a commonly performed, minimally invasive procedure performed by a cardiac electrophysiologist to treat many different cardiac rhythm abnormalities.  During catheter ablation, long, thin, flexible tubes are advanced into the heart via small sheaths inserted into the femoral veins and thermal energy (either heating or cooling) is applied within the heart to disrupt abnormal electrical activity.  Atrial fibrillation ablation is performed under general anesthesia, with procedures generally taking approximately 2-3 hours.  Patients are typically observed for 3-5 hours after the ablation, and in most cases can be discharged home the same day.  Atrial fibrillation ablation is associated with better outcomes (mortality, cardiovascular hospitalizations, atrial arrhythmia recurrences) compared to antiarrhythmic drug therapy.  However, atrial fibrillation recurrences are not  uncommon, and repeat catheter ablation may be offered.  Your electrophysiology team can review atrial fibrillation ablation, anticipated success rates, risks, and recovery expectations with you.    What are anti-arrhythmic medications?  Anti-arrhythmic medications are specialized drugs which alter cardiac electrical functioning to suppress arrhythmias.  There are several anti-arrhythmic medications available, each with its own success rate and side effects.  Some anti-arrhythmic medications are less effective though safer to use, others are more effective though have serious potential toxicities.  Atrial fibrillation recurrences are common and may require dose adjustment or change in antiarrhythmic therapy.  Your electrophysiology team will carefully consider which medication would be the best and safest for your particular case.      Can I live a normal life?    The goal of atrial fibrillation management is for patients to live normal lives without being limited by symptoms related to atrial fibrillation.    Are any additional educational resources available?  There are a number of excellent atrial fibrillation education resources available to you online.  A few options you may wish to review include:  hrsonline.org/guide-atrial-fibrillation  afibmatters.org  getsmartaboutafib.com  stopaf.com    What comes next?    Consider your management options and let us know how we can help in your decision process.  Please take medications as they have been prescribed.  You should also get any tests that may have been ordered for you.      When to Call Your Doctor or seek emergency care:  Call your doctor or seek emergency care if you have any significant changes with the following:  Weakness  Dizziness  Fainting  Fatigue  Shortness of breath  Chest pain with increased activity  If you are concerned that your heart rate is too fast or too slow  Bleeding that does not stop in 10 minutes  Coughing or throwing up blood  Bloody  diarrhea or bleeding hemorrhoids  Dark-colored urine or black stool  Allergic reactions:  Rash  Itching  Swelling  Trouble breathing or swallowing      Please call the [facility] at [phone] if you have concerns about your symptoms, your medicines, or your follow-up appointments.

## 2024-06-04 NOTE — LETTER
2024    Bernadette Taylor MD  [address] [facility] [address]    RE: Suma Puenteell       Dear Colleague,     I had the pleasure of seeing Suma Mark in the [facility].     [facility]  Cardiac Electrophysiology  [address]  [place] [address]   Office: [phone]  Fax: [phone]     Patient: Suma Mark   : 1950       CHIEF COMPLAINT/REASON FOR VISIT  Paroxysmal atrial fibrillation      Assessment/Recommendations     [id number]/Paroxysmal atrial fibrillation - symptomatic with palpitations, chest discomfort, exertional intolerance  [id number] - 31mm Watchman, FLX, 2023  We reviewed atrial fibrillation physiology, managing stroke risk, cardioversion, antiarrhythmic drug therapy, and catheter ablation.  We discussed atrial fibrillation ablation procedures, anticipated success rates, the potential need for re-do ablation vs addition of anti-arrhythmic drugs, procedural risks (including groin bleeding, vascular injury, tamponade, phrenic or esophageal injury, stroke, pulmonary vein stenosis) and recovery expectations.  She would prefer catheter ablation  - PVI, general anesthesia, restart apixaban 5mg twice daily 1 week prior to and to continue for 6 weeks post ablation (in place of aspirin), hold sotalol 3 days prior (likely resume for 6-12 weeks post ablation)  - continue sotalol 80mg twice daily  - continue aspirin 81mg daily  - we discussed the ongoing importance of lifestyle modification (maintaining a healthy weight, aerobic activity, sleep apnea diagnosis and management, alcohol avoidance) as part of a long term strategy for atrial fibrillation management    Follow up: as above           History of Present Illness   Suma Mark is a 73 year old female with paroxysmal atrial fibrillation, percutaneous NAVIN occlusion (31mm Watchman, FLX, 2023), chronic diastolic heart failure, CAD with prior PCI , HTN,  esophagitis, fibromyalgia, anxiety referred by Marta Neil CNP for consultation regarding atrial fibrillation.    Mrs. Mark's atrial fibrillation history is as summarized below:  Symptoms: palpitations, chest discomfort, exertional intolerance  Diagnosis date: paroxysmal atrial fibrillation - ER telemetry 2022, [facility] 10/28/2022  Admissions/ER visits: none  Prior medical therapies: sotalol (2022-present)  Prior DCCVs: none  Prior ablations: none  Percutaneous left atrial appendage occlusion: 31mm Watchman, FLX, 6/22/2023    She notes ongoing intermittent symptoms.  She denies chest pain, syncope.       Physical Examination  Review of Systems   VITALS: /62 (BP Location: Right arm, Patient Position: Sitting, Cuff Size: Adult Regular)   Pulse 66   Resp 20   Wt 82.6 kg (182 lb 3.2 oz)   LMP  (LMP Unknown)   SpO2 (!) 89%   BMI 34.43 kg/m    Wt Readings from Last 3 Encounters:   05/28/24 83 kg (182 lb 14.4 oz)   05/14/24 82.1 kg (181 lb)   04/04/24 83.7 kg (184 lb 9.6 oz)     CONSTITUTIONAL: well nourished, comfortable, no distress  EYES:  Conjunctivae pink, sclerae clear.    E/N/T:  Oral mucosa pink  RESPIRATORY:  Respiratory effort is normal  CARDIOVASCULAR:  regular, normal S1 and S2  GASTROINTESTINAL:  Abdomen without masses or tenderness  EXTREMITIES:  No clubbing or cyanosis.    MUSCULOSKELETAL:  Overall grossly normal muscle strength  SKIN:  Overall, skin warm and dry, no lesions.  NEURO/PSYCH:  Oriented x 3 with normal affect.   Constitutional:  No weight loss or loss of appetite    Eyes:  No difficulty with vision, no double vision, no dry eyes  ENT:  No sore throat, difficulty swallowing; changes in hearing or tinnitus  Cardiovascular: As detailed above  Respiratory:  No cough  Musculoskeletal  No joint pain, muscle aches  Neurologic:  No syncope, lightheadedness, fainting spells   Hematologic: No easy bruising, excessive bleeding tendency   Gastrointestinal:  No jaundice, abdominal pain or  abdominal bloating  Genitourinary: No changes in urinary habits, no trouble urinating    Psychiatric: No anxiety or depression      Medical History  Surgical History   Past Medical History:   Diagnosis Date    Anxiety state, unspecified     Back pain     Diverticulitis 12/07/2017    DJD (degenerative joint disease)     Essential hypertension, benign     Fibromyalgia     Gastro-oesophageal reflux disease     Heart disease     Hernia, ventral 04/27/2017    History of anesthesia complications     hypotension after surgery    History of blood transfusion     Hyperlipidemia     Hyponatremia 12/07/2017    Major depression     Migraine     Osteopenia     PONV (postoperative nausea and vomiting)     Posttraumatic stress disorder     Raynaud's disease     Restless leg syndrome     S/P total knee replacement 11/27/2017    Sepsis (H) 03/25/2019    Sleep apnea     Thrombosis of leg     with pregnancy    Unspecified hypothyroidism     Past Surgical History:   Procedure Laterality Date    ARTHROPLASTY KNEE UNICOMPARTMENT  10/31/2013    Procedure: ARTHROPLASTY KNEE UNICOMPARTMENT;  LEFT KNEE UNICOMPARTMENTAL ARTHROPLASTY (CAROLINE)^;  Surgeon: Chi Mittal MD;  Location:  OR    BREAST SURGERY      bx    COLECTOMY N/A 06/02/2017    Procedure: RESECTION, SMALL INTESTINE, OPEN ADHESIOLYSIS;  Surgeon: Keyon Qureshi MD;  Location: [facility];  Service:     COLONOSCOPY N/A 4/4/2024    Procedure: COLONOSCOPY WITH POLYPECTOMY;  Surgeon: Chris Edmonds MD;  Location: [facility]    CV CORONARY ANGIOGRAM N/A 10/12/2022    Procedure: CV CORONARY ANGIOGRAM;  Surgeon: You Martinez MD;  Location: [facility] CV    CV CORONARY ANGIOGRAM N/A 6/9/2023    Procedure: Coronary Angiogram;  Surgeon: Bret Jin MD;  Location: [facility] LAB CV    CV FRACTIONAL FLOW RATIO WIRE N/A 10/12/2022    Procedure: Fractional Flow Ratio Wire;  Surgeon: You Martinez MD;  Location: [facility] CV    CV LEFT  ATRIAL APPENDAGE CLOSURE Right 6/22/2023    Procedure: Left Atrial Appendage Closure;  Surgeon: Lenin Mariano MD;  Location: [facility] CV    CV LEFT HEART CATH N/A 6/9/2023    Procedure: Left Heart Catheterization;  Surgeon: Bret Jin MD;  Location: [facility] CV    CV PCI STENT DRUG ELUTING N/A 10/12/2022    Procedure: Percutaneous Coronary Intervention Stent;  Surgeon: You Martinez MD;  Location: [facility] CV    CV PCI STENT DRUG ELUTING N/A 11/08/2022    Procedure: Percutaneous Coronary Intervention - Stent;  Surgeon: Bret Jin MD;  Location: [facility] CV    ENDOSCOPIC RETROGRADE CHOLANGIOPANCREATOGRAPHY      ENDOSCOPIC STRIPPING VEIN(S)      BILATERLY    GENITOURINARY SURGERY      bladder sling    GI SURGERY  10/02/2015    Rectal prolaspse. s/p Abdominal rectopexy, sacral colpopexy, proctoscopy, cystoscopy    HERNIORRHAPHY INCISIONAL (LOCATION) N/A 06/02/2017    Procedure: LAPARASCOPIC CONVERTED TO OPEN PRIMARY INCISIONAL HERNIA REPAIR;  Surgeon: Keyon Qureshi MD;  Location: [facility];  Service:     HYSTERECTOMY  1985 1985-1986    LAMINECTOMY LUMBAR TWO LEVELS  2006    LAPAROSCOPY N/A 08/22/2019    Procedure: DIAGNOSTIC LAPAROSCOPY, LYSIS OF ADHESION;  Surgeon: Svetlana Ron MD;  Location: [facility];  Service: General    LUMBAR HARDWARE REMOVAL[  03/22/2012    REMOVAL LUMBAR HARDWARE 03/22/2012   L3-S1; Type CD Horizon m8 instrumentation Dr. Terri MCKEON CARPAL TUNNEL      TONSILLECTOMY  1954    ???    TOTAL KNEE ARTHROPLASTY Right 11/27/2017    Procedure:  RIGHT TOTAL KNEE ARTHROPLASTY;  Surgeon: Kevin Ryder MD;  Location: [facility];  Service: Orthopedics    US THYROID BIOPSY  04/19/2019         Family History Social History   Family History   Problem Relation Age of Onset    Dementia Mother     Arthritis Mother     Chronic Obstructive Pulmonary Disease Father     Cardiovascular Father     Hypertension  Father     No Known Problems Sister     No Known Problems Daughter     No Known Problems Son     Cerebrovascular Disease Maternal Grandmother     Heart Disease Paternal Grandmother     Cerebrovascular Disease Paternal Grandmother     No Known Problems Sister     No Known Problems Sister     No Known Problems Son     No Known Problems Daughter     Breast Cancer Paternal Aunt         age in 50's        Social History     Tobacco Use    Smoking status: Never     Passive exposure: Never    Smokeless tobacco: Never   Vaping Use    Vaping status: Never Used   Substance Use Topics    Alcohol use: Yes     Comment: Alcoholic Drinks/day: 1 cocktail daily    Drug use: No         Medications  Allergies     Current Outpatient Medications:     amLODIPine (NORVASC) 5 MG tablet, TAKE 1 TABLET BY MOUTH EVERYDAY AT BEDTIME, Disp: 90 tablet, Rfl: 3    aspirin 81 MG EC tablet, Take 1 tablet (81 mg) by mouth daily, Disp: , Rfl:     atorvastatin (LIPITOR) 80 MG tablet, TAKE 1 TABLET BY MOUTH AT BEDTIME, Disp: 30 tablet, Rfl: 11    botulinum toxin type A (BOTOX) 100 units injection, Every 3 months. [facility], Disp: , Rfl:     busPIRone (BUSPAR) 15 MG tablet, TAKE 1 TABLET BY MOUTH 3 TIMES DAILY., Disp: 270 tablet, Rfl: 2    cholecalciferol 50 MCG (2000 UT) CAPS, Take 2,000 Units by mouth daily, Disp: , Rfl:     citalopram (CELEXA) 40 MG tablet, Take 1 tablet (40 mg) by mouth daily, Disp: 90 tablet, Rfl: 2    CVS DICLOFENAC SODIUM 1 % topical gel, APPLY 2-4 G TOPICALLY 4 TIMES DAILY, Disp: 150 g, Rfl: 3    eletriptan (RELPAX) 40 MG tablet, Take 1 tablet (40 mg) by mouth at onset of headache for migraine, Disp: 10 tablet, Rfl: 0    furosemide (LASIX) 20 MG tablet, TAKE 2 TABLETS BY MOUTH EVERY DAY, Disp: 180 tablet, Rfl: 3    HYDROcodone-acetaminophen (NORCO) 5-325 MG tablet, Take 1 tablet by mouth daily as needed (Severe headache), Disp: 5 tablet, Rfl: 0    hydrocortisone 2.5 % cream, APPLY TO CHEEK RASH TWICE A DAY FOR 1 WEEK, Disp: ,  "Rfl:     irbesartan (AVAPRO) 150 MG tablet, TAKE 1 TABLET BY MOUTH EVERY DAY, Disp: 90 tablet, Rfl: 3    isosorbide mononitrate (IMDUR) 30 MG 24 hr tablet, TAKE 1 TABLET BY MOUTH EVERY DAY, Disp: 30 tablet, Rfl: 7    levothyroxine (SYNTHROID/LEVOTHROID) 88 MCG tablet, TAKE 1 TABLET BY MOUTH DAILY AT 6:00 AM., Disp: 90 tablet, Rfl: 2    LORazepam (ATIVAN) 0.5 MG tablet, TAKE 1 TAB BY MOUTH NIGHTLY AS NEEDED FOR ANXIETY OR SLEEP, Disp: 30 tablet, Rfl: 0    naproxen (NAPROSYN) 500 MG tablet, TAKE 1 TABLET BY MOUTH TWICE A DAY WITH MEALS FOR 10 DAYS, Disp: 20 tablet, Rfl: 0    nitroGLYcerin (NITROSTAT) 0.4 MG sublingual tablet, For chest pain place 1 tablet under the tongue every 5 minutes for 3 doses. If symptoms persist 5 minutes after 1st dose call 911., Disp: 30 tablet, Rfl: 1    pramipexole (MIRAPEX) 0.25 MG tablet, TAKE 1 TABLET BY MOUTH THREE TIMES A DAY, Disp: 270 tablet, Rfl: 0    sotalol (BETAPACE) 80 MG tablet, Take 1 tablet (80 mg) by mouth every 12 hours, Disp: 180 tablet, Rfl: 3    tiZANidine (ZANAFLEX) 4 MG tablet, Take 1 tablet (4 mg) by mouth 3 times daily, Disp: 60 tablet, Rfl: 0    valsartan (DIOVAN) 160 MG tablet, Take 160 mg by mouth daily, Disp: , Rfl:   No current facility-administered medications for this visit.    Facility-Administered Medications Ordered in Other Visits:     iodixanol (VISIPAQUE 320) injection, , , Once PRN, Bret Jin MD, 65 mL at 06/09/23 0955     Allergies   Allergen Reactions    Ace Inhibitors Other (See Comments)     Elevates blood pressure    Amitriptyline Hcl Other (See Comments)     elevates blood pressure    Atenolol Other (See Comments)     Aggravates reynauds    Celebrex [Celecoxib] GI Disturbance    Cephalexin Nausea and Vomiting    Clonidine Unknown     \"got very ill in ER\"    Codeine Sulfate Nausea and Vomiting    Darvocet [Propoxyphene N-Apap] Nausea and Vomiting    Dexamethasone Acetate Swelling    Erythromycin Nausea and Vomiting    Escitalopram Other " "(See Comments)     Headaches.      Propoxyphene      HUT Reaction: Gastrointestinal; HUT Reaction: Nausea And Vomiting; HUT Noted: 20150911    Sodium     Tramadol Swelling     Swelling of tongue and face    Tramadol-Acetaminophen Other (See Comments)    Triamterene     Venlafaxine Nausea and Vomiting and Diarrhea    Zolpidem Other (See Comments) and Unknown     Pt doesn't rember      Bacitracin Itching and Rash    Cephalosporins Unknown     Pt doesn't remember       Gabapentin Itching and Rash     \"Spotted\"    Hctz Unknown     \"depletes my sodium\"    Potassium GI Disturbance    Sulfanilamide Rash    Zolpidem Tartrate Unknown          Lab Results    Chemistry CBC Cardiac Enzymes/BNP/TSH/INR   Recent Labs   Lab Test 05/28/24  1639   *   POTASSIUM 3.8   CHLORIDE 99   CO2 22   *   BUN 18.0   CR 0.94   GFRESTIMATED 63   ISSA 8.8     Recent Labs   Lab Test 05/28/24  1639 03/21/24  0923 12/22/23  0925   CR 0.94 0.78 0.85          Recent Labs   Lab Test 05/28/24  1639   WBC 5.4   HGB 11.4*   HCT 34.4*   MCV 82        Recent Labs   Lab Test 05/28/24  1639 03/21/24  0923 01/11/24  0825   HGB 11.4* 11.3* 10.1    Recent Labs   Lab Test 11/28/22  0139 11/27/22  2315 10/12/22  0354   TROPONINI 0.02 0.01 0.03     Recent Labs   Lab Test 11/27/22  2315        Recent Labs   Lab Test 05/28/24  1639   TSH 1.95     Recent Labs   Lab Test 11/27/22  2315   INR 1.07         Data Review    ECGs (tracings independently reviewed)  3/21/2024 - SR 58bpm, QT//449ms  11/27/2022 - AF, 67bpm    Cardiac event monitoring from 1/17/2023 to 1/23/2023 (monitored duration 6d 8h 44m) (independently reviewed)  Baseline rhythm was sinus rhythm 73bpm.    Average heart rate 64bpm, maximum 144bpm..  No symptoms recorded.  Paroxysmal atrial fibrillation eg. 1/18/2023 23:17:50, 1/19/2023 22:48:27.    There were no pauses noted.  Atrial fibrillation episodes are incompletely characterized on this monitoring " modality.  Supraventricular and ventricular ectopic beat frequency are not reported on this monitoring modality.      9/27/2023 DIANA  Left ventricular size, wall motion and function are normal. The ejection  fraction is 60-65%.  Normal right ventricle size and systolic function.  Doppler suggests left to right interatrial shunt.  Watchman device noted in left atrial appendage. The device is well seated with  no leakage by color flow Doppler imaging.  Thrombus absent on left atrial appendage occlusion device.  Moderate atherosclerotic plaque(s) in the descending aorta.       Cc: Marta Neil CNP, Bernadette Taylor MD Amila Dilusha William, MD  6/4/2024  1:22 PM        Thank you for allowing me to participate in the care of your patient.      Sincerely,     Lenin Mariano MD     [facility]  cc:   [facility]  HEART & VASCULAR [address]  [address] [place] [address]

## 2024-06-04 NOTE — PROGRESS NOTES
H&P:  Card OV  Date:  EP DAYANARA [] PVI  Order Case Req Y  Order Set Y Lab/EKG   Orders [] Imaging Order None     AC Eliquis-Hx of LAAO- Start 7 days prior, and continue for 6 wks post stop ASA   AAD Sotalol-Hold 3 days prior   PPI/H2 Blocker Increase omeprazole to 40mg daily 3 days prior, 6wk post   Diuretics lasix   DM/GLP-1 DM Meds- None  GLP-1- None     Suma Mark, 1950, [id number]  Home:[phone] (home) Cell:[phone] (mobile)  Emergency Contact: Lonnie Cobian   PCP: Bernadette Taylor, [phone]    Important patient information for CSC/Cath Lab staff : None    [facility] EP Cath Lab Procedure Order   Ablation Type:  PVI- Atrial Fibrillation  Ordering Provider: Dr García Lou Ordered and Prepped: 6/4/2024 Norma Fry RN    Scheduling Information:  Anticipated Case Duration:  Standard ( Case per day SA 2:1, DW 4:1, KA 3:1)   Scheduling Timeframe:  Next Available  Scheduling Restrictions: Pt to start AC 1 wk prior to procedure, please schedule accordingly to allow time for this  Scheduling Contact: Pt is aware of scheduling limitations at this time due to schedule, please call pt when schedule is available  EP RN Follow Up Apt: Schedule EP RN PC visit 3-4 days s/p PVI  MD Preference: Scheduling with ordering provider  Current Device/Device Co Needed for Procedure: None NoneNone  Pre-Procedural Testing needed: None  Mapping System Required:  Carto (Dr Mariano and Dr Saldivar)  ICE Needed:  Yes  Anesthesia:General Anesthesia    [facility] EP Cath Lab Prep   H&P:  Schedule H&P with EP DAYANARA, RN Teach, and Labs within 30 days of PVI  Pre-op Labs: CBC, BMP, Beta HcG if appropriate, and INR if on Warfarin will be ordered AM of procedure, if not completed at pre-op H&P within 7 days of procedure.  T&S Pre-Procedure Review: Does not need for PVI procedures  Medical Records Pertinent for Procedure:  None  Iodinated Contrast Dye Allergies (Does not include Shellfish, Egg, and/or Iodine Allergy): NA  GLP-1 Protocol:  "If on Dulaglutide (Trulicity) (weekly)- Injection hold 7 days prior to procedure  , Exenatide extended release (Bydureon bcise) (weekly)- Injection hold 7 days prior to procedure, Exenatide (Byetta) (twice daily)- Oral Tablet hold day prior and morning of procedure and for Injection hold 7 days prior to procedure, Semaglutide (Ozempic) (weekly)- Injection and Oral hold 7 days prior to procedure, Liraglutide (Victoza, Saxenda) (daily)- Injection hold day prior and morning of procedure  Follow Up S/P: EP RN 3-4 PC Visit and EP DAYANARA 6wk (To be scheduled at time of case scheduling)    Allergies   Allergen Reactions    Ace Inhibitors Other (See Comments)     Elevates blood pressure    Amitriptyline Hcl Other (See Comments)     elevates blood pressure    Atenolol Other (See Comments)     Aggravates reynauds    Celebrex [Celecoxib] GI Disturbance    Cephalexin Nausea and Vomiting    Clonidine Unknown     \"got very ill in ER\"    Codeine Sulfate Nausea and Vomiting    Darvocet [Propoxyphene N-Apap] Nausea and Vomiting    Dexamethasone Acetate Swelling    Erythromycin Nausea and Vomiting    Escitalopram Other (See Comments)     Headaches.      Propoxyphene      HUT Reaction: Gastrointestinal; HUT Reaction: Nausea And Vomiting; HUT Noted: 20150911    Sodium     Tramadol Swelling     Swelling of tongue and face    Tramadol-Acetaminophen Other (See Comments)    Triamterene     Venlafaxine Nausea and Vomiting and Diarrhea    Zolpidem Other (See Comments) and Unknown     Pt doesn't rember      Bacitracin Itching and Rash    Cephalosporins Unknown     Pt doesn't remember       Gabapentin Itching and Rash     \"Spotted\"    Hctz Unknown     \"depletes my sodium\"    Potassium GI Disturbance    Sulfanilamide Rash    Zolpidem Tartrate Unknown       Current Outpatient Medications:     amLODIPine (NORVASC) 5 MG tablet, TAKE 1 TABLET BY MOUTH EVERYDAY AT BEDTIME, Disp: 90 tablet, Rfl: 3    aspirin 81 MG EC tablet, Take 1 tablet (81 mg) by " mouth daily, Disp: , Rfl:     atorvastatin (LIPITOR) 80 MG tablet, TAKE 1 TABLET BY MOUTH AT BEDTIME, Disp: 30 tablet, Rfl: 11    botulinum toxin type A (BOTOX) 100 units injection, Every 3 months. [facility], Disp: , Rfl:     busPIRone (BUSPAR) 15 MG tablet, TAKE 1 TABLET BY MOUTH 3 TIMES DAILY., Disp: 270 tablet, Rfl: 2    cholecalciferol 50 MCG (2000 UT) CAPS, Take 2,000 Units by mouth daily, Disp: , Rfl:     citalopram (CELEXA) 40 MG tablet, Take 1 tablet (40 mg) by mouth daily, Disp: 90 tablet, Rfl: 2    CVS DICLOFENAC SODIUM 1 % topical gel, APPLY 2-4 G TOPICALLY 4 TIMES DAILY (Patient not taking: Reported on 6/4/2024), Disp: 150 g, Rfl: 3    eletriptan (RELPAX) 40 MG tablet, Take 1 tablet (40 mg) by mouth at onset of headache for migraine, Disp: 10 tablet, Rfl: 0    furosemide (LASIX) 20 MG tablet, TAKE 2 TABLETS BY MOUTH EVERY DAY, Disp: 180 tablet, Rfl: 3    HYDROcodone-acetaminophen (NORCO) 5-325 MG tablet, Take 1 tablet by mouth daily as needed (Severe headache), Disp: 5 tablet, Rfl: 0    irbesartan (AVAPRO) 150 MG tablet, TAKE 1 TABLET BY MOUTH EVERY DAY, Disp: 90 tablet, Rfl: 3    isosorbide mononitrate (IMDUR) 30 MG 24 hr tablet, TAKE 1 TABLET BY MOUTH EVERY DAY, Disp: 30 tablet, Rfl: 7    levothyroxine (SYNTHROID/LEVOTHROID) 88 MCG tablet, TAKE 1 TABLET BY MOUTH DAILY AT 6:00 AM., Disp: 90 tablet, Rfl: 2    LORazepam (ATIVAN) 0.5 MG tablet, TAKE 1 TAB BY MOUTH NIGHTLY AS NEEDED FOR ANXIETY OR SLEEP, Disp: 30 tablet, Rfl: 0    nitroGLYcerin (NITROSTAT) 0.4 MG sublingual tablet, For chest pain place 1 tablet under the tongue every 5 minutes for 3 doses. If symptoms persist 5 minutes after 1st dose call 911., Disp: 30 tablet, Rfl: 1    omeprazole (PRILOSEC) 20 MG DR capsule, Take 20 mg by mouth daily, Disp: , Rfl:     pramipexole (MIRAPEX) 0.25 MG tablet, TAKE 1 TABLET BY MOUTH THREE TIMES A DAY, Disp: 270 tablet, Rfl: 0    sotalol (BETAPACE) 80 MG tablet, Take 1 tablet (80 mg) by mouth every 12 hours,  Disp: 180 tablet, Rfl: 3    valsartan (DIOVAN) 160 MG tablet, Take 160 mg by mouth daily, Disp: , Rfl:   No current facility-administered medications for this visit.    Facility-Administered Medications Ordered in Other Visits:     iodixanol (VISIPAQUE 320) injection, , , Once PRN, Bret Jin MD, 65 mL at 06/09/23 0955    Documentation Date:6/4/2024 2:16 PM  Norma Fry RN

## 2024-06-04 NOTE — PROGRESS NOTES
[facility]  Cardiac Electrophysiology  [address]  [place] [address]   Office: [phone]  Fax: [phone]     Patient: Suma Mark   : 1950       CHIEF COMPLAINT/REASON FOR VISIT  Paroxysmal atrial fibrillation      Assessment/Recommendations     [id number]/Paroxysmal atrial fibrillation - symptomatic with palpitations, chest discomfort, exertional intolerance  [id number] - 31mm Watchman, FLX, 2023  We reviewed atrial fibrillation physiology, managing stroke risk, cardioversion, antiarrhythmic drug therapy, and catheter ablation.  We discussed atrial fibrillation ablation procedures, anticipated success rates, the potential need for re-do ablation vs addition of anti-arrhythmic drugs, procedural risks (including groin bleeding, vascular injury, tamponade, phrenic or esophageal injury, stroke, pulmonary vein stenosis) and recovery expectations.  She would prefer catheter ablation  - PVI, general anesthesia, restart apixaban 5mg twice daily 1 week prior to and to continue for 6 weeks post ablation (in place of aspirin), hold sotalol 3 days prior (likely resume for 6-12 weeks post ablation)  - continue sotalol 80mg twice daily  - continue aspirin 81mg daily  - we discussed the ongoing importance of lifestyle modification (maintaining a healthy weight, aerobic activity, sleep apnea diagnosis and management, alcohol avoidance) as part of a long term strategy for atrial fibrillation management    Follow up: as above           History of Present Illness   Suma Mark is a 73 year old female with paroxysmal atrial fibrillation, percutaneous NAVIN occlusion (31mm Watchman, FLX, 2023), chronic diastolic heart failure, CAD with prior PCI , HTN, esophagitis, fibromyalgia, anxiety referred by Marta Neil CNP for consultation regarding atrial fibrillation.    Mrs. Mark's atrial fibrillation history is as summarized below:  Symptoms:  palpitations, chest discomfort, exertional intolerance  Diagnosis date: paroxysmal atrial fibrillation - ER telemetry 2022, DERIC 10/28/2022  Admissions/ER visits: none  Prior medical therapies: sotalol (2022-present)  Prior DCCVs: none  Prior ablations: none  Percutaneous left atrial appendage occlusion: 31mm Watchman, FLX, 6/22/2023    She notes ongoing intermittent symptoms.  She denies chest pain, syncope.       Physical Examination  Review of Systems   VITALS: /62 (BP Location: Right arm, Patient Position: Sitting, Cuff Size: Adult Regular)   Pulse 66   Resp 20   Wt 82.6 kg (182 lb 3.2 oz)   LMP  (LMP Unknown)   SpO2 (!) 89%   BMI 34.43 kg/m    Wt Readings from Last 3 Encounters:   05/28/24 83 kg (182 lb 14.4 oz)   05/14/24 82.1 kg (181 lb)   04/04/24 83.7 kg (184 lb 9.6 oz)     CONSTITUTIONAL: well nourished, comfortable, no distress  EYES:  Conjunctivae pink, sclerae clear.    E/N/T:  Oral mucosa pink  RESPIRATORY:  Respiratory effort is normal  CARDIOVASCULAR:  regular, normal S1 and S2  GASTROINTESTINAL:  Abdomen without masses or tenderness  EXTREMITIES:  No clubbing or cyanosis.    MUSCULOSKELETAL:  Overall grossly normal muscle strength  SKIN:  Overall, skin warm and dry, no lesions.  NEURO/PSYCH:  Oriented x 3 with normal affect.   Constitutional:  No weight loss or loss of appetite    Eyes:  No difficulty with vision, no double vision, no dry eyes  ENT:  No sore throat, difficulty swallowing; changes in hearing or tinnitus  Cardiovascular: As detailed above  Respiratory:  No cough  Musculoskeletal  No joint pain, muscle aches  Neurologic:  No syncope, lightheadedness, fainting spells   Hematologic: No easy bruising, excessive bleeding tendency   Gastrointestinal:  No jaundice, abdominal pain or abdominal bloating  Genitourinary: No changes in urinary habits, no trouble urinating    Psychiatric: No anxiety or depression      Medical History  Surgical History   Past Medical History:   Diagnosis  Date    Anxiety state, unspecified     Back pain     Diverticulitis 12/07/2017    DJD (degenerative joint disease)     Essential hypertension, benign     Fibromyalgia     Gastro-oesophageal reflux disease     Heart disease     Hernia, ventral 04/27/2017    History of anesthesia complications     hypotension after surgery    History of blood transfusion     Hyperlipidemia     Hyponatremia 12/07/2017    Major depression     Migraine     Osteopenia     PONV (postoperative nausea and vomiting)     Posttraumatic stress disorder     Raynaud's disease     Restless leg syndrome     S/P total knee replacement 11/27/2017    Sepsis (H) 03/25/2019    Sleep apnea     Thrombosis of leg     with pregnancy    Unspecified hypothyroidism     Past Surgical History:   Procedure Laterality Date    ARTHROPLASTY KNEE UNICOMPARTMENT  10/31/2013    Procedure: ARTHROPLASTY KNEE UNICOMPARTMENT;  LEFT KNEE UNICOMPARTMENTAL ARTHROPLASTY (CAROLINE)^;  Surgeon: Chi Mittal MD;  Location:  OR    BREAST SURGERY      bx    COLECTOMY N/A 06/02/2017    Procedure: RESECTION, SMALL INTESTINE, OPEN ADHESIOLYSIS;  Surgeon: Keyon Qureshi MD;  Location: [facility] OR;  Service:     COLONOSCOPY N/A 4/4/2024    Procedure: COLONOSCOPY WITH POLYPECTOMY;  Surgeon: Chris Edmonds MD;  Location: [facility]    CV CORONARY ANGIOGRAM N/A 10/12/2022    Procedure: CV CORONARY ANGIOGRAM;  Surgeon: You Martinez MD;  Location: [facility] CV    CV CORONARY ANGIOGRAM N/A 6/9/2023    Procedure: Coronary Angiogram;  Surgeon: Bret Jin MD;  Location: [facility] CV    CV FRACTIONAL FLOW RATIO WIRE N/A 10/12/2022    Procedure: Fractional Flow Ratio Wire;  Surgeon: You Martinez MD;  Location: [facility] CV    CV LEFT ATRIAL APPENDAGE CLOSURE Right 6/22/2023    Procedure: Left Atrial Appendage Closure;  Surgeon: Lenin Mariano MD;  Location: [facility] CV    CV LEFT HEART CATH N/A 6/9/2023    Procedure:  Left Heart Catheterization;  Surgeon: Bret Jin MD;  Location: [facility] CV    CV PCI STENT DRUG ELUTING N/A 10/12/2022    Procedure: Percutaneous Coronary Intervention Stent;  Surgeon: You Martinez MD;  Location: [facility] CV    CV PCI STENT DRUG ELUTING N/A 11/08/2022    Procedure: Percutaneous Coronary Intervention - Stent;  Surgeon: Bret Jin MD;  Location: [facility] CV    ENDOSCOPIC RETROGRADE CHOLANGIOPANCREATOGRAPHY      ENDOSCOPIC STRIPPING VEIN(S)      BILATERLY    GENITOURINARY SURGERY      bladder sling    GI SURGERY  10/02/2015    Rectal prolaspse. s/p Abdominal rectopexy, sacral colpopexy, proctoscopy, cystoscopy    HERNIORRHAPHY INCISIONAL (LOCATION) N/A 06/02/2017    Procedure: LAPARASCOPIC CONVERTED TO OPEN PRIMARY INCISIONAL HERNIA REPAIR;  Surgeon: Keyon Qureshi MD;  Location: [facility];  Service:     HYSTERECTOMY  1985    [id number]    LAMINECTOMY LUMBAR TWO LEVELS  2006    LAPAROSCOPY N/A 08/22/2019    Procedure: DIAGNOSTIC LAPAROSCOPY, LYSIS OF ADHESION;  Surgeon: Svetlana Ron MD;  Location: [facility];  Service: General    LUMBAR HARDWARE REMOVAL[  03/22/2012    REMOVAL LUMBAR HARDWARE 03/22/2012   L3-S1; Type CD Horizon m8 instrumentation Dr. Murray    RELEASE CARPAL TUNNEL      TONSILLECTOMY  1954    ???    TOTAL KNEE ARTHROPLASTY Right 11/27/2017    Procedure:  RIGHT TOTAL KNEE ARTHROPLASTY;  Surgeon: Kevin yRder MD;  Location: [facility];  Service: Orthopedics     THYROID BIOPSY  04/19/2019         Family History Social History   Family History   Problem Relation Age of Onset    Dementia Mother     Arthritis Mother     Chronic Obstructive Pulmonary Disease Father     Cardiovascular Father     Hypertension Father     No Known Problems Sister     No Known Problems Daughter     No Known Problems Son     Cerebrovascular Disease Maternal Grandmother     Heart Disease Paternal Grandmother     Cerebrovascular Disease  Paternal Grandmother     No Known Problems Sister     No Known Problems Sister     No Known Problems Son     No Known Problems Daughter     Breast Cancer Paternal Aunt         age in 50's        Social History     Tobacco Use    Smoking status: Never     Passive exposure: Never    Smokeless tobacco: Never   Vaping Use    Vaping status: Never Used   Substance Use Topics    Alcohol use: Yes     Comment: Alcoholic Drinks/day: 1 cocktail daily    Drug use: No         Medications  Allergies     Current Outpatient Medications:     amLODIPine (NORVASC) 5 MG tablet, TAKE 1 TABLET BY MOUTH EVERYDAY AT BEDTIME, Disp: 90 tablet, Rfl: 3    aspirin 81 MG EC tablet, Take 1 tablet (81 mg) by mouth daily, Disp: , Rfl:     atorvastatin (LIPITOR) 80 MG tablet, TAKE 1 TABLET BY MOUTH AT BEDTIME, Disp: 30 tablet, Rfl: 11    botulinum toxin type A (BOTOX) 100 units injection, Every 3 months. [facility], Disp: , Rfl:     busPIRone (BUSPAR) 15 MG tablet, TAKE 1 TABLET BY MOUTH 3 TIMES DAILY., Disp: 270 tablet, Rfl: 2    cholecalciferol 50 MCG (2000 UT) CAPS, Take 2,000 Units by mouth daily, Disp: , Rfl:     citalopram (CELEXA) 40 MG tablet, Take 1 tablet (40 mg) by mouth daily, Disp: 90 tablet, Rfl: 2    [employer]S DICLOFENAC SODIUM 1 % topical gel, APPLY 2-4 G TOPICALLY 4 TIMES DAILY, Disp: 150 g, Rfl: 3    eletriptan (RELPAX) 40 MG tablet, Take 1 tablet (40 mg) by mouth at onset of headache for migraine, Disp: 10 tablet, Rfl: 0    furosemide (LASIX) 20 MG tablet, TAKE 2 TABLETS BY MOUTH EVERY DAY, Disp: 180 tablet, Rfl: 3    HYDROcodone-acetaminophen (NORCO) 5-325 MG tablet, Take 1 tablet by mouth daily as needed (Severe headache), Disp: 5 tablet, Rfl: 0    hydrocortisone 2.5 % cream, APPLY TO CHEEK RASH TWICE A DAY FOR 1 WEEK, Disp: , Rfl:     irbesartan (AVAPRO) 150 MG tablet, TAKE 1 TABLET BY MOUTH EVERY DAY, Disp: 90 tablet, Rfl: 3    isosorbide mononitrate (IMDUR) 30 MG 24 hr tablet, TAKE 1 TABLET BY MOUTH EVERY DAY, Disp: 30 tablet,  "Rfl: 7    levothyroxine (SYNTHROID/LEVOTHROID) 88 MCG tablet, TAKE 1 TABLET BY MOUTH DAILY AT 6:00 AM., Disp: 90 tablet, Rfl: 2    LORazepam (ATIVAN) 0.5 MG tablet, TAKE 1 TAB BY MOUTH NIGHTLY AS NEEDED FOR ANXIETY OR SLEEP, Disp: 30 tablet, Rfl: 0    naproxen (NAPROSYN) 500 MG tablet, TAKE 1 TABLET BY MOUTH TWICE A DAY WITH MEALS FOR 10 DAYS, Disp: 20 tablet, Rfl: 0    nitroGLYcerin (NITROSTAT) 0.4 MG sublingual tablet, For chest pain place 1 tablet under the tongue every 5 minutes for 3 doses. If symptoms persist 5 minutes after 1st dose call 911., Disp: 30 tablet, Rfl: 1    pramipexole (MIRAPEX) 0.25 MG tablet, TAKE 1 TABLET BY MOUTH THREE TIMES A DAY, Disp: 270 tablet, Rfl: 0    sotalol (BETAPACE) 80 MG tablet, Take 1 tablet (80 mg) by mouth every 12 hours, Disp: 180 tablet, Rfl: 3    tiZANidine (ZANAFLEX) 4 MG tablet, Take 1 tablet (4 mg) by mouth 3 times daily, Disp: 60 tablet, Rfl: 0    valsartan (DIOVAN) 160 MG tablet, Take 160 mg by mouth daily, Disp: , Rfl:   No current facility-administered medications for this visit.    Facility-Administered Medications Ordered in Other Visits:     iodixanol (VISIPAQUE 320) injection, , , Once PRN, Bret Jin MD, 65 mL at 06/09/23 0955     Allergies   Allergen Reactions    Ace Inhibitors Other (See Comments)     Elevates blood pressure    Amitriptyline Hcl Other (See Comments)     elevates blood pressure    Atenolol Other (See Comments)     Aggravates reynauds    Celebrex [Celecoxib] GI Disturbance    Cephalexin Nausea and Vomiting    Clonidine Unknown     \"got very ill in ER\"    Codeine Sulfate Nausea and Vomiting    Darvocet [Propoxyphene N-Apap] Nausea and Vomiting    Dexamethasone Acetate Swelling    Erythromycin Nausea and Vomiting    Escitalopram Other (See Comments)     Headaches.      Propoxyphene      HUT Reaction: Gastrointestinal; HUT Reaction: Nausea And Vomiting; HUT Noted: [id number]    Sodium     Tramadol Swelling     Swelling of tongue and face    " "Tramadol-Acetaminophen Other (See Comments)    Triamterene     Venlafaxine Nausea and Vomiting and Diarrhea    Zolpidem Other (See Comments) and Unknown     Pt doesn't rember      Bacitracin Itching and Rash    Cephalosporins Unknown     Pt doesn't remember       Gabapentin Itching and Rash     \"Spotted\"    Hctz Unknown     \"depletes my sodium\"    Potassium GI Disturbance    Sulfanilamide Rash    Zolpidem Tartrate Unknown          Lab Results    Chemistry CBC Cardiac Enzymes/BNP/TSH/INR   Recent Labs   Lab Test 05/28/24  1639   *   POTASSIUM 3.8   CHLORIDE 99   CO2 22   *   BUN 18.0   CR 0.94   GFRESTIMATED 63   ISSA 8.8     Recent Labs   Lab Test 05/28/24  1639 03/21/24  0923 12/22/23  0925   CR 0.94 0.78 0.85          Recent Labs   Lab Test 05/28/24  1639   WBC 5.4   HGB 11.4*   HCT 34.4*   MCV 82        Recent Labs   Lab Test 05/28/24  1639 03/21/24  0923 01/11/24  0825   HGB 11.4* 11.3* 10.1    Recent Labs   Lab Test 11/28/22  0139 11/27/22  2315 10/12/22  0354   TROPONINI 0.02 0.01 0.03     Recent Labs   Lab Test 11/27/22  2315        Recent Labs   Lab Test 05/28/24  1639   TSH 1.95     Recent Labs   Lab Test 11/27/22  2315   INR 1.07         Data Review    ECGs (tracings independently reviewed)  3/21/2024 - SR 58bpm, QT//449ms  11/27/2022 - AF, 67bpm    Cardiac event monitoring from 1/17/2023 to 1/23/2023 (monitored duration 6d 8h 44m) (independently reviewed)  Baseline rhythm was sinus rhythm 73bpm.    Average heart rate 64bpm, maximum 144bpm..  No symptoms recorded.  Paroxysmal atrial fibrillation eg. 1/18/2023 23:17:50, 1/19/2023 22:48:27.    There were no pauses noted.  Atrial fibrillation episodes are incompletely characterized on this monitoring modality.  Supraventricular and ventricular ectopic beat frequency are not reported on this monitoring modality.      9/27/2023 DIANA  Left ventricular size, wall motion and function are normal. The ejection  fraction is " 60-65%.  Normal right ventricle size and systolic function.  Doppler suggests left to right interatrial shunt.  Watchman device noted in left atrial appendage. The device is well seated with  no leakage by color flow Doppler imaging.  Thrombus absent on left atrial appendage occlusion device.  Moderate atherosclerotic plaque(s) in the descending aorta.       Cc: Marta Neil CNP, Bernadette Taylor MD Amila Dilusha William, MD  6/4/2024  1:22 PM

## 2024-06-05 DIAGNOSIS — I48.0 PAROXYSMAL ATRIAL FIBRILLATION (H): Primary | Chronic | ICD-10-CM

## 2024-06-06 DIAGNOSIS — M81.0 AGE-RELATED OSTEOPOROSIS WITHOUT CURRENT PATHOLOGICAL FRACTURE: Primary | ICD-10-CM

## 2024-06-06 DIAGNOSIS — Z92.29 PERSONAL HISTORY OF OTHER DRUG THERAPY: ICD-10-CM

## 2024-06-11 DIAGNOSIS — D50.8 IRON DEFICIENCY ANEMIA SECONDARY TO INADEQUATE DIETARY IRON INTAKE: Primary | ICD-10-CM

## 2024-06-11 RX ORDER — DIPHENHYDRAMINE HYDROCHLORIDE 50 MG/ML
50 INJECTION INTRAMUSCULAR; INTRAVENOUS
Status: CANCELLED
Start: 2024-06-18

## 2024-06-11 RX ORDER — HEPARIN SODIUM (PORCINE) LOCK FLUSH IV SOLN 100 UNIT/ML 100 UNIT/ML
5 SOLUTION INTRAVENOUS
OUTPATIENT
Start: 2024-06-18

## 2024-06-11 RX ORDER — HEPARIN SODIUM,PORCINE 10 UNIT/ML
5-20 VIAL (ML) INTRAVENOUS DAILY PRN
OUTPATIENT
Start: 2024-06-18

## 2024-06-11 RX ORDER — EPINEPHRINE 1 MG/ML
0.3 INJECTION, SOLUTION, CONCENTRATE INTRAVENOUS EVERY 5 MIN PRN
Status: CANCELLED | OUTPATIENT
Start: 2024-06-18

## 2024-06-11 RX ORDER — ALBUTEROL SULFATE 0.83 MG/ML
2.5 SOLUTION RESPIRATORY (INHALATION)
Status: CANCELLED | OUTPATIENT
Start: 2024-06-18

## 2024-06-11 RX ORDER — MEPERIDINE HYDROCHLORIDE 25 MG/ML
25 INJECTION INTRAMUSCULAR; INTRAVENOUS; SUBCUTANEOUS EVERY 30 MIN PRN
Status: CANCELLED | OUTPATIENT
Start: 2024-06-18

## 2024-06-11 RX ORDER — ALBUTEROL SULFATE 90 UG/1
1-2 AEROSOL, METERED RESPIRATORY (INHALATION)
Status: CANCELLED
Start: 2024-06-18

## 2024-06-11 RX ORDER — METHYLPREDNISOLONE SODIUM SUCCINATE 125 MG/2ML
125 INJECTION, POWDER, LYOPHILIZED, FOR SOLUTION INTRAMUSCULAR; INTRAVENOUS
Status: CANCELLED
Start: 2024-06-18

## 2024-06-21 ENCOUNTER — INFUSION THERAPY VISIT (OUTPATIENT)
Dept: INFUSION THERAPY | Facility: HOSPITAL | Age: 74
End: 2024-06-21
Attending: INTERNAL MEDICINE
Payer: MEDICARE

## 2024-06-21 VITALS
TEMPERATURE: 98 F | HEART RATE: 58 BPM | RESPIRATION RATE: 18 BRPM | SYSTOLIC BLOOD PRESSURE: 120 MMHG | DIASTOLIC BLOOD PRESSURE: 70 MMHG | BODY MASS INDEX: 34.43 KG/M2 | OXYGEN SATURATION: 95 % | HEIGHT: 61 IN

## 2024-06-21 DIAGNOSIS — D50.8 OTHER IRON DEFICIENCY ANEMIA: ICD-10-CM

## 2024-06-21 DIAGNOSIS — D50.8 IRON DEFICIENCY ANEMIA SECONDARY TO INADEQUATE DIETARY IRON INTAKE: Primary | ICD-10-CM

## 2024-06-21 DIAGNOSIS — G25.81 RESTLESS LEG SYNDROME: ICD-10-CM

## 2024-06-21 PROCEDURE — 258N000003 HC RX IP 258 OP 636: Mod: JZ | Performed by: INTERNAL MEDICINE

## 2024-06-21 PROCEDURE — 250N000011 HC RX IP 250 OP 636: Performed by: INTERNAL MEDICINE

## 2024-06-21 PROCEDURE — 96365 THER/PROPH/DIAG IV INF INIT: CPT

## 2024-06-21 PROCEDURE — 96376 TX/PRO/DX INJ SAME DRUG ADON: CPT

## 2024-06-21 PROCEDURE — 96366 THER/PROPH/DIAG IV INF ADDON: CPT

## 2024-06-21 RX ORDER — DIPHENHYDRAMINE HYDROCHLORIDE 50 MG/ML
50 INJECTION INTRAMUSCULAR; INTRAVENOUS
Status: DISCONTINUED | OUTPATIENT
Start: 2024-06-21 | End: 2024-06-21 | Stop reason: HOSPADM

## 2024-06-21 RX ORDER — HEPARIN SODIUM (PORCINE) LOCK FLUSH IV SOLN 100 UNIT/ML 100 UNIT/ML
5 SOLUTION INTRAVENOUS
OUTPATIENT
Start: 2024-06-21

## 2024-06-21 RX ORDER — DIPHENHYDRAMINE HYDROCHLORIDE 50 MG/ML
50 INJECTION INTRAMUSCULAR; INTRAVENOUS
Status: CANCELLED
Start: 2024-06-21

## 2024-06-21 RX ORDER — MEPERIDINE HYDROCHLORIDE 50 MG/ML
25 INJECTION INTRAMUSCULAR; INTRAVENOUS; SUBCUTANEOUS EVERY 30 MIN PRN
Status: DISCONTINUED | OUTPATIENT
Start: 2024-06-21 | End: 2024-06-21 | Stop reason: HOSPADM

## 2024-06-21 RX ORDER — METHYLPREDNISOLONE SODIUM SUCCINATE 125 MG/2ML
125 INJECTION, POWDER, LYOPHILIZED, FOR SOLUTION INTRAMUSCULAR; INTRAVENOUS
Status: CANCELLED
Start: 2024-06-21

## 2024-06-21 RX ORDER — ALBUTEROL SULFATE 90 UG/1
1-2 AEROSOL, METERED RESPIRATORY (INHALATION)
Status: DISCONTINUED | OUTPATIENT
Start: 2024-06-21 | End: 2024-06-21 | Stop reason: HOSPADM

## 2024-06-21 RX ORDER — MEPERIDINE HYDROCHLORIDE 50 MG/ML
25 INJECTION INTRAMUSCULAR; INTRAVENOUS; SUBCUTANEOUS EVERY 30 MIN PRN
Status: CANCELLED | OUTPATIENT
Start: 2024-06-21

## 2024-06-21 RX ORDER — EPINEPHRINE 1 MG/ML
0.3 INJECTION, SOLUTION INTRAMUSCULAR; SUBCUTANEOUS EVERY 5 MIN PRN
Status: DISCONTINUED | OUTPATIENT
Start: 2024-06-21 | End: 2024-06-21 | Stop reason: HOSPADM

## 2024-06-21 RX ORDER — METHYLPREDNISOLONE SODIUM SUCCINATE 125 MG/2ML
125 INJECTION, POWDER, LYOPHILIZED, FOR SOLUTION INTRAMUSCULAR; INTRAVENOUS
Status: DISCONTINUED | OUTPATIENT
Start: 2024-06-21 | End: 2024-06-21 | Stop reason: HOSPADM

## 2024-06-21 RX ORDER — EPINEPHRINE 1 MG/ML
0.3 INJECTION, SOLUTION INTRAMUSCULAR; SUBCUTANEOUS EVERY 5 MIN PRN
Status: CANCELLED | OUTPATIENT
Start: 2024-06-21

## 2024-06-21 RX ORDER — ALBUTEROL SULFATE 0.83 MG/ML
2.5 SOLUTION RESPIRATORY (INHALATION)
Status: CANCELLED | OUTPATIENT
Start: 2024-06-21

## 2024-06-21 RX ORDER — ALBUTEROL SULFATE 90 UG/1
1-2 AEROSOL, METERED RESPIRATORY (INHALATION)
Status: CANCELLED
Start: 2024-06-21

## 2024-06-21 RX ORDER — ALBUTEROL SULFATE 0.83 MG/ML
2.5 SOLUTION RESPIRATORY (INHALATION)
Status: DISCONTINUED | OUTPATIENT
Start: 2024-06-21 | End: 2024-06-21 | Stop reason: HOSPADM

## 2024-06-21 RX ORDER — HEPARIN SODIUM,PORCINE 10 UNIT/ML
5-20 VIAL (ML) INTRAVENOUS DAILY PRN
OUTPATIENT
Start: 2024-06-21

## 2024-06-21 RX ADMIN — SODIUM CHLORIDE 250 ML: 9 INJECTION, SOLUTION INTRAVENOUS at 10:15

## 2024-06-21 RX ADMIN — SODIUM CHLORIDE 975 MG: 9 INJECTION, SOLUTION INTRAVENOUS at 11:47

## 2024-06-21 RX ADMIN — SODIUM CHLORIDE 25 MG: 9 INJECTION, SOLUTION INTRAVENOUS at 10:15

## 2024-06-21 SDOH — HEALTH STABILITY: PHYSICAL HEALTH: ON AVERAGE, HOW MANY MINUTES DO YOU ENGAGE IN EXERCISE AT THIS LEVEL?: 0 MIN

## 2024-06-21 SDOH — HEALTH STABILITY: PHYSICAL HEALTH: ON AVERAGE, HOW MANY DAYS PER WEEK DO YOU ENGAGE IN MODERATE TO STRENUOUS EXERCISE (LIKE A BRISK WALK)?: 0 DAYS

## 2024-06-21 ASSESSMENT — SOCIAL DETERMINANTS OF HEALTH (SDOH): HOW OFTEN DO YOU GET TOGETHER WITH FRIENDS OR RELATIVES?: ONCE A WEEK

## 2024-06-21 ASSESSMENT — PAIN SCALES - GENERAL: PAINLEVEL: MODERATE PAIN (4)

## 2024-06-21 NOTE — PROGRESS NOTES
Infusion Nursing Note:  Suma Mark presents today for iron infusion.    Patient seen by provider today: No   present during visit today: Not Applicable.    Note: This is Lo's first infusion of Infed; she has had Venofer in the past with no reactions.      Intravenous Access:  Peripheral IV placed.    Treatment Conditions:  Lo denies having any new or additional symptoms today. Will proceed.       Post Infusion Assessment:  Patient tolerated infusion without incident.  Patient observed for some minutes post iron infusion per protocol.  Blood return noted pre and post infusion.  Site patent and intact, free from redness, edema or discomfort.  No evidence of extravasations.  Access discontinued per protocol.       Discharge Plan:   Patient and/or family verbalized understanding of discharge instructions and all questions answered.  Patient discharged in stable condition accompanied by: self.  Departure Mode: Ambulatory.  Medication information sheet given to patient.      Alyson Mathews RN BSN

## 2024-06-25 ENCOUNTER — TRANSFERRED RECORDS (OUTPATIENT)
Dept: HEALTH INFORMATION MANAGEMENT | Facility: CLINIC | Age: 74
End: 2024-06-25
Payer: MEDICARE

## 2024-06-26 ENCOUNTER — OFFICE VISIT (OUTPATIENT)
Dept: INTERNAL MEDICINE | Facility: CLINIC | Age: 74
End: 2024-06-26
Payer: MEDICARE

## 2024-06-26 VITALS
OXYGEN SATURATION: 97 % | HEART RATE: 58 BPM | WEIGHT: 181.9 LBS | RESPIRATION RATE: 16 BRPM | SYSTOLIC BLOOD PRESSURE: 100 MMHG | HEIGHT: 61 IN | BODY MASS INDEX: 34.34 KG/M2 | TEMPERATURE: 97.2 F | DIASTOLIC BLOOD PRESSURE: 62 MMHG

## 2024-06-26 DIAGNOSIS — Z13.1 SCREENING FOR DIABETES MELLITUS: ICD-10-CM

## 2024-06-26 DIAGNOSIS — I10 ESSENTIAL HYPERTENSION: ICD-10-CM

## 2024-06-26 DIAGNOSIS — Z95.818 PRESENCE OF WATCHMAN LEFT ATRIAL APPENDAGE CLOSURE DEVICE: Chronic | ICD-10-CM

## 2024-06-26 DIAGNOSIS — E03.9 ACQUIRED HYPOTHYROIDISM: Chronic | ICD-10-CM

## 2024-06-26 DIAGNOSIS — F41.1 GENERALIZED ANXIETY DISORDER: Chronic | ICD-10-CM

## 2024-06-26 DIAGNOSIS — R42 DIZZINESS: ICD-10-CM

## 2024-06-26 DIAGNOSIS — E78.5 DYSLIPIDEMIA, GOAL LDL BELOW 70: Chronic | ICD-10-CM

## 2024-06-26 DIAGNOSIS — I48.0 PAROXYSMAL ATRIAL FIBRILLATION (H): Chronic | ICD-10-CM

## 2024-06-26 DIAGNOSIS — D50.8 IRON DEFICIENCY ANEMIA SECONDARY TO INADEQUATE DIETARY IRON INTAKE: ICD-10-CM

## 2024-06-26 DIAGNOSIS — M81.0 SENILE OSTEOPOROSIS: Chronic | ICD-10-CM

## 2024-06-26 DIAGNOSIS — E04.1 THYROID NODULE: ICD-10-CM

## 2024-06-26 DIAGNOSIS — F51.04 CHRONIC INSOMNIA: ICD-10-CM

## 2024-06-26 DIAGNOSIS — I25.10 CORONARY ARTERY DISEASE INVOLVING NATIVE CORONARY ARTERY OF NATIVE HEART WITHOUT ANGINA PECTORIS: Chronic | ICD-10-CM

## 2024-06-26 DIAGNOSIS — Z00.00 ENCOUNTER FOR ANNUAL WELLNESS VISIT (AWV) IN MEDICARE PATIENT: Primary | ICD-10-CM

## 2024-06-26 DIAGNOSIS — G47.33 OSA (OBSTRUCTIVE SLEEP APNEA): Chronic | ICD-10-CM

## 2024-06-26 PROBLEM — R53.83 OTHER FATIGUE: Status: RESOLVED | Noted: 2024-05-30 | Resolved: 2024-06-26

## 2024-06-26 PROBLEM — R73.03 PREDIABETES: Status: ACTIVE | Noted: 2024-06-26

## 2024-06-26 PROBLEM — R39.9 UTI SYMPTOMS: Status: RESOLVED | Noted: 2024-05-30 | Resolved: 2024-06-26

## 2024-06-26 PROBLEM — M79.651 PAIN IN BOTH THIGHS: Status: RESOLVED | Noted: 2024-05-30 | Resolved: 2024-06-26

## 2024-06-26 PROBLEM — M79.652 PAIN IN BOTH THIGHS: Status: RESOLVED | Noted: 2024-05-30 | Resolved: 2024-06-26

## 2024-06-26 PROBLEM — E55.9 VITAMIN D DEFICIENCY: Status: RESOLVED | Noted: 2017-12-07 | Resolved: 2024-06-26

## 2024-06-26 LAB
ALBUMIN SERPL BCG-MCNC: 4.4 G/DL (ref 3.5–5.2)
ALBUMIN UR-MCNC: NEGATIVE MG/DL
ALP SERPL-CCNC: 99 U/L (ref 40–150)
ALT SERPL W P-5'-P-CCNC: 29 U/L (ref 0–50)
ANION GAP SERPL CALCULATED.3IONS-SCNC: 9 MMOL/L (ref 7–15)
APPEARANCE UR: CLEAR
AST SERPL W P-5'-P-CCNC: 33 U/L (ref 0–45)
BACTERIA #/AREA URNS HPF: ABNORMAL /HPF
BILIRUB SERPL-MCNC: 0.7 MG/DL
BILIRUB UR QL STRIP: NEGATIVE
BUN SERPL-MCNC: 16.6 MG/DL (ref 8–23)
CALCIUM SERPL-MCNC: 9.8 MG/DL (ref 8.8–10.2)
CHLORIDE SERPL-SCNC: 100 MMOL/L (ref 98–107)
CHOLEST SERPL-MCNC: 143 MG/DL
COLOR UR AUTO: YELLOW
CREAT SERPL-MCNC: 0.79 MG/DL (ref 0.51–0.95)
DEPRECATED HCO3 PLAS-SCNC: 27 MMOL/L (ref 22–29)
EGFRCR SERPLBLD CKD-EPI 2021: 78 ML/MIN/1.73M2
ERYTHROCYTE [DISTWIDTH] IN BLOOD BY AUTOMATED COUNT: 19.9 % (ref 10–15)
FASTING STATUS PATIENT QL REPORTED: YES
FASTING STATUS PATIENT QL REPORTED: YES
FERRITIN SERPL-MCNC: 914 NG/ML (ref 11–328)
GLUCOSE SERPL-MCNC: 96 MG/DL (ref 70–99)
GLUCOSE UR STRIP-MCNC: NEGATIVE MG/DL
HBA1C MFR BLD: 5.8 % (ref 0–5.6)
HCT VFR BLD AUTO: 36.9 % (ref 35–47)
HDLC SERPL-MCNC: 65 MG/DL
HGB BLD-MCNC: 11.8 G/DL (ref 11.7–15.7)
HGB UR QL STRIP: ABNORMAL
IRON BINDING CAPACITY (ROCHE): 442 UG/DL (ref 240–430)
IRON SATN MFR SERPL: 35 % (ref 15–46)
IRON SERPL-MCNC: 155 UG/DL (ref 37–145)
KETONES UR STRIP-MCNC: NEGATIVE MG/DL
LDLC SERPL CALC-MCNC: 58 MG/DL
LEUKOCYTE ESTERASE UR QL STRIP: NEGATIVE
MCH RBC QN AUTO: 26.6 PG (ref 26.5–33)
MCHC RBC AUTO-ENTMCNC: 32 G/DL (ref 31.5–36.5)
MCV RBC AUTO: 83 FL (ref 78–100)
NITRATE UR QL: NEGATIVE
NONHDLC SERPL-MCNC: 78 MG/DL
PH UR STRIP: 6.5 [PH] (ref 5–8)
PLATELET # BLD AUTO: 344 10E3/UL (ref 150–450)
POTASSIUM SERPL-SCNC: 4.4 MMOL/L (ref 3.4–5.3)
PROT SERPL-MCNC: 7 G/DL (ref 6.4–8.3)
RBC # BLD AUTO: 4.44 10E6/UL (ref 3.8–5.2)
RBC #/AREA URNS AUTO: ABNORMAL /HPF
SODIUM SERPL-SCNC: 136 MMOL/L (ref 135–145)
SP GR UR STRIP: 1.01 (ref 1–1.03)
SQUAMOUS #/AREA URNS AUTO: ABNORMAL /LPF
TRIGL SERPL-MCNC: 98 MG/DL
TSH SERPL DL<=0.005 MIU/L-ACNC: 2.36 UIU/ML (ref 0.3–4.2)
UROBILINOGEN UR STRIP-ACNC: 0.2 E.U./DL
VIT D+METAB SERPL-MCNC: 27 NG/ML (ref 20–50)
WBC # BLD AUTO: 7.3 10E3/UL (ref 4–11)
WBC #/AREA URNS AUTO: ABNORMAL /HPF

## 2024-06-26 PROCEDURE — 85027 COMPLETE CBC AUTOMATED: CPT | Performed by: INTERNAL MEDICINE

## 2024-06-26 PROCEDURE — G0439 PPPS, SUBSEQ VISIT: HCPCS | Performed by: INTERNAL MEDICINE

## 2024-06-26 PROCEDURE — 36415 COLL VENOUS BLD VENIPUNCTURE: CPT | Performed by: INTERNAL MEDICINE

## 2024-06-26 PROCEDURE — 83550 IRON BINDING TEST: CPT | Performed by: INTERNAL MEDICINE

## 2024-06-26 PROCEDURE — 99214 OFFICE O/P EST MOD 30 MIN: CPT | Mod: 25 | Performed by: INTERNAL MEDICINE

## 2024-06-26 PROCEDURE — 82306 VITAMIN D 25 HYDROXY: CPT | Performed by: INTERNAL MEDICINE

## 2024-06-26 PROCEDURE — 96372 THER/PROPH/DIAG INJ SC/IM: CPT | Mod: LT | Performed by: INTERNAL MEDICINE

## 2024-06-26 PROCEDURE — 83540 ASSAY OF IRON: CPT | Performed by: INTERNAL MEDICINE

## 2024-06-26 PROCEDURE — 82728 ASSAY OF FERRITIN: CPT | Performed by: INTERNAL MEDICINE

## 2024-06-26 PROCEDURE — 81001 URINALYSIS AUTO W/SCOPE: CPT | Performed by: INTERNAL MEDICINE

## 2024-06-26 PROCEDURE — 83036 HEMOGLOBIN GLYCOSYLATED A1C: CPT | Performed by: INTERNAL MEDICINE

## 2024-06-26 PROCEDURE — 80053 COMPREHEN METABOLIC PANEL: CPT | Performed by: INTERNAL MEDICINE

## 2024-06-26 PROCEDURE — 80061 LIPID PANEL: CPT | Performed by: INTERNAL MEDICINE

## 2024-06-26 PROCEDURE — 84443 ASSAY THYROID STIM HORMONE: CPT | Performed by: INTERNAL MEDICINE

## 2024-06-26 RX ORDER — ESZOPICLONE 1 MG/1
1 TABLET, FILM COATED ORAL AT BEDTIME
Qty: 60 TABLET | Refills: 0 | Status: SHIPPED | OUTPATIENT
Start: 2024-06-26 | End: 2024-08-01

## 2024-06-26 NOTE — PATIENT INSTRUCTIONS
Schedule visit with sleep specialist and discuss Inspire and sleep management. Suresh Ferguson MD at the [facility]    Please check at home that you are NOT taking valsartan.  You are not suppose to take valsartan and irbesartan.    Also check how much calcium you take a day.    You can decrease amlodipine to 1/2 pill daily and drink more water.    Schedule thyroid US, carotid US and mammogram [phone].    Prolia 12th today. Labs today.  Prolia 13th in 6 months with me.    DXA in 6/2025 .   Phone number to schedule [phone].    Daily calcium need is 5827-8696 mg a day from the diet and supplements.  Calcium citrate is easier to digest.  Vitamin D 2000 IU daily recommended.    Risk of rebound vertebral fractures is higher when Prolia suddenly stopped or dose was missed.      Prolia and Covid vaccine should be  for at least a week.

## 2024-06-26 NOTE — PROGRESS NOTES
Preventive Care Visit  [facility]  Bernadette Taylor MD, Internal Medicine  Jun 26, 2024      Assessment & Plan     Encounter for annual wellness visit (AWV) in Medicare patient    - Iron & Iron Binding Capacity    Other iron deficiency anemia  Comment: She had received 3 iron infusions, last one was fin July.  She had EGD on 10/30/23, but they were not able to complete the colonoscopy because she had constant retching and transient desaturation in low 90s. They suggested colonoscopy when off the Plavix. EGD result reviewed and showed esophagitis and gastritis, and she is on omeprazole now. She denies black or bloody stool.  She had colonoscopy in 4/2024, one polyp was removed.    She had iron dextran infusion on 6/21/24.    Paroxysmal atrial fibrillation (H)  Presence of Watchman left atrial appendage closure device  symptomatic with palpitations, chest discomfort, exertional intolerance  [id number] 6 - 31mm Watchman, FLX, 6/22/2023  Catheter ablation is planned for August:  apixaban 5mg twice daily 1 week prior to and to continue for 6 weeks post ablation (in place of aspirin), hold sotalol 3 days prior (likely resume for 6-12 weeks post ablation)  - continue sotalol 80mg twice daily  - continue aspirin 81mg daily    DONN (obstructive sleep apnea) - mild (AHI 11)  She saw Suresh Ferguson MD at the [facility] in 2022. She cannot tolerate the CPAP machine and the mask. She will schedule follow-up to discuss Inspire.    Essential hypertension  On amlodipine 5 mg, Avapro 150 mg, furosemide 40 mg. Has frequent dizziness, orthostatic. BP on the low side. We will decrease amlodipine to 2.5 mg daily.  Please check at home that you are NOT taking valsartan.  You are not suppose to take valsartan and irbesartan.  - UA Macroscopic with reflex to Microscopic and Culture; Future  - UA Macroscopic with reflex to Microscopic and Culture    Coronary artery disease involving native coronary artery of native  heart without angina pectoris  status post EUGENIA to OM2 (extending back to Lcx) on 10/12/22 and EUGENIA to distal LAD on 11/8/22    She was on Plavix until December 2023 ( 1 year post stent), now continued ASA 81 mg daily indefinitely.   On Imdur 30 mg daily  - CBC with platelets; Future  - Comprehensive metabolic panel; Future  - Lipid panel reflex to direct LDL Fasting; Future  - US Carotid Bilateral; Future  - CBC with platelets  - Comprehensive metabolic panel  - Lipid panel reflex to direct LDL Fasting    Chronic insomnia  Patient is having insomnia, with difficulty falling and staying asleep. She was on trazodone for many years in the past, but even the dose of 150 mg at bedtime was not providing good sleep. She is taking lorazepam 0.5 mg at bedtime daily for the more than 6 months now. We discussed melatonin, Tylenol PM, Unisom, Sleep aid OTC.She tried them all. We will try Lunesta.  - eszopiclone (LUNESTA) 1 MG tablet; Take 1 tablet (1 mg) by mouth at bedtime    Acquired hypothyroidism  On levothyroxine  - TSH with free T4 reflex; Future  - TSH with free T4 reflex    Dyslipidemia, goal LDL below 70  On Lipitor  - Lipid panel reflex to direct LDL Fasting; Future  - Lipid panel reflex to direct LDL Fasting    Generalized anxiety disorder  Stable on Buspar and celexa    Senile osteoporosis  Surgical history significant for hysterectomy at age of 35 and early menopause which was treated with hormonal therapy for 5-6 years only.  eGFR 54 on 11/27/22.  On Prolia since 8/2018. Tolerating it well.  Prolia # 12 today.  DXA in 6/2025  - Vitamin D Deficiency; Future  - Vitamin D Deficiency    Screening for diabetes mellitus    - Hemoglobin A1c; Future  - Hemoglobin A1c    Thyroid nodule  Last US in 2022. Recommended follow-up in 2024.  - US Thyroid; Future    Dizziness  She noticed dizziness with body position change, banding, neck extension, standing up. We will correct the BP medications. She will schedule US carotid.  -  "US Carotid Bilateral; Future            BMI  Estimated body mass index is 34.94 kg/m  as calculated from the following:    Height as of this encounter: 1.537 m (5' 0.5\").    Weight as of this encounter: 82.5 kg (181 lb 14.4 oz).       Counseling  Appropriate preventive services were discussed with this patient, including applicable screening as appropriate for fall prevention, nutrition, physical activity, Tobacco-use cessation, weight loss and cognition.  Checklist reviewing preventive services available has been given to the patient.  Reviewed patient's diet, addressing concerns and/or questions.   Discussed possible causes of fatigue. I have reviewed Opioid Use Disorder and Substance Use Disorder risk factors and made any needed referrals.           Halina Blanchard is a 73 year old, presenting for the following:  Wellness Visit (Mammo 6/15/23 DXA 6/13/23 Colon 4/04/24)        6/26/2024     8:21 AM   Additional Questions   Roomed by Lizbeth         Health Care Directive  Patient has a Health Care Directive on file  Advance care planning document is on file and is current.    HPI              6/21/2024   General Health   How would you rate your overall physical health? Good   Feel stress (tense, anxious, or unable to sleep) Not at all            6/21/2024   Nutrition   Diet: Regular (no restrictions)            6/21/2024   Exercise   Days per week of moderate/strenous exercise 0 days   Average minutes spent exercising at this level 0 min      (!) EXERCISE CONCERN      6/21/2024   Social Factors   Frequency of gathering with friends or relatives Once a week   Worry food won't last until get money to buy more No   Food not last or not have enough money for food? No   Do you have housing? (Housing is defined as stable permanent housing and does not include staying ouside in a car, in a tent, in an abandoned building, in an overnight shelter, or couch-surfing.) Yes   Are you worried about losing your housing? No   Lack " of transportation? No   Unable to get utilities (heat,electricity)? No            6/26/2024   Fall Risk   Fallen 2 or more times in the past year? No   Trouble with walking or balance? No   Gait Speed Test Interpretation Less than or equal to 5.00 seconds - PASS             6/21/2024   Activities of Daily Living- Home Safety   Needs help with the following daily activites None of the above   Safety concerns in the home None of the above            6/21/2024   Dental   Dentist two times every year? Yes            6/21/2024   Hearing Screening   Hearing concerns? None of the above            6/21/2024   Driving Risk Screening   Patient/family members have concerns about driving No            6/21/2024   General Alertness/Fatigue Screening   Have you been more tired than usual lately? (!) YES            6/21/2024   Urinary Incontinence Screening   Bothered by leaking urine in past 6 months No            6/21/2024   TB Screening   Were you born outside of the [place]? No          Today's PHQ-9 Score:       6/25/2024     9:36 AM   PHQ-9 SCORE   PHQ-9 Total Score MyChart 2 (Minimal depression)   PHQ-9 Total Score 2         6/21/2024   Substance Use   Alcohol more than 3/day or more than 7/wk No   Do you have a current opioid prescription? (!) YES   How severe/bad is pain from 1 to 10? 6/10   Do you use any other substances recreationally? No          Social History     Tobacco Use    Smoking status: Never     Passive exposure: Never    Smokeless tobacco: Never   Vaping Use    Vaping status: Never Used   Substance Use Topics    Alcohol use: Yes     Comment: Alcoholic Drinks/day: 1 cocktail daily    Drug use: No           6/15/2023   LAST FHS-7 RESULTS   1st degree relative breast or ovarian cancer No   Any relative bilateral breast cancer Unknown   Any male have breast cancer No   Any ONE woman have BOTH breast AND ovarian cancer No   Any woman with breast cancer before 50yrs No   2 or more relatives with breast AND/OR ovarian  cancer No   2 or more relatives with breast AND/OR bowel cancer No               ASCVD Risk   The 10-year ASCVD risk score (Emily CARRILLO, et al., 2019) is: 12.9%    Values used to calculate the score:      Age: 73 years      Sex: Female      Is Non- : No      Diabetic: No      Tobacco smoker: No      Systolic Blood Pressure: 100 mmHg      Is BP treated: Yes      HDL Cholesterol: 60 mg/dL      Total Cholesterol: 131 mg/dL            Reviewed and updated as needed this visit by Provider                      Current providers sharing in care for this patient include:  Patient Care Team:  Bernadette Taylor MD as PCP - General (Internal Medicine)  Bernadette Taylor MD as Assigned PCP  Corinne Bonilla MD as MD (Cardiovascular Disease)  Suresh Ferguson MD as Assigned Sleep Provider  Jacek Worley MD as MD (Cardiovascular Disease)  Marilyn Middleton PA-C as Assigned Heart and Vascular Provider  Suma Bennett APRN CNP as Assigned Pulmonology Provider  Reny Camejo CNP as Assigned Neuroscience Provider    The following health maintenance items are reviewed in Epic and correct as of today:  Health Maintenance   Topic Date Due    HF ACTION PLAN  Never done    URINE DRUG SCREEN  Never done    Pneumococcal Vaccine: 65+ Years (3 of 3 - PPSV23 or PCV20) 12/01/2020    COVID-19 Vaccine (8 - 2023-24 season) 02/26/2024    ANNUAL REVIEW OF HM ORDERS  05/01/2024    MEDICARE ANNUAL WELLNESS VISIT  06/13/2024    BMP  11/28/2024    LIPID  12/20/2024    ALT  05/28/2025    CBC  05/28/2025    FALL RISK ASSESSMENT  06/26/2025    GLUCOSE  05/28/2027    ADVANCE CARE PLANNING  06/13/2028    DTAP/TDAP/TD IMMUNIZATION (6 - Td or Tdap) 07/22/2028    COLORECTAL CANCER SCREENING  04/04/2029    DEXA  06/13/2038    TSH W/FREE T4 REFLEX  Completed    HEPATITIS C SCREENING  Completed    PHQ-2 (once per calendar year)  Completed    INFLUENZA VACCINE  Completed    ZOSTER IMMUNIZATION  Completed    RSV VACCINE  "(Pregnancy & 60+)  Completed    IPV IMMUNIZATION  Aged Out    HPV IMMUNIZATION  Aged Out    MENINGITIS IMMUNIZATION  Aged Out    RSV MONOCLONAL ANTIBODY  Aged Out    MAMMO SCREENING  Discontinued            Objective    Exam  /62   Pulse 58   Temp 97.2  F (36.2  C)   Resp 16   Ht 1.537 m (5' 0.5\")   Wt 82.5 kg (181 lb 14.4 oz)   LMP  (LMP Unknown)   SpO2 97%   BMI 34.94 kg/m     Estimated body mass index is 34.94 kg/m  as calculated from the following:    Height as of this encounter: 1.537 m (5' 0.5\").    Weight as of this encounter: 82.5 kg (181 lb 14.4 oz).    Physical Exam  General Appearance: Alert, cooperative, no distress, appears stated age.  Head: Normocephalic, without obvious abnormality, atraumatic  Eyes: PERRL, conjunctiva/corneas clear, EOM's intact  Ears: Normal TM's and external ear canals, both ears  Nose: Nares normal, septum midline,mucosa normal, no drainage  Throat: Lips, mucosa, and tongue normal; teeth and gums normal  Neck: Supple, symmetrical, trachea midline, no adenopathy;  thyroid: not enlarged, symmetric, no tenderness/mass/nodules; no carotid bruit or JVD  Back: Symmetric, no curvature, ROM normal, no CVA tenderness.  Lungs: Clear to auscultation bilaterally, respirations unlabored.  Breasts: No breast masses, tenderness, asymmetry, or nipple discharge.  Heart: Regular rate and rhythm, S1 and S2 normal, no murmur, rub, or gallop.  Abdomen: Soft, non-tender, bowel sounds active all four quadrants,  no masses, no organomegaly.  Extremities: Extremities normal, atraumatic, no cyanosis or edema.  Skin: Skin color, texture, turgor normal, no rashes or lesions.  Lymph nodes: Cervical, supraclavicular, and axillary nodes normal.  Neurologic: No focal neurological findings.          6/26/2024   Mini Cog   Clock Draw Score 2 Normal   3 Item Recall 3 objects recalled   Mini Cog Total Score 5                 Signed Electronically by: Bernadette Taylor MD    Answers submitted by the " patient for this visit:  Patient Health Questionnaire (Submitted on 6/25/2024)  If you checked off any problems, how difficult have these problems made it for you to do your work, take care of things at home, or get along with other people?: Not difficult at all  PHQ9 TOTAL SCORE: 2

## 2024-06-27 ENCOUNTER — HOSPITAL ENCOUNTER (OUTPATIENT)
Dept: MAMMOGRAPHY | Facility: CLINIC | Age: 74
Discharge: HOME | End: 2024-06-27
Attending: INTERNAL MEDICINE | Admitting: INTERNAL MEDICINE
Payer: MEDICARE

## 2024-06-27 DIAGNOSIS — Z12.31 VISIT FOR SCREENING MAMMOGRAM: ICD-10-CM

## 2024-06-27 PROCEDURE — 77063 BREAST TOMOSYNTHESIS BI: CPT

## 2024-06-28 ENCOUNTER — HOSPITAL ENCOUNTER (OUTPATIENT)
Dept: ULTRASOUND IMAGING | Facility: CLINIC | Age: 74
Discharge: HOME | End: 2024-06-28
Attending: INTERNAL MEDICINE
Payer: MEDICARE

## 2024-06-28 DIAGNOSIS — I25.10 CORONARY ARTERY DISEASE INVOLVING NATIVE CORONARY ARTERY OF NATIVE HEART WITHOUT ANGINA PECTORIS: Chronic | ICD-10-CM

## 2024-06-28 DIAGNOSIS — R42 DIZZINESS: ICD-10-CM

## 2024-06-28 DIAGNOSIS — E04.1 THYROID NODULE: ICD-10-CM

## 2024-06-28 PROCEDURE — 93880 EXTRACRANIAL BILAT STUDY: CPT | Mod: XU

## 2024-06-28 PROCEDURE — 76536 US EXAM OF HEAD AND NECK: CPT

## 2024-07-06 DIAGNOSIS — F41.9 ANXIETY: ICD-10-CM

## 2024-07-08 RX ORDER — LORAZEPAM 0.5 MG/1
TABLET ORAL
Qty: 30 TABLET | Refills: 0 | Status: SHIPPED | OUTPATIENT
Start: 2024-07-08 | End: 2024-08-20

## 2024-07-16 ENCOUNTER — TELEPHONE (OUTPATIENT)
Dept: CARDIOLOGY | Facility: CLINIC | Age: 74
End: 2024-07-16
Payer: MEDICARE

## 2024-07-16 DIAGNOSIS — G25.81 RESTLESS LEGS SYNDROME: ICD-10-CM

## 2024-07-16 DIAGNOSIS — I48.0 PAROXYSMAL ATRIAL FIBRILLATION (H): ICD-10-CM

## 2024-07-16 RX ORDER — SOTALOL HYDROCHLORIDE 80 MG/1
80 TABLET ORAL EVERY 12 HOURS
Qty: 180 TABLET | Refills: 3 | Status: SHIPPED | OUTPATIENT
Start: 2024-07-16 | End: 2024-10-07

## 2024-07-16 RX ORDER — PRAMIPEXOLE DIHYDROCHLORIDE 0.25 MG/1
TABLET ORAL
Qty: 270 TABLET | Refills: 1 | Status: SHIPPED | OUTPATIENT
Start: 2024-07-16

## 2024-07-16 NOTE — TELEPHONE ENCOUNTER
[facility] Call Center    Phone Message    May a detailed message be left on voicemail: yes     Reason for Call: Medication Refill Request    Has the patient contacted the pharmacy for the refill? Yes   Name of medication being requested: sotalol (BETAPACE) 80 MG tablet [[id number]]   Provider who prescribed the medication: Marta Neil  Pharmacy:    [facility]/PH[facility]ACY #[id number] - [place], [place] - [address] AT [facility] & [address]    Date medication is needed: 5/19/2024      Action Taken: Other: Cardiology    Travel Screening: Not Applicable    Thank you!  [facility]       Date of Service:

## 2024-07-22 DIAGNOSIS — F41.9 ANXIETY: ICD-10-CM

## 2024-07-22 RX ORDER — CITALOPRAM HYDROBROMIDE 40 MG/1
40 TABLET ORAL DAILY
Qty: 90 TABLET | Refills: 2 | Status: SHIPPED | OUTPATIENT
Start: 2024-07-22

## 2024-07-29 ENCOUNTER — TRANSFERRED RECORDS (OUTPATIENT)
Dept: HEALTH INFORMATION MANAGEMENT | Facility: CLINIC | Age: 74
End: 2024-07-29
Payer: MEDICARE

## 2024-08-01 ENCOUNTER — OFFICE VISIT (OUTPATIENT)
Dept: INTERNAL MEDICINE | Facility: CLINIC | Age: 74
End: 2024-08-01
Payer: MEDICARE

## 2024-08-01 VITALS
WEIGHT: 184.2 LBS | HEART RATE: 56 BPM | RESPIRATION RATE: 16 BRPM | TEMPERATURE: 97.1 F | BODY MASS INDEX: 34.78 KG/M2 | SYSTOLIC BLOOD PRESSURE: 134 MMHG | OXYGEN SATURATION: 95 % | HEIGHT: 61 IN | DIASTOLIC BLOOD PRESSURE: 70 MMHG

## 2024-08-01 DIAGNOSIS — F51.04 CHRONIC INSOMNIA: ICD-10-CM

## 2024-08-01 DIAGNOSIS — D50.8 IRON DEFICIENCY ANEMIA SECONDARY TO INADEQUATE DIETARY IRON INTAKE: ICD-10-CM

## 2024-08-01 DIAGNOSIS — I48.0 PAROXYSMAL ATRIAL FIBRILLATION (H): Chronic | ICD-10-CM

## 2024-08-01 DIAGNOSIS — G89.4 CHRONIC PAIN SYNDROME: Chronic | ICD-10-CM

## 2024-08-01 DIAGNOSIS — I10 ESSENTIAL HYPERTENSION: Primary | Chronic | ICD-10-CM

## 2024-08-01 PROBLEM — M54.9 CHRONIC BACK PAIN: Chronic | Status: RESOLVED | Noted: 2017-12-07 | Resolved: 2024-08-01

## 2024-08-01 PROBLEM — G89.29 CHRONIC BACK PAIN: Chronic | Status: RESOLVED | Noted: 2017-12-07 | Resolved: 2024-08-01

## 2024-08-01 LAB
ERYTHROCYTE [DISTWIDTH] IN BLOOD BY AUTOMATED COUNT: 18 % (ref 10–15)
HCT VFR BLD AUTO: 36.3 % (ref 35–47)
HGB BLD-MCNC: 12 G/DL (ref 11.7–15.7)
MCH RBC QN AUTO: 29.4 PG (ref 26.5–33)
MCHC RBC AUTO-ENTMCNC: 33.1 G/DL (ref 31.5–36.5)
MCV RBC AUTO: 89 FL (ref 78–100)
PLATELET # BLD AUTO: 252 10E3/UL (ref 150–450)
RBC # BLD AUTO: 4.08 10E6/UL (ref 3.8–5.2)
WBC # BLD AUTO: 6.3 10E3/UL (ref 4–11)

## 2024-08-01 PROCEDURE — 82728 ASSAY OF FERRITIN: CPT | Performed by: INTERNAL MEDICINE

## 2024-08-01 PROCEDURE — G2211 COMPLEX E/M VISIT ADD ON: HCPCS | Performed by: INTERNAL MEDICINE

## 2024-08-01 PROCEDURE — 83540 ASSAY OF IRON: CPT | Performed by: INTERNAL MEDICINE

## 2024-08-01 PROCEDURE — 85027 COMPLETE CBC AUTOMATED: CPT | Performed by: INTERNAL MEDICINE

## 2024-08-01 PROCEDURE — 83550 IRON BINDING TEST: CPT | Performed by: INTERNAL MEDICINE

## 2024-08-01 PROCEDURE — 99214 OFFICE O/P EST MOD 30 MIN: CPT | Performed by: INTERNAL MEDICINE

## 2024-08-01 PROCEDURE — 36415 COLL VENOUS BLD VENIPUNCTURE: CPT | Performed by: INTERNAL MEDICINE

## 2024-08-01 NOTE — PROGRESS NOTES
Assessment & Plan     Essential hypertension  We decreased amlodipine a month ago to 2.5 mg, she is on Avapro and furosemide. BP stays stable. She does not feel dizzy as before.    Paroxysmal atrial fibrillation (H)  Presence of Watchman left atrial appendage closure device  symptomatic with palpitations, chest discomfort, exertional intolerance  [id number] 6 - 31mm ROSANNE Resnedez, 6/22/2023  Catheter ablation is planned for August:  apixaban 5mg twice daily 1 week prior to and to continue for 6 weeks post ablation (in place of aspirin), hold sotalol 3 days prior (likely resume for 6-12 weeks post ablation)  - continue sotalol 80mg twice daily  - continue aspirin 81mg daily    She is scheduled for ablation end of August.    Iron deficiency anemia secondary to inadequate dietary iron intake  She had received 3 iron infusions, last one was fin July.  She had EGD on 10/30/23, but they were not able to complete the colonoscopy because she had constant retching and transient desaturation in low 90s. They suggested colonoscopy when off the Plavix. EGD result reviewed and showed esophagitis and gastritis, and she is on omeprazole now. She denies black or bloody stool.  She had colonoscopy in 4/2024, one polyp was removed.     She had iron dextran infusion on 6/21/24.    She is feeling good since the last infusion. I will recheck the labs today.  - CBC with platelets; Future  - Iron & Iron Binding Capacity; Future  - Ferritin; Future  - CBC with platelets  - Iron & Iron Binding Capacity  - Ferritin    Chronic pain syndrome  Mostly back pain, taking Norco few times a week.    Chronic insomnia  Patient is having insomnia, with difficulty falling and staying asleep. She was on trazodone for many years in the past, but even the dose of 150 mg at bedtime was not providing good sleep. She is taking lorazepam 0.5 mg at bedtime daily for the more than 6 months now. We discussed melatonin, Tylenol PM, Unisom, Sleep aid OTC.She  "tried them all. We tried Lunesta but she is still waking up around 2 am and has trouble going back to sleep. Lunesta makes her very drowsy in the morning. It looks like lorazepam works better for insomnia. She will try to 1/2 pill of Lunesta or lorazepam.      The longitudinal plan of care for the diagnosis(es)/condition(s) as documented were addressed during this visit. Due to the added complexity in care, I will continue to support Lo in the subsequent management and with ongoing continuity of care.    BMI  Estimated body mass index is 34.8 kg/m  as calculated from the following:    Height as of this encounter: 1.549 m (5' 1\").    Weight as of this encounter: 83.6 kg (184 lb 3.2 oz).             Halina Blanchard is a 73 year old, presenting for the following health issues:  Follow Up (F/U)      8/1/2024     1:40 PM   Additional Questions   Roomed by Lizbeth     History of Present Illness       Reason for visit:  Follow up    She eats 2-3 servings of fruits and vegetables daily.She consumes 1 sweetened beverage(s) daily.She exercises with enough effort to increase her heart rate 9 or less minutes per day.  She exercises with enough effort to increase her heart rate 3 or less days per week.   She is taking medications regularly.                     Objective    /70   Pulse 56   Temp 97.1  F (36.2  C)   Resp 16   Ht 1.549 m (5' 1\")   Wt 83.6 kg (184 lb 3.2 oz)   LMP  (LMP Unknown)   SpO2 95%   BMI 34.80 kg/m    Body mass index is 34.8 kg/m .  Physical Exam               Signed Electronically by: Bernadette Taylor MD    "

## 2024-08-02 LAB
FERRITIN SERPL-MCNC: 184 NG/ML (ref 11–328)
IRON BINDING CAPACITY (ROCHE): 283 UG/DL (ref 240–430)
IRON SATN MFR SERPL: 29 % (ref 15–46)
IRON SERPL-MCNC: 81 UG/DL (ref 37–145)

## 2024-08-12 ENCOUNTER — NURSE TRIAGE (OUTPATIENT)
Dept: INTERNAL MEDICINE | Facility: CLINIC | Age: 74
End: 2024-08-12
Payer: MEDICARE

## 2024-08-12 ENCOUNTER — MYC MEDICAL ADVICE (OUTPATIENT)
Dept: INTERNAL MEDICINE | Facility: CLINIC | Age: 74
End: 2024-08-12
Payer: MEDICARE

## 2024-08-12 NOTE — TELEPHONE ENCOUNTER
Nurse Triage SBAR    Is this a 2nd Level Triage? NO    Situation: Pt fell on Saturday on her right side. Hit her right knee and her right side. Having some rib pain and small amount of bruising.     Background: Tripped and fell on her deck.     Assessment: Did not hit her head. Denies changes to breathing. Does have right sided rib pain when coughing or taking a deep breath, states pain can spike to 8/10 but is minimal at rest. Taking ibuprofen.     Protocol Recommended Disposition:   See in Office Within 3 Days    Recommendation: Offered appointment today but patient declined. Agreeable to appointment tomorrow. Educated on red flag symptoms and when to be seen immediately. Patient agreeable to plan of care. Gave recommended home care advice as well.     Does the patient meet one of the following criteria for ADS visit consideration? 16+ years old, with an [facility] PCP     TIP  Providers, please consider if this condition is appropriate for management at one of our Acute and Diagnostic Services sites.     If patient is a good candidate, please use dotphrase <dot>triageresponse and select Refer to ADS to document.      Reason for Disposition   Patient wants to be seen    Additional Information   Negative: Major injury from dangerous force (e.g., fall > 10 feet or 3 meters)   Negative: Major bleeding (e.g., actively dripping or spurting) and can't be stopped   Negative: Shock suspected (e.g., cold/pale/clammy skin, too weak to stand)   Negative: Difficult to awaken or acting confused (e.g., disoriented, slurred speech)   Negative: SEVERE weakness (i.e., unable to walk or barely able to walk, requires support) and new-onset or worsening   Negative: Can't stand (bear weight) or walk and new-onset after fall   Negative: Sounds like a life-threatening emergency to the triager   Negative: Fainted (passed out)   Negative: New-onset or worsening weakness of the face, arm or leg on one side of the body   Negative: New-onset or  worsening dizziness and described as spinning or off balance (i.e., vertigo)   Negative: New-onset or worsening dizziness and NO spinning sensation or trouble with balance   Negative: Pregnant and fall   Negative: Patient has a concerning injury to a specific part of the body (e.g., chest, leg, head)   Negative: Patient has a wound (abrasion, cut, puncture, other skin injury or tear)   Negative: Injury (or injuries) that need emergency care   Negative: Sounds like a serious injury to the triager   Negative: Muscle pain and dark (cola colored) or red-colored urine   Negative: Unable to get up until help (e.g., caregiver, family, friend) arrived and on the ground 1 hour or more   Negative: Patient sounds very sick or weak to the triager   Negative: MODERATE weakness (i.e., interferes with work, school, normal activities) and new-onset or worsening   Negative: Fever > 101 F (38.3 C) and age > 60 years   Negative: Fever > 100.0 F (37.8 C) and bedridden (e.g., CVA, chronic illness, recovering from surgery)   Negative: Fever > 100.0 F (37.8 C) and diabetes mellitus or weak immune system (e.g., HIV positive, cancer chemo, splenectomy, organ transplant, chronic steroids)   Negative: Suspicious history for the fall   Negative: Caller has URGENT question and triager unable to answer question   Negative: New-onset or worsening pale skin (pallor)   Negative: No prior tetanus shots (or is not fully vaccinated) and any wound (e.g., cut or scrape)   Negative: HIV positive or severe immunodeficiency (severely weak immune system) and DIRTY cut or scrape   Negative: Caller has NON-URGENT question and triager unable to answer question   Negative: MILD weakness (i.e., does not interfere with ability to work, go to school, normal activities)  (Exception: mild weakness is a chronic symptom.)   Negative: Last tetanus shot > 5 years ago and DIRTY cut or scrape   Negative: Last tetanus shot > 10 years ago and CLEAN cut or scrape (e.g.,  object and skin were clean)    Protocols used: Falls and [id number]

## 2024-08-13 ENCOUNTER — OFFICE VISIT (OUTPATIENT)
Dept: INTERNAL MEDICINE | Facility: CLINIC | Age: 74
End: 2024-08-13
Payer: MEDICARE

## 2024-08-13 ENCOUNTER — ANCILLARY PROCEDURE (OUTPATIENT)
Dept: GENERAL RADIOLOGY | Facility: CLINIC | Age: 74
End: 2024-08-13
Attending: INTERNAL MEDICINE
Payer: MEDICARE

## 2024-08-13 VITALS
SYSTOLIC BLOOD PRESSURE: 124 MMHG | BODY MASS INDEX: 34.59 KG/M2 | OXYGEN SATURATION: 97 % | HEART RATE: 58 BPM | WEIGHT: 183.2 LBS | HEIGHT: 61 IN | RESPIRATION RATE: 16 BRPM | DIASTOLIC BLOOD PRESSURE: 64 MMHG | TEMPERATURE: 97.7 F

## 2024-08-13 DIAGNOSIS — G89.11 ACUTE PAIN OF RIGHT SHOULDER DUE TO TRAUMA: ICD-10-CM

## 2024-08-13 DIAGNOSIS — M25.511 ACUTE PAIN OF RIGHT SHOULDER DUE TO TRAUMA: ICD-10-CM

## 2024-08-13 DIAGNOSIS — S29.9XXA TRAUMATIC INJURY OF RIB: ICD-10-CM

## 2024-08-13 DIAGNOSIS — S29.9XXA TRAUMATIC INJURY OF RIB: Primary | ICD-10-CM

## 2024-08-13 PROCEDURE — 71101 X-RAY EXAM UNILAT RIBS/CHEST: CPT | Mod: TC | Performed by: STUDENT IN AN ORGANIZED HEALTH CARE EDUCATION/TRAINING PROGRAM

## 2024-08-13 PROCEDURE — 99213 OFFICE O/P EST LOW 20 MIN: CPT | Performed by: INTERNAL MEDICINE

## 2024-08-13 RX ORDER — SENNOSIDES 8.6 MG
1300 CAPSULE ORAL 2 TIMES DAILY
COMMUNITY
Start: 2024-08-13 | End: 2024-08-23

## 2024-08-13 RX ORDER — OMEGA-3 FATTY ACIDS/FISH OIL 300-1000MG
400 CAPSULE ORAL 3 TIMES DAILY PRN
COMMUNITY
Start: 2024-08-13

## 2024-08-13 RX ORDER — LIDOCAINE 4 G/G
1-2 PATCH TOPICAL EVERY 24 HOURS
COMMUNITY
Start: 2024-08-13

## 2024-08-13 NOTE — PROGRESS NOTES
"  Assessment & Plan     Traumatic injury of rib  73-year-old woman who fell 2 days ago landing on her right side now experiencing severe pain in her right anterior lateral rib cage.  There is tenderness to palpation throughout the region.  No ecchymosis.  Also some pain involving right shoulder as discussed below.  We discussed that this could either be bruised ribs or fractured ribs and will obtain x-ray to further evaluate.  Either way, pain is expected for the first 1 to 2 weeks after such injuries.  Bruised ribs will heal up much quicker than fractured ribs.  I am recommending managing pain with a combination of Tylenol 1300 mg twice daily with ibuprofen 400 mg 3 times daily as needed taking the ibuprofen and Tylenol at different times and alternating.  She can also try applying lidocaine 4% patches using up to 2 patches daily leaving on for 12 hours and off for 12 hours.  - acetaminophen (TYLENOL) 650 MG CR tablet; Take 2 tablets (1,300 mg) by mouth 2 times daily for 10 days  - ibuprofen (ADVIL/MOTRIN) 200 MG capsule; Take 2 capsules (400 mg) by mouth 3 times daily as needed for other (pain)  - Lidocaine (LIDOCARE) 4 % Patch; Place 1-2 patches onto the skin every 24 hours To prevent lidocaine toxicity, patient should be patch free for 12 hrs daily.  - XR Ribs & Chest Right G/E 3 Views; Future    Acute pain of right shoulder due to trauma  Injury to her right shoulder following fall.  Normal range of motion.  May have strained her rotator cuff.  Nothing to suggest any fracture and hopefully no rotator cuff tear.  However, we discussed seeing an orthopedic specialist if her shoulder continues to bother her beyond the next 7-10 days.          BMI  Estimated body mass index is 34.64 kg/m  as calculated from the following:    Height as of this encounter: 1.549 m (5' 0.98\").    Weight as of this encounter: 83.1 kg (183 lb 3.2 oz).         See Patient Instructions    Halina Blanchard is a 73 year old, presenting for " "the following health issues: Here to discuss pain in her ribs and shoulder following fall.  See assessment and plan for details  Fall (Sore from fall )        8/13/2024    12:42 PM   Additional Questions   Roomed by yasir hill   Accompanied by bridgett     Fall                   Review of Systems  See assessment and plan      Objective    /64   Pulse 58   Temp 97.7  F (36.5  C) (Oral)   Resp 16   Ht 1.549 m (5' 0.98\")   Wt 83.1 kg (183 lb 3.2 oz)   LMP  (LMP Unknown)   SpO2 97%   BMI 34.64 kg/m    Body mass index is 34.64 kg/m .  Physical Exam   No ecchymosis  Tenderness to palpation over right anterior lateral rib cage  Normal range of motion right shoulder with only mild discomfort        Signed Electronically by: Thang Paez MD    "

## 2024-08-14 NOTE — PROGRESS NOTES
[facility]  Cardiac Electrophysiology  [address]  [place] [address]   Office: [phone]  Fax: [phone]     HEART CARE ELECTROPHYSIOLOGY FOLLOW UP    Primary Care: Bernadette Taylor MD      Assessment/Recommendations     Paroxysmal atrial fibrillation/stage 3A: Symptomatic with chest discomfort, palpitations, dyspnea on exertion.  Breakthrough on sotalol.  We discussed pulmonary vein isolation ablation procedure including <1-2% risk for major complication, anticipated success rates, recovery and follow-up.  Medical and surgical history reviewed and updated. Current medications and allergies reviewed and updated as appropriate. History of postoperative nausea and vomiting after back surgery years ago. No personal or family history of abnormal bleeding with surgery    Presence of Watchman percutaneous left atrial appendage closure: [id number] score of 6 for age >75, gender, hypertension, cardiomyopathy and coronary artery disease; HAS-BLED score of 3 for age, bleeding disposition and current use antiplatelet therapy  Sp LAAC 6/22/2023 with DIANA 9/27/2023 unremarkable for peridevice flow or thrombus    CAD: Status post PCI 2022.  No anginal symptoms    DONN: Intolerant of CPAP.  Recommended follow-up regarding alternative treatment options    Plan:   Start Eliquis 5 mg twice a day 7 days prior to ablation, continue for 6 weeks after  Stop aspirin while on Eliquis  Hold sotalol beginning 3 days prior to ablation  Increase omeprazole to 40 mg daily beginning 3 days prior to ablation, continue for 6 weeks after  EP follow-up 6 weeks post ablation     History of Present Illness/Subjective    Suma Mark is a 73 year old female with past medical history significant for paroxysmal atrial fibrillation, status post percutaneous left atrial appendage closure 6/22/2023, coronary artery disease status post PCI 2022, HFpEF, hypertension, venous insufficiency, esophagitis and  gastritis, STEPHANI, DONN, fibromyalgia, hypothyroidism, obesity, anxiety, seen today for history and physical prior to AF ablation    She continues to have sporadic irregular palpitations, also associated with chest discomfort and dyspnea on exertion.  She notices symptoms particularly at night; they usually last 15-20 minutes.  She also endorses pedal edema, legs feel heavy at times. She recently suffered a mechanical fall where she tripped on her deck outside and has right sided back and rib pain. She plans to travel to [place] to visit her daughter in October.  She denies dyspnea at rest, lightheadedness/dizziness or syncope.     Data Review     Arrhythmia hx  Sx: Chest discomfort, palpitations, decreased activity tolerance, dyspnea on exertion  Dx/date: Paroxysmal AF by ED telemetry 2022, further documented by DERIC 10/28/2022.  Started on sotalol 11/27/2022 with ongoing breakthrough AF  [id number]:  6 for age >75, gender, hypertension, cardiomyopathy and coronary artery disease; HAS-BLED score of 3 for age, bleeding disposition and current use antiplatelet therapy. Hx esophagitis, gastritis with hx STEPHANI both on OAC and DAPT post-LAAC  OAC: Apixaban.  Sp LAAC 6/22/2023  Rate control: Metoprolol  AAD: Sotalol (2022-present)  DCCV: None  Ablation: None    EKG 8/15/2024: SR 60 bpm, QRS 80 ms  3/21/2024: SB 58 bpm, QT/QTc 458/449 ms  11/27/2022:  bpm  6/19/2023: sinus bradycardia at 57 bpm, QRS 86 ms, QT/QTc 430/418 ms  6/9/2023: Sinus rhythm at 63 bpm, QRS 90 ms, QT/QTc 448/458 ms  Personally reviewed.      DIANA 9/27/2023  Patient was sedated using Versed 2 mg. The heart rate, respiratory rate,  oxygen saturations, blood pressure, and response to care were monitored  throughout the procedure with the assistance of the nurse.  Left ventricular size, wall motion and function are normal. The ejection  fraction is 60-65%.  Normal right ventricle size and systolic function.  Doppler suggests left to right  "interatrial shunt.  Watchman device noted in left atrial appendage. The device is well seated with  no leakage by color flow Doppler imaging.  Thrombus absent on left atrial appendage occlusion device.  Moderate atherosclerotic plaque(s) in the descending aorta.    Complete TTE: 1/17/2023  The left ventricle is normal in size with mild concentric left ventricular  hypertrophy.  Left ventricular function is normal.The ejection fraction is 60-65%.  Normal right ventricle size and systolic function.  Both atria are of normal size.  No significant valve disease identified.  There is no comparison study available.    Cardiac event monitoring from 1/17/2023 to 1/23/2023 (monitored duration 6d 8h 44m).  Baseline rhythm was sinus rhythm 73bpm.    Average heart rate 64bpm, maximum 144bpm..  No symptoms recorded.  Paroxysmal atrial fibrillation eg. 1/18/2023 23:17:50, 1/19/2023 22:48:27.    There were no pauses noted.  Atrial fibrillation episodes are incompletely characterized on this monitoring modality.  Supraventricular and ventricular ectopic beat frequency are not reported on this monitoring modality.       Left heart cath: 6/9/2023  1.  Left circumflex stent remains widely patent  2.  Mid/distal LAD stent remains widely patent  3.  Moderate proximal and apical disease in the LAD, proximal RCA, and distal RCA appear unchanged from prior study October 2022.  No new or significant progression of underlying CAD.    I have reviewed and updated the patient's past medical history, allergy list and medication list.          Physical Examination   Vitals: /64 (BP Location: Right arm, Patient Position: Sitting, Cuff Size: Adult Regular)   Pulse 60   Resp 16   Ht 1.549 m (5' 0.98\")   Wt 83.5 kg (184 lb)   LMP  (LMP Unknown)   BMI 34.79 kg/m      BMI= Body mass index is 34.79 kg/m .    Wt Readings from Last 3 Encounters:   08/15/24 83.5 kg (184 lb)   08/13/24 83.1 kg (183 lb 3.2 oz)   08/01/24 83.6 kg (184 lb 3.2 oz) "       General   Appearance:   Alert and oriented, in no acute distress.    HEENT:  Normocephalic and atraumatic. Conjunctiva and sclera are clear. Moist oral mucosa.    Neck: No JVP, carotid bruit or obvious thyromegaly.   Lungs:   Respirations unlabored. Clear bilaterally with no rales, rhonchi, or wheezes.     Cardiovascular:   Rhythm is regular with occasional brief irregular beats. S1 and S2 are normal. No significant murmur is present. Lower extremities demonstrate 1+ edema. Posterior tibial pulses are intact bilaterally.   Extremities: No cyanosis or clubbing   Skin: Skin is warm, dry, and otherwise intact.   Neurologic: Gait not assessed. Mood and affect appropriate.              Medical History  Surgical History Family History Social History   Past Medical History:   Diagnosis Date    Anxiety state, unspecified     Back pain     Diverticulitis 12/07/2017    DJD (degenerative joint disease)     Essential hypertension, benign     Fibromyalgia     Gastro-oesophageal reflux disease     Heart disease     Hernia, ventral 04/27/2017    History of anesthesia complications     hypotension after surgery    History of blood transfusion     Hyperlipidemia     Hyponatremia 12/07/2017    Major depression     Migraine     Osteopenia     PONV (postoperative nausea and vomiting)     Posttraumatic stress disorder     Raynaud's disease     Restless leg syndrome     S/P total knee replacement 11/27/2017    Sepsis (H) 03/25/2019    Sleep apnea     Thrombosis of leg     with pregnancy    Unspecified hypothyroidism     Past Surgical History:   Procedure Laterality Date    ARTHROPLASTY KNEE UNICOMPARTMENT  10/31/2013    Procedure: ARTHROPLASTY KNEE UNICOMPARTMENT;  LEFT KNEE UNICOMPARTMENTAL ARTHROPLASTY (CAROLINE)^;  Surgeon: Chi Mittal MD;  Location:  OR    BREAST SURGERY      bx    COLECTOMY N/A 06/02/2017    Procedure: RESECTION, SMALL INTESTINE, OPEN ADHESIOLYSIS;  Surgeon: Keyon Qureshi MD;  Location: [facility]  Main OR;  Service:     COLONOSCOPY N/A 4/4/2024    Procedure: COLONOSCOPY WITH POLYPECTOMY;  Surgeon: Chris Edmonds MD;  Location: [facility] Main OR    CV CORONARY ANGIOGRAM N/A 10/12/2022    Procedure: CV CORONARY ANGIOGRAM;  Surgeon: You Martinez MD;  Location: [facility] CV    CV CORONARY ANGIOGRAM N/A 6/9/2023    Procedure: Coronary Angiogram;  Surgeon: Bret Jin MD;  Location: [facility] CV    CV FRACTIONAL FLOW RATIO WIRE N/A 10/12/2022    Procedure: Fractional Flow Ratio Wire;  Surgeon: You Martinez MD;  Location: [facility] CV    CV LEFT ATRIAL APPENDAGE CLOSURE Right 6/22/2023    Procedure: Left Atrial Appendage Closure;  Surgeon: Lenin Mariano MD;  Location: [facility] CV    CV LEFT HEART CATH N/A 6/9/2023    Procedure: Left Heart Catheterization;  Surgeon: Bret Jin MD;  Location: [facility] CV    CV PCI STENT DRUG ELUTING N/A 10/12/2022    Procedure: Percutaneous Coronary Intervention Stent;  Surgeon: You Martinez MD;  Location: [facility] CV    CV PCI STENT DRUG ELUTING N/A 11/08/2022    Procedure: Percutaneous Coronary Intervention - Stent;  Surgeon: Bret Jin MD;  Location: [facility] CV    ENDOSCOPIC RETROGRADE CHOLANGIOPANCREATOGRAPHY      ENDOSCOPIC STRIPPING VEIN(S)      BILATERLY    GENITOURINARY SURGERY      bladder sling    GI SURGERY  10/02/2015    Rectal prolaspse. s/p Abdominal rectopexy, sacral colpopexy, proctoscopy, cystoscopy    HERNIORRHAPHY INCISIONAL (LOCATION) N/A 06/02/2017    Procedure: LAPARASCOPIC CONVERTED TO OPEN PRIMARY INCISIONAL HERNIA REPAIR;  Surgeon: Keyon Qureshi MD;  Location: [facility] Main OR;  Service:     HYSTERECTOMY  1985 1985-1986    LAMINECTOMY LUMBAR TWO LEVELS  2006    LAPAROSCOPY N/A 08/22/2019    Procedure: DIAGNOSTIC LAPAROSCOPY, LYSIS OF ADHESION;  Surgeon: Svetlana Ron MD;  Location: [facility] Main OR;  Service: General    LUMBAR HARDWARE REMOVAL[   03/22/2012    REMOVAL LUMBAR HARDWARE 03/22/2012   L3-S1; Type CD Horizon m8 instrumentation Dr. Murray    RELEASE CARPAL TUNNEL      TONSILLECTOMY  1954    ???    TOTAL KNEE ARTHROPLASTY Right 11/27/2017    Procedure:  RIGHT TOTAL KNEE ARTHROPLASTY;  Surgeon: Kevin Ryder MD;  Location: [facility] Main OR;  Service: Orthopedics    US THYROID BIOPSY  04/19/2019    Family History   Problem Relation Age of Onset    Dementia Mother     Arthritis Mother     Chronic Obstructive Pulmonary Disease Father     Cardiovascular Father     Hypertension Father     No Known Problems Sister     No Known Problems Daughter     No Known Problems Son     Cerebrovascular Disease Maternal Grandmother     Heart Disease Paternal Grandmother     Cerebrovascular Disease Paternal Grandmother     No Known Problems Sister     No Known Problems Sister     No Known Problems Son     No Known Problems Daughter     Breast Cancer Paternal Aunt         age in 50's    Social History     Socioeconomic History    Marital status:      Spouse name: Not on file    Number of children: Not on file    Years of education: Not on file    Highest education level: Not on file   Occupational History    Occupation: , occasionally still subs     Employer: RETIRED   Tobacco Use    Smoking status: Never     Passive exposure: Never    Smokeless tobacco: Never   Vaping Use    Vaping status: Never Used   Substance and Sexual Activity    Alcohol use: Yes     Comment: Alcoholic Drinks/day: 1 cocktail daily    Drug use: No    Sexual activity: Not on file   Other Topics Concern    Not on file   Social History Narrative    Not on file     Social Determinants of Health     Financial Resource Strain: Low Risk  (6/21/2024)    Financial Resource Strain     Within the past 12 months, have you or your family members you live with been unable to get utilities (heat, electricity) when it was really needed?: No   Food Insecurity: Low Risk  (6/21/2024)     Food Insecurity     Within the past 12 months, did you worry that your food would run out before you got money to buy more?: No     Within the past 12 months, did the food you bought just not last and you didn t have money to get more?: No   Transportation Needs: Low Risk  (6/21/2024)    Transportation Needs     Within the past 12 months, has lack of transportation kept you from medical appointments, getting your medicines, non-medical meetings or appointments, work, or from getting things that you need?: No   Physical Activity: Inactive (6/21/2024)    Exercise Vital Sign     Days of Exercise per Week: 0 days     Minutes of Exercise per Session: 0 min   Stress: No Stress Concern Present (6/21/2024)    [place] [employer] - Occupational Stress Questionnaire     Feeling of Stress : Not at all   Social Connections: Unknown (6/21/2024)    Social Connection and Isolation Panel [NHANES]     Frequency of Communication with Friends and Family: Not on file     Frequency of Social Gatherings with Friends and Family: Once a week     Attends Church Services: Not on file     Active Member of Clubs or Organizations: Not on file     Attends Club or Organization Meetings: Not on file     Marital Status: Not on file   Interpersonal Safety: Low Risk  (6/26/2024)    Interpersonal Safety     Do you feel physically and emotionally safe where you currently live?: Yes     Within the past 12 months, have you been hit, slapped, kicked or otherwise physically hurt by someone?: No     Within the past 12 months, have you been humiliated or emotionally abused in other ways by your partner or ex-partner?: No   Housing Stability: Low Risk  (6/21/2024)    Housing Stability     Do you have housing? : Yes     Are you worried about losing your housing?: No          Medications  Allergies   Scheduled Meds:  Current Outpatient Medications   Medication Sig Dispense Refill    amLODIPine (NORVASC) 5 MG tablet TAKE 1 TABLET BY MOUTH  EVERYDAY AT BEDTIME (Patient taking differently: 2.5 mg Taking Half Tab) 90 tablet 3    aspirin 81 MG EC tablet Take 1 tablet (81 mg) by mouth daily      atorvastatin (LIPITOR) 80 MG tablet TAKE 1 TABLET BY MOUTH AT BEDTIME 30 tablet 11    botulinum toxin type A (BOTOX) 100 units injection Every 3 months. [facility]      busPIRone (BUSPAR) 15 MG tablet TAKE 1 TABLET BY MOUTH 3 TIMES DAILY. 270 tablet 2    Calcium Carbonate-Vit D-Min (CALCIUM 1200 PO) Take 1 tablet by mouth daily      cholecalciferol 50 MCG (2000 UT) CAPS Take 2,000 Units by mouth daily      citalopram (CELEXA) 40 MG tablet Take 1 tablet (40 mg) by mouth daily 90 tablet 2    eletriptan (RELPAX) 40 MG tablet Take 1 tablet (40 mg) by mouth at onset of headache for migraine 10 tablet 0    furosemide (LASIX) 20 MG tablet TAKE 2 TABLETS BY MOUTH EVERY  tablet 3    irbesartan (AVAPRO) 150 MG tablet TAKE 1 TABLET BY MOUTH EVERY DAY 90 tablet 3    isosorbide mononitrate (IMDUR) 30 MG 24 hr tablet TAKE 1 TABLET BY MOUTH EVERY DAY 30 tablet 7    levothyroxine (SYNTHROID/LEVOTHROID) 88 MCG tablet TAKE 1 TABLET BY MOUTH DAILY AT 6:00 AM. 90 tablet 2    Lidocaine (LIDOCARE) 4 % Patch Place 1-2 patches onto the skin every 24 hours To prevent lidocaine toxicity, patient should be patch free for 12 hrs daily.      LORazepam (ATIVAN) 0.5 MG tablet TAKE 1 TAB BY MOUTH NIGHTLY AS NEEDED FOR ANXIETY OR SLEEP 30 tablet 0    nitroGLYcerin (NITROSTAT) 0.4 MG sublingual tablet For chest pain place 1 tablet under the tongue every 5 minutes for 3 doses. If symptoms persist 5 minutes after 1st dose call 911. 30 tablet 1    omeprazole (PRILOSEC) 20 MG DR capsule Take 20 mg by mouth daily      pramipexole (MIRAPEX) 0.25 MG tablet TAKE 1 TABLET BY MOUTH THREE TIMES A  tablet 1    sotalol (BETAPACE) 80 MG tablet Take 1 tablet (80 mg) by mouth every 12 hours 180 tablet 3    acetaminophen (TYLENOL) 650 MG CR tablet Take 2 tablets (1,300 mg) by mouth 2 times daily for  "10 days (Patient not taking: Reported on 8/15/2024)      apixaban ANTICOAGULANT (ELIQUIS) 5 MG tablet Take 1 tablet (5 mg) by mouth 2 times daily 100 tablet 0    HYDROcodone-acetaminophen (NORCO) 5-325 MG tablet Take 1 tablet by mouth daily as needed (Severe headache) (Patient not taking: Reported on 8/15/2024) 5 tablet 0    ibuprofen (ADVIL/MOTRIN) 200 MG capsule Take 2 capsules (400 mg) by mouth 3 times daily as needed for other (pain) (Patient not taking: Reported on 8/15/2024)      omeprazole (PRILOSEC) 40 MG DR capsule Take 1 capsule (40 mg) by mouth daily 45 capsule 0    Allergies   Allergen Reactions    Ace Inhibitors Other (See Comments)     Elevates blood pressure    Amitriptyline Hcl Other (See Comments)     elevates blood pressure    Atenolol Other (See Comments)     Aggravates reynauds    Celebrex [Celecoxib] GI Disturbance    Cephalexin Nausea and Vomiting    Clonidine Unknown     \"got very ill in ER\"    Codeine Sulfate Nausea and Vomiting    Darvocet [Propoxyphene N-Apap] Nausea and Vomiting    Dexamethasone Acetate Swelling    Erythromycin Nausea and Vomiting    Escitalopram Other (See Comments)     Headaches.      Propoxyphene      HUT Reaction: Gastrointestinal; HUT Reaction: Nausea And Vomiting; HUT Noted: [id number]    Sodium     Tramadol Swelling     Swelling of tongue and face    Tramadol-Acetaminophen Other (See Comments)    Triamterene     Venlafaxine Nausea and Vomiting and Diarrhea    Zolpidem Other (See Comments) and Unknown     Pt doesn't rember      Bacitracin Itching and Rash    Cephalosporins Unknown     Pt doesn't remember       Gabapentin Itching and Rash     \"Spotted\"    Hctz Unknown     \"depletes my sodium\"    Potassium GI Disturbance    Sulfanilamide Rash    Zolpidem Tartrate Unknown         Lab Results    Chemistry/lipid CBC Cardiac Enzymes/BNP/TSH/INR   Lab Results   Component Value Date    CHOL 143 06/26/2024    HDL 65 06/26/2024    TRIG 98 06/26/2024    BUN 16.6 06/26/2024    NA " 136 2024    CO2 27 2024    Lab Results   Component Value Date    WBC 6.3 2024    HGB 12.0 2024    HCT 36.3 2024    MCV 89 2024     2024    @RESUFAST(BMP,CBC,BNP,TSH,  INR)@      30 minutes spent reviewing prior records (including documentation, laboratory studies, cardiac testing/imaging), history and physical exam, planning, and subsequent documentation.     The longitudinal plan of care for the diagnosis(es)/condition(s) as documented were addressed during this visit. Due to the added complexity in care, I will continue to support Lo in the subsequent management and with ongoing continuity of care.      This note has been dictated using voice recognition software. Any grammatical, typographical, or context distortions are unintentional and inherent to the software.    Marta Neil, CNP  Clinical Cardiac Electrophysiology  [facility]  Clinic and [phone]  Fax: [phone]  Electrophysiology Nurses: [phone]

## 2024-08-14 NOTE — H&P (VIEW-ONLY)
[facility]  Cardiac Electrophysiology  [address]  [place] [address]   Office: [phone]  Fax: [phone]     HEART CARE ELECTROPHYSIOLOGY FOLLOW UP    Primary Care: Bernadette Taylor MD      Assessment/Recommendations     Paroxysmal atrial fibrillation/stage 3A: Symptomatic with chest discomfort, palpitations, dyspnea on exertion.  Breakthrough on sotalol.  We discussed pulmonary vein isolation ablation procedure including <1-2% risk for major complication, anticipated success rates, recovery and follow-up.  Medical and surgical history reviewed and updated. Current medications and allergies reviewed and updated as appropriate. History of postoperative nausea and vomiting after back surgery years ago. No personal or family history of abnormal bleeding with surgery    Presence of Watchman percutaneous left atrial appendage closure: [id number] score of 6 for age >75, gender, hypertension, cardiomyopathy and coronary artery disease; HAS-BLED score of 3 for age, bleeding disposition and current use antiplatelet therapy  Sp LAAC 6/22/2023 with DIANA 9/27/2023 unremarkable for peridevice flow or thrombus    CAD: Status post PCI 2022.  No anginal symptoms    DONN: Intolerant of CPAP.  Recommended follow-up regarding alternative treatment options    Plan:   Start Eliquis 5 mg twice a day 7 days prior to ablation, continue for 6 weeks after  Stop aspirin while on Eliquis  Hold sotalol beginning 3 days prior to ablation  Increase omeprazole to 40 mg daily beginning 3 days prior to ablation, continue for 6 weeks after  EP follow-up 6 weeks post ablation     History of Present Illness/Subjective    Suma Mark is a 73 year old female with past medical history significant for paroxysmal atrial fibrillation, status post percutaneous left atrial appendage closure 6/22/2023, coronary artery disease status post PCI 2022, HFpEF, hypertension, venous insufficiency, esophagitis and  gastritis, STEPHANI, DONN, fibromyalgia, hypothyroidism, obesity, anxiety, seen today for history and physical prior to AF ablation    She continues to have sporadic irregular palpitations, also associated with chest discomfort and dyspnea on exertion.  She notices symptoms particularly at night; they usually last 15-20 minutes.  She also endorses pedal edema, legs feel heavy at times. She recently suffered a mechanical fall where she tripped on her deck outside and has right sided back and rib pain. She plans to travel to [place] to visit her daughter in October.  She denies dyspnea at rest, lightheadedness/dizziness or syncope.     Data Review     Arrhythmia hx  Sx: Chest discomfort, palpitations, decreased activity tolerance, dyspnea on exertion  Dx/date: Paroxysmal AF by ED telemetry 2022, further documented by DERIC 10/28/2022.  Started on sotalol 11/27/2022 with ongoing breakthrough AF  [id number]:  6 for age >75, gender, hypertension, cardiomyopathy and coronary artery disease; HAS-BLED score of 3 for age, bleeding disposition and current use antiplatelet therapy. Hx esophagitis, gastritis with hx STEPHANI both on OAC and DAPT post-LAAC  OAC: Apixaban.  Sp LAAC 6/22/2023  Rate control: Metoprolol  AAD: Sotalol (2022-present)  DCCV: None  Ablation: None    EKG 8/15/2024: SR 60 bpm, QRS 80 ms  3/21/2024: SB 58 bpm, QT/QTc 458/449 ms  11/27/2022:  bpm  6/19/2023: sinus bradycardia at 57 bpm, QRS 86 ms, QT/QTc 430/418 ms  6/9/2023: Sinus rhythm at 63 bpm, QRS 90 ms, QT/QTc 448/458 ms  Personally reviewed.      DIANA 9/27/2023  Patient was sedated using Versed 2 mg. The heart rate, respiratory rate,  oxygen saturations, blood pressure, and response to care were monitored  throughout the procedure with the assistance of the nurse.  Left ventricular size, wall motion and function are normal. The ejection  fraction is 60-65%.  Normal right ventricle size and systolic function.  Doppler suggests left to right  "interatrial shunt.  Watchman device noted in left atrial appendage. The device is well seated with  no leakage by color flow Doppler imaging.  Thrombus absent on left atrial appendage occlusion device.  Moderate atherosclerotic plaque(s) in the descending aorta.    Complete TTE: 1/17/2023  The left ventricle is normal in size with mild concentric left ventricular  hypertrophy.  Left ventricular function is normal.The ejection fraction is 60-65%.  Normal right ventricle size and systolic function.  Both atria are of normal size.  No significant valve disease identified.  There is no comparison study available.    Cardiac event monitoring from 1/17/2023 to 1/23/2023 (monitored duration 6d 8h 44m).  Baseline rhythm was sinus rhythm 73bpm.    Average heart rate 64bpm, maximum 144bpm..  No symptoms recorded.  Paroxysmal atrial fibrillation eg. 1/18/2023 23:17:50, 1/19/2023 22:48:27.    There were no pauses noted.  Atrial fibrillation episodes are incompletely characterized on this monitoring modality.  Supraventricular and ventricular ectopic beat frequency are not reported on this monitoring modality.       Left heart cath: 6/9/2023  1.  Left circumflex stent remains widely patent  2.  Mid/distal LAD stent remains widely patent  3.  Moderate proximal and apical disease in the LAD, proximal RCA, and distal RCA appear unchanged from prior study October 2022.  No new or significant progression of underlying CAD.    I have reviewed and updated the patient's past medical history, allergy list and medication list.          Physical Examination   Vitals: /64 (BP Location: Right arm, Patient Position: Sitting, Cuff Size: Adult Regular)   Pulse 60   Resp 16   Ht 1.549 m (5' 0.98\")   Wt 83.5 kg (184 lb)   LMP  (LMP Unknown)   BMI 34.79 kg/m      BMI= Body mass index is 34.79 kg/m .    Wt Readings from Last 3 Encounters:   08/15/24 83.5 kg (184 lb)   08/13/24 83.1 kg (183 lb 3.2 oz)   08/01/24 83.6 kg (184 lb 3.2 oz) "       General   Appearance:   Alert and oriented, in no acute distress.    HEENT:  Normocephalic and atraumatic. Conjunctiva and sclera are clear. Moist oral mucosa.    Neck: No JVP, carotid bruit or obvious thyromegaly.   Lungs:   Respirations unlabored. Clear bilaterally with no rales, rhonchi, or wheezes.     Cardiovascular:   Rhythm is regular with occasional brief irregular beats. S1 and S2 are normal. No significant murmur is present. Lower extremities demonstrate 1+ edema. Posterior tibial pulses are intact bilaterally.   Extremities: No cyanosis or clubbing   Skin: Skin is warm, dry, and otherwise intact.   Neurologic: Gait not assessed. Mood and affect appropriate.              Medical History  Surgical History Family History Social History   Past Medical History:   Diagnosis Date    Anxiety state, unspecified     Back pain     Diverticulitis 12/07/2017    DJD (degenerative joint disease)     Essential hypertension, benign     Fibromyalgia     Gastro-oesophageal reflux disease     Heart disease     Hernia, ventral 04/27/2017    History of anesthesia complications     hypotension after surgery    History of blood transfusion     Hyperlipidemia     Hyponatremia 12/07/2017    Major depression     Migraine     Osteopenia     PONV (postoperative nausea and vomiting)     Posttraumatic stress disorder     Raynaud's disease     Restless leg syndrome     S/P total knee replacement 11/27/2017    Sepsis (H) 03/25/2019    Sleep apnea     Thrombosis of leg     with pregnancy    Unspecified hypothyroidism     Past Surgical History:   Procedure Laterality Date    ARTHROPLASTY KNEE UNICOMPARTMENT  10/31/2013    Procedure: ARTHROPLASTY KNEE UNICOMPARTMENT;  LEFT KNEE UNICOMPARTMENTAL ARTHROPLASTY (CAROLINE)^;  Surgeon: Chi Mittal MD;  Location:  OR    BREAST SURGERY      bx    COLECTOMY N/A 06/02/2017    Procedure: RESECTION, SMALL INTESTINE, OPEN ADHESIOLYSIS;  Surgeon: Keyon Qureshi MD;  Location: [facility]  Main OR;  Service:     COLONOSCOPY N/A 4/4/2024    Procedure: COLONOSCOPY WITH POLYPECTOMY;  Surgeon: Chris Edmonds MD;  Location: [facility] Main OR    CV CORONARY ANGIOGRAM N/A 10/12/2022    Procedure: CV CORONARY ANGIOGRAM;  Surgeon: You Martinez MD;  Location: [facility] CV    CV CORONARY ANGIOGRAM N/A 6/9/2023    Procedure: Coronary Angiogram;  Surgeon: Bret Jin MD;  Location: [facility] CV    CV FRACTIONAL FLOW RATIO WIRE N/A 10/12/2022    Procedure: Fractional Flow Ratio Wire;  Surgeon: You Martinez MD;  Location: [facility] CV    CV LEFT ATRIAL APPENDAGE CLOSURE Right 6/22/2023    Procedure: Left Atrial Appendage Closure;  Surgeon: Lenin Mariano MD;  Location: [facility] CV    CV LEFT HEART CATH N/A 6/9/2023    Procedure: Left Heart Catheterization;  Surgeon: Bret Jin MD;  Location: [facility] CV    CV PCI STENT DRUG ELUTING N/A 10/12/2022    Procedure: Percutaneous Coronary Intervention Stent;  Surgeon: You Martinez MD;  Location: [facility] CV    CV PCI STENT DRUG ELUTING N/A 11/08/2022    Procedure: Percutaneous Coronary Intervention - Stent;  Surgeon: Bret Jin MD;  Location: [facility] CV    ENDOSCOPIC RETROGRADE CHOLANGIOPANCREATOGRAPHY      ENDOSCOPIC STRIPPING VEIN(S)      BILATERLY    GENITOURINARY SURGERY      bladder sling    GI SURGERY  10/02/2015    Rectal prolaspse. s/p Abdominal rectopexy, sacral colpopexy, proctoscopy, cystoscopy    HERNIORRHAPHY INCISIONAL (LOCATION) N/A 06/02/2017    Procedure: LAPARASCOPIC CONVERTED TO OPEN PRIMARY INCISIONAL HERNIA REPAIR;  Surgeon: Keyon Qureshi MD;  Location: [facility] Main OR;  Service:     HYSTERECTOMY  1985 1985-1986    LAMINECTOMY LUMBAR TWO LEVELS  2006    LAPAROSCOPY N/A 08/22/2019    Procedure: DIAGNOSTIC LAPAROSCOPY, LYSIS OF ADHESION;  Surgeon: Svetlana Ron MD;  Location: [facility] Main OR;  Service: General    LUMBAR HARDWARE REMOVAL[   03/22/2012    REMOVAL LUMBAR HARDWARE 03/22/2012   L3-S1; Type CD Horizon m8 instrumentation Dr. Murray    RELEASE CARPAL TUNNEL      TONSILLECTOMY  1954    ???    TOTAL KNEE ARTHROPLASTY Right 11/27/2017    Procedure:  RIGHT TOTAL KNEE ARTHROPLASTY;  Surgeon: Kevin Ryder MD;  Location: [facility] Main OR;  Service: Orthopedics    US THYROID BIOPSY  04/19/2019    Family History   Problem Relation Age of Onset    Dementia Mother     Arthritis Mother     Chronic Obstructive Pulmonary Disease Father     Cardiovascular Father     Hypertension Father     No Known Problems Sister     No Known Problems Daughter     No Known Problems Son     Cerebrovascular Disease Maternal Grandmother     Heart Disease Paternal Grandmother     Cerebrovascular Disease Paternal Grandmother     No Known Problems Sister     No Known Problems Sister     No Known Problems Son     No Known Problems Daughter     Breast Cancer Paternal Aunt         age in 50's    Social History     Socioeconomic History    Marital status:      Spouse name: Not on file    Number of children: Not on file    Years of education: Not on file    Highest education level: Not on file   Occupational History    Occupation: , occasionally still subs     Employer: RETIRED   Tobacco Use    Smoking status: Never     Passive exposure: Never    Smokeless tobacco: Never   Vaping Use    Vaping status: Never Used   Substance and Sexual Activity    Alcohol use: Yes     Comment: Alcoholic Drinks/day: 1 cocktail daily    Drug use: No    Sexual activity: Not on file   Other Topics Concern    Not on file   Social History Narrative    Not on file     Social Determinants of Health     Financial Resource Strain: Low Risk  (6/21/2024)    Financial Resource Strain     Within the past 12 months, have you or your family members you live with been unable to get utilities (heat, electricity) when it was really needed?: No   Food Insecurity: Low Risk  (6/21/2024)     Food Insecurity     Within the past 12 months, did you worry that your food would run out before you got money to buy more?: No     Within the past 12 months, did the food you bought just not last and you didn t have money to get more?: No   Transportation Needs: Low Risk  (6/21/2024)    Transportation Needs     Within the past 12 months, has lack of transportation kept you from medical appointments, getting your medicines, non-medical meetings or appointments, work, or from getting things that you need?: No   Physical Activity: Inactive (6/21/2024)    Exercise Vital Sign     Days of Exercise per Week: 0 days     Minutes of Exercise per Session: 0 min   Stress: No Stress Concern Present (6/21/2024)    [employer] - Occupational Stress Questionnaire     Feeling of Stress : Not at all   Social Connections: Unknown (6/21/2024)    Social Connection and Isolation Panel [NHANES]     Frequency of Communication with Friends and Family: Not on file     Frequency of Social Gatherings with Friends and Family: Once a week     Attends Zoroastrian Services: Not on file     Active Member of Clubs or Organizations: Not on file     Attends Club or Organization Meetings: Not on file     Marital Status: Not on file   Interpersonal Safety: Low Risk  (6/26/2024)    Interpersonal Safety     Do you feel physically and emotionally safe where you currently live?: Yes     Within the past 12 months, have you been hit, slapped, kicked or otherwise physically hurt by someone?: No     Within the past 12 months, have you been humiliated or emotionally abused in other ways by your partner or ex-partner?: No   Housing Stability: Low Risk  (6/21/2024)    Housing Stability     Do you have housing? : Yes     Are you worried about losing your housing?: No          Medications  Allergies   Scheduled Meds:  Current Outpatient Medications   Medication Sig Dispense Refill    amLODIPine (NORVASC) 5 MG tablet TAKE 1 TABLET BY MOUTH  EVERYDAY AT BEDTIME (Patient taking differently: 2.5 mg Taking Half Tab) 90 tablet 3    aspirin 81 MG EC tablet Take 1 tablet (81 mg) by mouth daily      atorvastatin (LIPITOR) 80 MG tablet TAKE 1 TABLET BY MOUTH AT BEDTIME 30 tablet 11    botulinum toxin type A (BOTOX) 100 units injection Every 3 months. [facility]      busPIRone (BUSPAR) 15 MG tablet TAKE 1 TABLET BY MOUTH 3 TIMES DAILY. 270 tablet 2    Calcium Carbonate-Vit D-Min (CALCIUM 1200 PO) Take 1 tablet by mouth daily      cholecalciferol 50 MCG (2000 UT) CAPS Take 2,000 Units by mouth daily      citalopram (CELEXA) 40 MG tablet Take 1 tablet (40 mg) by mouth daily 90 tablet 2    eletriptan (RELPAX) 40 MG tablet Take 1 tablet (40 mg) by mouth at onset of headache for migraine 10 tablet 0    furosemide (LASIX) 20 MG tablet TAKE 2 TABLETS BY MOUTH EVERY  tablet 3    irbesartan (AVAPRO) 150 MG tablet TAKE 1 TABLET BY MOUTH EVERY DAY 90 tablet 3    isosorbide mononitrate (IMDUR) 30 MG 24 hr tablet TAKE 1 TABLET BY MOUTH EVERY DAY 30 tablet 7    levothyroxine (SYNTHROID/LEVOTHROID) 88 MCG tablet TAKE 1 TABLET BY MOUTH DAILY AT 6:00 AM. 90 tablet 2    Lidocaine (LIDOCARE) 4 % Patch Place 1-2 patches onto the skin every 24 hours To prevent lidocaine toxicity, patient should be patch free for 12 hrs daily.      LORazepam (ATIVAN) 0.5 MG tablet TAKE 1 TAB BY MOUTH NIGHTLY AS NEEDED FOR ANXIETY OR SLEEP 30 tablet 0    nitroGLYcerin (NITROSTAT) 0.4 MG sublingual tablet For chest pain place 1 tablet under the tongue every 5 minutes for 3 doses. If symptoms persist 5 minutes after 1st dose call 911. 30 tablet 1    omeprazole (PRILOSEC) 20 MG DR capsule Take 20 mg by mouth daily      pramipexole (MIRAPEX) 0.25 MG tablet TAKE 1 TABLET BY MOUTH THREE TIMES A  tablet 1    sotalol (BETAPACE) 80 MG tablet Take 1 tablet (80 mg) by mouth every 12 hours 180 tablet 3    acetaminophen (TYLENOL) 650 MG CR tablet Take 2 tablets (1,300 mg) by mouth 2 times daily for  "10 days (Patient not taking: Reported on 8/15/2024)      apixaban ANTICOAGULANT (ELIQUIS) 5 MG tablet Take 1 tablet (5 mg) by mouth 2 times daily 100 tablet 0    HYDROcodone-acetaminophen (NORCO) 5-325 MG tablet Take 1 tablet by mouth daily as needed (Severe headache) (Patient not taking: Reported on 8/15/2024) 5 tablet 0    ibuprofen (ADVIL/MOTRIN) 200 MG capsule Take 2 capsules (400 mg) by mouth 3 times daily as needed for other (pain) (Patient not taking: Reported on 8/15/2024)      omeprazole (PRILOSEC) 40 MG DR capsule Take 1 capsule (40 mg) by mouth daily 45 capsule 0    Allergies   Allergen Reactions    Ace Inhibitors Other (See Comments)     Elevates blood pressure    Amitriptyline Hcl Other (See Comments)     elevates blood pressure    Atenolol Other (See Comments)     Aggravates reynauds    Celebrex [Celecoxib] GI Disturbance    Cephalexin Nausea and Vomiting    Clonidine Unknown     \"got very ill in ER\"    Codeine Sulfate Nausea and Vomiting    Darvocet [Propoxyphene N-Apap] Nausea and Vomiting    Dexamethasone Acetate Swelling    Erythromycin Nausea and Vomiting    Escitalopram Other (See Comments)     Headaches.      Propoxyphene      HUT Reaction: Gastrointestinal; HUT Reaction: Nausea And Vomiting; HUT Noted: [id number]    Sodium     Tramadol Swelling     Swelling of tongue and face    Tramadol-Acetaminophen Other (See Comments)    Triamterene     Venlafaxine Nausea and Vomiting and Diarrhea    Zolpidem Other (See Comments) and Unknown     Pt doesn't rember      Bacitracin Itching and Rash    Cephalosporins Unknown     Pt doesn't remember       Gabapentin Itching and Rash     \"Spotted\"    Hctz Unknown     \"depletes my sodium\"    Potassium GI Disturbance    Sulfanilamide Rash    Zolpidem Tartrate Unknown         Lab Results    Chemistry/lipid CBC Cardiac Enzymes/BNP/TSH/INR   Lab Results   Component Value Date    CHOL 143 06/26/2024    HDL 65 06/26/2024    TRIG 98 06/26/2024    BUN 16.6 06/26/2024    NA " 136 2024    CO2 27 2024    Lab Results   Component Value Date    WBC 6.3 2024    HGB 12.0 2024    HCT 36.3 2024    MCV 89 2024     2024    @RESUFAST(BMP,CBC,BNP,TSH,  INR)@      30 minutes spent reviewing prior records (including documentation, laboratory studies, cardiac testing/imaging), history and physical exam, planning, and subsequent documentation.     The longitudinal plan of care for the diagnosis(es)/condition(s) as documented were addressed during this visit. Due to the added complexity in care, I will continue to support Lo in the subsequent management and with ongoing continuity of care.      This note has been dictated using voice recognition software. Any grammatical, typographical, or context distortions are unintentional and inherent to the software.    Marta Neil, CNP  Clinical Cardiac Electrophysiology  [facility]  Clinic and [phone]  Fax: [phone]  Electrophysiology Nurses: [phone]

## 2024-08-15 ENCOUNTER — LAB (OUTPATIENT)
Dept: CARDIOLOGY | Facility: CLINIC | Age: 74
End: 2024-08-15
Payer: MEDICARE

## 2024-08-15 ENCOUNTER — ALLIED HEALTH/NURSE VISIT (OUTPATIENT)
Dept: CARDIOLOGY | Facility: CLINIC | Age: 74
End: 2024-08-15
Payer: MEDICARE

## 2024-08-15 ENCOUNTER — OFFICE VISIT (OUTPATIENT)
Dept: CARDIOLOGY | Facility: CLINIC | Age: 74
End: 2024-08-15
Payer: MEDICARE

## 2024-08-15 VITALS
WEIGHT: 184 LBS | DIASTOLIC BLOOD PRESSURE: 64 MMHG | BODY MASS INDEX: 34.74 KG/M2 | HEIGHT: 61 IN | SYSTOLIC BLOOD PRESSURE: 102 MMHG | HEART RATE: 60 BPM | RESPIRATION RATE: 16 BRPM

## 2024-08-15 DIAGNOSIS — I10 ESSENTIAL (PRIMARY) HYPERTENSION: ICD-10-CM

## 2024-08-15 DIAGNOSIS — I48.0 PAROXYSMAL ATRIAL FIBRILLATION (H): Primary | ICD-10-CM

## 2024-08-15 DIAGNOSIS — Z95.818 PRESENCE OF WATCHMAN LEFT ATRIAL APPENDAGE CLOSURE DEVICE: Chronic | ICD-10-CM

## 2024-08-15 DIAGNOSIS — I48.0 PAROXYSMAL ATRIAL FIBRILLATION (H): Primary | Chronic | ICD-10-CM

## 2024-08-15 DIAGNOSIS — G47.33 OSA (OBSTRUCTIVE SLEEP APNEA): Chronic | ICD-10-CM

## 2024-08-15 DIAGNOSIS — I25.10 CORONARY ARTERY DISEASE INVOLVING NATIVE CORONARY ARTERY OF NATIVE HEART WITHOUT ANGINA PECTORIS: Chronic | ICD-10-CM

## 2024-08-15 DIAGNOSIS — I48.0 PAROXYSMAL ATRIAL FIBRILLATION (H): Chronic | ICD-10-CM

## 2024-08-15 LAB
ANION GAP SERPL CALCULATED.3IONS-SCNC: 12 MMOL/L (ref 7–15)
ATRIAL RATE - MUSE: 60 BPM
BUN SERPL-MCNC: 18 MG/DL (ref 8–23)
CALCIUM SERPL-MCNC: 8.9 MG/DL (ref 8.8–10.4)
CHLORIDE SERPL-SCNC: 101 MMOL/L (ref 98–107)
CREAT SERPL-MCNC: 0.73 MG/DL (ref 0.51–0.95)
DIASTOLIC BLOOD PRESSURE - MUSE: NORMAL MMHG
EGFRCR SERPLBLD CKD-EPI 2021: 85 ML/MIN/1.73M2
ERYTHROCYTE [DISTWIDTH] IN BLOOD BY AUTOMATED COUNT: 16.9 % (ref 10–15)
GLUCOSE SERPL-MCNC: 92 MG/DL (ref 70–99)
HCO3 SERPL-SCNC: 26 MMOL/L (ref 22–29)
HCT VFR BLD AUTO: 36.1 % (ref 35–47)
HGB BLD-MCNC: 11.8 G/DL (ref 11.7–15.7)
INTERPRETATION ECG - MUSE: NORMAL
MCH RBC QN AUTO: 29.4 PG (ref 26.5–33)
MCHC RBC AUTO-ENTMCNC: 32.7 G/DL (ref 31.5–36.5)
MCV RBC AUTO: 90 FL (ref 78–100)
P AXIS - MUSE: 48 DEGREES
PLATELET # BLD AUTO: 261 10E3/UL (ref 150–450)
POTASSIUM SERPL-SCNC: 4.2 MMOL/L (ref 3.4–5.3)
PR INTERVAL - MUSE: 160 MS
QRS DURATION - MUSE: 80 MS
QT - MUSE: 436 MS
QTC - MUSE: 436 MS
R AXIS - MUSE: 37 DEGREES
RBC # BLD AUTO: 4.01 10E6/UL (ref 3.8–5.2)
SODIUM SERPL-SCNC: 139 MMOL/L (ref 135–145)
SYSTOLIC BLOOD PRESSURE - MUSE: NORMAL MMHG
T AXIS - MUSE: 31 DEGREES
VENTRICULAR RATE- MUSE: 60 BPM
WBC # BLD AUTO: 5.7 10E3/UL (ref 4–11)

## 2024-08-15 PROCEDURE — 36415 COLL VENOUS BLD VENIPUNCTURE: CPT

## 2024-08-15 PROCEDURE — 85027 COMPLETE CBC AUTOMATED: CPT

## 2024-08-15 PROCEDURE — G2211 COMPLEX E/M VISIT ADD ON: HCPCS | Performed by: NURSE PRACTITIONER

## 2024-08-15 PROCEDURE — 93000 ELECTROCARDIOGRAM COMPLETE: CPT | Performed by: INTERNAL MEDICINE

## 2024-08-15 PROCEDURE — 99214 OFFICE O/P EST MOD 30 MIN: CPT | Performed by: NURSE PRACTITIONER

## 2024-08-15 PROCEDURE — 80048 BASIC METABOLIC PNL TOTAL CA: CPT

## 2024-08-15 PROCEDURE — 99207 PR NO CHARGE NURSE ONLY: CPT

## 2024-08-15 RX ORDER — OMEPRAZOLE 40 MG/1
40 CAPSULE, DELAYED RELEASE ORAL DAILY
Qty: 45 CAPSULE | Refills: 0 | Status: SHIPPED | OUTPATIENT
Start: 2024-08-15

## 2024-08-15 RX ORDER — AMLODIPINE BESYLATE 5 MG/1
2.5 TABLET ORAL DAILY
Status: SHIPPED
Start: 2024-08-15

## 2024-08-15 NOTE — LETTER
8/15/2024    Bernadette Taylor MD  [address] [facility] [address] [address]    RE: Suma Puenteell       Dear Colleague,     I had the pleasure of seeing Suma Mark in the [facility].     [facility]  Cardiac Electrophysiology  [address]  [place] [address]   Office: [phone]  Fax: [phone]     HEART CARE ELECTROPHYSIOLOGY FOLLOW UP    Primary Care: Bernadette Taylor MD      Assessment/Recommendations     Paroxysmal atrial fibrillation/stage 3A: Symptomatic with chest discomfort, palpitations, dyspnea on exertion.  Breakthrough on sotalol.  We discussed pulmonary vein isolation ablation procedure including <1-2% risk for major complication, anticipated success rates, recovery and follow-up.  Medical and surgical history reviewed and updated. Current medications and allergies reviewed and updated as appropriate. History of postoperative nausea and vomiting after back surgery years ago. No personal or family history of abnormal bleeding with surgery    Presence of Watchman percutaneous left atrial appendage closure: [id number] score of 6 for age >75, gender, hypertension, cardiomyopathy and coronary artery disease; HAS-BLED score of 3 for age, bleeding disposition and current use antiplatelet therapy  Sp LAAC 6/22/2023 with DIANA 9/27/2023 unremarkable for peridevice flow or thrombus    CAD: Status post PCI 2022.  No anginal symptoms    DONN: Intolerant of CPAP.  Recommended follow-up regarding alternative treatment options    Plan:   Start Eliquis 5 mg twice a day 7 days prior to ablation, continue for 6 weeks after  Stop aspirin while on Eliquis  Hold sotalol beginning 3 days prior to ablation  Increase omeprazole to 40 mg daily beginning 3 days prior to ablation, continue for 6 weeks after  EP follow-up 6 weeks post ablation     History of Present Illness/Subjective    Suma Mark is a 73 year old female with past medical history significant  for paroxysmal atrial fibrillation, status post percutaneous left atrial appendage closure 6/22/2023, coronary artery disease status post PCI 2022, HFpEF, hypertension, venous insufficiency, esophagitis and gastritis, STEPHANI, DNON, fibromyalgia, hypothyroidism, obesity, anxiety, seen today for history and physical prior to AF ablation    She continues to have sporadic irregular palpitations, also associated with chest discomfort and dyspnea on exertion.  She notices symptoms particularly at night; they usually last 15-20 minutes.  She also endorses pedal edema, legs feel heavy at times. She recently suffered a mechanical fall where she tripped on her deck outside and has right sided back and rib pain. She plans to travel to [place] to visit her daughter in October.  She denies dyspnea at rest, lightheadedness/dizziness or syncope.     Data Review     Arrhythmia hx  Sx: Chest discomfort, palpitations, decreased activity tolerance, dyspnea on exertion  Dx/date: Paroxysmal AF by ED telemetry 2022, further documented by DERIC 10/28/2022.  Started on sotalol 11/27/2022 with ongoing breakthrough AF  [id number]:  6 for age >75, gender, hypertension, cardiomyopathy and coronary artery disease; HAS-BLED score of 3 for age, bleeding disposition and current use antiplatelet therapy. Hx esophagitis, gastritis with hx STEPHANI both on OAC and DAPT post-LAAC  OAC: Apixaban.  Sp LAAC 6/22/2023  Rate control: Metoprolol  AAD: Sotalol (2022-present)  DCCV: None  Ablation: None    EKG 8/15/2024: SR 60 bpm, QRS 80 ms  3/21/2024: SB 58 bpm, QT/QTc 458/449 ms  11/27/2022:  bpm  6/19/2023: sinus bradycardia at 57 bpm, QRS 86 ms, QT/QTc 430/418 ms  6/9/2023: Sinus rhythm at 63 bpm, QRS 90 ms, QT/QTc 448/458 ms  Personally reviewed.      DIANA 9/27/2023  Patient was sedated using Versed 2 mg. The heart rate, respiratory rate,  oxygen saturations, blood pressure, and response to care were monitored  throughout the procedure with the  "assistance of the nurse.  Left ventricular size, wall motion and function are normal. The ejection  fraction is 60-65%.  Normal right ventricle size and systolic function.  Doppler suggests left to right interatrial shunt.  Watchman device noted in left atrial appendage. The device is well seated with  no leakage by color flow Doppler imaging.  Thrombus absent on left atrial appendage occlusion device.  Moderate atherosclerotic plaque(s) in the descending aorta.    Complete TTE: 1/17/2023  The left ventricle is normal in size with mild concentric left ventricular  hypertrophy.  Left ventricular function is normal.The ejection fraction is 60-65%.  Normal right ventricle size and systolic function.  Both atria are of normal size.  No significant valve disease identified.  There is no comparison study available.    Cardiac event monitoring from 1/17/2023 to 1/23/2023 (monitored duration 6d 8h 44m).  Baseline rhythm was sinus rhythm 73bpm.    Average heart rate 64bpm, maximum 144bpm..  No symptoms recorded.  Paroxysmal atrial fibrillation eg. 1/18/2023 23:17:50, 1/19/2023 22:48:27.    There were no pauses noted.  Atrial fibrillation episodes are incompletely characterized on this monitoring modality.  Supraventricular and ventricular ectopic beat frequency are not reported on this monitoring modality.       Left heart cath: 6/9/2023  1.  Left circumflex stent remains widely patent  2.  Mid/distal LAD stent remains widely patent  3.  Moderate proximal and apical disease in the LAD, proximal RCA, and distal RCA appear unchanged from prior study October 2022.  No new or significant progression of underlying CAD.    I have reviewed and updated the patient's past medical history, allergy list and medication list.          Physical Examination   Vitals: /64 (BP Location: Right arm, Patient Position: Sitting, Cuff Size: Adult Regular)   Pulse 60   Resp 16   Ht 1.549 m (5' 0.98\")   Wt 83.5 kg (184 lb)   LMP  (LMP " Unknown)   BMI 34.79 kg/m      BMI= Body mass index is 34.79 kg/m .    Wt Readings from Last 3 Encounters:   08/15/24 83.5 kg (184 lb)   08/13/24 83.1 kg (183 lb 3.2 oz)   08/01/24 83.6 kg (184 lb 3.2 oz)       General   Appearance:   Alert and oriented, in no acute distress.    HEENT:  Normocephalic and atraumatic. Conjunctiva and sclera are clear. Moist oral mucosa.    Neck: No JVP, carotid bruit or obvious thyromegaly.   Lungs:   Respirations unlabored. Clear bilaterally with no rales, rhonchi, or wheezes.     Cardiovascular:   Rhythm is regular with occasional brief irregular beats. S1 and S2 are normal. No significant murmur is present. Lower extremities demonstrate 1+ edema. Posterior tibial pulses are intact bilaterally.   Extremities: No cyanosis or clubbing   Skin: Skin is warm, dry, and otherwise intact.   Neurologic: Gait not assessed. Mood and affect appropriate.              Medical History  Surgical History Family History Social History   Past Medical History:   Diagnosis Date     Anxiety state, unspecified      Back pain      Diverticulitis 12/07/2017     DJD (degenerative joint disease)      Essential hypertension, benign      Fibromyalgia      Gastro-oesophageal reflux disease      Heart disease      Hernia, ventral 04/27/2017     History of anesthesia complications     hypotension after surgery     History of blood transfusion      Hyperlipidemia      Hyponatremia 12/07/2017     Major depression      Migraine      Osteopenia      PONV (postoperative nausea and vomiting)      Posttraumatic stress disorder      Raynaud's disease      Restless leg syndrome      S/P total knee replacement 11/27/2017     Sepsis (H) 03/25/2019     Sleep apnea      Thrombosis of leg     with pregnancy     Unspecified hypothyroidism     Past Surgical History:   Procedure Laterality Date     ARTHROPLASTY KNEE UNICOMPARTMENT  10/31/2013    Procedure: ARTHROPLASTY KNEE UNICOMPARTMENT;  LEFT KNEE UNICOMPARTMENTAL ARTHROPLASTY  (CAROLINE)^;  Surgeon: Chi Mittal MD;  Location:  OR     BREAST SURGERY      bx     COLECTOMY N/A 06/02/2017    Procedure: RESECTION, SMALL INTESTINE, OPEN ADHESIOLYSIS;  Surgeon: Keyon Qureshi MD;  Location: [facility] OR;  Service:      COLONOSCOPY N/A 4/4/2024    Procedure: COLONOSCOPY WITH POLYPECTOMY;  Surgeon: Chris Edmonds MD;  Location: [facility] OR     CV CORONARY ANGIOGRAM N/A 10/12/2022    Procedure: CV CORONARY ANGIOGRAM;  Surgeon: You Martinez MD;  Location: [facility] CV     CV CORONARY ANGIOGRAM N/A 6/9/2023    Procedure: Coronary Angiogram;  Surgeon: Bret Jin MD;  Location: [facility]     CV FRACTIONAL FLOW RATIO WIRE N/A 10/12/2022    Procedure: Fractional Flow Ratio Wire;  Surgeon: You Martinez MD;  Location: [facility]     CV LEFT ATRIAL APPENDAGE CLOSURE Right 6/22/2023    Procedure: Left Atrial Appendage Closure;  Surgeon: Lenin Mariano MD;  Location: [facility] CV     CV LEFT HEART CATH N/A 6/9/2023    Procedure: Left Heart Catheterization;  Surgeon: Bret Jin MD;  Location: [facility]     CV PCI STENT DRUG ELUTING N/A 10/12/2022    Procedure: Percutaneous Coronary Intervention Stent;  Surgeon: You Martinez MD;  Location: [facility] CV     CV PCI STENT DRUG ELUTING N/A 11/08/2022    Procedure: Percutaneous Coronary Intervention - Stent;  Surgeon: Bret Jin MD;  Location: [facility] CV     ENDOSCOPIC RETROGRADE CHOLANGIOPANCREATOGRAPHY       ENDOSCOPIC STRIPPING VEIN(S)      BILATERLY     GENITOURINARY SURGERY      bladder sling     GI SURGERY  10/02/2015    Rectal prolaspse. s/p Abdominal rectopexy, sacral colpopexy, proctoscopy, cystoscopy     HERNIORRHAPHY INCISIONAL (LOCATION) N/A 06/02/2017    Procedure: LAPARASCOPIC CONVERTED TO OPEN PRIMARY INCISIONAL HERNIA REPAIR;  Surgeon: Keyon Qureshi MD;  Location: [facility];  Service:      HYSTERECTOMY  1985     [id number]     LAMINECTOMY LUMBAR TWO LEVELS  2006     LAPAROSCOPY N/A 08/22/2019    Procedure: DIAGNOSTIC LAPAROSCOPY, LYSIS OF ADHESION;  Surgeon: Svetlana Ron MD;  Location: [facility] OR;  Service: General     LUMBAR HARDWARE REMOVAL[  03/22/2012    REMOVAL LUMBAR HARDWARE 03/22/2012   L3-S1; Type CD Horizon m8 instrumentation Dr. Murray     RELEASE CARPAL TUNNEL       TONSILLECTOMY  1954    ???     TOTAL KNEE ARTHROPLASTY Right 11/27/2017    Procedure:  RIGHT TOTAL KNEE ARTHROPLASTY;  Surgeon: Kevin Ryder MD;  Location: [facility] OR;  Service: Orthopedics     US THYROID BIOPSY  04/19/2019    Family History   Problem Relation Age of Onset     Dementia Mother      Arthritis Mother      Chronic Obstructive Pulmonary Disease Father      Cardiovascular Father      Hypertension Father      No Known Problems Sister      No Known Problems Daughter      No Known Problems Son      Cerebrovascular Disease Maternal Grandmother      Heart Disease Paternal Grandmother      Cerebrovascular Disease Paternal Grandmother      No Known Problems Sister      No Known Problems Sister      No Known Problems Son      No Known Problems Daughter      Breast Cancer Paternal Aunt         age in 50's    Social History     Socioeconomic History     Marital status:      Spouse name: Not on file     Number of children: Not on file     Years of education: Not on file     Highest education level: Not on file   Occupational History     Occupation: , occasionally still subs     Employer: RETIRED   Tobacco Use     Smoking status: Never     Passive exposure: Never     Smokeless tobacco: Never   Vaping Use     Vaping status: Never Used   Substance and Sexual Activity     Alcohol use: Yes     Comment: Alcoholic Drinks/day: 1 cocktail daily     Drug use: No     Sexual activity: Not on file   Other Topics Concern     Not on file   Social History Narrative     Not on file     Social Determinants of Health      Financial Resource Strain: Low Risk  (6/21/2024)    Financial Resource Strain      Within the past 12 months, have you or your family members you live with been unable to get utilities (heat, electricity) when it was really needed?: No   Food Insecurity: Low Risk  (6/21/2024)    Food Insecurity      Within the past 12 months, did you worry that your food would run out before you got money to buy more?: No      Within the past 12 months, did the food you bought just not last and you didn t have money to get more?: No   Transportation Needs: Low Risk  (6/21/2024)    Transportation Needs      Within the past 12 months, has lack of transportation kept you from medical appointments, getting your medicines, non-medical meetings or appointments, work, or from getting things that you need?: No   Physical Activity: Inactive (6/21/2024)    Exercise Vital Sign      Days of Exercise per Week: 0 days      Minutes of Exercise per Session: 0 min   Stress: No Stress Concern Present (6/21/2024)    [place] [employer] - Occupational Stress Questionnaire      Feeling of Stress : Not at all   Social Connections: Unknown (6/21/2024)    Social Connection and Isolation Panel [NHANES]      Frequency of Communication with Friends and Family: Not on file      Frequency of Social Gatherings with Friends and Family: Once a week      Attends Orthodoxy Services: Not on file      Active Member of Clubs or Organizations: Not on file      Attends Club or Organization Meetings: Not on file      Marital Status: Not on file   Interpersonal Safety: Low Risk  (6/26/2024)    Interpersonal Safety      Do you feel physically and emotionally safe where you currently live?: Yes      Within the past 12 months, have you been hit, slapped, kicked or otherwise physically hurt by someone?: No      Within the past 12 months, have you been humiliated or emotionally abused in other ways by your partner or ex-partner?: No   Housing Stability:  Low Risk  (6/21/2024)    Housing Stability      Do you have housing? : Yes      Are you worried about losing your housing?: No          Medications  Allergies   Scheduled Meds:  Current Outpatient Medications   Medication Sig Dispense Refill     amLODIPine (NORVASC) 5 MG tablet TAKE 1 TABLET BY MOUTH EVERYDAY AT BEDTIME (Patient taking differently: 2.5 mg Taking Half Tab) 90 tablet 3     aspirin 81 MG EC tablet Take 1 tablet (81 mg) by mouth daily       atorvastatin (LIPITOR) 80 MG tablet TAKE 1 TABLET BY MOUTH AT BEDTIME 30 tablet 11     botulinum toxin type A (BOTOX) 100 units injection Every 3 months. [facility]       busPIRone (BUSPAR) 15 MG tablet TAKE 1 TABLET BY MOUTH 3 TIMES DAILY. 270 tablet 2     Calcium Carbonate-Vit D-Min (CALCIUM 1200 PO) Take 1 tablet by mouth daily       cholecalciferol 50 MCG (2000 UT) CAPS Take 2,000 Units by mouth daily       citalopram (CELEXA) 40 MG tablet Take 1 tablet (40 mg) by mouth daily 90 tablet 2     eletriptan (RELPAX) 40 MG tablet Take 1 tablet (40 mg) by mouth at onset of headache for migraine 10 tablet 0     furosemide (LASIX) 20 MG tablet TAKE 2 TABLETS BY MOUTH EVERY  tablet 3     irbesartan (AVAPRO) 150 MG tablet TAKE 1 TABLET BY MOUTH EVERY DAY 90 tablet 3     isosorbide mononitrate (IMDUR) 30 MG 24 hr tablet TAKE 1 TABLET BY MOUTH EVERY DAY 30 tablet 7     levothyroxine (SYNTHROID/LEVOTHROID) 88 MCG tablet TAKE 1 TABLET BY MOUTH DAILY AT 6:00 AM. 90 tablet 2     Lidocaine (LIDOCARE) 4 % Patch Place 1-2 patches onto the skin every 24 hours To prevent lidocaine toxicity, patient should be patch free for 12 hrs daily.       LORazepam (ATIVAN) 0.5 MG tablet TAKE 1 TAB BY MOUTH NIGHTLY AS NEEDED FOR ANXIETY OR SLEEP 30 tablet 0     nitroGLYcerin (NITROSTAT) 0.4 MG sublingual tablet For chest pain place 1 tablet under the tongue every 5 minutes for 3 doses. If symptoms persist 5 minutes after 1st dose call 911. 30 tablet 1     omeprazole (PRILOSEC) 20 MG   "capsule Take 20 mg by mouth daily       pramipexole (MIRAPEX) 0.25 MG tablet TAKE 1 TABLET BY MOUTH THREE TIMES A  tablet 1     sotalol (BETAPACE) 80 MG tablet Take 1 tablet (80 mg) by mouth every 12 hours 180 tablet 3     acetaminophen (TYLENOL) 650 MG CR tablet Take 2 tablets (1,300 mg) by mouth 2 times daily for 10 days (Patient not taking: Reported on 8/15/2024)       apixaban ANTICOAGULANT (ELIQUIS) 5 MG tablet Take 1 tablet (5 mg) by mouth 2 times daily 100 tablet 0     HYDROcodone-acetaminophen (NORCO) 5-325 MG tablet Take 1 tablet by mouth daily as needed (Severe headache) (Patient not taking: Reported on 8/15/2024) 5 tablet 0     ibuprofen (ADVIL/MOTRIN) 200 MG capsule Take 2 capsules (400 mg) by mouth 3 times daily as needed for other (pain) (Patient not taking: Reported on 8/15/2024)       omeprazole (PRILOSEC) 40 MG DR capsule Take 1 capsule (40 mg) by mouth daily 45 capsule 0    Allergies   Allergen Reactions     Ace Inhibitors Other (See Comments)     Elevates blood pressure     Amitriptyline Hcl Other (See Comments)     elevates blood pressure     Atenolol Other (See Comments)     Aggravates reynauds     Celebrex [Celecoxib] GI Disturbance     Cephalexin Nausea and Vomiting     Clonidine Unknown     \"got very ill in ER\"     Codeine Sulfate Nausea and Vomiting     Darvocet [Propoxyphene N-Apap] Nausea and Vomiting     Dexamethasone Acetate Swelling     Erythromycin Nausea and Vomiting     Escitalopram Other (See Comments)     Headaches.       Propoxyphene      HUT Reaction: Gastrointestinal; HUT Reaction: Nausea And Vomiting; HUT Noted: [id number]     Sodium      Tramadol Swelling     Swelling of tongue and face     Tramadol-Acetaminophen Other (See Comments)     Triamterene      Venlafaxine Nausea and Vomiting and Diarrhea     Zolpidem Other (See Comments) and Unknown     Pt doesn't rember       Bacitracin Itching and Rash     Cephalosporins Unknown     Pt doesn't remember        Gabapentin " "Itching and Rash     \"Spotted\"     Hctz Unknown     \"depletes my sodium\"     Potassium GI Disturbance     Sulfanilamide Rash     Zolpidem Tartrate Unknown         Lab Results    Chemistry/lipid CBC Cardiac Enzymes/BNP/TSH/INR   Lab Results   Component Value Date    CHOL 143 2024    HDL 65 2024    TRIG 98 2024    BUN 16.6 2024     2024    CO2 27 2024    Lab Results   Component Value Date    WBC 6.3 2024    HGB 12.0 2024    HCT 36.3 2024    MCV 89 2024     2024    @RESUFAST(BMP,CBC,BNP,TSH,  INR)@      30 minutes spent reviewing prior records (including documentation, laboratory studies, cardiac testing/imaging), history and physical exam, planning, and subsequent documentation.     The longitudinal plan of care for the diagnosis(es)/condition(s) as documented were addressed during this visit. Due to the added complexity in care, I will continue to support Lo in the subsequent management and with ongoing continuity of care.      This note has been dictated using voice recognition software. Any grammatical, typographical, or context distortions are unintentional and inherent to the software.    Marta Neil CNP  Clinical Cardiac Electrophysiology  [facility]  Clinic and [phone]  Fax: [phone]  Electrophysiology Nurses: [phone]          Thank you for allowing me to participate in the care of your patient.      Sincerely,     ANDREA FITZGERALD CNP     [facility]  cc:   ANDREA Gibson CNP  [facility] [address]  [address] [place] [address]      "

## 2024-08-15 NOTE — PROGRESS NOTES
Pre-Procedure Pulmonary Vein Ablation (AF) Education    Procedure: PVI with Dr Mariano on 8/23/24 with arrival time 7:00 am    COVID: Pt denies COVID like symptoms, and is aware if he/she develops COVID like symptoms they would need to complete an at home with a rapid antigen COVID test 1-2 days prior to your procedure date. If COVID + pt is aware the procedure will need to be rescheduled, and to contact CV scheduling as soon as possible    Type & Screen: Is not required for PVI Ablation    Pre-Op H&P: Completed today with EP DAYANARA- See record in Epic    Education:   Reviewed with pt in Clinic today  Pre-Procedure Instruction: NPO after midnight pre procedure, Defined NPO, Remove all jewelry and leave all valuables at home, Shower prior to arrival, Anesthesia and intubation plan/orders, Intra-procedure PVI process, Post- PVI procedure expectations/recovery, Transportation requirements and arrangements post procedure, Post-procedure follow up process, Letter sent to pt via [employer] and mail with written instructions (Refer to letters tab), Lab results would be called to pt if abnormal  Risks:   Atrial Fibrillation Ablation/Left Atrial Ablation  Cardiac Ablation  <1% Hypotension, Hemorrhage, Thrombophlebitis, Systemic or pulmonic emboli, Cardiac perforation (tamponade), Infection, Pneumothorax, Arrhythmias, Proarrhythmic effects of drugs, Radiation exposure, Catheter entrapment  <1 % Vascular injury including perforation of vein, artery or heart  1-2% Tamponade and Aortic puncture with left sided transeptal approach  1% CVA   <1% MI  <0.1% death  If external defibrillation or CV is needed, 25% risk for superficial burn  Risks associated with general anesthesia will be addressed by the Anesthesiology Department  Radiofrequency Risks:  In addition to standard risks for Radiofrequency Ablation, there is:  <2% Significant pulmonary vein stenosis  <2% Embolic events  <1% Esophageal fistula  <1% Phrenic nerve paralysis    Cryoablation Risks:  In addition to standard risks for Cryoablation Ablation, there is:  <1% Phrenic nerve paralysis  <1% Pulmonary vein stenosis  <1% Esophageal fistula    Medication:   Instructions regarding anticoagulants: Start Eliquis 7 days prior to procedure per EP procedural MD, take anticoagulation uninterrupted through procedure, do not miss any doses of AC, importance of taking AC for stroke prevention, taking AC as prescribed, to call prior to PVI if missed a dose of AC, and if upon arrival pt reports missing a dose of AC PVI will potentially be cnx/postponed. Continue for 6 weeks post ablation.  Instructions given to pt regarding antiarrhythmic medication: Sotalol- hold 3 days prior to procedure, and will be started on 8/20/24  Instructions given to pt regarding PPI medication: Increase Omeprazole to 40mg daily, 3 days prior, 6wk post  Instructions given to pt regarding diuretics medication: Hold oral diuretics AM of procedure LASIX  Instructions given to pt regarding DM/GLP-1 medication:   DM- None  GLP-1- None  Instructions for medication, other than anticoagulants and antiarrhythmics listed above, given to pt: Take all medication AM of procedure with small sips of water     Important patient information for staff: None    8/15/2024 9:07 AM  Melanie Hemphill RN

## 2024-08-20 ENCOUNTER — MYC MEDICAL ADVICE (OUTPATIENT)
Dept: INTERNAL MEDICINE | Facility: CLINIC | Age: 74
End: 2024-08-20
Payer: MEDICARE

## 2024-08-20 DIAGNOSIS — F41.9 ANXIETY: ICD-10-CM

## 2024-08-20 RX ORDER — LORAZEPAM 1 MG/1
1 TABLET ORAL DAILY PRN
Qty: 30 TABLET | Refills: 3 | Status: SHIPPED | OUTPATIENT
Start: 2024-08-20

## 2024-08-20 NOTE — TELEPHONE ENCOUNTER
8/1 OV  Chronic insomnia  Patient is having insomnia, with difficulty falling and staying asleep. She was on trazodone for many years in the past, but even the dose of 150 mg at bedtime was not providing good sleep. She is taking lorazepam 0.5 mg at bedtime daily for the more than 6 months now. We discussed melatonin, Tylenol PM, Unisom, Sleep aid OTC.She tried them all. We tried Lunesta but she is still waking up around 2 am and has trouble going back to sleep. Lunesta makes her very drowsy in the morning. It looks like lorazepam works better for insomnia. She will try to 1/2 pill of Lunesta or lorazepam.      Current RX  LORazepam (ATIVAN) 0.5 MG tablet   TAKE 1 TAB BY MOUTH NIGHTLY AS NEEDED FOR ANXIETY OR SLEEP

## 2024-08-22 ENCOUNTER — ANESTHESIA EVENT (OUTPATIENT)
Dept: CARDIOLOGY | Facility: HOSPITAL | Age: 74
End: 2024-08-22
Payer: MEDICARE

## 2024-08-22 ASSESSMENT — ENCOUNTER SYMPTOMS: DYSRHYTHMIAS: 1

## 2024-08-23 ENCOUNTER — ANESTHESIA (OUTPATIENT)
Dept: CARDIOLOGY | Facility: HOSPITAL | Age: 74
End: 2024-08-23
Payer: MEDICARE

## 2024-08-23 ENCOUNTER — HOSPITAL ENCOUNTER (OUTPATIENT)
Facility: HOSPITAL | Age: 74
Discharge: HOME | End: 2024-08-23
Attending: INTERNAL MEDICINE | Admitting: INTERNAL MEDICINE
Payer: MEDICARE

## 2024-08-23 VITALS
WEIGHT: 181 LBS | BODY MASS INDEX: 34.17 KG/M2 | DIASTOLIC BLOOD PRESSURE: 61 MMHG | HEIGHT: 61 IN | HEART RATE: 91 BPM | RESPIRATION RATE: 12 BRPM | TEMPERATURE: 98.5 F | OXYGEN SATURATION: 92 % | SYSTOLIC BLOOD PRESSURE: 123 MMHG

## 2024-08-23 DIAGNOSIS — I48.0 PAROXYSMAL ATRIAL FIBRILLATION (H): ICD-10-CM

## 2024-08-23 LAB
ACT BLD: 383 SECONDS (ref 74–150)
ANION GAP SERPL CALCULATED.3IONS-SCNC: 13 MMOL/L (ref 7–15)
ATRIAL RATE - MUSE: 85 BPM
BUN SERPL-MCNC: 18.1 MG/DL (ref 8–23)
CALCIUM SERPL-MCNC: 8.7 MG/DL (ref 8.8–10.4)
CHLORIDE SERPL-SCNC: 103 MMOL/L (ref 98–107)
CREAT SERPL-MCNC: 0.73 MG/DL (ref 0.51–0.95)
DIASTOLIC BLOOD PRESSURE - MUSE: NORMAL MMHG
EGFRCR SERPLBLD CKD-EPI 2021: 85 ML/MIN/1.73M2
ERYTHROCYTE [DISTWIDTH] IN BLOOD BY AUTOMATED COUNT: 16.4 % (ref 10–15)
GLUCOSE SERPL-MCNC: 98 MG/DL (ref 70–99)
HCO3 SERPL-SCNC: 23 MMOL/L (ref 22–29)
HCT VFR BLD AUTO: 34.3 % (ref 35–47)
HGB BLD-MCNC: 11.2 G/DL (ref 11.7–15.7)
INTERPRETATION ECG - MUSE: NORMAL
MCH RBC QN AUTO: 28.9 PG (ref 26.5–33)
MCHC RBC AUTO-ENTMCNC: 32.7 G/DL (ref 31.5–36.5)
MCV RBC AUTO: 89 FL (ref 78–100)
P AXIS - MUSE: 57 DEGREES
PLATELET # BLD AUTO: 254 10E3/UL (ref 150–450)
POTASSIUM SERPL-SCNC: 3.8 MMOL/L (ref 3.4–5.3)
PR INTERVAL - MUSE: 148 MS
QRS DURATION - MUSE: 98 MS
QT - MUSE: 400 MS
QTC - MUSE: 476 MS
R AXIS - MUSE: 22 DEGREES
RBC # BLD AUTO: 3.87 10E6/UL (ref 3.8–5.2)
SODIUM SERPL-SCNC: 139 MMOL/L (ref 135–145)
SYSTOLIC BLOOD PRESSURE - MUSE: NORMAL MMHG
T AXIS - MUSE: 48 DEGREES
VENTRICULAR RATE- MUSE: 85 BPM
WBC # BLD AUTO: 6.8 10E3/UL (ref 4–11)

## 2024-08-23 PROCEDURE — 36415 COLL VENOUS BLD VENIPUNCTURE: CPT | Performed by: INTERNAL MEDICINE

## 2024-08-23 PROCEDURE — 93615 ESOPHAGEAL RECORDING: CPT | Mod: 26 | Performed by: INTERNAL MEDICINE

## 2024-08-23 PROCEDURE — C1887 CATHETER, GUIDING: HCPCS | Performed by: INTERNAL MEDICINE

## 2024-08-23 PROCEDURE — 80048 BASIC METABOLIC PNL TOTAL CA: CPT | Performed by: INTERNAL MEDICINE

## 2024-08-23 PROCEDURE — 250N000011 HC RX IP 250 OP 636: Performed by: NURSE ANESTHETIST, CERTIFIED REGISTERED

## 2024-08-23 PROCEDURE — 250N000013 HC RX MED GY IP 250 OP 250 PS 637: Performed by: NURSE PRACTITIONER

## 2024-08-23 PROCEDURE — C1733 CATH, EP, OTHR THAN COOL-TIP: HCPCS | Performed by: INTERNAL MEDICINE

## 2024-08-23 PROCEDURE — 85027 COMPLETE CBC AUTOMATED: CPT | Performed by: INTERNAL MEDICINE

## 2024-08-23 PROCEDURE — C1759 CATH, INTRA ECHOCARDIOGRAPHY: HCPCS | Performed by: INTERNAL MEDICINE

## 2024-08-23 PROCEDURE — 710N000010 HC RECOVERY PHASE 1, LEVEL 2, PER MIN

## 2024-08-23 PROCEDURE — 93656 COMPRE EP EVAL ABLTJ ATR FIB: CPT | Performed by: NURSE ANESTHETIST, CERTIFIED REGISTERED

## 2024-08-23 PROCEDURE — C1769 GUIDE WIRE: HCPCS | Performed by: INTERNAL MEDICINE

## 2024-08-23 PROCEDURE — C1730 CATH, EP, 19 OR FEW ELECT: HCPCS | Performed by: INTERNAL MEDICINE

## 2024-08-23 PROCEDURE — C1766 INTRO/SHEATH,STRBLE,NON-PEEL: HCPCS | Performed by: INTERNAL MEDICINE

## 2024-08-23 PROCEDURE — 250N000011 HC RX IP 250 OP 636: Performed by: INTERNAL MEDICINE

## 2024-08-23 PROCEDURE — 93010 ELECTROCARDIOGRAM REPORT: CPT | Mod: HOP | Performed by: INTERNAL MEDICINE

## 2024-08-23 PROCEDURE — 93005 ELECTROCARDIOGRAM TRACING: CPT

## 2024-08-23 PROCEDURE — C1732 CATH, EP, DIAG/ABL, 3D/VECT: HCPCS | Performed by: INTERNAL MEDICINE

## 2024-08-23 PROCEDURE — 99100 ANES PT EXTEME AGE<1 YR&>70: CPT | Performed by: NURSE ANESTHETIST, CERTIFIED REGISTERED

## 2024-08-23 PROCEDURE — 272N000001 HC OR GENERAL SUPPLY STERILE: Performed by: INTERNAL MEDICINE

## 2024-08-23 PROCEDURE — 250N000009 HC RX 250: Performed by: INTERNAL MEDICINE

## 2024-08-23 PROCEDURE — 93623 PRGRMD STIMJ&PACG IV RX NFS: CPT | Performed by: INTERNAL MEDICINE

## 2024-08-23 PROCEDURE — C1894 INTRO/SHEATH, NON-LASER: HCPCS | Performed by: INTERNAL MEDICINE

## 2024-08-23 PROCEDURE — 999N000054 HC STATISTIC EKG NON-CHARGEABLE

## 2024-08-23 PROCEDURE — 93656 COMPRE EP EVAL ABLTJ ATR FIB: CPT | Performed by: ANESTHESIOLOGY

## 2024-08-23 PROCEDURE — 250N000009 HC RX 250: Performed by: NURSE ANESTHETIST, CERTIFIED REGISTERED

## 2024-08-23 PROCEDURE — 85347 COAGULATION TIME ACTIVATED: CPT

## 2024-08-23 PROCEDURE — 93656 COMPRE EP EVAL ABLTJ ATR FIB: CPT | Performed by: INTERNAL MEDICINE

## 2024-08-23 PROCEDURE — 93615 ESOPHAGEAL RECORDING: CPT | Performed by: INTERNAL MEDICINE

## 2024-08-23 PROCEDURE — 370N000017 HC ANESTHESIA TECHNICAL FEE, PER MIN: Performed by: INTERNAL MEDICINE

## 2024-08-23 PROCEDURE — 258N000003 HC RX IP 258 OP 636: Performed by: INTERNAL MEDICINE

## 2024-08-23 PROCEDURE — 258N000003 HC RX IP 258 OP 636: Performed by: NURSE ANESTHETIST, CERTIFIED REGISTERED

## 2024-08-23 RX ORDER — LIDOCAINE 40 MG/G
CREAM TOPICAL
Status: DISCONTINUED | OUTPATIENT
Start: 2024-08-23 | End: 2024-08-23 | Stop reason: HOSPADM

## 2024-08-23 RX ORDER — ONDANSETRON 2 MG/ML
4 INJECTION INTRAMUSCULAR; INTRAVENOUS EVERY 6 HOURS PRN
Status: DISCONTINUED | OUTPATIENT
Start: 2024-08-23 | End: 2024-08-23 | Stop reason: HOSPADM

## 2024-08-23 RX ORDER — PRAMIPEXOLE DIHYDROCHLORIDE 0.25 MG/1
0.25 TABLET ORAL ONCE
Status: COMPLETED | OUTPATIENT
Start: 2024-08-23 | End: 2024-08-23

## 2024-08-23 RX ORDER — ONDANSETRON 2 MG/ML
INJECTION INTRAMUSCULAR; INTRAVENOUS PRN
Status: DISCONTINUED | OUTPATIENT
Start: 2024-08-23 | End: 2024-08-23

## 2024-08-23 RX ORDER — SODIUM CHLORIDE 9 MG/ML
100 INJECTION, SOLUTION INTRAVENOUS CONTINUOUS
Status: DISCONTINUED | OUTPATIENT
Start: 2024-08-23 | End: 2024-08-23 | Stop reason: HOSPADM

## 2024-08-23 RX ORDER — SODIUM CHLORIDE 9 MG/ML
INJECTION, SOLUTION INTRAVENOUS CONTINUOUS PRN
Status: DISCONTINUED | OUTPATIENT
Start: 2024-08-23 | End: 2024-08-23

## 2024-08-23 RX ORDER — IBUPROFEN 600 MG/1
600 TABLET, FILM COATED ORAL EVERY 6 HOURS PRN
Status: DISCONTINUED | OUTPATIENT
Start: 2024-08-23 | End: 2024-08-23 | Stop reason: HOSPADM

## 2024-08-23 RX ORDER — HEPARIN SODIUM 1000 [USP'U]/ML
INJECTION, SOLUTION INTRAVENOUS; SUBCUTANEOUS
Status: DISCONTINUED | OUTPATIENT
Start: 2024-08-23 | End: 2024-08-23 | Stop reason: HOSPADM

## 2024-08-23 RX ORDER — ONDANSETRON 4 MG/1
4 TABLET, ORALLY DISINTEGRATING ORAL EVERY 30 MIN PRN
Status: DISCONTINUED | OUTPATIENT
Start: 2024-08-23 | End: 2024-08-23 | Stop reason: HOSPADM

## 2024-08-23 RX ORDER — FENTANYL CITRATE 50 UG/ML
25 INJECTION, SOLUTION INTRAMUSCULAR; INTRAVENOUS
Status: DISCONTINUED | OUTPATIENT
Start: 2024-08-23 | End: 2024-08-23 | Stop reason: HOSPADM

## 2024-08-23 RX ORDER — FENTANYL CITRATE 50 UG/ML
INJECTION, SOLUTION INTRAMUSCULAR; INTRAVENOUS PRN
Status: DISCONTINUED | OUTPATIENT
Start: 2024-08-23 | End: 2024-08-23

## 2024-08-23 RX ORDER — ADENOSINE 3 MG/ML
INJECTION, SOLUTION INTRAVENOUS
Status: DISCONTINUED | OUTPATIENT
Start: 2024-08-23 | End: 2024-08-23 | Stop reason: HOSPADM

## 2024-08-23 RX ORDER — ONDANSETRON 2 MG/ML
4 INJECTION INTRAMUSCULAR; INTRAVENOUS EVERY 30 MIN PRN
Status: DISCONTINUED | OUTPATIENT
Start: 2024-08-23 | End: 2024-08-23 | Stop reason: HOSPADM

## 2024-08-23 RX ORDER — HEPARIN SODIUM 10000 [USP'U]/100ML
INJECTION, SOLUTION INTRAVENOUS CONTINUOUS PRN
Status: DISCONTINUED | OUTPATIENT
Start: 2024-08-23 | End: 2024-08-23 | Stop reason: HOSPADM

## 2024-08-23 RX ORDER — FENTANYL CITRATE 50 UG/ML
25 INJECTION, SOLUTION INTRAMUSCULAR; INTRAVENOUS EVERY 5 MIN PRN
Status: DISCONTINUED | OUTPATIENT
Start: 2024-08-23 | End: 2024-08-23 | Stop reason: HOSPADM

## 2024-08-23 RX ORDER — PROPOFOL 10 MG/ML
INJECTION, EMULSION INTRAVENOUS PRN
Status: DISCONTINUED | OUTPATIENT
Start: 2024-08-23 | End: 2024-08-23

## 2024-08-23 RX ORDER — SODIUM CHLORIDE, SODIUM LACTATE, POTASSIUM CHLORIDE, CALCIUM CHLORIDE 600; 310; 30; 20 MG/100ML; MG/100ML; MG/100ML; MG/100ML
INJECTION, SOLUTION INTRAVENOUS CONTINUOUS
Status: DISCONTINUED | OUTPATIENT
Start: 2024-08-23 | End: 2024-08-23 | Stop reason: HOSPADM

## 2024-08-23 RX ORDER — ONDANSETRON 4 MG/1
4 TABLET, ORALLY DISINTEGRATING ORAL EVERY 6 HOURS PRN
Status: DISCONTINUED | OUTPATIENT
Start: 2024-08-23 | End: 2024-08-23 | Stop reason: HOSPADM

## 2024-08-23 RX ORDER — NALOXONE HYDROCHLORIDE 0.4 MG/ML
0.1 INJECTION, SOLUTION INTRAMUSCULAR; INTRAVENOUS; SUBCUTANEOUS
Status: DISCONTINUED | OUTPATIENT
Start: 2024-08-23 | End: 2024-08-23 | Stop reason: HOSPADM

## 2024-08-23 RX ORDER — HYDROMORPHONE HCL IN WATER/PF 6 MG/30 ML
0.2 PATIENT CONTROLLED ANALGESIA SYRINGE INTRAVENOUS EVERY 5 MIN PRN
Status: DISCONTINUED | OUTPATIENT
Start: 2024-08-23 | End: 2024-08-23 | Stop reason: HOSPADM

## 2024-08-23 RX ORDER — PROTAMINE SULFATE 10 MG/ML
INJECTION, SOLUTION INTRAVENOUS PRN
Status: DISCONTINUED | OUTPATIENT
Start: 2024-08-23 | End: 2024-08-23

## 2024-08-23 RX ADMIN — PROPOFOL 120 MG: 10 INJECTION, EMULSION INTRAVENOUS at 09:44

## 2024-08-23 RX ADMIN — PROTAMINE SULFATE 10 MG: 10 INJECTION, SOLUTION INTRAVENOUS at 10:45

## 2024-08-23 RX ADMIN — PHENYLEPHRINE HYDROCHLORIDE 50 MCG: 10 INJECTION INTRAVENOUS at 10:39

## 2024-08-23 RX ADMIN — SODIUM CHLORIDE: 0.9 INJECTION, SOLUTION INTRAVENOUS at 09:42

## 2024-08-23 RX ADMIN — PHENYLEPHRINE HYDROCHLORIDE 100 MCG: 10 INJECTION INTRAVENOUS at 09:59

## 2024-08-23 RX ADMIN — FENTANYL CITRATE 25 MCG: 50 INJECTION INTRAMUSCULAR; INTRAVENOUS at 09:57

## 2024-08-23 RX ADMIN — PHENYLEPHRINE HYDROCHLORIDE 0.2 MCG/KG/MIN: 10 INJECTION INTRAVENOUS at 09:56

## 2024-08-23 RX ADMIN — Medication 60 MG: at 09:45

## 2024-08-23 RX ADMIN — PROTAMINE SULFATE 10 MG: 10 INJECTION, SOLUTION INTRAVENOUS at 10:43

## 2024-08-23 RX ADMIN — SODIUM CHLORIDE 100 ML/HR: 9 INJECTION, SOLUTION INTRAVENOUS at 08:21

## 2024-08-23 RX ADMIN — ROCURONIUM BROMIDE 30 MG: 50 INJECTION, SOLUTION INTRAVENOUS at 10:17

## 2024-08-23 RX ADMIN — FENTANYL CITRATE 25 MCG: 50 INJECTION INTRAMUSCULAR; INTRAVENOUS at 09:54

## 2024-08-23 RX ADMIN — PROTAMINE SULFATE 10 MG: 10 INJECTION, SOLUTION INTRAVENOUS at 10:46

## 2024-08-23 RX ADMIN — FENTANYL CITRATE 100 MCG: 50 INJECTION INTRAMUSCULAR; INTRAVENOUS at 09:43

## 2024-08-23 RX ADMIN — FENTANYL CITRATE 25 MCG: 50 INJECTION INTRAMUSCULAR; INTRAVENOUS at 09:51

## 2024-08-23 RX ADMIN — PROTAMINE SULFATE 10 MG: 10 INJECTION, SOLUTION INTRAVENOUS at 10:44

## 2024-08-23 RX ADMIN — FENTANYL CITRATE 25 MCG: 50 INJECTION INTRAMUSCULAR; INTRAVENOUS at 09:49

## 2024-08-23 RX ADMIN — MIDAZOLAM 1 MG: 1 INJECTION INTRAMUSCULAR; INTRAVENOUS at 09:42

## 2024-08-23 RX ADMIN — PRAMIPEXOLE DIHYDROCHLORIDE 0.25 MG: 0.25 TABLET ORAL at 13:37

## 2024-08-23 RX ADMIN — PROTAMINE SULFATE 10 MG: 10 INJECTION, SOLUTION INTRAVENOUS at 10:42

## 2024-08-23 RX ADMIN — SUGAMMADEX 200 MG: 100 INJECTION, SOLUTION INTRAVENOUS at 10:49

## 2024-08-23 RX ADMIN — ONDANSETRON 4 MG: 2 INJECTION INTRAMUSCULAR; INTRAVENOUS at 10:20

## 2024-08-23 ASSESSMENT — ACTIVITIES OF DAILY LIVING (ADL)
ADLS_ACUITY_SCORE: 36

## 2024-08-23 NOTE — ANESTHESIA PREPROCEDURE EVALUATION
Anesthesia Pre-Procedure Evaluation    Patient: Suma Mark   MRN: [id number] : 1950        Procedure : Procedure(s):  Ablation Atrial Fibrillation          Past Medical History:   Diagnosis Date     Anxiety state, unspecified      Back pain      Diverticulitis 2017     DJD (degenerative joint disease)      Essential hypertension, benign      Fibromyalgia      Gastro-oesophageal reflux disease      Heart disease      Hernia, ventral 2017     History of anesthesia complications     hypotension after surgery     History of blood transfusion      Hyperlipidemia      Hyponatremia 2017     Major depression      Migraine      Osteopenia      PONV (postoperative nausea and vomiting)      Posttraumatic stress disorder      Raynaud's disease      Restless leg syndrome      S/P total knee replacement 2017     Sepsis (H) 2019     Sleep apnea      Thrombosis of leg     with pregnancy     Unspecified hypothyroidism       Past Surgical History:   Procedure Laterality Date     ARTHROPLASTY KNEE UNICOMPARTMENT  10/31/2013    Procedure: ARTHROPLASTY KNEE UNICOMPARTMENT;  LEFT KNEE UNICOMPARTMENTAL ARTHROPLASTY (CAROLINE)^;  Surgeon: Chi Mittal MD;  Location:  OR     BREAST SURGERY      bx     COLECTOMY N/A 2017    Procedure: RESECTION, SMALL INTESTINE, OPEN ADHESIOLYSIS;  Surgeon: Keyon Qureshi MD;  Location: [facility];  Service:      COLONOSCOPY N/A 2024    Procedure: COLONOSCOPY WITH POLYPECTOMY;  Surgeon: Chris Edmonds MD;  Location: [facility]     CV CORONARY ANGIOGRAM N/A 10/12/2022    Procedure: CV CORONARY ANGIOGRAM;  Surgeon: You Martinez MD;  Location: [facility] CV     CV CORONARY ANGIOGRAM N/A 2023    Procedure: Coronary Angiogram;  Surgeon: Bret Jin MD;  Location: [facility] CV     CV FRACTIONAL FLOW RATIO WIRE N/A 10/12/2022    Procedure: Fractional Flow Ratio Wire;  Surgeon: You Martinez MD;  Location:  [facility] LAB CV     CV LEFT ATRIAL APPENDAGE CLOSURE Right 6/22/2023    Procedure: Left Atrial Appendage Closure;  Surgeon: Lenin Mariano MD;  Location: [facility] CV     CV LEFT HEART CATH N/A 6/9/2023    Procedure: Left Heart Catheterization;  Surgeon: Bret Jin MD;  Location: [facility] CV     CV PCI STENT DRUG ELUTING N/A 10/12/2022    Procedure: Percutaneous Coronary Intervention Stent;  Surgeon: You Martinez MD;  Location: [facility] CV     CV PCI STENT DRUG ELUTING N/A 11/08/2022    Procedure: Percutaneous Coronary Intervention - Stent;  Surgeon: Bret Jin MD;  Location: [facility] CV     ENDOSCOPIC RETROGRADE CHOLANGIOPANCREATOGRAPHY       ENDOSCOPIC STRIPPING VEIN(S)      BILATERLY     GENITOURINARY SURGERY      bladder sling     GI SURGERY  10/02/2015    Rectal prolaspse. s/p Abdominal rectopexy, sacral colpopexy, proctoscopy, cystoscopy     HERNIORRHAPHY INCISIONAL (LOCATION) N/A 06/02/2017    Procedure: LAPARASCOPIC CONVERTED TO OPEN PRIMARY INCISIONAL HERNIA REPAIR;  Surgeon: Keyon Qureshi MD;  Location: [facility];  Service:      HYSTERECTOMY  1985 1985-1986     LAMINECTOMY LUMBAR TWO LEVELS  2006     LAPAROSCOPY N/A 08/22/2019    Procedure: DIAGNOSTIC LAPAROSCOPY, LYSIS OF ADHESION;  Surgeon: Svetlana Ron MD;  Location: [facility];  Service: General     LUMBAR HARDWARE REMOVAL[  03/22/2012    REMOVAL LUMBAR HARDWARE 03/22/2012   L3-S1; Type CD Horizon m8 instrumentation Dr. Murray     RELEASE CARPAL TUNNEL       TONSILLECTOMY  1954    ???     TOTAL KNEE ARTHROPLASTY Right 11/27/2017    Procedure:  RIGHT TOTAL KNEE ARTHROPLASTY;  Surgeon: Kevin Ryder MD;  Location: [facility];  Service: Orthopedics     US THYROID BIOPSY  04/19/2019      Allergies   Allergen Reactions     Ace Inhibitors Other (See Comments)     Elevates blood pressure     Amitriptyline Hcl Other (See Comments)     elevates blood pressure  "    Atenolol Other (See Comments)     Aggravates reynauds     Celebrex [Celecoxib] GI Disturbance     Cephalexin Nausea and Vomiting     Clonidine Unknown     \"got very ill in ER\"     Codeine Sulfate Nausea and Vomiting     Darvocet [Propoxyphene N-Apap] Nausea and Vomiting     Dexamethasone Acetate Swelling     Erythromycin Nausea and Vomiting     Escitalopram Other (See Comments)     Headaches.       Propoxyphene      HUT Reaction: Gastrointestinal; HUT Reaction: Nausea And Vomiting; HUT Noted: [id number]     Sodium      Tramadol Swelling     Swelling of tongue and face     Tramadol-Acetaminophen Other (See Comments)     Triamterene      Venlafaxine Nausea and Vomiting and Diarrhea     Zolpidem Other (See Comments) and Unknown     Pt doesn't rember       Bacitracin Itching and Rash     Cephalosporins Unknown     Pt doesn't remember        Gabapentin Itching and Rash     \"Spotted\"     Hctz Unknown     \"depletes my sodium\"     Potassium GI Disturbance     Sulfanilamide Rash     Zolpidem Tartrate Unknown      Social History     Tobacco Use     Smoking status: Never     Passive exposure: Never     Smokeless tobacco: Never   Substance Use Topics     Alcohol use: Yes     Comment: Alcoholic Drinks/day: 1 cocktail daily      Wt Readings from Last 1 Encounters:   08/15/24 83.5 kg (184 lb)        Anesthesia Evaluation   Pt has had prior anesthetic.     History of anesthetic complications  - PONV.      ROS/MED HX  ENT/Pulmonary:     (+) sleep apnea, doesn't use CPAP,                                      Neurologic:     (+)      migraines,                          Cardiovascular: Comment: 9/27/23 DIANA  Interpretation Summary     Patient was sedated using Versed 2 mg. The heart rate, respiratory rate,  oxygen saturations, blood pressure, and response to care were monitored  throughout the procedure with the assistance of the nurse.  Left ventricular size, wall motion and function are normal. The ejection  fraction is " 60-65%.  Normal right ventricle size and systolic function.  Doppler suggests left to right interatrial shunt.  Watchman device noted in left atrial appendage. The device is well seated with  no leakage by color flow Doppler imaging.  Thrombus absent on left atrial appendage occlusion device.  Moderate atherosclerotic plaque(s) in the descending aorta.      (+) Dyslipidemia hypertension- -  CAD angina-at rest.  - stent-   Taking blood thinners Pt has received instructions:  CHF (HFpEF)     HAMMER.             dysrhythmias (s/p Watchman device), a-fib,             METS/Exercise Tolerance:  Comment: Pt reports 6 month h/o severe chest tightness that radiates to her back and left arm.  This happens at rest and with exertion.  Last episode few days ago.  She also reports severe HAMMER where she cannot walk up an incline.     Hematologic:     (+) History of blood clots,               Musculoskeletal:       GI/Hepatic: Comment: esophagitis and gastritis hx    (+) GERD, Asymptomatic on medication,                  Renal/Genitourinary:  - neg Renal ROS     Endo:     (+)          thyroid problem, hypothyroidism,    Obesity (BMI 35),       Psychiatric/Substance Use:     (+) psychiatric history (PTSD) depression and anxiety       Infectious Disease:       Malignancy:       Other:      (+)  , H/O Chronic Pain (fibromyalgia),         Physical Exam    Airway        Mallampati: III   TM distance: > 3 FB   Neck ROM: full   Mouth opening: > 3 cm    Respiratory Devices and Support         Dental         B=Bridge, C=Chipped, L=Loose, M=Missing    Cardiovascular   cardiovascular exam normal          Pulmonary   pulmonary exam normal            OUTSIDE LABS:  CBC:   Lab Results   Component Value Date    WBC 5.7 08/15/2024    WBC 6.3 08/01/2024    HGB 11.8 08/15/2024    HGB 12.0 08/01/2024    HCT 36.1 08/15/2024    HCT 36.3 08/01/2024     08/15/2024     08/01/2024     BMP:   Lab Results   Component Value Date     08/15/2024      06/26/2024    POTASSIUM 4.2 08/15/2024    POTASSIUM 4.4 06/26/2024    CHLORIDE 101 08/15/2024    CHLORIDE 100 06/26/2024    CO2 26 08/15/2024    CO2 27 06/26/2024    BUN 18.0 08/15/2024    BUN 16.6 06/26/2024    CR 0.73 08/15/2024    CR 0.79 06/26/2024    GLC 92 08/15/2024    GLC 96 06/26/2024     COAGS:   Lab Results   Component Value Date    PTT 27 11/27/2022    INR 1.07 11/27/2022     POC:   Lab Results   Component Value Date     (H) 11/01/2013     HEPATIC:   Lab Results   Component Value Date    ALBUMIN 4.4 06/26/2024    PROTTOTAL 7.0 06/26/2024    ALT 29 06/26/2024    AST 33 06/26/2024    ALKPHOS 99 06/26/2024    BILITOTAL 0.7 06/26/2024     OTHER:   Lab Results   Component Value Date    A1C 5.8 (H) 06/26/2024    ISSA 8.9 08/15/2024    PHOS 3.4 06/10/2021    MAG 1.9 11/27/2022    LIPASE 19 10/18/2019    AMYLASE 30 10/18/2019    TSH 2.36 06/26/2024    CRP 0.4 11/07/2019    SED 32 (H) 05/28/2024       Anesthesia Plan    ASA Status:  3    NPO Status:  NPO Appropriate    Anesthesia Type: General.     - Airway: ETT   Induction: Intravenous, Propofol.   Maintenance: Balanced.   Techniques and Equipment:     - Airway: Video-Laryngoscope     - Lines/Monitors: 2nd IV (Invasive lines per CVL)     Consents    Anesthesia Plan(s) and associated risks, benefits, and realistic alternatives discussed. Questions answered and patient/representative(s) expressed understanding.     - Discussed: Risks, Benefits and Alternatives for BOTH SEDATION and the PROCEDURE were discussed     - Discussed with:  Patient, Spouse       - Patient is DNR/DNI Status: No     Use of blood products discussed: Yes.     - Discussed with: Patient.     - Consented: consented to blood products     Postoperative Care    Pain management: Multi-modal analgesia.   PONV prophylaxis: Ondansetron (or other 5HT-3), Background Propofol Infusion     Comments:    Other Comments: Informed Dr. Mariano about patients chest pain and HAMMER hx.  He will  "evaluate the patient prior to proceeding with ablation today.      Addendum:  Dr. Mariano interviewed and evaluated the patient.  Pt's chest tightness and HAMMER is correlating with episodes of palpitations and rapid HR likely due to a.fib.  She apparently can walk her dog up hills when not having palpitations.  Plan is to proceed as her symptoms appear to correlate with episodes of a.fib.    Glidescope    Phenylephrine inline  Maintain MAP above 75 and SBP above 100           Queenie Rodríguez MD    I have reviewed the pertinent notes and labs in the chart from the past 30 days and (re)examined the patient.  Any updates or changes from those notes are reflected in this note.              # Obesity: Estimated body mass index is 34.79 kg/m  as calculated from the following:    Height as of 8/15/24: 1.549 m (5' 0.98\").    Weight as of 8/15/24: 83.5 kg (184 lb).      "

## 2024-08-23 NOTE — DISCHARGE INSTRUCTIONS
[facility]  Cardiac Electrophysiology  [address]  [place] [address]   Office: [phone]  Fax: [phone]       Cardiac Electrophysiology - Post Ablation Discharge Instructions      PROCEDURE   Atrial fibrillation ablation         MEDICATION INSTRUCTIONS   Continue taking your prior to procedure medications, including sotalol for now - we will assess for stopping sotalol in follow-up.  Continue taking your blood thinner apixaban (Eliquis).          DISCHARGE INSTRUCTIONS   Medications   Take your medications as prescribed.   It is important for you to continue your blood thinner without interruption, unless your electrophysiologist instructs you otherwise.     General instructions   Have an adult stay with you until tomorrow.   You may resume your normal diet.     You may shower tomorrow.  Do not take a bath, or use a hot tub or pool for at least 1 week.    Activity recommendations   Do not drive for 3 days.   Avoid stooping or squatting more than 90 degrees at the hips for 7 days.   Avoid repetitive motions such as loading , vacuuming, raking or shoveling for 7 days.   Avoid heavy lifting (greater than 25lbs) for 7 days.       Groin care instructions   For the first 24 hours after your ablation, check the groin access sites every 1-2 hours while awake.   You may keep a bandaid over the puncture sites for 1 or 2 days post-procedure and thereafter may keep these sites uncovered.  Change the bandaid daily.  If there is minor oozing, apply another bandaid and remove it after 12 hours.   For 2 days, when you cough, sneeze, laugh or move your bowels, hold your hand over the puncture sites and press firmly.   Do not scrub the groin access sites.   Do not use lotion or powder near the groin access sites.       Arrhythmias following ablation  Recurrent atrial fibrillation and palpitations are common within the first 3 months post ablation while your heart recovers  from the procedure.  These are usually more frequent in the first few weeks following ablation and should occur less frequently over time.  Please contact us if you are having frequent or long lasting atrial fibrillation episodes following ablation.    Common findings after ablation  Bruising and a dime or pea size lump at the access sites is common.  If you notice increased swelling, external bleeding, or have other concerns regarding your groin access sites please call your electrophysiology team's office, and if after hours consider emergency department evaluation.   Soreness and mild pain at your groin sites is normal, to help relieve this pain you can apply ice/cold packs to the sites for 20min 3-4 times per day.   Pleuritic chest discomfort (chest pain worse with taking deep breaths, worse with laying flat on your back) can occur after ablation, usually coming about within the first 24-72hrs post ablation.  If this occurs and is severe enough to be troublesome to you, please call us and consider starting a course of ibuprofen 400mg three times daily for 5 to 7 days.     Things to watch for   As with any type of procedure, please be more attentive to unusual symptoms post ablation (eg. fever, neurologic changes, pain with swallowing, loss of consciousness, etc) - we recommend ER evaluation for any such symptoms in the first few weeks post procedure.       Contact the EP Nurse line with any of the following.  Contact the cardiology on-call number after business hours.    Consider ER evaluation for severe symptoms.   Groin pain, swelling, or growing hard lump around the puncture sites.   Groin redness, tenderness, swelling, or drainage (blood or pus).   Neurological changes (for example: leg, arm or face weakness or numbness, difficulties with speech or word finding, problems walking or with your balance, vision changes).   Any numbness, coolness or changes in color in your extremities.  Sudden onset severe  abdominal or back pain.  Moderate or severe chest pain not relieved by Tylenol or Ibuprofen, particularly after the first 48 hours.   Shortness of breath.   Chills or fever greater than 100 F.   Difficulty swallowing food or liquids.  Coughing up blood.   Blood in your stool.  Nausea and vomiting.  Difficulty urinating.  Recurrent atrial fibrillation associated with prolonged rapid heart rates (for example, heart rates over 140bpm for greater than 4 hours) or associated with additional concerning symptoms (for example, chest pain, lightheadedness, loss of consciousness, sweating).     If you are being evaluated at an emergency department, please tell your ER doctor that you have recently underwent an atrial fibrillation ablation and ask the ER to contact our office.  Many special considerations apply following ablation - these may be overlooked during an ER evaluation.      Our office will have a follow-up visit scheduled for you in approximately 6 weeks.  Please do not hesitate to call us before that time should issues arise.         [facility]      To reach the EP nurses working with Dr. Mariano:   [phone]        To reach the general [facility] line or after hours service:   [phone]   If you are calling after hours, please listen to the entire voicemail,    a live  will answer at the end of the message.

## 2024-08-23 NOTE — INTERVAL H&P NOTE
"I have reviewed the surgical (or preoperative) H&P that is linked to this encounter, and examined the patient. There are no significant changes    Clinical Conditions Present on Arrival:  Clinically Significant Risk Factors Present on Admission                # Drug Induced Coagulation Defect: home medication list includes an anticoagulant medication  # Drug Induced Platelet Defect: home medication list includes an antiplatelet medication      # Obesity: Estimated body mass index is 34.48 kg/m  as calculated from the following:    Height as of this encounter: 1.543 m (5' 0.75\").    Weight as of this encounter: 82.1 kg (181 lb).       "

## 2024-08-23 NOTE — Clinical Note
Dibspace Med system 12 lead EKG, hemodynamics 5 lead, pulse oximetery, NIBP, Physiocontrol hands off defibrillator/external pacer, with 3 monitoring leads to patient. Baseline assessment done.

## 2024-08-23 NOTE — ANESTHESIA POSTPROCEDURE EVALUATION
Patient: Suma Mark    Procedure: Procedure(s):  Ablation Atrial Fibrillation       Anesthesia Type:  General    Note:  Disposition: Outpatient   Postop Pain Control: Uneventful            Sign Out: Well controlled pain   PONV: No   Neuro/Psych: Uneventful            Sign Out: Acceptable/Baseline neuro status   Airway/Respiratory: Uneventful            Sign Out: Acceptable/Baseline resp. status   CV/Hemodynamics: Uneventful            Sign Out: Acceptable CV status; No obvious hypovolemia; No obvious fluid overload   Other NRE: NONE   DID A NON-ROUTINE EVENT OCCUR? No           Last vitals:  Vitals Value Taken Time   /61 08/23/24 1523   Temp 36.9  C (98.5  F) 08/23/24 1102   Pulse 70 08/23/24 1344   Resp 18 08/23/24 1344   SpO2 93 % 08/23/24 1344   Vitals shown include unfiled device data.    Electronically Signed By: Queenie Rodríguez MD  August 23, 2024  4:02 PM

## 2024-08-23 NOTE — ANESTHESIA PROCEDURE NOTES
Airway         Procedure Start/Stop Times: 8/23/2024 9:46 AM  Staff -        Anesthesiologist:  Queenie Rodríguez MD       CRNA: Tyler Franklin APRN CRNA       Performed By: CRNA  Consent for Airway        Urgency: elective  Indications and Patient Condition       Indications for airway management: farnaz-procedural       Induction type:intravenous       Mask difficulty assessment: 1 - vent by mask    Final Airway Details       Final airway type: endotracheal airway       Successful airway: ETT - single  Endotracheal Airway Details        ETT size (mm): 7.0       Cuffed: yes       Cuff volume (mL): 8       Successful intubation technique: video laryngoscopy       VL Blade Size: Glidescope 3       Grade View of Cords: 1       Adjucts: stylet       Position: Right       Measured from: lips       Secured at (cm): 20       Bite block used: Soft    Post intubation assessment        Placement verified by: capnometry, equal breath sounds and chest rise        Number of attempts at approach: 1       Number of other approaches attempted: 0       Secured with: tape       Ease of procedure: easy       Dentition: Intact and Unchanged    Medication(s) Administered   Medication Administration Time: 8/23/2024 9:46 AM

## 2024-08-23 NOTE — ANESTHESIA CARE TRANSFER NOTE
Patient: Suma Mark    Procedure: Procedure(s):  Ablation Atrial Fibrillation       Diagnosis: Paroxysmal atrial fibrillation  Diagnosis Additional Information: No value filed.    Anesthesia Type:   General     Note:    Oropharynx: oropharynx clear of all foreign objects and spontaneously breathing  Level of Consciousness: drowsy  Oxygen Supplementation: face mask  Level of Supplemental Oxygen (L/min / FiO2): 4  Independent Airway: airway patency satisfactory and stable  Dentition: dentition unchanged  Vital Signs Stable: post-procedure vital signs reviewed and stable  Report to RN Given: handoff report given  Patient transferred to: PACU  Comments: MARIVEL, follows commands  Handoff Report: Identifed the Patient, Identified the Reponsible Provider, Reviewed the pertinent medical history, Discussed the surgical course, Reviewed Intra-OP anesthesia mangement and issues during anesthesia, Set expectations for post-procedure period and Allowed opportunity for questions and acknowledgement of understanding      Vitals:  Vitals Value Taken Time   /70 08/23/24 1102   Temp 36.9  C (98.5  F) 08/23/24 1102   Pulse 93 08/23/24 1104   Resp 18 08/23/24 1104   SpO2 95 % 08/23/24 1104   Vitals shown include unfiled device data.    Electronically Signed By: ANDREA Sue CRNA  August 23, 2024  11:06 AM

## 2024-08-23 NOTE — PROGRESS NOTES
Patient was kept comfortable during post-procedure stay.VSS. Rhythm NSR Pt at baseline neuro status. Denies pain. Right femoral access site remains dry & free from signs of bleeding. Appointments made & included in AVS. Dr. Mariano was able to speak with patient post procedure. Post-op instructions given to patient & spouse. Able to ask questions. Verbalized no concerns. Belongings returned. Discharged in stable condition.   Radiation Oncology Follow-Up    Patient Name:  Sapna Henriquez  MRN:  [id number]  :  1978    Referring Provider: Starla Calles DO  Primary Care Provider: José Luis Zambrano DO  Care Team: Patient Care Team:  José Luis Zambrano DO as PCP - General  Stew Bustos MD as Primary Care Provider  Torsten Best MD as Consulting Physician (Hematology and Oncology)    Date of Service: 3/5/2024     Diagnosis: Malignant neoplasm of upper-outer quadrant of left breast, estrogen receptor positive (CMS/HCC), Clinical: Stage IIIB (cT2, cN3b, cM0, G3, ER+, ID+, HER2: Equivocal)  Malignant neoplasm of upper-outer quadrant of left breast, estrogen receptor positive (CMS/HCC), Pathologic: No Stage Recommended (ypT0, pN0, cM0)     Previous treatment:  2023: Completed neoadjuvant dose dense AC + T  2023: left breast lumpectomy and sentinel node biopsy    History of Present Illness:  Ms. Henriquez is a 45 y.o. woman, who returns to radiation oncology clinic today for follow-up after completing left breast and regional adriana radiation in January, as below. She had some apparent moist desquamation in her left axilla and inframammary fold, for which she visited the emergency room, though did not contact our offices at that time. She does note her skin significantly improved in the 2 weeks following her treatment with topical moisturizers only, and denies any significant irritation currently. She denies any other breast pain or discomfort. She denies any breast masses or lesions. She has been taking anastrozole since mid January, does note increased fatigue, hot flashes and joint pain.      Treatment Rendered:   Radiation Treatments       Active   No active radiation treatments to show.     Completed   L breast_RNI (Started on 2023)   Most recent fraction: 320 cGy given on 2024   Total given: 4,800 cGy / 4,800 cGy  (15 of 15 fractions)   Elapsed Days: 23   Technique: VMAT                     Review of Systems:   Review of Systems -  Oncology  The patient's current pain level was assessed.  They report currently having a pain of 4 out of 10.  They feel their pain is under control without the use of pain medications.    Performance Status:   The Karnofsky performance scale today is 90, Able to carry on normal activity; minor signs or symptoms of disease (ECOG equivalent 0).        OBJECTIVE  Vital Signs:  /86 (BP Location: Right arm, Patient Position: Sitting)   Pulse 71   Temp 36.2 °C (97.2 °F)   Resp 18   Wt 53.4 kg (117 lb 13.4 oz)   SpO2 96%   BMI 23.98 kg/m²    Physical Exam:  Physical Exam  Vitals reviewed. Exam conducted with a chaperone present.   Constitutional:       General: She is not in acute distress.     Appearance: Normal appearance. She is not ill-appearing.   HENT:      Head: Normocephalic and atraumatic.      Right Ear: External ear normal.      Left Ear: External ear normal.      Mouth/Throat:      Mouth: Mucous membranes are moist.      Pharynx: Oropharynx is clear.   Eyes:      Conjunctiva/sclera: Conjunctivae normal.   Cardiovascular:      Rate and Rhythm: Normal rate.   Pulmonary:      Effort: Pulmonary effort is normal. No respiratory distress.   Chest:   Breasts:     Right: No swelling, bleeding, mass, skin change or tenderness.      Left: No swelling, bleeding, mass, skin change or tenderness.      Comments: The breasts were examined in the supine and upright positions, and are overall symmetrical in appearance. The left breast has a well-healed lumpectomy scar with mild hyperpigmentation within the inframammary fold and inferior aspect of the left axilla. Otherwise, the remainder of the breast exam exhibits normal breast tissue in the right and left breasts, without any discrete firmness, nodularity, or lesions to palpation. There is no other tenderness or palpable masses. The overlying skin is otherwise normal. There is no appreciable axillary lymphadenopathy in the right or left axilla. There is no  appreciable lymphedema.  Abdominal:      General: There is no distension.   Musculoskeletal:         General: Normal range of motion.      Cervical back: Normal range of motion and neck supple.   Lymphadenopathy:      Upper Body:      Right upper body: No axillary adenopathy.      Left upper body: No axillary adenopathy.   Skin:     General: Skin is warm and dry.   Neurological:      Mental Status: She is alert and oriented to person, place, and time.   Psychiatric:         Mood and Affect: Mood normal.         Behavior: Behavior normal.             ASSESSMENT:  Ms. Henriquez is a 45 y.o. woman with a diagnosis of Malignant neoplasm of upper-outer quadrant of left breast, estrogen receptor positive (CMS/HCC), Clinical: Stage IIIB (cT2, cN3b, cM0, G3, ER+, WA+, HER2: Equivocal)  Malignant neoplasm of upper-outer quadrant of left breast, estrogen receptor positive (CMS/HCC), Pathologic: No Stage Recommended (ypT0, pN0, cM0), status post neoadjuvant chemotherapy, followed by lumpectomy and sentinel node biopsy, now status post whole breast and regional adriana radiation. She overall tolerated treatment as expected, with some brisk desquamation in the weeks following treatment, which has now nearly completely resolved. She is taking anastrozole with some expected adverse effects.    PLAN: We advised that she reach out to medical oncology if she continues experience fatigue, hot flashes, and joint pain without improvement over the coming weeks. From a postradiation perspective, she has healed very well, and has no evidence of lymphedema. We will have her return for follow-up in 6 months, and if she continues to do well at that time, she may transition to our breast survivorship clinic or follow-up here as needed.  NCCN Guidelines were applicable to guide this patients treatment plan.  Starla Calles DO

## 2024-08-27 ENCOUNTER — VIRTUAL VISIT (OUTPATIENT)
Dept: CARDIOLOGY | Facility: CLINIC | Age: 74
End: 2024-08-27
Payer: MEDICARE

## 2024-08-27 DIAGNOSIS — I48.0 PAROXYSMAL ATRIAL FIBRILLATION (H): Primary | ICD-10-CM

## 2024-08-27 PROCEDURE — 99207 PR NO CHARGE NURSE ONLY: CPT | Mod: 93

## 2024-08-27 NOTE — PROGRESS NOTES
Post PVI Procedural Follow Up Call    Pt is s/p PVI from 8/23 with Dr Mariano  PC was placed to pt, spoke to pt    General Assessment:     Weight: Pt reports pt is unable to report weight, but denies s/s of fluid retention    Pain: Pt denies generalized or localized pain abnormal to healing s/p     /GI: Pt denies difficulty swallowing, denies constipation, denies urinary retention/difficulty, reports no s/s of infection, report normal appetite, and reports staying hydrated.    Neurological: Pt Denies any neurological changes, or s/s of CVA    Respiratory: Pt denies SOB, denies difficulty breathing, denies throat pain, denies changes/abnormal sputum, and denies any further symptoms abnormal to normal healing process s/p PVI.    Activity: Pt is tolerating advancement in activity while following physical restrictions, staying well hydrated, and gradually working into baseline activity.     Rhythm Assessment:   Pt denies palpitations, denies irregularities in HR or rhythm, and denies symptoms or sustained AF episodes.    Procedure Site Assessment:   Pts no visible/physical changes in groin sites and small pea/dime size hardening under suture sites normal to PVI recovery    Anticoagulation/Medication:  Pt remain on Eliquis without interruption  Per guidelines by Dr Mariano no ASA needed upon discharge    Education completed with pt at this visit:  Reviewed normal post-op PVI healing process, when to contact EP-RN/MICHAEL-MD, contact information was given to the pt for further concerns or questions, and pt verbalized understanding    Follow up  Pts AVS was printed and mailed to pt by scheduling team and pt will be seen by EP NP at 6 wks, monitor will be ordered at this follow-up if indicated as well as 3mo follow-up with either EP DAYANARA or MICHAEL FENG    8/27/2024 9:29 AM  Yuly Verde RN

## 2024-08-27 NOTE — PATIENT INSTRUCTIONS
Instructions Following your Ablation Procedure    Your anticoagulation medication Eliquis:  It is important to remain on your anticoagulation medication uninterrupted after your ablation to reduce your risk of a stroke or heart attack, do not stop this medication  Please contact me if you have any questions regarding your anticoagulation medication    Groin care instructions  Keep the site clean and dry, do not place a bandage over the site. If there is minor oozing, apply another bandaid and remove it after 12 hours.  Mild bruising at the access sites is normal. If you notice increased swelling, external bleeding, or have other concerns regarding your access sites please consider emergency department evaluation for significant changes and call your electrophysiology team's office.  You may experience mild discomfort at your groin sites, applying ice packs 20min 3-4 times a day can help alleviate this discomfort.    Activity recommendations  You can resume driving.  Avoid stooping or squatting more than 90 degrees at the hips, repetitive motions such as loading , vacuuming, raking or shoveling, and heavy lifting (greater than 25lbs) for 1 week  Increase your activity gradually over the next 5-10 days, working back to your normal daily activity/routine.    Post ablation instructions  Stay well hydrated, and increase your fluid intake during this recovery period  High protein foods aide in your bodies healing process  You may have some irregular heartbeats and/or atrial fibrillation following your ablation which is normal to recovery, these episodes should occur less frequently over time.   Recurrent atrial fibrillation can occur within the first 3 months post ablation while your heart recovers from the procedure. Please call the electrophysiology team's office if you have an episode lasting greater than 4 hours, or if you notice the episodes are increasing in frequency or duration  Pleuritic chest  discomfort (chest pain worse with taking deep breaths, worse with laying flat on your back) can occur after ablation, usually coming about within the first 24-48hrs post ablation. If this occurs and is severe enough to be troublesome to you, please call us and consider starting a course of ibuprofen 400mg three times daily for 5 to 7 days    Things to watch for  As with any type of procedure, please be more attentive to unusual symptoms post ablation (eg. fever, neurologic changes, pain with swallowing, loss of consciousness, etc) - we recommend ER evaluation for any such symptoms in the first few weeks post procedure.    Consider ER evaluation for the following:  Severe chest pain not relieved by Tylenol or Ibuprofen  You have chills or a fever greater than 101 F (38 C)  Neurologic changes (eg. leg, arm or face weakness or numbness, difficulties with speech or word finding, problems  walking or with your balance, vision changes)  Severe difficulty swallowing and/or you are coughing up blood  Shortness of breath  Increased groin pain or a large or growing hard lump around the site  Groin is red, swollen, hot or tender  Blood or fluid is draining from the groin site  Any numbness, coolness or changes in color in your extremities  Groin pain not relieved by Tylenol or Advil  Recurrent atrial fibrillation associated with sustained rapid heart rates or associated with additional concerning  symptoms.    Your follow up appointments are as follows:  You will be seen by the electrophysiologist nurse practitioner at 6 weeks after your ablation  At your 6 week appointment, a 3 month follow-up appointment will be arranged with either the Nurse Practitioner or the Electrophysiology provider     Sincerely,  Yuly Verde RN [phone]    After hours please contact the on call service at # [phone]

## 2024-09-12 ENCOUNTER — MYC MEDICAL ADVICE (OUTPATIENT)
Dept: INTERNAL MEDICINE | Facility: CLINIC | Age: 74
End: 2024-09-12
Payer: MEDICARE

## 2024-09-19 ENCOUNTER — OFFICE VISIT (OUTPATIENT)
Dept: INTERNAL MEDICINE | Facility: CLINIC | Age: 74
End: 2024-09-19
Payer: MEDICARE

## 2024-09-19 VITALS
HEIGHT: 61 IN | WEIGHT: 183 LBS | TEMPERATURE: 97.9 F | DIASTOLIC BLOOD PRESSURE: 60 MMHG | SYSTOLIC BLOOD PRESSURE: 118 MMHG | BODY MASS INDEX: 34.55 KG/M2 | HEART RATE: 60 BPM | RESPIRATION RATE: 16 BRPM | OXYGEN SATURATION: 97 %

## 2024-09-19 DIAGNOSIS — R35.0 URINARY FREQUENCY: ICD-10-CM

## 2024-09-19 DIAGNOSIS — K57.92 ACUTE DIVERTICULITIS: Primary | ICD-10-CM

## 2024-09-19 LAB
ALBUMIN UR-MCNC: NEGATIVE MG/DL
APPEARANCE UR: CLEAR
BILIRUB UR QL STRIP: NEGATIVE
COLOR UR AUTO: YELLOW
GLUCOSE UR STRIP-MCNC: NEGATIVE MG/DL
HGB UR QL STRIP: NEGATIVE
KETONES UR STRIP-MCNC: NEGATIVE MG/DL
LEUKOCYTE ESTERASE UR QL STRIP: NEGATIVE
NITRATE UR QL: NEGATIVE
PH UR STRIP: 5.5 [PH] (ref 5–8)
SP GR UR STRIP: 1.01 (ref 1–1.03)
UROBILINOGEN UR STRIP-ACNC: 0.2 E.U./DL

## 2024-09-19 PROCEDURE — 99214 OFFICE O/P EST MOD 30 MIN: CPT | Performed by: INTERNAL MEDICINE

## 2024-09-19 PROCEDURE — 81003 URINALYSIS AUTO W/O SCOPE: CPT | Performed by: INTERNAL MEDICINE

## 2024-09-19 RX ORDER — CIPROFLOXACIN 500 MG/1
500 TABLET, FILM COATED ORAL 2 TIMES DAILY
Qty: 20 TABLET | Refills: 0 | Status: SHIPPED | OUTPATIENT
Start: 2024-09-19 | End: 2024-09-29

## 2024-09-19 RX ORDER — METRONIDAZOLE 250 MG/1
250 TABLET ORAL 3 TIMES DAILY
Qty: 30 TABLET | Refills: 0 | Status: SHIPPED | OUTPATIENT
Start: 2024-09-19 | End: 2024-09-29

## 2024-09-19 NOTE — PROGRESS NOTES
"  Assessment & Plan     Acute diverticulitis  Concerns for UTI as she is experiencing urinary frequency and pain in her left lower abdomen radiating to her lower back.  Her urinalysis is normal.  However, there is tenderness to deep palpation of her left lower quadrant raising concerns for acute diverticulitis.  She has no fevers or chills but given the suspicion for the diagnosis based on her history and exam, I am recommending treatment with ciprofloxacin 500 mg twice daily and metronidazole 250 mg 3 times daily both for 10 days.  She will report if any worsening symptoms or if symptoms are not resolving with the above treatment.  - ciprofloxacin (CIPRO) 500 MG tablet; Take 1 tablet (500 mg) by mouth 2 times daily for 10 days.  - metroNIDAZOLE (FLAGYL) 250 MG tablet; Take 1 tablet (250 mg) by mouth 3 times daily for 10 days.    Urinary frequency  Possibly related to overactive bladder as her urinalysis is normal  - UA Macroscopic with reflex to Microscopic and Culture - Clinic Collect          BMI  Estimated body mass index is 34.86 kg/m  as calculated from the following:    Height as of this encounter: 1.543 m (5' 0.75\").    Weight as of this encounter: 83 kg (183 lb).         See Patient Instructions    Halina Blanchard is a 74 year old, presenting for the following health issues: Urinary frequency with left lower quadrant abdominal pain.  See assessment and plan for details  UTI (Abdominal and back pain, frequent urination, Some burning X 5 days)    UTI    History of Present Illness       Reason for visit:  Possible UTI  Symptom onset:  3-7 days ago  Symptoms include:  Abdominal and back pain, frequent urination, Some burning  Symptom intensity:  Moderate  Symptom progression:  Staying the same  Had these symptoms before:  Yes  Has tried/received treatment for these symptoms:  Yes  Previous treatment was successful:  Yes   She is taking medications regularly.             Review of Systems  No fevers or chills.  " "No hematuria.  No constipation or diarrhea.      Objective    /60   Pulse 60   Temp 97.9  F (36.6  C) (Oral)   Resp 16   Ht 1.543 m (5' 0.75\")   Wt 83 kg (183 lb)   LMP  (LMP Unknown)   SpO2 97%   BMI 34.86 kg/m    Body mass index is 34.86 kg/m .  Physical Exam   Well-appearing elderly woman who does not appear acutely ill  Abdomen is soft with tenderness to deep palpation left lower quadrant without any masses or rebound tenderness        Signed Electronically by: Thang Paez MD    "

## 2024-09-20 ENCOUNTER — IMMUNIZATION (OUTPATIENT)
Dept: FAMILY MEDICINE | Facility: CLINIC | Age: 74
End: 2024-09-20
Payer: MEDICARE

## 2024-09-20 DIAGNOSIS — I87.2 VENOUS INSUFFICIENCY OF BOTH LOWER EXTREMITIES: ICD-10-CM

## 2024-09-20 PROCEDURE — G0008 ADMIN INFLUENZA VIRUS VAC: HCPCS

## 2024-09-20 PROCEDURE — 90662 IIV NO PRSV INCREASED AG IM: CPT

## 2024-09-20 PROCEDURE — 90480 ADMN SARSCOV2 VAC 1/ONLY CMP: CPT

## 2024-09-20 PROCEDURE — 91320 SARSCV2 VAC 30MCG TRS-SUC IM: CPT

## 2024-09-20 RX ORDER — FUROSEMIDE 20 MG
40 TABLET ORAL DAILY
Qty: 180 TABLET | Refills: 3 | Status: SHIPPED | OUTPATIENT
Start: 2024-09-20

## 2024-10-06 PROBLEM — K92.1 MELENA: Status: ACTIVE | Noted: 2017-09-05

## 2024-10-06 PROBLEM — K46.9 ABDOMINAL HERNIA: Status: ACTIVE | Noted: 2017-07-12

## 2024-10-06 PROBLEM — Z98.890 S/P ABLATION OF ATRIAL FIBRILLATION: Status: ACTIVE | Noted: 2024-10-06

## 2024-10-06 PROBLEM — K21.00 GASTRO-ESOPHAGEAL REFLUX DISEASE WITH ESOPHAGITIS: Status: ACTIVE | Noted: 2018-04-25

## 2024-10-06 PROBLEM — K57.30 DIVERTICULOSIS OF LARGE INTESTINE WITHOUT HEMORRHAGE: Status: ACTIVE | Noted: 2024-10-06

## 2024-10-06 PROBLEM — K63.5 POLYP OF COLON: Status: ACTIVE | Noted: 2023-08-22

## 2024-10-06 PROBLEM — Z86.79 S/P ABLATION OF ATRIAL FIBRILLATION: Status: ACTIVE | Noted: 2024-10-06

## 2024-10-06 PROBLEM — R19.5 OCCULT BLOOD IN STOOLS: Status: ACTIVE | Noted: 2024-10-06

## 2024-10-06 PROBLEM — K63.3 ILEUM ULCER: Status: ACTIVE | Noted: 2018-07-11

## 2024-10-06 PROBLEM — M25.50 PAIN IN JOINT: Status: ACTIVE | Noted: 2017-09-05

## 2024-10-06 PROBLEM — K59.00 CONSTIPATION: Status: ACTIVE | Noted: 2017-07-24

## 2024-10-07 ENCOUNTER — MYC MEDICAL ADVICE (OUTPATIENT)
Dept: CARDIOLOGY | Facility: CLINIC | Age: 74
End: 2024-10-07

## 2024-10-07 ENCOUNTER — OFFICE VISIT (OUTPATIENT)
Dept: CARDIOLOGY | Facility: CLINIC | Age: 74
End: 2024-10-07
Payer: MEDICARE

## 2024-10-07 VITALS
HEART RATE: 54 BPM | WEIGHT: 186.19 LBS | SYSTOLIC BLOOD PRESSURE: 130 MMHG | DIASTOLIC BLOOD PRESSURE: 66 MMHG | BODY MASS INDEX: 35.47 KG/M2 | OXYGEN SATURATION: 99 %

## 2024-10-07 DIAGNOSIS — Z86.79 S/P ABLATION OF ATRIAL FIBRILLATION: ICD-10-CM

## 2024-10-07 DIAGNOSIS — G47.33 OSA (OBSTRUCTIVE SLEEP APNEA): ICD-10-CM

## 2024-10-07 DIAGNOSIS — Z98.890 S/P ABLATION OF ATRIAL FIBRILLATION: ICD-10-CM

## 2024-10-07 DIAGNOSIS — I10 ESSENTIAL HYPERTENSION: ICD-10-CM

## 2024-10-07 DIAGNOSIS — I25.119 CORONARY ARTERY DISEASE INVOLVING NATIVE CORONARY ARTERY OF NATIVE HEART WITH ANGINA PECTORIS (H): Primary | ICD-10-CM

## 2024-10-07 DIAGNOSIS — I48.0 PAROXYSMAL ATRIAL FIBRILLATION (H): Primary | ICD-10-CM

## 2024-10-07 PROCEDURE — 99214 OFFICE O/P EST MOD 30 MIN: CPT | Performed by: NURSE PRACTITIONER

## 2024-10-07 RX ORDER — HYDROCODONE BITARTRATE AND ACETAMINOPHEN 5; 325 MG/1; MG/1
TABLET ORAL
COMMUNITY
Start: 2024-09-20

## 2024-10-07 RX ORDER — ISOSORBIDE MONONITRATE 30 MG/1
30 TABLET, EXTENDED RELEASE ORAL DAILY
Qty: 90 TABLET | Refills: 0 | Status: SHIPPED | OUTPATIENT
Start: 2024-10-07 | End: 2024-10-07

## 2024-10-07 RX ORDER — ISOSORBIDE MONONITRATE 30 MG/1
30 TABLET, EXTENDED RELEASE ORAL DAILY
Qty: 90 TABLET | Refills: 0 | Status: SHIPPED | OUTPATIENT
Start: 2024-10-07

## 2024-10-07 NOTE — LETTER
10/7/2024    Bernadette Taylor MD  [address] [facility] [address]    RE: Suma Puenteell       Dear Colleague,     I had the pleasure of seeing Suma Mark in the [facility].    Thank you, Dr. Mariano, for asking the [facility] team to see Ms. Suma Mark to evaluate 6 weeks post ablation.    Assessment/Recommendations     Assessment/Plan:    Diagnoses and all orders for this visit:  Paroxysmal atrial fibrillation (H)  S/P ablation of atrial fibrillation  --Symptomatic with chest discomfort, palpitations, dyspnea on exertion.  Breakthrough on sotalol   --s/p PVI ablation 8/23/24 Dr Philippe  -- No symptomatic recurrence of atrial fibrillation post ablation, her ventricular rates remain well-controlled on her current dosing of sotalol  -- No ER visits post ablation    Presence of Watchman percutaneous left atrial appendage closure: [id number] score of 6 for age >75, gender, hypertension, cardiomyopathy and coronary artery disease; HAS-BLED score of 3 for age, bleeding disposition and current use antiplatelet therapy  Sp LAAC 6/22/2023 with DIANA 9/27/2023 unremarkable for peridevice flow or thrombus    --Discontinue Eliquis, completed for 6 weeks post ablation  --Resume ASA 81 mg daily (s/p LAAO)  --Discontinue Sotalol   --Follow up end of November, 3 months post ablation    Essential hypertension  --/79, recheck 130/66  --Continue amlodipine and irbesartan    DONN (obstructive sleep apnea) - mild (AHI 11)  --Intolerant of CPAP. Recommended follow-up regarding alternative treatment options          History of Present Illness/Subjective     Suma Mark is a very pleasant 74 year old female who comes in today for EP 6 weeks post ablation     PMH: paroxysmal atrial fibrillation, status post percutaneous left atrial appendage closure 6/22/2023, coronary artery disease status post PCI 2022, HFpEF, hypertension, venous insufficiency, esophagitis and gastritis,  STEPHANI, DONN, fibromyalgia, hypothyroidism, obesity, anxiety     Arrhythmia hx  Sx: Chest discomfort, palpitations, decreased activity tolerance, dyspnea on exertion  Dx/date: Paroxysmal AF by ED telemetry 2022, further documented by DERIC 10/28/2022.  Started on sotalol 11/27/2022 with ongoing breakthrough AF  [id number]/OAC:  6 for age >75, gender, hypertension, cardiomyopathy and coronary artery disease; HAS-BLED score of 3 for age, bleeding disposition and current use antiplatelet therapy. Hx esophagitis, gastritis with hx STEPHANI both on OAC and DAPT post-LAAC  OAC: Apixaban.  Sp LAAC 6/22/2023  Rate control: Metoprolol  AAD: Sotalol (2022-present)  DCCV: None  Ablation: PVI ablation Dr Mariano 8/23/24      Lo is maintaining normal sinus rhythm/sinus bradycardia on her current dosing of sotalol 80 mg BID 6 weeks post ablation.  She denies any recent palpitations, shortness of breath on exertion or at rest, fatigue, dizziness or near syncope.  She does report some upper left chest tightness since her ablation, she also informs me that she recently ran out of her isosorbide a couple of weeks ago.  She is due to see general cardiology in December for her annual follow-up visit.  She did complete 6 weeks of Eliquis therapy and resumed 81 mg of aspirin daily, she is status post watchman 6/2023.  She denies any bleeding episodes while taking Eliquis therapy over the last 6 weeks, she did not miss any doses of her Eliquis.  Denies any complications with groin site access following her ablation.  Denies any emergency department visits for any acute complications following her procedure.  Intolerant of CPAP due to claustrophobia and developed small blisters and rash on her face, DONN remains untreated.     Cardiographics (reviewed):  EKG   8/23/24 (post ablation) NSR 85 bpm QT/QTC: 400/476 ms  8/15/2024: SR 60 bpm, QRS 80 ms  3/21/2024: SB 58 bpm, QT/QTc 458/449 ms  11/27/2022:  bpm  6/19/2023: sinus bradycardia at 57  bpm, QRS 86 ms, QT/QTc 430/418 ms  6/9/2023: Sinus rhythm at 63 bpm, QRS 90 ms, QT/QTc 448/458 ms    DIANA 9/27/2023  Patient was sedated using Versed 2 mg. The heart rate, respiratory rate,  oxygen saturations, blood pressure, and response to care were monitored  throughout the procedure with the assistance of the nurse.  Left ventricular size, wall motion and function are normal. The ejection  fraction is 60-65%.  Normal right ventricle size and systolic function.  Doppler suggests left to right interatrial shunt.  Watchman device noted in left atrial appendage. The device is well seated with  no leakage by color flow Doppler imaging.  Thrombus absent on left atrial appendage occlusion device.  Moderate atherosclerotic plaque(s) in the descending aorta.     Complete TTE: 1/17/2023  The left ventricle is normal in size with mild concentric left ventricular  hypertrophy.  Left ventricular function is normal.The ejection fraction is 60-65%.  Normal right ventricle size and systolic function.  Both atria are of normal size.  No significant valve disease identified.  There is no comparison study available.     Cardiac event monitoring from 1/17/2023 to 1/23/2023 (monitored duration 6d 8h 44m).  Baseline rhythm was sinus rhythm 73bpm.    Average heart rate 64bpm, maximum 144bpm..  No symptoms recorded.  Paroxysmal atrial fibrillation eg. 1/18/2023 23:17:50, 1/19/2023 22:48:27.    There were no pauses noted.  Atrial fibrillation episodes are incompletely characterized on this monitoring modality.  Supraventricular and ventricular ectopic beat frequency are not reported on this monitoring modality.       Left heart cath: 6/9/2023  1.  Left circumflex stent remains widely patent  2.  Mid/distal LAD stent remains widely patent  3.  Moderate proximal and apical disease in the LAD, proximal RCA, and distal RCA appear unchanged from prior study October 2022.  No new or significant progression of underlying CAD.        Problem  List:  Patient Active Problem List   Diagnosis     Total knee replacement status     Acquired hypothyroidism     Fibromyalgia     Diverticulosis     Essential hypertension     Gastroesophageal reflux disease without esophagitis     Chronic insomnia     Migraine     Dyslipidemia, goal LDL below 70     Restless leg syndrome     Thyroid nodule     Coronary artery disease involving native coronary artery of native heart without angina pectoris     Paroxysmal atrial fibrillation (H)     Senile osteoporosis     Generalized anxiety disorder     Venous insufficiency of both lower extremities     Chronic pain syndrome     Class 1 obesity due to excess calories with serious comorbidity and body mass index (BMI) of 33.0 to 33.9 in adult     Anemia     Presence of Watchman left atrial appendage closure device     DONN (obstructive sleep apnea) - mild (AHI 11)     Iron deficiency anemia secondary to inadequate dietary iron intake     Prediabetes     Acute diverticulitis     Abdominal hernia     Constipation     Diverticulosis of large intestine without hemorrhage     Gastro-esophageal reflux disease with esophagitis     Occult blood in stools     Ileum ulcer     Melena     Pain in joint     Polyp of colon     S/P ablation of atrial fibrillation     Revi  e  Physical Examination Review of Systems   w fystems  LMP  (LMP Unknown)   There is no height or weight on file to calculate BMI.  Wt Readings from Last 3 Encounters:   09/19/24 83 kg (183 lb)   08/23/24 82.1 kg (181 lb)   08/15/24 83.5 kg (184 lb)     General Appearance:   Alert, well-appearing and in no acute distress.   HEENT: Atraumatic, normocephalic.  No scleral icterus, normal conjunctivae; mucous membranes pink and moist.     Chest: Chest symmetric, spine straight.   Lungs:   Respirations unlabored: Lungs are clear to auscultation.   Cardiovascular:   Normal first and second heart sounds with no murmurs, rubs, or gallops.  Regular, regular.  Bradycardic.  Normal JVD, no  edema.       Extremities: No cyanosis or clubbing   Musculoskeletal: Moves all extremities   Skin: Warm, dry, intact.    Neurologic: Mood and affect are appropriate, alert and oriented to person, place, time, and situation     ROS: 10 point ROS neg other than the symptoms noted above in the HPI.     Medical History  Surgical History Family History Social History     Past Medical History:   Diagnosis Date     Acute diverticulitis 09/19/2024     Anxiety state, unspecified      Back pain      Diverticulitis 12/07/2017     DJD (degenerative joint disease)      Essential hypertension, benign      Fibromyalgia      Gastro-oesophageal reflux disease      Heart disease      Hernia, ventral 04/27/2017     History of anesthesia complications     hypotension after surgery     History of blood transfusion      Hyperlipidemia      Hyponatremia 12/07/2017     Major depression      Migraine      Osteopenia      PONV (postoperative nausea and vomiting)      Posttraumatic stress disorder      Raynaud's disease      Restless leg syndrome      S/P total knee replacement 11/27/2017     Sepsis (H) 03/25/2019     Sleep apnea      Thrombosis of leg     with pregnancy     Unspecified hypothyroidism     Past Surgical History:   Procedure Laterality Date     ARTHROPLASTY KNEE UNICOMPARTMENT  10/31/2013    Procedure: ARTHROPLASTY KNEE UNICOMPARTMENT;  LEFT KNEE UNICOMPARTMENTAL ARTHROPLASTY (CAROLINE)^;  Surgeon: Chi Mittal MD;  Location: [facility]     BREAST SURGERY      bx     COLECTOMY N/A 06/02/2017    Procedure: RESECTION, SMALL INTESTINE, OPEN ADHESIOLYSIS;  Surgeon: Keyon Qureshi MD;  Location: [facility];  Service:      COLONOSCOPY N/A 4/4/2024    Procedure: COLONOSCOPY WITH POLYPECTOMY;  Surgeon: Chris Edmonds MD;  Location: [facility]     CV CORONARY ANGIOGRAM N/A 10/12/2022    Procedure: CV CORONARY ANGIOGRAM;  Surgeon: You Martinez MD;  Location: [facility] LAB CV     CV CORONARY ANGIOGRAM N/A 6/9/2023     Procedure: Coronary Angiogram;  Surgeon: Bret Jin MD;  Location: [facility]     CV FRACTIONAL FLOW RATIO WIRE N/A 10/12/2022    Procedure: Fractional Flow Ratio Wire;  Surgeon: You Martinez MD;  Location: [facility]     CV LEFT ATRIAL APPENDAGE CLOSURE Right 6/22/2023    Procedure: Left Atrial Appendage Closure;  Surgeon: Lenin Mariano MD;  Location: [facility]     CV LEFT HEART CATH N/A 6/9/2023    Procedure: Left Heart Catheterization;  Surgeon: Bret Jin MD;  Location: [facility]     CV PCI STENT DRUG ELUTING N/A 10/12/2022    Procedure: Percutaneous Coronary Intervention Stent;  Surgeon: You Martinez MD;  Location: [facility]     CV PCI STENT DRUG ELUTING N/A 11/08/2022    Procedure: Percutaneous Coronary Intervention - Stent;  Surgeon: Bret Jin MD;  Location: [facility]     ENDOSCOPIC RETROGRADE CHOLANGIOPANCREATOGRAPHY       ENDOSCOPIC STRIPPING VEIN(S)      BILATERLY     EP ABLATION PULMONARY VEIN ISOLATION N/A 8/23/2024    Procedure: Ablation Atrial Fibrillation;  Surgeon: Lenin Mariano MD;  Location: [facility]     GENITOURINARY SURGERY      bladder sling     GI SURGERY  10/02/2015    Rectal prolaspse. s/p Abdominal rectopexy, sacral colpopexy, proctoscopy, cystoscopy     HERNIORRHAPHY INCISIONAL (LOCATION) N/A 06/02/2017    Procedure: LAPARASCOPIC CONVERTED TO OPEN PRIMARY INCISIONAL HERNIA REPAIR;  Surgeon: Keyon Qureshi MD;  Location: [facility];  Service:      HYSTERECTOMY  1985 1985-1986     LAMINECTOMY LUMBAR TWO LEVELS  2006     LAPAROSCOPY N/A 08/22/2019    Procedure: DIAGNOSTIC LAPAROSCOPY, LYSIS OF ADHESION;  Surgeon: Svetlana Ron MD;  Location: [facility];  Service: General     LUMBAR HARDWARE REMOVAL[  03/22/2012    REMOVAL LUMBAR HARDWARE 03/22/2012   L3-S1; Type CD Horizon m8 instrumentation Dr. Murray     RELEASE CARPAL TUNNEL        TONSILLECTOMY  1954    ???     TOTAL KNEE ARTHROPLASTY Right 11/27/2017    Procedure:  RIGHT TOTAL KNEE ARTHROPLASTY;  Surgeon: Kevin Ryder MD;  Location: [facility];  Service: Orthopedics     US THYROID BIOPSY  04/19/2019    Family History   Problem Relation Age of Onset     Dementia Mother      Arthritis Mother      Chronic Obstructive Pulmonary Disease Father      Cardiovascular Father      Hypertension Father      No Known Problems Sister      No Known Problems Daughter      No Known Problems Son      Cerebrovascular Disease Maternal Grandmother      Heart Disease Paternal Grandmother      Cerebrovascular Disease Paternal Grandmother      No Known Problems Sister      No Known Problems Sister      No Known Problems Son      No Known Problems Daughter      Breast Cancer Paternal Aunt         age in 50's    History   Smoking Status     Never   Smokeless Tobacco     Never     Social History    Substance and Sexual Activity      Alcohol use: Yes        Comment: Alcoholic Drinks/day: 1 cocktail daily       Medications  Allergies     Current Outpatient Medications   Medication Sig Dispense Refill     amLODIPine (NORVASC) 5 MG tablet Take 0.5 tablets (2.5 mg) by mouth daily Taking Half Tab       apixaban ANTICOAGULANT (ELIQUIS) 5 MG tablet Take 1 tablet (5 mg) by mouth 2 times daily 100 tablet 0     atorvastatin (LIPITOR) 80 MG tablet TAKE 1 TABLET BY MOUTH AT BEDTIME 30 tablet 11     botulinum toxin type A (BOTOX) 100 units injection Every 3 months. [facility]       busPIRone (BUSPAR) 15 MG tablet TAKE 1 TABLET BY MOUTH 3 TIMES DAILY. 270 tablet 2     Calcium Carbonate-Vit D-Min (CALCIUM 1200 PO) Take 1 tablet by mouth daily       cholecalciferol 50 MCG (2000 UT) CAPS Take 2,000 Units by mouth daily       citalopram (CELEXA) 40 MG tablet Take 1 tablet (40 mg) by mouth daily 90 tablet 2     eletriptan (RELPAX) 40 MG tablet Take 1 tablet (40 mg) by mouth at onset of headache for migraine 10 tablet 0      "furosemide (LASIX) 20 MG tablet TAKE 2 TABLETS BY MOUTH EVERY  tablet 3     ibuprofen (ADVIL/MOTRIN) 200 MG capsule Take 2 capsules (400 mg) by mouth 3 times daily as needed for other (pain)       irbesartan (AVAPRO) 150 MG tablet TAKE 1 TABLET BY MOUTH EVERY DAY 90 tablet 3     isosorbide mononitrate (IMDUR) 30 MG 24 hr tablet TAKE 1 TABLET BY MOUTH EVERY DAY 30 tablet 7     levothyroxine (SYNTHROID/LEVOTHROID) 88 MCG tablet TAKE 1 TABLET BY MOUTH DAILY AT 6:00 AM. 90 tablet 2     Lidocaine (LIDOCARE) 4 % Patch Place 1-2 patches onto the skin every 24 hours To prevent lidocaine toxicity, patient should be patch free for 12 hrs daily.       LORazepam (ATIVAN) 1 MG tablet Take 1 tablet (1 mg) by mouth daily as needed for anxiety TAKE 1 TAB BY MOUTH NIGHTLY AS NEEDED FOR ANXIETY OR SLEEP 30 tablet 3     nitroGLYcerin (NITROSTAT) 0.4 MG sublingual tablet For chest pain place 1 tablet under the tongue every 5 minutes for 3 doses. If symptoms persist 5 minutes after 1st dose call 911. 30 tablet 1     omeprazole (PRILOSEC) 20 MG DR capsule Take 20 mg by mouth daily       omeprazole (PRILOSEC) 40 MG DR capsule Take 1 capsule (40 mg) by mouth daily 45 capsule 0     pramipexole (MIRAPEX) 0.25 MG tablet TAKE 1 TABLET BY MOUTH THREE TIMES A  tablet 1     sotalol (BETAPACE) 80 MG tablet Take 1 tablet (80 mg) by mouth every 12 hours 180 tablet 3      Allergies   Allergen Reactions     Ace Inhibitors Other (See Comments)     Elevates blood pressure     Amitriptyline Hcl Other (See Comments)     elevates blood pressure     Atenolol Other (See Comments)     Aggravates reynauds     Celebrex [Celecoxib] GI Disturbance     Cephalexin Nausea and Vomiting     Clonidine Unknown     \"got very ill in ER\"     Codeine Sulfate Nausea and Vomiting     Darvocet [Propoxyphene N-Apap] Nausea and Vomiting     Dexamethasone Acetate Swelling     Erythromycin Nausea and Vomiting     Escitalopram Other (See Comments)     Headaches.       " "Propoxyphene      HUT Reaction: Gastrointestinal; HUT Reaction: Nausea And Vomiting; HUT Noted: [id number]     Sodium      Tramadol Swelling     Swelling of tongue and face     Tramadol-Acetaminophen Other (See Comments)     Triamterene      Venlafaxine Nausea and Vomiting and Diarrhea     Zolpidem Other (See Comments) and Unknown     Pt doesn't rember       Bacitracin Itching and Rash     Cephalosporins Unknown     Pt doesn't remember        Gabapentin Itching and Rash     \"Spotted\"     Hctz Unknown     \"depletes my sodium\"     Potassium GI Disturbance     Sulfanilamide Rash     Zolpidem Tartrate Unknown      Medical, surgical, family, social history, and medications were all reviewed and updated as necessary.   Lab Results    Chemistry/lipid CBC Cardiac Enzymes/BNP/TSH/INR   Recent Labs   Lab Test 06/26/24  0926   CHOL 143   HDL 65   LDL 58   TRIG 98     Recent Labs   Lab Test 06/26/24  0926 12/20/23  0836 11/15/22  0800   LDL 58 51 68     Recent Labs   Lab Test 08/23/24  0735      POTASSIUM 3.8   CHLORIDE 103   CO2 23   GLC 98   BUN 18.1   CR 0.73   GFRESTIMATED 85   ISSA 8.7*     Recent Labs   Lab Test 08/23/24  0735 08/15/24  0926 06/26/24  0926   CR 0.73 0.73 0.79     Recent Labs   Lab Test 06/26/24  0926   A1C 5.8*          Recent Labs   Lab Test 08/23/24  0735   WBC 6.8   HGB 11.2*   HCT 34.3*   MCV 89        Recent Labs   Lab Test 08/23/24  0735 08/15/24  0926 08/01/24  1415   HGB 11.2* 11.8 12.0    Recent Labs   Lab Test 11/28/22  0139 11/27/22  2315 10/12/22  0354   TROPONINI 0.02 0.01 0.03     Recent Labs   Lab Test 11/27/22  2315        Recent Labs   Lab Test 06/26/24  0926   TSH 2.36     Recent Labs   Lab Test 11/27/22  2315   INR 1.07          Total Time- 19 minutes spent on date of encounter doing chart review, history and exam, documentation and further activities as noted above.  This note has been dictated using voice recognition software. Any grammatical, typographical, or " context distortions are unintentional and inherent to the software.    Sussy Cabrales CNP  [facility]  [phone]                         Thank you for allowing me to participate in the care of your patient.      Sincerely,     Sussy Cabrales NP     [facility]  cc:   Lenin Mariano MD  [address]  [place] [address]

## 2024-10-07 NOTE — PROGRESS NOTES
Thank you, Dr. Mariano, for asking the [facility] team to see Ms. Suma Mark to evaluate 6 weeks post ablation.    Assessment/Recommendations     Assessment/Plan:    Diagnoses and all orders for this visit:  Paroxysmal atrial fibrillation (H)  S/P ablation of atrial fibrillation  --Symptomatic with chest discomfort, palpitations, dyspnea on exertion.  Breakthrough on sotalol   --s/p PVI ablation 8/23/24 Dr Philippe  -- No symptomatic recurrence of atrial fibrillation post ablation, her ventricular rates remain well-controlled on her current dosing of sotalol  -- No ER visits post ablation    Presence of Watchman percutaneous left atrial appendage closure: [id number] score of 6 for age >75, gender, hypertension, cardiomyopathy and coronary artery disease; HAS-BLED score of 3 for age, bleeding disposition and current use antiplatelet therapy  Sp LAAC 6/22/2023 with DIANA 9/27/2023 unremarkable for peridevice flow or thrombus    --Discontinue Eliquis, completed for 6 weeks post ablation  --Resume ASA 81 mg daily (s/p LAAO)  --Discontinue Sotalol   --Follow up end of November, 3 months post ablation    Essential hypertension  --/79, recheck 130/66  --Continue amlodipine and irbesartan    DONN (obstructive sleep apnea) - mild (AHI 11)  --Intolerant of CPAP. Recommended follow-up regarding alternative treatment options          History of Present Illness/Subjective     Suma Mark is a very pleasant 74 year old female who comes in today for EP 6 weeks post ablation     PMH: paroxysmal atrial fibrillation, status post percutaneous left atrial appendage closure 6/22/2023, coronary artery disease status post PCI 2022, HFpEF, hypertension, venous insufficiency, esophagitis and gastritis, STEPHANI, DONN, fibromyalgia, hypothyroidism, obesity, anxiety     Arrhythmia hx  Sx: Chest discomfort, palpitations, decreased activity tolerance, dyspnea on exertion  Dx/date: Paroxysmal AF by ED telemetry 2022,  further documented by DERIC 10/28/2022.  Started on sotalol 11/27/2022 with ongoing breakthrough AF  [id number]:  6 for age >75, gender, hypertension, cardiomyopathy and coronary artery disease; HAS-BLED score of 3 for age, bleeding disposition and current use antiplatelet therapy. Hx esophagitis, gastritis with hx STEPHANI both on OAC and DAPT post-LAAC  OAC: Apixaban.  Sp LAAC 6/22/2023  Rate control: Metoprolol  AAD: Sotalol (2022-present)  DCCV: None  Ablation: PVI ablation Dr Mariano 8/23/24      Lo is maintaining normal sinus rhythm/sinus bradycardia on her current dosing of sotalol 80 mg BID 6 weeks post ablation.  She denies any recent palpitations, shortness of breath on exertion or at rest, fatigue, dizziness or near syncope.  She does report some upper left chest tightness since her ablation, she also informs me that she recently ran out of her isosorbide a couple of weeks ago.  She is due to see general cardiology in December for her annual follow-up visit.  She did complete 6 weeks of Eliquis therapy and resumed 81 mg of aspirin daily, she is status post watchman 6/2023.  She denies any bleeding episodes while taking Eliquis therapy over the last 6 weeks, she did not miss any doses of her Eliquis.  Denies any complications with groin site access following her ablation.  Denies any emergency department visits for any acute complications following her procedure.  Intolerant of CPAP due to claustrophobia and developed small blisters and rash on her face, DONN remains untreated.     Cardiographics (reviewed):  EKG   8/23/24 (post ablation) NSR 85 bpm QT/QTC: 400/476 ms  8/15/2024: SR 60 bpm, QRS 80 ms  3/21/2024: SB 58 bpm, QT/QTc 458/449 ms  11/27/2022:  bpm  6/19/2023: sinus bradycardia at 57 bpm, QRS 86 ms, QT/QTc 430/418 ms  6/9/2023: Sinus rhythm at 63 bpm, QRS 90 ms, QT/QTc 448/458 ms    DIANA 9/27/2023  Patient was sedated using Versed 2 mg. The heart rate, respiratory rate,  oxygen saturations,  blood pressure, and response to care were monitored  throughout the procedure with the assistance of the nurse.  Left ventricular size, wall motion and function are normal. The ejection  fraction is 60-65%.  Normal right ventricle size and systolic function.  Doppler suggests left to right interatrial shunt.  Watchman device noted in left atrial appendage. The device is well seated with  no leakage by color flow Doppler imaging.  Thrombus absent on left atrial appendage occlusion device.  Moderate atherosclerotic plaque(s) in the descending aorta.     Complete TTE: 1/17/2023  The left ventricle is normal in size with mild concentric left ventricular  hypertrophy.  Left ventricular function is normal.The ejection fraction is 60-65%.  Normal right ventricle size and systolic function.  Both atria are of normal size.  No significant valve disease identified.  There is no comparison study available.     Cardiac event monitoring from 1/17/2023 to 1/23/2023 (monitored duration 6d 8h 44m).  Baseline rhythm was sinus rhythm 73bpm.    Average heart rate 64bpm, maximum 144bpm..  No symptoms recorded.  Paroxysmal atrial fibrillation eg. 1/18/2023 23:17:50, 1/19/2023 22:48:27.    There were no pauses noted.  Atrial fibrillation episodes are incompletely characterized on this monitoring modality.  Supraventricular and ventricular ectopic beat frequency are not reported on this monitoring modality.       Left heart cath: 6/9/2023  1.  Left circumflex stent remains widely patent  2.  Mid/distal LAD stent remains widely patent  3.  Moderate proximal and apical disease in the LAD, proximal RCA, and distal RCA appear unchanged from prior study October 2022.  No new or significant progression of underlying CAD.        Problem List:  Patient Active Problem List   Diagnosis    Total knee replacement status    Acquired hypothyroidism    Fibromyalgia    Diverticulosis    Essential hypertension    Gastroesophageal reflux disease without  esophagitis    Chronic insomnia    Migraine    Dyslipidemia, goal LDL below 70    Restless leg syndrome    Thyroid nodule    Coronary artery disease involving native coronary artery of native heart without angina pectoris    Paroxysmal atrial fibrillation (H)    Senile osteoporosis    Generalized anxiety disorder    Venous insufficiency of both lower extremities    Chronic pain syndrome    Class 1 obesity due to excess calories with serious comorbidity and body mass index (BMI) of 33.0 to 33.9 in adult    Anemia    Presence of Watchman left atrial appendage closure device    DONN (obstructive sleep apnea) - mild (AHI 11)    Iron deficiency anemia secondary to inadequate dietary iron intake    Prediabetes    Acute diverticulitis    Abdominal hernia    Constipation    Diverticulosis of large intestine without hemorrhage    Gastro-esophageal reflux disease with esophagitis    Occult blood in stools    Ileum ulcer    Melena    Pain in joint    Polyp of colon    S/P ablation of atrial fibrillation     Revi  e  Physical Examination Review of Systems   w [facility]  LMP  (LMP Unknown)   There is no height or weight on file to calculate BMI.  Wt Readings from Last 3 Encounters:   09/19/24 83 kg (183 lb)   08/23/24 82.1 kg (181 lb)   08/15/24 83.5 kg (184 lb)     General Appearance:   Alert, well-appearing and in no acute distress.   HEENT: Atraumatic, normocephalic.  No scleral icterus, normal conjunctivae; mucous membranes pink and moist.     Chest: Chest symmetric, spine straight.   Lungs:   Respirations unlabored: Lungs are clear to auscultation.   Cardiovascular:   Normal first and second heart sounds with no murmurs, rubs, or gallops.  Regular, regular.  Bradycardic.  Normal JVD, no edema.       Extremities: No cyanosis or clubbing   Musculoskeletal: Moves all extremities   Skin: Warm, dry, intact.    Neurologic: Mood and affect are appropriate, alert and oriented to person, place, time, and situation     ROS: 10 point ROS  neg other than the symptoms noted above in the HPI.     Medical History  Surgical History Family History Social History     Past Medical History:   Diagnosis Date    Acute diverticulitis 09/19/2024    Anxiety state, unspecified     Back pain     Diverticulitis 12/07/2017    DJD (degenerative joint disease)     Essential hypertension, benign     Fibromyalgia     Gastro-oesophageal reflux disease     Heart disease     Hernia, ventral 04/27/2017    History of anesthesia complications     hypotension after surgery    History of blood transfusion     Hyperlipidemia     Hyponatremia 12/07/2017    Major depression     Migraine     Osteopenia     PONV (postoperative nausea and vomiting)     Posttraumatic stress disorder     Raynaud's disease     Restless leg syndrome     S/P total knee replacement 11/27/2017    Sepsis (H) 03/25/2019    Sleep apnea     Thrombosis of leg     with pregnancy    Unspecified hypothyroidism     Past Surgical History:   Procedure Laterality Date    ARTHROPLASTY KNEE UNICOMPARTMENT  10/31/2013    Procedure: ARTHROPLASTY KNEE UNICOMPARTMENT;  LEFT KNEE UNICOMPARTMENTAL ARTHROPLASTY (CAROLINE)^;  Surgeon: Chi Mittal MD;  Location:  OR    BREAST SURGERY      bx    COLECTOMY N/A 06/02/2017    Procedure: RESECTION, SMALL INTESTINE, OPEN ADHESIOLYSIS;  Surgeon: Keyon Qureshi MD;  Location: [facility];  Service:     COLONOSCOPY N/A 4/4/2024    Procedure: COLONOSCOPY WITH POLYPECTOMY;  Surgeon: Chris Edmonds MD;  Location: [facility]    CV CORONARY ANGIOGRAM N/A 10/12/2022    Procedure: CV CORONARY ANGIOGRAM;  Surgeon: You Martinez MD;  Location: [facility] CV    CV CORONARY ANGIOGRAM N/A 6/9/2023    Procedure: Coronary Angiogram;  Surgeon: Bret Jin MD;  Location: [facility] CV    CV FRACTIONAL FLOW RATIO WIRE N/A 10/12/2022    Procedure: Fractional Flow Ratio Wire;  Surgeon: You Martinez MD;  Location: [facility] CV    CV LEFT ATRIAL  APPENDAGE CLOSURE Right 6/22/2023    Procedure: Left Atrial Appendage Closure;  Surgeon: Lenin Mariano MD;  Location: [facility]    CV LEFT HEART CATH N/A 6/9/2023    Procedure: Left Heart Catheterization;  Surgeon: Bret Jin MD;  Location: [facility] CV    CV PCI STENT DRUG ELUTING N/A 10/12/2022    Procedure: Percutaneous Coronary Intervention Stent;  Surgeon: You Martinez MD;  Location: [facility] CV    CV PCI STENT DRUG ELUTING N/A 11/08/2022    Procedure: Percutaneous Coronary Intervention - Stent;  Surgeon: Bret Jin MD;  Location: [facility] CV    ENDOSCOPIC RETROGRADE CHOLANGIOPANCREATOGRAPHY      ENDOSCOPIC STRIPPING VEIN(S)      BILATERLY    EP ABLATION PULMONARY VEIN ISOLATION N/A 8/23/2024    Procedure: Ablation Atrial Fibrillation;  Surgeon: Lenin Mariano MD;  Location: [facility]    GENITOURINARY SURGERY      bladder sling    GI SURGERY  10/02/2015    Rectal prolaspse. s/p Abdominal rectopexy, sacral colpopexy, proctoscopy, cystoscopy    HERNIORRHAPHY INCISIONAL (LOCATION) N/A 06/02/2017    Procedure: LAPARASCOPIC CONVERTED TO OPEN PRIMARY INCISIONAL HERNIA REPAIR;  Surgeon: Keyon Qureshi MD;  Location: [facility];  Service:     HYSTERECTOMY  1985    [id number]    LAMINECTOMY LUMBAR TWO LEVELS  2006    LAPAROSCOPY N/A 08/22/2019    Procedure: DIAGNOSTIC LAPAROSCOPY, LYSIS OF ADHESION;  Surgeon: Svetlana Ron MD;  Location: [facility];  Service: General    LUMBAR HARDWARE REMOVAL[  03/22/2012    REMOVAL LUMBAR HARDWARE 03/22/2012   L3-S1; Type CD Horizon m8 instrumentation Dr. Murray    RELEASE CARPAL TUNNEL      TONSILLECTOMY  1954    ???    TOTAL KNEE ARTHROPLASTY Right 11/27/2017    Procedure:  RIGHT TOTAL KNEE ARTHROPLASTY;  Surgeon: Kevin Ryder MD;  Location: [facility] OR;  Service: Orthopedics     THYROID BIOPSY  04/19/2019    Family History   Problem Relation Age of Onset    Dementia  Mother     Arthritis Mother     Chronic Obstructive Pulmonary Disease Father     Cardiovascular Father     Hypertension Father     No Known Problems Sister     No Known Problems Daughter     No Known Problems Son     Cerebrovascular Disease Maternal Grandmother     Heart Disease Paternal Grandmother     Cerebrovascular Disease Paternal Grandmother     No Known Problems Sister     No Known Problems Sister     No Known Problems Son     No Known Problems Daughter     Breast Cancer Paternal Aunt         age in 50's    History   Smoking Status    Never   Smokeless Tobacco    Never     Social History    Substance and Sexual Activity      Alcohol use: Yes        Comment: Alcoholic Drinks/day: 1 cocktail daily       Medications  Allergies     Current Outpatient Medications   Medication Sig Dispense Refill    amLODIPine (NORVASC) 5 MG tablet Take 0.5 tablets (2.5 mg) by mouth daily Taking Half Tab      apixaban ANTICOAGULANT (ELIQUIS) 5 MG tablet Take 1 tablet (5 mg) by mouth 2 times daily 100 tablet 0    atorvastatin (LIPITOR) 80 MG tablet TAKE 1 TABLET BY MOUTH AT BEDTIME 30 tablet 11    botulinum toxin type A (BOTOX) 100 units injection Every 3 months. [facility]      busPIRone (BUSPAR) 15 MG tablet TAKE 1 TABLET BY MOUTH 3 TIMES DAILY. 270 tablet 2    Calcium Carbonate-Vit D-Min (CALCIUM 1200 PO) Take 1 tablet by mouth daily      cholecalciferol 50 MCG (2000 UT) CAPS Take 2,000 Units by mouth daily      citalopram (CELEXA) 40 MG tablet Take 1 tablet (40 mg) by mouth daily 90 tablet 2    eletriptan (RELPAX) 40 MG tablet Take 1 tablet (40 mg) by mouth at onset of headache for migraine 10 tablet 0    furosemide (LASIX) 20 MG tablet TAKE 2 TABLETS BY MOUTH EVERY  tablet 3    ibuprofen (ADVIL/MOTRIN) 200 MG capsule Take 2 capsules (400 mg) by mouth 3 times daily as needed for other (pain)      irbesartan (AVAPRO) 150 MG tablet TAKE 1 TABLET BY MOUTH EVERY DAY 90 tablet 3    isosorbide mononitrate (IMDUR) 30 MG 24 hr  "tablet TAKE 1 TABLET BY MOUTH EVERY DAY 30 tablet 7    levothyroxine (SYNTHROID/LEVOTHROID) 88 MCG tablet TAKE 1 TABLET BY MOUTH DAILY AT 6:00 AM. 90 tablet 2    Lidocaine (LIDOCARE) 4 % Patch Place 1-2 patches onto the skin every 24 hours To prevent lidocaine toxicity, patient should be patch free for 12 hrs daily.      LORazepam (ATIVAN) 1 MG tablet Take 1 tablet (1 mg) by mouth daily as needed for anxiety TAKE 1 TAB BY MOUTH NIGHTLY AS NEEDED FOR ANXIETY OR SLEEP 30 tablet 3    nitroGLYcerin (NITROSTAT) 0.4 MG sublingual tablet For chest pain place 1 tablet under the tongue every 5 minutes for 3 doses. If symptoms persist 5 minutes after 1st dose call 911. 30 tablet 1    omeprazole (PRILOSEC) 20 MG DR capsule Take 20 mg by mouth daily      omeprazole (PRILOSEC) 40 MG DR capsule Take 1 capsule (40 mg) by mouth daily 45 capsule 0    pramipexole (MIRAPEX) 0.25 MG tablet TAKE 1 TABLET BY MOUTH THREE TIMES A  tablet 1    sotalol (BETAPACE) 80 MG tablet Take 1 tablet (80 mg) by mouth every 12 hours 180 tablet 3      Allergies   Allergen Reactions    Ace Inhibitors Other (See Comments)     Elevates blood pressure    Amitriptyline Hcl Other (See Comments)     elevates blood pressure    Atenolol Other (See Comments)     Aggravates reynauds    Celebrex [Celecoxib] GI Disturbance    Cephalexin Nausea and Vomiting    Clonidine Unknown     \"got very ill in ER\"    Codeine Sulfate Nausea and Vomiting    Darvocet [Propoxyphene N-Apap] Nausea and Vomiting    Dexamethasone Acetate Swelling    Erythromycin Nausea and Vomiting    Escitalopram Other (See Comments)     Headaches.      Propoxyphene      HUT Reaction: Gastrointestinal; HUT Reaction: Nausea And Vomiting; HUT Noted: [id number]    Sodium     Tramadol Swelling     Swelling of tongue and face    Tramadol-Acetaminophen Other (See Comments)    Triamterene     Venlafaxine Nausea and Vomiting and Diarrhea    Zolpidem Other (See Comments) and Unknown     Pt doesn't rember   " "   Bacitracin Itching and Rash    Cephalosporins Unknown     Pt doesn't remember       Gabapentin Itching and Rash     \"Spotted\"    Hctz Unknown     \"depletes my sodium\"    Potassium GI Disturbance    Sulfanilamide Rash    Zolpidem Tartrate Unknown      Medical, surgical, family, social history, and medications were all reviewed and updated as necessary.   Lab Results    Chemistry/lipid CBC Cardiac Enzymes/BNP/TSH/INR   Recent Labs   Lab Test 06/26/24  0926   CHOL 143   HDL 65   LDL 58   TRIG 98     Recent Labs   Lab Test 06/26/24  0926 12/20/23  0836 11/15/22  0800   LDL 58 51 68     Recent Labs   Lab Test 08/23/24  0735      POTASSIUM 3.8   CHLORIDE 103   CO2 23   GLC 98   BUN 18.1   CR 0.73   GFRESTIMATED 85   ISSA 8.7*     Recent Labs   Lab Test 08/23/24  0735 08/15/24  0926 06/26/24  0926   CR 0.73 0.73 0.79     Recent Labs   Lab Test 06/26/24  0926   A1C 5.8*          Recent Labs   Lab Test 08/23/24  0735   WBC 6.8   HGB 11.2*   HCT 34.3*   MCV 89        Recent Labs   Lab Test 08/23/24  0735 08/15/24  0926 08/01/24  1415   HGB 11.2* 11.8 12.0    Recent Labs   Lab Test 11/28/22  0139 11/27/22  2315 10/12/22  0354   TROPONINI 0.02 0.01 0.03     Recent Labs   Lab Test 11/27/22  2315        Recent Labs   Lab Test 06/26/24  0926   TSH 2.36     Recent Labs   Lab Test 11/27/22  2315   INR 1.07          Total Time- 19 minutes spent on date of encounter doing chart review, history and exam, documentation and further activities as noted above.  This note has been dictated using voice recognition software. Any grammatical, typographical, or context distortions are unintentional and inherent to the software.    Sussy Cabrales [facility]  [phone]                       "

## 2024-10-07 NOTE — TELEPHONE ENCOUNTER
"Follow up scheduled with Dr. Worley in [facility]    From: Mariam Fair PA-C  Sent: 10/7/2024  11:16 AM CDT  To: [facility] Cv Rn Team Y  Subject: FW: Refill                                       OK to refill Imdur for 90 days, should help her schedule general cardiology follow up in December  ----- Message -----  From: Suma Mark \"Lo\"  Sent: 10/7/2024   8:00 AM CDT  To: Mariam Fair PA-C  Subject: Refill                                           I am out of Isosorbide MononitER 30mg. I will need enough to get me through until 11/05/24 as I leave for a 2-week vacation in 10/22/24. Thank you. Lo Mark  "

## 2024-10-07 NOTE — PATIENT INSTRUCTIONS
Suma Mark,    It was a pleasure to see you today at the [facility].     My recommendations after this visit include:    --Discontinue Eliquis, completed for 6 weeks post ablation  --Resume ASA 81 mg daily (s/p LAAO)  --Discontinue Sotalol today   --Follow up end of November, 3 months post ablation  --Follow up with Dr Worley (general cardiology) in December    Sussy Cabrales CNP  [facility], Electrophysiology  [phone]  EP nurses [phone]

## 2024-11-06 ENCOUNTER — MYC REFILL (OUTPATIENT)
Dept: CARDIOLOGY | Facility: CLINIC | Age: 74
End: 2024-11-06
Payer: MEDICARE

## 2024-11-06 DIAGNOSIS — I10 ESSENTIAL (PRIMARY) HYPERTENSION: ICD-10-CM

## 2024-11-06 RX ORDER — AMLODIPINE BESYLATE 5 MG/1
2.5 TABLET ORAL DAILY
Qty: 45 TABLET | Refills: 1 | Status: SHIPPED | OUTPATIENT
Start: 2024-11-06

## 2024-11-07 ASSESSMENT — SLEEP AND FATIGUE QUESTIONNAIRES
HOW LIKELY ARE YOU TO NOD OFF OR FALL ASLEEP WHILE LYING DOWN TO REST IN THE AFTERNOON WHEN CIRCUMSTANCES PERMIT: MODERATE CHANCE OF DOZING
HOW LIKELY ARE YOU TO NOD OFF OR FALL ASLEEP WHILE SITTING AND READING: SLIGHT CHANCE OF DOZING
HOW LIKELY ARE YOU TO NOD OFF OR FALL ASLEEP WHILE SITTING AND TALKING TO SOMEONE: WOULD NEVER DOZE
HOW LIKELY ARE YOU TO NOD OFF OR FALL ASLEEP WHILE SITTING QUIETLY AFTER LUNCH WITHOUT ALCOHOL: SLIGHT CHANCE OF DOZING
HOW LIKELY ARE YOU TO NOD OFF OR FALL ASLEEP WHILE SITTING INACTIVE IN A PUBLIC PLACE: SLIGHT CHANCE OF DOZING
HOW LIKELY ARE YOU TO NOD OFF OR FALL ASLEEP WHEN YOU ARE A PASSENGER IN A CAR FOR AN HOUR WITHOUT A BREAK: HIGH CHANCE OF DOZING
HOW LIKELY ARE YOU TO NOD OFF OR FALL ASLEEP WHILE WATCHING TV: HIGH CHANCE OF DOZING
HOW LIKELY ARE YOU TO NOD OFF OR FALL ASLEEP IN A CAR, WHILE STOPPED FOR A FEW MINUTES IN TRAFFIC: WOULD NEVER DOZE

## 2024-11-10 ENCOUNTER — HEALTH MAINTENANCE LETTER (OUTPATIENT)
Age: 74
End: 2024-11-10

## 2024-11-12 ENCOUNTER — VIRTUAL VISIT (OUTPATIENT)
Dept: SLEEP MEDICINE | Facility: CLINIC | Age: 74
End: 2024-11-12
Payer: MEDICARE

## 2024-11-12 VITALS
HEIGHT: 61 IN | WEIGHT: 186 LBS | SYSTOLIC BLOOD PRESSURE: 130 MMHG | DIASTOLIC BLOOD PRESSURE: 66 MMHG | BODY MASS INDEX: 35.12 KG/M2

## 2024-11-12 DIAGNOSIS — R53.83 MALAISE AND FATIGUE: ICD-10-CM

## 2024-11-12 DIAGNOSIS — E66.01 CLASS 2 SEVERE OBESITY DUE TO EXCESS CALORIES WITH SERIOUS COMORBIDITY AND BODY MASS INDEX (BMI) OF 35.0 TO 35.9 IN ADULT (H): ICD-10-CM

## 2024-11-12 DIAGNOSIS — E66.812 CLASS 2 SEVERE OBESITY DUE TO EXCESS CALORIES WITH SERIOUS COMORBIDITY AND BODY MASS INDEX (BMI) OF 35.0 TO 35.9 IN ADULT (H): ICD-10-CM

## 2024-11-12 DIAGNOSIS — G25.81 RESTLESS LEG SYNDROME: ICD-10-CM

## 2024-11-12 DIAGNOSIS — F51.04 CHRONIC INSOMNIA: ICD-10-CM

## 2024-11-12 DIAGNOSIS — R53.81 MALAISE AND FATIGUE: ICD-10-CM

## 2024-11-12 DIAGNOSIS — G47.33 OSA (OBSTRUCTIVE SLEEP APNEA): Primary | ICD-10-CM

## 2024-11-12 PROCEDURE — 99214 OFFICE O/P EST MOD 30 MIN: CPT | Mod: 95 | Performed by: INTERNAL MEDICINE

## 2024-11-12 ASSESSMENT — PAIN SCALES - GENERAL: PAINLEVEL_OUTOF10: NO PAIN (0)

## 2024-11-12 NOTE — PROGRESS NOTES
Virtual Visit Details    Type of service:  Video Visit   Video Start Time: 9:30 AM  Video End Time:10:01 AM    Originating Location (pt. Location): Home    Distant Location (provider location):  Off-site  Platform used for Video Visit: Tita    Chief complaint: Yearly follow-up for sleep apnea    LOV 10/20/2023 Suma Bennett    History of Present Illness: 74-year-old female with past medical history notable for coronary artery disease, atrial fibrillation, hypertension.  This is her first visit with me today.  She reports long history of restless legs.  She has been on pramipexole 3 times a day.  She continues to suffer motor restlessness despite this.  This impacts her ability to fall asleep and stay asleep.  She has not discussed this with her neurologist.  She does recall trying gabapentin in the past.    She also notes that back pain can affect her sleep.  She takes Norco a couple times a week.  She was diagnosed with obstructive sleep apnea about a year and a half ago.  She recalls trying CPAP for a few months.  She developed a rashes on her face despite trying multiple different masks.  She had to see dermatology.  She ended up giving up on CPAP.  She did feel that her sleep quality improved although she did not feel any different during the day.  She was referred to see At her initial consultation with Dr. Ferguson she was referred for CBT-I.  However, she does not recall doing that.    Weight has been relatively stable fluctuating within about 10 pounds.    She states she mostly sleeps on her sides but she will often wake up on her back so she is not sure how long she is on her back.    She recalls trying a dental appliance in the past.  She does not recall why.  This was before her sleep study.  She did have a lot of jaw pain with the appliance.    Platinum Sleepiness Scale  Total score - Platinum: (Patient-Rptd) 11 (11/7/2024 10:35 AM)  (Less than 10 normal)    Insomnia Severity Scale  ASHLEY Total Score:  "(Patient-Rptd) 18  (normal 0-7, mild 8-14, moderate 15-21, severe 22-28)    Past Medical History:   Diagnosis Date    Acute diverticulitis 09/19/2024    Anxiety state, unspecified     Back pain     Diverticulitis 12/07/2017    DJD (degenerative joint disease)     Essential hypertension, benign     Fibromyalgia     Gastro-oesophageal reflux disease     Heart disease     Hernia, ventral 04/27/2017    History of anesthesia complications     hypotension after surgery    History of blood transfusion     Hyperlipidemia     Hyponatremia 12/07/2017    Major depression     Migraine     Osteopenia     PONV (postoperative nausea and vomiting)     Posttraumatic stress disorder     Raynaud's disease     Restless leg syndrome     S/P total knee replacement 11/27/2017    Sepsis (H) 03/25/2019    Sleep apnea     Thrombosis of leg     with pregnancy    Unspecified hypothyroidism        Allergies   Allergen Reactions    Ace Inhibitors Other (See Comments)     Elevates blood pressure    Amitriptyline Hcl Other (See Comments)     elevates blood pressure    Atenolol Other (See Comments)     Aggravates reynauds    Celebrex [Celecoxib] GI Disturbance    Cephalexin Nausea and Vomiting    Clonidine Unknown     \"got very ill in ER\"    Codeine Sulfate Nausea and Vomiting    Darvocet [Propoxyphene N-Apap] Nausea and Vomiting    Dexamethasone Acetate Swelling    Erythromycin Nausea and Vomiting    Escitalopram Other (See Comments)     Headaches.      Propoxyphene      HUT Reaction: Gastrointestinal; HUT Reaction: Nausea And Vomiting; HUT Noted: [id number]    Sodium     Tramadol Swelling     Swelling of tongue and face    Tramadol-Acetaminophen Other (See Comments)    Triamterene     Venlafaxine Nausea and Vomiting and Diarrhea    Zolpidem Other (See Comments) and Unknown     Pt doesn't rember      Bacitracin Itching and Rash    Cephalosporins Unknown     Pt doesn't remember       Gabapentin Itching and Rash     \"Spotted\"    Hctz Unknown     " "\"depletes my sodium\"    Potassium GI Disturbance    Sulfanilamide Rash    Zolpidem Tartrate Unknown       Current Outpatient Medications   Medication Sig Dispense Refill    amLODIPine (NORVASC) 5 MG tablet Take 0.5 tablets (2.5 mg) by mouth daily. Taking Half Tab 45 tablet 1    atorvastatin (LIPITOR) 80 MG tablet TAKE 1 TABLET BY MOUTH AT BEDTIME 30 tablet 11    botulinum toxin type A (BOTOX) 100 units injection Every 3 months. [facility]      busPIRone (BUSPAR) 15 MG tablet TAKE 1 TABLET BY MOUTH 3 TIMES DAILY. 270 tablet 2    Calcium Carbonate-Vit D-Min (CALCIUM 1200 PO) Take 1 tablet by mouth daily      cholecalciferol 50 MCG (2000 UT) CAPS Take 2,000 Units by mouth daily      citalopram (CELEXA) 40 MG tablet Take 1 tablet (40 mg) by mouth daily 90 tablet 2    eletriptan (RELPAX) 40 MG tablet Take 1 tablet (40 mg) by mouth at onset of headache for migraine 10 tablet 0    furosemide (LASIX) 20 MG tablet TAKE 2 TABLETS BY MOUTH EVERY  tablet 3    HYDROcodone-acetaminophen (NORCO) 5-325 MG tablet TAKE 1 TABLET ORALLY (OK TO FILL 09/16/2024) ONCE A DAY AS NEEDED FOR PAIN 30 DAYS      ibuprofen (ADVIL/MOTRIN) 200 MG capsule Take 2 capsules (400 mg) by mouth 3 times daily as needed for other (pain)      irbesartan (AVAPRO) 150 MG tablet TAKE 1 TABLET BY MOUTH EVERY DAY 90 tablet 3    isosorbide mononitrate (IMDUR) 30 MG 24 hr tablet Take 1 tablet (30 mg) by mouth daily. 90 tablet 0    levothyroxine (SYNTHROID/LEVOTHROID) 88 MCG tablet TAKE 1 TABLET BY MOUTH DAILY AT 6:00 AM. 90 tablet 2    Lidocaine (LIDOCARE) 4 % Patch Place 1-2 patches onto the skin every 24 hours To prevent lidocaine toxicity, patient should be patch free for 12 hrs daily.      LORazepam (ATIVAN) 1 MG tablet Take 1 tablet (1 mg) by mouth daily as needed for anxiety TAKE 1 TAB BY MOUTH NIGHTLY AS NEEDED FOR ANXIETY OR SLEEP 30 tablet 3    nitroGLYcerin (NITROSTAT) 0.4 MG sublingual tablet For chest pain place 1 tablet under the tongue every " 5 minutes for 3 doses. If symptoms persist 5 minutes after 1st dose call 911. 30 tablet 1    omeprazole (PRILOSEC) 20 MG DR capsule Take 20 mg by mouth daily      omeprazole (PRILOSEC) 40 MG DR capsule Take 1 capsule (40 mg) by mouth daily 45 capsule 0    pramipexole (MIRAPEX) 0.25 MG tablet TAKE 1 TABLET BY MOUTH THREE TIMES A  tablet 1     Current Facility-Administered Medications   Medication Dose Route Frequency Provider Last Rate Last Admin    denosumab (PROLIA) injection 60 mg  60 mg Subcutaneous Q6 Months    60 mg at 06/26/24 0918     Facility-Administered Medications Ordered in Other Visits   Medication Dose Route Frequency Provider Last Rate Last Admin    iodixanol (VISIPAQUE 320) injection    Once PRN Bret Jin MD   65 mL at 06/09/23 0955       Social History     Socioeconomic History    Marital status:      Spouse name: Not on file    Number of children: Not on file    Years of education: Not on file    Highest education level: Not on file   Occupational History    Occupation: , occasionally still subs     Employer: RETIRED   Tobacco Use    Smoking status: Never     Passive exposure: Never    Smokeless tobacco: Never   Vaping Use    Vaping status: Never Used   Substance and Sexual Activity    Alcohol use: Yes     Comment: Alcoholic Drinks/day: 1 cocktail daily    Drug use: No    Sexual activity: Not on file   Other Topics Concern    Not on file   Social History Narrative    Not on file     Social Drivers of Health     Financial Resource Strain: Low Risk  (6/21/2024)    Financial Resource Strain     Within the past 12 months, have you or your family members you live with been unable to get utilities (heat, electricity) when it was really needed?: No   Food Insecurity: Low Risk  (6/21/2024)    Food Insecurity     Within the past 12 months, did you worry that your food would run out before you got money to buy more?: No     Within the past 12 months, did the food you  bought just not last and you didn t have money to get more?: No   Transportation Needs: Low Risk  (6/21/2024)    Transportation Needs     Within the past 12 months, has lack of transportation kept you from medical appointments, getting your medicines, non-medical meetings or appointments, work, or from getting things that you need?: No   Physical Activity: Inactive (6/21/2024)    Exercise Vital Sign     Days of Exercise per Week: 0 days     Minutes of Exercise per Session: 0 min   Stress: No Stress Concern Present (6/21/2024)    [employer] - Occupational Stress Questionnaire     Feeling of Stress : Not at all   Social Connections: Unknown (6/21/2024)    Social Connection and Isolation Panel [NHANES]     Frequency of Communication with Friends and Family: Not on file     Frequency of Social Gatherings with Friends and Family: Once a week     Attends Worship Services: Not on file     Active Member of Clubs or Organizations: Not on file     Attends Club or Organization Meetings: Not on file     Marital Status: Not on file   Interpersonal Safety: Low Risk  (6/26/2024)    Interpersonal Safety     Do you feel physically and emotionally safe where you currently live?: Yes     Within the past 12 months, have you been hit, slapped, kicked or otherwise physically hurt by someone?: No     Within the past 12 months, have you been humiliated or emotionally abused in other ways by your partner or ex-partner?: No   Housing Stability: Low Risk  (6/21/2024)    Housing Stability     Do you have housing? : Yes     Are you worried about losing your housing?: No       Family History   Problem Relation Age of Onset    Dementia Mother     Arthritis Mother     Chronic Obstructive Pulmonary Disease Father     Cardiovascular Father     Hypertension Father     No Known Problems Sister     No Known Problems Daughter     No Known Problems Son     Cerebrovascular Disease Maternal Grandmother     Heart Disease  "Paternal Grandmother     Cerebrovascular Disease Paternal Grandmother     No Known Problems Sister     No Known Problems Sister     No Known Problems Son     No Known Problems Daughter     Breast Cancer Paternal Aunt         age in 50's           EXAM:  /66   Ht 1.543 m (5' 0.75\")   Wt 84.4 kg (186 lb)   LMP  (LMP Unknown)   BMI 35.43 kg/m    GENERAL: Alert and no distress  EYES: Eyes grossly normal to inspection.  No discharge or erythema, or obvious scleral/conjunctival abnormalities.  RESP: No audible wheeze, cough, or visible cyanosis.    SKIN: Visible skin clear. No significant rash, abnormal pigmentation or lesions.  NEURO: Cranial nerves grossly intact.  Mentation and speech appropriate for age.  PSYCH: Appropriate affect, tone, and pace of words       PSG 7/6/2023  Weight 180 lbs BMI 34.4  AHI 11.5, REM AHI 30.4, Supine 16.9, Lateral AHI 4.5. Lowest O2 Sat 82%      TSH   Date Value Ref Range Status   06/26/2024 2.36 0.30 - 4.20 uIU/mL Final   06/08/2022 3.99 0.30 - 5.00 uIU/mL Final         ASSESSMENT:  74-year-old female with history of coronary artery disease, hypertension, atrial fibrillation, migraines, overall mild obstructive sleep apnea, restless legs, insomnia.  She is tried CPAP therapy and gotten some benefit with improved sleep quality.  However it is intolerable due to rashes despite switching multiple masks.  She currently thinks she is sleeping on her sides but potentially would benefit from formal positional therapy.  She is probably not a candidate for oral appliance therapy given she has had poor tolerance of oral appliance in the past.  She may benefit from improved treatment of motor restlessness.  She is never done cognitive behavioral therapy for her insomnia.  She is not a good candidate for upper airway nerve stimulation due positional dependent sleep apnea, BMI, not trying all the alternatives.    PLAN:  Orders generated for positional therapy.  She should have a home sleep " apnea test after follow-up to ensure adequate resolution of sleep apnea.  Strongly encouraged increased efforts at weight management.  Patient is referred back to primary care regarding this.    Referral generated for CBT-I as recommended by her primary sleep provider.  Follow-up with her neurologist to discuss treatment options that would improve treatment of motor restlessness.  Follow-up with her primary sleep provider in 4 months.      35 minutes spent by me on the date of the encounter doing chart review, history and exam, documentation and further activities per the note    Paris Delgado M.D.  Pulmonary/Critical Care/Sleep Medicine    [facility]   Floor 1, [address]   [address] [place] [address]. [place] [address]   Appointments: [phone]    The above note was dictated using voice recognition software and may include typographical errors. Please contact the author for any clarifications.

## 2024-11-12 NOTE — PATIENT INSTRUCTIONS
Discuss the RLS with your neurologist  Try positional device see below-you will get a script in the mail  (Once you are using it regularly we can to a home sleep apnea test to confirm that it is working-follow up with Dr Ferguson)  See below for more information on how to prepare  to see the insomnia specialist      BESIDES CPAP, WHAT OTHER THERAPIES ARE THERE?    Positioning Device  Positioning devices are generally used when sleep apnea is mild and only occurs on your back.This example shows a pillow that straps around the waist. It may be appropriate for those whose sleep study shows milder sleep apnea that occurs primarily when lying flat on one's back. Preliminary studies have shown benefit but effectiveness at home may need to be verified by a home sleep test. These devices are generally not covered by medical insurance.  Examples of devices that maintain sleeping on the back to prevent snoring and mild sleep apnea.    Belt type body positioner  [email or web address]/    Electronic reminder  [email or web address].[email or web address]            Oral Appliance  What is oral appliance therapy?  An oral appliance device fits on your teeth at night like a retainer used after having braces. The device is made by a specialized dentist and requires several visits over 1-2 months before a manufactured device is made to fit your teeth and is adjusted to prevent your sleep apnea. Once an oral device is working properly, snoring should be improved. A home sleep test may be recommended at that time if to determine whether the sleep apnea is adequately treated.       Some things to remember:  -Oral devices are often, but not always, covered by your medical insurance. Be sure to check with your insurance provider.   -If you are referred for oral therapy, you will be given a list of specialized dentists to consider or you may choose to visit the Web site of the American Academy of Dental Sleep Medicine  -Oral devices are less likely to work if  you have severe sleep apnea or are extremely overweight.     More detailed information  An oral appliance is a small acrylic device that fits over the upper and lower teeth  (similar to a retainer or a mouth guard). This device slightly moves jaw forward, which moves the base of the tongue forward, opens the airway, improves breathing for effective treat snoring and obstructive sleep apnea in perhaps 7 out of 10 people .  The best working devices are custom-made by a dental device  after a mold is made of the teeth 1, 2, 3.  When is an oral appliance indicated?  Oral appliance therapy is recommended as a first-line treatment for patients with primary snoring, mild sleep apnea, and for patients with moderate sleep apnea who prefer appliance therapy to use of CPAP4, 5. Severity of sleep apnea is determined by sleep testing and is based on the number of respiratory events per hour of sleep.   How successful is oral appliance therapy?  The success rate of oral appliance therapy in patients with mild sleep apnea is 75-80% while in patients with moderate sleep apnea it is 50-70%. The chance of success in patients with severe sleep apnea is 40-50%. The research also shows that oral appliances have a beneficial effect on the cardiovascular health of DONN patients at the same magnitude as CPAP therapy7.  Oral appliances should be a second-line treatment in cases of severe sleep apnea, but if not completely successful then a combination therapy utilizing CPAP plus oral appliance therapy may be effective. Oral appliances tend to be effective in a broad range of patients although studies show that the patients who have the highest success are females, younger patients, those with milder disease, and less severe obesity. 3, 6.   Finding a dentist that practices dental sleep medicine  Specific training is available through the American Academy of Dental Sleep Medicine for dentists interested in working in the field of  sleep. To find a dentist who is educated in the field of sleep and the use of oral appliances, near you, visit the Web site of the American Academy of Dental Sleep Medicine.    References  1. Fernando et al. Objectively measured vs self-reported compliance during oral appliance therapy for sleep-disordered breathing. Chest 2013; 144(5): 0987-1671.  2. eRuben et al. Objective measurement of compliance during oral appliance therapy for sleep-disordered breathing. Thorax 2013; 68(1): 91-96.  3. Fernanda et al. Mandibular advancement devices in 620 men and women with DONN and snoring: tolerability and predictors of treatment success. Chest 2004; 125: 4317-2714.  4. Moisés et al. Oral appliances for snoring and DONN: a review. Sleep 2006; 29: 244-262.  5. Taqueria et al. Oral appliance treatment for DONN: an update. J Clin Sleep Med 2014; 10(2): 215-227.  6. Priya et al. Predictors of OSAH treatment outcome. J Dent Res 2007; 86: 4983-6624.      Weight Loss:   Your Body mass index is 35.43 kg/m .    Being overweight does not necessarily mean you will have health consequences.  Those who have BMI over 35 or over 27 with existing medical conditions carries greater risk.   Weight loss decreases severity of sleep apnea in most people with obesity. For those with mild obesity who have developed snoring with weight gain, even 15-30 pound weight loss can improve and occasionally milder eliminate sleep apnea.  Structured and life-long dietary and health habits are necessary to lose weight and keep healthier weight levels.     The Comprehensive Weight loss program offers all aspects of weight loss strategies including two Non-Surgical Weight Loss Programs: Medical Weight Management and our 24 Week Healthy Lifestyle Program:    Medical Weight Management: You will meet with a Medical Weight Management Provider, as well as a Registered Dietician. The program may include medication therapy, dietary education,  recommended exercise and physical therapy programs, monthly support group meetings, and possible psychological counseling. Follow up visits with the provider or dietician are scheduled based on your progress and needs.    24 Week Healthy Lifestyle Program: This unique program is designed to give you the support of weekly appointments and activities thru a 24-week period. It may include all of the components of the basic program (above), with the addition of 11 individual Health  Visits, 24-week access to the [employer] website for over 700 online classes, and monthly support group meetings. This program has an out-of-pocket expense of $499 to cover the items that can not be billed to insurance (health coaches and [employer] access), and is non-refundable/non-transferable (you may be able to use a Health Savings Account; ask your HSA provider). There may be an optional meal replacement plan prescribed as well.   Surgical management achieves meaningful long-term weight loss and improvement in health risks in most patients with more severe obesity.      Sleep Apnea Surgery:    Surgery for obstructive sleep apnea is considered generally only when other therapies fail to work. Surgery may be discussed with you if you are having a difficult time tolerating CPAP and or when there is an abnormal structure that requires surgical correction.  Nose and throat surgeries often enlarge the airway to prevent collapse.  Most of these surgeries create pain for 1-2 weeks and up to half of the most common surgeries are not effective throughout life.  You should carefully discuss the benefits and drawbacks to surgery with your sleep provider and surgeon to determine if it is the best solution for you.   More information  Surgery for DONN is directed at areas that are responsible for narrowing or complete obstruction of the airway during sleep.  There are a wide range of procedures available to enlarge and/or stabilize the airway to  prevent blockage of breathing in the three major areas where it can occur: the palate, tongue, and nasal regions.  Successful surgical treatment depends on the accurate identification of the factors responsible for obstructive sleep apnea in each person.  A personalized approach is required because there is no single treatment that works well for everyone.  Because of anatomic variation, consultation with an examination by a sleep surgeon is a critical first step in determining what surgical options are best for each patient.  In some cases, examination during sedation may be recommended in order to guide the selection of procedures.  Patients will be counseled about risks and benefits as well as the typical recovery course after surgery. Surgery is typically not a cure for a person s DONN.  However, surgery will often significantly improve one s DONN severity (termed  success rate ).  Even in the absence of a cure, surgery will decrease the cardiovascular risk associated with OSA7; improve overall quality of life8 (sleepiness, functionality, sleep quality, etc).      Palate Procedures:  Patients with DONN often have narrowing of their airway in the region of their tonsils and uvula.  The goals of palate procedures are to widen the airway in this region as well as to help the tissues resist collapse.  Modern palate procedure techniques focus on tissue conservation and soft tissue rearrangement, rather than tissue removal.  Often the uvula is preserved in this procedure. Residual sleep apnea is common in patient after pharyngoplasty with an average reduction in sleep apnea events of 33%2.      Tongue Procedures:  ExamWhile patients are awake, the muscles that surround the throat are active and keep this region open for breathing. These muscles relax during sleep, allowing the tongue and other structures to collapse and block breathing.  There are several different tongue procedures available.  Selection of a tongue base  procedure depends on characteristics seen on physical exam.  Generally, procedures are aimed at removing bulky tissues in this area or preventing the back of the tongue from falling back during sleep.  Success rates for tongue surgery range from 50-62%3.    Hypoglossal Nerve Stimulation:  Hypoglossal nerve stimulation has recently received approval from the [place] Food and Drug Administration for the treatment of obstructive sleep apnea.  This is based on research showing that the system was safe and effective in treating sleep apnea6.  Results showed that the median AHI score decreased 68%, from 29.3 to 9.0. This therapy uses an implant system that senses breathing patterns and delivers mild stimulation to airway muscles, which keeps the airway open during sleep.  The system consists of three fully implanted components: a small generator (similar in size to a pacemaker), a breathing sensor, and a stimulation lead.  Using a small handheld remote, a patient turns the therapy on before bed and off upon awakening.    Candidates for this device must be greater than 18 years of age, have moderate to severe obstructive sleep apnea with less than 25% central events  (AHI between 15-65), BMI less than 35, have tried CPAP/oral appliance for at least 8 weeks without success, and have appropriate upper airway anatomy (determined by a sleep endoscopy performed by Dr. Enrique Braga or Dr. Chai Montgomery).    Nasal Procedures:  Nasal obstruction can interfere with nasal breathing during the day and night.  Studies have shown that relief of nasal obstruction can improve the ability of some patients to tolerate positive airway pressure therapy for obstructive sleep apnea1.  Treatment options include medications such as nasal saline, topical corticosteroid and antihistamine sprays, and oral medications such as antihistamines or decongestants. Non-surgical treatments can include external nasal dilators for selected patients. If  these are not successful by themselves, surgery can improve the nasal airway either alone or in combination with these other options.        Combination Procedures:  Combination of surgical procedures and other treatments may be recommended, particularly if patients have more than one area of narrowing or persistent positional disease.  The success rate of combination surgery ranges from 66-80%2,3.    References  Sly JUAN. The Role of the Nose in Snoring and Obstructive Sleep Apnoea: An Update.  Eur Arch Otorhinolaryngol. 2011; 268: 1365-73.   Greta SM; Naldo JA; Tracy JR; Pallanch JF; Chandler MB; Rick SG; Chano RIVERA. Surgical modifications of the upper airway for obstructive sleep apnea in adults: a systematic review and meta-analysis. SLEEP 2010;33(10):2358-1333. Tanya LAURA. Hypopharyngeal surgery in obstructive sleep apnea: an evidence-based medicine review.  Arch Otolaryngol Head Neck Surg. 2006 Feb;132(2):206-13.  Marek YH1, Donaldo Y, Asaf LIYAH. The efficacy of anatomically based multilevel surgery for obstructive sleep apnea. Otolaryngol Head Neck Surg. 2003 Oct;129(4):327-35.  Tanya LAURA, Goldberg A. Hypopharyngeal Surgery in Obstructive Sleep Apnea: An Evidence-Based Medicine Review. Arch Otolaryngol Head Neck Surg. 2006 Feb;132(2):206-13.  Mechelle TONEY et al. Upper-Airway Stimulation for Obstructive Sleep Apnea.  N Engl J Med. 2014 Jan 9;370(2):139-49.  Marco Y et al. Increased Incidence of Cardiovascular Disease in Middle-aged Men with Obstructive Sleep Apnea. Am J Respir Crit Care Med; 2002 166: 159-165  Mcginnis EM et al. Studying Life Effects and Effectiveness of Palatopharyngoplasty (SLEEP) study: Subjective Outcomes of Isolated Uvulopalatopharyngoplasty. Otolaryngol Head Neck Surg. 2011; 144: 623-631.      Behavioral Sleep Medicine Program    The [facility] Behavioral Sleep Medicine Program,  provides non-drug treatment for sleep problems as part of our multi-discipline sleep medicine program.  Services offered include:    Cognitive-behavioral Therapies for Insomnia (CBT-I)  Management of Shift-work and Jet Lag Sleep Disorders  Management of Delayed, Advanced and Irregular Circadian Rhythm Sleep Disorders  Imagery Rehearsal Therapy (IRT) for Nightmare Disorder  Adaptation to PAP Therapy for Obstructive Sleep Apnea  Behavioral strategies to manage hypersomnia and fatigue    You have been referred for consultation with a sleep psychologist who specializes in behavioral sleep medicine and treatment of insomnia.  The [facility] Behavioral Sleep Medicine Program offers individualized telehealth services through our [facility] and online CBT-I.    Preparing for your Consultation    We recommmend you keep a Sleep Diary for at least a week prior to your visit. Complete the sleep diary each day first thing after you get up by answering a few key questions about your sleep using our convenient mobile danny or paper sleep diary.  Your answers should be based on your recall of the past 24 hours.  Avoid watching the clock or recording data during the night.     Insomnia  Danny    The Insomnia  mobile danny  is a convenient way to keep track of your sleep prior to your sleep consultation.  Simply download the free danny on your Apple or Android phone and record your information each morning.  The danny includes training, self-assessment, and sleep schedule recommendations.  Prior to your consultation we recommend you use only the sleep diary function. You can e-mail yourself a copy of your sleep diary data by going to the Settings section and using the [employer] User Data function.  During your consultation your provider will review the data with you.          [facility] Sleep Diary    You can also track your sleep using the [facility] paper sleep diary.  You can upload your sleep diary and send it via a [employer] message, fax it to [phone], or have it with you at the time of  your consultation.            CBT-I:  Frequently Asked Questions    What is CBT-I?    Cognitive Behavioral Therapy for Insomnia, also known as CBT-I, is a highly effective non-drug treatment for insomnia. The American College of Physicians recommends CBT-I as the first treatment for chronic insomnia.  Research has shown CBT-I to be safer and more effective long term than sleeping pills.    What does CBT-I involve?     CBT-I targets behaviors that lead to chronic insomnia:  Habits that weaken the bed as a cue for sleep  Habits that weaken your body's sleep drive and sleep/wake clock   Unhelpful sleep thoughts that increase sleep-related worry and arousal.    The process involves 3-6 telehealth visits that guide you to implement proven strategies to get a better night's sleep.    People often see improvement in their sleep within a few weeks. Research shows if you keep practicing the skills you learn your sleep is likely to continue to improve 6-12 months after treatment.    Does this program prescribe or manage sleep medication?    No.  Your prescribing provider is responsible to assist you in managing your sleep medications.  Some people choose to stop using sleep medication prior to or during CBT-I.  Our program can work with your prescribing provider to help reduce or eliminate use of sleep medications.     Getting Started Today!    If you haven't already done so, we recommend you consider making the following changes to your sleep habits prior to your sleep consultation:     Reduce your consumption of caffeine and alcohol.  Both can disrupt sleep and make strengthening your sleep more difficult.  Specifically:    - Avoid caffeine within 6 hours of bedtime   - No more than 3 caffeinated beverages per day (e.g. 8 oz. cup coffee or 12 oz. cup soda)            - No alcohol within 3 hours of bedtime    Make sure your bedroom is quiet, comfortable and dark.  Noise, light and an uncomfortable sleep space can harm your  "sleep.      Keep the same sleep schedule 7 days a week.unless you do shift work.      Our Online CBT-I Program    If you want to get started today, research indicates that online CBT-I can be effective for some individuals. These programs requires comfort with myles-based or online learning.  However, digital CBT-I programs are not for everyone.  Contraindications include:    Seizure disorders,   Bipolar disorder,   Unstable medical or mental health conditions,   Frailty or risk of falling  Pregnancy    You should consult a sleep specialist before using these resources if you have:    Sleep Apnea  Restless Leg Syndrome  Sleep Walking  REM behavior disorder  Night Terrors  Excessive Daytime Sleepiness  Are engaged in shift work  Use prescription sleep medication    Click on the link below to get started today:                                  [email or web address]             Once you are registered you can share your sleep log data with  [employer] where your health provider will be able to review your sleep log and progress.  Once you begin the program, go to the left side navigation bar and click on the  share sleep log  button:                                        This takes you to the next page where you enter the provider code [employer] and click Locate:                 This brings you to the verification page where you should see [facility] identified as your provider                                                                           By clicking \"Submit\" your sleep log data will be sent to our secure [employer] portal for review by you provider.     For Help Contact Customer Care At:    [email or web address]  If your sleep provider recommends online CBT-I for you , the cost for an entire 6-week program is $40.    To get started, copy and paste the link below which will take you to the landing page to register:                              Self-help " Workbooks for Insomnia    If you have found self-help books useful in the past, you may want to consider reading one of the following books prior to your consultation:    Say [place] to Insomnia: The Six-Week, Drug-Free Program Developed at [facility].  Linden Mancia MD. Available in paperback, Amy, and audiobook.    Overcoming Insomnia: A Cognitive-Behavioral Therapy Approach, Workbook.  Shant Edward, PhD  and Aminah Baugh, PhD.  Available in paperback and Amy.    Quiet Your Mind and Get to Sleep: Solutions to Insomnia for Those with Depression, Anxiety, or Chronic Pain.  Melanie Warner, PhD and Aminah Baugh, PhD.  Available in paperback and Amy

## 2024-11-12 NOTE — NURSING NOTE
Current patient location: [address] [place] [id number]    Is the patient currently in the state of [place]? YES    Visit mode:VIDEO    If the visit is dropped, the patient can be reconnected by:VIDEO VISIT: Text to cell phone:   Telephone Information:   Mobile [phone]       Will anyone else be joining the visit? NO  (If patient encounters technical issues they should call [phone]    Are changes needed to the allergy or medication list? No    Are refills needed on medications prescribed by this physician? NO    Rooming Documentation:  Not applicable    Reason for visit: MAIDA CHRISTENSENF

## 2024-11-24 ENCOUNTER — MYC REFILL (OUTPATIENT)
Dept: CARDIOLOGY | Facility: CLINIC | Age: 74
End: 2024-11-24
Payer: MEDICARE

## 2024-11-24 DIAGNOSIS — I10 ESSENTIAL (PRIMARY) HYPERTENSION: ICD-10-CM

## 2024-11-25 RX ORDER — AMLODIPINE BESYLATE 5 MG/1
2.5 TABLET ORAL DAILY
Qty: 45 TABLET | Refills: 1 | Status: SHIPPED | OUTPATIENT
Start: 2024-11-25

## 2024-11-26 NOTE — PROGRESS NOTES
Thank you, Dr. Mariano, for asking the [facility] team to see Ms. Suma Mark to evaluate 3 months post ablation.    Assessment/Recommendations     Assessment/Plan:    Diagnoses and all orders for this visit:  Paroxysmal atrial fibrillation (H)  S/P ablation of atrial fibrillation  --Symptomatic with chest discomfort, palpitations, dyspnea on exertion.  Breakthrough on sotalol   --s/p PVI ablation 8/23/24 Dr Philippe  -- she notes some irregularity with her HR and elevated ventricular rates one week post discontinuation of Sotalol, episodes were short lived, about 5-10 minutes, Sotalol discontinued 6 weeks post ablation. Maintaining NSR today, rates 70s.   --we discussed the ongoing importance of lifestyle modification (maintaining a healthy weight, sleep apnea diagnosis and management, alcohol avoidance) as part of a long term strategy for atrial fibrillation management      Presence of Watchman percutaneous left atrial appendage closure: [id number] score of 6 for age >75, gender, hypertension, cardiomyopathy and coronary artery disease; HAS-BLED score of 3 for age, bleeding disposition and current use antiplatelet therapy  Sp LAAC 6/22/2023 with DIANA 9/27/2023 unremarkable for peridevice flow or thrombus    --Continue ASA 81 mg daily (LAAC 6/2023)  --she was instructed to call clinic if AF episodes occur and last longer than 2 hours  --Follow up with Dr Worley in December (already scheduled)  --Follow up with me 1 year post ablation in August 2025    Essential hypertension  --153/76, recheck, 140/80  --Continue on current medication regimen (Amlodipine, Irbesartan)    DONN (obstructive sleep apnea) - mild (AHI 11)  --Intolerant of CPAP. Recommended follow-up regarding alternative treatment options, she had intentions to follow up with sleep medicine clinic but backed out of scheduling an appointment. DONN remains untreated.          History of Present Illness/Subjective     Suma Mark is a  very pleasant 74 year old female who comes in today for EP follow up 3 months post ablation     PMH: paroxysmal atrial fibrillation, status post percutaneous left atrial appendage closure 6/22/2023, coronary artery disease status post PCI 2022, HFpEF, hypertension, venous insufficiency, esophagitis and gastritis, STEPHANI, DONN, fibromyalgia, hypothyroidism, obesity, anxiety      Arrhythmia hx  Sx: Chest discomfort, palpitations, decreased activity tolerance, dyspnea on exertion  Dx/date: Paroxysmal AF by ED telemetry 2022, further documented by DERIC 10/28/2022.  Started on sotalol 11/27/2022 with ongoing breakthrough AF  [id number]/OAC:  6 for age >75, gender, hypertension, cardiomyopathy and coronary artery disease; HAS-BLED score of 3 for age, bleeding disposition and current use antiplatelet therapy. Hx esophagitis, gastritis with hx STEPHANI both on OAC and DAPT post-LAAC  OAC: Apixaban.  Sp LAAC 6/22/2023  Rate control: Metoprolol  AAD: Sotalol (2022-present)  DCCV: None  Ablation: PVI ablation Dr Mariano 8/23/24    Lo is maintaining normal sinus rhythm 3 months post ablation, sotalol was discontinued 6 weeks post procedure.    She did note some irregularity and mild palpitations with elevated ventricular rates in the 100s 10 days after sotalol discontinuation.  Ventricular rates are controlled in the 70s today in NSR.  Eliquis was discontinued 6 weeks post ablation, she is status post NAVIN C June 2023.  She remains on aspirin 81 mg daily.  She has been struggling with her restless leg syndrome over the last 4 weeks, she has not been getting much sleep and appears significantly fatigued today because of this.  She does receive iron infusions due to her iron deficiency.  She is taking pramipexole 3 times daily for management of her restless leg syndrome with minimal effectiveness at this time.  She appears euvolemic on exam today.  Blood pressure mildly elevated x 2 today.  She has not yet taking her amlodipine today,  typically takes this at at bedtime.  DONN remains untreated due to CPAP intolerance, she was going to schedule a follow-up appointment with sleep medicine to discuss further options of treatment for her DONN however she has not yet made an appointment.        Cardiographics (reviewed):  EKG   8/23/24 (post ablation) NSR 85 bpm QT/QTC: 400/476 ms  8/15/2024: SR 60 bpm, QRS 80 ms  3/21/2024: SB 58 bpm, QT/QTc 458/449 ms  11/27/2022:  bpm  6/19/2023: sinus bradycardia at 57 bpm, QRS 86 ms, QT/QTc 430/418 ms  6/9/2023: Sinus rhythm at 63 bpm, QRS 90 ms, QT/QTc 448/458 ms     DIANA 9/27/2023  Patient was sedated using Versed 2 mg. The heart rate, respiratory rate,  oxygen saturations, blood pressure, and response to care were monitored  throughout the procedure with the assistance of the nurse.  Left ventricular size, wall motion and function are normal. The ejection  fraction is 60-65%.  Normal right ventricle size and systolic function.  Doppler suggests left to right interatrial shunt.  Watchman device noted in left atrial appendage. The device is well seated with  no leakage by color flow Doppler imaging.  Thrombus absent on left atrial appendage occlusion device.  Moderate atherosclerotic plaque(s) in the descending aorta.     Complete TTE: 1/17/2023  The left ventricle is normal in size with mild concentric left ventricular  hypertrophy.  Left ventricular function is normal.The ejection fraction is 60-65%.  Normal right ventricle size and systolic function.  Both atria are of normal size.  No significant valve disease identified.  There is no comparison study available.     Cardiac event monitoring from 1/17/2023 to 1/23/2023 (monitored duration 6d 8h 44m).  Baseline rhythm was sinus rhythm 73bpm.    Average heart rate 64bpm, maximum 144bpm..  No symptoms recorded.  Paroxysmal atrial fibrillation eg. 1/18/2023 23:17:50, 1/19/2023 22:48:27.    There were no pauses noted.  Atrial fibrillation episodes are  incompletely characterized on this monitoring modality.  Supraventricular and ventricular ectopic beat frequency are not reported on this monitoring modality.       Left heart cath: 6/9/2023  1.  Left circumflex stent remains widely patent  2.  Mid/distal LAD stent remains widely patent  3.  Moderate proximal and apical disease in the LAD, proximal RCA, and distal RCA appear unchanged from prior study October 2022.  No new or significant progression of underlying CAD.               Problem List:  Patient Active Problem List   Diagnosis    Total knee replacement status    Acquired hypothyroidism    Fibromyalgia    Diverticulosis    Essential hypertension    Gastroesophageal reflux disease without esophagitis    Chronic insomnia    Migraine    Dyslipidemia, goal LDL below 70    Restless leg syndrome    Thyroid nodule    Coronary artery disease involving native coronary artery of native heart without angina pectoris    Paroxysmal atrial fibrillation (H)    Senile osteoporosis    Generalized anxiety disorder    Venous insufficiency of both lower extremities    Chronic pain syndrome    Class 1 obesity due to excess calories with serious comorbidity and body mass index (BMI) of 33.0 to 33.9 in adult    Anemia    Presence of Watchman left atrial appendage closure device    DONN (obstructive sleep apnea) - mild (AHI 11)    Iron deficiency anemia secondary to inadequate dietary iron intake    Prediabetes    Acute diverticulitis    Abdominal hernia    Constipation    Diverticulosis of large intestine without hemorrhage    Gastro-esophageal reflux disease with esophagitis    Occult blood in stools    Ileum ulcer    Melena    Pain in joint    Polyp of colon    S/P ablation of atrial fibrillation    Class 2 severe obesity due to excess calories with serious comorbidity in adult (H)     Revi  e  Physical Examination Review of Systems   w isamar  LMP  (LMP Unknown)   There is no height or weight on file to calculate BMI.  Wt  Readings from Last 3 Encounters:   11/12/24 84.4 kg (186 lb)   10/07/24 84.5 kg (186 lb 3 oz)   09/19/24 83 kg (183 lb)     General Appearance:   Alert, well-appearing and in no acute distress. Moderately fatigued.    HEENT: Atraumatic, normocephalic.  No scleral icterus, normal conjunctivae; mucous membranes pink and moist.     Chest: Chest symmetric, spine straight.   Lungs:   Respirations unlabored: Lungs are clear to auscultation.   Cardiovascular:   Normal first and second heart sounds with no murmurs, rubs, or gallops.  Regular, regular.   Normal JVD, no edema.       Extremities: No cyanosis or clubbing   Musculoskeletal: Moves all extremities   Skin: Warm, dry, intact.    Neurologic: Mood and affect are appropriate, alert and oriented to person, place, time, and situation     ROS: 10 point ROS neg other than the symptoms noted above in the HPI.     Medical History  Surgical History Family History Social History     Past Medical History:   Diagnosis Date    Acute diverticulitis 09/19/2024    Anxiety state, unspecified     Back pain     Diverticulitis 12/07/2017    DJD (degenerative joint disease)     Essential hypertension, benign     Fibromyalgia     Gastro-oesophageal reflux disease     Heart disease     Hernia, ventral 04/27/2017    History of anesthesia complications     hypotension after surgery    History of blood transfusion     Hyperlipidemia     Hyponatremia 12/07/2017    Major depression     Migraine     Osteopenia     PONV (postoperative nausea and vomiting)     Posttraumatic stress disorder     Raynaud's disease     Restless leg syndrome     S/P total knee replacement 11/27/2017    Sepsis (H) 03/25/2019    Sleep apnea     Thrombosis of leg     with pregnancy    Unspecified hypothyroidism     Past Surgical History:   Procedure Laterality Date    ARTHROPLASTY KNEE UNICOMPARTMENT  10/31/2013    Procedure: ARTHROPLASTY KNEE UNICOMPARTMENT;  LEFT KNEE UNICOMPARTMENTAL ARTHROPLASTY (CAROLINE)^;  Surgeon:  Chi Mittal MD;  Location: [facility]    BREAST SURGERY      bx    COLECTOMY N/A 06/02/2017    Procedure: RESECTION, SMALL INTESTINE, OPEN ADHESIOLYSIS;  Surgeon: Keyon Qureshi MD;  Location: [facility];  Service:     COLONOSCOPY N/A 4/4/2024    Procedure: COLONOSCOPY WITH POLYPECTOMY;  Surgeon: Chris Edmonds MD;  Location: [facility]    CV CORONARY ANGIOGRAM N/A 10/12/2022    Procedure: CV CORONARY ANGIOGRAM;  Surgeon: You Martinez MD;  Location: [facility]    CV CORONARY ANGIOGRAM N/A 6/9/2023    Procedure: Coronary Angiogram;  Surgeon: Bret Jin MD;  Location: [facility]    CV FRACTIONAL FLOW RATIO WIRE N/A 10/12/2022    Procedure: Fractional Flow Ratio Wire;  Surgeon: You Martinez MD;  Location: [facility]    CV LEFT ATRIAL APPENDAGE CLOSURE Right 6/22/2023    Procedure: Left Atrial Appendage Closure;  Surgeon: Lenin Mariano MD;  Location: [facility]    CV LEFT HEART CATH N/A 6/9/2023    Procedure: Left Heart Catheterization;  Surgeon: Bret Jin MD;  Location: [facility]    CV PCI STENT DRUG ELUTING N/A 10/12/2022    Procedure: Percutaneous Coronary Intervention Stent;  Surgeon: You Martinez MD;  Location: [facility]    CV PCI STENT DRUG ELUTING N/A 11/08/2022    Procedure: Percutaneous Coronary Intervention - Stent;  Surgeon: Bret Jin MD;  Location: [facility]    ENDOSCOPIC RETROGRADE CHOLANGIOPANCREATOGRAPHY      ENDOSCOPIC STRIPPING VEIN(S)      BILATERLY    EP ABLATION PULMONARY VEIN ISOLATION N/A 8/23/2024    Procedure: Ablation Atrial Fibrillation;  Surgeon: Lenin Mariano MD;  Location: [facility]    GENITOURINARY SURGERY      bladder sling    GI SURGERY  10/02/2015    Rectal prolaspse. s/p Abdominal rectopexy, sacral colpopexy, proctoscopy, cystoscopy    HERNIORRHAPHY INCISIONAL (LOCATION) N/A 06/02/2017    Procedure: LAPARASCOPIC CONVERTED TO OPEN  PRIMARY INCISIONAL HERNIA REPAIR;  Surgeon: Keyon Qureshi MD;  Location: [facility];  Service:     HYSTERECTOMY  1985 1985-1986    LAMINECTOMY LUMBAR TWO LEVELS  2006    LAPAROSCOPY N/A 08/22/2019    Procedure: DIAGNOSTIC LAPAROSCOPY, LYSIS OF ADHESION;  Surgeon: Svetlana Ron MD;  Location: [facility];  Service: General    LUMBAR HARDWARE REMOVAL[  03/22/2012    REMOVAL LUMBAR HARDWARE 03/22/2012   L3-S1; Type CD Horizon m8 instrumentation Dr. Murray    RELEASE CARPAL TUNNEL      TONSILLECTOMY  1954    ???    TOTAL KNEE ARTHROPLASTY Right 11/27/2017    Procedure:  RIGHT TOTAL KNEE ARTHROPLASTY;  Surgeon: Kevin Ryder MD;  Location: [facility] OR;  Service: Orthopedics     THYROID BIOPSY  04/19/2019    Family History   Problem Relation Age of Onset    Dementia Mother     Arthritis Mother     Chronic Obstructive Pulmonary Disease Father     Cardiovascular Father     Hypertension Father     No Known Problems Sister     No Known Problems Daughter     No Known Problems Son     Cerebrovascular Disease Maternal Grandmother     Heart Disease Paternal Grandmother     Cerebrovascular Disease Paternal Grandmother     No Known Problems Sister     No Known Problems Sister     No Known Problems Son     No Known Problems Daughter     Breast Cancer Paternal Aunt         age in 50's    History   Smoking Status    Never   Smokeless Tobacco    Never     Social History    Substance and Sexual Activity      Alcohol use: Yes        Comment: Alcoholic Drinks/day: 1 cocktail daily       Medications  Allergies     Current Outpatient Medications   Medication Sig Dispense Refill    amLODIPine (NORVASC) 5 MG tablet Take 0.5 tablets (2.5 mg) by mouth daily. Taking Half Tab 45 tablet 1    atorvastatin (LIPITOR) 80 MG tablet TAKE 1 TABLET BY MOUTH EVERYDAY AT BEDTIME 30 tablet 0    botulinum toxin type A (BOTOX) 100 units injection Every 3 months. [facility]      busPIRone (BUSPAR) 15 MG tablet TAKE 1 TABLET  BY MOUTH 3 TIMES DAILY. 270 tablet 2    Calcium Carbonate-Vit D-Min (CALCIUM 1200 PO) Take 1 tablet by mouth daily      cholecalciferol 50 MCG (2000 UT) CAPS Take 2,000 Units by mouth daily      citalopram (CELEXA) 40 MG tablet Take 1 tablet (40 mg) by mouth daily 90 tablet 2    eletriptan (RELPAX) 40 MG tablet Take 1 tablet (40 mg) by mouth at onset of headache for migraine 10 tablet 0    furosemide (LASIX) 20 MG tablet TAKE 2 TABLETS BY MOUTH EVERY  tablet 3    HYDROcodone-acetaminophen (NORCO) 5-325 MG tablet TAKE 1 TABLET ORALLY (OK TO FILL 09/16/2024) ONCE A DAY AS NEEDED FOR PAIN 30 DAYS      ibuprofen (ADVIL/MOTRIN) 200 MG capsule Take 2 capsules (400 mg) by mouth 3 times daily as needed for other (pain)      irbesartan (AVAPRO) 150 MG tablet TAKE 1 TABLET BY MOUTH EVERY DAY 90 tablet 3    isosorbide mononitrate (IMDUR) 30 MG 24 hr tablet Take 1 tablet (30 mg) by mouth daily. 90 tablet 0    levothyroxine (SYNTHROID/LEVOTHROID) 88 MCG tablet TAKE 1 TABLET BY MOUTH DAILY AT 6:00 AM. 90 tablet 2    Lidocaine (LIDOCARE) 4 % Patch Place 1-2 patches onto the skin every 24 hours To prevent lidocaine toxicity, patient should be patch free for 12 hrs daily.      LORazepam (ATIVAN) 1 MG tablet Take 1 tablet (1 mg) by mouth daily as needed for anxiety TAKE 1 TAB BY MOUTH NIGHTLY AS NEEDED FOR ANXIETY OR SLEEP 30 tablet 3    nitroGLYcerin (NITROSTAT) 0.4 MG sublingual tablet For chest pain place 1 tablet under the tongue every 5 minutes for 3 doses. If symptoms persist 5 minutes after 1st dose call 911. 30 tablet 1    omeprazole (PRILOSEC) 20 MG DR capsule Take 20 mg by mouth daily      omeprazole (PRILOSEC) 40 MG DR capsule Take 1 capsule (40 mg) by mouth daily 45 capsule 0    pramipexole (MIRAPEX) 0.25 MG tablet TAKE 1 TABLET BY MOUTH THREE TIMES A  tablet 1      Allergies   Allergen Reactions    Ace Inhibitors Other (See Comments)     Elevates blood pressure    Amitriptyline Hcl Other (See Comments)      "elevates blood pressure    Atenolol Other (See Comments)     Aggravates reynauds    Celebrex [Celecoxib] GI Disturbance    Cephalexin Nausea and Vomiting    Clonidine Unknown     \"got very ill in ER\"    Codeine Sulfate Nausea and Vomiting    Darvocet [Propoxyphene N-Apap] Nausea and Vomiting    Dexamethasone Acetate Swelling    Erythromycin Nausea and Vomiting    Escitalopram Other (See Comments)     Headaches.      Propoxyphene      HUT Reaction: Gastrointestinal; HUT Reaction: Nausea And Vomiting; HUT Noted: [id number]    Sodium     Tramadol Swelling     Swelling of tongue and face    Tramadol-Acetaminophen Other (See Comments)    Triamterene     Venlafaxine Nausea and Vomiting and Diarrhea    Zolpidem Other (See Comments) and Unknown     Pt doesn't rember      Bacitracin Itching and Rash    Cephalosporins Unknown     Pt doesn't remember       Gabapentin Itching and Rash     \"Spotted\"    Hctz Unknown     \"depletes my sodium\"    Potassium GI Disturbance    Sulfanilamide Rash    Zolpidem Tartrate Unknown      Medical, surgical, family, social history, and medications were all reviewed and updated as necessary.   Lab Results    Chemistry/lipid CBC Cardiac Enzymes/BNP/TSH/INR   Recent Labs   Lab Test 06/26/24  0926   CHOL 143   HDL 65   LDL 58   TRIG 98     Recent Labs   Lab Test 06/26/24  0926 12/20/23  0836 11/15/22  0800   LDL 58 51 68     Recent Labs   Lab Test 08/23/24  0735      POTASSIUM 3.8   CHLORIDE 103   CO2 23   GLC 98   BUN 18.1   CR 0.73   GFRESTIMATED 85   ISSA 8.7*     Recent Labs   Lab Test 08/23/24  0735 08/15/24  0926 06/26/24  0926   CR 0.73 0.73 0.79     Recent Labs   Lab Test 06/26/24  0926   A1C 5.8*          Recent Labs   Lab Test 08/23/24  0735   WBC 6.8   HGB 11.2*   HCT 34.3*   MCV 89        Recent Labs   Lab Test 08/23/24  0735 08/15/24  0926 08/01/24  1415   HGB 11.2* 11.8 12.0    Recent Labs   Lab Test 11/28/22  0139 11/27/22  2315 10/12/22  0354   TROPONINI 0.02 0.01 0.03 "     Recent Labs   Lab Test 11/27/22  2315        Recent Labs   Lab Test 06/26/24  0926   TSH 2.36     Recent Labs   Lab Test 11/27/22  2315   INR 1.07          Total Time- 30 minutes spent on date of encounter doing chart review, history and exam, documentation and further activities as noted above.  This note has been dictated using voice recognition software. Any grammatical, typographical, or context distortions are unintentional and inherent to the software.    Sussy Cabrales [facility]  [phone]

## 2024-11-27 ENCOUNTER — OFFICE VISIT (OUTPATIENT)
Dept: CARDIOLOGY | Facility: CLINIC | Age: 74
End: 2024-11-27
Attending: NURSE PRACTITIONER
Payer: MEDICARE

## 2024-11-27 ENCOUNTER — TELEPHONE (OUTPATIENT)
Dept: INTERNAL MEDICINE | Facility: CLINIC | Age: 74
End: 2024-11-27

## 2024-11-27 VITALS
OXYGEN SATURATION: 98 % | SYSTOLIC BLOOD PRESSURE: 140 MMHG | WEIGHT: 187 LBS | BODY MASS INDEX: 35.62 KG/M2 | DIASTOLIC BLOOD PRESSURE: 80 MMHG | HEART RATE: 76 BPM

## 2024-11-27 DIAGNOSIS — Z86.79 S/P ABLATION OF ATRIAL FIBRILLATION: ICD-10-CM

## 2024-11-27 DIAGNOSIS — I87.2 VENOUS INSUFFICIENCY OF BOTH LOWER EXTREMITIES: ICD-10-CM

## 2024-11-27 DIAGNOSIS — Z98.890 S/P ABLATION OF ATRIAL FIBRILLATION: ICD-10-CM

## 2024-11-27 DIAGNOSIS — I10 ESSENTIAL HYPERTENSION: ICD-10-CM

## 2024-11-27 DIAGNOSIS — G47.33 OSA (OBSTRUCTIVE SLEEP APNEA): ICD-10-CM

## 2024-11-27 DIAGNOSIS — D50.8 IRON DEFICIENCY ANEMIA SECONDARY TO INADEQUATE DIETARY IRON INTAKE: Primary | ICD-10-CM

## 2024-11-27 DIAGNOSIS — I48.0 PAROXYSMAL ATRIAL FIBRILLATION (H): Primary | ICD-10-CM

## 2024-11-27 PROCEDURE — 99214 OFFICE O/P EST MOD 30 MIN: CPT | Performed by: NURSE PRACTITIONER

## 2024-11-27 PROCEDURE — G2211 COMPLEX E/M VISIT ADD ON: HCPCS | Performed by: NURSE PRACTITIONER

## 2024-11-27 NOTE — TELEPHONE ENCOUNTER
General Call    Contacts       Contact Date/Time Type Contact Phone/Fax    11/27/2024 02:18 PM CST Phone (Incoming) Lo Mark (Self) [phone] (M)          Reason for Call:  Pt has been having restless legs that have been flaring up. Not able to sleep for over a month. Currently on pramipexole (MIRAPEX) 0.25 MG tablet and seems to not be working. Wondering if she can take something else for it? Also requesting blood draw for her ferritin level. Usually when her ferritin level is down, explains the restless leg. Please advise.      Could we send this information to you in [facility] or would you prefer to receive a phone call?:   Patient would prefer a phone call   Okay to leave a detailed message?: No at Cell number on file:    Telephone Information:   Mobile [phone]

## 2024-11-27 NOTE — LETTER
11/27/2024    Bernadette Taylor MD  [address] [facility] [address]    RE: Suma Parksowell       Dear Colleague,     I had the pleasure of seeing Suma Mark in the [facility].    Thank you, Dr. Mariano, for asking the [facility] team to see Ms. Suma Makr to evaluate 3 months post ablation.    Assessment/Recommendations     Assessment/Plan:    Diagnoses and all orders for this visit:  Paroxysmal atrial fibrillation (H)  S/P ablation of atrial fibrillation  --Symptomatic with chest discomfort, palpitations, dyspnea on exertion.  Breakthrough on sotalol   --s/p PVI ablation 8/23/24 Dr Philippe  -- she notes some irregularity with her HR and elevated ventricular rates one week post discontinuation of Sotalol, episodes were short lived, about 5-10 minutes, Sotalol discontinued 6 weeks post ablation. Maintaining NSR today, rates 70s.   --we discussed the ongoing importance of lifestyle modification (maintaining a healthy weight, sleep apnea diagnosis and management, alcohol avoidance) as part of a long term strategy for atrial fibrillation management      Presence of Watchman percutaneous left atrial appendage closure: [id number] score of 6 for age >75, gender, hypertension, cardiomyopathy and coronary artery disease; HAS-BLED score of 3 for age, bleeding disposition and current use antiplatelet therapy  Sp LAAC 6/22/2023 with DIANA 9/27/2023 unremarkable for peridevice flow or thrombus    --Continue ASA 81 mg daily (LAAC 6/2023)  --she was instructed to call clinic if AF episodes occur and last longer than 2 hours  --Follow up with Dr Worley in December (already scheduled)  --Follow up with me 1 year post ablation in August 2025    Essential hypertension  --153/76, recheck, 140/80  --Continue on current medication regimen (Amlodipine, Irbesartan)    DONN (obstructive sleep apnea) - mild (AHI 11)  --Intolerant of CPAP. Recommended follow-up regarding alternative treatment  options, she had intentions to follow up with sleep medicine clinic but backed out of scheduling an appointment. DONN remains untreated.          History of Present Illness/Subjective     Suma Mark is a very pleasant 74 year old female who comes in today for EP follow up 3 months post ablation     PMH: paroxysmal atrial fibrillation, status post percutaneous left atrial appendage closure 6/22/2023, coronary artery disease status post PCI 2022, HFpEF, hypertension, venous insufficiency, esophagitis and gastritis, STEPHANI, DONN, fibromyalgia, hypothyroidism, obesity, anxiety      Arrhythmia hx  Sx: Chest discomfort, palpitations, decreased activity tolerance, dyspnea on exertion  Dx/date: Paroxysmal AF by ED telemetry 2022, further documented by DERIC 10/28/2022.  Started on sotalol 11/27/2022 with ongoing breakthrough AF  [id number]:  6 for age >75, gender, hypertension, cardiomyopathy and coronary artery disease; HAS-BLED score of 3 for age, bleeding disposition and current use antiplatelet therapy. Hx esophagitis, gastritis with hx STEPHANI both on OAC and DAPT post-LAAC  OAC: Apixaban.  Sp LAAC 6/22/2023  Rate control: Metoprolol  AAD: Sotalol (2022-present)  DCCV: None  Ablation: PVI ablation Dr Mariano 8/23/24    Lo is maintaining normal sinus rhythm 3 months post ablation, sotalol was discontinued 6 weeks post procedure.    She did note some irregularity and mild palpitations with elevated ventricular rates in the 100s 10 days after sotalol discontinuation.  Ventricular rates are controlled in the 70s today in NSR.  Eliquis was discontinued 6 weeks post ablation, she is status post NAVIN C June 2023.  She remains on aspirin 81 mg daily.  She has been struggling with her restless leg syndrome over the last 4 weeks, she has not been getting much sleep and appears significantly fatigued today because of this.  She does receive iron infusions due to her iron deficiency.  She is taking pramipexole 3 times daily for  management of her restless leg syndrome with minimal effectiveness at this time.  She appears euvolemic on exam today.  Blood pressure mildly elevated x 2 today.  She has not yet taking her amlodipine today, typically takes this at at bedtime.  DONN remains untreated due to CPAP intolerance, she was going to schedule a follow-up appointment with sleep medicine to discuss further options of treatment for her DONN however she has not yet made an appointment.        Cardiographics (reviewed):  EKG   8/23/24 (post ablation) NSR 85 bpm QT/QTC: 400/476 ms  8/15/2024: SR 60 bpm, QRS 80 ms  3/21/2024: SB 58 bpm, QT/QTc 458/449 ms  11/27/2022:  bpm  6/19/2023: sinus bradycardia at 57 bpm, QRS 86 ms, QT/QTc 430/418 ms  6/9/2023: Sinus rhythm at 63 bpm, QRS 90 ms, QT/QTc 448/458 ms     DIANA 9/27/2023  Patient was sedated using Versed 2 mg. The heart rate, respiratory rate,  oxygen saturations, blood pressure, and response to care were monitored  throughout the procedure with the assistance of the nurse.  Left ventricular size, wall motion and function are normal. The ejection  fraction is 60-65%.  Normal right ventricle size and systolic function.  Doppler suggests left to right interatrial shunt.  Watchman device noted in left atrial appendage. The device is well seated with  no leakage by color flow Doppler imaging.  Thrombus absent on left atrial appendage occlusion device.  Moderate atherosclerotic plaque(s) in the descending aorta.     Complete TTE: 1/17/2023  The left ventricle is normal in size with mild concentric left ventricular  hypertrophy.  Left ventricular function is normal.The ejection fraction is 60-65%.  Normal right ventricle size and systolic function.  Both atria are of normal size.  No significant valve disease identified.  There is no comparison study available.     Cardiac event monitoring from 1/17/2023 to 1/23/2023 (monitored duration 6d 8h 44m).  Baseline rhythm was sinus rhythm 73bpm.    Average  heart rate 64bpm, maximum 144bpm..  No symptoms recorded.  Paroxysmal atrial fibrillation eg. 1/18/2023 23:17:50, 1/19/2023 22:48:27.    There were no pauses noted.  Atrial fibrillation episodes are incompletely characterized on this monitoring modality.  Supraventricular and ventricular ectopic beat frequency are not reported on this monitoring modality.       Left heart cath: 6/9/2023  1.  Left circumflex stent remains widely patent  2.  Mid/distal LAD stent remains widely patent  3.  Moderate proximal and apical disease in the LAD, proximal RCA, and distal RCA appear unchanged from prior study October 2022.  No new or significant progression of underlying CAD.               Problem List:  Patient Active Problem List   Diagnosis     Total knee replacement status     Acquired hypothyroidism     Fibromyalgia     Diverticulosis     Essential hypertension     Gastroesophageal reflux disease without esophagitis     Chronic insomnia     Migraine     Dyslipidemia, goal LDL below 70     Restless leg syndrome     Thyroid nodule     Coronary artery disease involving native coronary artery of native heart without angina pectoris     Paroxysmal atrial fibrillation (H)     Senile osteoporosis     Generalized anxiety disorder     Venous insufficiency of both lower extremities     Chronic pain syndrome     Class 1 obesity due to excess calories with serious comorbidity and body mass index (BMI) of 33.0 to 33.9 in adult     Anemia     Presence of Watchman left atrial appendage closure device     DONN (obstructive sleep apnea) - mild (AHI 11)     Iron deficiency anemia secondary to inadequate dietary iron intake     Prediabetes     Acute diverticulitis     Abdominal hernia     Constipation     Diverticulosis of large intestine without hemorrhage     Gastro-esophageal reflux disease with esophagitis     Occult blood in stools     Ileum ulcer     Melena     Pain in joint     Polyp of colon     S/P ablation of atrial fibrillation      Class 2 severe obesity due to excess calories with serious comorbidity in adult (H)     Revi  e  Physical Examination Review of Systems   w fystems  LMP  (LMP Unknown)   There is no height or weight on file to calculate BMI.  Wt Readings from Last 3 Encounters:   11/12/24 84.4 kg (186 lb)   10/07/24 84.5 kg (186 lb 3 oz)   09/19/24 83 kg (183 lb)     General Appearance:   Alert, well-appearing and in no acute distress. Moderately fatigued.    HEENT: Atraumatic, normocephalic.  No scleral icterus, normal conjunctivae; mucous membranes pink and moist.     Chest: Chest symmetric, spine straight.   Lungs:   Respirations unlabored: Lungs are clear to auscultation.   Cardiovascular:   Normal first and second heart sounds with no murmurs, rubs, or gallops.  Regular, regular.   Normal JVD, no edema.       Extremities: No cyanosis or clubbing   Musculoskeletal: Moves all extremities   Skin: Warm, dry, intact.    Neurologic: Mood and affect are appropriate, alert and oriented to person, place, time, and situation     ROS: 10 point ROS neg other than the symptoms noted above in the HPI.     Medical History  Surgical History Family History Social History     Past Medical History:   Diagnosis Date     Acute diverticulitis 09/19/2024     Anxiety state, unspecified      Back pain      Diverticulitis 12/07/2017     DJD (degenerative joint disease)      Essential hypertension, benign      Fibromyalgia      Gastro-oesophageal reflux disease      Heart disease      Hernia, ventral 04/27/2017     History of anesthesia complications     hypotension after surgery     History of blood transfusion      Hyperlipidemia      Hyponatremia 12/07/2017     Major depression      Migraine      Osteopenia      PONV (postoperative nausea and vomiting)      Posttraumatic stress disorder      Raynaud's disease      Restless leg syndrome      S/P total knee replacement 11/27/2017     Sepsis (H) 03/25/2019     Sleep apnea      Thrombosis of leg     with  pregnancy     Unspecified hypothyroidism     Past Surgical History:   Procedure Laterality Date     ARTHROPLASTY KNEE UNICOMPARTMENT  10/31/2013    Procedure: ARTHROPLASTY KNEE UNICOMPARTMENT;  LEFT KNEE UNICOMPARTMENTAL ARTHROPLASTY (CAROLINE)^;  Surgeon: Chi Mittal MD;  Location:  OR     BREAST SURGERY      bx     COLECTOMY N/A 06/02/2017    Procedure: RESECTION, SMALL INTESTINE, OPEN ADHESIOLYSIS;  Surgeon: Keyon Qureshi MD;  Location: [facility];  Service:      COLONOSCOPY N/A 4/4/2024    Procedure: COLONOSCOPY WITH POLYPECTOMY;  Surgeon: Chris Edmonds MD;  Location: [facility] OR     CV CORONARY ANGIOGRAM N/A 10/12/2022    Procedure: CV CORONARY ANGIOGRAM;  Surgeon: You Martinez MD;  Location: [facility] CV     CV CORONARY ANGIOGRAM N/A 6/9/2023    Procedure: Coronary Angiogram;  Surgeon: Bret Jin MD;  Location: [facility]     CV FRACTIONAL FLOW RATIO WIRE N/A 10/12/2022    Procedure: Fractional Flow Ratio Wire;  Surgeon: You Martinez MD;  Location: [facility]     CV LEFT ATRIAL APPENDAGE CLOSURE Right 6/22/2023    Procedure: Left Atrial Appendage Closure;  Surgeon: Lenin Mariano MD;  Location: [facility]     CV LEFT HEART CATH N/A 6/9/2023    Procedure: Left Heart Catheterization;  Surgeon: Bret Jin MD;  Location: [facility]     CV PCI STENT DRUG ELUTING N/A 10/12/2022    Procedure: Percutaneous Coronary Intervention Stent;  Surgeon: You Martinez MD;  Location: [facility]     CV PCI STENT DRUG ELUTING N/A 11/08/2022    Procedure: Percutaneous Coronary Intervention - Stent;  Surgeon: Bret Jin MD;  Location: [facility] CV     ENDOSCOPIC RETROGRADE CHOLANGIOPANCREATOGRAPHY       ENDOSCOPIC STRIPPING VEIN(S)      BILATERLY     EP ABLATION PULMONARY VEIN ISOLATION N/A 8/23/2024    Procedure: Ablation Atrial Fibrillation;  Surgeon: Lenin Mariano MD;  Location: [facility]  CATH LAB CV     GENITOURINARY SURGERY      bladder sling     GI SURGERY  10/02/2015    Rectal prolaspse. s/p Abdominal rectopexy, sacral colpopexy, proctoscopy, cystoscopy     HERNIORRHAPHY INCISIONAL (LOCATION) N/A 06/02/2017    Procedure: LAPARASCOPIC CONVERTED TO OPEN PRIMARY INCISIONAL HERNIA REPAIR;  Surgeon: Keyon Qureshi MD;  Location: [facility];  Service:      HYSTERECTOMY  1985    [id number]     LAMINECTOMY LUMBAR TWO LEVELS  2006     LAPAROSCOPY N/A 08/22/2019    Procedure: DIAGNOSTIC LAPAROSCOPY, LYSIS OF ADHESION;  Surgeon: Svetlana Ron MD;  Location: [facility] OR;  Service: General     LUMBAR HARDWARE REMOVAL[  03/22/2012    REMOVAL LUMBAR HARDWARE 03/22/2012   L3-S1; Type CD Horizon m8 instrumentation Dr. Murray     RELEASE CARPAL TUNNEL       TONSILLECTOMY  1954    ???     TOTAL KNEE ARTHROPLASTY Right 11/27/2017    Procedure:  RIGHT TOTAL KNEE ARTHROPLASTY;  Surgeon: Kevin Ryder MD;  Location: [facility];  Service: Orthopedics      THYROID BIOPSY  04/19/2019    Family History   Problem Relation Age of Onset     Dementia Mother      Arthritis Mother      Chronic Obstructive Pulmonary Disease Father      Cardiovascular Father      Hypertension Father      No Known Problems Sister      No Known Problems Daughter      No Known Problems Son      Cerebrovascular Disease Maternal Grandmother      Heart Disease Paternal Grandmother      Cerebrovascular Disease Paternal Grandmother      No Known Problems Sister      No Known Problems Sister      No Known Problems Son      No Known Problems Daughter      Breast Cancer Paternal Aunt         age in 50's    History   Smoking Status     Never   Smokeless Tobacco     Never     Social History    Substance and Sexual Activity      Alcohol use: Yes        Comment: Alcoholic Drinks/day: 1 cocktail daily       Medications  Allergies     Current Outpatient Medications   Medication Sig Dispense Refill     amLODIPine (NORVASC) 5 MG tablet  Take 0.5 tablets (2.5 mg) by mouth daily. Taking Half Tab 45 tablet 1     atorvastatin (LIPITOR) 80 MG tablet TAKE 1 TABLET BY MOUTH EVERYDAY AT BEDTIME 30 tablet 0     botulinum toxin type A (BOTOX) 100 units injection Every 3 months. [facility]       busPIRone (BUSPAR) 15 MG tablet TAKE 1 TABLET BY MOUTH 3 TIMES DAILY. 270 tablet 2     Calcium Carbonate-Vit D-Min (CALCIUM 1200 PO) Take 1 tablet by mouth daily       cholecalciferol 50 MCG (2000 UT) CAPS Take 2,000 Units by mouth daily       citalopram (CELEXA) 40 MG tablet Take 1 tablet (40 mg) by mouth daily 90 tablet 2     eletriptan (RELPAX) 40 MG tablet Take 1 tablet (40 mg) by mouth at onset of headache for migraine 10 tablet 0     furosemide (LASIX) 20 MG tablet TAKE 2 TABLETS BY MOUTH EVERY  tablet 3     HYDROcodone-acetaminophen (NORCO) 5-325 MG tablet TAKE 1 TABLET ORALLY (OK TO FILL 09/16/2024) ONCE A DAY AS NEEDED FOR PAIN 30 DAYS       ibuprofen (ADVIL/MOTRIN) 200 MG capsule Take 2 capsules (400 mg) by mouth 3 times daily as needed for other (pain)       irbesartan (AVAPRO) 150 MG tablet TAKE 1 TABLET BY MOUTH EVERY DAY 90 tablet 3     isosorbide mononitrate (IMDUR) 30 MG 24 hr tablet Take 1 tablet (30 mg) by mouth daily. 90 tablet 0     levothyroxine (SYNTHROID/LEVOTHROID) 88 MCG tablet TAKE 1 TABLET BY MOUTH DAILY AT 6:00 AM. 90 tablet 2     Lidocaine (LIDOCARE) 4 % Patch Place 1-2 patches onto the skin every 24 hours To prevent lidocaine toxicity, patient should be patch free for 12 hrs daily.       LORazepam (ATIVAN) 1 MG tablet Take 1 tablet (1 mg) by mouth daily as needed for anxiety TAKE 1 TAB BY MOUTH NIGHTLY AS NEEDED FOR ANXIETY OR SLEEP 30 tablet 3     nitroGLYcerin (NITROSTAT) 0.4 MG sublingual tablet For chest pain place 1 tablet under the tongue every 5 minutes for 3 doses. If symptoms persist 5 minutes after 1st dose call 911. 30 tablet 1     omeprazole (PRILOSEC) 20 MG DR capsule Take 20 mg by mouth daily       omeprazole  "(PRILOSEC) 40 MG DR capsule Take 1 capsule (40 mg) by mouth daily 45 capsule 0     pramipexole (MIRAPEX) 0.25 MG tablet TAKE 1 TABLET BY MOUTH THREE TIMES A  tablet 1      Allergies   Allergen Reactions     Ace Inhibitors Other (See Comments)     Elevates blood pressure     Amitriptyline Hcl Other (See Comments)     elevates blood pressure     Atenolol Other (See Comments)     Aggravates reynauds     Celebrex [Celecoxib] GI Disturbance     Cephalexin Nausea and Vomiting     Clonidine Unknown     \"got very ill in ER\"     Codeine Sulfate Nausea and Vomiting     Darvocet [Propoxyphene N-Apap] Nausea and Vomiting     Dexamethasone Acetate Swelling     Erythromycin Nausea and Vomiting     Escitalopram Other (See Comments)     Headaches.       Propoxyphene      HUT Reaction: Gastrointestinal; HUT Reaction: Nausea And Vomiting; HUT Noted: [id number]     Sodium      Tramadol Swelling     Swelling of tongue and face     Tramadol-Acetaminophen Other (See Comments)     Triamterene      Venlafaxine Nausea and Vomiting and Diarrhea     Zolpidem Other (See Comments) and Unknown     Pt doesn't rember       Bacitracin Itching and Rash     Cephalosporins Unknown     Pt doesn't remember        Gabapentin Itching and Rash     \"Spotted\"     Hctz Unknown     \"depletes my sodium\"     Potassium GI Disturbance     Sulfanilamide Rash     Zolpidem Tartrate Unknown      Medical, surgical, family, social history, and medications were all reviewed and updated as necessary.   Lab Results    Chemistry/lipid CBC Cardiac Enzymes/BNP/TSH/INR   Recent Labs   Lab Test 06/26/24  0926   CHOL 143   HDL 65   LDL 58   TRIG 98     Recent Labs   Lab Test 06/26/24  0926 12/20/23  0836 11/15/22  0800   LDL 58 51 68     Recent Labs   Lab Test 08/23/24  0735      POTASSIUM 3.8   CHLORIDE 103   CO2 23   GLC 98   BUN 18.1   CR 0.73   GFRESTIMATED 85   ISSA 8.7*     Recent Labs   Lab Test 08/23/24  0735 08/15/24  0926 06/26/24  0926   CR 0.73 0.73 0.79 "     Recent Labs   Lab Test 06/26/24  0926   A1C 5.8*          Recent Labs   Lab Test 08/23/24  0735   WBC 6.8   HGB 11.2*   HCT 34.3*   MCV 89        Recent Labs   Lab Test 08/23/24  0735 08/15/24  0926 08/01/24  1415   HGB 11.2* 11.8 12.0    Recent Labs   Lab Test 11/28/22  0139 11/27/22  2315 10/12/22  0354   TROPONINI 0.02 0.01 0.03     Recent Labs   Lab Test 11/27/22  2315        Recent Labs   Lab Test 06/26/24  0926   TSH 2.36     Recent Labs   Lab Test 11/27/22  2315   INR 1.07          Total Time- 30 minutes spent on date of encounter doing chart review, history and exam, documentation and further activities as noted above.  This note has been dictated using voice recognition software. Any grammatical, typographical, or context distortions are unintentional and inherent to the software.    Sussy Cabrales [facility]  [phone]                         Thank you for allowing me to participate in the care of your patient.      Sincerely,     Sussy Cabrales NP     [facility]  cc:   Sussy Cabrales NP  [address], [address]  [place] [address]

## 2024-11-27 NOTE — TELEPHONE ENCOUNTER
I signed the lab order for ferritin level.    For restless legs, I would have wanted to have her try gabapentin, but unfortunately gabapentin is on her medication intolerances list, with a low level reaction.    It might be worth challenging her with gabapentin anyway, but I would rather that Dr. Taylor do that

## 2024-11-27 NOTE — TELEPHONE ENCOUNTER
Patient returned call, relayed provider message. Patient noted the Gabapentin intolerance. She asked what is she supposed to do about not being able to sleep. I mentioned if Gabapentin is not an option, she could reach out to the pharmacy to see if there are any recommended OTC medications such as melatonin to help with sleep.    Routing to PCP as FYI as patient wants to discuss options with PCP

## 2024-11-27 NOTE — PATIENT INSTRUCTIONS
Suma Mark,    It was a pleasure to see you today at the [facility].     My recommendations after this visit include:    --Continue ASA 81 mg daily (LAAC 6/2023)  -- call clinic if AF episodes occur and last longer than 2 hours  --Follow up with Dr Worley in December (already scheduled)  --Follow up with me 1 year post ablation in August 2025    Sussy Cabrales CNP  [facility], Electrophysiology  [phone]  EP nurses [phone]

## 2024-12-02 ENCOUNTER — LAB (OUTPATIENT)
Dept: LAB | Facility: CLINIC | Age: 74
End: 2024-12-02
Payer: MEDICARE

## 2024-12-02 DIAGNOSIS — I87.2 VENOUS INSUFFICIENCY OF BOTH LOWER EXTREMITIES: ICD-10-CM

## 2024-12-02 DIAGNOSIS — D50.8 OTHER IRON DEFICIENCY ANEMIA: ICD-10-CM

## 2024-12-02 DIAGNOSIS — D50.8 IRON DEFICIENCY ANEMIA SECONDARY TO INADEQUATE DIETARY IRON INTAKE: ICD-10-CM

## 2024-12-02 LAB
ERYTHROCYTE [DISTWIDTH] IN BLOOD BY AUTOMATED COUNT: 13.6 % (ref 10–15)
FERRITIN SERPL-MCNC: 30 NG/ML (ref 11–328)
HCT VFR BLD AUTO: 35.1 % (ref 35–47)
HGB BLD-MCNC: 11.3 G/DL (ref 11.7–15.7)
MCH RBC QN AUTO: 28.8 PG (ref 26.5–33)
MCHC RBC AUTO-ENTMCNC: 32.2 G/DL (ref 31.5–36.5)
MCV RBC AUTO: 89 FL (ref 78–100)
PLATELET # BLD AUTO: 244 10E3/UL (ref 150–450)
RBC # BLD AUTO: 3.93 10E6/UL (ref 3.8–5.2)
WBC # BLD AUTO: 6.6 10E3/UL (ref 4–11)

## 2024-12-02 PROCEDURE — 36415 COLL VENOUS BLD VENIPUNCTURE: CPT

## 2024-12-02 PROCEDURE — 82728 ASSAY OF FERRITIN: CPT

## 2024-12-02 PROCEDURE — 85027 COMPLETE CBC AUTOMATED: CPT

## 2024-12-04 ENCOUNTER — MYC MEDICAL ADVICE (OUTPATIENT)
Dept: INTERNAL MEDICINE | Facility: CLINIC | Age: 74
End: 2024-12-04
Payer: MEDICARE

## 2024-12-04 DIAGNOSIS — D50.8 IRON DEFICIENCY ANEMIA SECONDARY TO INADEQUATE DIETARY IRON INTAKE: Primary | ICD-10-CM

## 2024-12-05 DIAGNOSIS — D50.8 IRON DEFICIENCY ANEMIA SECONDARY TO INADEQUATE DIETARY IRON INTAKE: Primary | ICD-10-CM

## 2024-12-05 RX ORDER — METHYLPREDNISOLONE SODIUM SUCCINATE 40 MG/ML
40 INJECTION INTRAMUSCULAR; INTRAVENOUS
Start: 2024-12-12

## 2024-12-05 RX ORDER — ALBUTEROL SULFATE 0.83 MG/ML
2.5 SOLUTION RESPIRATORY (INHALATION)
OUTPATIENT
Start: 2024-12-12

## 2024-12-05 RX ORDER — HEPARIN SODIUM (PORCINE) LOCK FLUSH IV SOLN 100 UNIT/ML 100 UNIT/ML
5 SOLUTION INTRAVENOUS
Status: DISCONTINUED | OUTPATIENT
Start: 2024-12-05 | End: 2024-12-05 | Stop reason: HOSPADM

## 2024-12-05 RX ORDER — ALBUTEROL SULFATE 90 UG/1
1-2 INHALANT RESPIRATORY (INHALATION)
Start: 2024-12-12

## 2024-12-05 RX ORDER — DIPHENHYDRAMINE HYDROCHLORIDE 50 MG/ML
25 INJECTION INTRAMUSCULAR; INTRAVENOUS
Start: 2024-12-12

## 2024-12-05 RX ORDER — MEPERIDINE HYDROCHLORIDE 25 MG/ML
25 INJECTION INTRAMUSCULAR; INTRAVENOUS; SUBCUTANEOUS
OUTPATIENT
Start: 2024-12-12

## 2024-12-05 RX ORDER — HEPARIN SODIUM (PORCINE) LOCK FLUSH IV SOLN 100 UNIT/ML 100 UNIT/ML
5 SOLUTION INTRAVENOUS
Status: CANCELLED | OUTPATIENT
Start: 2024-12-05

## 2024-12-05 RX ORDER — HEPARIN SODIUM (PORCINE) LOCK FLUSH IV SOLN 100 UNIT/ML 100 UNIT/ML
5 SOLUTION INTRAVENOUS
OUTPATIENT
Start: 2024-12-12

## 2024-12-05 RX ORDER — DIPHENHYDRAMINE HYDROCHLORIDE 50 MG/ML
50 INJECTION INTRAMUSCULAR; INTRAVENOUS
Start: 2024-12-12

## 2024-12-05 RX ORDER — HEPARIN SODIUM,PORCINE 10 UNIT/ML
5-20 VIAL (ML) INTRAVENOUS DAILY PRN
OUTPATIENT
Start: 2024-12-12

## 2024-12-05 RX ORDER — EPINEPHRINE 1 MG/ML
0.3 INJECTION, SOLUTION, CONCENTRATE INTRAVENOUS EVERY 5 MIN PRN
OUTPATIENT
Start: 2024-12-12

## 2024-12-05 RX ORDER — HEPARIN SODIUM,PORCINE 10 UNIT/ML
5-20 VIAL (ML) INTRAVENOUS DAILY PRN
Status: DISCONTINUED | OUTPATIENT
Start: 2024-12-05 | End: 2024-12-05 | Stop reason: HOSPADM

## 2024-12-05 RX ORDER — HEPARIN SODIUM,PORCINE 10 UNIT/ML
5-20 VIAL (ML) INTRAVENOUS DAILY PRN
Status: CANCELLED | OUTPATIENT
Start: 2024-12-05

## 2024-12-15 ENCOUNTER — MYC MEDICAL ADVICE (OUTPATIENT)
Dept: INTERNAL MEDICINE | Facility: CLINIC | Age: 74
End: 2024-12-15
Payer: MEDICARE

## 2024-12-16 NOTE — TELEPHONE ENCOUNTER
Outgoing call to patient to gather more information or potentially triage. No answer, LMTCB.    Incoming call, offered triage, declined at this time. Scheduled patient for appointment tomorrow with Shante Cárdenas.    Patient describes pain as a pain that radiates down arm and fingers, like a sharp gloria, feels like something is sticking it. She notes pain is relieved by warm rice bag, or Ibuprofen. Instructed patient to call back with any questions or concerns prior to appointment

## 2024-12-17 ENCOUNTER — TRANSFERRED RECORDS (OUTPATIENT)
Dept: HEALTH INFORMATION MANAGEMENT | Facility: CLINIC | Age: 74
End: 2024-12-17

## 2024-12-17 ENCOUNTER — OFFICE VISIT (OUTPATIENT)
Dept: FAMILY MEDICINE | Facility: CLINIC | Age: 74
End: 2024-12-17
Payer: MEDICARE

## 2024-12-17 ENCOUNTER — ANCILLARY PROCEDURE (OUTPATIENT)
Dept: GENERAL RADIOLOGY | Facility: CLINIC | Age: 74
End: 2024-12-17
Attending: NURSE PRACTITIONER
Payer: MEDICARE

## 2024-12-17 VITALS
HEART RATE: 75 BPM | WEIGHT: 186.3 LBS | SYSTOLIC BLOOD PRESSURE: 140 MMHG | TEMPERATURE: 97.1 F | DIASTOLIC BLOOD PRESSURE: 70 MMHG | RESPIRATION RATE: 14 BRPM | OXYGEN SATURATION: 98 % | BODY MASS INDEX: 35.49 KG/M2

## 2024-12-17 DIAGNOSIS — M25.511 ACUTE PAIN OF RIGHT SHOULDER: Primary | ICD-10-CM

## 2024-12-17 DIAGNOSIS — M25.511 ACUTE PAIN OF RIGHT SHOULDER: ICD-10-CM

## 2024-12-17 PROCEDURE — 73030 X-RAY EXAM OF SHOULDER: CPT | Mod: TC | Performed by: RADIOLOGY

## 2024-12-17 PROCEDURE — 72040 X-RAY EXAM NECK SPINE 2-3 VW: CPT | Mod: TC | Performed by: RADIOLOGY

## 2024-12-17 PROCEDURE — 99213 OFFICE O/P EST LOW 20 MIN: CPT | Performed by: NURSE PRACTITIONER

## 2024-12-17 RX ORDER — NAPROXEN 500 MG/1
500 TABLET ORAL 2 TIMES DAILY WITH MEALS
Qty: 28 TABLET | Refills: 0 | Status: SHIPPED | OUTPATIENT
Start: 2024-12-17 | End: 2024-12-31

## 2024-12-17 NOTE — PROGRESS NOTES
"  Assessment & Plan     Acute pain of right shoulder  Discussed several different causes of pain including rotator cuff tear, shoulder impingement syndrome, cervical radiculopathy, arthritis,     Will obtain xray of neck and shoulder to assess for arthritis today. Awaiting formal radiology read.     Follow up with PT. If symptoms not improving with PT would recommend MRI and follow up with ortho.    - naproxen (NAPROSYN) 500 MG tablet; Take 1 tablet (500 mg) by mouth 2 times daily (with meals) for 14 days.  - XR Cervical Spine 2/3 Views; Future  - XR Shoulder Left G/E 3 Views; Future  - Physical Therapy  Referral; Future          BMI  Estimated body mass index is 35.49 kg/m  as calculated from the following:    Height as of 11/12/24: 1.543 m (5' 0.75\").    Weight as of this encounter: 84.5 kg (186 lb 4.8 oz).             Halina Blanchard is a 74 year old, presenting for the following health issues:  Shoulder Pain (Started on right shoulder and now in left. Reports pain travels down to fingers. Difficulty sleeping, there is pain in her neck, and difficulty putting on her clothing. Has been ongoing for over 3 weeks and has gotten worse. Reports there was no injuries prior and pain started out of no where.)        12/17/2024     2:49 PM   Additional Questions   Roomed by SULEIMAN WISE     History of Present Illness       Reason for visit:  Severe pain in upper left arm and shoulder   She is taking medications regularly.     NO known injury. Reports arm will throb and feel like there are needles in it.    Difficult to move arm.     History of cervical pain-has been treated by neurosurgey/spine for this. Had injections but that didn't cause pain on the arm.                      Objective    BP (!) 140/70 (BP Location: Right arm, Patient Position: Sitting, Cuff Size: Adult Large)   Pulse 75   Temp 97.1  F (36.2  C)   Resp 14   Wt 84.5 kg (186 lb 4.8 oz)   LMP  (LMP Unknown)   SpO2 98%   BMI 35.49 kg/m    Body " mass index is 35.49 kg/m .  Physical Exam  Constitutional:       Appearance: Normal appearance.   Musculoskeletal:      Right shoulder: Normal.      Left shoulder: Tenderness present. No swelling or bony tenderness. Decreased range of motion. Normal strength.   Neurological:      General: No focal deficit present.      Mental Status: She is alert and oriented to person, place, and time.   Psychiatric:         Mood and Affect: Mood normal.         Behavior: Behavior normal.                    Signed Electronically by: ANDREA GANT CNP

## 2024-12-19 ENCOUNTER — OFFICE VISIT (OUTPATIENT)
Dept: CARDIOLOGY | Facility: CLINIC | Age: 74
End: 2024-12-19
Payer: MEDICARE

## 2024-12-19 ENCOUNTER — TRANSFERRED RECORDS (OUTPATIENT)
Dept: HEALTH INFORMATION MANAGEMENT | Facility: CLINIC | Age: 74
End: 2024-12-19

## 2024-12-19 VITALS
SYSTOLIC BLOOD PRESSURE: 129 MMHG | BODY MASS INDEX: 35.24 KG/M2 | WEIGHT: 185 LBS | DIASTOLIC BLOOD PRESSURE: 72 MMHG | OXYGEN SATURATION: 98 % | HEART RATE: 83 BPM

## 2024-12-19 DIAGNOSIS — E78.5 DYSLIPIDEMIA, GOAL LDL BELOW 70: Chronic | ICD-10-CM

## 2024-12-19 DIAGNOSIS — I10 ESSENTIAL HYPERTENSION: Chronic | ICD-10-CM

## 2024-12-19 DIAGNOSIS — E66.09 CLASS 1 OBESITY DUE TO EXCESS CALORIES WITH SERIOUS COMORBIDITY AND BODY MASS INDEX (BMI) OF 33.0 TO 33.9 IN ADULT: Chronic | ICD-10-CM

## 2024-12-19 DIAGNOSIS — I25.10 CORONARY ARTERY DISEASE INVOLVING NATIVE CORONARY ARTERY OF NATIVE HEART WITHOUT ANGINA PECTORIS: ICD-10-CM

## 2024-12-19 DIAGNOSIS — E66.811 CLASS 1 OBESITY DUE TO EXCESS CALORIES WITH SERIOUS COMORBIDITY AND BODY MASS INDEX (BMI) OF 33.0 TO 33.9 IN ADULT: Chronic | ICD-10-CM

## 2024-12-19 DIAGNOSIS — I48.0 PAROXYSMAL ATRIAL FIBRILLATION (H): Chronic | ICD-10-CM

## 2024-12-19 DIAGNOSIS — I50.32 CHRONIC DIASTOLIC CONGESTIVE HEART FAILURE (H): Primary | ICD-10-CM

## 2024-12-19 LAB
ANION GAP SERPL CALCULATED.3IONS-SCNC: 12 MMOL/L (ref 7–15)
BUN SERPL-MCNC: 20.2 MG/DL (ref 8–23)
CALCIUM SERPL-MCNC: 9.6 MG/DL (ref 8.8–10.4)
CHLORIDE SERPL-SCNC: 101 MMOL/L (ref 98–107)
CREAT SERPL-MCNC: 0.82 MG/DL (ref 0.51–0.95)
EGFRCR SERPLBLD CKD-EPI 2021: 74 ML/MIN/1.73M2
GLUCOSE SERPL-MCNC: 88 MG/DL (ref 70–99)
HCO3 SERPL-SCNC: 24 MMOL/L (ref 22–29)
POTASSIUM SERPL-SCNC: 4.7 MMOL/L (ref 3.4–5.3)
SODIUM SERPL-SCNC: 137 MMOL/L (ref 135–145)

## 2024-12-19 NOTE — LETTER
12/19/2024    Bernadette Taylor MD  [address] [facility] [address]    RE: Suma Puenteell       Dear Colleague,     I had the pleasure of seeing Suma Mark in the [facility].            Assessment/Plan:   1.  Coronary artery disease, s/p EUGENIA to mid LCX/OM1 and EUGENIA to distal LAD: The patient had no chest pain.  Her left arm pain is constant, aggravated by recent left arm which is caused by left shoulder or muscle skeletal pain.  Less likely it is cardiac etiology.  Continue isosorbide mononitrate, aspirin, atorvastatin.    2.  Chronic congestive heart failure with preserved ejection fraction: Her shortness of breath on exertion is improved after ablation of atrial fibrillation.  Her weight has been stable.  She has no leg edema.  No JVDs.  Her lungs are clear.  I think she is compensated at this time.  Continue Lasix 40 mg daily.  Routine labs BMP today to make sure normal electrolytes and renal function.    3.  Paroxysmal atrial fibrillation stated ablation, s/p Watchman device placement: The patient has maintained in sinus rhythm.  She has been off antiarrhythmic medication.  Continue to monitor.  Continue aspirin 81 mg daily.    4.  Primary hypertension: Her blood pressure has been well-controlled with amlodipine 5 mg daily, irbesartan 150 mg daily.    5.  Dyslipidemia: Continue atorvastatin 80 mg at bedtime.  LDL was 58.    6.  Obesity: Lifestyle modification.    Thank you for the opportunity to be involved in the care of Suma Mark. If you have any questions, please feel free to contact me.  I will see the patient again in 6 months and as needed.    Much or all of the text in this note was generated through the use of Dragon Dictate voice-to-text software. Errors in spelling or words which seem out of context are unintentional. Sound alike errors, in particular, may have escaped editing.       History of Present Illness:   It is my pleasure to see Suma Mark at the  [facility] for routine cardiology follow up.  Suma Mark is a 74 year old female with a medical history of coronary artery disease status post EUGENIA to mid LCx/OM1, EUGENIA to distal LAD on November 8, 2022, primary hypertension, dyslipidemia, chronic congestive heart failure with preserved ejection fraction, paroxysmal atrial fibrillation stated ablation on August 23, 2024, obesity.    The patient states that her shortness of breath on exertion was improved after she had ablation of atrial fibrillation on August 23, 2024.  She denies chest pain.  She developed constant left arm pain, aggravated with raising left arm.  She has occasional fluttering heartbeats, no obvious episode of palpitations.  She had no dizziness, orthopnea, PND.  He has trace leg edema.  Her weight has been stable.  Her blood pressure and heart rate are controlled.    Past Medical History:     Patient Active Problem List   Diagnosis     Total knee replacement status     Acquired hypothyroidism     Fibromyalgia     Diverticulosis     Essential hypertension     Gastroesophageal reflux disease without esophagitis     Chronic insomnia     Migraine     Dyslipidemia, goal LDL below 70     Restless leg syndrome     Thyroid nodule     Coronary artery disease involving native coronary artery of native heart without angina pectoris     Paroxysmal atrial fibrillation (H)     Senile osteoporosis     Generalized anxiety disorder     Venous insufficiency of both lower extremities     Chronic pain syndrome     Class 1 obesity due to excess calories with serious comorbidity and body mass index (BMI) of 33.0 to 33.9 in adult     Anemia     Presence of Watchman left atrial appendage closure device     DONN (obstructive sleep apnea) - mild (AHI 11)     Iron deficiency anemia secondary to inadequate dietary iron intake     Prediabetes     Acute diverticulitis     Abdominal hernia     Constipation     Diverticulosis of large intestine  without hemorrhage     Gastro-esophageal reflux disease with esophagitis     Occult blood in stools     Ileum ulcer     Melena     Pain in joint     Polyp of colon     S/P ablation of atrial fibrillation     Class 2 severe obesity due to excess calories with serious comorbidity in adult (H)       Past Surgical History:     Past Surgical History:   Procedure Laterality Date     ARTHROPLASTY KNEE UNICOMPARTMENT  10/31/2013    Procedure: ARTHROPLASTY KNEE UNICOMPARTMENT;  LEFT KNEE UNICOMPARTMENTAL ARTHROPLASTY (CAROLINE)^;  Surgeon: Chi Mittal MD;  Location:  OR     BREAST SURGERY      bx     COLECTOMY N/A 06/02/2017    Procedure: RESECTION, SMALL INTESTINE, OPEN ADHESIOLYSIS;  Surgeon: Keyon Qureshi MD;  Location: [facility];  Service:      COLONOSCOPY N/A 4/4/2024    Procedure: COLONOSCOPY WITH POLYPECTOMY;  Surgeon: Chris Edmonds MD;  Location: [facility]     CV CORONARY ANGIOGRAM N/A 10/12/2022    Procedure: CV CORONARY ANGIOGRAM;  Surgeon: You Martinez MD;  Location: [facility] CV     CV CORONARY ANGIOGRAM N/A 6/9/2023    Procedure: Coronary Angiogram;  Surgeon: Bret Jin MD;  Location: [facility]     CV FRACTIONAL FLOW RATIO WIRE N/A 10/12/2022    Procedure: Fractional Flow Ratio Wire;  Surgeon: You Martinez MD;  Location: [facility]     CV LEFT ATRIAL APPENDAGE CLOSURE Right 6/22/2023    Procedure: Left Atrial Appendage Closure;  Surgeon: Lenin Mariano MD;  Location: [facility] CV     CV LEFT HEART CATH N/A 6/9/2023    Procedure: Left Heart Catheterization;  Surgeon: Bret Jin MD;  Location: [facility] CV     CV PCI STENT DRUG ELUTING N/A 10/12/2022    Procedure: Percutaneous Coronary Intervention Stent;  Surgeon: You Martinez MD;  Location: [facility] CV     CV PCI STENT DRUG ELUTING N/A 11/08/2022    Procedure: Percutaneous Coronary Intervention - Stent;  Surgeon: Bret Jin MD;  Location:  [facility] LAB CV     ENDOSCOPIC RETROGRADE CHOLANGIOPANCREATOGRAPHY       ENDOSCOPIC STRIPPING VEIN(S)      BILATERLY     EP ABLATION PULMONARY VEIN ISOLATION N/A 8/23/2024    Procedure: Ablation Atrial Fibrillation;  Surgeon: Lenin Mariano MD;  Location: [facility] CV     GENITOURINARY SURGERY      bladder sling     GI SURGERY  10/02/2015    Rectal prolaspse. s/p Abdominal rectopexy, sacral colpopexy, proctoscopy, cystoscopy     HERNIORRHAPHY INCISIONAL (LOCATION) N/A 06/02/2017    Procedure: LAPARASCOPIC CONVERTED TO OPEN PRIMARY INCISIONAL HERNIA REPAIR;  Surgeon: Keyon Qureshi MD;  Location: [facility];  Service:      HYSTERECTOMY  1985 1985-1986     LAMINECTOMY LUMBAR TWO LEVELS  2006     LAPAROSCOPY N/A 08/22/2019    Procedure: DIAGNOSTIC LAPAROSCOPY, LYSIS OF ADHESION;  Surgeon: Svetlana Ron MD;  Location: [facility];  Service: General     LUMBAR HARDWARE REMOVAL[  03/22/2012    REMOVAL LUMBAR HARDWARE 03/22/2012   L3-S1; Type CD Horizon m8 instrumentation Dr. Murray     RELEASE CARPAL TUNNEL       TONSILLECTOMY  1954    ???     TOTAL KNEE ARTHROPLASTY Right 11/27/2017    Procedure:  RIGHT TOTAL KNEE ARTHROPLASTY;  Surgeon: Kevin Ryder MD;  Location: [facility];  Service: Orthopedics      THYROID BIOPSY  04/19/2019       Family History:     Family History   Problem Relation Age of Onset     Dementia Mother      Arthritis Mother      Chronic Obstructive Pulmonary Disease Father      Cardiovascular Father      Hypertension Father      No Known Problems Sister      No Known Problems Daughter      No Known Problems Son      Cerebrovascular Disease Maternal Grandmother      Heart Disease Paternal Grandmother      Cerebrovascular Disease Paternal Grandmother      No Known Problems Sister      No Known Problems Sister      No Known Problems Son      No Known Problems Daughter      Breast Cancer Paternal Aunt         age in 50's        Social History:     reports that she has never smoked. She has never been exposed to tobacco smoke. She has never used smokeless tobacco. She reports current alcohol use. She reports that she does not use drugs.    Review of Systems:   12 systems are reviewed negative except for in HPI.    Meds:     Current Outpatient Medications:      amLODIPine (NORVASC) 5 MG tablet, Take 0.5 tablets (2.5 mg) by mouth daily. Taking Half Tab, Disp: 45 tablet, Rfl: 1     atorvastatin (LIPITOR) 80 MG tablet, TAKE 1 TABLET BY MOUTH EVERYDAY AT BEDTIME, Disp: 30 tablet, Rfl: 0     botulinum toxin type A (BOTOX) 100 units injection, Every 3 months. [facility], Disp: , Rfl:      busPIRone (BUSPAR) 15 MG tablet, TAKE 1 TABLET BY MOUTH 3 TIMES DAILY., Disp: 270 tablet, Rfl: 2     Calcium Carbonate-Vit D-Min (CALCIUM 1200 PO), Take 1 tablet by mouth daily, Disp: , Rfl:      cholecalciferol 50 MCG (2000 UT) CAPS, Take 2,000 Units by mouth daily, Disp: , Rfl:      citalopram (CELEXA) 40 MG tablet, Take 1 tablet (40 mg) by mouth daily, Disp: 90 tablet, Rfl: 2     eletriptan (RELPAX) 40 MG tablet, Take 1 tablet (40 mg) by mouth at onset of headache for migraine, Disp: 10 tablet, Rfl: 0     furosemide (LASIX) 20 MG tablet, TAKE 2 TABLETS BY MOUTH EVERY DAY, Disp: 180 tablet, Rfl: 3     HYDROcodone-acetaminophen (NORCO) 5-325 MG tablet, TAKE 1 TABLET ORALLY (OK TO FILL 09/16/2024) ONCE A DAY AS NEEDED FOR PAIN 30 DAYS, Disp: , Rfl:      ibuprofen (ADVIL/MOTRIN) 200 MG capsule, Take 2 capsules (400 mg) by mouth 3 times daily as needed for other (pain), Disp: , Rfl:      irbesartan (AVAPRO) 150 MG tablet, TAKE 1 TABLET BY MOUTH EVERY DAY, Disp: 90 tablet, Rfl: 3     isosorbide mononitrate (IMDUR) 30 MG 24 hr tablet, Take 1 tablet (30 mg) by mouth daily., Disp: 90 tablet, Rfl: 0     levothyroxine (SYNTHROID/LEVOTHROID) 88 MCG tablet, TAKE 1 TABLET BY MOUTH DAILY AT 6:00 AM., Disp: 90 tablet, Rfl: 2     Lidocaine (LIDOCARE) 4 % Patch, Place 1-2 patches onto the skin  every 24 hours To prevent lidocaine toxicity, patient should be patch free for 12 hrs daily., Disp: , Rfl:      LORazepam (ATIVAN) 1 MG tablet, Take 1 tablet (1 mg) by mouth daily as needed for anxiety TAKE 1 TAB BY MOUTH NIGHTLY AS NEEDED FOR ANXIETY OR SLEEP, Disp: 30 tablet, Rfl: 3     naproxen (NAPROSYN) 500 MG tablet, Take 1 tablet (500 mg) by mouth 2 times daily (with meals) for 14 days., Disp: 28 tablet, Rfl: 0     nitroGLYcerin (NITROSTAT) 0.4 MG sublingual tablet, For chest pain place 1 tablet under the tongue every 5 minutes for 3 doses. If symptoms persist 5 minutes after 1st dose call 911., Disp: 30 tablet, Rfl: 1     omeprazole (PRILOSEC) 40 MG DR capsule, Take 1 capsule (40 mg) by mouth daily, Disp: 45 capsule, Rfl: 0     pramipexole (MIRAPEX) 0.25 MG tablet, TAKE 1 TABLET BY MOUTH THREE TIMES A DAY, Disp: 270 tablet, Rfl: 1     omeprazole (PRILOSEC) 20 MG DR capsule, Take 20 mg by mouth daily, Disp: , Rfl:     Current Facility-Administered Medications:      denosumab (PROLIA) injection 60 mg, 60 mg, Subcutaneous, Q6 Months, , 60 mg at 06/26/24 0918    Facility-Administered Medications Ordered in Other Visits:      iodixanol (VISIPAQUE 320) injection, , , Once PRN, Bret Jin MD, 65 mL at 06/09/23 0955    Allergies:   Ace inhibitors, Amitriptyline hcl, Atenolol, Celebrex [celecoxib], Cephalexin, Clonidine, Codeine sulfate, Darvocet [propoxyphene n-apap], Dexamethasone acetate, Erythromycin, Escitalopram, Propoxyphene, Sodium, Tramadol, Tramadol-acetaminophen, Triamterene, Venlafaxine, Zolpidem, Bacitracin, Cephalosporins, Gabapentin, Hctz, Potassium, Sulfanilamide, and Zolpidem tartrate      Objective:      Physical Exam  83.9 kg (185 lb)     Body mass index is 35.24 kg/m .  /72 (BP Location: Right arm, Patient Position: Sitting, Cuff Size: Adult Large)   Pulse 83   Wt 83.9 kg (185 lb)   LMP  (LMP Unknown)   SpO2 98%   BMI 35.24 kg/m      General Appearance:   Awake, Alert, No acute  distress.   HEENT:  Pupil equal and reactive to light. No scleral icterus; the mucous membranes were moist.   Neck: No cervical bruits. No JVD. No thyromegaly.     Chest: The spine was straight. The chest was symmetric.   Lungs:   Respirations unlabored; Lungs are clear to auscultation. No crackles. No wheezing.   Cardiovascular:   Regular rhythm and rate, normal first and second heart sounds with no murmurs. No rubs or gallops.    Abdomen:  Obese. Soft. No tenderness. Non-distended. Bowels sounds are present   Extremities: Equal tibial pulses. No leg edema.   Skin: No rashes or ulcers. Warm, Dry.   Musculoskeletal: No tenderness. No deformity.   Neurologic: Mood and affect are appropriate. No focal deficits.         EKG: Personally reviewed  Sinus bradycardia   Otherwise normal ECG   When compared with ECG of 27-NOV-2022 23:46,   No significant change was found    Cardiac Imaging Studies  DIANA on September 27, 2023:  Patient was sedated using Versed 2 mg. The heart rate, respiratory rate,  oxygen saturations, blood pressure, and response to care were monitored  throughout the procedure with the assistance of the nurse.  Left ventricular size, wall motion and function are normal. The ejection  fraction is 60-65%.  Normal right ventricle size and systolic function.  Doppler suggests left to right interatrial shunt.  Watchman device noted in left atrial appendage. The device is well seated with  no leakage by color flow Doppler imaging.  Thrombus absent on left atrial appendage occlusion device.  Moderate atherosclerotic plaque(s) in the descending aorta.    Coronary angiogram with PCI on November 8, 2022:  EUGENIA to mid LCX and OM1 and EUGENIA to distal LAD    Lab Review   Lab Results   Component Value Date     12/13/2022    CO2 25 12/13/2022    CO2 26 11/27/2022    BUN 18.2 12/13/2022    BUN 20 11/27/2022     Lab Results   Component Value Date    WBC 6.5 11/27/2022    HGB 12.1 11/27/2022    HCT 37.1 11/27/2022    MCV  "90 11/27/2022     11/27/2022     Lab Results   Component Value Date    CHOL 143 06/26/2024    CHOL 131 12/20/2023    CHOL 132 11/15/2022     Lab Results   Component Value Date    HDL 65 06/26/2024    HDL 60 12/20/2023    HDL 43 (L) 11/15/2022     No components found for: \"LDLCALC\"  Lab Results   Component Value Date    TRIG 98 06/26/2024    TRIG 99 12/20/2023    TRIG 106 11/15/2022     No results found for: \"CHOLHDL\"  Lab Results   Component Value Date    TROPONINI 0.02 11/28/2022     Lab Results   Component Value Date     11/27/2022     Lab Results   Component Value Date    TSH 3.53 12/13/2022    TSH 3.99 06/08/2022                 Thank you for allowing me to participate in the care of your patient.      Sincerely,     Jacek Worley MD     [facility]  cc:   Mariam Fair PA-C  [address]  [address]  [place] [address]      "

## 2024-12-19 NOTE — PROGRESS NOTES
Assessment/Plan:   1.  Coronary artery disease, s/p EUGENIA to mid LCX/OM1 and EUGENIA to distal LAD: The patient had no chest pain.  Her left arm pain is constant, aggravated by recent left arm which is caused by left shoulder or muscle skeletal pain.  Less likely it is cardiac etiology.  Continue isosorbide mononitrate, aspirin, atorvastatin.    2.  Chronic congestive heart failure with preserved ejection fraction: Her shortness of breath on exertion is improved after ablation of atrial fibrillation.  Her weight has been stable.  She has no leg edema.  No JVDs.  Her lungs are clear.  I think she is compensated at this time.  Continue Lasix 40 mg daily.  Routine labs BMP today to make sure normal electrolytes and renal function.    3.  Paroxysmal atrial fibrillation stated ablation, s/p Watchman device placement: The patient has maintained in sinus rhythm.  She has been off antiarrhythmic medication.  Continue to monitor.  Continue aspirin 81 mg daily.    4.  Primary hypertension: Her blood pressure has been well-controlled with amlodipine 5 mg daily, irbesartan 150 mg daily.    5.  Dyslipidemia: Continue atorvastatin 80 mg at bedtime.  LDL was 58.    6.  Obesity: Lifestyle modification.    Thank you for the opportunity to be involved in the care of Suma Mark. If you have any questions, please feel free to contact me.  I will see the patient again in 6 months and as needed.    Much or all of the text in this note was generated through the use of Dragon Dictate voice-to-text software. Errors in spelling or words which seem out of context are unintentional. Sound alike errors, in particular, may have escaped editing.       History of Present Illness:   It is my pleasure to see Suma Mark at the [facility] for routine cardiology follow up.  Suma Mark is a 74 year old female with a medical history of coronary artery disease status post EUGENIA to mid LCx/OM1, EUGENIA to distal LAD  on November 8, 2022, primary hypertension, dyslipidemia, chronic congestive heart failure with preserved ejection fraction, paroxysmal atrial fibrillation stated ablation on August 23, 2024, obesity.    The patient states that her shortness of breath on exertion was improved after she had ablation of atrial fibrillation on August 23, 2024.  She denies chest pain.  She developed constant left arm pain, aggravated with raising left arm.  She has occasional fluttering heartbeats, no obvious episode of palpitations.  She had no dizziness, orthopnea, PND.  He has trace leg edema.  Her weight has been stable.  Her blood pressure and heart rate are controlled.    Past Medical History:     Patient Active Problem List   Diagnosis    Total knee replacement status    Acquired hypothyroidism    Fibromyalgia    Diverticulosis    Essential hypertension    Gastroesophageal reflux disease without esophagitis    Chronic insomnia    Migraine    Dyslipidemia, goal LDL below 70    Restless leg syndrome    Thyroid nodule    Coronary artery disease involving native coronary artery of native heart without angina pectoris    Paroxysmal atrial fibrillation (H)    Senile osteoporosis    Generalized anxiety disorder    Venous insufficiency of both lower extremities    Chronic pain syndrome    Class 1 obesity due to excess calories with serious comorbidity and body mass index (BMI) of 33.0 to 33.9 in adult    Anemia    Presence of Watchman left atrial appendage closure device    DONN (obstructive sleep apnea) - mild (AHI 11)    Iron deficiency anemia secondary to inadequate dietary iron intake    Prediabetes    Acute diverticulitis    Abdominal hernia    Constipation    Diverticulosis of large intestine without hemorrhage    Gastro-esophageal reflux disease with esophagitis    Occult blood in stools    Ileum ulcer    Melena    Pain in joint    Polyp of colon    S/P ablation of atrial fibrillation    Class 2 severe obesity due to excess calories  with serious comorbidity in adult (H)       Past Surgical History:     Past Surgical History:   Procedure Laterality Date    ARTHROPLASTY KNEE UNICOMPARTMENT  10/31/2013    Procedure: ARTHROPLASTY KNEE UNICOMPARTMENT;  LEFT KNEE UNICOMPARTMENTAL ARTHROPLASTY (CAROLINE)^;  Surgeon: Chi Mittal MD;  Location:  OR    BREAST SURGERY      bx    COLECTOMY N/A 06/02/2017    Procedure: RESECTION, SMALL INTESTINE, OPEN ADHESIOLYSIS;  Surgeon: Keyon Qureshi MD;  Location: [facility];  Service:     COLONOSCOPY N/A 4/4/2024    Procedure: COLONOSCOPY WITH POLYPECTOMY;  Surgeon: Chris Edmonds MD;  Location: [facility]    CV CORONARY ANGIOGRAM N/A 10/12/2022    Procedure: CV CORONARY ANGIOGRAM;  Surgeon: You Martinez MD;  Location: [facility] CV    CV CORONARY ANGIOGRAM N/A 6/9/2023    Procedure: Coronary Angiogram;  Surgeon: Bret Jin MD;  Location: [facility]    CV FRACTIONAL FLOW RATIO WIRE N/A 10/12/2022    Procedure: Fractional Flow Ratio Wire;  Surgeon: You Martinez MD;  Location: [facility] CV    CV LEFT ATRIAL APPENDAGE CLOSURE Right 6/22/2023    Procedure: Left Atrial Appendage Closure;  Surgeon: Lenin Mariano MD;  Location: [facility] CV    CV LEFT HEART CATH N/A 6/9/2023    Procedure: Left Heart Catheterization;  Surgeon: Bret Jin MD;  Location: [facility] CV    CV PCI STENT DRUG ELUTING N/A 10/12/2022    Procedure: Percutaneous Coronary Intervention Stent;  Surgeon: You Martinez MD;  Location: [facility] CV    CV PCI STENT DRUG ELUTING N/A 11/08/2022    Procedure: Percutaneous Coronary Intervention - Stent;  Surgeon: Bret Jin MD;  Location: [facility] CV    ENDOSCOPIC RETROGRADE CHOLANGIOPANCREATOGRAPHY      ENDOSCOPIC STRIPPING VEIN(S)      BILATERLY    EP ABLATION PULMONARY VEIN ISOLATION N/A 8/23/2024    Procedure: Ablation Atrial Fibrillation;  Surgeon: Lenin Mariano MD;   Location: [facility] LAB CV    GENITOURINARY SURGERY      bladder sling    GI SURGERY  10/02/2015    Rectal prolaspse. s/p Abdominal rectopexy, sacral colpopexy, proctoscopy, cystoscopy    HERNIORRHAPHY INCISIONAL (LOCATION) N/A 06/02/2017    Procedure: LAPARASCOPIC CONVERTED TO OPEN PRIMARY INCISIONAL HERNIA REPAIR;  Surgeon: Keyon Qureshi MD;  Location: [facility];  Service:     HYSTERECTOMY  1985    [id number]    LAMINECTOMY LUMBAR TWO LEVELS  2006    LAPAROSCOPY N/A 08/22/2019    Procedure: DIAGNOSTIC LAPAROSCOPY, LYSIS OF ADHESION;  Surgeon: Svetlana Ron MD;  Location: [facility];  Service: General    LUMBAR HARDWARE REMOVAL[  03/22/2012    REMOVAL LUMBAR HARDWARE 03/22/2012   L3-S1; Type CD Horizon m8 instrumentation Dr. Murray    RELEASE CARPAL TUNNEL      TONSILLECTOMY  1954    ???    TOTAL KNEE ARTHROPLASTY Right 11/27/2017    Procedure:  RIGHT TOTAL KNEE ARTHROPLASTY;  Surgeon: Kevin Ryder MD;  Location: [facility];  Service: Orthopedics     THYROID BIOPSY  04/19/2019       Family History:     Family History   Problem Relation Age of Onset    Dementia Mother     Arthritis Mother     Chronic Obstructive Pulmonary Disease Father     Cardiovascular Father     Hypertension Father     No Known Problems Sister     No Known Problems Daughter     No Known Problems Son     Cerebrovascular Disease Maternal Grandmother     Heart Disease Paternal Grandmother     Cerebrovascular Disease Paternal Grandmother     No Known Problems Sister     No Known Problems Sister     No Known Problems Son     No Known Problems Daughter     Breast Cancer Paternal Aunt         age in 50's        Social History:    reports that she has never smoked. She has never been exposed to tobacco smoke. She has never used smokeless tobacco. She reports current alcohol use. She reports that she does not use drugs.    Review of Systems:   12 systems are reviewed negative except for in HPI.    Meds:     Current  Outpatient Medications:     amLODIPine (NORVASC) 5 MG tablet, Take 0.5 tablets (2.5 mg) by mouth daily. Taking Half Tab, Disp: 45 tablet, Rfl: 1    atorvastatin (LIPITOR) 80 MG tablet, TAKE 1 TABLET BY MOUTH EVERYDAY AT BEDTIME, Disp: 30 tablet, Rfl: 0    botulinum toxin type A (BOTOX) 100 units injection, Every 3 months. [facility], Disp: , Rfl:     busPIRone (BUSPAR) 15 MG tablet, TAKE 1 TABLET BY MOUTH 3 TIMES DAILY., Disp: 270 tablet, Rfl: 2    Calcium Carbonate-Vit D-Min (CALCIUM 1200 PO), Take 1 tablet by mouth daily, Disp: , Rfl:     cholecalciferol 50 MCG (2000 UT) CAPS, Take 2,000 Units by mouth daily, Disp: , Rfl:     citalopram (CELEXA) 40 MG tablet, Take 1 tablet (40 mg) by mouth daily, Disp: 90 tablet, Rfl: 2    eletriptan (RELPAX) 40 MG tablet, Take 1 tablet (40 mg) by mouth at onset of headache for migraine, Disp: 10 tablet, Rfl: 0    furosemide (LASIX) 20 MG tablet, TAKE 2 TABLETS BY MOUTH EVERY DAY, Disp: 180 tablet, Rfl: 3    HYDROcodone-acetaminophen (NORCO) 5-325 MG tablet, TAKE 1 TABLET ORALLY (OK TO FILL 09/16/2024) ONCE A DAY AS NEEDED FOR PAIN 30 DAYS, Disp: , Rfl:     ibuprofen (ADVIL/MOTRIN) 200 MG capsule, Take 2 capsules (400 mg) by mouth 3 times daily as needed for other (pain), Disp: , Rfl:     irbesartan (AVAPRO) 150 MG tablet, TAKE 1 TABLET BY MOUTH EVERY DAY, Disp: 90 tablet, Rfl: 3    isosorbide mononitrate (IMDUR) 30 MG 24 hr tablet, Take 1 tablet (30 mg) by mouth daily., Disp: 90 tablet, Rfl: 0    levothyroxine (SYNTHROID/LEVOTHROID) 88 MCG tablet, TAKE 1 TABLET BY MOUTH DAILY AT 6:00 AM., Disp: 90 tablet, Rfl: 2    Lidocaine (LIDOCARE) 4 % Patch, Place 1-2 patches onto the skin every 24 hours To prevent lidocaine toxicity, patient should be patch free for 12 hrs daily., Disp: , Rfl:     LORazepam (ATIVAN) 1 MG tablet, Take 1 tablet (1 mg) by mouth daily as needed for anxiety TAKE 1 TAB BY MOUTH NIGHTLY AS NEEDED FOR ANXIETY OR SLEEP, Disp: 30 tablet, Rfl: 3    naproxen  (NAPROSYN) 500 MG tablet, Take 1 tablet (500 mg) by mouth 2 times daily (with meals) for 14 days., Disp: 28 tablet, Rfl: 0    nitroGLYcerin (NITROSTAT) 0.4 MG sublingual tablet, For chest pain place 1 tablet under the tongue every 5 minutes for 3 doses. If symptoms persist 5 minutes after 1st dose call 911., Disp: 30 tablet, Rfl: 1    omeprazole (PRILOSEC) 40 MG DR capsule, Take 1 capsule (40 mg) by mouth daily, Disp: 45 capsule, Rfl: 0    pramipexole (MIRAPEX) 0.25 MG tablet, TAKE 1 TABLET BY MOUTH THREE TIMES A DAY, Disp: 270 tablet, Rfl: 1    omeprazole (PRILOSEC) 20 MG DR capsule, Take 20 mg by mouth daily, Disp: , Rfl:     Current Facility-Administered Medications:     denosumab (PROLIA) injection 60 mg, 60 mg, Subcutaneous, Q6 Months, , 60 mg at 06/26/24 0918    Facility-Administered Medications Ordered in Other Visits:     iodixanol (VISIPAQUE 320) injection, , , Once PRN, Bret Jin MD, 65 mL at 06/09/23 0955    Allergies:   Ace inhibitors, Amitriptyline hcl, Atenolol, Celebrex [celecoxib], Cephalexin, Clonidine, Codeine sulfate, Darvocet [propoxyphene n-apap], Dexamethasone acetate, Erythromycin, Escitalopram, Propoxyphene, Sodium, Tramadol, Tramadol-acetaminophen, Triamterene, Venlafaxine, Zolpidem, Bacitracin, Cephalosporins, Gabapentin, Hctz, Potassium, Sulfanilamide, and Zolpidem tartrate      Objective:      Physical Exam  83.9 kg (185 lb)     Body mass index is 35.24 kg/m .  /72 (BP Location: Right arm, Patient Position: Sitting, Cuff Size: Adult Large)   Pulse 83   Wt 83.9 kg (185 lb)   LMP  (LMP Unknown)   SpO2 98%   BMI 35.24 kg/m      General Appearance:   Awake, Alert, No acute distress.   HEENT:  Pupil equal and reactive to light. No scleral icterus; the mucous membranes were moist.   Neck: No cervical bruits. No JVD. No thyromegaly.     Chest: The spine was straight. The chest was symmetric.   Lungs:   Respirations unlabored; Lungs are clear to auscultation. No crackles. No  "wheezing.   Cardiovascular:   Regular rhythm and rate, normal first and second heart sounds with no murmurs. No rubs or gallops.    Abdomen:  Obese. Soft. No tenderness. Non-distended. Bowels sounds are present   Extremities: Equal tibial pulses. No leg edema.   Skin: No rashes or ulcers. Warm, Dry.   Musculoskeletal: No tenderness. No deformity.   Neurologic: Mood and affect are appropriate. No focal deficits.         EKG: Personally reviewed  Sinus bradycardia   Otherwise normal ECG   When compared with ECG of 27-NOV-2022 23:46,   No significant change was found    Cardiac Imaging Studies  DIANA on September 27, 2023:  Patient was sedated using Versed 2 mg. The heart rate, respiratory rate,  oxygen saturations, blood pressure, and response to care were monitored  throughout the procedure with the assistance of the nurse.  Left ventricular size, wall motion and function are normal. The ejection  fraction is 60-65%.  Normal right ventricle size and systolic function.  Doppler suggests left to right interatrial shunt.  Watchman device noted in left atrial appendage. The device is well seated with  no leakage by color flow Doppler imaging.  Thrombus absent on left atrial appendage occlusion device.  Moderate atherosclerotic plaque(s) in the descending aorta.    Coronary angiogram with PCI on November 8, 2022:  EUGENIA to mid LCX and OM1 and EUGENIA to distal LAD    Lab Review   Lab Results   Component Value Date     12/13/2022    CO2 25 12/13/2022    CO2 26 11/27/2022    BUN 18.2 12/13/2022    BUN 20 11/27/2022     Lab Results   Component Value Date    WBC 6.5 11/27/2022    HGB 12.1 11/27/2022    HCT 37.1 11/27/2022    MCV 90 11/27/2022     11/27/2022     Lab Results   Component Value Date    CHOL 143 06/26/2024    CHOL 131 12/20/2023    CHOL 132 11/15/2022     Lab Results   Component Value Date    HDL 65 06/26/2024    HDL 60 12/20/2023    HDL 43 (L) 11/15/2022     No components found for: \"LDLCALC\"  Lab Results " "  Component Value Date    TRIG 98 06/26/2024    TRIG 99 12/20/2023    TRIG 106 11/15/2022     No results found for: \"CHOLHDL\"  Lab Results   Component Value Date    TROPONINI 0.02 11/28/2022     Lab Results   Component Value Date     11/27/2022     Lab Results   Component Value Date    TSH 3.53 12/13/2022    TSH 3.99 06/08/2022             "

## 2024-12-20 DIAGNOSIS — E78.5 HYPERLIPIDEMIA, UNSPECIFIED: ICD-10-CM

## 2024-12-23 RX ORDER — ATORVASTATIN CALCIUM 80 MG/1
80 TABLET, FILM COATED ORAL AT BEDTIME
Qty: 30 TABLET | Refills: 11 | Status: SHIPPED | OUTPATIENT
Start: 2024-12-23

## 2024-12-26 ENCOUNTER — OFFICE VISIT (OUTPATIENT)
Dept: INTERNAL MEDICINE | Facility: CLINIC | Age: 74
End: 2024-12-26
Payer: MEDICARE

## 2024-12-26 VITALS
BODY MASS INDEX: 34.64 KG/M2 | WEIGHT: 183.5 LBS | OXYGEN SATURATION: 98 % | RESPIRATION RATE: 16 BRPM | HEART RATE: 73 BPM | TEMPERATURE: 97.7 F | SYSTOLIC BLOOD PRESSURE: 128 MMHG | DIASTOLIC BLOOD PRESSURE: 68 MMHG | HEIGHT: 61 IN

## 2024-12-26 DIAGNOSIS — D50.8 OTHER IRON DEFICIENCY ANEMIA: ICD-10-CM

## 2024-12-26 DIAGNOSIS — M25.512 ACUTE PAIN OF LEFT SHOULDER: ICD-10-CM

## 2024-12-26 DIAGNOSIS — M81.0 SENILE OSTEOPOROSIS: Primary | Chronic | ICD-10-CM

## 2024-12-26 DIAGNOSIS — M79.652 PAIN OF LEFT THIGH: ICD-10-CM

## 2024-12-26 LAB
ERYTHROCYTE [DISTWIDTH] IN BLOOD BY AUTOMATED COUNT: 14.1 % (ref 10–15)
FERRITIN SERPL-MCNC: 755 NG/ML (ref 11–328)
HCT VFR BLD AUTO: 35 % (ref 35–47)
HGB BLD-MCNC: 11.4 G/DL (ref 11.7–15.7)
IRON BINDING CAPACITY (ROCHE): 421 UG/DL (ref 240–430)
IRON SATN MFR SERPL: 36 % (ref 15–46)
IRON SERPL-MCNC: 153 UG/DL (ref 37–145)
MCH RBC QN AUTO: 28.4 PG (ref 26.5–33)
MCHC RBC AUTO-ENTMCNC: 32.6 G/DL (ref 31.5–36.5)
MCV RBC AUTO: 87 FL (ref 78–100)
PLATELET # BLD AUTO: 343 10E3/UL (ref 150–450)
RBC # BLD AUTO: 4.02 10E6/UL (ref 3.8–5.2)
WBC # BLD AUTO: 9.9 10E3/UL (ref 4–11)

## 2024-12-26 RX ORDER — PREGABALIN 25 MG/1
25 CAPSULE ORAL 3 TIMES DAILY
Qty: 90 CAPSULE | Refills: 1 | Status: SHIPPED | OUTPATIENT
Start: 2024-12-26

## 2024-12-26 RX ORDER — NAPROXEN 500 MG/1
500 TABLET ORAL 2 TIMES DAILY WITH MEALS
Qty: 28 TABLET | Refills: 0 | Status: CANCELLED | OUTPATIENT
Start: 2024-12-26

## 2024-12-26 NOTE — PATIENT INSTRUCTIONS
Tylenol 2 pills every 8h as needed for pain  We will try Lyrica 3x/day.  We can wean off Mirapex.    Schedule follow-up visit with Spine clinic.    Prolia 13th today. Labs today.  Prolia 14th in 6 months with me.     DXA in 6/2025 .   Phone number to schedule [phone].    Daily calcium need is 9790-7546 mg a day from the diet and supplements.  Calcium citrate is easier to digest.  Vitamin D 2000 IU daily recommended.    Risk of rebound vertebral fractures is higher when Prolia suddenly stopped or dose was missed.      Prolia and Covid vaccine should be  for at least a week.    Patient education:  Patients advised to maintain good oral hygiene during treatment, because of the risk for osteonecrosis of the jaw.   The risk of osteonecrosis of the jaw with Prolia therapy is extremely low.   If a patient is late for Prolia therapy (>3 wks) a rapid rebound of bone loss can occur which is highly concerning and important to try to prevent to optimize bone health.   Therefore if an invasive dental procedure is required, primarily extraction or implant, while on Prolia therapy, the recommendation is to time the dental procedure optimally 4 months after the most recent dose of Prolia allowing 6-8 weeks for healing prior to next dose of Prolia.   This is based on the updated 2022 Recommendations from the American Association of Oral and Maxillof[employer] Sur[employer].   We also recommend discussing with dentist or oral surgeon if pretreatment prophylactic oral rinses and antibiotics would be beneficial.   Optimizing oral hygiene before any invasive dental procedure significantly lowers ONJ risk.     There is a greater risk of severe hypocalcemia following denosumab administration and patient is advised that we will have to monitor calcium, phosphorous, and magnesium levels. Risk of infection is increased with denosumab use, so patient will inform me if has more frequent infections.     Atypical femur fracture  has been  reported in patients receiving denosumab. The fractures may occur anywhere along the femoral shaft (may be bilateral) and commonly occur with minimal to no trauma to the area. Some patients experience prodromal thigh or groin pain weeks or months before the fracture occurs. Contralateral limb should be assessed if AFF occurs.     Multiple vertebral column fracture has been reported following discontinuation of therapy. Hypertension and increased cholesterol were reported with denosumab use.      Discussed 10-year data of Prolia. Discussed the importance of being on time and consistent with Prolia use to prevent rapid bone of osteoclastic activity.  Discussed that if Prolia is discontinued we will likely need to utilize an antiresorptive, such as Reclast, to help prevent rapid rebound of osteoclast activity. Often more than 1 dose is required.  Labs yearly to ensure calcium and vitamin D optimized while patient on Prolia.

## 2024-12-27 NOTE — PROGRESS NOTES
(M81.0) Senile osteoporosis  (primary encounter diagnosis)  Comment: Surgical history significant for hysterectomy at age of 35 and early menopause which was treated with hormonal therapy for 5-6 years only.  On Prolia since 8/2018. Tolerating it well.  Due for DXA in 6/2025.  Component      Latest Ref Rng 12/19/2024  11:32 AM   Sodium      135 - 145 mmol/L 137    Potassium      3.4 - 5.3 mmol/L 4.7    Chloride      98 - 107 mmol/L 101    Carbon Dioxide (CO2)      22 - 29 mmol/L 24    Anion Gap      7 - 15 mmol/L 12    Urea Nitrogen      8.0 - 23.0 mg/dL 20.2    Creatinine      0.51 - 0.95 mg/dL 0.82    GFR Estimate      >60 mL/min/1.73m2 74    Calcium      8.8 - 10.4 mg/dL 9.6    Glucose      70 - 99 mg/dL 88          (M25.511) Acute pain of left shoulder  Comment: she was seen at [facility] a [facility] ago. Xray showed some mild AC joint arthritis and she will start the PT for rotator cuff tendinitis and biceps tendinitis. She did have steroid injection a week ago.    She is currently taking naproxen 500 mg bid for weeks. We discussed problems with chronic daily NSAIds use. She is also on aspirin. She will try to manage the pain with Tylenol and OTC topical meds.       (M79.652) Pain of left thigh  Comment: the pain in the left thigh and groin is back. She had the similar pain in February and March when had lumbar spine injection. She denies acute trauma. We discussed the pain management and will get the xrays . She will see the spine specialist. We will try Lyrica.  She does have the history of restless legs and is on Mirapex, but we are not sure if beneficial at all, since she has pain and not really the restless legs.  Plan: pregabalin (LYRICA) 25 MG capsule, XR Hip Left         2-3 Views, XR Femur Left 2 Views, XR Knee Left         3 Views            (D50.8) Other iron deficiency anemia  Comment: She had received 3 iron infusions in July 2024.  She had EGD on 10/30/23, but they were not able to complete the  colonoscopy because she had constant retching and transient desaturation in low 90s. They suggested colonoscopy when off the Plavix. EGD result reviewed and showed esophagitis and gastritis, and she is on omeprazole now. She denies black or bloody stool.  She had colonoscopy in 4/2024, one polyp was removed.     She had iron infusion on 12/20/24.     She is feeling good since the last infusion. I will recheck the labs today.  Plan: CBC with platelets, Ferritin, Iron & Iron         Binding Capacity          The longitudinal plan of care for the diagnosis(es)/condition(s) as documented were addressed during this visit. Due to the added complexity in care, I will continue to support Lo in the subsequent management and with ongoing continuity of care.    Patient was educated on safety of Prolia utilizing Patient Counseling Chart for Healthcare Providers, as outlined by the Prolia REMS progam.     Return in about 4 weeks (around 1/23/2025) for Follow up, multiple issues.    Patient Instructions   Tylenol 2 pills every 8h as needed for pain  We will try Lyrica 3x/day.  We can wean off Mirapex.    Schedule follow-up visit with Spine clinic.    Prolia 13th today. Labs today.  Prolia 14th in 6 months with me.     DXA in 6/2025 .   Phone number to schedule [phone].    Daily calcium need is 1553-6918 mg a day from the diet and supplements.  Calcium citrate is easier to digest.  Vitamin D 2000 IU daily recommended.    Risk of rebound vertebral fractures is higher when Prolia suddenly stopped or dose was missed.      Prolia and Covid vaccine should be  for at least a week.    Patient education:  Patients advised to maintain good oral hygiene during treatment, because of the risk for osteonecrosis of the jaw.   The risk of osteonecrosis of the jaw with Prolia therapy is extremely low.   If a patient is late for Prolia therapy (>3 wks) a rapid rebound of bone loss can occur which is highly concerning and important  to try to prevent to optimize bone health.   Therefore if an invasive dental procedure is required, primarily extraction or implant, while on Prolia therapy, the recommendation is to time the dental procedure optimally 4 months after the most recent dose of Prolia allowing 6-8 weeks for healing prior to next dose of Prolia.   This is based on the updated 2022 Recommendations from the American Association of Oral and Maxillofacial Surgeons.   We also recommend discussing with dentist or oral surgeon if pretreatment prophylactic oral rinses and antibiotics would be beneficial.   Optimizing oral hygiene before any invasive dental procedure significantly lowers ONJ risk.     There is a greater risk of severe hypocalcemia following denosumab administration and patient is advised that we will have to monitor calcium, phosphorous, and magnesium levels. Risk of infection is increased with denosumab use, so patient will inform me if has more frequent infections.     Atypical femur fracture  has been reported in patients receiving denosumab. The fractures may occur anywhere along the femoral shaft (may be bilateral) and commonly occur with minimal to no trauma to the area. Some patients experience prodromal thigh or groin pain weeks or months before the fracture occurs. Contralateral limb should be assessed if AFF occurs.     Multiple vertebral column fracture has been reported following discontinuation of therapy. Hypertension and increased cholesterol were reported with denosumab use.      Discussed 10-year data of Prolia. Discussed the importance of being on time and consistent with Prolia use to prevent rapid bone of osteoclastic activity.  Discussed that if Prolia is discontinued we will likely need to utilize an antiresorptive, such as Reclast, to help prevent rapid rebound of osteoclast activity. Often more than 1 dose is required.  Labs yearly to ensure calcium and vitamin D optimized while patient on Prolia.   "          /68   Pulse 73   Temp 97.7  F (36.5  C) (Temporal)   Resp 16   Ht 1.537 m (5' 0.5\")   Wt 83.2 kg (183 lb 8 oz)   LMP  (LMP Unknown)   SpO2 98%   BMI 35.25 kg/m        Did you experience any problems with previous Prolia injection? no  Any medication change in the last 6 months? no  Did you take prednisone or other immunosupressant drugs in the last 6 months   (chemo, transplant, rheum, dermatology conditions)? no  Did you have any serious infection in the last 6 months?no  Any recent hospitalizations?no  Do you plan any dental work in the next 2-3 months?no  How much calcium do you take daily from the diet and supplements?1200 mg  How much vit D do you take daily? 2000 IU  Last DXA?  6/2023 Reviewed and discussed      Patient is here today for the Prolia injection. Patient tolerated previous injections well.   We discussed calcium and vit D daily needs today.       We discussed high risk of rebound vertebral fractures when Prolia suddenly stopped.    Next Prolia injection will be in 6 months.           This note has been dictated using voice recognition software. Any grammatical or context distortions are unintentional and inherent to the software      Patient Active Problem List   Diagnosis    Total knee replacement status    Acquired hypothyroidism    Fibromyalgia    Diverticulosis    Essential hypertension    Gastroesophageal reflux disease without esophagitis    Chronic insomnia    Migraine    Dyslipidemia, goal LDL below 70    Restless leg syndrome    Thyroid nodule    Coronary artery disease involving native coronary artery of native heart without angina pectoris    Paroxysmal atrial fibrillation (H)    Senile osteoporosis    Generalized anxiety disorder    Venous insufficiency of both lower extremities    Chronic pain syndrome    Class 1 obesity due to excess calories with serious comorbidity and body mass index (BMI) of 33.0 to 33.9 in adult    Anemia    Presence of Watchman left " atrial appendage closure device    DONN (obstructive sleep apnea) - mild (AHI 11)    Iron deficiency anemia secondary to inadequate dietary iron intake    Prediabetes    Acute diverticulitis    Abdominal hernia    Constipation    Diverticulosis of large intestine without hemorrhage    Gastro-esophageal reflux disease with esophagitis    Occult blood in stools    Ileum ulcer    Melena    Pain in joint    Polyp of colon    S/P ablation of atrial fibrillation    Class 2 severe obesity due to excess calories with serious comorbidity in adult (H)       Current Outpatient Medications   Medication Sig Dispense Refill    amLODIPine (NORVASC) 5 MG tablet Take 0.5 tablets (2.5 mg) by mouth daily. Taking Half Tab 45 tablet 1    atorvastatin (LIPITOR) 80 MG tablet TAKE 1 TABLET BY MOUTH EVERYDAY AT BEDTIME 30 tablet 11    botulinum toxin type A (BOTOX) 100 units injection Every 3 months. [facility]      busPIRone (BUSPAR) 15 MG tablet TAKE 1 TABLET BY MOUTH 3 TIMES DAILY. 270 tablet 2    Calcium Carbonate-Vit D-Min (CALCIUM 1200 PO) Take 1 tablet by mouth daily      cholecalciferol 50 MCG (2000 UT) CAPS Take 2,000 Units by mouth daily      citalopram (CELEXA) 40 MG tablet Take 1 tablet (40 mg) by mouth daily 90 tablet 2    eletriptan (RELPAX) 40 MG tablet Take 1 tablet (40 mg) by mouth at onset of headache for migraine 10 tablet 0    furosemide (LASIX) 20 MG tablet TAKE 2 TABLETS BY MOUTH EVERY  tablet 3    HYDROcodone-acetaminophen (NORCO) 5-325 MG tablet TAKE 1 TABLET ORALLY (OK TO FILL 09/16/2024) ONCE A DAY AS NEEDED FOR PAIN 30 DAYS      ibuprofen (ADVIL/MOTRIN) 200 MG capsule Take 2 capsules (400 mg) by mouth 3 times daily as needed for other (pain)      irbesartan (AVAPRO) 150 MG tablet TAKE 1 TABLET BY MOUTH EVERY DAY 90 tablet 3    isosorbide mononitrate (IMDUR) 30 MG 24 hr tablet Take 1 tablet (30 mg) by mouth daily. 90 tablet 0    levothyroxine (SYNTHROID/LEVOTHROID) 88 MCG tablet TAKE 1 TABLET BY MOUTH DAILY  AT 6:00 AM. 90 tablet 2    LORazepam (ATIVAN) 1 MG tablet Take 1 tablet (1 mg) by mouth daily as needed for anxiety TAKE 1 TAB BY MOUTH NIGHTLY AS NEEDED FOR ANXIETY OR SLEEP 30 tablet 3    naproxen (NAPROSYN) 500 MG tablet Take 1 tablet (500 mg) by mouth 2 times daily (with meals) for 14 days. 28 tablet 0    nitroGLYcerin (NITROSTAT) 0.4 MG sublingual tablet For chest pain place 1 tablet under the tongue every 5 minutes for 3 doses. If symptoms persist 5 minutes after 1st dose call 911. 30 tablet 1    omeprazole (PRILOSEC) 40 MG DR capsule Take 1 capsule (40 mg) by mouth daily 45 capsule 0    pramipexole (MIRAPEX) 0.25 MG tablet TAKE 1 TABLET BY MOUTH THREE TIMES A  tablet 1    pregabalin (LYRICA) 25 MG capsule Take 1 capsule (25 mg) by mouth 3 times daily. 90 capsule 1     No current facility-administered medications for this visit.     Facility-Administered Medications Ordered in Other Visits   Medication Dose Route Frequency Provider Last Rate Last Admin    iodixanol (VISIPAQUE 320) injection    Once PRN Bret Jin MD   65 mL at 06/09/23 7259

## 2025-01-05 ENCOUNTER — MYC MEDICAL ADVICE (OUTPATIENT)
Dept: CARDIOLOGY | Facility: CLINIC | Age: 75
End: 2025-01-05
Payer: MEDICARE

## 2025-01-05 DIAGNOSIS — I10 ESSENTIAL HYPERTENSION: ICD-10-CM

## 2025-01-07 RX ORDER — IRBESARTAN 150 MG/1
150 TABLET ORAL DAILY
Qty: 90 TABLET | Refills: 2 | Status: SHIPPED | OUTPATIENT
Start: 2025-01-07

## 2025-01-07 NOTE — TELEPHONE ENCOUNTER
From: Mariam Fair PA-C  Sent: 1/7/2025  12:54 PM CST  To: [facility]  Subject: FW: Prescription renewal                         OK to refill irbesartan, she recently saw Dr. Worley.

## 2025-01-09 DIAGNOSIS — E03.9 ACQUIRED HYPOTHYROIDISM: ICD-10-CM

## 2025-01-09 RX ORDER — LEVOTHYROXINE SODIUM 88 UG/1
TABLET ORAL
Qty: 90 TABLET | Refills: 1 | Status: SHIPPED | OUTPATIENT
Start: 2025-01-09

## 2025-01-12 ENCOUNTER — MYC MEDICAL ADVICE (OUTPATIENT)
Dept: INTERNAL MEDICINE | Facility: CLINIC | Age: 75
End: 2025-01-12
Payer: MEDICARE

## 2025-01-12 DIAGNOSIS — I73.9 PAD (PERIPHERAL ARTERY DISEASE): ICD-10-CM

## 2025-01-12 DIAGNOSIS — G89.4 CHRONIC PAIN SYNDROME: Primary | Chronic | ICD-10-CM

## 2025-01-13 RX ORDER — TRAMADOL HYDROCHLORIDE 50 MG/1
50 TABLET ORAL EVERY 12 HOURS PRN
Qty: 10 TABLET | Refills: 0 | Status: SHIPPED | OUTPATIENT
Start: 2025-01-13 | End: 2025-01-16

## 2025-01-14 ENCOUNTER — TELEPHONE (OUTPATIENT)
Dept: VASCULAR SURGERY | Facility: CLINIC | Age: 75
End: 2025-01-14
Payer: MEDICARE

## 2025-01-14 DIAGNOSIS — G25.81 RESTLESS LEG SYNDROME: ICD-10-CM

## 2025-01-14 DIAGNOSIS — I73.9 PAD (PERIPHERAL ARTERY DISEASE): ICD-10-CM

## 2025-01-14 DIAGNOSIS — G89.4 CHRONIC PAIN SYNDROME: Primary | Chronic | ICD-10-CM

## 2025-01-14 NOTE — TELEPHONE ENCOUNTER
Vascular Referral Intake    Appointment note (to be pasted into appt note. Also add where additional info is located ie: outside images pushed to PACS, in Epic, sent to HIM, etc): Chronic pain of legs, patient believes she has poor circulation, no work-up done, hx of restless leg syndrome    Referred by Bernadette Taylor MD for PAD (peripheral artery disease)     Testing/Imaging Needed Before Consult: Needs abis first    *Schedulers: Please send welcome letter to patient after appointment(s) have been scheduled*

## 2025-01-21 ENCOUNTER — ANCILLARY PROCEDURE (OUTPATIENT)
Dept: VASCULAR ULTRASOUND | Facility: CLINIC | Age: 75
End: 2025-01-21
Attending: SURGERY
Payer: MEDICARE

## 2025-01-21 DIAGNOSIS — G25.81 RESTLESS LEG SYNDROME: ICD-10-CM

## 2025-01-21 DIAGNOSIS — I73.9 PAD (PERIPHERAL ARTERY DISEASE): ICD-10-CM

## 2025-01-21 DIAGNOSIS — G89.4 CHRONIC PAIN SYNDROME: Chronic | ICD-10-CM

## 2025-01-21 PROCEDURE — 93924 LWR XTR VASC STDY BILAT: CPT

## 2025-01-29 ENCOUNTER — OFFICE VISIT (OUTPATIENT)
Dept: INTERNAL MEDICINE | Facility: CLINIC | Age: 75
End: 2025-01-29
Payer: MEDICARE

## 2025-01-29 VITALS
SYSTOLIC BLOOD PRESSURE: 122 MMHG | DIASTOLIC BLOOD PRESSURE: 72 MMHG | HEIGHT: 61 IN | BODY MASS INDEX: 34.7 KG/M2 | RESPIRATION RATE: 16 BRPM | OXYGEN SATURATION: 98 % | HEART RATE: 76 BPM | TEMPERATURE: 97.8 F | WEIGHT: 183.8 LBS

## 2025-01-29 DIAGNOSIS — G25.81 RESTLESS LEG SYNDROME: ICD-10-CM

## 2025-01-29 DIAGNOSIS — M25.512 ACUTE PAIN OF LEFT SHOULDER: Primary | ICD-10-CM

## 2025-01-29 DIAGNOSIS — M54.16 LEFT LUMBAR RADICULOPATHY: ICD-10-CM

## 2025-01-29 DIAGNOSIS — D50.8 OTHER IRON DEFICIENCY ANEMIA: ICD-10-CM

## 2025-01-29 LAB
ERYTHROCYTE [DISTWIDTH] IN BLOOD BY AUTOMATED COUNT: 16.1 % (ref 10–15)
FERRITIN SERPL-MCNC: 225 NG/ML (ref 11–328)
HCT VFR BLD AUTO: 37.8 % (ref 35–47)
HGB BLD-MCNC: 12.4 G/DL (ref 11.7–15.7)
IRON BINDING CAPACITY (ROCHE): 331 UG/DL (ref 240–430)
IRON SATN MFR SERPL: 22 % (ref 15–46)
IRON SERPL-MCNC: 73 UG/DL (ref 37–145)
MCH RBC QN AUTO: 29.8 PG (ref 26.5–33)
MCHC RBC AUTO-ENTMCNC: 32.8 G/DL (ref 31.5–36.5)
MCV RBC AUTO: 91 FL (ref 78–100)
PLATELET # BLD AUTO: 231 10E3/UL (ref 150–450)
RBC # BLD AUTO: 4.16 10E6/UL (ref 3.8–5.2)
WBC # BLD AUTO: 7.1 10E3/UL (ref 4–11)

## 2025-01-29 PROCEDURE — 83550 IRON BINDING TEST: CPT | Performed by: INTERNAL MEDICINE

## 2025-01-29 PROCEDURE — G2211 COMPLEX E/M VISIT ADD ON: HCPCS | Performed by: INTERNAL MEDICINE

## 2025-01-29 PROCEDURE — 85027 COMPLETE CBC AUTOMATED: CPT | Performed by: INTERNAL MEDICINE

## 2025-01-29 PROCEDURE — 36415 COLL VENOUS BLD VENIPUNCTURE: CPT | Performed by: INTERNAL MEDICINE

## 2025-01-29 PROCEDURE — 82728 ASSAY OF FERRITIN: CPT | Performed by: INTERNAL MEDICINE

## 2025-01-29 PROCEDURE — 83540 ASSAY OF IRON: CPT | Performed by: INTERNAL MEDICINE

## 2025-01-29 PROCEDURE — 99214 OFFICE O/P EST MOD 30 MIN: CPT | Performed by: INTERNAL MEDICINE

## 2025-01-29 RX ORDER — TRAMADOL HYDROCHLORIDE 50 MG/1
50 TABLET ORAL EVERY 6 HOURS PRN
COMMUNITY
End: 2025-01-29

## 2025-01-29 RX ORDER — TRAMADOL HYDROCHLORIDE 50 MG/1
50-100 TABLET ORAL EVERY 8 HOURS PRN
Qty: 60 TABLET | Refills: 1 | Status: SHIPPED | OUTPATIENT
Start: 2025-01-29

## 2025-01-29 NOTE — PROGRESS NOTES
Assessment & Plan     Acute pain of left shoulder  Seeing [facility], in the PT now.Xray showed some mild AC joint arthritis and she is doing the PT for rotator cuff tendinitis and biceps tendinitis. She did have steroid injection in mid Dec.  She will try to manage the pain with Tylenol and OTC topical meds.     Restless leg syndrome  She is taking Mirapex again, when tried to wean it off, had a lot of symptoms of restless legs.    Left lumbar radiculopathy  the pain in the left thigh and groin is back. She had the similar pain in February and March when had lumbar spine injection. She denies acute trauma. We discussed the pain management.. She did see the spine specialist and waiting to be scheduled for epidural injection.  Lyrica did not help at all.  She is now taking the Tramadol at bedtime and it helps.  Pain is severe during the night and cannot sleep.    - traMADol (ULTRAM) 50 MG tablet; Take 1-2 tablets ( mg) by mouth every 8 hours as needed for severe pain.    Other iron deficiency anemia  She had received 3 iron infusions in July 2024.  She had EGD on 10/30/23, but they were not able to complete the colonoscopy because she had constant retching and transient desaturation in low 90s. They suggested colonoscopy when off the Plavix. EGD result reviewed and showed esophagitis and gastritis, and she is on omeprazole now. She denies black or bloody stool.  She had colonoscopy in 4/2024, one polyp was removed.     She had iron infusion on 12/20/24. Hgb stays around 11-12.  She feels tired again.I will recheck the labs today.   - CBC with platelets; Future  - Ferritin; Future  - Iron & Iron Binding Capacity; Future  - CBC with platelets  - Ferritin  - Iron & Iron Binding Capacity    The longitudinal plan of care for the diagnosis(es)/condition(s) as documented were addressed during this visit. Due to the added complexity in care, I will continue to support Lo in the subsequent management and with ongoing  "continuity of care.      BMI  Estimated body mass index is 35.31 kg/m  as calculated from the following:    Height as of this encounter: 1.537 m (5' 0.5\").    Weight as of this encounter: 83.4 kg (183 lb 12.8 oz).             Halina Blanchard is a 74 year old, presenting for the following health issues:  Follow Up (F/U)      1/29/2025     8:36 AM   Additional Questions   Roomed by Lizbeth     History of Present Illness       Reason for visit:  Follow up on leg pain and difficulty sleeping due to pain    She eats 0-1 servings of fruits and vegetables daily.She consumes 1 sweetened beverage(s) daily.She exercises with enough effort to increase her heart rate 9 or less minutes per day.  She exercises with enough effort to increase her heart rate 3 or less days per week.   She is taking medications regularly.                     Objective    /72   Pulse 76   Temp 97.8  F (36.6  C) (Temporal)   Resp 16   Ht 1.537 m (5' 0.5\")   Wt 83.4 kg (183 lb 12.8 oz)   LMP  (LMP Unknown)   SpO2 98%   BMI 35.31 kg/m    Body mass index is 35.31 kg/m .  Physical Exam               Signed Electronically by: Bernadette Taylor MD    "

## 2025-02-10 DIAGNOSIS — G25.81 RESTLESS LEGS SYNDROME: ICD-10-CM

## 2025-02-10 RX ORDER — PRAMIPEXOLE DIHYDROCHLORIDE 0.25 MG/1
TABLET ORAL
Qty: 270 TABLET | Refills: 2 | Status: SHIPPED | OUTPATIENT
Start: 2025-02-10

## 2025-02-16 NOTE — Clinical Note
[facility] Progress Note      Patient Name: Wilner Menjivar  : 1986  MRN: [id number]  Primary Care Physician:  Roosevelt Thao MD  Date of admission: 2025    Subjective     Interval History:   Follow-up on ESRD    Patient having significant discomfort on his foot  And managed with narcotics  Noticing some nausea and decreased appetite      Review of Systems:   As noted above    Objective     Vitals:   Temp:  [98.9 °F (37.2 °C)-101.1 °F (38.4 °C)] 99.1 °F (37.3 °C)  Heart Rate:  [] 102  Resp:  [16] 16  BP: (143-157)/(67-76) 150/70  No intake or output data in the 24 hours ending 25 1109      Physical Exam:    General Appearance: Awake, chronically ill, no acute distress  Skin: warm and dry  HEENT: oral mucosa normal, nonicteric sclera, legally blind  Neck: supple, no JVD  Lungs: CTA, no wheezing, nonlabored on RA  Heart: Tachycardic, RR, no rub  Abdomen: soft, nontender, nondistended, BS +  : no palpable bladder  Extremities: no edema, cyanosis or clubbing; dressing on right foot  Neuro: normal speech and mental status   Vascular: L AVF patent, + thrill and bruit    Scheduled Meds:     amLODIPine, 5 mg, Oral, Q24H  aspirin, 81 mg, Oral, Daily  atorvastatin, 40 mg, Oral, Nightly  bumetanide, 2 mg, Oral, BID Diuretics  carvedilol, 25 mg, Oral, BID  cefTRIAXone, 2,000 mg, Intravenous, Daily  docusate sodium, 100 mg, Oral, BID  epoetin jamil/jamil-epbx, 10,000 Units, Subcutaneous, Once per day on   insulin glargine, 15 Units, Subcutaneous, Nightly  insulin lispro, 2-7 Units, Subcutaneous, 4x Daily AC & at Bedtime  insulin lispro, 5 Units, Subcutaneous, TID With Meals  losartan, 25 mg, Oral, Q24H  pantoprazole, 40 mg, Oral, Q AM  polyethylene glycol, 17 g, Oral, Daily  terazosin, 1 mg, Oral, Nightly  ticagrelor, 90 mg, Oral, Q12H      IV Meds:   Pharmacy to dose vancomycin,         Results Reviewed:   I have personally reviewed the results from  Pt positioned and bony prominences padded with anesthesia assistance. Positioning assessed and repositioned every 2 hours throughout the procedure.   the time of this admission to 2/16/2025 11:09 EST     Results from last 7 days   Lab Units 02/16/25 0457 02/15/25  0725 02/14/25  0333 02/13/25  0507 02/12/25  0746 02/11/25  1556   SODIUM mmol/L 130* 129* 129* 130*   < > 125*   POTASSIUM mmol/L 3.7 4.0 3.6 3.5   < > 4.5   CHLORIDE mmol/L 95* 95* 95* 94*   < > 88*   CO2 mmol/L 21.6* 21.0* 23.0 24.0   < > 23.5   BUN mg/dL 35* 29* 40* 26*   < > 42*   CREATININE mg/dL 5.97* 5.35* 6.57* 4.84*   < > 5.85*   CALCIUM mg/dL 9.1 8.8 9.0 9.1   < > 9.0   BILIRUBIN mg/dL  --   --   --  0.6  --  0.9   ALK PHOS U/L  --   --   --  131*  --  154*   ALT (SGPT) U/L  --   --   --  19  --  14   AST (SGOT) U/L  --   --   --  25  --  19   GLUCOSE mg/dL 133* 274* 202* 148*   < > 442*    < > = values in this interval not displayed.       Estimated Creatinine Clearance: 19.3 mL/min (A) (by C-G formula based on SCr of 5.97 mg/dL (H)).    Results from last 7 days   Lab Units 02/14/25 0333   PHOSPHORUS mg/dL 3.5             Results from last 7 days   Lab Units 02/16/25  0457 02/15/25  0725 02/14/25  2233 02/14/25 0333 02/13/25  0507 02/12/25  0746   WBC 10*3/mm3 14.97* 15.30*  --  13.96* 11.03* 10.26   HEMOGLOBIN g/dL 8.9* 9.1* 9.4* 6.6* 7.2* 7.4*   PLATELETS 10*3/mm3 304 294  --  226 175 155             Assessment / Plan     ASSESSMENT:    End Stage Renal Disease ( ESRD )  since 6/2024 followed by Dr. Wasserman on Hemodialysis on a Monday, Wednesday and Friday schedule via LUE AVF.  Continue renal diet   Acute on chronic normocytic anemia. On Epogen protocol.  10,000 units subcu  TIW , we will continue to follow H&H closely, low iron stores 2/13-screen ordered.  Primary team ordered PRBCs x 2 hemoglobin dropped to 6.6 repeat 8.9  Hyperphosphatemia. Continue renal diet  meals w low phosphorus . We will follow phosphorus to make further adjustments to binders if needed   Hypertension w/ CKD.  Continue home regimen. We will continue to follow closely. Heart healthy diet   Coronary artery disease  "status postcardiac cath with stent placement to mid/distal OM2 , and proximal Cx. ( 9/24 ).  On medical management  Diabetes Mellitus type 2 w/ complications ( retinopathy , peripheral vascular disease  nephropathy ) .Continue insulin regimen and Diabetic diet, as per primary team   Right diabetic foot as per primary team.  Foot x-ray showing subcutaneous emphysema and soft tissue loss over the great toe. MRI showing \" Osteomyelitis of the distal shaft and head of the 1st proximal phalanx and of the distal phalanx with overlying soft tissue wound/ulcer. Gas within the soft tissues and marrow of the great toe associated with infection. Tenosynovitis flexor hallucis longus tendon sheath. Forefoot cellulitis\" follow-up podiatry and vascular surgery  Hypervolemic hyponatremia will adjust dialysis prescription and will follow sodium trend    PLAN:    Will arrange for hemodialysis tomorrow  Awaiting possible surgical plan during his admission, and planning for Tuesday  Surveillance labs    Thank you for involving us in the care of Wilner Menjivar.  Please feel free to call with any questions.    Houston Ramos MD  02/16/25  11:09 [facility]    [facility]  [phone]    Please note that portions of this note were completed with a voice recognition program.  "

## 2025-02-24 ENCOUNTER — TRANSFERRED RECORDS (OUTPATIENT)
Dept: HEALTH INFORMATION MANAGEMENT | Facility: CLINIC | Age: 75
End: 2025-02-24
Payer: MEDICARE

## 2025-03-10 DIAGNOSIS — I48.0 PAROXYSMAL ATRIAL FIBRILLATION (H): Primary | ICD-10-CM

## 2025-03-11 ENCOUNTER — ORDERS ONLY (AUTO-RELEASED) (OUTPATIENT)
Dept: CARDIOLOGY | Facility: CLINIC | Age: 75
End: 2025-03-11
Payer: MEDICARE

## 2025-03-11 DIAGNOSIS — I48.0 PAROXYSMAL ATRIAL FIBRILLATION (H): ICD-10-CM

## 2025-03-17 ENCOUNTER — TELEPHONE (OUTPATIENT)
Dept: URGENT CARE | Facility: URGENT CARE | Age: 75
End: 2025-03-17
Payer: MEDICARE

## 2025-03-17 NOTE — TELEPHONE ENCOUNTER
Rn left non detailed vm for pt. Please relay below:    Your kidney function is slightly lower than it has been but very close to what it was 9 months ago.  No immediate action is needed but follow-up with your primary care provider for your continued management of chronic conditions.   Rick Pyle PA-C         RN also sent detailed mychart.    Reny GUNTER RN

## 2025-03-17 NOTE — TELEPHONE ENCOUNTER
Rn spoke with pt and relayed results and follow up instructions. 2 labs are still in process and RN was not able to relay those results at this time.     Pt had no further questions.    Reny GUNTER RN

## 2025-03-23 ENCOUNTER — MYC MEDICAL ADVICE (OUTPATIENT)
Dept: INTERNAL MEDICINE | Facility: CLINIC | Age: 75
End: 2025-03-23
Payer: MEDICARE

## 2025-03-24 ENCOUNTER — TRANSFERRED RECORDS (OUTPATIENT)
Dept: HEALTH INFORMATION MANAGEMENT | Facility: CLINIC | Age: 75
End: 2025-03-24
Payer: MEDICARE

## 2025-03-24 ENCOUNTER — TELEPHONE (OUTPATIENT)
Dept: INTERNAL MEDICINE | Facility: CLINIC | Age: 75
End: 2025-03-24
Payer: MEDICARE

## 2025-03-24 NOTE — TELEPHONE ENCOUNTER
Patient is to have a covid and cortisone shot in same day. She asked if there is any concern with this.     RN let patient know that this should be fine. We would want her to not have COVID vaccine if she had fever or active infection.    ROSALBA Ernandez  [facility]  [phone]    [facility]   Monday  - Thursday 7 AM - 6 PM    Friday  7 AM - 5 PM     -Please call your clinic for assistance from a nurse after hours.

## 2025-03-25 ENCOUNTER — IMMUNIZATION (OUTPATIENT)
Dept: FAMILY MEDICINE | Facility: CLINIC | Age: 75
End: 2025-03-25
Payer: MEDICARE

## 2025-03-25 PROCEDURE — 91320 SARSCV2 VAC 30MCG TRS-SUC IM: CPT

## 2025-03-25 PROCEDURE — 90480 ADMN SARSCOV2 VAC 1/ONLY CMP: CPT

## 2025-04-01 DIAGNOSIS — I10 ESSENTIAL HYPERTENSION: ICD-10-CM

## 2025-04-01 DIAGNOSIS — I25.119 CORONARY ARTERY DISEASE INVOLVING NATIVE CORONARY ARTERY OF NATIVE HEART WITH ANGINA PECTORIS: ICD-10-CM

## 2025-04-01 RX ORDER — ISOSORBIDE MONONITRATE 30 MG/1
30 TABLET, EXTENDED RELEASE ORAL DAILY
Qty: 90 TABLET | Refills: 0 | Status: SHIPPED | OUTPATIENT
Start: 2025-04-01

## 2025-04-03 ENCOUNTER — TELEPHONE (OUTPATIENT)
Dept: INTERNAL MEDICINE | Facility: CLINIC | Age: 75
End: 2025-04-03
Payer: MEDICARE

## 2025-04-03 DIAGNOSIS — F41.9 ANXIETY: ICD-10-CM

## 2025-04-03 RX ORDER — LORAZEPAM 1 MG/1
TABLET ORAL
Qty: 30 TABLET | Refills: 0 | Status: SHIPPED | OUTPATIENT
Start: 2025-04-03

## 2025-04-03 NOTE — TELEPHONE ENCOUNTER
Pharmacy called PA team line for update on PA, let them know I will start this now      Central Prior Authorization Team   Phone: [phone]    PA Initiation    Medication: Lorazepam 1mg  Insurance Company: [employer] - Phone [phone] Fax [phone]  Pharmacy Filling the Rx: [id number] - [place], [place] - [address] [facility] [address]  Filling Pharmacy Phone: [phone]  Filling Pharmacy Fax:    Start Date: 4/3/2025

## 2025-04-03 NOTE — TELEPHONE ENCOUNTER
Prior Authorization Approval    Authorization Effective Date: 1/3/2025  Authorization Expiration Date: 4/3/2026  Medication: Lorazepam 1mg  Approved Dose/Quantity:   Reference #:     Insurance Company: [employer] - Phone [phone] Fax [phone]  Expected CoPay:       CoPay Card Available:      Foundation Assistance Needed:    Which Pharmacy is filling the prescription (Not needed for infusion/clinic administered): [id number] - [place], [place] - [address] AT [facility] [address]  Pharmacy Notified:  yes  Patient Notified:  yes- Pharmacy will contact patient when ready to /ship

## 2025-04-07 LAB — CV ZIO PRELIM RESULTS: NORMAL

## 2025-04-24 ENCOUNTER — MYC MEDICAL ADVICE (OUTPATIENT)
Dept: INTERNAL MEDICINE | Facility: CLINIC | Age: 75
End: 2025-04-24
Payer: MEDICARE

## 2025-04-24 NOTE — TELEPHONE ENCOUNTER
"LOV 1/29/25    \"Left lumbar radiculopathy  the pain in the left thigh and groin is back. She had the similar pain in February and March when had lumbar spine injection. She denies acute trauma. We discussed the pain management.. She did see the spine specialist and waiting to be scheduled for epidural injection.  Lyrica did not help at all.  She is now taking the Tramadol at bedtime and it helps.  Pain is severe during the night and cannot sleep.\"  "

## 2025-05-01 ENCOUNTER — VIRTUAL VISIT (OUTPATIENT)
Dept: INTERNAL MEDICINE | Facility: CLINIC | Age: 75
End: 2025-05-01
Payer: MEDICARE

## 2025-05-01 DIAGNOSIS — F51.04 CHRONIC INSOMNIA: Primary | ICD-10-CM

## 2025-05-01 DIAGNOSIS — M54.16 LEFT LUMBAR RADICULOPATHY: ICD-10-CM

## 2025-05-01 PROBLEM — R19.5 OCCULT BLOOD IN STOOLS: Status: RESOLVED | Noted: 2024-10-06 | Resolved: 2025-05-01

## 2025-05-01 RX ORDER — KETOCONAZOLE 20 MG/G
CREAM TOPICAL 2 TIMES DAILY
COMMUNITY
Start: 2025-04-16

## 2025-05-01 RX ORDER — TIZANIDINE HYDROCHLORIDE 6 MG/1
6 CAPSULE, GELATIN COATED ORAL 3 TIMES DAILY
Qty: 60 CAPSULE | Refills: 1 | Status: SHIPPED | OUTPATIENT
Start: 2025-05-01

## 2025-05-01 RX ORDER — RAMELTEON 8 MG/1
8 TABLET ORAL AT BEDTIME
Qty: 60 TABLET | Refills: 1 | Status: SHIPPED | OUTPATIENT
Start: 2025-05-01

## 2025-05-01 NOTE — PROGRESS NOTES
"Lo is a 74 year old who is being evaluated via a billable video visit.    How would you like to obtain your AVS? MyChart  If the video visit is dropped, the invitation should be resent by: Text to cell phone: [phone]  Will anyone else be joining your video visit? No      Assessment & Plan     Chronic insomnia  Patient is having insomnia, with difficulty falling and staying asleep. She was on trazodone for many years in the past, but even the dose of 150 mg at bedtime was not providing good sleep. She is taking lorazepam 0.5-1 mg at bedtime daily and have about 2-3 h of sleep. She is getting very tired.  She tried melatonin, Tylenol PM, Unisom, Sleep aid OTC, Lunesta.We tried Lunesta but she is still waking up around 2 am and has trouble going back to sleep. Lunesta makes her very drowsy in the morning.   We will try Rozerem. Follow-up in 3 weeks.  - ramelteon (ROZEREM) 8 MG tablet; Take 1 tablet (8 mg) by mouth at bedtime.    Left lumbar radiculopathy  Pain improved few months ago when she had epidural injection. Now, on some nights, has the left leg pain again, more like a spasm and ache. She is not taking tramadol anymore. We will try tizanidine prn.  - tiZANidine (ZANAFLEX) 6 MG capsule; Take 1 capsule (6 mg) by mouth 3 times daily.    The longitudinal plan of care for the diagnosis(es)/condition(s) as documented were addressed during this visit. Due to the added complexity in care, I will continue to support Lo in the subsequent management and with ongoing continuity of care.      BMI  Estimated body mass index is 34.19 kg/m  as calculated from the following:    Height as of 1/29/25: 1.537 m (5' 0.5\").    Weight as of 3/14/25: 80.7 kg (178 lb).             Subjective   Lo is a 74 year old, presenting for the following health issues:  Insomnia (Having a hard time staying a sleep and falling back asleep. )      Video Start Time:  11:40 AM    History of Present Illness       Reason for visit:  Inability to " sleep. Can fall asleep for 2 hours. Then wake up and cannot go back to sleep. Some nights body is in pain. I am frustrated as I do not get the sleep I need and it is making me a bit irritable.    She eats 2-3 servings of fruits and vegetables daily.She consumes 1 sweetened beverage(s) daily.She exercises with enough effort to increase her heart rate 9 or less minutes per day.  She exercises with enough effort to increase her heart rate 3 or less days per week.   She is taking medications regularly.                    Objective           Vitals:  No vitals were obtained today due to virtual visit.    Physical Exam   GENERAL: alert and no distress  EYES: Eyes grossly normal to inspection.  No discharge or erythema, or obvious scleral/conjunctival abnormalities.  RESP: No audible wheeze, cough, or visible cyanosis.    SKIN: Visible skin clear. No significant rash, abnormal pigmentation or lesions.  NEURO: Cranial nerves grossly intact.  Mentation and speech appropriate for age.  PSYCH: Appropriate affect, tone, and pace of words          Video-Visit Details    Type of service:  Video Visit   Video End Time:12:00 PM  Originating Location (pt. Location): Home    Distant Location (provider location):  On-site  Platform used for Video Visit: Tita  Signed Electronically by: Bernadette Taylor MD

## 2025-05-05 ENCOUNTER — TRANSFERRED RECORDS (OUTPATIENT)
Dept: HEALTH INFORMATION MANAGEMENT | Facility: CLINIC | Age: 75
End: 2025-05-05
Payer: MEDICARE

## 2025-05-10 DIAGNOSIS — F41.9 ANXIETY: ICD-10-CM

## 2025-05-12 RX ORDER — LORAZEPAM 1 MG/1
TABLET ORAL
Qty: 30 TABLET | Refills: 0 | Status: SHIPPED | OUTPATIENT
Start: 2025-05-12

## 2025-05-19 DIAGNOSIS — F41.9 ANXIETY: ICD-10-CM

## 2025-05-20 ENCOUNTER — TRANSFERRED RECORDS (OUTPATIENT)
Dept: HEALTH INFORMATION MANAGEMENT | Facility: CLINIC | Age: 75
End: 2025-05-20
Payer: MEDICARE

## 2025-05-20 RX ORDER — BUSPIRONE HYDROCHLORIDE 15 MG/1
15 TABLET ORAL 3 TIMES DAILY
Qty: 270 TABLET | Refills: 2 | Status: SHIPPED | OUTPATIENT
Start: 2025-05-20

## 2025-05-22 ENCOUNTER — VIRTUAL VISIT (OUTPATIENT)
Dept: INTERNAL MEDICINE | Facility: CLINIC | Age: 75
End: 2025-05-22
Payer: MEDICARE

## 2025-05-22 DIAGNOSIS — M79.7 FIBROMYALGIA: Chronic | ICD-10-CM

## 2025-05-22 DIAGNOSIS — G47.33 OSA (OBSTRUCTIVE SLEEP APNEA): ICD-10-CM

## 2025-05-22 DIAGNOSIS — I48.0 PAROXYSMAL ATRIAL FIBRILLATION (H): Chronic | ICD-10-CM

## 2025-05-22 DIAGNOSIS — G25.81 RESTLESS LEG SYNDROME: ICD-10-CM

## 2025-05-22 DIAGNOSIS — G89.4 CHRONIC PAIN SYNDROME: Chronic | ICD-10-CM

## 2025-05-22 DIAGNOSIS — Z86.79 S/P ABLATION OF ATRIAL FIBRILLATION: ICD-10-CM

## 2025-05-22 DIAGNOSIS — Z98.890 S/P ABLATION OF ATRIAL FIBRILLATION: ICD-10-CM

## 2025-05-22 DIAGNOSIS — F51.04 CHRONIC INSOMNIA: Primary | ICD-10-CM

## 2025-05-22 DIAGNOSIS — Z95.818 PRESENCE OF WATCHMAN LEFT ATRIAL APPENDAGE CLOSURE DEVICE: Chronic | ICD-10-CM

## 2025-05-22 PROBLEM — M25.50 PAIN IN JOINT: Status: RESOLVED | Noted: 2017-09-05 | Resolved: 2025-05-22

## 2025-05-22 PROBLEM — E66.09 CLASS 1 OBESITY DUE TO EXCESS CALORIES WITH SERIOUS COMORBIDITY AND BODY MASS INDEX (BMI) OF 33.0 TO 33.9 IN ADULT: Chronic | Status: RESOLVED | Noted: 2023-03-09 | Resolved: 2025-05-22

## 2025-05-22 PROBLEM — E66.811 CLASS 1 OBESITY DUE TO EXCESS CALORIES WITH SERIOUS COMORBIDITY AND BODY MASS INDEX (BMI) OF 33.0 TO 33.9 IN ADULT: Chronic | Status: RESOLVED | Noted: 2023-03-09 | Resolved: 2025-05-22

## 2025-05-22 RX ORDER — ZALEPLON 5 MG/1
5 CAPSULE ORAL AT BEDTIME
Qty: 60 CAPSULE | Refills: 0 | Status: SHIPPED | OUTPATIENT
Start: 2025-05-22

## 2025-05-22 NOTE — PROGRESS NOTES
Lo is a 74 year old who is being evaluated via a billable video visit.    How would you like to obtain your AVS? MyChart  If the video visit is dropped, the invitation should be resent by: Text to cell phone: [phone]  Will anyone else be joining your video visit? No      Assessment & Plan     Chronic insomnia  Patient is having insomnia, with difficulty falling and staying asleep. She was on trazodone for many years in the past, but even the dose of 150 mg at bedtime was not providing good sleep. She is taking lorazepam 0.5-1 mg at bedtime daily and have about 2-3 h of sleep. She is getting very tired.  She tried melatonin, Tylenol PM, Unisom, Sleep aid OTC, Lunesta, Ambien. Rozerem.In the last 2 weeks, she was taking Rozerem, lorazepam 1mg and musce relxant at bedtime , but she is still waking up 2-3 hours later and has trouble going back to sleep. Lunesta made her very drowsy in the morning.   We will try Sonata.  She will follow-up with sleep clinic.  - zaleplon (SONATA) 5 MG capsule; Take 1 capsule (5 mg) by mouth at bedtime.  - Adult Sleep Eval & Management  Referral; Future    DONN (obstructive sleep apnea)  She saw Suresh Ferguson MD at the [facility] in 2022. Not able to tolerate CPAP or mouth device.She recalls trying a dental appliance in the past   She had virtual visit with Dr Delgado in 11/2024. She will schedule follow-up. The home sleep study was not arranged.  she was referred for CBT-I   She will schedule follow-up to discuss Inspire.   - Adult Sleep Eval & Management  Referral; Future    Restless leg syndrome  She has been on pramipexole 3 times a day. She continues to suffer motor restlessness despite this. This impacts her ability to fall asleep and stay asleep. She does recall trying gabapentin in the past, but is on her allergy list now, since had generalized rash.    Chronic pain syndrome  Left lumbar radiculopathy  Pain improved few months ago when she had epidural  "injection. Now, on some nights, has the left leg pain again, more like a spasm and ache. She is not taking tramadol anymore. We will try tizanidine prn.    Fibromyalgia  We tried Lyrica, but did not help. Gabapentin was giving the rash.    Paroxysmal atrial fibrillation (H)  S/P ablation of atrial fibrillation  Presence of Watchman left atrial appendage closure device  symptomatic with palpitations, chest discomfort, exertional intolerance  [id number] 6 - 31mm Watchman, FLX, 6/22/2023    Coronary artery disease involving native coronary artery of native heart without angina pectoris  status post EUGENIA to OM2 (extending back to Lcx) on 10/12/22 and EUGENIA to distal LAD on 11/8/22    She was on Plavix until December 2023 ( 1 year post stent), now continued ASA 81 mg daily indefinitely.   On Imdur 30 mg daily    We will decrease furosemide to 20 mg since waking up every night after 2-3h of sleep to go to the bathroom. She will watch for leg swelling, dyspnea.    The longitudinal plan of care for the diagnosis(es)/condition(s) as documented were addressed during this visit. Due to the added complexity in care, I will continue to support Lo in the subsequent management and with ongoing continuity of care.      BMI  Estimated body mass index is 34.19 kg/m  as calculated from the following:    Height as of 1/29/25: 1.537 m (5' 0.5\").    Weight as of 3/14/25: 80.7 kg (178 lb).             Halina Blanchard is a 74 year old, presenting for the following health issues:  Follow Up (New on (ROZEREM) 8 MG tab, sleeping 2 hours a time - takes 1 Ativan and 1 muscle relaxer 1/2 hour before going to bed - discuss Lasix dosage )      Video Start Time: 1:00 PM    History of Present Illness       Reason for visit:  Sleep issues    She eats 0-1 servings of fruits and vegetables daily.She consumes 1 sweetened beverage(s) daily.She exercises with enough effort to increase her heart rate 9 or less minutes per day.    She is taking medications " regularly.                    Objective           Vitals:  No vitals were obtained today due to virtual visit.    Physical Exam   GENERAL: alert and no distress  EYES: Eyes grossly normal to inspection.  No discharge or erythema, or obvious scleral/conjunctival abnormalities.  RESP: No audible wheeze, cough, or visible cyanosis.    SKIN: Visible skin clear. No significant rash, abnormal pigmentation or lesions.  NEURO: Cranial nerves grossly intact.  Mentation and speech appropriate for age.  PSYCH: Appropriate affect, tone, and pace of words          Video-Visit Details    Type of service:  Video Visit   Video End Time:1:25 PM  Originating Location (pt. Location): Home    Distant Location (provider location):  On-site  Platform used for Video Visit: Tita  Signed Electronically by: Bernadette Taylor MD

## 2025-05-22 NOTE — PATIENT INSTRUCTIONS
Stop Rozerem.    Try Sonata 5 mg at bedtime, can go up to 10 mg.    Decrease Lasix to 20 mg daily.    Stop drinking fluids after 7pm.    Schedule visit with sleep clinic.

## 2025-05-28 ENCOUNTER — PATIENT OUTREACH (OUTPATIENT)
Dept: CARE COORDINATION | Facility: CLINIC | Age: 75
End: 2025-05-28
Payer: MEDICARE

## 2025-06-02 ENCOUNTER — PATIENT OUTREACH (OUTPATIENT)
Dept: CARE COORDINATION | Facility: CLINIC | Age: 75
End: 2025-06-02
Payer: MEDICARE

## 2025-06-12 DIAGNOSIS — Z92.29 PERSONAL HISTORY OF OTHER DRUG THERAPY: ICD-10-CM

## 2025-06-12 DIAGNOSIS — M81.0 SENILE OSTEOPOROSIS: Primary | ICD-10-CM

## 2025-06-18 DIAGNOSIS — F41.9 ANXIETY: ICD-10-CM

## 2025-06-18 RX ORDER — LORAZEPAM 1 MG/1
TABLET ORAL
Qty: 30 TABLET | Refills: 0 | Status: SHIPPED | OUTPATIENT
Start: 2025-06-18

## 2025-06-19 DIAGNOSIS — M81.0 SENILE OSTEOPOROSIS: Primary | ICD-10-CM

## 2025-06-19 DIAGNOSIS — Z92.29 PERSONAL HISTORY OF OTHER DRUG THERAPY: ICD-10-CM

## 2025-06-25 ENCOUNTER — OFFICE VISIT (OUTPATIENT)
Dept: CARDIOLOGY | Facility: CLINIC | Age: 75
End: 2025-06-25
Attending: INTERNAL MEDICINE
Payer: MEDICARE

## 2025-06-25 VITALS
WEIGHT: 178 LBS | OXYGEN SATURATION: 98 % | HEART RATE: 78 BPM | DIASTOLIC BLOOD PRESSURE: 83 MMHG | BODY MASS INDEX: 34.19 KG/M2 | SYSTOLIC BLOOD PRESSURE: 158 MMHG

## 2025-06-25 DIAGNOSIS — I25.10 CORONARY ARTERY DISEASE INVOLVING NATIVE CORONARY ARTERY OF NATIVE HEART WITHOUT ANGINA PECTORIS: Primary | ICD-10-CM

## 2025-06-25 DIAGNOSIS — E78.5 DYSLIPIDEMIA, GOAL LDL BELOW 70: Chronic | ICD-10-CM

## 2025-06-25 DIAGNOSIS — I50.32 CHRONIC DIASTOLIC CONGESTIVE HEART FAILURE (H): ICD-10-CM

## 2025-06-25 DIAGNOSIS — I48.0 PAROXYSMAL ATRIAL FIBRILLATION (H): Chronic | ICD-10-CM

## 2025-06-25 DIAGNOSIS — I10 ESSENTIAL (PRIMARY) HYPERTENSION: ICD-10-CM

## 2025-06-25 PROCEDURE — 3077F SYST BP >= 140 MM HG: CPT | Performed by: INTERNAL MEDICINE

## 2025-06-25 PROCEDURE — G2211 COMPLEX E/M VISIT ADD ON: HCPCS | Performed by: INTERNAL MEDICINE

## 2025-06-25 PROCEDURE — 99214 OFFICE O/P EST MOD 30 MIN: CPT | Performed by: INTERNAL MEDICINE

## 2025-06-25 PROCEDURE — 3079F DIAST BP 80-89 MM HG: CPT | Performed by: INTERNAL MEDICINE

## 2025-06-25 RX ORDER — AMLODIPINE BESYLATE 5 MG/1
5 TABLET ORAL DAILY
Qty: 90 TABLET | Refills: 3 | Status: SHIPPED | OUTPATIENT
Start: 2025-06-25

## 2025-06-25 RX ORDER — AMLODIPINE BESYLATE 5 MG/1
5 TABLET ORAL DAILY
Qty: 90 TABLET | Refills: 4 | Status: SHIPPED | OUTPATIENT
Start: 2025-06-25 | End: 2025-06-25

## 2025-06-25 RX ORDER — OMEPRAZOLE 20 MG/1
CAPSULE, DELAYED RELEASE ORAL
COMMUNITY
Start: 2025-06-11

## 2025-06-25 NOTE — LETTER
6/25/2025    Bernadette Taylor MD  [address] [address]    RE: Suma Puenteell       Dear Colleague,     I had the pleasure of seeing Suma Mark in the [facility].            Assessment/Plan:   1.  Coronary artery disease, s/p EUGENIA to mid LCX/OM1 and EUGENIA to distal LAD: The patient had no chest pain or shortness of breath on exertion.  Continue aspirin, atorvastatin.  She developed headaches which could cause high blood pressure.  After discussion, isosorbide mononitrate 30 mg daily is held to see if her headaches will be improved.    2.  Chronic congestive heart failure with preserved ejection fraction: No shortness of breath on exertion.  Her weight has been stable.  She has no leg edema.  No JVDs.  Her lungs are clear.  She is compensated at this time.  Continue Lasix 40 mg daily.  Her BMP on March 14 is reviewed, stable.  She is going to have annual physical examination next month, will have routine labs including lipid profile.    3.  Paroxysmal atrial fibrillation stated ablation, s/p Watchman device placement: The patient has maintained in sinus rhythm.  She has been off antiarrhythmic medication.  Continue to monitor.  Continue aspirin 81 mg daily.    4.  Primary hypertension: Her blood pressure is elevated.  As we discussed it could be related to headaches.  Discontinued isosorbide mononitrate.  Increase amlodipine from 2.5 mg daily to 5 mg daily.  Continue irbesartan 150 mg daily.  Continue to monitor her blood pressure.    5.  Dyslipidemia: Continue atorvastatin 80 mg at bedtime.  LDL was 58.    6.  Obesity: Lifestyle modification.    Thank you for the opportunity to be involved in the care of Suma Mark. If you have any questions, please feel free to contact me.  I will see the patient again in 6 months and as needed.    Much or all of the text in this note was generated through the use of Dragon Dictate voice-to-text software. Errors in spelling or words  which seem out of context are unintentional. Sound alike errors, in particular, may have escaped editing.       History of Present Illness:   It is my pleasure to see Suma Mark at the [facility] clinic for routine cardiology follow up.  Suma Mark is a 74 year old female with a medical history of coronary artery disease status post EUGENIA to mid LCx/OM1, EUGENIA to distal LAD on November 8, 2022, primary hypertension, dyslipidemia, chronic congestive heart failure with preserved ejection fraction, paroxysmal atrial fibrillation stated ablation on August 23, 2024, obesity.    The patient states that she did not have shortness of breath on exertion.  She denies chest pain, palpitations. She has occasional fluttering heartbeats, no obvious episode of palpitations.  She had no dizziness, orthopnea, PND.  He has trace leg edema.  Her weight has been stable.  He developed headaches over the last 2 weeks.  Her blood pressure has been elevated.  Her pulse is well-controlled.    Zio patch monitor in April 2025 was reported no episode of atrial fibrillation.    Past Medical History:     Patient Active Problem List   Diagnosis     Total knee replacement status     Acquired hypothyroidism     Fibromyalgia     Diverticulosis     Essential hypertension     Gastroesophageal reflux disease without esophagitis     Chronic insomnia     Migraine     Dyslipidemia, goal LDL below 70     Restless leg syndrome     Thyroid nodule     Coronary artery disease involving native coronary artery of native heart without angina pectoris     Paroxysmal atrial fibrillation (H)     Senile osteoporosis     Generalized anxiety disorder     Venous insufficiency of both lower extremities     Chronic pain syndrome     Anemia     Presence of Watchman left atrial appendage closure device     DONN (obstructive sleep apnea) - mild (AHI 11)     Iron deficiency anemia secondary to inadequate dietary iron intake     Prediabetes     Acute  diverticulitis     Abdominal hernia     Constipation     Diverticulosis of large intestine without hemorrhage     Gastro-esophageal reflux disease with esophagitis     Ileum ulcer     Melena     Polyp of colon     S/P ablation of atrial fibrillation     Class 2 severe obesity due to excess calories with serious comorbidity in adult (H)     Left lumbar radiculopathy       Past Surgical History:     Past Surgical History:   Procedure Laterality Date     ARTHROPLASTY KNEE UNICOMPARTMENT  10/31/2013    Procedure: ARTHROPLASTY KNEE UNICOMPARTMENT;  LEFT KNEE UNICOMPARTMENTAL ARTHROPLASTY (CAROLINE)^;  Surgeon: Chi Mittal MD;  Location:  OR     BREAST SURGERY      bx     COLECTOMY N/A 06/02/2017    Procedure: RESECTION, SMALL INTESTINE, OPEN ADHESIOLYSIS;  Surgeon: Keyon Qureshi MD;  Location: [facility];  Service:      COLONOSCOPY N/A 4/4/2024    Procedure: COLONOSCOPY WITH POLYPECTOMY;  Surgeon: Chris Edmonds MD;  Location: [facility]     CV CORONARY ANGIOGRAM N/A 10/12/2022    Procedure: CV CORONARY ANGIOGRAM;  Surgeon: You Martinez MD;  Location: [facility] CV     CV CORONARY ANGIOGRAM N/A 6/9/2023    Procedure: Coronary Angiogram;  Surgeon: Bret Jin MD;  Location: [facility] CV     CV FRACTIONAL FLOW RATIO WIRE N/A 10/12/2022    Procedure: Fractional Flow Ratio Wire;  Surgeon: You Martinez MD;  Location: [facility] CV     CV LEFT ATRIAL APPENDAGE CLOSURE Right 6/22/2023    Procedure: Left Atrial Appendage Closure;  Surgeon: Lenin Mariano MD;  Location: [facility] LAB CV     CV LEFT HEART CATH N/A 6/9/2023    Procedure: Left Heart Catheterization;  Surgeon: Bret Jin MD;  Location: [facility] CV     CV PCI STENT DRUG ELUTING N/A 10/12/2022    Procedure: Percutaneous Coronary Intervention Stent;  Surgeon: You Martinez MD;  Location: [facility] CV     CV PCI STENT DRUG ELUTING N/A 11/08/2022    Procedure:  Percutaneous Coronary Intervention - Stent;  Surgeon: Bret Jin MD;  Location: [facility] CV     ENDOSCOPIC RETROGRADE CHOLANGIOPANCREATOGRAPHY       ENDOSCOPIC STRIPPING VEIN(S)      BILATERLY     EP ABLATION PULMONARY VEIN ISOLATION N/A 8/23/2024    Procedure: Ablation Atrial Fibrillation;  Surgeon: Lenin Mariano MD;  Location: [facility] CV     GENITOURINARY SURGERY      bladder sling     GI SURGERY  10/02/2015    Rectal prolaspse. s/p Abdominal rectopexy, sacral colpopexy, proctoscopy, cystoscopy     HERNIORRHAPHY INCISIONAL (LOCATION) N/A 06/02/2017    Procedure: LAPARASCOPIC CONVERTED TO OPEN PRIMARY INCISIONAL HERNIA REPAIR;  Surgeon: Keyon Qureshi MD;  Location: [facility];  Service:      HYSTERECTOMY  1985 1985-1986     LAMINECTOMY LUMBAR TWO LEVELS  2006     LAPAROSCOPY N/A 08/22/2019    Procedure: DIAGNOSTIC LAPAROSCOPY, LYSIS OF ADHESION;  Surgeon: Svetlana Ron MD;  Location: [facility];  Service: General     LUMBAR HARDWARE REMOVAL[  03/22/2012    REMOVAL LUMBAR HARDWARE 03/22/2012   L3-S1; Type CD Horizon m8 instrumentation Dr. Murray     RELEASE CARPAL TUNNEL       TONSILLECTOMY  1954    ???     TOTAL KNEE ARTHROPLASTY Right 11/27/2017    Procedure:  RIGHT TOTAL KNEE ARTHROPLASTY;  Surgeon: Kevin Ryder MD;  Location: [facility];  Service: Orthopedics     US THYROID BIOPSY  04/19/2019       Family History:     Family History   Problem Relation Age of Onset     Dementia Mother      Arthritis Mother      Chronic Obstructive Pulmonary Disease Father      Cardiovascular Father      Hypertension Father      No Known Problems Sister      No Known Problems Daughter      No Known Problems Son      Cerebrovascular Disease Maternal Grandmother      Heart Disease Paternal Grandmother      Cerebrovascular Disease Paternal Grandmother      No Known Problems Sister      No Known Problems Sister      No Known Problems Son      No Known Problems  Daughter      Breast Cancer Paternal Aunt         age in 50's        Social History:    reports that she has never smoked. She has never been exposed to tobacco smoke. She has never used smokeless tobacco. She reports current alcohol use. She reports that she does not use drugs.    Review of Systems:   12 systems are reviewed negative except for in HPI.    Meds:     Current Outpatient Medications:      amLODIPine (NORVASC) 5 MG tablet, Take 1 tablet (5 mg) by mouth daily. Taking Half Tab, Disp: 90 tablet, Rfl: 4     atorvastatin (LIPITOR) 80 MG tablet, TAKE 1 TABLET BY MOUTH EVERYDAY AT BEDTIME, Disp: 30 tablet, Rfl: 11     botulinum toxin type A (BOTOX) 100 units injection, Every 3 months. [facility], Disp: , Rfl:      busPIRone (BUSPAR) 15 MG tablet, TAKE 1 TABLET BY MOUTH THREE TIMES A DAY, Disp: 270 tablet, Rfl: 2     Calcium Carbonate-Vit D-Min (CALCIUM 1200 PO), Take 1 tablet by mouth daily, Disp: , Rfl:      cholecalciferol 50 MCG (2000 UT) CAPS, Take 2,000 Units by mouth daily, Disp: , Rfl:      citalopram (CELEXA) 40 MG tablet, Take 1 tablet (40 mg) by mouth daily, Disp: 90 tablet, Rfl: 2     eletriptan (RELPAX) 40 MG tablet, Take 1 tablet (40 mg) by mouth at onset of headache for migraine, Disp: 10 tablet, Rfl: 0     furosemide (LASIX) 20 MG tablet, TAKE 2 TABLETS BY MOUTH EVERY DAY, Disp: 180 tablet, Rfl: 3     HYDROcodone-acetaminophen (NORCO) 5-325 MG tablet, TAKE 1 TABLET ORALLY (OK TO FILL 09/16/2024) ONCE A DAY AS NEEDED FOR PAIN 30 DAYS, Disp: , Rfl:      irbesartan (AVAPRO) 150 MG tablet, Take 1 tablet (150 mg) by mouth daily., Disp: 90 tablet, Rfl: 2     ketoconazole (NIZORAL) 2 % external cream, Apply topically 2 times daily., Disp: , Rfl:      levothyroxine (SYNTHROID/LEVOTHROID) 88 MCG tablet, TAKE 1 TABLET BY MOUTH DAILY AT 6:00 AM., Disp: 90 tablet, Rfl: 1     LORazepam (ATIVAN) 1 MG tablet, TAKE 1 TAB BY MOUTH NIGHTLY AS NEEDED FOR ANXIETY OR SLEEP, Disp: 30 tablet, Rfl: 0      nitroGLYcerin (NITROSTAT) 0.4 MG sublingual tablet, For chest pain place 1 tablet under the tongue every 5 minutes for 3 doses. If symptoms persist 5 minutes after 1st dose call 911., Disp: 30 tablet, Rfl: 1     omeprazole (PRILOSEC) 20 MG DR capsule, TAKE 1 CAPSULE BY MOUTH EVERY DAY 30 MINUTES TO 1 HOUR BEFORE A MEAL, Disp: , Rfl:      pramipexole (MIRAPEX) 0.25 MG tablet, TAKE 1 TABLET BY MOUTH THREE TIMES A DAY, Disp: 270 tablet, Rfl: 2     tiZANidine (ZANAFLEX) 6 MG capsule, Take 1 capsule (6 mg) by mouth 3 times daily., Disp: 60 capsule, Rfl: 1     zaleplon (SONATA) 5 MG capsule, Take 1 capsule (5 mg) by mouth at bedtime., Disp: 60 capsule, Rfl: 0     isosorbide mononitrate (IMDUR) 30 MG 24 hr tablet, TAKE 1 TABLET BY MOUTH EVERY DAY (Patient not taking: Reported on 6/25/2025), Disp: 90 tablet, Rfl: 0     omeprazole (PRILOSEC) 40 MG DR capsule, Take 1 capsule (40 mg) by mouth daily (Patient not taking: Reported on 6/25/2025), Disp: 45 capsule, Rfl: 0    Current Facility-Administered Medications:      denosumab (PROLIA) injection 60 mg, 60 mg, Subcutaneous, Q6 Months,      denosumab (PROLIA) injection 60 mg, 60 mg, Subcutaneous, Q6 Months,     Facility-Administered Medications Ordered in Other Visits:      iodixanol (VISIPAQUE 320) injection, , , Once PRN, Bret Jin MD, 65 mL at 06/09/23 0955    Allergies:   Ace inhibitors, Amitriptyline hcl, Atenolol, Celebrex [celecoxib], Cephalexin, Clonidine, Codeine sulfate, Darvocet [propoxyphene n-apap], Dexamethasone acetate, Erythromycin, Escitalopram, Propoxyphene, Sodium, Triamterene, Venlafaxine, Zolpidem, Adhesive tape, Bacitracin, Cephalosporins, Gabapentin, Hctz, Potassium, Sulfanilamide, Trimethoprim, and Zolpidem tartrate      Objective:      Physical Exam  80.7 kg (178 lb)     Body mass index is 34.19 kg/m .  BP (!) 158/83 (BP Location: Left arm, Patient Position: Sitting, Cuff Size: Adult Large)   Pulse 78   Wt 80.7 kg (178 lb)   LMP  (LMP Unknown)    SpO2 98%   BMI 34.19 kg/m      General Appearance:   Awake, Alert, No acute distress.   HEENT:  Pupil equal and reactive to light. No scleral icterus; the mucous membranes were moist.   Neck: No cervical bruits. No JVD. No thyromegaly.     Chest: The spine was straight. The chest was symmetric.   Lungs:   Respirations unlabored; Lungs are clear to auscultation. No crackles. No wheezing.   Cardiovascular:   Regular rhythm and rate, normal first and second heart sounds with I/VI systolic murmurs. No rubs or gallops.    Abdomen:  Obese. Soft. No tenderness. Non-distended. Bowels sounds are present   Extremities: Equal tibial pulses. No leg edema.   Skin: No rashes or ulcers. Warm, Dry.   Musculoskeletal: No tenderness. No deformity.   Neurologic: Mood and affect are appropriate. No focal deficits.         EKG: Personally reviewed  Sinus rhythm   Prolonged QT   Abnormal ECG   When compared with ECG of 15-Aug-2024 09:20,   QT has lengthened     Cardiac Imaging Studies  DIANA on September 27, 2023:  Patient was sedated using Versed 2 mg. The heart rate, respiratory rate,  oxygen saturations, blood pressure, and response to care were monitored  throughout the procedure with the assistance of the nurse.  Left ventricular size, wall motion and function are normal. The ejection  fraction is 60-65%.  Normal right ventricle size and systolic function.  Doppler suggests left to right interatrial shunt.  Watchman device noted in left atrial appendage. The device is well seated with  no leakage by color flow Doppler imaging.  Thrombus absent on left atrial appendage occlusion device.  Moderate atherosclerotic plaque(s) in the descending aorta.    Coronary angiogram with PCI on November 8, 2022:  EUGENIA to mid LCX and OM1 and EUGENIA to distal LAD    Zio monitoring from 3/15/2025 to 3/29/2025 (duration 13d 23h).  Predominant underlying rhythm was sinus rhythm, 56 to 124bpm, average 85bpm.  No atrial fibrillation.  There were no pauses  "of greater than 3 seconds.  Rare supraventricular ectopic beats (<1%).  55 Supraventricular Tachycardia runs occurred, the run with the fastest interval lasting 8.2 secs with a max rate of 207 bpm, the longest lasting 11.7 secs with an avg rate of 144 bpm.  Rare premature ventricular contractions (<1%).  Symptom triggers correlated with normal sinus rhythm or sinus tachycardia with rare PVCs .    Lab Review   Lab Results   Component Value Date     12/13/2022    CO2 25 12/13/2022    CO2 26 11/27/2022    BUN 18.2 12/13/2022    BUN 20 11/27/2022     Lab Results   Component Value Date    WBC 6.5 11/27/2022    HGB 12.1 11/27/2022    HCT 37.1 11/27/2022    MCV 90 11/27/2022     11/27/2022     Lab Results   Component Value Date    CHOL 143 06/26/2024    CHOL 131 12/20/2023    CHOL 132 11/15/2022     Lab Results   Component Value Date    HDL 65 06/26/2024    HDL 60 12/20/2023    HDL 43 (L) 11/15/2022     No components found for: \"LDLCALC\"  Lab Results   Component Value Date    TRIG 98 06/26/2024    TRIG 99 12/20/2023    TRIG 106 11/15/2022     No results found for: \"CHOLHDL\"  Lab Results   Component Value Date    TROPONINI 0.02 11/28/2022     Lab Results   Component Value Date     11/27/2022     Lab Results   Component Value Date    TSH 3.53 12/13/2022    TSH 3.99 06/08/2022               Thank you for allowing me to participate in the care of your patient.      Sincerely,     Jacek Worley MD     [facility]  cc:   Jacek Worley MD  [facility]  [place] [address]      "

## 2025-06-25 NOTE — PROGRESS NOTES
Assessment/Plan:   1.  Coronary artery disease, s/p EUGENIA to mid LCX/OM1 and EUGENIA to distal LAD: The patient had no chest pain or shortness of breath on exertion.  Continue aspirin, atorvastatin.  She developed headaches which could cause high blood pressure.  After discussion, isosorbide mononitrate 30 mg daily is held to see if her headaches will be improved.    2.  Chronic congestive heart failure with preserved ejection fraction: No shortness of breath on exertion.  Her weight has been stable.  She has no leg edema.  No JVDs.  Her lungs are clear.  She is compensated at this time.  Continue Lasix 40 mg daily.  Her BMP on March 14 is reviewed, stable.  She is going to have annual physical examination next month, will have routine labs including lipid profile.    3.  Paroxysmal atrial fibrillation stated ablation, s/p Watchman device placement: The patient has maintained in sinus rhythm.  She has been off antiarrhythmic medication.  Continue to monitor.  Continue aspirin 81 mg daily.    4.  Primary hypertension: Her blood pressure is elevated.  As we discussed it could be related to headaches.  Discontinued isosorbide mononitrate.  Increase amlodipine from 2.5 mg daily to 5 mg daily.  Continue irbesartan 150 mg daily.  Continue to monitor her blood pressure.    5.  Dyslipidemia: Continue atorvastatin 80 mg at bedtime.  LDL was 58.    6.  Obesity: Lifestyle modification.    Thank you for the opportunity to be involved in the care of Suma Mark. If you have any questions, please feel free to contact me.  I will see the patient again in 6 months and as needed.    Much or all of the text in this note was generated through the use of Dragon Dictate voice-to-text software. Errors in spelling or words which seem out of context are unintentional. Sound alike errors, in particular, may have escaped editing.       History of Present Illness:   It is my pleasure to see Suma Mark at the [facility] for routine cardiology follow up.  Suma Mark is a 74 year old female with a medical history of coronary artery disease status post EUGENIA to mid LCx/OM1, EUGENIA to distal LAD on November 8, 2022, primary hypertension, dyslipidemia, chronic congestive heart failure with preserved ejection fraction, paroxysmal atrial fibrillation stated ablation on August 23, 2024, obesity.    The patient states that she did not have shortness of breath on exertion.  She denies chest pain, palpitations. She has occasional fluttering heartbeats, no obvious episode of palpitations.  She had no dizziness, orthopnea, PND.  He has trace leg edema.  Her weight has been stable.  He developed headaches over the last 2 weeks.  Her blood pressure has been elevated.  Her pulse is well-controlled.    Zio patch monitor in April 2025 was reported no episode of atrial fibrillation.    Past Medical History:     Patient Active Problem List   Diagnosis    Total knee replacement status    Acquired hypothyroidism    Fibromyalgia    Diverticulosis    Essential hypertension    Gastroesophageal reflux disease without esophagitis    Chronic insomnia    Migraine    Dyslipidemia, goal LDL below 70    Restless leg syndrome    Thyroid nodule    Coronary artery disease involving native coronary artery of native heart without angina pectoris    Paroxysmal atrial fibrillation (H)    Senile osteoporosis    Generalized anxiety disorder    Venous insufficiency of both lower extremities    Chronic pain syndrome    Anemia    Presence of Watchman left atrial appendage closure device    DONN (obstructive sleep apnea) - mild (AHI 11)    Iron deficiency anemia secondary to inadequate dietary iron intake    Prediabetes    Acute diverticulitis    Abdominal hernia    Constipation    Diverticulosis of large intestine without hemorrhage    Gastro-esophageal reflux disease with esophagitis    Ileum ulcer    Melena    Polyp of colon    S/P ablation of atrial  fibrillation    Class 2 severe obesity due to excess calories with serious comorbidity in adult (H)    Left lumbar radiculopathy       Past Surgical History:     Past Surgical History:   Procedure Laterality Date    ARTHROPLASTY KNEE UNICOMPARTMENT  10/31/2013    Procedure: ARTHROPLASTY KNEE UNICOMPARTMENT;  LEFT KNEE UNICOMPARTMENTAL ARTHROPLASTY (CAROLINE)^;  Surgeon: Chi Mittal MD;  Location:  OR    BREAST SURGERY      bx    COLECTOMY N/A 06/02/2017    Procedure: RESECTION, SMALL INTESTINE, OPEN ADHESIOLYSIS;  Surgeon: Keyon Qureshi MD;  Location: [facility] OR;  Service:     COLONOSCOPY N/A 4/4/2024    Procedure: COLONOSCOPY WITH POLYPECTOMY;  Surgeon: Chris Edmonds MD;  Location: [facility] OR    CV CORONARY ANGIOGRAM N/A 10/12/2022    Procedure: CV CORONARY ANGIOGRAM;  Surgeon: You Martinez MD;  Location: [facility] CV    CV CORONARY ANGIOGRAM N/A 6/9/2023    Procedure: Coronary Angiogram;  Surgeon: Bret Jin MD;  Location: [facility] CV    CV FRACTIONAL FLOW RATIO WIRE N/A 10/12/2022    Procedure: Fractional Flow Ratio Wire;  Surgeon: You Martinez MD;  Location: [facility] LAB CV    CV LEFT ATRIAL APPENDAGE CLOSURE Right 6/22/2023    Procedure: Left Atrial Appendage Closure;  Surgeon: Lenin Mariano MD;  Location: [facility] LAB CV    CV LEFT HEART CATH N/A 6/9/2023    Procedure: Left Heart Catheterization;  Surgeon: Bret Jin MD;  Location: [facility] CV    CV PCI STENT DRUG ELUTING N/A 10/12/2022    Procedure: Percutaneous Coronary Intervention Stent;  Surgeon: You Martinez MD;  Location: [facility] LAB CV    CV PCI STENT DRUG ELUTING N/A 11/08/2022    Procedure: Percutaneous Coronary Intervention - Stent;  Surgeon: Bret Jin MD;  Location: [facility] CV    ENDOSCOPIC RETROGRADE CHOLANGIOPANCREATOGRAPHY      ENDOSCOPIC STRIPPING VEIN(S)      BILATERLY    EP ABLATION PULMONARY VEIN ISOLATION N/A 8/23/2024     Procedure: Ablation Atrial Fibrillation;  Surgeon: Lenin Mariano MD;  [place] [facility]    GENITOURINARY SURGERY      bladder sling    GI SURGERY  10/02/2015    Rectal prolaspse. s/p Abdominal rectopexy, sacral colpopexy, proctoscopy, cystoscopy    HERNIORRHAPHY INCISIONAL (LOCATION) N/A 06/02/2017    Procedure: LAPARASCOPIC CONVERTED TO OPEN PRIMARY INCISIONAL HERNIA REPAIR;  Surgeon: Keyon Qureshi MD;  Location: [facility];  Service:     HYSTERECTOMY  1985 1985-1986    LAMINECTOMY LUMBAR TWO LEVELS  2006    LAPAROSCOPY N/A 08/22/2019    Procedure: DIAGNOSTIC LAPAROSCOPY, LYSIS OF ADHESION;  Surgeon: Svetlana Ron MD;  [facility];  Service: General    LUMBAR HARDWARE REMOVAL[  03/22/2012    REMOVAL LUMBAR HARDWARE 03/22/2012   L3-S1; Type CD Horizon m8 instrumentation Dr. Murray    RELEASE CARPAL TUNNEL      TONSILLECTOMY  1954    ???    TOTAL KNEE ARTHROPLASTY Right 11/27/2017    Procedure:  RIGHT TOTAL KNEE ARTHROPLASTY;  Surgeon: Kevin Ryder MD;  Location: [facility];  Service: Orthopedics    US THYROID BIOPSY  04/19/2019       Family History:     Family History   Problem Relation Age of Onset    Dementia Mother     Arthritis Mother     Chronic Obstructive Pulmonary Disease Father     Cardiovascular Father     Hypertension Father     No Known Problems Sister     No Known Problems Daughter     No Known Problems Son     Cerebrovascular Disease Maternal Grandmother     Heart Disease Paternal Grandmother     Cerebrovascular Disease Paternal Grandmother     No Known Problems Sister     No Known Problems Sister     No Known Problems Son     No Known Problems Daughter     Breast Cancer Paternal Aunt         age in 50's        Social History:    reports that she has never smoked. She has never been exposed to tobacco smoke. She has never used smokeless tobacco. She reports current alcohol use. She reports that she does not use drugs.    Review of  Systems:   12 systems are reviewed negative except for in HPI.    Meds:     Current Outpatient Medications:     amLODIPine (NORVASC) 5 MG tablet, Take 1 tablet (5 mg) by mouth daily. Taking Half Tab, Disp: 90 tablet, Rfl: 4    atorvastatin (LIPITOR) 80 MG tablet, TAKE 1 TABLET BY MOUTH EVERYDAY AT BEDTIME, Disp: 30 tablet, Rfl: 11    botulinum toxin type A (BOTOX) 100 units injection, Every 3 months. [facility], Disp: , Rfl:     busPIRone (BUSPAR) 15 MG tablet, TAKE 1 TABLET BY MOUTH THREE TIMES A DAY, Disp: 270 tablet, Rfl: 2    Calcium Carbonate-Vit D-Min (CALCIUM 1200 PO), Take 1 tablet by mouth daily, Disp: , Rfl:     cholecalciferol 50 MCG (2000 UT) CAPS, Take 2,000 Units by mouth daily, Disp: , Rfl:     citalopram (CELEXA) 40 MG tablet, Take 1 tablet (40 mg) by mouth daily, Disp: 90 tablet, Rfl: 2    eletriptan (RELPAX) 40 MG tablet, Take 1 tablet (40 mg) by mouth at onset of headache for migraine, Disp: 10 tablet, Rfl: 0    furosemide (LASIX) 20 MG tablet, TAKE 2 TABLETS BY MOUTH EVERY DAY, Disp: 180 tablet, Rfl: 3    HYDROcodone-acetaminophen (NORCO) 5-325 MG tablet, TAKE 1 TABLET ORALLY (OK TO FILL 09/16/2024) ONCE A DAY AS NEEDED FOR PAIN 30 DAYS, Disp: , Rfl:     irbesartan (AVAPRO) 150 MG tablet, Take 1 tablet (150 mg) by mouth daily., Disp: 90 tablet, Rfl: 2    ketoconazole (NIZORAL) 2 % external cream, Apply topically 2 times daily., Disp: , Rfl:     levothyroxine (SYNTHROID/LEVOTHROID) 88 MCG tablet, TAKE 1 TABLET BY MOUTH DAILY AT 6:00 AM., Disp: 90 tablet, Rfl: 1    LORazepam (ATIVAN) 1 MG tablet, TAKE 1 TAB BY MOUTH NIGHTLY AS NEEDED FOR ANXIETY OR SLEEP, Disp: 30 tablet, Rfl: 0    nitroGLYcerin (NITROSTAT) 0.4 MG sublingual tablet, For chest pain place 1 tablet under the tongue every 5 minutes for 3 doses. If symptoms persist 5 minutes after 1st dose call 911., Disp: 30 tablet, Rfl: 1    omeprazole (PRILOSEC) 20 MG DR capsule, TAKE 1 CAPSULE BY MOUTH EVERY DAY 30 MINUTES TO 1 HOUR BEFORE A MEAL,  Disp: , Rfl:     pramipexole (MIRAPEX) 0.25 MG tablet, TAKE 1 TABLET BY MOUTH THREE TIMES A DAY, Disp: 270 tablet, Rfl: 2    tiZANidine (ZANAFLEX) 6 MG capsule, Take 1 capsule (6 mg) by mouth 3 times daily., Disp: 60 capsule, Rfl: 1    zaleplon (SONATA) 5 MG capsule, Take 1 capsule (5 mg) by mouth at bedtime., Disp: 60 capsule, Rfl: 0    isosorbide mononitrate (IMDUR) 30 MG 24 hr tablet, TAKE 1 TABLET BY MOUTH EVERY DAY (Patient not taking: Reported on 6/25/2025), Disp: 90 tablet, Rfl: 0    omeprazole (PRILOSEC) 40 MG DR capsule, Take 1 capsule (40 mg) by mouth daily (Patient not taking: Reported on 6/25/2025), Disp: 45 capsule, Rfl: 0    Current Facility-Administered Medications:     denosumab (PROLIA) injection 60 mg, 60 mg, Subcutaneous, Q6 Months,     denosumab (PROLIA) injection 60 mg, 60 mg, Subcutaneous, Q6 Months,     Facility-Administered Medications Ordered in Other Visits:     iodixanol (VISIPAQUE 320) injection, , , Once PRN, Bret Jin MD, 65 mL at 06/09/23 0955    Allergies:   Ace inhibitors, Amitriptyline hcl, Atenolol, Celebrex [celecoxib], Cephalexin, Clonidine, Codeine sulfate, Darvocet [propoxyphene n-apap], Dexamethasone acetate, Erythromycin, Escitalopram, Propoxyphene, Sodium, Triamterene, Venlafaxine, Zolpidem, Adhesive tape, Bacitracin, Cephalosporins, Gabapentin, Hctz, Potassium, Sulfanilamide, Trimethoprim, and Zolpidem tartrate      Objective:      Physical Exam  80.7 kg (178 lb)     Body mass index is 34.19 kg/m .  BP (!) 158/83 (BP Location: Left arm, Patient Position: Sitting, Cuff Size: Adult Large)   Pulse 78   Wt 80.7 kg (178 lb)   LMP  (LMP Unknown)   SpO2 98%   BMI 34.19 kg/m      General Appearance:   Awake, Alert, No acute distress.   HEENT:  Pupil equal and reactive to light. No scleral icterus; the mucous membranes were moist.   Neck: No cervical bruits. No JVD. No thyromegaly.     Chest: The spine was straight. The chest was symmetric.   Lungs:   Respirations  unlabored; Lungs are clear to auscultation. No crackles. No wheezing.   Cardiovascular:   Regular rhythm and rate, normal first and second heart sounds with I/VI systolic murmurs. No rubs or gallops.    Abdomen:  Obese. Soft. No tenderness. Non-distended. Bowels sounds are present   Extremities: Equal tibial pulses. No leg edema.   Skin: No rashes or ulcers. Warm, Dry.   Musculoskeletal: No tenderness. No deformity.   Neurologic: Mood and affect are appropriate. No focal deficits.         EKG: Personally reviewed  Sinus rhythm   Prolonged QT   Abnormal ECG   When compared with ECG of 15-Aug-2024 09:20,   QT has lengthened     Cardiac Imaging Studies  DIANA on September 27, 2023:  Patient was sedated using Versed 2 mg. The heart rate, respiratory rate,  oxygen saturations, blood pressure, and response to care were monitored  throughout the procedure with the assistance of the nurse.  Left ventricular size, wall motion and function are normal. The ejection  fraction is 60-65%.  Normal right ventricle size and systolic function.  Doppler suggests left to right interatrial shunt.  Watchman device noted in left atrial appendage. The device is well seated with  no leakage by color flow Doppler imaging.  Thrombus absent on left atrial appendage occlusion device.  Moderate atherosclerotic plaque(s) in the descending aorta.    Coronary angiogram with PCI on November 8, 2022:  EUGENIA to mid LCX and OM1 and EUGENIA to distal LAD    Zio monitoring from 3/15/2025 to 3/29/2025 (duration 13d 23h).  Predominant underlying rhythm was sinus rhythm, 56 to 124bpm, average 85bpm.  No atrial fibrillation.  There were no pauses of greater than 3 seconds.  Rare supraventricular ectopic beats (<1%).  55 Supraventricular Tachycardia runs occurred, the run with the fastest interval lasting 8.2 secs with a max rate of 207 bpm, the longest lasting 11.7 secs with an avg rate of 144 bpm.  Rare premature ventricular contractions (<1%).  Symptom triggers  "correlated with normal sinus rhythm or sinus tachycardia with rare PVCs .    Lab Review   Lab Results   Component Value Date     12/13/2022    CO2 25 12/13/2022    CO2 26 11/27/2022    BUN 18.2 12/13/2022    BUN 20 11/27/2022     Lab Results   Component Value Date    WBC 6.5 11/27/2022    HGB 12.1 11/27/2022    HCT 37.1 11/27/2022    MCV 90 11/27/2022     11/27/2022     Lab Results   Component Value Date    CHOL 143 06/26/2024    CHOL 131 12/20/2023    CHOL 132 11/15/2022     Lab Results   Component Value Date    HDL 65 06/26/2024    HDL 60 12/20/2023    HDL 43 (L) 11/15/2022     No components found for: \"LDLCALC\"  Lab Results   Component Value Date    TRIG 98 06/26/2024    TRIG 99 12/20/2023    TRIG 106 11/15/2022     No results found for: \"CHOLHDL\"  Lab Results   Component Value Date    TROPONINI 0.02 11/28/2022     Lab Results   Component Value Date     11/27/2022     Lab Results   Component Value Date    TSH 3.53 12/13/2022    TSH 3.99 06/08/2022             "

## 2025-06-26 ENCOUNTER — OFFICE VISIT (OUTPATIENT)
Dept: INTERNAL MEDICINE | Facility: CLINIC | Age: 75
End: 2025-06-26
Payer: MEDICARE

## 2025-06-26 VITALS
DIASTOLIC BLOOD PRESSURE: 78 MMHG | RESPIRATION RATE: 14 BRPM | HEART RATE: 76 BPM | SYSTOLIC BLOOD PRESSURE: 130 MMHG | TEMPERATURE: 97.8 F | WEIGHT: 178 LBS | OXYGEN SATURATION: 99 % | BODY MASS INDEX: 34.19 KG/M2

## 2025-06-26 DIAGNOSIS — Z98.890 S/P ABLATION OF ATRIAL FIBRILLATION: ICD-10-CM

## 2025-06-26 DIAGNOSIS — Z95.818 PRESENCE OF WATCHMAN LEFT ATRIAL APPENDAGE CLOSURE DEVICE: Chronic | ICD-10-CM

## 2025-06-26 DIAGNOSIS — I48.0 PAROXYSMAL ATRIAL FIBRILLATION (H): Chronic | ICD-10-CM

## 2025-06-26 DIAGNOSIS — D50.8 IRON DEFICIENCY ANEMIA SECONDARY TO INADEQUATE DIETARY IRON INTAKE: ICD-10-CM

## 2025-06-26 DIAGNOSIS — M81.0 SENILE OSTEOPOROSIS: Primary | ICD-10-CM

## 2025-06-26 DIAGNOSIS — H10.9 BACTERIAL CONJUNCTIVITIS: ICD-10-CM

## 2025-06-26 DIAGNOSIS — G47.33 OSA (OBSTRUCTIVE SLEEP APNEA): Chronic | ICD-10-CM

## 2025-06-26 DIAGNOSIS — F51.04 CHRONIC INSOMNIA: ICD-10-CM

## 2025-06-26 DIAGNOSIS — E03.9 ACQUIRED HYPOTHYROIDISM: Chronic | ICD-10-CM

## 2025-06-26 DIAGNOSIS — Z86.79 S/P ABLATION OF ATRIAL FIBRILLATION: ICD-10-CM

## 2025-06-26 DIAGNOSIS — I25.10 CORONARY ARTERY DISEASE INVOLVING NATIVE CORONARY ARTERY OF NATIVE HEART WITHOUT ANGINA PECTORIS: Chronic | ICD-10-CM

## 2025-06-26 DIAGNOSIS — Z92.29 PERSONAL HISTORY OF OTHER DRUG THERAPY: ICD-10-CM

## 2025-06-26 PROBLEM — K92.1 MELENA: Status: RESOLVED | Noted: 2017-09-05 | Resolved: 2025-06-26

## 2025-06-26 LAB
ERYTHROCYTE [DISTWIDTH] IN BLOOD BY AUTOMATED COUNT: 12.7 % (ref 10–15)
HCT VFR BLD AUTO: 37.4 % (ref 35–47)
HGB BLD-MCNC: 12.4 G/DL (ref 11.7–15.7)
MCH RBC QN AUTO: 30.2 PG (ref 26.5–33)
MCHC RBC AUTO-ENTMCNC: 33.2 G/DL (ref 31.5–36.5)
MCV RBC AUTO: 91 FL (ref 78–100)
PLATELET # BLD AUTO: 277 10E3/UL (ref 150–450)
RBC # BLD AUTO: 4.1 10E6/UL (ref 3.8–5.2)
WBC # BLD AUTO: 8.4 10E3/UL (ref 4–11)

## 2025-06-26 RX ORDER — ZALEPLON 10 MG/1
10 CAPSULE ORAL AT BEDTIME
Qty: 90 CAPSULE | Refills: 0 | Status: SHIPPED | OUTPATIENT
Start: 2025-06-26

## 2025-06-26 RX ORDER — CIPROFLOXACIN HYDROCHLORIDE 3.5 MG/ML
1-2 SOLUTION/ DROPS TOPICAL EVERY 6 HOURS
Qty: 10 ML | Refills: 0 | Status: SHIPPED | OUTPATIENT
Start: 2025-06-26

## 2025-06-26 NOTE — PROGRESS NOTES
Patient is here today for the Prolia injection. Patient tolerated previous injections well. No recent falls, new fractures, medication change, hospitalizations or surgeries. We discussed calcium and vit D daily needs today.   We discussed high risk of rebound vertebral fractures when Prolia suddenly stopped.      (M81.0) Senile osteoporosis  (primary encounter diagnosis)  (Z92.29) Personal history of other drug therapy  Surgical history significant for hysterectomy at age of 35 and early menopause which was treated with hormonal therapy for 5-6 years only.  On Prolia since 8/2018. Tolerating it well.  Due for DXA in 6/2025.    Component      Latest Ref Rng 3/14/2025  6:49 PM   Sodium      135 - 145 mmol/L 136    Potassium      3.4 - 5.3 mmol/L 4.0    Chloride      98 - 107 mmol/L 99    Carbon Dioxide (CO2)      22 - 29 mmol/L 23    Anion Gap      7 - 15 mmol/L 14    Urea Nitrogen      8.0 - 23.0 mg/dL 21.4    Creatinine      0.51 - 0.95 mg/dL 0.98 (H)    GFR Estimate      >60 mL/min/1.73m2 60 (L)    Calcium      8.8 - 10.4 mg/dL 9.4    Glucose      70 - 99 mg/dL 102 (H)         Plan: DX Bone Density      (I25.10) Coronary artery disease involving native coronary artery of native heart without angina pectoris  status post EUGENIA to OM2 (extending back to Lcx) on 10/12/22 and EUGENIA to distal LAD on 11/8/22    She was on Plavix until December 2023 ( 1 year post stent), now continued ASA 81 mg daily indefinitely.   Imdur was stopped because of the headache.  Amlodipine was increased to 5 mg. BP looks good today.    Dyslipidemia: Continue atorvastatin 80 mg at bedtime. LDL was 58.     Primary hypertension: Increase amlodipine from 2.5 mg daily to 5 mg daily.  Continue irbesartan 150 mg daily.  Continue to monitor her blood pressure.     (I48.0) Paroxysmal atrial fibrillation (H)  (Z95.818) Presence of Watchman left atrial appendage closure device  (Z98.890,  Z86.79) S/P ablation of atrial fibrillation  [id number] 6 - 31mm  ROSANNE Resendez, 6/22/2023   The patient has maintained in sinus rhythm. She has been off antiarrhythmic medication. Continue to monitor. Continue aspirin 81 mg daily.     Chronic congestive heart failure with preserved ejection fraction: No shortness of breath on exertion.  Her weight has been stable.  She has no leg edema.  No JVDs.  Her lungs are clear.  She is compensated at this time.  Continue Lasix 40 mg daily.    (F51.04) Chronic insomnia  Comment: Patient is having insomnia, with difficulty falling and staying asleep. She was on trazodone for many years in the past, but even the dose of 150 mg at bedtime was not providing good sleep. She is taking lorazepam 0.5-1 mg at bedtime daily and have about 2-3 h of sleep. She is getting very tired.  She tried melatonin, Tylenol PM, Unisom, Sleep aid OTC, Lunesta, Ambien. Rozerem.In the last 2 weeks, she was taking Rozerem, lorazepam 1mg and musce relxant at bedtime , but she is still waking up 2-3 hours later and has trouble going back to sleep. Lunesta made her very drowsy in the morning.   We started Sonata in May. She is taking 5 mg and usually wakes up after 4 hours. We will increase dose to 10 mg daily.  Plan: zaleplon (SONATA) 10 MG capsule            (G47.33) DONN (obstructive sleep apnea) - mild (AHI 11)  Comment: She saw Suresh Ferguson MD at the [facility] in 2022. Not able to tolerate CPAP or mouth device.She recalls trying a dental appliance in the past   She had virtual visit with Dr Delgado in 11/2024. She will schedule follow-up. The home sleep study was not arranged.  she was referred for CBT-I   She will schedule follow-up to discuss Fe.    Restless leg syndrome  She has been on pramipexole 3 times a day. She continues to suffer motor restlessness despite this. This impacts her ability to fall asleep and stay asleep. She does recall trying gabapentin in the past, but is on her allergy list now, since had generalized rash.    Hypothyroidism  On  levothyroxine 88 mcg daily.  Component      Latest Ref Rng 3/14/2025  6:49 PM   TSH      0.30 - 4.20 uIU/mL 2.85          (H10.9) Bacterial conjunctivitis  Comment: she has pink eye and injected conjunctivae and eye drainage since yesterday. Rx for Cipro eye drops is sent.  Plan: ciprofloxacin (CILOXAN) 0.3 % ophthalmic         solution            (D50.8) Iron deficiency anemia secondary to inadequate dietary iron intake  Comment: She had received 3 iron infusions in July 2024.   She had iron infusion on 12/20/24   She had EGD on 10/30/23, but they were not able to complete the colonoscopy because she had constant retching and transient desaturation in low 90s. They suggested colonoscopy when off the Plavix. EGD result reviewed and showed esophagitis and gastritis, and she is on omeprazole now. She denies black or bloody stool.  She had colonoscopy in 4/2024, one polyp was removed.  She feels tired again.I will recheck the labs today.   Plan: CBC with platelets, Iron & Iron Binding         Capacity, Ferritin    The longitudinal plan of care for the diagnosis(es)/condition(s) as documented were addressed during this visit. Due to the added complexity in care, I will continue to support Lo in the subsequent management and with ongoing continuity of care.            Patient was educated on safety of Prolia utilizing Patient Counseling Chart for Healthcare Providers, as outlined by the Prolia REMS progam.     Return in about 6 months (around 12/26/2025) for Follow up, Prolia, multiple issues.    Patient Instructions   Prolia 14th today.  Prolia 15th in 6 months with me.    DXA due now .   Phone number to schedule [phone].    Daily calcium need is 2585-1147 mg a day from the diet and supplements.  Calcium citrate is easier to digest.  Vitamin D 1000 IU daily recommended.    Risk of rebound vertebral fractures is higher when Prolia suddenly stopped or dose was missed.      Prolia and Covid vaccine should be   for at least a week.    Patient education:  Patients advised to maintain good oral hygiene during treatment, because of the risk for osteonecrosis of the jaw.   The risk of osteonecrosis of the jaw with Prolia therapy is extremely low.   If a patient is late for Prolia therapy (>3 wks) a rapid rebound of bone loss can occur which is highly concerning and important to try to prevent to optimize bone health.   Therefore if an invasive dental procedure is required, primarily extraction or implant, while on Prolia therapy, the recommendation is to time the dental procedure optimally 4 months after the most recent dose of Prolia allowing 6-8 weeks for healing prior to next dose of Prolia.   This is based on the updated 2022 Recommendations from the American Association of Oral and Maxillofacial Surgeons.   We also recommend discussing with dentist or oral surgeon if pretreatment prophylactic oral rinses and antibiotics would be beneficial.   Optimizing oral hygiene before any invasive dental procedure significantly lowers ONJ risk.     There is a greater risk of severe hypocalcemia following denosumab administration and patient is advised that we will have to monitor calcium level.  Risk of infection is increased with denosumab use, so patient will inform me if has more frequent infections.     Atypical femur fracture  has been reported in patients receiving denosumab. The fractures may occur anywhere along the femoral shaft (may be bilateral) and commonly occur with minimal to no trauma to the area. Some patients experience prodromal thigh or groin pain weeks or months before the fracture occurs. Contralateral limb should be assessed if AFF occurs.     Multiple vertebral column fracture has been reported following discontinuation of therapy. Hypertension and increased cholesterol were reported with denosumab use.      Discussed 10-year data of Prolia. Discussed the importance of being on time and consistent  with Prolia use to prevent rapid bone of osteoclastic activity.  Discussed that if Prolia is discontinued we will likely need to utilize an antiresorptive, such as Reclast, to help prevent rapid rebound of osteoclast activity. Often more than 1 dose is required.  Labs yearly to ensure calcium and vitamin D optimized while patient on Prolia.            /78 (BP Location: Right arm, Patient Position: Sitting, Cuff Size: Adult Regular)   Pulse 76   Temp 97.8  F (36.6  C) (Temporal)   Resp 14   Wt 80.7 kg (178 lb)   LMP  (LMP Unknown)   SpO2 99%   BMI 34.19 kg/m        Did you experience any problems with previous Prolia injection? no  Any medication change in the last 6 months? no  Did you take prednisone or other immunosupressant drugs in the last 6 months   (chemo, transplant, rheum, dermatology conditions)? no  Did you have any serious infection in the last 6 months?no  Any recent hospitalizations?no  Do you plan any dental work in the next 2-3 months?no  How much calcium do you take daily from the diet and supplements?1200 mg  How much vit D do you take daily? 2000 IU  Last DXA? 6/2023 Reviewed and discussed            This note has been dictated using voice recognition software. Any grammatical or context distortions are unintentional and inherent to the software      Patient Active Problem List   Diagnosis    Total knee replacement status    Acquired hypothyroidism    Fibromyalgia    Diverticulosis    Essential hypertension    Gastroesophageal reflux disease without esophagitis    Chronic insomnia    Migraine    Dyslipidemia, goal LDL below 70    Restless leg syndrome    Thyroid nodule    Coronary artery disease involving native coronary artery of native heart without angina pectoris    Paroxysmal atrial fibrillation (H)    Senile osteoporosis    Generalized anxiety disorder    Venous insufficiency of both lower extremities    Chronic pain syndrome    Anemia    Presence of Watchman left atrial  appendage closure device    DONN (obstructive sleep apnea) - mild (AHI 11)    Iron deficiency anemia secondary to inadequate dietary iron intake    Prediabetes    Acute diverticulitis    Abdominal hernia    Constipation    Diverticulosis of large intestine without hemorrhage    Gastro-esophageal reflux disease with esophagitis    Ileum ulcer    Polyp of colon    S/P ablation of atrial fibrillation    Class 2 severe obesity due to excess calories with serious comorbidity in adult (H)    Left lumbar radiculopathy       Current Outpatient Medications   Medication Sig Dispense Refill    amLODIPine (NORVASC) 5 MG tablet Take 1 tablet (5 mg) by mouth daily. 90 tablet 3    atorvastatin (LIPITOR) 80 MG tablet TAKE 1 TABLET BY MOUTH EVERYDAY AT BEDTIME 30 tablet 11    botulinum toxin type A (BOTOX) 100 units injection Every 3 months. [facility]      busPIRone (BUSPAR) 15 MG tablet TAKE 1 TABLET BY MOUTH THREE TIMES A  tablet 2    Calcium Carbonate-Vit D-Min (CALCIUM 1200 PO) Take 1 tablet by mouth daily      cholecalciferol 50 MCG (2000 UT) CAPS Take 2,000 Units by mouth daily      ciprofloxacin (CILOXAN) 0.3 % ophthalmic solution Place 1-2 drops into both eyes every 6 hours. For 5 days 10 mL 0    citalopram (CELEXA) 40 MG tablet Take 1 tablet (40 mg) by mouth daily 90 tablet 2    eletriptan (RELPAX) 40 MG tablet Take 1 tablet (40 mg) by mouth at onset of headache for migraine 10 tablet 0    furosemide (LASIX) 20 MG tablet TAKE 2 TABLETS BY MOUTH EVERY  tablet 3    HYDROcodone-acetaminophen (NORCO) 5-325 MG tablet TAKE 1 TABLET ORALLY (OK TO FILL 09/16/2024) ONCE A DAY AS NEEDED FOR PAIN 30 DAYS      irbesartan (AVAPRO) 150 MG tablet Take 1 tablet (150 mg) by mouth daily. 90 tablet 2    ketoconazole (NIZORAL) 2 % external cream Apply topically 2 times daily.      levothyroxine (SYNTHROID/LEVOTHROID) 88 MCG tablet TAKE 1 TABLET BY MOUTH DAILY AT 6:00 AM. 90 tablet 1    LORazepam (ATIVAN) 1 MG tablet TAKE 1 TAB  BY MOUTH NIGHTLY AS NEEDED FOR ANXIETY OR SLEEP 30 tablet 0    nitroGLYcerin (NITROSTAT) 0.4 MG sublingual tablet For chest pain place 1 tablet under the tongue every 5 minutes for 3 doses. If symptoms persist 5 minutes after 1st dose call 911. 30 tablet 1    omeprazole (PRILOSEC) 20 MG DR capsule TAKE 1 CAPSULE BY MOUTH EVERY DAY 30 MINUTES TO 1 HOUR BEFORE A MEAL      pramipexole (MIRAPEX) 0.25 MG tablet TAKE 1 TABLET BY MOUTH THREE TIMES A  tablet 2    tiZANidine (ZANAFLEX) 6 MG capsule Take 1 capsule (6 mg) by mouth 3 times daily. 60 capsule 1    zaleplon (SONATA) 10 MG capsule Take 1 capsule (10 mg) by mouth at bedtime. 90 capsule 0     Current Facility-Administered Medications   Medication Dose Route Frequency Provider Last Rate Last Admin    denosumab (PROLIA) injection 60 mg  60 mg Subcutaneous Q6 Months         denosumab (PROLIA) injection 60 mg  60 mg Subcutaneous Q6 Months    60 mg at 06/26/25 0919     Facility-Administered Medications Ordered in Other Visits   Medication Dose Route Frequency Provider Last Rate Last Admin    iodixanol (VISIPAQUE 320) injection    Once PRN Bret Jin MD   65 mL at 06/09/23 0955

## 2025-06-26 NOTE — PATIENT INSTRUCTIONS
Prolia 14th today.  Prolia 15th in 6 months with me.    DXA due now .   Phone number to schedule [phone].    Daily calcium need is 3045-6657 mg a day from the diet and supplements.  Calcium citrate is easier to digest.  Vitamin D 1000 IU daily recommended.    Risk of rebound vertebral fractures is higher when Prolia suddenly stopped or dose was missed.      Prolia and Covid vaccine should be  for at least a week.    Patient education:  Patients advised to maintain good oral hygiene during treatment, because of the risk for osteonecrosis of the jaw.   The risk of osteonecrosis of the jaw with Prolia therapy is extremely low.   If a patient is late for Prolia therapy (>3 wks) a rapid rebound of bone loss can occur which is highly concerning and important to try to prevent to optimize bone health.   Therefore if an invasive dental procedure is required, primarily extraction or implant, while on Prolia therapy, the recommendation is to time the dental procedure optimally 4 months after the most recent dose of Prolia allowing 6-8 weeks for healing prior to next dose of Prolia.   This is based on the updated 2022 Recommendations from the American Association of Oral and Maxillofacial Surgeons.   We also recommend discussing with dentist or oral surgeon if pretreatment prophylactic oral rinses and antibiotics would be beneficial.   Optimizing oral hygiene before any invasive dental procedure significantly lowers ONJ risk.     There is a greater risk of severe hypocalcemia following denosumab administration and patient is advised that we will have to monitor calcium level.  Risk of infection is increased with denosumab use, so patient will inform me if has more frequent infections.     Atypical femur fracture  has been reported in patients receiving denosumab. The fractures may occur anywhere along the femoral shaft (may be bilateral) and commonly occur with minimal to no trauma to the area. Some patients  experience prodromal thigh or groin pain weeks or months before the fracture occurs. Contralateral limb should be assessed if AFF occurs.     Multiple vertebral column fracture has been reported following discontinuation of therapy. Hypertension and increased cholesterol were reported with denosumab use.      Discussed 10-year data of Prolia. Discussed the importance of being on time and consistent with Prolia use to prevent rapid bone of osteoclastic activity.  Discussed that if Prolia is discontinued we will likely need to utilize an antiresorptive, such as Reclast, to help prevent rapid rebound of osteoclast activity. Often more than 1 dose is required.  Labs yearly to ensure calcium and vitamin D optimized while patient on Prolia.     Numbness/tingling down right leg into toes of right foot.

## 2025-06-27 ENCOUNTER — RESULTS FOLLOW-UP (OUTPATIENT)
Dept: INTERNAL MEDICINE | Facility: CLINIC | Age: 75
End: 2025-06-27

## 2025-07-07 DIAGNOSIS — I10 ESSENTIAL (PRIMARY) HYPERTENSION: ICD-10-CM

## 2025-07-07 RX ORDER — AMLODIPINE BESYLATE 10 MG/1
10 TABLET ORAL DAILY
Qty: 90 TABLET | Refills: 3 | Status: SHIPPED | OUTPATIENT
Start: 2025-07-07

## 2025-07-08 ENCOUNTER — TRANSFERRED RECORDS (OUTPATIENT)
Dept: HEALTH INFORMATION MANAGEMENT | Facility: CLINIC | Age: 75
End: 2025-07-08
Payer: MEDICARE

## 2025-07-08 ENCOUNTER — HOSPITAL ENCOUNTER (OUTPATIENT)
Dept: MAMMOGRAPHY | Facility: CLINIC | Age: 75
Discharge: HOME | End: 2025-07-08
Attending: INTERNAL MEDICINE
Payer: MEDICARE

## 2025-07-08 DIAGNOSIS — Z12.31 VISIT FOR SCREENING MAMMOGRAM: ICD-10-CM

## 2025-07-08 PROCEDURE — 77067 SCR MAMMO BI INCL CAD: CPT

## 2025-07-09 DIAGNOSIS — E03.9 ACQUIRED HYPOTHYROIDISM: ICD-10-CM

## 2025-07-09 RX ORDER — LEVOTHYROXINE SODIUM 88 UG/1
TABLET ORAL
Qty: 90 TABLET | Refills: 1 | Status: SHIPPED | OUTPATIENT
Start: 2025-07-09

## 2025-07-09 ASSESSMENT — SLEEP AND FATIGUE QUESTIONNAIRES
HOW LIKELY ARE YOU TO NOD OFF OR FALL ASLEEP WHEN YOU ARE A PASSENGER IN A CAR FOR AN HOUR WITHOUT A BREAK: SLIGHT CHANCE OF DOZING
HOW LIKELY ARE YOU TO NOD OFF OR FALL ASLEEP WHILE SITTING AND TALKING TO SOMEONE: WOULD NEVER DOZE
HOW LIKELY ARE YOU TO NOD OFF OR FALL ASLEEP WHILE LYING DOWN TO REST IN THE AFTERNOON WHEN CIRCUMSTANCES PERMIT: MODERATE CHANCE OF DOZING
HOW LIKELY ARE YOU TO NOD OFF OR FALL ASLEEP WHILE SITTING INACTIVE IN A PUBLIC PLACE: SLIGHT CHANCE OF DOZING
HOW LIKELY ARE YOU TO NOD OFF OR FALL ASLEEP IN A CAR, WHILE STOPPED FOR A FEW MINUTES IN TRAFFIC: WOULD NEVER DOZE
HOW LIKELY ARE YOU TO NOD OFF OR FALL ASLEEP WHILE SITTING AND READING: HIGH CHANCE OF DOZING
HOW LIKELY ARE YOU TO NOD OFF OR FALL ASLEEP WHILE SITTING QUIETLY AFTER LUNCH WITHOUT ALCOHOL: SLIGHT CHANCE OF DOZING
HOW LIKELY ARE YOU TO NOD OFF OR FALL ASLEEP WHILE WATCHING TV: MODERATE CHANCE OF DOZING

## 2025-07-12 SDOH — HEALTH STABILITY: PHYSICAL HEALTH: ON AVERAGE, HOW MANY MINUTES DO YOU ENGAGE IN EXERCISE AT THIS LEVEL?: 20 MIN

## 2025-07-12 SDOH — HEALTH STABILITY: PHYSICAL HEALTH: ON AVERAGE, HOW MANY DAYS PER WEEK DO YOU ENGAGE IN MODERATE TO STRENUOUS EXERCISE (LIKE A BRISK WALK)?: 1 DAY

## 2025-07-12 ASSESSMENT — SOCIAL DETERMINANTS OF HEALTH (SDOH): HOW OFTEN DO YOU GET TOGETHER WITH FRIENDS OR RELATIVES?: ONCE A WEEK

## 2025-07-16 ENCOUNTER — OFFICE VISIT (OUTPATIENT)
Dept: SLEEP MEDICINE | Facility: CLINIC | Age: 75
End: 2025-07-16
Payer: MEDICARE

## 2025-07-16 VITALS
HEART RATE: 73 BPM | DIASTOLIC BLOOD PRESSURE: 72 MMHG | BODY MASS INDEX: 34.25 KG/M2 | OXYGEN SATURATION: 99 % | SYSTOLIC BLOOD PRESSURE: 118 MMHG | WEIGHT: 181.4 LBS | HEIGHT: 61 IN

## 2025-07-16 DIAGNOSIS — F51.04 CHRONIC INSOMNIA: ICD-10-CM

## 2025-07-16 DIAGNOSIS — G25.81 RESTLESS LEG SYNDROME: ICD-10-CM

## 2025-07-16 DIAGNOSIS — G47.33 OSA (OBSTRUCTIVE SLEEP APNEA): Primary | ICD-10-CM

## 2025-07-16 PROCEDURE — 1125F AMNT PAIN NOTED PAIN PRSNT: CPT

## 2025-07-16 PROCEDURE — 3074F SYST BP LT 130 MM HG: CPT

## 2025-07-16 PROCEDURE — 3078F DIAST BP <80 MM HG: CPT

## 2025-07-16 PROCEDURE — 99215 OFFICE O/P EST HI 40 MIN: CPT

## 2025-07-16 NOTE — PROGRESS NOTES
"Sleep Apnea - Follow-up Visit:    Impression/Plan:  (G47.33) DONN (obstructive sleep apnea) (primary encounter diagnosis)  Comment: Suma \"Lo\" MICKY Mark presents for follow-up of her mild sleep apnea, currently untreated. She also follows up to discuss her insomnia and RLS. History of trying CPAP and an oral appliance. She tried CPAP in 2023. She returned her machine in January 2024. She reports trying different mask styles. She tried a dental appliance in the past for management for bruxism. She falls asleep on her sides, but she can wake on her back.    Plan: HST-Home Sleep Apnea Test - Noxturnal Returnable  Based on her 2023 sleep study, she did not have significant apnea while sleeping laterally. Recommended repeating a home sleep study with a sleep positioning device such as the Sleep Noodle, SlumberBUMP, or a pregnancy pillow to confirm efficacy and resolution of apnea.     (F51.04) Chronic insomnia  Comment: She is prescribed zaleplon 10 mg at bedtime for insomnia. History of taking 150 mg trazodone, melatonin, Lunesta, zolpidem, rozerem, Unisom, OTC sleep aid, and a muscle relaxer. With the Sonata, she can sleep 2-3 hours, but then she is up 1-2 hours in the middle of the night. She wakes to use the bathroom.   Plan: Recommended reevaluating insomnia after her apnea is treated. I suspect apnea may be triggering her awakening. She had severe apnea while dreaming on her 2023 study. Recommended leaving the bedroom when she is struggling to fall back asleep. Suggested reading or listening to an Audiobook in dim light until sleepy enough to return to bed.      (G25.81) Restless leg syndrome  Comment: She takes pramipexole 0.25 mg three times daily for RLS. She is well controlled during the day, but she has breakthrough symptoms between 12-2 AM. She just started taking iron 1-2 weeks ago. History of rash with gabapentin. She has tried Lyrica for her fibromyalgia. Last ferritin on file was 73 ng/mL. Walking around " "her kitchen island helps relieve symptoms. Sometimes restless legs prevent her from falling asleep. She has been struggling with RLS since 2015. She was also prescribed Requip at one point. She had vomiting with the Requip. She takes her Norco once or twice per week for pain which can improve restless legs.     Plan: We reviewed behavioral strategies to mitigate RLS. Her citalopram and untreated apnea could be exacerbating symptoms. She could also be experiencing augmentation with the pramipexole. Recommended continuing with iron and rechecking her ferritin in a couple months. Her symptoms may improve after treating apnea too. If symptoms don't improve, could consider the rotigotine patch. Opioids would be absolute last resort.    Suma Mark will follow up approximately 3 months after sleep study. Results of the study will be reviewed via [employer] and treatment will be initiated prior to the follow up visit.   ANDREA Cisneros CNP    Total time spent reviewing medical records, history and physical examination, review of previous testing and interpretation as well as documentation on this date: 53 minutes     CC: Bernadette Taylor MD     History of Present Illness:  Chief Complaint   Patient presents with    Sleep Problem     Medication follow up     Suma \"Lo\"MICKY Mark presents for follow-up of her mild sleep apnea, currently untreated as well as her insomnia.     History of trying CPAP and an oral appliance. She tried CPAP in 2023. She returned her machine in January 2024. She reports trying different mask styles. She tried a dental appliance in the past for management for bruxism. She falls asleep on her sides, but she can wake on her back.     She takes pramipexole 0.25 mg three times daily for RLS. She is well controlled during the day, but she has breakthrough symptoms between 12-2 AM. She just started taking iron 1-2 weeks ago.     She is prescribed zaleplon 10 mg at bedtime for insomnia. History " of taking 150 mg trazodone, melatonin, Lunesta, zolpidem, rozerem, Unisom, OTC sleep aid, and a muscle relaxer. With the Sonata, she can sleep 2-3 hours, but then she is up 1-2 hours in the middle of the night. She wakes to use the bathroom.     History of rash with gabapentin. She has tried Lyrica for her fibromyalgia. Last ferritin on file was 73 ng/mL. Walking around her kitchen island helps relieve symptoms. Sometimes restless legs prevent her from falling asleep. She has been struggling with RLS since 2015. She was also prescribed Requip at one point. She had vomiting with the Requip.     She takes her Norco once or twice per week for pain which can improve restless legs.     PSG on 07/06/2023 at 180 lbs.- AHI 11.5 events per hour, RDI, 13.9 events per hour, REM AHI 30.4 events per hour, supine AHI 16.9 events per hour. No sleep associated hypoxemia.    Do you use a CPAP Machine at home: (Patient-Rptd) No    Seldom unintentional naps while reading. She feels sleepy during the day.     What is your typical bedtime: 10-11 PM  How long does it take you to fall asleep: 15-30 minutes.   She wakes 1-3 times per night. She can wake more than once to use the bathroom.   What time do you typically get out of bed for the day: 4-5 AM     EPWORTH SLEEPINESS SCALE         7/9/2025     1:39 PM    [place] Sleepiness Scale ( CHANTE Carballo  [id number]<br>ESS - [place]/English - Final version - 21 Nov 07 - [facility].)   Sitting and reading High chance of dozing   Watching TV Moderate chance of dozing   Sitting, inactive in a public place (e.g. a theatre or a meeting) Slight chance of dozing   As a passenger in a car for an hour without a break Slight chance of dozing   Lying down to rest in the afternoon when circumstances permit Moderate chance of dozing   Sitting and talking to someone Would never doze   Sitting quietly after a lunch without alcohol Slight chance of dozing   In a car, while stopped for a few minutes in  traffic Would never doze   [place] Score (MC) 10   [place] Score (Sleep) 10        Patient-reported       INSOMNIA SEVERITY INDEX (ASHLEY)          7/9/2025     1:38 PM   Insomnia Severity Index (ASHLEY)   Difficulty falling asleep 2   Difficulty staying asleep 2   Problems waking up too early 3   How SATISFIED/DISSATISFIED are you with your CURRENT sleep pattern? 4   How NOTICEABLE to others do you think your sleep problem is in terms of impairing the quality of your life? 1   How WORRIED/DISTRESSED are you about your current sleep problem? 3   To what extent do you consider your sleep problem to INTERFERE with your daily functioning (e.g. daytime fatigue, mood, ability to function at work/daily chores, concentration, memory, mood, etc.) CURRENTLY? 2   ASHLEY Total Score 17        Patient-reported       Guidelines for Scoring/Interpretation:  Total score categories:  0-7 = No clinically significant insomnia   8-14 = Subthreshold insomnia   15-21 = Clinical insomnia (moderate severity)  22-28 = Clinical insomnia (severe)  Used via courtesy of www.[employer].[place].gov with permission from Thang Yanes PhD., [facility]      Past medical/surgical history, family history, social history, medications and allergies were reviewed.        Problem List:  Patient Active Problem List    Diagnosis Date Noted    Chronic pain syndrome 03/08/2023     Priority: High    Left lumbar radiculopathy 05/01/2025     Priority: Medium    Class 2 severe obesity due to excess calories with serious comorbidity in adult (H) 11/12/2024     Priority: Medium    Diverticulosis of large intestine without hemorrhage 10/06/2024     Priority: Medium    S/P ablation of atrial fibrillation 10/06/2024     Priority: Medium    Acute diverticulitis 09/19/2024     Priority: Medium    Prediabetes 06/26/2024     Priority: Medium    Iron deficiency anemia secondary to inadequate dietary iron intake 06/11/2024     Priority: Medium    Polyp of colon 08/22/2023      Priority: Medium    DONN (obstructive sleep apnea) - mild (AHI 11) 07/07/2023     Priority: Medium     7/6/2023 [place] Study (180.0 lbs) - AHI 11.5, RDI 13.9, Supine AHI 16.9, REM AHI 30.4, Low O2 82.0%, Time Spent <=88% 3.0 minutes / Time Spent <=89% 6.0 minutes.      Presence of Watchman left atrial appendage closure device 06/22/2023     Priority: Medium     6/22/2023, 31 mmm Watchman FLX Device      Anemia 06/16/2023     Priority: Medium    Senile osteoporosis 12/13/2022     Priority: Medium    Paroxysmal atrial fibrillation (H) 12/05/2022     Priority: Medium    Coronary artery disease involving native coronary artery of native heart without angina pectoris 10/18/2022     Priority: Medium      s/p PCI with EUGENIA to OM2 on 10/12/22 and PCI with EUGENIA to dLAD on 11/8/22.       Chronic insomnia 08/19/2021     Priority: Medium    Thyroid nodule 08/19/2021     Priority: Medium    Venous insufficiency of both lower extremities 11/04/2019     Priority: Medium    Dyslipidemia, goal LDL below 70 04/08/2019     Priority: Medium    Ileum ulcer 07/11/2018     Priority: Medium    Gastro-esophageal reflux disease with esophagitis 04/25/2018     Priority: Medium    Fibromyalgia 04/24/2018     Priority: Medium    Generalized anxiety disorder 12/07/2017     Priority: Medium    Diverticulosis 10/04/2017     Priority: Medium     Incidental finding on colonoscopy 10/2017      Constipation 07/24/2017     Priority: Medium    Abdominal hernia 07/12/2017     Priority: Medium    Migraine 04/04/2017     Priority: Medium     Managed by [facility].      Essential hypertension 04/21/2015     Priority: Medium    Gastroesophageal reflux disease without esophagitis 04/21/2015     Priority: Medium    Restless leg syndrome 04/21/2015     Priority: Medium     S/p iron infusion [facility] 5/2022      Total knee replacement status 10/31/2013     Priority: Medium    Acquired hypothyroidism 03/12/2009     Priority: Medium        /72  "  Pulse 73   Ht 1.549 m (5' 1\")   Wt 82.3 kg (181 lb 6.4 oz)   LMP  (LMP Unknown)   SpO2 99%   BMI 34.28 kg/m      Amanda Germain, ANDREA CNP  "

## 2025-07-16 NOTE — PATIENT INSTRUCTIONS
Sleep positioning devices such as the Sleep Noodle, SlumberBUMP, or a pregnancy pillow are available on Amazon or [email or web address].

## 2025-07-16 NOTE — NURSING NOTE
"Chief Complaint   Patient presents with    Sleep Problem     Medication follow up       Initial /72   Pulse 73   Ht 1.549 m (5' 1\")   Wt 82.3 kg (181 lb 6.4 oz)   LMP  (LMP Unknown)   SpO2 99%   BMI 34.28 kg/m   Estimated body mass index is 34.28 kg/m  as calculated from the following:    Height as of this encounter: 1.549 m (5' 1\").    Weight as of this encounter: 82.3 kg (181 lb 6.4 oz).    Medication Reconciliation: complete      Flex Goldsmith MA     "

## 2025-07-17 ENCOUNTER — OFFICE VISIT (OUTPATIENT)
Dept: INTERNAL MEDICINE | Facility: CLINIC | Age: 75
End: 2025-07-17
Payer: MEDICARE

## 2025-07-17 ENCOUNTER — TELEPHONE (OUTPATIENT)
Dept: SLEEP MEDICINE | Facility: CLINIC | Age: 75
End: 2025-07-17

## 2025-07-17 VITALS
DIASTOLIC BLOOD PRESSURE: 77 MMHG | BODY MASS INDEX: 33.23 KG/M2 | HEIGHT: 61 IN | SYSTOLIC BLOOD PRESSURE: 133 MMHG | OXYGEN SATURATION: 99 % | TEMPERATURE: 97.9 F | RESPIRATION RATE: 16 BRPM | HEART RATE: 80 BPM | WEIGHT: 176 LBS

## 2025-07-17 DIAGNOSIS — Z86.79 S/P ABLATION OF ATRIAL FIBRILLATION: ICD-10-CM

## 2025-07-17 DIAGNOSIS — G25.81 RESTLESS LEG SYNDROME: ICD-10-CM

## 2025-07-17 DIAGNOSIS — Z00.00 ENCOUNTER FOR ANNUAL WELLNESS VISIT (AWV) IN MEDICARE PATIENT: Primary | ICD-10-CM

## 2025-07-17 DIAGNOSIS — F41.1 GENERALIZED ANXIETY DISORDER: Chronic | ICD-10-CM

## 2025-07-17 DIAGNOSIS — G47.33 OSA (OBSTRUCTIVE SLEEP APNEA): Chronic | ICD-10-CM

## 2025-07-17 DIAGNOSIS — E04.1 THYROID NODULE: ICD-10-CM

## 2025-07-17 DIAGNOSIS — M81.0 SENILE OSTEOPOROSIS: Chronic | ICD-10-CM

## 2025-07-17 DIAGNOSIS — D50.8 IRON DEFICIENCY ANEMIA SECONDARY TO INADEQUATE DIETARY IRON INTAKE: ICD-10-CM

## 2025-07-17 DIAGNOSIS — I48.0 PAROXYSMAL ATRIAL FIBRILLATION (H): Chronic | ICD-10-CM

## 2025-07-17 DIAGNOSIS — R73.03 PREDIABETES: ICD-10-CM

## 2025-07-17 DIAGNOSIS — G43.709 CHRONIC MIGRAINE WITHOUT AURA WITHOUT STATUS MIGRAINOSUS, NOT INTRACTABLE: Chronic | ICD-10-CM

## 2025-07-17 DIAGNOSIS — I10 ESSENTIAL HYPERTENSION: Chronic | ICD-10-CM

## 2025-07-17 DIAGNOSIS — F51.04 CHRONIC INSOMNIA: ICD-10-CM

## 2025-07-17 DIAGNOSIS — G89.4 CHRONIC PAIN SYNDROME: Chronic | ICD-10-CM

## 2025-07-17 DIAGNOSIS — I25.10 CORONARY ARTERY DISEASE INVOLVING NATIVE CORONARY ARTERY OF NATIVE HEART WITHOUT ANGINA PECTORIS: Chronic | ICD-10-CM

## 2025-07-17 DIAGNOSIS — E03.9 ACQUIRED HYPOTHYROIDISM: Chronic | ICD-10-CM

## 2025-07-17 DIAGNOSIS — K21.00 GASTROESOPHAGEAL REFLUX DISEASE WITH ESOPHAGITIS WITHOUT HEMORRHAGE: ICD-10-CM

## 2025-07-17 DIAGNOSIS — R30.0 DYSURIA: ICD-10-CM

## 2025-07-17 DIAGNOSIS — Z98.890 S/P ABLATION OF ATRIAL FIBRILLATION: ICD-10-CM

## 2025-07-17 DIAGNOSIS — Z95.818 PRESENCE OF WATCHMAN LEFT ATRIAL APPENDAGE CLOSURE DEVICE: Chronic | ICD-10-CM

## 2025-07-17 DIAGNOSIS — R60.0 LOCALIZED EDEMA: ICD-10-CM

## 2025-07-17 PROBLEM — E66.812 CLASS 2 SEVERE OBESITY DUE TO EXCESS CALORIES WITH SERIOUS COMORBIDITY IN ADULT (H): Status: RESOLVED | Noted: 2024-11-12 | Resolved: 2025-07-17

## 2025-07-17 PROBLEM — K63.3 ILEUM ULCER: Status: RESOLVED | Noted: 2018-07-11 | Resolved: 2025-07-17

## 2025-07-17 PROBLEM — E66.01 CLASS 2 SEVERE OBESITY DUE TO EXCESS CALORIES WITH SERIOUS COMORBIDITY IN ADULT (H): Status: RESOLVED | Noted: 2024-11-12 | Resolved: 2025-07-17

## 2025-07-17 PROBLEM — D64.9 ANEMIA: Status: RESOLVED | Noted: 2023-06-16 | Resolved: 2025-07-17

## 2025-07-17 LAB
ALBUMIN UR-MCNC: NEGATIVE MG/DL
APPEARANCE UR: CLEAR
BACTERIA #/AREA URNS HPF: ABNORMAL /HPF
BILIRUB UR QL STRIP: NEGATIVE
COLOR UR AUTO: YELLOW
GLUCOSE UR STRIP-MCNC: NEGATIVE MG/DL
HGB UR QL STRIP: NEGATIVE
KETONES UR STRIP-MCNC: NEGATIVE MG/DL
LEUKOCYTE ESTERASE UR QL STRIP: ABNORMAL
NITRATE UR QL: NEGATIVE
PH UR STRIP: 5.5 [PH] (ref 5–8)
RBC #/AREA URNS AUTO: ABNORMAL /HPF
SP GR UR STRIP: 1.01 (ref 1–1.03)
SQUAMOUS #/AREA URNS AUTO: ABNORMAL /LPF
UROBILINOGEN UR STRIP-ACNC: 0.2 E.U./DL
WBC #/AREA URNS AUTO: ABNORMAL /HPF
WBC CLUMPS #/AREA URNS HPF: PRESENT /HPF

## 2025-07-17 RX ORDER — AMLODIPINE BESYLATE 5 MG/1
5 TABLET ORAL DAILY
Qty: 90 TABLET | Refills: 3 | Status: SHIPPED | OUTPATIENT
Start: 2025-07-17

## 2025-07-17 RX ORDER — METOPROLOL SUCCINATE 25 MG/1
25 TABLET, EXTENDED RELEASE ORAL DAILY
Qty: 60 TABLET | Refills: 1 | Status: SHIPPED | OUTPATIENT
Start: 2025-07-17

## 2025-07-17 NOTE — PROGRESS NOTES
Preventive Care Visit  [facility]  Bernadette Taylor MD, Internal Medicine  Jul 17, 2025      Assessment & Plan     Encounter for annual wellness visit (AWV) in Medicare patient  Pneumo 20 today. We will do fasting labs when she comes for preop in August.    Coronary artery disease involving native coronary artery of native heart without angina pectoris  status post EUGENIA to OM2 (extending back to Lcx) on 10/12/22 and EUGENIA to distal LAD on 11/8/22    She was on Plavix until December 2023 ( 1 year post stent), now continued ASA 81 mg daily indefinitely.   Imdur was stopped because of the headache  - CBC with Platelets & Differential; Future  - Comprehensive metabolic panel; Future  - Lipid panel reflex to direct LDL Fasting; Future    Paroxysmal atrial fibrillation (H)  Presence of Watchman left atrial appendage closure device  S/P ablation of atrial fibrillation  [id number] - 31mm Watchman, FLX, 6/22/2023   The patient has maintained in sinus rhythm. She has been off antiarrhythmic medication. Continue to monitor. Continue aspirin 81 mg daily.     Chronic congestive heart failure with preserved ejection fraction: No shortness of breath on exertion.  Her weight has been stable.  She has no leg edema.  No JVDs.  Her lungs are clear.  She is compensated at this time.  Continue Lasix 40 mg daily.    DONN (obstructive sleep apnea) - mild (AHI 11)  She saw sleep clinic yesterday and they are planning home sleep study.    Essential hypertension  On irbesartan 150 mg daily. Her Cardiologist increased amlodipine to 10 mg daily 2-3 weeks ago and now her both lower legs are very swollen. She will go back to 5 mg amlodipine. We will add metoprolol 25 mg daily.  She will use compression stockings.  - metoprolol succinate ER (TOPROL XL) 25 MG 24 hr tablet; Take 1 tablet (25 mg) by mouth daily.    Generalized anxiety disorder  Stable on citalopram and Buspar.    Gastroesophageal reflux disease with esophagitis  without hemorrhage  EGD 10/2023 result reviewed and showed esophagitis and gastritis, and she is on omeprazole now.     Iron deficiency anemia secondary to inadequate dietary iron intake  She had received 3 iron infusions in July 2024.   She had iron infusion on 12/20/24   She had EGD on 10/30/23, but they were not able to complete the colonoscopy because she had constant retching and transient desaturation in low 90s. They suggested colonoscopy when off the Plavix. EGD result reviewed and showed esophagitis and gastritis, and she is on omeprazole now. She denies black or bloody stool.  She had colonoscopy in 4/2024, one polyp was removed.  Taking oral iron now.  - Vitamin B12; Future  - Ferritin; Future  - Iron & Iron Binding Capacity; Future  - Magnesium; Future    Prediabetes  Low carb diet, weight loss and regular exercise recommended.   - Hemoglobin A1c; Future    Senile osteoporosis  Surgical history significant for hysterectomy at age of 35 and early menopause which was treated with hormonal therapy for 5-6 years only.  On Prolia since 8/2018. Tolerating it well.  - Vitamin D Deficiency; Future    Acquired hypothyroidism  On levothyroxine  - TSH with free T4 reflex; Future    Chronic insomnia  Patient is having insomnia, with difficulty falling and staying asleep. She was on trazodone for many years in the past, but even the dose of 150 mg at bedtime was not providing good sleep. She is taking lorazepam 0.5-1 mg at bedtime daily and have about 2-3 h of sleep. She is getting very tired.  She tried melatonin, Tylenol PM, Unisom, Sleep aid OTC, Lunesta, Ambien. Rozerem.In the last 2 weeks, she was taking Rozerem, lorazepam 1mg and musce relxant at bedtime , but she is still waking up 2-3 hours later and has trouble going back to sleep. Lunesta made her very drowsy in the morning.   We started Sonata in May. She is taking 5 mg and usually wakes up after 4 hours. We will increase dose to 10 mg daily.    Chronic  "pain syndrome  She takes her Norco once or twice per week for pain which can improve restless legs.        Chronic migraine without aura without status migrainosus, not intractable  Seeing Neurology, on Botox treatment.    Restless leg syndrome  She has been on pramipexole 3 times a day. She continues to suffer motor restlessness despite this. This impacts her ability to fall asleep and stay asleep. She does recall trying gabapentin in the past, but is on her allergy list now, since had generalized rash.   - Vitamin B12; Future  - Ferritin; Future  - Iron & Iron Binding Capacity; Future  - Magnesium; Future    Thyroid nodule  US done in 2024, no need for follow-up     Dysuria  For the last few days and change in the smell. UA/UCX today to rule out UTI  - UA with Microscopic reflex to Culture - Clinic Collect; Future    Localized edema  Probably secondary to increased amlodipine dose to  10 mg daily, few days ago. She will go back to 5 mg and will wear compression stockings.  - Compression Sleeve/Stocking Order for DME - ONLY FOR DME      The longitudinal plan of care for the diagnosis(es)/condition(s) as documented were addressed during this visit. Due to the added complexity in care, I will continue to support Lo in the subsequent management and with ongoing continuity of care.    BMI  Estimated body mass index is 33.25 kg/m  as calculated from the following:    Height as of this encounter: 1.549 m (5' 1\").    Weight as of this encounter: 79.8 kg (176 lb).       Counseling  Appropriate preventive services were addressed with this patient via screening, questionnaire, or discussion as appropriate for fall prevention, nutrition, physical activity, Tobacco-use cessation, social engagement, weight loss and cognition.  Checklist reviewing preventive services available has been given to the patient.  Reviewed patient's diet, addressing concerns and/or questions.   She is at risk for lack of exercise and has been " provided with information to increase physical activity for the benefit of her well-being.   She is at risk for psychosocial distress and has been provided with information to reduce risk.   Discussed possible causes of fatigue. Patient reported safety concerns were addressed today.Information on urinary incontinence and treatment options given to patient.   I have reviewed Opioid Use Disorder and Substance Use Disorder risk factors and made any needed referrals.           Halina Blanchard is a 74 year old, presenting for the following:  Physical        7/17/2025    12:32 PM   Additional Questions   Roomed by jaqueline   Accompanied by self          HPI           Advance Care Planning            7/12/2025   General Health   How would you rate your overall physical health? Good   Feel stress (tense, anxious, or unable to sleep) To some extent   (!) STRESS CONCERN      7/12/2025   Nutrition   Diet: Regular (no restrictions)         7/12/2025   Exercise   Days per week of moderate/strenous exercise 1 day   Average minutes spent exercising at this level 20 min   (!) EXERCISE CONCERN      7/12/2025   Social Factors   Frequency of gathering with friends or relatives Once a week   Worry food won't last until get money to buy more No   Food not last or not have enough money for food? No   Do you have housing? (Housing is defined as stable permanent housing and does not include staying outside in a car, in a tent, in an abandoned building, in an overnight shelter, or couch-surfing.) Yes   Are you worried about losing your housing? No   Lack of transportation? No   Unable to get utilities (heat,electricity)? No         7/12/2025   Fall Risk   Fallen 2 or more times in the past year? No   Trouble with walking or balance? No          7/12/2025   Activities of Daily Living- Home Safety   Needs help with the following daily activites None of the above   Safety concerns in the home No grab bars in the bathroom         7/12/2025    Dental   Dentist two times every year? Yes         7/12/2025   Hearing Screening   Hearing concerns? None of the above         7/12/2025   Driving Risk Screening   Patient/family members have concerns about driving No         7/12/2025   General Alertness/Fatigue Screening   Have you been more tired than usual lately? (!) YES         7/12/2025   Urinary Incontinence Screening   Bothered by leaking urine in past 6 months Yes         Today's PHQ-2 Score:       7/16/2025     2:50 PM   PHQ-2 ( 1999 Pf[employer])   Q1: Little interest or pleasure in doing things 0   Q2: Feeling down, depressed or hopeless 0   PHQ-2 Score 0    Q1: Little interest or pleasure in doing things Not at all   Q2: Feeling down, depressed or hopeless Not at all   PHQ-2 Score 0       Patient-reported           7/12/2025   Substance Use   Alcohol more than 3/day or more than 7/wk No   Do you have a current opioid prescription? (!) YES   How severe/bad is pain from 1 to 10? 6/10   Do you use any other substances recreationally? No       Social History     Tobacco Use    Smoking status: Never     Passive exposure: Never    Smokeless tobacco: Never   Vaping Use    Vaping status: Never Used   Substance Use Topics    Alcohol use: Yes     Comment: Alcoholic Drinks/day: 1 cocktail daily    Drug use: No           7/8/2025   LAST FHS-7 RESULTS   1st degree relative breast or ovarian cancer No   Any relative bilateral breast cancer No   Any male have breast cancer No   Any ONE woman have BOTH breast AND ovarian cancer No   Any woman with breast cancer before 50yrs No   2 or more relatives with breast AND/OR ovarian cancer No   2 or more relatives with breast AND/OR bowel cancer No            ASCVD Risk   The 10-year ASCVD risk score (Emily CARRILLO, et al., 2019) is: 24.7%    Values used to calculate the score:      Age: 74 years      Sex: Female      Is Non- : No      Diabetic: No      Tobacco smoker: No      Systolic Blood Pressure:  "133 mmHg      Is BP treated: Yes      HDL Cholesterol: 65 mg/dL      Total Cholesterol: 143 mg/dL            Reviewed and updated as needed this visit by Provider                      Current providers sharing in care for this patient include:  Patient Care Team:  Bernadette Taylor MD as PCP - General (Internal Medicine)  Bernadette Taylor MD as Assigned PCP  Corinne Bonilla MD as MD (Cardiovascular Disease)  Jacek Worley MD as MD (Cardiovascular Disease)  Marta Neil APRN CNP as Assigned Heart and Vascular Provider  Paris Delgado MD as Assigned Pulmonology Provider  Jacek Worley MD as MD (Cardiology)    The following health maintenance items are reviewed in Epic and correct as of today:  Health Maintenance   Topic Date Due    HF ACTION PLAN  Never done    URINE DRUG SCREEN  Never done    PNEUMOCOCCAL VACCINE 50+ YEARS (3 of 3 - PCV20 or PCV21) 12/01/2024    ALT  06/26/2025    LIPID  06/26/2025    MEDICARE ANNUAL WELLNESS VISIT  06/26/2025    ANNUAL REVIEW OF HM ORDERS  12/26/2025    INFLUENZA VACCINE (1) 09/01/2025    BMP  09/14/2025    CBC  06/26/2026    FALL RISK ASSESSMENT  07/17/2026    DIABETES SCREENING  03/14/2028    DTAP/TDAP/TD VACCINE (6 - Td or Tdap) 07/22/2028    COLORECTAL CANCER SCREENING  04/04/2029    ADVANCE CARE PLANNING  06/26/2029    DEXA  06/13/2038    TSH W/FREE T4 REFLEX  Completed    HEPATITIS C SCREENING  Completed    PHQ-2 (once per calendar year)  Completed    ZOSTER VACCINE  Completed    RSV VACCINE  Completed    COVID-19 VACCINE  Completed    HPV VACCINE  Aged Out    MENINGITIS VACCINE  Aged Out    MAMMO SCREENING  Discontinued            Objective    Exam  /77   Pulse 80   Temp 97.9  F (36.6  C) (Temporal)   Resp 16   Ht 1.549 m (5' 1\")   Wt 79.8 kg (176 lb)   LMP  (LMP Unknown)   SpO2 99%   BMI 33.25 kg/m     Estimated body mass index is 33.25 kg/m  as calculated from the following:    Height as of this encounter: 1.549 m (5' 1\").    Weight as of this " encounter: 79.8 kg (176 lb).    Physical Exam  Constitutional:  oriented to person, place, and time, appears well-nourished. No distress.   HENT:   Head: Normocephalic.   Mouth/Throat: Oropharynx is clear and moist.   Eyes: Conjunctivae are normal. Pupils are equal, round, and reactive to light.   Neck: Normal range of motion. Neck supple.   Cardiovascular: Normal rate, regular rhythm and normal heart sounds.    Pulmonary/Chest: Effort normal and breath sounds normal.   Abdominal: Soft. Bowel sounds are normal.   Musculoskeletal: Normal range of motion.   Neurological: alert and oriented to person, place, and time. Skin: Skin is warm.   Psychiatric: normal mood and affect.         7/17/2025   Mini Cog   Clock Draw Score 2 Normal   3 Item Recall 3 objects recalled   Mini Cog Total Score 5              Signed Electronically by: Bernadette Taylor MD

## 2025-07-17 NOTE — TELEPHONE ENCOUNTER
Patient called and left message that she wanted to change her appointment to . Called patient back and let her know that I had noticed she already had rescheduled to December in  but that if she had additional questions she could call me back.    Donna Gerber , Home Sleep Studies Specialist  [place]  / [facility] [place]

## 2025-07-17 NOTE — PATIENT INSTRUCTIONS
Schedule fasting lab appointment in 1-2 months.    Urine test today.    Amlodipine is causing the leg edema.  Decrease amlodipine to 5 mg daily. Wear compression stockings daily.  We will add metoprolol 25 mg daily. Continue Irbesartan.   Check BP daily and send us the numbers.

## 2025-07-18 ENCOUNTER — RESULTS FOLLOW-UP (OUTPATIENT)
Dept: INTERNAL MEDICINE | Facility: CLINIC | Age: 75
End: 2025-07-18
Payer: MEDICARE

## 2025-07-18 ENCOUNTER — TELEPHONE (OUTPATIENT)
Dept: INTERNAL MEDICINE | Facility: CLINIC | Age: 75
End: 2025-07-18
Payer: MEDICARE

## 2025-07-18 DIAGNOSIS — R30.0 DYSURIA: Primary | ICD-10-CM

## 2025-07-18 NOTE — TELEPHONE ENCOUNTER
Test Results    Contacts       Contact Date/Time Type Contact Phone/Fax    07/18/2025 08:28 AM CDT Phone (Incoming) Lo Mark (Self) [phone] (M)            Who ordered the test:  Claudia     Type of test: Lab    Date of test:  yesterday    Where was the test performed:  [facility]    What are your questions/concerns?:  she will be leaving out of town and would like to get results as soon as she can. Writer explained one test not complete yet and she understood but still wanted us to know she would like to be notified and treated if necessary as soon as they do come in.    Could we [place] this information to you [place] or would you prefer to receive a phone call?:   Patient would prefer a phone call   Okay to leave a detailed message?: Yes at Cell number on file:    Telephone Information:   Mobile [phone]

## 2025-07-18 NOTE — TELEPHONE ENCOUNTER
UCX results is not available yet. Which pharmacy [place] she [place] use if I need to send the prescription tonight or tomorrow?

## 2025-07-21 NOTE — TELEPHONE ENCOUNTER
Outgoing call to patient. No answer. LMTCB. When pt returns call please relay message below and forward pt's response back to PCP. Thank you.

## 2025-07-21 NOTE — TELEPHONE ENCOUNTER
Patient Returning Call      Pt notified of results. Still having odor, back pain and stinging with urination. She also has urinary frequency however states this is common for her.

## 2025-07-22 NOTE — TELEPHONE ENCOUNTER
Outgoing call to patient. She can only come in to leave a sample around 4:30 pm. Appointment scheduled.     Neena YATES RN

## 2025-07-23 ENCOUNTER — LAB (OUTPATIENT)
Dept: LAB | Facility: CLINIC | Age: 75
End: 2025-07-23
Payer: MEDICARE

## 2025-07-23 ENCOUNTER — MYC MEDICAL ADVICE (OUTPATIENT)
Dept: INTERNAL MEDICINE | Facility: CLINIC | Age: 75
End: 2025-07-23

## 2025-07-23 DIAGNOSIS — R73.03 PREDIABETES: ICD-10-CM

## 2025-07-23 DIAGNOSIS — M81.0 SENILE OSTEOPOROSIS: Chronic | ICD-10-CM

## 2025-07-23 DIAGNOSIS — G25.81 RESTLESS LEG SYNDROME: ICD-10-CM

## 2025-07-23 DIAGNOSIS — I25.10 CORONARY ARTERY DISEASE INVOLVING NATIVE CORONARY ARTERY OF NATIVE HEART WITHOUT ANGINA PECTORIS: ICD-10-CM

## 2025-07-23 DIAGNOSIS — E78.5 DYSLIPIDEMIA, GOAL LDL BELOW 70: Primary | ICD-10-CM

## 2025-07-23 DIAGNOSIS — D50.8 IRON DEFICIENCY ANEMIA SECONDARY TO INADEQUATE DIETARY IRON INTAKE: ICD-10-CM

## 2025-07-23 DIAGNOSIS — E03.9 ACQUIRED HYPOTHYROIDISM: Chronic | ICD-10-CM

## 2025-07-23 DIAGNOSIS — R30.0 DYSURIA: ICD-10-CM

## 2025-07-23 LAB
ALBUMIN UR-MCNC: NEGATIVE MG/DL
APPEARANCE UR: CLEAR
BACTERIA #/AREA URNS HPF: ABNORMAL /HPF
BILIRUB UR QL STRIP: NEGATIVE
COLOR UR AUTO: YELLOW
GLUCOSE UR STRIP-MCNC: NEGATIVE MG/DL
HGB UR QL STRIP: NEGATIVE
HYALINE CASTS #/AREA URNS LPF: ABNORMAL /LPF
KETONES UR STRIP-MCNC: NEGATIVE MG/DL
LEUKOCYTE ESTERASE UR QL STRIP: NEGATIVE
MUCOUS THREADS #/AREA URNS LPF: PRESENT /LPF
NITRATE UR QL: NEGATIVE
PH UR STRIP: 6.5 [PH] (ref 5–8)
RBC #/AREA URNS AUTO: ABNORMAL /HPF
SP GR UR STRIP: 1.01 (ref 1–1.03)
SQUAMOUS #/AREA URNS AUTO: ABNORMAL /LPF
UROBILINOGEN UR STRIP-ACNC: 0.2 E.U./DL
WBC #/AREA URNS AUTO: ABNORMAL /HPF

## 2025-07-23 PROCEDURE — 81001 URINALYSIS AUTO W/SCOPE: CPT

## 2025-07-23 NOTE — TELEPHONE ENCOUNTER
LOV 7/17/25  Essential hypertension  On irbesartan 150 mg daily. Her Cardiologist increased amlodipine to 10 mg daily 2-3 weeks ago and now her both lower legs are very swollen. She will go back to 5 mg amlodipine. We will add metoprolol 25 mg daily.  She will use compression stockings.  - metoprolol succinate ER (TOPROL XL) 25 MG 24 hr tablet; Take 1 tablet (25 mg) by mouth daily.      Routing BP readings to PCP

## 2025-08-04 DIAGNOSIS — R09.02 HYPOXIA: Primary | ICD-10-CM

## 2025-08-05 ENCOUNTER — LAB (OUTPATIENT)
Dept: LAB | Facility: CLINIC | Age: 75
End: 2025-08-05
Payer: MEDICARE

## 2025-08-05 DIAGNOSIS — M81.0 SENILE OSTEOPOROSIS: Chronic | ICD-10-CM

## 2025-08-05 DIAGNOSIS — D50.8 IRON DEFICIENCY ANEMIA SECONDARY TO INADEQUATE DIETARY IRON INTAKE: ICD-10-CM

## 2025-08-05 DIAGNOSIS — I25.10 CORONARY ARTERY DISEASE INVOLVING NATIVE CORONARY ARTERY OF NATIVE HEART WITHOUT ANGINA PECTORIS: ICD-10-CM

## 2025-08-05 DIAGNOSIS — E03.9 ACQUIRED HYPOTHYROIDISM: Chronic | ICD-10-CM

## 2025-08-05 DIAGNOSIS — R73.03 PREDIABETES: ICD-10-CM

## 2025-08-05 DIAGNOSIS — G25.81 RESTLESS LEG SYNDROME: ICD-10-CM

## 2025-08-05 DIAGNOSIS — E78.5 DYSLIPIDEMIA, GOAL LDL BELOW 70: Primary | ICD-10-CM

## 2025-08-05 LAB
ALBUMIN SERPL BCG-MCNC: 4.2 G/DL (ref 3.5–5.2)
ALP SERPL-CCNC: 81 U/L (ref 40–150)
ALT SERPL W P-5'-P-CCNC: 24 U/L (ref 0–50)
ANION GAP SERPL CALCULATED.3IONS-SCNC: 9 MMOL/L (ref 7–15)
AST SERPL W P-5'-P-CCNC: 29 U/L (ref 0–45)
BASOPHILS # BLD AUTO: 0 10E3/UL (ref 0–0.2)
BASOPHILS NFR BLD AUTO: 1 %
BILIRUB SERPL-MCNC: 0.5 MG/DL
BUN SERPL-MCNC: 20.6 MG/DL (ref 8–23)
CALCIUM SERPL-MCNC: 9.6 MG/DL (ref 8.8–10.4)
CHLORIDE SERPL-SCNC: 103 MMOL/L (ref 98–107)
CHOLEST SERPL-MCNC: 142 MG/DL
CREAT SERPL-MCNC: 0.76 MG/DL (ref 0.51–0.95)
EGFRCR SERPLBLD CKD-EPI 2021: 81 ML/MIN/1.73M2
EOSINOPHIL # BLD AUTO: 0.1 10E3/UL (ref 0–0.7)
EOSINOPHIL NFR BLD AUTO: 2 %
ERYTHROCYTE [DISTWIDTH] IN BLOOD BY AUTOMATED COUNT: 13.1 % (ref 10–15)
EST. AVERAGE GLUCOSE BLD GHB EST-MCNC: 120 MG/DL
FASTING STATUS PATIENT QL REPORTED: YES
FASTING STATUS PATIENT QL REPORTED: YES
FERRITIN SERPL-MCNC: 63 NG/ML (ref 11–328)
GLUCOSE SERPL-MCNC: 98 MG/DL (ref 70–99)
HBA1C MFR BLD: 5.8 % (ref 0–5.6)
HCO3 SERPL-SCNC: 27 MMOL/L (ref 22–29)
HCT VFR BLD AUTO: 38.7 % (ref 35–47)
HDLC SERPL-MCNC: 62 MG/DL
HGB BLD-MCNC: 12.6 G/DL (ref 11.7–15.7)
IMM GRANULOCYTES # BLD: 0 10E3/UL
IMM GRANULOCYTES NFR BLD: 0 %
IRON BINDING CAPACITY (ROCHE): 317 UG/DL (ref 240–430)
IRON SATN MFR SERPL: 20 % (ref 15–46)
IRON SERPL-MCNC: 64 UG/DL (ref 37–145)
LDLC SERPL CALC-MCNC: 58 MG/DL
LYMPHOCYTES # BLD AUTO: 1.7 10E3/UL (ref 0.8–5.3)
LYMPHOCYTES NFR BLD AUTO: 25 %
MAGNESIUM SERPL-MCNC: 2.1 MG/DL (ref 1.7–2.3)
MCH RBC QN AUTO: 29.5 PG (ref 26.5–33)
MCHC RBC AUTO-ENTMCNC: 32.6 G/DL (ref 31.5–36.5)
MCV RBC AUTO: 91 FL (ref 78–100)
MONOCYTES # BLD AUTO: 0.6 10E3/UL (ref 0–1.3)
MONOCYTES NFR BLD AUTO: 8 %
NEUTROPHILS # BLD AUTO: 4.2 10E3/UL (ref 1.6–8.3)
NEUTROPHILS NFR BLD AUTO: 64 %
NONHDLC SERPL-MCNC: 80 MG/DL
PLATELET # BLD AUTO: 277 10E3/UL (ref 150–450)
POTASSIUM SERPL-SCNC: 4.2 MMOL/L (ref 3.4–5.3)
PROT SERPL-MCNC: 6.8 G/DL (ref 6.4–8.3)
RBC # BLD AUTO: 4.27 10E6/UL (ref 3.8–5.2)
SODIUM SERPL-SCNC: 139 MMOL/L (ref 135–145)
TRIGL SERPL-MCNC: 108 MG/DL
TSH SERPL DL<=0.005 MIU/L-ACNC: 2.02 UIU/ML (ref 0.3–4.2)
VIT B12 SERPL-MCNC: 600 PG/ML (ref 232–1245)
VIT D+METAB SERPL-MCNC: 35 NG/ML (ref 20–50)
WBC # BLD AUTO: 6.5 10E3/UL (ref 4–11)

## 2025-08-05 PROCEDURE — 83735 ASSAY OF MAGNESIUM: CPT

## 2025-08-05 PROCEDURE — 83540 ASSAY OF IRON: CPT

## 2025-08-05 PROCEDURE — 82306 VITAMIN D 25 HYDROXY: CPT

## 2025-08-05 PROCEDURE — 83550 IRON BINDING TEST: CPT

## 2025-08-05 PROCEDURE — 80061 LIPID PANEL: CPT

## 2025-08-05 PROCEDURE — 84443 ASSAY THYROID STIM HORMONE: CPT

## 2025-08-05 PROCEDURE — 80053 COMPREHEN METABOLIC PANEL: CPT

## 2025-08-05 PROCEDURE — 85025 COMPLETE CBC W/AUTO DIFF WBC: CPT

## 2025-08-05 PROCEDURE — 82607 VITAMIN B-12: CPT

## 2025-08-05 PROCEDURE — 82728 ASSAY OF FERRITIN: CPT

## 2025-08-05 PROCEDURE — 83036 HEMOGLOBIN GLYCOSYLATED A1C: CPT

## 2025-08-05 PROCEDURE — 36415 COLL VENOUS BLD VENIPUNCTURE: CPT

## 2025-08-06 ENCOUNTER — TELEPHONE (OUTPATIENT)
Dept: CARDIOLOGY | Facility: CLINIC | Age: 75
End: 2025-08-06

## 2025-08-06 ENCOUNTER — OFFICE VISIT (OUTPATIENT)
Dept: INTERNAL MEDICINE | Facility: CLINIC | Age: 75
End: 2025-08-06
Payer: MEDICARE

## 2025-08-06 VITALS
HEIGHT: 61 IN | RESPIRATION RATE: 16 BRPM | WEIGHT: 179 LBS | BODY MASS INDEX: 33.79 KG/M2 | DIASTOLIC BLOOD PRESSURE: 60 MMHG | OXYGEN SATURATION: 96 % | SYSTOLIC BLOOD PRESSURE: 110 MMHG | HEART RATE: 72 BPM | TEMPERATURE: 98.3 F

## 2025-08-06 DIAGNOSIS — F41.1 GENERALIZED ANXIETY DISORDER: Chronic | ICD-10-CM

## 2025-08-06 DIAGNOSIS — G89.4 CHRONIC PAIN SYNDROME: Chronic | ICD-10-CM

## 2025-08-06 DIAGNOSIS — D50.8 IRON DEFICIENCY ANEMIA SECONDARY TO INADEQUATE DIETARY IRON INTAKE: ICD-10-CM

## 2025-08-06 DIAGNOSIS — F51.04 CHRONIC INSOMNIA: ICD-10-CM

## 2025-08-06 DIAGNOSIS — Z01.818 PREOP GENERAL PHYSICAL EXAM: Primary | ICD-10-CM

## 2025-08-06 DIAGNOSIS — G43.709 CHRONIC MIGRAINE WITHOUT AURA WITHOUT STATUS MIGRAINOSUS, NOT INTRACTABLE: Chronic | ICD-10-CM

## 2025-08-06 DIAGNOSIS — G47.33 OSA (OBSTRUCTIVE SLEEP APNEA): Chronic | ICD-10-CM

## 2025-08-06 DIAGNOSIS — Z98.890 S/P ABLATION OF ATRIAL FIBRILLATION: ICD-10-CM

## 2025-08-06 DIAGNOSIS — E78.5 DYSLIPIDEMIA, GOAL LDL BELOW 70: Chronic | ICD-10-CM

## 2025-08-06 DIAGNOSIS — Z95.818 PRESENCE OF WATCHMAN LEFT ATRIAL APPENDAGE CLOSURE DEVICE: Chronic | ICD-10-CM

## 2025-08-06 DIAGNOSIS — I48.0 PAROXYSMAL ATRIAL FIBRILLATION (H): Chronic | ICD-10-CM

## 2025-08-06 DIAGNOSIS — Z86.79 S/P ABLATION OF ATRIAL FIBRILLATION: ICD-10-CM

## 2025-08-06 DIAGNOSIS — R73.03 PREDIABETES: ICD-10-CM

## 2025-08-06 DIAGNOSIS — E03.9 ACQUIRED HYPOTHYROIDISM: Chronic | ICD-10-CM

## 2025-08-06 DIAGNOSIS — G89.29 CHRONIC LEFT SHOULDER PAIN: ICD-10-CM

## 2025-08-06 DIAGNOSIS — I10 ESSENTIAL HYPERTENSION: Chronic | ICD-10-CM

## 2025-08-06 DIAGNOSIS — I25.10 CORONARY ARTERY DISEASE INVOLVING NATIVE CORONARY ARTERY OF NATIVE HEART WITHOUT ANGINA PECTORIS: Chronic | ICD-10-CM

## 2025-08-06 DIAGNOSIS — M25.512 CHRONIC LEFT SHOULDER PAIN: ICD-10-CM

## 2025-08-06 LAB
ATRIAL RATE - MUSE: 67 BPM
DIASTOLIC BLOOD PRESSURE - MUSE: NORMAL MMHG
INTERPRETATION ECG - MUSE: NORMAL
P AXIS - MUSE: 21 DEGREES
PR INTERVAL - MUSE: 134 MS
QRS DURATION - MUSE: 86 MS
QT - MUSE: 398 MS
QTC - MUSE: 420 MS
R AXIS - MUSE: 5 DEGREES
SYSTOLIC BLOOD PRESSURE - MUSE: NORMAL MMHG
T AXIS - MUSE: 31 DEGREES
VENTRICULAR RATE- MUSE: 67 BPM

## 2025-08-06 PROCEDURE — 93005 ELECTROCARDIOGRAM TRACING: CPT | Performed by: INTERNAL MEDICINE

## 2025-08-06 PROCEDURE — 3078F DIAST BP <80 MM HG: CPT | Performed by: INTERNAL MEDICINE

## 2025-08-06 PROCEDURE — 3074F SYST BP LT 130 MM HG: CPT | Performed by: INTERNAL MEDICINE

## 2025-08-06 PROCEDURE — G2211 COMPLEX E/M VISIT ADD ON: HCPCS | Performed by: INTERNAL MEDICINE

## 2025-08-06 PROCEDURE — 99215 OFFICE O/P EST HI 40 MIN: CPT | Performed by: INTERNAL MEDICINE

## 2025-08-06 PROCEDURE — 93010 ELECTROCARDIOGRAM REPORT: CPT | Mod: OFF | Performed by: INTERNAL MEDICINE

## 2025-08-06 RX ORDER — RIMEGEPANT SULFATE 75 MG/75MG
TABLET, ORALLY DISINTEGRATING ORAL
COMMUNITY
Start: 2025-07-18

## 2025-08-11 DIAGNOSIS — F41.9 ANXIETY: ICD-10-CM

## 2025-08-11 RX ORDER — LORAZEPAM 1 MG/1
TABLET ORAL
Qty: 30 TABLET | Refills: 0 | Status: SHIPPED | OUTPATIENT
Start: 2025-08-11

## 2025-08-29 ENCOUNTER — MYC MEDICAL ADVICE (OUTPATIENT)
Dept: INTERNAL MEDICINE | Facility: CLINIC | Age: 75
End: 2025-08-29
Payer: MEDICARE

## 2025-09-03 ENCOUNTER — TELEPHONE (OUTPATIENT)
Dept: PULMONOLOGY | Facility: CLINIC | Age: 75
End: 2025-09-03
Payer: MEDICARE

## (undated) DEVICE — DEVICE INFLATION SYR W/ HEMOSTASIS VALVE 12IN EXT IN4904

## (undated) DEVICE — SUCTION MANIFOLD NEPTUNE 2 SYS 1 PORT 702-025-000

## (undated) DEVICE — CUSTOM PACK CORONARY SAN5BCRHEA

## (undated) DEVICE — TUBING SUCTION MEDI-VAC 1/4"X20' N620A

## (undated) DEVICE — SHEATH WITH DILATOR ACCESS SYSTEM FXD CRV DBL M635TU80020

## (undated) DEVICE — GUIDEWIRE PROTRACK PGTL .025IN DIA 23

## (undated) DEVICE — KIT HAND CONTROL ACIST 014644 AR-P54

## (undated) DEVICE — MANIFOLD KIT ANGIO AUTOMATED 014613

## (undated) DEVICE — GUIDEWIRE FORTE FLOPPY J TOP 34949-05J

## (undated) DEVICE — INTRODUCER SHEATH VASC CATH 8.5FR CARTO GIDE STH MED D138502

## (undated) DEVICE — PROBE TEMP CIRCA S-CATH M ESPH 12-SNSR 3D MAP CS-21EP

## (undated) DEVICE — CATH DIAGNOSTIC RADIAL 5FR TIG 4.0

## (undated) DEVICE — CUSTOM PACK EP

## (undated) DEVICE — SOL WATER IRRIG 1000ML BOTTLE 2F7114

## (undated) DEVICE — SLEEVE TR BAND RADIAL COMPRESSION DEVICE 24CM TRB24-REG

## (undated) DEVICE — SHTH INTRO 0.021IN ID 6FR DIA

## (undated) DEVICE — 8F SOUNDSTAR ECO ULTRASOUND CATHETER

## (undated) DEVICE — KIT HAND CONTROL ACIST 016795

## (undated) DEVICE — GUIDEWIRE VASC 0.014INX180CM RUNTHROUGH 25-1011

## (undated) DEVICE — SYR ANGIOGRAPHY MULTIUSE KIT ACIST 014612

## (undated) DEVICE — CATHETER OCTARAY LONG SPLINE CURVE F 3-3-3-3-3 D160906

## (undated) DEVICE — CATH BALLOON EMERGE 2.5X12MM H7493918912250

## (undated) DEVICE — SHEATH PINNACLE 8FR 10CM R/O II RSB802

## (undated) DEVICE — EXCHANGE WIRE .035 260 STAR/JFC/035/260/ M001491681

## (undated) DEVICE — CATH ABLATION 8FR 115CM F-J CURVE BI-DIR QDOT MICRO D139504

## (undated) DEVICE — CATH LAUNCHER 6FR EBU 30 LA6EBU30

## (undated) DEVICE — NEEDLE 71CM NRG TRANSSEPTAL C0  8F FIX CURVE NRG-E-HF-71-C0

## (undated) DEVICE — WIRE GUIDE HI-TRQ  WHISPER MS JTIP 0.014"X190CM 1005357HJ

## (undated) DEVICE — ENDO FORCEP SPIKED SERRATED SHAFT JUMBO 239CM G56998

## (undated) DEVICE — SHEATH GUIDING VERSACROSS D1 CURVE L85 CM L180 CBL VXAK0003

## (undated) DEVICE — INTRODUCER CHECK FLO 16FRX30CM .038

## (undated) DEVICE — TUBE SET SMARKABLATE IRRIGATION

## (undated) DEVICE — INTRO GLIDESHEATH SLENDER 6FR 10X45CM 60-1060

## (undated) DEVICE — GUIDEWIRE VASCULAR COMET II 185CML PRESSURE H74939359110

## (undated) DEVICE — PATCH CARTO 3 EXTERNAL REFERENCE 3D MAPPING CREFP6

## (undated) DEVICE — ELECTRODE DEFIB CADENCE 22550R

## (undated) DEVICE — ELECTRODE ADULT PACING MULTI P-211-M1

## (undated) DEVICE — TRANSDUCER TRAY ARTERIAL 42646-06

## (undated) DEVICE — CATH BALLOON NC EMERGE 2.75X20MM H7493926720270

## (undated) DEVICE — STENT CORONARY DES SYNERGY XD MR US 2.75X28MM H7493941828270: Type: IMPLANTABLE DEVICE | Status: NON-FUNCTIONAL

## (undated) DEVICE — CATH LAUNCHER 6FR EBU 3.5 LA6EBU35

## (undated) DEVICE — GUIDEWIRE TEFLON CRV .035 50CM 3MM J

## (undated) DEVICE — Device

## (undated) DEVICE — CATH EP 7FR X 115CM DECANAV CA

## (undated) DEVICE — CATH BALLOON NC EMERGE 2.50X12MM H7493926712250

## (undated) DEVICE — INTRO SHEATH 9FRX10CM PINNACLE RSS902

## (undated) DEVICE — CATH ANGIO INFINITI PIGTAIL 6FRX110CM 6 SH 534650S

## (undated) RX ORDER — FENTANYL CITRATE-0.9 % NACL/PF 10 MCG/ML
PLASTIC BAG, INJECTION (ML) INTRAVENOUS
Status: DISPENSED
Start: 2024-08-23

## (undated) RX ORDER — PROPOFOL 10 MG/ML
INJECTION, EMULSION INTRAVENOUS
Status: DISPENSED
Start: 2024-04-04

## (undated) RX ORDER — LIDOCAINE HYDROCHLORIDE 10 MG/ML
INJECTION, SOLUTION EPIDURAL; INFILTRATION; INTRACAUDAL; PERINEURAL
Status: DISPENSED
Start: 2024-04-04

## (undated) RX ORDER — ONDANSETRON 4 MG/1
TABLET, ORALLY DISINTEGRATING ORAL
Status: DISPENSED
Start: 2023-06-22

## (undated) RX ORDER — PHENYLEPHRINE HYDROCHLORIDE 10 MG/ML
INJECTION INTRAVENOUS
Status: DISPENSED
Start: 2024-08-23

## (undated) RX ORDER — NITROGLYCERIN 5 MG/ML
VIAL (ML) INTRAVENOUS
Status: DISPENSED
Start: 2022-11-08

## (undated) RX ORDER — FENTANYL CITRATE 50 UG/ML
INJECTION, SOLUTION INTRAMUSCULAR; INTRAVENOUS
Status: DISPENSED
Start: 2022-11-08

## (undated) RX ORDER — FENTANYL CITRATE 50 UG/ML
INJECTION, SOLUTION INTRAMUSCULAR; INTRAVENOUS
Status: DISPENSED
Start: 2024-08-23

## (undated) RX ORDER — PROPOFOL 10 MG/ML
INJECTION, EMULSION INTRAVENOUS
Status: DISPENSED
Start: 2024-08-23

## (undated) RX ORDER — LIDOCAINE HYDROCHLORIDE 10 MG/ML
INJECTION, SOLUTION EPIDURAL; INFILTRATION; INTRACAUDAL; PERINEURAL
Status: DISPENSED
Start: 2022-10-12

## (undated) RX ORDER — PROTAMINE SULFATE 10 MG/ML
INJECTION, SOLUTION INTRAVENOUS
Status: DISPENSED
Start: 2024-08-23

## (undated) RX ORDER — ONDANSETRON 2 MG/ML
INJECTION INTRAMUSCULAR; INTRAVENOUS
Status: DISPENSED
Start: 2024-08-23

## (undated) RX ORDER — FENTANYL CITRATE 50 UG/ML
INJECTION, SOLUTION INTRAMUSCULAR; INTRAVENOUS
Status: DISPENSED
Start: 2022-10-12

## (undated) RX ORDER — DIAZEPAM 5 MG
TABLET ORAL
Status: DISPENSED
Start: 2023-06-09

## (undated) RX ORDER — CLOPIDOGREL 300 MG/1
TABLET, FILM COATED ORAL
Status: DISPENSED
Start: 2022-11-08

## (undated) RX ORDER — PRASUGREL 10 MG/1
TABLET, FILM COATED ORAL
Status: DISPENSED
Start: 2022-10-12

## (undated) RX ORDER — ASPIRIN 325 MG
TABLET ORAL
Status: DISPENSED
Start: 2023-06-09

## (undated) RX ORDER — HEPARIN SODIUM 1000 [USP'U]/ML
INJECTION, SOLUTION INTRAVENOUS; SUBCUTANEOUS
Status: DISPENSED
Start: 2022-10-12

## (undated) RX ORDER — ONDANSETRON 2 MG/ML
INJECTION INTRAMUSCULAR; INTRAVENOUS
Status: DISPENSED
Start: 2023-06-22

## (undated) RX ORDER — ETOMIDATE 2 MG/ML
INJECTION INTRAVENOUS
Status: DISPENSED
Start: 2024-08-23

## (undated) RX ORDER — NOREPINEPHRINE BITARTRATE 0.02 MG/ML
INJECTION, SOLUTION INTRAVENOUS
Status: DISPENSED
Start: 2024-08-23

## (undated) RX ORDER — HEPARIN SODIUM 1000 [USP'U]/ML
INJECTION, SOLUTION INTRAVENOUS; SUBCUTANEOUS
Status: DISPENSED
Start: 2024-08-23

## (undated) RX ORDER — ASPIRIN 325 MG
TABLET ORAL
Status: DISPENSED
Start: 2022-10-12

## (undated) RX ORDER — DIAZEPAM 5 MG
TABLET ORAL
Status: DISPENSED
Start: 2022-11-08

## (undated) RX ORDER — ASPIRIN 81 MG/1
TABLET, CHEWABLE ORAL
Status: DISPENSED
Start: 2023-06-22

## (undated) RX ORDER — FENTANYL CITRATE 50 UG/ML
INJECTION, SOLUTION INTRAMUSCULAR; INTRAVENOUS
Status: DISPENSED
Start: 2023-06-09

## (undated) RX ORDER — ADENOSINE 3 MG/ML
INJECTION, SOLUTION INTRAVENOUS
Status: DISPENSED
Start: 2022-10-12

## (undated) RX ORDER — DIAZEPAM 5 MG
TABLET ORAL
Status: DISPENSED
Start: 2022-10-12